# Patient Record
Sex: MALE | Race: WHITE | NOT HISPANIC OR LATINO | Employment: OTHER | URBAN - METROPOLITAN AREA
[De-identification: names, ages, dates, MRNs, and addresses within clinical notes are randomized per-mention and may not be internally consistent; named-entity substitution may affect disease eponyms.]

---

## 2017-01-02 ENCOUNTER — LAB VISIT (OUTPATIENT)
Dept: LAB | Facility: HOSPITAL | Age: 77
End: 2017-01-02
Attending: FAMILY MEDICINE
Payer: MEDICARE

## 2017-01-02 DIAGNOSIS — Z93.1 GASTROSTOMY STATUS: ICD-10-CM

## 2017-01-02 DIAGNOSIS — Z99.11: Primary | ICD-10-CM

## 2017-01-02 DIAGNOSIS — J96.00 ACUTE RESPIRATORY FAILURE: ICD-10-CM

## 2017-01-02 LAB
BASOPHILS # BLD AUTO: 0.2 K/UL
BASOPHILS NFR BLD: 2.4 %
DIFFERENTIAL METHOD: ABNORMAL
EOSINOPHIL # BLD AUTO: 0.6 K/UL
EOSINOPHIL NFR BLD: 6.2 %
ERYTHROCYTE [DISTWIDTH] IN BLOOD BY AUTOMATED COUNT: 16 %
HCT VFR BLD AUTO: 37.6 %
HGB BLD-MCNC: 12.4 G/DL
LYMPHOCYTES # BLD AUTO: 2.7 K/UL
LYMPHOCYTES NFR BLD: 30 %
MCH RBC QN AUTO: 28.3 PG
MCHC RBC AUTO-ENTMCNC: 32.9 %
MCV RBC AUTO: 86 FL
MONOCYTES # BLD AUTO: 0.8 K/UL
MONOCYTES NFR BLD: 9 %
NEUTROPHILS # BLD AUTO: 4.7 K/UL
NEUTROPHILS NFR BLD: 52.4 %
PLATELET # BLD AUTO: 186 K/UL
PMV BLD AUTO: 11.5 FL
RBC # BLD AUTO: 4.37 M/UL
WBC # BLD AUTO: 9 K/UL

## 2017-01-02 PROCEDURE — 36415 COLL VENOUS BLD VENIPUNCTURE: CPT

## 2017-01-02 PROCEDURE — 85025 COMPLETE CBC W/AUTO DIFF WBC: CPT

## 2017-02-13 ENCOUNTER — LAB VISIT (OUTPATIENT)
Dept: LAB | Facility: HOSPITAL | Age: 77
End: 2017-02-13
Attending: FAMILY MEDICINE
Payer: MEDICARE

## 2017-02-13 DIAGNOSIS — J39.8 INCREASED TRACHEAL SECRETIONS: Primary | ICD-10-CM

## 2017-02-13 PROCEDURE — 87070 CULTURE OTHR SPECIMN AEROBIC: CPT

## 2017-02-13 PROCEDURE — 87205 SMEAR GRAM STAIN: CPT

## 2017-02-13 PROCEDURE — 87077 CULTURE AEROBIC IDENTIFY: CPT

## 2017-02-13 PROCEDURE — 87186 SC STD MICRODIL/AGAR DIL: CPT | Mod: 59

## 2017-02-16 LAB
BACTERIA SPEC AEROBE CULT: NORMAL
BACTERIA SPEC AEROBE CULT: NORMAL
GRAM STN SPEC: NORMAL

## 2017-02-24 ENCOUNTER — LAB VISIT (OUTPATIENT)
Dept: LAB | Facility: HOSPITAL | Age: 77
End: 2017-02-24
Attending: FAMILY MEDICINE
Payer: MEDICARE

## 2017-02-24 DIAGNOSIS — R50.9 TEMPERATURE ELEVATED: Primary | ICD-10-CM

## 2017-02-24 PROCEDURE — 87077 CULTURE AEROBIC IDENTIFY: CPT | Mod: 59

## 2017-02-24 PROCEDURE — 87205 SMEAR GRAM STAIN: CPT

## 2017-02-24 PROCEDURE — 87070 CULTURE OTHR SPECIMN AEROBIC: CPT

## 2017-02-24 PROCEDURE — 87186 SC STD MICRODIL/AGAR DIL: CPT

## 2017-02-27 LAB
BACTERIA SPEC AEROBE CULT: NORMAL
GRAM STN SPEC: NORMAL

## 2017-03-20 ENCOUNTER — HOSPITAL ENCOUNTER (INPATIENT)
Facility: HOSPITAL | Age: 77
LOS: 3 days | DRG: 698 | End: 2017-03-24
Attending: EMERGENCY MEDICINE | Admitting: INTERNAL MEDICINE
Payer: MEDICARE

## 2017-03-20 DIAGNOSIS — A41.9 SEPSIS, DUE TO UNSPECIFIED ORGANISM: ICD-10-CM

## 2017-03-20 DIAGNOSIS — E03.9 ACQUIRED HYPOTHYROIDISM: ICD-10-CM

## 2017-03-20 DIAGNOSIS — Z93.0 TRACHEOSTOMY DEPENDENCE: ICD-10-CM

## 2017-03-20 DIAGNOSIS — A41.9 SEPTIC SHOCK: ICD-10-CM

## 2017-03-20 DIAGNOSIS — Z93.1 PEG (PERCUTANEOUS ENDOSCOPIC GASTROSTOMY) STATUS: ICD-10-CM

## 2017-03-20 DIAGNOSIS — N39.0 URINARY TRACT INFECTION WITHOUT HEMATURIA, SITE UNSPECIFIED: Primary | ICD-10-CM

## 2017-03-20 DIAGNOSIS — J44.9 CHRONIC OBSTRUCTIVE PULMONARY DISEASE, UNSPECIFIED COPD TYPE: ICD-10-CM

## 2017-03-20 DIAGNOSIS — J95.851 VENTILATOR ASSOCIATED PNEUMONIA: ICD-10-CM

## 2017-03-20 DIAGNOSIS — R53.2 FUNCTIONAL QUADRIPLEGIA: ICD-10-CM

## 2017-03-20 DIAGNOSIS — R65.21 SEPTIC SHOCK: ICD-10-CM

## 2017-03-20 LAB
BACTERIA #/AREA URNS HPF: ABNORMAL /HPF
BILIRUB UR QL STRIP: NEGATIVE
CLARITY UR: ABNORMAL
COLOR UR: YELLOW
GLUCOSE UR QL STRIP: NEGATIVE
HGB UR QL STRIP: ABNORMAL
HYALINE CASTS #/AREA URNS LPF: 10 /LPF
KETONES UR QL STRIP: NEGATIVE
LEUKOCYTE ESTERASE UR QL STRIP: ABNORMAL
MICROSCOPIC COMMENT: ABNORMAL
NITRITE UR QL STRIP: POSITIVE
PH UR STRIP: >8 [PH] (ref 5–8)
PROT UR QL STRIP: ABNORMAL
RBC #/AREA URNS HPF: >100 /HPF (ref 0–4)
SP GR UR STRIP: 1.01 (ref 1–1.03)
URN SPEC COLLECT METH UR: ABNORMAL
UROBILINOGEN UR STRIP-ACNC: 1 EU/DL
WBC #/AREA URNS HPF: >100 /HPF (ref 0–5)

## 2017-03-20 PROCEDURE — 93005 ELECTROCARDIOGRAM TRACING: CPT

## 2017-03-20 PROCEDURE — 80053 COMPREHEN METABOLIC PANEL: CPT

## 2017-03-20 PROCEDURE — 84443 ASSAY THYROID STIM HORMONE: CPT

## 2017-03-20 PROCEDURE — 25000003 PHARM REV CODE 250: Performed by: EMERGENCY MEDICINE

## 2017-03-20 PROCEDURE — 27000221 HC OXYGEN, UP TO 24 HOURS

## 2017-03-20 PROCEDURE — 93010 ELECTROCARDIOGRAM REPORT: CPT | Mod: ,,, | Performed by: INTERNAL MEDICINE

## 2017-03-20 PROCEDURE — 36415 COLL VENOUS BLD VENIPUNCTURE: CPT

## 2017-03-20 PROCEDURE — 99291 CRITICAL CARE FIRST HOUR: CPT

## 2017-03-20 PROCEDURE — 85025 COMPLETE CBC W/AUTO DIFF WBC: CPT

## 2017-03-20 PROCEDURE — 5A1945Z RESPIRATORY VENTILATION, 24-96 CONSECUTIVE HOURS: ICD-10-PCS | Performed by: INTERNAL MEDICINE

## 2017-03-20 PROCEDURE — 96365 THER/PROPH/DIAG IV INF INIT: CPT | Mod: 59

## 2017-03-20 PROCEDURE — 87086 URINE CULTURE/COLONY COUNT: CPT

## 2017-03-20 PROCEDURE — 87040 BLOOD CULTURE FOR BACTERIA: CPT | Mod: 59

## 2017-03-20 PROCEDURE — 81000 URINALYSIS NONAUTO W/SCOPE: CPT

## 2017-03-20 PROCEDURE — 83605 ASSAY OF LACTIC ACID: CPT

## 2017-03-20 PROCEDURE — 94002 VENT MGMT INPAT INIT DAY: CPT

## 2017-03-20 PROCEDURE — 51701 INSERT BLADDER CATHETER: CPT

## 2017-03-20 PROCEDURE — 84439 ASSAY OF FREE THYROXINE: CPT

## 2017-03-20 RX ORDER — CIPROFLOXACIN 2 MG/ML
400 INJECTION, SOLUTION INTRAVENOUS
Status: COMPLETED | OUTPATIENT
Start: 2017-03-21 | End: 2017-03-21

## 2017-03-20 RX ADMIN — SODIUM CHLORIDE 1000 ML: 0.9 INJECTION, SOLUTION INTRAVENOUS at 11:03

## 2017-03-20 NOTE — IP AVS SNAPSHOT
[address] [place] [address]  Phone: [phone]           Patient Discharge Instructions     Our goal is to set you up for success. This packet includes information on your condition, medications, and your home care. It will help you to care for yourself so you don't get sicker and need to go back to the hospital.     Please ask your nurse if you have any questions.        There are many details to remember when preparing to leave the hospital. Here is what you will need to do:    1. Take your medicine. If you are prescribed medications, review your Medication List in the following pages. You may have new medications to  at the pharmacy and others that you'll need to stop taking. Review the instructions for how and when to take your medications. Talk with your doctor or nurses if you are unsure of what to do.     2. Go to your follow-up appointments. Specific follow-up information is listed in the following pages. Your may be contacted by a transition nurse or clinical provider about future appointments. Be sure we have all of the phone numbers to reach you, if needed. Please contact your provider's office if you are unable to make an appointment.     3. Watch for warning signs. Your doctor or nurse will give you detailed warning signs to watch for and when to call for assistance. These instructions may also include educational information about your condition. If you experience any of warning signs to your health, call your doctor.               [facility]  Unless otherwise directed by your provider, please contact [facility], our nurse care line that is available for 24/7 assistance.     [phone] (toll-free)    Registered nurses in the [facility] provide clinical advisement, health education, appointment booking, and other advisory services.                    ** Verify the list of medication(s) below is accurate and up to date.  Carry this with you in case of emergency. If your medications have changed, please notify your healthcare provider.             Medication List      START taking these medications        Additional Info                      amoxicillin-clavulanate 875-125mg 875-125 mg per tablet   Commonly known as:  AUGMENTIN   Quantity:  14 tablet   Refills:  0   Dose:  1 tablet    Instructions:  1 tablet by Per G Tube route 2 (two) times daily.     Begin Date    AM    Noon    PM    Bedtime       levoFLOXacin 500 MG tablet   Commonly known as:  LEVAQUIN   Quantity:  7 tablet   Refills:  0   Dose:  500 mg    Instructions:  1 tablet (500 mg total) by Per G Tube route once daily.     Begin Date    AM    Noon    PM    Bedtime         CHANGE how you take these medications        Additional Info                      famotidine 20 MG tablet   Commonly known as:  PEPCID   Quantity:  60 tablet   Refills:  11   Dose:  20 mg   What changed:    - how to take this  - when to take this    Instructions:  1 tablet (20 mg total) by Per G Tube route 2 (two) times daily.     Begin Date    AM    Noon    PM    Bedtime         CONTINUE taking these medications        Additional Info                      aspirin 325 MG tablet   Refills:  0   Dose:  325 mg    Last time this was given:  325 mg on 3/24/2017  9:05 AM   Instructions:  325 mg by PEG Tube route once daily.     Begin Date    AM    Noon    PM    Bedtime       CERTA PLUS ORAL   Refills:  0    Instructions:  Take by mouth.     Begin Date    AM    Noon    PM    Bedtime       duloxetine 30 MG capsule   Commonly known as:  CYMBALTA   Refills:  0   Dose:  30 mg    Last time this was given:  30 mg on 3/24/2017  9:05 AM   Instructions:  Take 30 mg by mouth once daily.     Begin Date    AM    Noon    PM    Bedtime       DUONEB 0.5 mg-3 mg(2.5 mg base)/3 mL nebulizer solution   Refills:  0   Dose:  3 mL   Generic drug:  albuterol-ipratropium 2.5mg-0.5mg/3mL    Last time this was given:  3 mLs on 3/24/2017   1:23 PM   Instructions:  Take 3 mLs by nebulization every 4 (four) hours as needed.     Begin Date    AM    Noon    PM    Bedtime       EXELON 4.6 mg/24 hr Pt24   Refills:  0   Dose:  1 patch   Generic drug:  rivastigmine    Last time this was given:  1 patch on 3/24/2017  9:05 AM   Instructions:  Place 1 patch onto the skin once daily.     Begin Date    AM    Noon    PM    Bedtime       ferrous sulfate 300 mg (60 mg iron)/5 mL syrup   Refills:  0   Dose:  300 mg    Last time this was given:  300 mg on 3/24/2017  9:06 AM   Instructions:  300 mg by PEG Tube route once daily.     Begin Date    AM    Noon    PM    Bedtime       finasteride 5 mg tablet   Commonly known as:  PROSCAR   Refills:  0   Dose:  5 mg    Last time this was given:  5 mg on 3/24/2017  9:05 AM   Instructions:  Take 5 mg by mouth once daily.     Begin Date    AM    Noon    PM    Bedtime       FLOMAX 0.4 mg Cp24   Refills:  0   Dose:  0.4 mg   Generic drug:  tamsulosin    Last time this was given:  0.4 mg on 3/24/2017  9:05 AM   Instructions:  Take 0.4 mg by mouth once daily.     Begin Date    AM    Noon    PM    Bedtime       gabapentin 400 MG capsule   Commonly known as:  NEURONTIN   Refills:  0   Dose:  400 mg    Last time this was given:  400 mg on 3/24/2017  2:05 PM   Instructions:  Take 400 mg by mouth every 8 (eight) hours.     Begin Date    AM    Noon    PM    Bedtime       hydrocodone-acetaminophen 10-325mg  mg Tab   Commonly known as:  NORCO   Refills:  0    Last time this was given:  1 tablet on 3/24/2017  9:05 AM   Instructions:  Take by mouth.     Begin Date    AM    Noon    PM    Bedtime       ISOSOURCE HN Liqd   Refills:  0   Dose:  70 mL   Generic drug:  nutritional supplements    Instructions:  Take 70 mLs by mouth Daily.     Begin Date    AM    Noon    PM    Bedtime       levothyroxine 50 MCG tablet   Commonly known as:  SYNTHROID   Quantity:  30 tablet   Refills:  11   Dose:  50 mcg    Instructions:  1 tablet (50 mcg total)  by Per G Tube route before breakfast.     Begin Date    AM    Noon    PM    Bedtime       liothyronine 25 MCG Tab   Commonly known as:  CYTOMEL   Refills:  0   Dose:  25 mcg    Instructions:  Take 25 mcg by mouth once daily.     Begin Date    AM    Noon    PM    Bedtime       melatonin 3 mg Tab   Refills:  0   Dose:  3 mg    Instructions:  3 mg by PEG Tube route every evening.     Begin Date    AM    Noon    PM    Bedtime       oxybutynin 5 MG Tab   Commonly known as:  DITROPAN   Quantity:  90 tablet   Refills:  11   Dose:  5 mg    Instructions:  1 tablet (5 mg total) by Per G Tube route 3 (three) times daily.     Begin Date    AM    Noon    PM    Bedtime       potassium chloride 20 mEq Pack   Commonly known as:  KLOR-CON   Refills:  0   Dose:  20 mEq    Instructions:  Take 20 mEq by mouth 2 (two) times daily.     Begin Date    AM    Noon    PM    Bedtime       trazodone 50 MG tablet   Commonly known as:  DESYREL   Refills:  0   Dose:  50 mg    Last time this was given:  50 mg on 3/23/2017  9:30 PM   Instructions:  Take 50 mg by mouth every evening.     Begin Date    AM    Noon    PM    Bedtime       VITAMIN C 500 mg/5 mL Syrp syrup   Refills:  0   Dose:  500 mg   Generic drug:  ascorbic acid (vitamin C)    Instructions:  Take 500 mg by mouth once daily.     Begin Date    AM    Noon    PM    Bedtime         STOP taking these medications     ciprofloxacin HCl 500 MG tablet   Commonly known as:  CIPRO            Where to Get Your Medications      You can get these medications from any pharmacy     Bring a paper prescription for each of these medications     amoxicillin-clavulanate 875-125mg 875-125 mg per tablet    famotidine 20 MG tablet    levoFLOXacin 500 MG tablet                  Please bring to all follow up appointments:    1. A copy of your discharge instructions.  2. All medicines you are currently taking in their original bottles.  3. Identification and insurance card.    Please arrive 15 minutes ahead of  scheduled appointment time.    Please call 24 hours in advance if you must reschedule your appointment and/or time.        Follow-up Information     Follow up with Jeramie Lombardi MD.    Specialty:  Family Medicine    Contact information:    [place] [id number] [phone]          Follow up with [facility] Osmin Taylor.    Specialties:  SNF Agency, Nursing Home Agency    Why:  alf, Nursing Home    Contact information:    [address] [place] [id number] [phone]          Discharge Instructions     Future Orders    Call MD for:     Comments:    For worsening symptoms, chest pain, shortness of breath, increased abdominal pain, high grade fever, stroke or stroke like symptoms, immediately go to the nearest Emergency Room or call 911 as soon as possible.    Diet general     Comments:    PEG feeding: Peptamin Bariatric at 55cc/hre with free water 120 cc q 4.    Questions:    Total calories:      Fat restriction, if any:      Protein restriction, if any:      Na restriction, if any:      Fluid restriction:      Additional restrictions:      Other restrictions (specify):     Comments:    Fall precautions        Discharge Instructions       Admit to [facility]     Dx-   Patient Active Problem List   Diagnosis    Sepsis    Functional quadriplegia    Respiratory failure, acute-on-chronic    SIRS with acute organ dysfunction due to infectious process    Tracheostomy dependence    Chronic obstructive pulmonary disease    Hematuria    Hypotension    Urinary tract infection without hematuria    Septic shock    PEG (percutaneous endoscopic gastrostomy) status    Ventilator associated pneumonia    Acquired hypothyroidism     Diet- NPO  TF- Peptamen Bariatric @55cc/hr; Hold TF x4 hrs for residuals >250mls. Flush 120 mls Q4 hrs to meet fluid need  Aspiration Precautions   Keep HOB @30 degrees   Peg care per protocol    Vent Mode: A/C  Oxygen Concentration  (%):  [40] 40  Resp Rate Total:  [10 br/min-33 br/min] 18 br/min  Vt Set:  [700 mL] 700 mL  PEEP/CPAP:  [5 cmH20] 5 cmH20  Pressure Support:  [0 cmH20] 0 cmH20  Mean Airway Pressure:  [11 seR55-20 cmH20] 11 cmH20     Trach care per protocol     Levaquin and Augmentin x7days; stop date- 3/31/17; Dx- UTI        Primary Diagnosis     Your primary diagnosis was:  Septic Shock      Admission Information     Date & Time Provider Department CSN    3/20/2017 10:23 PM Melissa Mayfield MD [facility] [id number]      Care Providers     Provider Role Specialty Primary office phone    Melissa Mayfield MD Attending Provider Internal Medicine [phone]      Your Vitals Were     BP Pulse Temp Resp Height Weight    107/66 (BP Location: Right arm, Patient Position: Lying, BP Method: Automatic) 87 100 °F (37.8 °C) (Oral) 23 6' (1.829 m) 119 kg (262 lb 5.6 oz)    SpO2 BMI             95% 35.58 kg/m2         Recent Lab Values     No lab values to display.      Pending Labs     Order Current Status    Blood culture Preliminary result    Blood culture Preliminary result      Allergies as of 3/24/2017        Reactions    Codeine Other (See Comments)      Advance Directives     An advance directive is a document which, in the event you are no longer able to make decisions for yourself, tells your healthcare team what kind of treatment you do or do not want to receive, or who you would like to make those decisions for you.  If you do not currently have an advance directive, [facility] encourages you to create one.  For more information call:  [phone] ([phone]), [phone] ([phone]),  or log on to [email or web address].        Smoking Cessation     If you would like to quit smoking:   You may be eligible for free services if you are a [place] resident and started smoking cigarettes before September 1, 1988.  Call the Smoking Cessation Trust ([facility]) toll free at [phone] or [phone].   Call  1-800-QUIT-NOW if you do not meet the above criteria.            Language Assistance Services     ATTENTION: Language assistance services are available, free of charge. Please call [phone].      ATENCIÓN: Si clarisse villafana, tiene a gannon disposición servicios gratuitos de asistencia lingüística. Llame al [phone].     CHÚ Ý: N?u b?n nói Ti?ng Vi?t, có các d?ch v? h? tr? ngôn ng? mi?n phí dành cho b?n. G?i s? [phone].        [facility] Sign-Up     Activating your [facility] account is as easy as 1-2-3!     1) Visit [email or web address], select Sign Up Now, enter this activation code and your date of birth, then select Next.  [email or web address]  Expires: 5/8/2017 12:09 PM      2) Create a username and password to use when you visit [facility] in the future and select a security question in case you lose your password and select Next.    3) Enter your e-mail address and click Sign Up!    Additional Information  If you have questions, please e-mail [email or web address] or call [phone] to talk to our [facility] staff. Remember, [facility] is NOT to be used for urgent needs. For medical emergencies, dial 911.          [facility] complies with applicable Federal civil rights laws and does not discriminate on the basis of race, color, national origin, age, disability, or sex.

## 2017-03-21 PROBLEM — N39.0 URINARY TRACT INFECTION WITHOUT HEMATURIA: Status: ACTIVE | Noted: 2017-03-21

## 2017-03-21 PROBLEM — R65.21 SEPTIC SHOCK: Status: ACTIVE | Noted: 2017-03-21

## 2017-03-21 PROBLEM — J95.851 VENTILATOR ASSOCIATED PNEUMONIA: Status: ACTIVE | Noted: 2017-03-21

## 2017-03-21 PROBLEM — E03.9 ACQUIRED HYPOTHYROIDISM: Status: ACTIVE | Noted: 2017-03-21

## 2017-03-21 PROBLEM — Z93.1 PEG (PERCUTANEOUS ENDOSCOPIC GASTROSTOMY) STATUS: Status: ACTIVE | Noted: 2017-03-21

## 2017-03-21 PROBLEM — A41.9 SEPTIC SHOCK: Status: ACTIVE | Noted: 2017-03-21

## 2017-03-21 LAB
ALBUMIN SERPL BCP-MCNC: 2.8 G/DL
ALP SERPL-CCNC: 89 U/L
ALT SERPL W/O P-5'-P-CCNC: 22 U/L
ANION GAP SERPL CALC-SCNC: 7 MMOL/L
APTT BLDCRRT: 27.4 SEC
AST SERPL-CCNC: 21 U/L
BASOPHILS # BLD AUTO: 0 K/UL
BASOPHILS # BLD AUTO: 0.1 K/UL
BASOPHILS NFR BLD: 0.3 %
BASOPHILS NFR BLD: 0.5 %
BILIRUB SERPL-MCNC: 1.1 MG/DL
BUN SERPL-MCNC: 33 MG/DL
CALCIUM SERPL-MCNC: 9.6 MG/DL
CHLORIDE SERPL-SCNC: 97 MMOL/L
CO2 SERPL-SCNC: 33 MMOL/L
CREAT SERPL-MCNC: 1.1 MG/DL
DIFFERENTIAL METHOD: ABNORMAL
DIFFERENTIAL METHOD: ABNORMAL
EOSINOPHIL # BLD AUTO: 0.1 K/UL
EOSINOPHIL # BLD AUTO: 0.1 K/UL
EOSINOPHIL NFR BLD: 0.9 %
EOSINOPHIL NFR BLD: 0.9 %
ERYTHROCYTE [DISTWIDTH] IN BLOOD BY AUTOMATED COUNT: 13.5 %
ERYTHROCYTE [DISTWIDTH] IN BLOOD BY AUTOMATED COUNT: 13.6 %
EST. GFR  (AFRICAN AMERICAN): >60 ML/MIN/1.73 M^2
EST. GFR  (NON AFRICAN AMERICAN): >60 ML/MIN/1.73 M^2
GLUCOSE SERPL-MCNC: 95 MG/DL
HCT VFR BLD AUTO: 31.4 %
HCT VFR BLD AUTO: 34.7 %
HGB BLD-MCNC: 10.4 G/DL
HGB BLD-MCNC: 11.5 G/DL
INR PPP: 1.3
LACTATE SERPL-SCNC: 0.7 MMOL/L
LACTATE SERPL-SCNC: 1.1 MMOL/L
LYMPHOCYTES # BLD AUTO: 1.3 K/UL
LYMPHOCYTES # BLD AUTO: 1.3 K/UL
LYMPHOCYTES NFR BLD: 12.1 %
LYMPHOCYTES NFR BLD: 13.5 %
MCH RBC QN AUTO: 28.5 PG
MCH RBC QN AUTO: 28.6 PG
MCHC RBC AUTO-ENTMCNC: 33.1 %
MCHC RBC AUTO-ENTMCNC: 33.2 %
MCV RBC AUTO: 86 FL
MCV RBC AUTO: 86 FL
MONOCYTES # BLD AUTO: 1.2 K/UL
MONOCYTES # BLD AUTO: 1.4 K/UL
MONOCYTES NFR BLD: 11.3 %
MONOCYTES NFR BLD: 14.4 %
NEUTROPHILS # BLD AUTO: 7.1 K/UL
NEUTROPHILS # BLD AUTO: 7.9 K/UL
NEUTROPHILS NFR BLD: 70.9 %
NEUTROPHILS NFR BLD: 75.2 %
PLATELET # BLD AUTO: 114 K/UL
PLATELET # BLD AUTO: 141 K/UL
PMV BLD AUTO: 11.1 FL
PMV BLD AUTO: 11.1 FL
POTASSIUM SERPL-SCNC: 4.6 MMOL/L
PROT SERPL-MCNC: 7.9 G/DL
PROTHROMBIN TIME: 13.5 SEC
RBC # BLD AUTO: 3.64 M/UL
RBC # BLD AUTO: 4.03 M/UL
SODIUM SERPL-SCNC: 137 MMOL/L
T4 FREE SERPL-MCNC: 0.75 NG/DL
TSH SERPL DL<=0.005 MIU/L-ACNC: 0.02 UIU/ML
WBC # BLD AUTO: 10 K/UL
WBC # BLD AUTO: 10.5 K/UL

## 2017-03-21 PROCEDURE — 25000003 PHARM REV CODE 250: Performed by: INTERNAL MEDICINE

## 2017-03-21 PROCEDURE — 27000221 HC OXYGEN, UP TO 24 HOURS

## 2017-03-21 PROCEDURE — 87205 SMEAR GRAM STAIN: CPT

## 2017-03-21 PROCEDURE — 94003 VENT MGMT INPAT SUBQ DAY: CPT

## 2017-03-21 PROCEDURE — 94761 N-INVAS EAR/PLS OXIMETRY MLT: CPT

## 2017-03-21 PROCEDURE — 63600175 PHARM REV CODE 636 W HCPCS: Performed by: NURSE PRACTITIONER

## 2017-03-21 PROCEDURE — 25000242 PHARM REV CODE 250 ALT 637 W/ HCPCS: Performed by: NURSE PRACTITIONER

## 2017-03-21 PROCEDURE — 63600175 PHARM REV CODE 636 W HCPCS: Performed by: EMERGENCY MEDICINE

## 2017-03-21 PROCEDURE — C9113 INJ PANTOPRAZOLE SODIUM, VIA: HCPCS | Performed by: NURSE PRACTITIONER

## 2017-03-21 PROCEDURE — 87186 SC STD MICRODIL/AGAR DIL: CPT | Mod: 59

## 2017-03-21 PROCEDURE — 85610 PROTHROMBIN TIME: CPT

## 2017-03-21 PROCEDURE — 96368 THER/DIAG CONCURRENT INF: CPT

## 2017-03-21 PROCEDURE — 83605 ASSAY OF LACTIC ACID: CPT

## 2017-03-21 PROCEDURE — 87070 CULTURE OTHR SPECIMN AEROBIC: CPT

## 2017-03-21 PROCEDURE — 20000000 HC ICU ROOM

## 2017-03-21 PROCEDURE — 96365 THER/PROPH/DIAG IV INF INIT: CPT

## 2017-03-21 PROCEDURE — 36415 COLL VENOUS BLD VENIPUNCTURE: CPT

## 2017-03-21 PROCEDURE — 25000003 PHARM REV CODE 250: Performed by: NURSE PRACTITIONER

## 2017-03-21 PROCEDURE — 94770 HC EXHALED C02 TEST: CPT

## 2017-03-21 PROCEDURE — 25000003 PHARM REV CODE 250: Performed by: EMERGENCY MEDICINE

## 2017-03-21 PROCEDURE — 94640 AIRWAY INHALATION TREATMENT: CPT

## 2017-03-21 PROCEDURE — 97802 MEDICAL NUTRITION INDIV IN: CPT | Performed by: DIETITIAN, REGISTERED

## 2017-03-21 PROCEDURE — 85025 COMPLETE CBC W/AUTO DIFF WBC: CPT

## 2017-03-21 PROCEDURE — 87077 CULTURE AEROBIC IDENTIFY: CPT | Mod: 59

## 2017-03-21 PROCEDURE — 85730 THROMBOPLASTIN TIME PARTIAL: CPT

## 2017-03-21 PROCEDURE — 36556 INSERT NON-TUNNEL CV CATH: CPT

## 2017-03-21 PROCEDURE — 99223 1ST HOSP IP/OBS HIGH 75: CPT | Mod: ,,, | Performed by: INTERNAL MEDICINE

## 2017-03-21 RX ORDER — GABAPENTIN 400 MG/1
400 CAPSULE ORAL EVERY 8 HOURS
Status: ON HOLD | COMMUNITY
End: 2017-10-07

## 2017-03-21 RX ORDER — IBUPROFEN 200 MG
16 TABLET ORAL
Status: DISCONTINUED | OUTPATIENT
Start: 2017-03-21 | End: 2017-03-24 | Stop reason: HOSPADM

## 2017-03-21 RX ORDER — DULOXETIN HYDROCHLORIDE 30 MG/1
30 CAPSULE, DELAYED RELEASE ORAL DAILY
COMMUNITY

## 2017-03-21 RX ORDER — POTASSIUM CHLORIDE 1.5 G/1.58G
20 POWDER, FOR SOLUTION ORAL 2 TIMES DAILY
COMMUNITY
End: 2019-01-01

## 2017-03-21 RX ORDER — ACETAMINOPHEN 650 MG/20.3ML
650 LIQUID ORAL EVERY 4 HOURS PRN
Status: DISCONTINUED | OUTPATIENT
Start: 2017-03-21 | End: 2017-03-24 | Stop reason: HOSPADM

## 2017-03-21 RX ORDER — PANTOPRAZOLE SODIUM 40 MG/10ML
40 INJECTION, POWDER, LYOPHILIZED, FOR SOLUTION INTRAVENOUS DAILY
Status: DISCONTINUED | OUTPATIENT
Start: 2017-03-21 | End: 2017-03-24 | Stop reason: HOSPADM

## 2017-03-21 RX ORDER — SODIUM CHLORIDE 9 MG/ML
INJECTION, SOLUTION INTRAVENOUS CONTINUOUS
Status: DISCONTINUED | OUTPATIENT
Start: 2017-03-21 | End: 2017-03-24

## 2017-03-21 RX ORDER — IPRATROPIUM BROMIDE AND ALBUTEROL SULFATE 2.5; .5 MG/3ML; MG/3ML
3 SOLUTION RESPIRATORY (INHALATION) EVERY 6 HOURS
Status: DISCONTINUED | OUTPATIENT
Start: 2017-03-21 | End: 2017-03-24 | Stop reason: HOSPADM

## 2017-03-21 RX ORDER — FINASTERIDE 5 MG/1
5 TABLET, FILM COATED ORAL DAILY
COMMUNITY

## 2017-03-21 RX ORDER — ACETAMINOPHEN 10 MG/ML
1000 INJECTION, SOLUTION INTRAVENOUS ONCE
Status: COMPLETED | OUTPATIENT
Start: 2017-03-21 | End: 2017-03-21

## 2017-03-21 RX ORDER — FERROUS SULFATE 300 MG/5ML
300 LIQUID (ML) ORAL DAILY
Status: DISCONTINUED | OUTPATIENT
Start: 2017-03-21 | End: 2017-03-24 | Stop reason: HOSPADM

## 2017-03-21 RX ORDER — ASPIRIN 325 MG
325 TABLET ORAL DAILY
Status: DISCONTINUED | OUTPATIENT
Start: 2017-03-21 | End: 2017-03-24 | Stop reason: HOSPADM

## 2017-03-21 RX ORDER — ENOXAPARIN SODIUM 100 MG/ML
40 INJECTION SUBCUTANEOUS EVERY 24 HOURS
Status: DISCONTINUED | OUTPATIENT
Start: 2017-03-21 | End: 2017-03-24 | Stop reason: HOSPADM

## 2017-03-21 RX ORDER — ASCORBIC ACID 500 MG/5ML
500 SYRUP ORAL 2 TIMES DAILY
COMMUNITY

## 2017-03-21 RX ORDER — FAMOTIDINE 20 MG/1
20 TABLET, FILM COATED ORAL DAILY
Status: ON HOLD | COMMUNITY
End: 2017-03-24

## 2017-03-21 RX ORDER — TAMSULOSIN HYDROCHLORIDE 0.4 MG/1
0.4 CAPSULE ORAL DAILY
Status: DISCONTINUED | OUTPATIENT
Start: 2017-03-21 | End: 2017-03-24 | Stop reason: HOSPADM

## 2017-03-21 RX ORDER — FINASTERIDE 5 MG/1
5 TABLET, FILM COATED ORAL DAILY
Status: DISCONTINUED | OUTPATIENT
Start: 2017-03-21 | End: 2017-03-24 | Stop reason: HOSPADM

## 2017-03-21 RX ORDER — LIOTHYRONINE SODIUM 25 UG/1
75 TABLET ORAL DAILY
COMMUNITY
End: 2019-01-01

## 2017-03-21 RX ORDER — NOREPINEPHRINE BITARTRATE/D5W 8 MG/250ML
0.05 PLASTIC BAG, INJECTION (ML) INTRAVENOUS CONTINUOUS
Status: DISCONTINUED | OUTPATIENT
Start: 2017-03-21 | End: 2017-03-24

## 2017-03-21 RX ORDER — TAMSULOSIN HYDROCHLORIDE 0.4 MG/1
0.4 CAPSULE ORAL DAILY
COMMUNITY
End: 2017-03-27

## 2017-03-21 RX ORDER — IBUPROFEN 200 MG
24 TABLET ORAL
Status: DISCONTINUED | OUTPATIENT
Start: 2017-03-21 | End: 2017-03-24 | Stop reason: HOSPADM

## 2017-03-21 RX ORDER — HYDROCODONE BITARTRATE AND ACETAMINOPHEN 10; 325 MG/1; MG/1
1 TABLET ORAL EVERY 6 HOURS PRN
Status: DISCONTINUED | OUTPATIENT
Start: 2017-03-21 | End: 2017-03-24 | Stop reason: HOSPADM

## 2017-03-21 RX ORDER — GABAPENTIN 400 MG/1
400 CAPSULE ORAL EVERY 8 HOURS
Status: DISCONTINUED | OUTPATIENT
Start: 2017-03-21 | End: 2017-03-24 | Stop reason: HOSPADM

## 2017-03-21 RX ORDER — HYDROCODONE BITARTRATE AND ACETAMINOPHEN 10; 325 MG/1; MG/1
1 TABLET ORAL EVERY 6 HOURS PRN
Status: ON HOLD | COMMUNITY
End: 2017-10-16

## 2017-03-21 RX ORDER — TRAZODONE HYDROCHLORIDE 50 MG/1
50 TABLET ORAL NIGHTLY
Status: DISCONTINUED | OUTPATIENT
Start: 2017-03-21 | End: 2017-03-24 | Stop reason: HOSPADM

## 2017-03-21 RX ORDER — TRAZODONE HYDROCHLORIDE 50 MG/1
50 TABLET ORAL NIGHTLY
Status: ON HOLD | COMMUNITY
End: 2017-10-07

## 2017-03-21 RX ORDER — LEVOTHYROXINE SODIUM 50 UG/1
50 TABLET ORAL
Status: DISCONTINUED | OUTPATIENT
Start: 2017-03-21 | End: 2017-03-21

## 2017-03-21 RX ORDER — CIPROFLOXACIN 500 MG/1
500 TABLET ORAL 2 TIMES DAILY
Status: ON HOLD | COMMUNITY
End: 2017-03-24 | Stop reason: HOSPADM

## 2017-03-21 RX ORDER — GLUCAGON 1 MG
1 KIT INJECTION
Status: DISCONTINUED | OUTPATIENT
Start: 2017-03-21 | End: 2017-03-24 | Stop reason: HOSPADM

## 2017-03-21 RX ORDER — HYDROCODONE BITARTRATE AND ACETAMINOPHEN 7.5; 325 MG/15ML; MG/15ML
5 SOLUTION ORAL EVERY 6 HOURS PRN
Status: DISCONTINUED | OUTPATIENT
Start: 2017-03-21 | End: 2017-03-21

## 2017-03-21 RX ORDER — RIVASTIGMINE 4.6 MG/24H
1 PATCH, EXTENDED RELEASE TRANSDERMAL DAILY
Status: DISCONTINUED | OUTPATIENT
Start: 2017-03-21 | End: 2017-03-24 | Stop reason: HOSPADM

## 2017-03-21 RX ORDER — DULOXETIN HYDROCHLORIDE 30 MG/1
30 CAPSULE, DELAYED RELEASE ORAL DAILY
Status: DISCONTINUED | OUTPATIENT
Start: 2017-03-21 | End: 2017-03-24 | Stop reason: HOSPADM

## 2017-03-21 RX ORDER — LIOTHYRONINE SODIUM 25 UG/1
25 TABLET ORAL DAILY
Status: DISCONTINUED | OUTPATIENT
Start: 2017-03-21 | End: 2017-03-21

## 2017-03-21 RX ADMIN — HYDROCODONE BITARTRATE AND ACETAMINOPHEN 5 ML: 2.5; 108 SOLUTION ORAL at 01:03

## 2017-03-21 RX ADMIN — TRAZODONE HYDROCHLORIDE 50 MG: 50 TABLET ORAL at 08:03

## 2017-03-21 RX ADMIN — IPRATROPIUM BROMIDE AND ALBUTEROL SULFATE 3 ML: .5; 3 SOLUTION RESPIRATORY (INHALATION) at 07:03

## 2017-03-21 RX ADMIN — GABAPENTIN 400 MG: 400 CAPSULE ORAL at 01:03

## 2017-03-21 RX ADMIN — CIPROFLOXACIN 400 MG: 2 INJECTION, SOLUTION INTRAVENOUS at 12:03

## 2017-03-21 RX ADMIN — ENOXAPARIN SODIUM 40 MG: 100 INJECTION SUBCUTANEOUS at 04:03

## 2017-03-21 RX ADMIN — PIPERACILLIN SODIUM AND TAZOBACTAM SODIUM 4.5 G: 4; .5 INJECTION, POWDER, FOR SOLUTION INTRAVENOUS at 03:03

## 2017-03-21 RX ADMIN — PANTOPRAZOLE SODIUM 40 MG: 40 INJECTION, POWDER, FOR SOLUTION INTRAVENOUS at 10:03

## 2017-03-21 RX ADMIN — SODIUM CHLORIDE 1570 ML: 0.9 INJECTION, SOLUTION INTRAVENOUS at 05:03

## 2017-03-21 RX ADMIN — PIPERACILLIN AND TAZOBACTAM 3.38 G: 3; .375 INJECTION, POWDER, LYOPHILIZED, FOR SOLUTION INTRAVENOUS; PARENTERAL at 12:03

## 2017-03-21 RX ADMIN — RIVASTIGMINE TRANSDERMAL SYSTEM 1 PATCH: 4.6 PATCH, EXTENDED RELEASE TRANSDERMAL at 10:03

## 2017-03-21 RX ADMIN — SODIUM CHLORIDE 1000 ML: 0.9 INJECTION, SOLUTION INTRAVENOUS at 12:03

## 2017-03-21 RX ADMIN — ACETAMINOPHEN 1000 MG: 10 INJECTION, SOLUTION INTRAVENOUS at 05:03

## 2017-03-21 RX ADMIN — MINERAL SUPPLEMENT IRON 300 MG / 5 ML STRENGTH LIQUID 100 PER BOX UNFLAVORED 300 MG: at 10:03

## 2017-03-21 RX ADMIN — DULOXETINE HYDROCHLORIDE 30 MG: 30 CAPSULE, DELAYED RELEASE ORAL at 10:03

## 2017-03-21 RX ADMIN — TAMSULOSIN HYDROCHLORIDE 0.4 MG: 0.4 CAPSULE ORAL at 10:03

## 2017-03-21 RX ADMIN — ACETAMINOPHEN 650 MG: 160 SOLUTION ORAL at 08:03

## 2017-03-21 RX ADMIN — Medication 0.05 MCG/KG/MIN: at 02:03

## 2017-03-21 RX ADMIN — GABAPENTIN 400 MG: 400 CAPSULE ORAL at 09:03

## 2017-03-21 RX ADMIN — GABAPENTIN 400 MG: 400 CAPSULE ORAL at 05:03

## 2017-03-21 RX ADMIN — HYDROCODONE BITARTRATE AND ACETAMINOPHEN 1 TABLET: 10; 325 TABLET ORAL at 09:03

## 2017-03-21 RX ADMIN — ACETAMINOPHEN 650 MG: 160 SOLUTION ORAL at 03:03

## 2017-03-21 RX ADMIN — FINASTERIDE 5 MG: 5 TABLET, FILM COATED ORAL at 10:03

## 2017-03-21 RX ADMIN — TRAZODONE HYDROCHLORIDE 50 MG: 50 TABLET ORAL at 05:03

## 2017-03-21 RX ADMIN — SODIUM CHLORIDE: 0.9 INJECTION, SOLUTION INTRAVENOUS at 05:03

## 2017-03-21 RX ADMIN — VANCOMYCIN HYDROCHLORIDE 1000 MG: 1 INJECTION, POWDER, LYOPHILIZED, FOR SOLUTION INTRAVENOUS at 12:03

## 2017-03-21 RX ADMIN — IPRATROPIUM BROMIDE AND ALBUTEROL SULFATE 3 ML: .5; 3 SOLUTION RESPIRATORY (INHALATION) at 01:03

## 2017-03-21 RX ADMIN — ASPIRIN 325 MG ORAL TABLET 325 MG: 325 PILL ORAL at 10:03

## 2017-03-21 RX ADMIN — Medication 500 MG: at 12:03

## 2017-03-21 RX ADMIN — PIPERACILLIN SODIUM AND TAZOBACTAM SODIUM 4.5 G: 4; .5 INJECTION, POWDER, FOR SOLUTION INTRAVENOUS at 08:03

## 2017-03-21 RX ADMIN — HYDROCODONE BITARTRATE AND ACETAMINOPHEN 1 TABLET: 10; 325 TABLET ORAL at 03:03

## 2017-03-21 NOTE — CONSULTS
Masood Messina [id number] is a 76 y.o. male who has been consulted for vancomycin dosing.    The patient has the following labs:     Date Creatinine (mg/dl)    BUN WBC Count   3/21/2017 Estimated Creatinine Clearance: 74.9 mL/min (based on Cr of 1.1). Lab Results   Component Value Date    BUN 33 (H) 03/20/2017     Lab Results   Component Value Date    WBC 10.50 03/20/2017        Current weight is 119 kg (262 lb 5.6 oz)      The patient received  1000 mg on 3/21 at 0003.    The patient will be started on vancomycin at a dose of 1500 mg every 24 hours. The vancomycin trough has been ordered for 3/22 at 2330.  Target trough goal is 15 to 20.    Patient will be followed by pharmacy for changes in renal function, toxicity, and efficacy.   Thank you for allowing us to participate in this patient's care.     Juli [place]

## 2017-03-21 NOTE — PLAN OF CARE
03/21/17 0743   Patient Assessment/Suction   Level of Consciousness (AVPU) alert   Respiratory Effort Normal;Unlabored   Expansion/Accessory Muscles/Retractions no use of accessory muscles   All Lung Fields Breath Sounds diminished;clear   Rhythm/Pattern, Respiratory mechanical device   Cough Frequency infrequent   Cough Type assisted   Suction Method in-line suction catheter (closed)   PRE-TX-O2-ETCO2   O2 Device (Oxygen Therapy) ventilator   $ Is the patient on Oxygen? Yes   Oxygen Concentration (%) 40   SpO2 100 %   Pulse Oximetry Type Continuous   $ Pulse Oximetry - Multiple Charge Pulse Oximetry - Multiple   ETCO2 (mmHg) 42 mmHg   $ ETCO2 Charge Exhaled CO2 Monitoring   Pulse 75   Resp 10   Aerosol Therapy   $ Aerosol Therapy Charges Aerosol Treatment   Respiratory Treatment Status given   SVN/Inhaler Treatment Route in-line   Position During Treatment HOB at 30 degrees   Patient Tolerance good   Post-Treatment   Post-treatment Heart Rate (beats/min) 78       Surgical Airway Portex Cuffed   No Placement Date or Time found.   Inserted by: Present Prior to Hospital Arrival  Brand: Portex  Airway Device Size: 8.0  Style: Cuffed   Cuff Pressure 28 cm H2O   Status Secured   Site Assessment Clean;Dry   Ties Assessment Clean;Dry;Intact;Secure   Vent Select   Conventional Vent Y   $ Ventilator Charges Ventilator Subsequent   Preset Conventional Ventilator Settings   Vent Type    Ventilation Type VC   Vent Mode A/C   Set Rate 10 bmp   Vt Set 700 mL   PEEP/CPAP 5 cmH20   Pressure Support 0 cmH20   Waveform RAMP   Peak Flow 60 L/min   Set Inspiratory Pressure 0 cmH20   Insp Time 0 Sec(s)   Plateau Set/Insp. Hold (sec) 0   Insp Rise Time  0 %   Trigger Sensitivity Flow/I-Trigger 3 L/min   P High 0 cm H2O   P Low 0 cm H2O   T High 0 sec   T Low 0 sec   Patient Ventilator Parameters   Resp Rate Total 11 br/min   Peak Airway Pressure 32 cmH2O   Mean Airway Pressure 11 cmH20   Exhaled Vt 0 mL   Total Ve 6.83 mL   Spont  Ve 0 L   I:E Ratio Measured 6.20:1   Conventional Ventilator Alarms   Ve High Alarm 26 L/min   Resp Rate High Alarm 40 br/min   Press High Alarm 40 cmH2O   Apnea Rate 10   Apnea Volume (mL) 690 mL   Apnea Oxygen Concentration  100   Apnea Flow Rate (L/min) 83   T Apnea 20 sec(s)   Ready to Wean/Extubation Screen   FIO2<60 (chart decimal) 0.4   MV<16L (chart vol.) 6.83   PEEP <10 (chart #) 5   Airway Safety   Ambu bag with the patient? Yes, Adult Ambu   Is mask with the patient? Yes, Adult Mask       Aerosol treatments q6 hours. Patient tolerated well. All ventilator alarms on and functioning properly.

## 2017-03-21 NOTE — PLAN OF CARE
Problem: Fall Risk (Adult)  Goal: Identify Related Risk Factors and Signs and Symptoms  Related risk factors and signs and symptoms are identified upon initiation of Human Response Clinical Practice Guideline (CPG)   Outcome: Ongoing (interventions implemented as appropriate)  No falls this shift. Safety maintained.  Goal: Absence of Falls  Patient will demonstrate the desired outcomes by discharge/transition of care.   Outcome: Ongoing (interventions implemented as appropriate)  No falls this shift.    Problem: Patient Care Overview  Goal: Plan of Care Review  Outcome: Ongoing (interventions implemented as appropriate)  VS monitored throughout shift. Vasopressor titrated per order. Medications administered as ordered. Airway suctioned and protected, patient on mechanical vent. Temperature monitored and treated per order.    Problem: Pressure Ulcer Risk (Brandon Scale) (Adult,Obstetrics,Pediatric)  Goal: Identify Related Risk Factors and Signs and Symptoms  Related risk factors and signs and symptoms are identified upon initiation of Human Response Clinical Practice Guideline (CPG)   Outcome: Ongoing (interventions implemented as appropriate)  No evidence of breakdown.  Goal: Skin Integrity  Patient will demonstrate the desired outcomes by discharge/transition of care.   Outcome: Ongoing (interventions implemented as appropriate)  Intact,.    Problem: Nutrition, Enteral (Adult)  Goal: Signs and Symptoms of Listed Potential Problems Will be Absent, Minimized or Managed (Nutrition, Enteral)  Signs and symptoms of listed potential problems will be absent, minimized or managed by discharge/transition of care (reference Nutrition, Enteral (Adult) CPG).   Outcome: Ongoing (interventions implemented as appropriate)  Enteral feedings restarted.

## 2017-03-21 NOTE — CONSULTS
[facility]  Adult Nutrition  Consult Note    SUMMARY     Recommendations  Recommendation/Intervention: 1.) Recommend Peptamen VHP (formerly bariatric) @ 20 mls/hr advancing by 10 mls Q4 hrs to goal rate 55 mls/hr continuous providing 1320 kcals/day, 121 g protein/day, 100 g CHO/day, and 1109 mls water/day. Hold TF x4 hrs for residuals >250mls. Flush 120 mls Q4 hrs to meet fluid needs or per MD.  Goals: 1.) enteral nutrition will initiate within 72 hrs.   Nutrition Goal Status: new  Communication of RD Recs: other (comment) (sticky note to MD)    1. Urinary tract infection without hematuria, site unspecified    2. Sepsis, due to unspecified organism    3. Septic shock      Past Medical History:   Diagnosis Date    AAA (abdominal aortic aneurysm)     Anemia     CHF (congestive heart failure)     COPD (chronic obstructive pulmonary disease)     Coronary artery disease     Dementia     Depression     GERD (gastroesophageal reflux disease)     Hyperlipidemia     Hypertension     Hypothyroid     Renal disorder     Respiratory failure, chronic     Ventilator dependence        Reason for Assessment  Reason for Assessment: nurse/nurse practitioner consult  Interdisciplinary Rounds: attended  General Information Comments: Admits from [place] in septic shock. Vent depending with trach/peg. No propofol infusing during RD visit.    Nutrition Prescription Ordered  Current Diet Order: NPO      Evaluation of Received Nutrients/Fluid Intake  IV Fluid (mL): 3000     Nutrition Risk Screen  Nutrition Risk Screen: tube feeding or parenteral nutrition      Labs/Tests/Procedures/Meds  Diagnostic Test/Procedure Review: reviewed, pertinent  Pertinent Labs Reviewed: reviewed, pertinent  BMP  Lab Results   Component Value Date     03/20/2017    K 4.6 03/20/2017    CL 97 03/20/2017    CO2 33 (H) 03/20/2017    BUN 33 (H) 03/20/2017    CREATININE 1.1 03/20/2017    CALCIUM 9.6 03/20/2017    ANIONGAP 7 (L)  03/20/2017    ESTGFRAFRICA >60 03/20/2017    EGFRNONAA >60 03/20/2017     Lab Results   Component Value Date    ALBUMIN 2.8 (L) 03/20/2017     Lab Results   Component Value Date    CALCIUM 9.6 03/20/2017    PHOS 2.8 05/11/2014     No results for input(s): POCTGLUCOSE in the last 24 hours.    Pertinent Medications Reviewed: reviewed  Scheduled Meds:   albuterol-ipratropium 2.5mg-0.5mg/3mL  3 mL Nebulization Q6H    aspirin  325 mg Oral Daily    duloxetine  30 mg Oral Daily    enoxaparin  40 mg Subcutaneous Daily    ferrous sulfate  300 mg Oral Daily    finasteride  5 mg Oral Daily    gabapentin  400 mg Oral Q8H    pantoprazole  40 mg Intravenous Daily    piperacillin-tazobactam 4.5 g in dextrose 5 % 100 mL IVPB (ready to mix system)  4.5 g Intravenous Q8H    rivastigmine  1 patch Transdermal Daily    tamsulosin  0.4 mg Oral Daily    trazodone  50 mg Oral QHS    [START ON 3/22/2017] vancomycin (VANCOCIN) IVPB  1,500 mg Intravenous Q24H    vancomycin 500 mg in dextrose 5 % 100 mL IVPB (ready to mix system)  500 mg Intravenous Once     Continuous Infusions:   sodium chloride 0.9% 125 mL/hr at 03/21/17 0605    norepinephrine bitartrate-D5W Stopped (03/21/17 1216)         Physical Findings  Overall Physical Appearance: obese, on ventilator support  Tubes: gastrostomy tube  Oral/Mouth Cavity: WDL  Skin:  (Brandon score 12)    Anthropometrics  Height (inches): 72.01 in  Weight Method: Bed Scale  Weight (kg): 119 kg  Ideal Body Weight (IBW), Male: 178.06 lb  % Ideal Body Weight, Male (lb): 147.34 lb  BMI (kg/m2): 35.57  BMI Grade: 35 - 39.9 - obesity - grade II  Usual Body Weight (UBW), kg:  (mike)    Estimated/Assessed Needs  Weight Used For Calorie Calculations: 119 kg (262 lb 5.6 oz)   Height (cm): 182.9 cm  Energy Need Method: [facility] (modified) (1875)- x70%= 1312 kcals/day per aspen guidelines of permissive underfeeds.   RMR (Medon-St. Jeor Equation): 1958.1  Weight Used For Protein Calculations: 80.9  kg (178 lb 5.6 oz) (ideal body weight)  1.2 gm Protein (gm): 97.28 and 2.0 gm Protein (gm): 162.14  Fluid Need Method: RDA Method (or per MD)   Grams of CHO per day: 234 g max    Monitor and Evaluation  Food and Nutrient Intake: energy intake, enteral nutrition intake  Food and Nutrient Adminstration: diet order  Anthropometric Measurements: weight, weight change, body mass index  Biochemical Data, Medical Tests and Procedures: electrolyte and renal panel, glucose/endocrine profile, lipid profile  Nutrition-Focused Physical Findings: skin    Nutrition Risk  Level of Risk: high (x2 weekly)    Nutrition Follow-Up  RD Follow-up?: Yes    Assessment and Plan  Nutrition Diagnosis:   Problem: inadequate energy intake  Etiology: decreased ability to consume sufficient energy  Signs/Symptoms: NPO, no enteral orders  Status: new    % EEN: 0-25%  % Meal: npo    Discharge planning: unable to determine at this time.

## 2017-03-21 NOTE — ASSESSMENT & PLAN NOTE
Reviewed sputum culture from [place] 2/27/17.  + pseudomonas with sensitivity to zosyn.  Continue Vanc/Zosyn.  Culture sputum.  Droplet/contact precautions.

## 2017-03-21 NOTE — H&P
[facility] Medicine  History & Physical    Patient Name: Masood Messina  MRN: [id number]  Admission Date: 3/20/2017  Attending Physician: Melissa Mayfield MD   Primary Care Provider: Jeramie Lombardi MD         Patient information was obtained from patient, nursing home and ER records.     Subjective:     Principal Problem:Septic shock    Chief Complaint:   Chief Complaint   Patient presents with    Fever    Hypotension        HPI: Masood Messina is a 76 y.o. Male with PMHx significant for functional quadraplegia, CAD, hx psedumonas with ventilator dependency.  He lives at [facility].  He was sent to ED for evaluation of fever.  He was recently found to have pseudomonas in his sputum (2/27)  and placed on cipro.  He is non-contributory to history, but does shake head yes and no. He was found to febrile and hypotensive in ED.  His urine revealed urinary tract infection.  He was bolused NS in ED but remained hypotensive.  He underwent central line insertion and Levophed was started.  He is stable on levophed infusion at this time.  He remains febrile. Other pertinent medical history as below:     Past Medical History:   Diagnosis Date    AAA (abdominal aortic aneurysm)     Anemia     CHF (congestive heart failure)     COPD (chronic obstructive pulmonary disease)     Coronary artery disease     Dementia     Depression     GERD (gastroesophageal reflux disease)     Hyperlipidemia     Hypertension     Hypothyroid     Renal disorder     Respiratory failure, chronic     Ventilator dependence        Past Surgical History:   Procedure Laterality Date    ABDOMINAL SURGERY      CARDIAC SURGERY      GASTROSTOMY TUBE PLACEMENT      SPINE SURGERY      TRACHEOSTOMY TUBE PLACEMENT         Review of patient's allergies indicates:   Allergen Reactions    Codeine Other (See Comments)       No current facility-administered medications on file prior to encounter.      Current  Outpatient Prescriptions on File Prior to Encounter   Medication Sig    albuterol-ipratropium 2.5mg-0.5mg/3mL (DUONEB) 0.5 mg-3 mg(2.5 mg base)/3 mL nebulizer solution Take 3 mLs by nebulization every 4 (four) hours as needed.    aspirin 325 MG tablet 325 mg by PEG Tube route once daily.     ferrous sulfate 300 mg (60 mg iron)/5 mL syrup 300 mg by PEG Tube route once daily.    levothyroxine (SYNTHROID) 50 MCG tablet 1 tablet (50 mcg total) by Per G Tube route before breakfast.    melatonin 3 mg Tab 3 mg by PEG Tube route every evening.     rivastigmine (EXELON) 4.6 mg/24 hour PT24 Place 1 patch onto the skin once daily.    oxybutynin (DITROPAN) 5 MG Tab 1 tablet (5 mg total) by Per G Tube route 3 (three) times daily.    [DISCONTINUED] demeclocycline (DECLOMYCIN) 300 MG Tab 600 mg by PEG Tube route every 12 (twelve) hours.    [DISCONTINUED] enoxaparin (LOVENOX) 40 mg/0.4 mL Syrg Inject 40 mg into the skin once daily.    [DISCONTINUED] LACTOSE-FREE FOOD/FIBER (ISOSOURCE 1.5 NITO ORAL) 65 mL/hr by PEG Tube route continuous.    [DISCONTINUED] lansoprazole (PREVACID) 30 MG capsule 30 mg by PEG Tube route once daily.     [DISCONTINUED] loperamide (IMODIUM) 2 mg capsule 2 mg by PEG Tube route 4 (four) times daily as needed.     Family History     None        Social History Main Topics    Smoking status: Former Smoker    Smokeless tobacco: Not on file    Alcohol use No    Drug use: No    Sexual activity: No     Review of Systems   Unable to perform ROS: Patient nonverbal (with trach on MV)     Objective:     Vital Signs (Most Recent):  Temp: (!) 101.4 °F (38.6 °C) (03/21/17 0349)  Pulse: 97 (03/21/17 0410)  Resp: 19 (03/21/17 0410)  BP: 131/88 (03/21/17 0349)  SpO2: 100 % (03/21/17 0410) Vital Signs (24h Range):  Temp:  [99.2 °F (37.3 °C)-101.4 °F (38.6 °C)] 101.4 °F (38.6 °C)  Pulse:  [] 97  Resp:  [11-19] 19  SpO2:  [97 %-100 %] 100 %  BP: ()/(50-88) 131/88     Weight: 119 kg (262 lb 5.6  oz)  Body mass index is 35.58 kg/(m^2).    Physical Exam   Constitutional: He is oriented to person, place, and time. No distress.   Ill appearing with trach on ventilator   HENT:   Head: Normocephalic and atraumatic.   Eyes: Conjunctivae and EOM are normal. Pupils are equal, round, and reactive to light.   Glasses on   Neck: Normal range of motion. Neck supple.   Trach in place without drainage or erythema   Cardiovascular: Regular rhythm, normal heart sounds and intact distal pulses.    tachycardiac   Pulmonary/Chest: Breath sounds normal. He has no wheezes. He has no rales.   Mechanically assisted ventilation.   Abdominal: Bowel sounds are normal. There is no tenderness.   Obese. PEG noted without erythema.  Scant drainage.   Genitourinary:   Genitourinary Comments: Rahman in place   Musculoskeletal: He exhibits edema (trace BLE edema. non-pitting).   Contracted with noted LEFT hemiplegia.  + bilateral foot drop   Neurological: He is alert and oriented to person, place, and time.   Skin: Skin is warm and dry.   Psychiatric:   Flat affect. Withdrawn.        Significant Labs:   CBC:   Recent Labs  Lab 03/20/17  2348   WBC 10.50   HGB 11.5*   HCT 34.7*   *     CMP:   Recent Labs  Lab 03/20/17  2348      K 4.6   CL 97   CO2 33*   GLU 95   BUN 33*   CREATININE 1.1   CALCIUM 9.6   PROT 7.9   ALBUMIN 2.8*   BILITOT 1.1*   ALKPHOS 89   AST 21   ALT 22   ANIONGAP 7*   EGFRNONAA >60     Lactic Acid:   Recent Labs  Lab 03/20/17  2347 03/21/17  0248   LACTATE 1.1 0.7     Urine Studies:   Recent Labs  Lab 03/20/17  2315   COLORU Yellow   APPEARANCEUA Cloudy*   PHUR >8.0*   SPECGRAV 1.010   PROTEINUA 1+*   GLUCUA Negative   KETONESU Negative   BILIRUBINUA Negative   OCCULTUA 3+*   NITRITE Positive*   UROBILINOGEN 1.0   LEUKOCYTESUR 3+*   RBCUA >100*   WBCUA >100*   BACTERIA Many*   HYALINECASTS 10*       Significant Imaging:   CXR: haziness bilaterally.  LLL increased opacity. ? Infiltrate.     Assessment/Plan:      * Septic shock  Urosepsis with hypotension not responsive to IV bolus, requiring vasopressor.  Will give additional bolus for total 30 ml/kg NS as per sepsis protocol.  Blood cultures drawn- will follow.  IV Vanc with pharmacy to dose + zosyn.  Follow urine and sputum cultures. Monitor closely in ICU and titrate vasopressors as clinically appropriate.      Urinary tract infection without hematuria  With urosepsis.  Abx therapy as above.  Maintain tay catheter (was not chronic cath at [place] per shake of patient's head).  Follow urine culture.      Ventilator associated pneumonia  Reviewed sputum culture from [facility] 2/27/17.  + pseudomonas with sensitivity to zosyn.  Continue Vanc/Zosyn.  Culture sputum.  Droplet/contact precautions.    Functional quadriplegia  Turn q 2 hrs. Utilize heel boots      Tracheostomy dependence  Continue mechanical ventilation with previous settings.  Trach care per RT      COPD (chronic obstructive pulmonary disease)  Chronic without evidence of exacerbation.  Continue ventilatory support.  DuoNebs q 6.    PEG (percutaneous endoscopic gastrostomy) status  Consult dietary, continue TFs as clinically appropriate.    Hypothyroidism  Presenting with low TSH.  Hold thyroid hormone replacement.       I reviewed patient records from [facility].    VTE Risk Mitigation         Ordered     enoxaparin injection 40 mg  Daily     Route:  Subcutaneous        03/21/17 8293   Peptic ulcer prophylaxis: Protonix.     Evangelina Sims NP  Department of Hospital Medicine   [facility]

## 2017-03-21 NOTE — ED NOTES
Pt settled in bed after transport here from [place].  Pt seems alert and awake.  Answers some simple yes/no questions.  Continuing to monitor.

## 2017-03-21 NOTE — ED NOTES
Pt appears more alert still.  Vitals remain stable at this time.  Spoke with Dr. Osuna about 79/52 bp so another 1000cc bolus ordered.  Continuing to monitor.

## 2017-03-21 NOTE — ED NOTES
Pt resting well.  Awake and alert more.  Answering more specific questions.  Continuing to monitor.

## 2017-03-21 NOTE — ASSESSMENT & PLAN NOTE
With urosepsis.  Abx therapy as above.  Maintain tay catheter (was not chronic cath at [place] per shake of patient's head).  Follow urine culture.

## 2017-03-21 NOTE — PLAN OF CARE
Problem: Nutrition, Enteral (Adult)  Goal: Signs and Symptoms of Listed Potential Problems Will be Absent, Minimized or Managed (Nutrition, Enteral)  Signs and symptoms of listed potential problems will be absent, minimized or managed by discharge/transition of care (reference Nutrition, Enteral (Adult) CPG).  Recommendations  Recommendation/Intervention: 1.) Recommend Peptamen VHP (formerly bariatric) @ 20 mls/hr advancing by 10 mls Q4 hrs to goal rate 55 mls/hr continuous providing 1320 kcals/day, 121 g protein/day, 100 g CHO/day, and 1109 mls water/day. Hold TF x4 hrs for residuals >250mls. Flush 120 mls Q4 hrs to meet fluid needs or per MD.  Goals: 1.) enteral nutrition will initiate within 72 hrs.   Nutrition Goal Status: new  Communication of [facility] Recs: other (comment) (sticky note to MD)

## 2017-03-21 NOTE — PLAN OF CARE
Problem: Patient Care Overview  Goal: Individualization & Mutuality  Outcome: Ongoing (interventions implemented as appropriate)  0345 pt admitted to room 508 from er,debbie vent,no obvious distress noted,on levophed to central line rt groin without problem noted,rotation bed,Don RN assumed care of pt upon admission to icu

## 2017-03-21 NOTE — ASSESSMENT & PLAN NOTE
Urosepsis with hypotension not responsive to IV bolus, requiring vasopressor.  Will give additional bolus for total 30 ml/kg NS as per sepsis protocol.  Blood cultures drawn- will follow.  IV Vanc with pharmacy to dose + zosyn.  Follow urine and sputum cultures.

## 2017-03-21 NOTE — SUBJECTIVE & OBJECTIVE
Past Medical History:   Diagnosis Date    AAA (abdominal aortic aneurysm)     Anemia     CHF (congestive heart failure)     COPD (chronic obstructive pulmonary disease)     Coronary artery disease     Dementia     Depression     GERD (gastroesophageal reflux disease)     Hyperlipidemia     Hypertension     Hypothyroid     Renal disorder     Respiratory failure, chronic     Ventilator dependence        Past Surgical History:   Procedure Laterality Date    ABDOMINAL SURGERY      CARDIAC SURGERY      GASTROSTOMY TUBE PLACEMENT      SPINE SURGERY      TRACHEOSTOMY TUBE PLACEMENT         Review of patient's allergies indicates:   Allergen Reactions    Codeine Other (See Comments)       No current facility-administered medications on file prior to encounter.      Current Outpatient Prescriptions on File Prior to Encounter   Medication Sig    albuterol-ipratropium 2.5mg-0.5mg/3mL (DUONEB) 0.5 mg-3 mg(2.5 mg base)/3 mL nebulizer solution Take 3 mLs by nebulization every 4 (four) hours as needed.    aspirin 325 MG tablet 325 mg by PEG Tube route once daily.     ferrous sulfate 300 mg (60 mg iron)/5 mL syrup 300 mg by PEG Tube route once daily.    levothyroxine (SYNTHROID) 50 MCG tablet 1 tablet (50 mcg total) by Per G Tube route before breakfast.    melatonin 3 mg Tab 3 mg by PEG Tube route every evening.     rivastigmine (EXELON) 4.6 mg/24 hour PT24 Place 1 patch onto the skin once daily.    oxybutynin (DITROPAN) 5 MG Tab 1 tablet (5 mg total) by Per G Tube route 3 (three) times daily.    [DISCONTINUED] demeclocycline (DECLOMYCIN) 300 MG Tab 600 mg by PEG Tube route every 12 (twelve) hours.    [DISCONTINUED] enoxaparin (LOVENOX) 40 mg/0.4 mL Syrg Inject 40 mg into the skin once daily.    [DISCONTINUED] LACTOSE-FREE FOOD/FIBER (ISOSOURCE 1.5 NITO ORAL) 65 mL/hr by PEG Tube route continuous.    [DISCONTINUED] lansoprazole (PREVACID) 30 MG capsule 30 mg by PEG Tube route once daily.      [DISCONTINUED] loperamide (IMODIUM) 2 mg capsule 2 mg by PEG Tube route 4 (four) times daily as needed.     Family History     None        Social History Main Topics    Smoking status: Former Smoker    Smokeless tobacco: Not on file    Alcohol use No    Drug use: No    Sexual activity: No     Review of Systems   Unable to perform ROS: Patient nonverbal (with trach on MV)     Objective:     Vital Signs (Most Recent):  Temp: (!) 101.4 °F (38.6 °C) (03/21/17 0349)  Pulse: 97 (03/21/17 0410)  Resp: 19 (03/21/17 0410)  BP: 131/88 (03/21/17 0349)  SpO2: 100 % (03/21/17 0410) Vital Signs (24h Range):  Temp:  [99.2 °F (37.3 °C)-101.4 °F (38.6 °C)] 101.4 °F (38.6 °C)  Pulse:  [] 97  Resp:  [11-19] 19  SpO2:  [97 %-100 %] 100 %  BP: ()/(50-88) 131/88     Weight: 119 kg (262 lb 5.6 oz)  Body mass index is 35.58 kg/(m^2).    Physical Exam   Constitutional: He is oriented to person, place, and time. No distress.   Ill appearing with trach on ventilator   HENT:   Head: Normocephalic and atraumatic.   Eyes: Conjunctivae and EOM are normal. Pupils are equal, round, and reactive to light.   Glasses on   Neck: Normal range of motion. Neck supple.   Trach in place without drainage or erythema   Cardiovascular: Regular rhythm, normal heart sounds and intact distal pulses.    tachycardiac   Pulmonary/Chest: Breath sounds normal. He has no wheezes. He has no rales.   Mechanically assisted ventilation.   Abdominal: Bowel sounds are normal. There is no tenderness.   Obese. PEG noted without erythema.  Scant drainage.   Genitourinary:   Genitourinary Comments: Rahman in place   Musculoskeletal: He exhibits edema (trace BLE edema. non-pitting).   Contracted with noted LEFT hemiplegia.  + bilateral foot drop   Neurological: He is alert and oriented to person, place, and time.   Skin: Skin is warm and dry.   Psychiatric:   Flat affect. Withdrawn.        Significant Labs:   CBC:   Recent Labs  Lab 03/20/17  2348   WBC 10.50    HGB 11.5*   HCT 34.7*   *     CMP:   Recent Labs  Lab 03/20/17  2348      K 4.6   CL 97   CO2 33*   GLU 95   BUN 33*   CREATININE 1.1   CALCIUM 9.6   PROT 7.9   ALBUMIN 2.8*   BILITOT 1.1*   ALKPHOS 89   AST 21   ALT 22   ANIONGAP 7*   EGFRNONAA >60     Lactic Acid:   Recent Labs  Lab 03/20/17  2347 03/21/17  0248   LACTATE 1.1 0.7     Urine Studies:   Recent Labs  Lab 03/20/17  2315   COLORU Yellow   APPEARANCEUA Cloudy*   PHUR >8.0*   SPECGRAV 1.010   PROTEINUA 1+*   GLUCUA Negative   KETONESU Negative   BILIRUBINUA Negative   OCCULTUA 3+*   NITRITE Positive*   UROBILINOGEN 1.0   LEUKOCYTESUR 3+*   RBCUA >100*   WBCUA >100*   BACTERIA Many*   HYALINECASTS 10*       Significant Imaging:   CXR: haziness bilaterally.  LLL increased opacity. ? Infiltrate.

## 2017-03-21 NOTE — ED NOTES
Pt resting in bed awake and alert.  No acute distress noted.  Levophed started at 0.02 for hypotension maintenance.  bp at this time 100/59.  Continuing to monitor.

## 2017-03-21 NOTE — PLAN OF CARE
"called the number on the face sheet, Isaak Messina [phone] and he informed me that he is not responsible for his uncle.I explained that he is listed as an emergency contact and he stated ,"yes my dad did that but I'm not responsible for him." I tried to explain that I was just verifying that the pts is care home care at [facility] and he replied,"well they brought him there so you should know that." I told him that there is a second phone number on the facesheet with no name and asked if there is someone else that I should be speaking to. He replied no. The pt is care home care at [facility] and they meet all of his medical needs, transportation and medication management. Agatha Perkins [facility]      03/21/17 1151   Discharge Assessment   Assessment Type Discharge Planning Assessment   Confirmed/corrected address and phone number on facesheet? Yes   Assessment information obtained from? Other  (Isaak Messina nephladi [phone])   Communicated expected length of stay with patient/caregiver no   If Healthcare Directive is received, is it scanned into Epic? no (comment)   Prior to hospitilization cognitive status: Unable to Assess   Prior to hospitalization functional status: Assistive Equipment   Current cognitive status: Unable to Assess   Current Functional Status: Assistive Equipment   Arrived From care home care facility  ([facility] )   Lives With facility resident   Able to Return to Prior Arrangements yes   Is patient able to care for self after discharge? No   Readmission Within The Last 30 Days no previous admission in last 30 days   Patient currently being followed by outpatient case management? No   Patient currently receives home health services? No   Patient currently receives private duty nursing? No   Patient currently receives any other outside agency services? No   Do you have any problems affording any of your prescribed medications? Yes   Is the patient taking medications as prescribed? yes "   Do you have any financial concerns preventing you from receiving the healthcare you need? No   Does the patient have transportation to healthcare appointments? No   Transportation Available van, wheelchair accessible   On Dialysis? No   Does the patient receive services at the [facility]? No   Are there any open cases? No   Discharge Plan A Return to nursing home   Patient/Family In Agreement With Plan yes

## 2017-03-21 NOTE — ED PROVIDER NOTES
Encounter Date: 3/20/2017    SCRIBE #1 NOTE: I, sherri Parmar, am scribing for, and in the presence of, Dr Osuna.       History     Chief Complaint   Patient presents with    Fever    Hypotension     Review of patient's allergies indicates:   Allergen Reactions    Codeine Other (See Comments)     HPI Comments: 03/20/2017  10:54 PM     Chief Complaint: Hypotension      Masood Messina is a 76 y.o. male presenting to the [facility] from [place] for episode of hypotension which occurred prior to arrival tonight. Pt denies other complaints at this time. HPI limited. Pmhx of AAA ; Anemia; CHF; COPD; CAD; Dementia; Depression; GERD ; Hyperlipidemia; Hypertension; Hypothyroid; Renal disorder; Respiratory failure, chronic; and Ventilator dependence.  Pt has a past surgical history that includes Abdominal surgery; Cardiac surgery; Tracheostomy tube placement; Gastrostomy tube placement; and Spine surgery.      The history is provided by the patient.     Past Medical History:   Diagnosis Date    AAA (abdominal aortic aneurysm)     Anemia     CHF (congestive heart failure)     COPD (chronic obstructive pulmonary disease)     Coronary artery disease     Dementia     Depression     GERD (gastroesophageal reflux disease)     Hyperlipidemia     Hypertension     Hypothyroid     Renal disorder     Respiratory failure, chronic     Ventilator dependence      Past Surgical History:   Procedure Laterality Date    ABDOMINAL SURGERY      CARDIAC SURGERY      GASTROSTOMY TUBE PLACEMENT      SPINE SURGERY      TRACHEOSTOMY TUBE PLACEMENT       History reviewed. No pertinent family history.  Social History   Substance Use Topics    Smoking status: Former Smoker    Smokeless tobacco: None    Alcohol use No     Review of Systems   Constitutional: Negative for fever.   HENT: Negative.  Negative for sore throat.    Eyes: Negative for visual disturbance.   Respiratory: Negative for cough.    Cardiovascular:  Negative for chest pain.   Gastrointestinal: Negative for abdominal pain, diarrhea, nausea and vomiting.   Genitourinary: Negative for difficulty urinating.   Musculoskeletal: Negative for arthralgias.   Skin: Negative for rash.   Neurological: Negative for weakness.   Hematological:        Hypotension.       Physical Exam   Initial Vitals   BP Pulse Resp Temp SpO2   03/20/17 2227 03/20/17 2227 03/20/17 2227 03/20/17 2227 03/20/17 2227   71/51 97 17 101 °F (38.3 °C) 97 %     Physical Exam    Nursing note and vitals reviewed.  Constitutional: He appears well-developed and well-nourished.   HENT:   Head: Normocephalic and atraumatic.   Eyes: Conjunctivae are normal.   Neck: Neck supple.   Cardiovascular: Normal rate, regular rhythm, normal heart sounds and intact distal pulses. Exam reveals no gallop and no friction rub.    No murmur heard.  Pulmonary/Chest: He has no wheezes. He has no rhonchi. He has no rales.   Trach in place with good synchrony with ventilator.    Abdominal: Soft. He exhibits no distension. There is no tenderness.   PEG tube in epigastrium.    Musculoskeletal:    Legs symmetric and non tender. Padded heel boots in place.      Neurological: He is alert.   Left upper extremity paresis.    Skin: No rash noted. No erythema.   Psychiatric: He has a normal mood and affect.         ED Course   Critical Care  Date/Time: 3/21/2017 4:01 AM  Performed by: TYRON SELLERS  Authorized by: MITCHEL PRABHAKAR   Direct patient critical care time: 21 minutes  Additional history critical care time: 3 minutes  Ordering / reviewing critical care time: 5 minutes  Documentation critical care time: 4 minutes  Consulting other physicians critical care time: 2 minutes  Total critical care time (exclusive of procedural time) : 35 minutes        Labs Reviewed   CBC W/ AUTO DIFFERENTIAL - Abnormal; Notable for the following:        Result Value    RBC 4.03 (*)     Hemoglobin 11.5 (*)     Hematocrit 34.7 (*)     Platelets  141 (*)     Gran # 7.9 (*)     Mono # 1.2 (*)     Gran% 75.2 (*)     Lymph% 12.1 (*)     All other components within normal limits   COMPREHENSIVE METABOLIC PANEL - Abnormal; Notable for the following:     CO2 33 (*)     BUN, Bld 33 (*)     Albumin 2.8 (*)     Total Bilirubin 1.1 (*)     Anion Gap 7 (*)     All other components within normal limits   URINALYSIS - Abnormal; Notable for the following:     Appearance, UA Cloudy (*)     pH, UA >8.0 (*)     Protein, UA 1+ (*)     Occult Blood UA 3+ (*)     Nitrite, UA Positive (*)     Leukocytes, UA 3+ (*)     All other components within normal limits   TSH - Abnormal; Notable for the following:     TSH 0.019 (*)     All other components within normal limits   URINALYSIS MICROSCOPIC - Abnormal; Notable for the following:     RBC, UA >100 (*)     WBC, UA >100 (*)     Bacteria, UA Many (*)     Hyaline Casts, UA 10 (*)     All other components within normal limits   CULTURE, URINE    Narrative:     Cath if necessary   CULTURE, BLOOD   CULTURE, BLOOD   LACTIC ACID, PLASMA   T4, FREE   LACTIC ACID, PLASMA    Narrative:     Lab redraw.        CENTRAL LINE PLACEMENT:  Verbal consent with explanation of risks, benefits, and alternatives was obtained prior to the procedure.  A triple lumen catheter was placed with universal precautions under sterile conditions using ultrasound guidance.  1% lidocaine was used for analgesia.  Seldinger technique was employed.  The line was placed in the right femoral area. 1 attempt(s) were required.  There were no recognized complications and the patient tolerated the procedure well.            Masood Messina is a 76 y.o. male presenting with fever and hypotension suggestive of urosepsis based on positive urinalysis.  The patient had hypotension despite 2 L normal saline solution IV challenge.  Lactic acid is initially normal with repeat pending at the time of admission to be followed by admitting team.  Based on hypotension, broad-spectrum  minimize were initiated with IV Vanco, Zosyn, and Cipro.  Central line was inserted and ultimately in the femoral area due to trach collar in place and poor neck mobility.  Please affect is initiated with adequate control of hypotension thereafter.  No other obvious etiology for sepsis.  I have spoken with hospitalist service who will assume care.              ED Course   Comment By Time   CXR:  Poor inspiration, NAD compared to prior. (my read) Shai Osuna MD 03/20 9575   EKG:  NSR, rate of 86, normal intervals and axis.  There are no acute ST or T wave changes suggestive of acute ischemia or infarction. Shai Osuna MD 03/20 6937     Clinical Impression:   The primary encounter diagnosis was Urinary tract infection without hematuria, site unspecified. Diagnoses of Sepsis, due to unspecified organism and Septic shock were also pertinent to this visit.          Shai Osuna MD  03/21/17 4386

## 2017-03-22 LAB
ANION GAP SERPL CALC-SCNC: 7 MMOL/L
BACTERIA UR CULT: NORMAL
BACTERIA UR CULT: NORMAL
BASOPHILS # BLD AUTO: 0 K/UL
BASOPHILS NFR BLD: 0.4 %
BUN SERPL-MCNC: 25 MG/DL
CALCIUM SERPL-MCNC: 9.1 MG/DL
CHLORIDE SERPL-SCNC: 98 MMOL/L
CO2 SERPL-SCNC: 30 MMOL/L
CREAT SERPL-MCNC: 1.1 MG/DL
DIFFERENTIAL METHOD: ABNORMAL
EOSINOPHIL # BLD AUTO: 0.1 K/UL
EOSINOPHIL NFR BLD: 1 %
ERYTHROCYTE [DISTWIDTH] IN BLOOD BY AUTOMATED COUNT: 13.7 %
EST. GFR  (AFRICAN AMERICAN): >60 ML/MIN/1.73 M^2
EST. GFR  (NON AFRICAN AMERICAN): >60 ML/MIN/1.73 M^2
GLUCOSE SERPL-MCNC: 98 MG/DL
HCT VFR BLD AUTO: 31.3 %
HGB BLD-MCNC: 10.4 G/DL
LYMPHOCYTES # BLD AUTO: 1.3 K/UL
LYMPHOCYTES NFR BLD: 13.9 %
MCH RBC QN AUTO: 28.7 PG
MCHC RBC AUTO-ENTMCNC: 33.2 %
MCV RBC AUTO: 86 FL
MONOCYTES # BLD AUTO: 1.6 K/UL
MONOCYTES NFR BLD: 16.6 %
NEUTROPHILS # BLD AUTO: 6.4 K/UL
NEUTROPHILS NFR BLD: 68.1 %
PLATELET # BLD AUTO: 113 K/UL
PMV BLD AUTO: 10.8 FL
POTASSIUM SERPL-SCNC: 3.8 MMOL/L
RBC # BLD AUTO: 3.63 M/UL
SODIUM SERPL-SCNC: 135 MMOL/L
WBC # BLD AUTO: 9.4 K/UL

## 2017-03-22 PROCEDURE — 25000242 PHARM REV CODE 250 ALT 637 W/ HCPCS: Performed by: NURSE PRACTITIONER

## 2017-03-22 PROCEDURE — 36415 COLL VENOUS BLD VENIPUNCTURE: CPT

## 2017-03-22 PROCEDURE — 80048 BASIC METABOLIC PNL TOTAL CA: CPT

## 2017-03-22 PROCEDURE — 20000000 HC ICU ROOM

## 2017-03-22 PROCEDURE — 94640 AIRWAY INHALATION TREATMENT: CPT

## 2017-03-22 PROCEDURE — 94770 HC EXHALED C02 TEST: CPT

## 2017-03-22 PROCEDURE — 27200966 HC CLOSED SUCTION SYSTEM

## 2017-03-22 PROCEDURE — 63600175 PHARM REV CODE 636 W HCPCS: Performed by: NURSE PRACTITIONER

## 2017-03-22 PROCEDURE — 99233 SBSQ HOSP IP/OBS HIGH 50: CPT | Mod: ,,, | Performed by: INTERNAL MEDICINE

## 2017-03-22 PROCEDURE — 94003 VENT MGMT INPAT SUBQ DAY: CPT

## 2017-03-22 PROCEDURE — C9113 INJ PANTOPRAZOLE SODIUM, VIA: HCPCS | Performed by: NURSE PRACTITIONER

## 2017-03-22 PROCEDURE — 25000003 PHARM REV CODE 250: Performed by: NURSE PRACTITIONER

## 2017-03-22 PROCEDURE — 63600175 PHARM REV CODE 636 W HCPCS: Performed by: INTERNAL MEDICINE

## 2017-03-22 PROCEDURE — 25000003 PHARM REV CODE 250: Performed by: INTERNAL MEDICINE

## 2017-03-22 PROCEDURE — 85025 COMPLETE CBC W/AUTO DIFF WBC: CPT

## 2017-03-22 PROCEDURE — 94761 N-INVAS EAR/PLS OXIMETRY MLT: CPT

## 2017-03-22 PROCEDURE — 27000221 HC OXYGEN, UP TO 24 HOURS

## 2017-03-22 RX ADMIN — SODIUM CHLORIDE: 0.9 INJECTION, SOLUTION INTRAVENOUS at 11:03

## 2017-03-22 RX ADMIN — IPRATROPIUM BROMIDE AND ALBUTEROL SULFATE 3 ML: .5; 3 SOLUTION RESPIRATORY (INHALATION) at 07:03

## 2017-03-22 RX ADMIN — PIPERACILLIN SODIUM AND TAZOBACTAM SODIUM 4.5 G: 4; .5 INJECTION, POWDER, FOR SOLUTION INTRAVENOUS at 04:03

## 2017-03-22 RX ADMIN — VANCOMYCIN HYDROCHLORIDE 1500 MG: 1 INJECTION, POWDER, LYOPHILIZED, FOR SOLUTION INTRAVENOUS at 03:03

## 2017-03-22 RX ADMIN — FINASTERIDE 5 MG: 5 TABLET, FILM COATED ORAL at 09:03

## 2017-03-22 RX ADMIN — DULOXETINE HYDROCHLORIDE 30 MG: 30 CAPSULE, DELAYED RELEASE ORAL at 09:03

## 2017-03-22 RX ADMIN — HYDROCODONE BITARTRATE AND ACETAMINOPHEN 1 TABLET: 10; 325 TABLET ORAL at 05:03

## 2017-03-22 RX ADMIN — HYDROCODONE BITARTRATE AND ACETAMINOPHEN 1 TABLET: 10; 325 TABLET ORAL at 11:03

## 2017-03-22 RX ADMIN — TRAZODONE HYDROCHLORIDE 50 MG: 50 TABLET ORAL at 09:03

## 2017-03-22 RX ADMIN — PANTOPRAZOLE SODIUM 40 MG: 40 INJECTION, POWDER, FOR SOLUTION INTRAVENOUS at 09:03

## 2017-03-22 RX ADMIN — ENOXAPARIN SODIUM 40 MG: 100 INJECTION SUBCUTANEOUS at 04:03

## 2017-03-22 RX ADMIN — PIPERACILLIN SODIUM AND TAZOBACTAM SODIUM 4.5 G: 4; .5 INJECTION, POWDER, FOR SOLUTION INTRAVENOUS at 11:03

## 2017-03-22 RX ADMIN — TAMSULOSIN HYDROCHLORIDE 0.4 MG: 0.4 CAPSULE ORAL at 09:03

## 2017-03-22 RX ADMIN — IPRATROPIUM BROMIDE AND ALBUTEROL SULFATE 3 ML: .5; 3 SOLUTION RESPIRATORY (INHALATION) at 12:03

## 2017-03-22 RX ADMIN — IPRATROPIUM BROMIDE AND ALBUTEROL SULFATE 3 ML: .5; 3 SOLUTION RESPIRATORY (INHALATION) at 01:03

## 2017-03-22 RX ADMIN — PIPERACILLIN SODIUM AND TAZOBACTAM SODIUM 4.5 G: 4; .5 INJECTION, POWDER, FOR SOLUTION INTRAVENOUS at 09:03

## 2017-03-22 RX ADMIN — GABAPENTIN 400 MG: 400 CAPSULE ORAL at 05:03

## 2017-03-22 RX ADMIN — GABAPENTIN 400 MG: 400 CAPSULE ORAL at 02:03

## 2017-03-22 RX ADMIN — ASPIRIN 325 MG ORAL TABLET 325 MG: 325 PILL ORAL at 09:03

## 2017-03-22 RX ADMIN — RIVASTIGMINE TRANSDERMAL SYSTEM 1 PATCH: 4.6 PATCH, EXTENDED RELEASE TRANSDERMAL at 09:03

## 2017-03-22 RX ADMIN — MINERAL SUPPLEMENT IRON 300 MG / 5 ML STRENGTH LIQUID 100 PER BOX UNFLAVORED 300 MG: at 09:03

## 2017-03-22 RX ADMIN — GABAPENTIN 400 MG: 400 CAPSULE ORAL at 09:03

## 2017-03-22 RX ADMIN — PIPERACILLIN SODIUM AND TAZOBACTAM SODIUM 4.5 G: 4; .5 INJECTION, POWDER, FOR SOLUTION INTRAVENOUS at 12:03

## 2017-03-22 RX ADMIN — SODIUM CHLORIDE: 0.9 INJECTION, SOLUTION INTRAVENOUS at 12:03

## 2017-03-22 NOTE — PROGRESS NOTES
Progress Note  Hospital Medicine  Patient Name:Masood Messina  MRN:  [id number]  Patient Class: IP- Inpatient  Admit Date: 3/20/2017  Length of Stay: 1 days  Expected Discharge Date:   Attending Physician: Melissa Mayfield MD  Primary Care Provider:  Jeramie Lombardi MD    SUBJECTIVE:     Principal Problem: Septic shock  Initial history of present illness: Masood Messina is a 76 y.o. Male with PMHx significant for functional quadraplegia, CAD, hx psedumonas with ventilator dependency. He lives at [facility]. He was sent to ED for evaluation of fever. He was recently found to have pseudomonas in his sputum (2/27) and placed on cipro. He is non-contributory to history, but does shake head yes and no. He was found to febrile and hypotensive in ED. His urine revealed urinary tract infection. He was bolused NS in ED but remained hypotensive. He underwent central line insertion and Levophed was started. He is stable on levophed infusion at this time. He remains febrile.    PMH/PSH/SH/FH/Meds: reviewed.    Symptoms/Review of Systems:  Tmax 102.2F. No acute distress, patient without complaints. On IV pressor agent.  Diet:  PEG feeding  Activity level: Keep HOB at 30 degrees, rotate patient q 2 hrs  Pain:  NAD       OBJECTIVE:   Vital Signs (Most Recent):      Temp: 99.9 °F (37.7 °C) (03/22/17 0130)  Pulse: 92 (03/22/17 0130)  Resp: 19 (03/22/17 0130)  BP: (!) 141/67 (03/22/17 0130)  SpO2: 97 % (03/22/17 0130)       Vital Signs Range (Last 24H):  Temp:  [98.2 °F (36.8 °C)-102.2 °F (39 °C)]   Pulse:  []   Resp:  []   BP: ()/(41-86)   SpO2:  [96 %-100 %]     Weight: 119 kg (262 lb 5.6 oz)  Body mass index is 35.58 kg/(m^2).    Intake/Output Summary (Last 24 hours) at 03/22/17 0569  Last data filed at 03/21/17 1800   Gross per 24 hour   Intake          3552.09 ml   Output             1700 ml   Net          1852.09 ml     Physical Examination:  Constitutional: He is oriented to person, place, and time. No  distress.   Ill appearing with trach on ventilator   HENT:   Head: Normocephalic and atraumatic.   Eyes: Conjunctivae and EOM are normal. Pupils are equal, round, and reactive to light.   Neck: Normal range of motion. Neck supple.   Trach in place without drainage or erythema   Cardiovascular: Regular rhythm, normal heart sounds and intact distal pulses.   Pulmonary/Chest: Breath sounds normal. He has no wheezes. He has no rales.   Mechanically assisted ventilation.   Abdominal: Bowel sounds are normal. There is no tenderness.   Obese. PEG noted without erythema.   Genitourinary:   Genitourinary Comments: Rahman in place   Musculoskeletal: He exhibits edema (trace BLE edema. non-pitting).   Contracted with noted LEFT hemiplegia.  + bilateral foot drop   Neurological: He is alert and oriented to person, place, and time.   Skin: Skin is warm and dry.   Psychiatric:   Flat affect. Withdrawn.     CBC:    Recent Labs  Lab 03/20/17 2348 03/21/17  0657 03/22/17  0320   WBC 10.50 10.00 9.40   RBC 4.03* 3.64* 3.63*   HGB 11.5* 10.4* 10.4*   HCT 34.7* 31.4* 31.3*   * 114* 113*   MCV 86 86 86   MCH 28.6 28.5 28.7   MCHC 33.2 33.1 33.2   BMP    Recent Labs  Lab 03/20/17 2348 03/22/17  0320   GLU 95 98    135*   K 4.6 3.8   CL 97 98   CO2 33* 30*   BUN 33* 25*   CREATININE 1.1 1.1   CALCIUM 9.6 9.1      Diagnostic Results:  Microbiology Results (last 7 days)     Procedure Component Value Units Date/Time    Culture, Respiratory with Gram Stain [[id number]] Collected:  03/21/17 1122    Order Status:  Sent Specimen:  Respiratory from Tracheal Aspirate Updated:  03/21/17 2123    Blood culture [[id number]] Collected:  03/20/17 2353    Order Status:  Completed Specimen:  Blood from Peripheral, Right  Arm Updated:  03/21/17 1715     Blood Culture, Routine No Growth to date    Blood culture [[id number]] Collected:  03/20/17 2348    Order Status:  Completed Specimen:  Blood from Peripheral, Left  Hand Updated:  03/21/17 1715      Blood Culture, Routine No Growth to date    Urine culture [[id number]] Collected:  03/20/17 1935    Order Status:  Sent Specimen:  Urine from Urine, Catheterized Updated:  03/21/17 1035    Narrative:       Cath if necessary    Urine culture [[id number]] Collected:  03/21/17 0515    Order Status:  Canceled Specimen:  Urine from Urine, Catheterized Updated:  03/21/17 0516    Narrative:       Culture Urine was cancelled on 03/21/2017 at 06:18 by [facility]; Duplicate   order, test included in another profile. RN Don      CXR: Mild patchy infiltrate or atelectasis left lung base.  Assessment/Plan:   * Septic shock  Follow microbiology results.  IV Vanc with pharmacy to dose + zosyn.   Monitor closely in ICU and titrate vasopressors as clinically appropriate.     Urinary tract infection without hematuria  Follow urine culture.     Ventilator associated pneumonia  Reviewed sputum culture from [place] [facility] 2/27/17. + pseudomonas with sensitivity to zosyn. Continue Vanc/Zosyn. Culture sputum.      Functional quadriplegia  Turn q 2 hrs. Utilize heel boots     Tracheostomy dependence  Continue mechanical ventilation with previous settings. Trach care per RT     COPD (chronic obstructive pulmonary disease)  Chronic without evidence of exacerbation. Continue ventilatory support. DuoNebs q 6.     PEG (percutaneous endoscopic gastrostomy) status  Consult dietary, continue TFs as clinically appropriate.     Hypothyroidism  Presenting with low TSH. Hold thyroid hormone replacement.      VTE Risk Mitigation         Ordered     enoxaparin injection 40 mg  Daily     Route:  Subcutaneous        03/21/17 0348        Melissa Mayfield MD  Department of Hospital Medicine   [facility]

## 2017-03-22 NOTE — PLAN OF CARE
03/21/17 1912   Patient Assessment/Suction   All Lung Fields Breath Sounds diminished;clear   Rhythm/Pattern, Respiratory mechanical device   Suction Method in-line suction catheter (closed)   Sputum Amount scant;small   Sputum Color yellow   Sputum Consistency thick   PRE-TX-O2-ETCO2   O2 Device (Oxygen Therapy) ventilator   Oxygen Concentration (%) 40   SpO2 97 %   ETCO2 (mmHg) 40 mmHg   Pulse 103   Resp 13   Aerosol Therapy   $ Aerosol Therapy Charges Aerosol Treatment   Respiratory Treatment Status given   SVN/Inhaler Treatment Route in-line   Patient Tolerance good   Post-Treatment   Post-treatment Heart Rate (beats/min) 101   Post-treatment Resp Rate (breaths/min) 22   All Fields Breath Sounds aeration increased       Surgical Airway Portex Cuffed   No Placement Date or Time found.   Inserted by: Present Prior to Hospital Arrival  Brand: Portex  Airway Device Size: 8.0  Style: Cuffed   Cuff Pressure 29 cm H2O   Status Secured   Site Assessment Clean;Dry   Vent Select   Conventional Vent Y   Preset Conventional Ventilator Settings   Vent Type    Ventilation Type VC   Vent Mode A/C   Humidity HME   Set Rate 10 bmp   Vt Set 700 mL   PEEP/CPAP 5 cmH20   Pressure Support 0 cmH20   Waveform RAMP   Peak Flow 60 L/min   Set Inspiratory Pressure 0 cmH20   Insp Time 0 Sec(s)   Plateau Set/Insp. Hold (sec) 0   Insp Rise Time  0 %   Trigger Sensitivity Flow/I-Trigger 3 L/min   P High 0 cm H2O   P Low 0 cm H2O   T High 0 sec   T Low 0 sec   Patient Ventilator Parameters   Resp Rate Total 13 br/min   Peak Airway Pressure 24 cmH2O   Mean Airway Pressure 11 cmH20   Exhaled Vt 581 mL   Total Ve 7.72 mL   Spont Ve 0 L   I:E Ratio Measured 1:2.60   Conventional Ventilator Alarms   Ve High Alarm 26 L/min   Resp Rate High Alarm 40 br/min   Press High Alarm 40 cmH2O   Apnea Rate 10   Apnea Volume (mL) 690 mL   Apnea Oxygen Concentration  100   Apnea Flow Rate (L/min) 83   T Apnea 20 sec(s)   Ready to Wean/Extubation Screen    FIO2<60 (chart decimal) 0.4   MV<16L (chart vol.) 7.72   PEEP <10 (chart #) 5   Ready to Wean Parameters   F/VT Ratio<105 (RSBI) (!) 22.38

## 2017-03-22 NOTE — PHYSICIAN QUERY
PT Name: Masood Messina  MR #: [id number]    Physician Query Form - Cause and Effect Relationship Clarification      CDS/: Radha Taylor RN, CDS               Contact information:[phone]    This form is a permanent document in the medical record.     Query Date: March 22, 2017    By submitting this query, we are merely seeking further clarification of documentation. Please utilize your independent clinical judgment when addressing the question(s) below.    The Medical record contains the following:  Supporting Clinical Findings   Location in record                                                                       Urinary tract infection without hematuria   With urosepsis.  Abx therapy as above.   Maintain tay catheter                                                                                                           H/P 3/21     * Septic shock   Urosepsis with hypotension not responsive to IV bolus, requiring vasopressor                                                                                                                                                                                         H/P 3/21         Provider, please clarify if there is any correlation between _UTI/Sepsis___ and ___Foley catheter__.           Are the conditions:     [ x ] Due to or associated with each other     [  ] Unrelated to each other     [  ] Other (Please Specify): _________________________     [  ] Clinically Undetermined

## 2017-03-22 NOTE — PROGRESS NOTES
Ice packs applied and room temperature adjusted for 103 temp oral.  Pt is extremely warm to touch.  Pt doesn't exhibit distress at this time.  Levophed held at this time for 146/75 BP.  Continuing to monitor.

## 2017-03-22 NOTE — PHYSICIAN QUERY
PT Name: Masood Messina  MR #: [id number]    Physician Query Form - Nutrition Clarification     CDS/: Radha Taylor RN, CDS             Contact information: [phone]    This form is a permanent document in the medical record.     Query Date: March 22, 2017    By submitting this query, we are merely seeking further clarification of documentation.. Please utilize your independent clinical judgment when addressing the question(s) below.    The Medical record contains the following:   Indicators  Supporting Clinical Findings Location in Medical Record   X % of Estimated Energy Intake over a time frame from p.o., TF, or TPN Energy Need Method: [facility] (modified) (1875)- x70%= 1312 kcals/day per [place] guidelines of permissive underfeeds.      :gastrostomy tube  - tube feeding or parenteral nutritiHe was sent to ED for  evaluation of feveron  RD Consult 3/21            RD Consult 3/21   X Weight Status over a time frame Overall Physical Appearance: obese, on ventilator support      RD Consult  3/21    Subcutaneous Fat and/or Muscle Loss trace  BLE edema. non-pitting H/P 3/21   X Fluid Accumulation or Edema      Reduced  Strength     X Wt / BMI / Usual Body Weight Ideal Body Weight (IBW), Male: 178.06 lb     Wt. 262 lbs  BMI 35.7 RD Consult 3/21      Flowsheet 3/20    Delayed Wound Healing / Failure to Thrive     X Acute or Chronic Illness Sepsis, VAP, Severe sepsis with septic shock,  peg, trach dependentPMHx significant for functional quadraplegia, CAD, hx psedumonas with ventilator dependency.  He lives at [facility] H/P 3/21    Medication      Treatment     X Other inadequate energy intake   Etiology: decreased ability to consume sufficient energy   Signs/Symptoms: NPO, no enteral orders      Recommend Peptamen VHP (formerly bariatric) @ 20 mls/hr advancing by 10 mls Q4 hrs to goal rate 55 mls/hr continuous providing 1320 kcals/day, 121 g protein/day, 100 g CHO/day, and 1109 mls water/day RD Consult  3/21            RD Consult 3/21     AND / ASPEN Clinical Characteristics (October 2011)  A minimum of two characteristics is recommended for diagnosing either moderate or severe malnutrition   Mild Malnutrition Moderate Malnutrition Severe Malnutrition   Energy Intake from p.o., TF or TPN. < 75% intake of estimated energy needs for less than 7 days < 75% intake of estimated energy needs for greater than 7 days < 50% intake of estimated energy needs for > 5 days   Weight Loss 1-2% in 1 month  5% in 3 months  7.5% in 6 months  10% in 1 year 1-2 % in 1 week  5% in 1 month  7.5% in 3 months  10% in 6 months  20% in 1 year > 2% in 1 week  > 5% in 1 month  > 7.5% in 3 months  > 10% in 6 months  > 20% in 1 year   Physical Findings     None *Mild subcutaneous fat and/or muscle loss  *Mild fluid accumulation  *Stage II decubitus  *Surgical wound or non-healing wound *Mod/severe subcutaneous fat and/or muscle loss  *Mod/severe fluid accumulation  *Stage III or IV decubitus  *Non-healing surgical wound     Provider, please specify diagnosis or diagnoses associated with above clinical findings.    [x ] Mild Protein-Calorie Malnutrition  [ ] Moderate Protein-Calorie Malnutrition  [ ] Severe Protein-Calorie Malnutrition  [ ] Cachexia  [ ] Anorexia  [ ] Abnormal Weight Loss  [ ] Underweight  [ ] Other Nutritional Diagnosis (please specify): ____________________________________  [ ] Other: ________________________________  [ ] Clinically Undetermined    Please document in your progress notes daily for the duration of treatment until resolved and include in your discharge summary.

## 2017-03-23 LAB
ANION GAP SERPL CALC-SCNC: 6 MMOL/L
BASOPHILS # BLD AUTO: 0 K/UL
BASOPHILS NFR BLD: 0.3 %
BUN SERPL-MCNC: 22 MG/DL
CALCIUM SERPL-MCNC: 9.1 MG/DL
CHLORIDE SERPL-SCNC: 99 MMOL/L
CO2 SERPL-SCNC: 28 MMOL/L
CREAT SERPL-MCNC: 1 MG/DL
DIFFERENTIAL METHOD: ABNORMAL
EOSINOPHIL # BLD AUTO: 0.4 K/UL
EOSINOPHIL NFR BLD: 4.2 %
ERYTHROCYTE [DISTWIDTH] IN BLOOD BY AUTOMATED COUNT: 13.5 %
EST. GFR  (AFRICAN AMERICAN): >60 ML/MIN/1.73 M^2
EST. GFR  (NON AFRICAN AMERICAN): >60 ML/MIN/1.73 M^2
GLUCOSE SERPL-MCNC: 95 MG/DL
HCT VFR BLD AUTO: 29.8 %
HGB BLD-MCNC: 10.1 G/DL
LYMPHOCYTES # BLD AUTO: 1.2 K/UL
LYMPHOCYTES NFR BLD: 14.6 %
MCH RBC QN AUTO: 29.2 PG
MCHC RBC AUTO-ENTMCNC: 33.9 %
MCV RBC AUTO: 86 FL
MONOCYTES # BLD AUTO: 1.3 K/UL
MONOCYTES NFR BLD: 14.8 %
NEUTROPHILS # BLD AUTO: 5.6 K/UL
NEUTROPHILS NFR BLD: 66.1 %
PLATELET # BLD AUTO: 109 K/UL
PMV BLD AUTO: 11.2 FL
POTASSIUM SERPL-SCNC: 3.4 MMOL/L
RBC # BLD AUTO: 3.47 M/UL
SODIUM SERPL-SCNC: 133 MMOL/L
VANCOMYCIN TROUGH SERPL-MCNC: 10.5 UG/ML
WBC # BLD AUTO: 8.5 K/UL

## 2017-03-23 PROCEDURE — 25000242 PHARM REV CODE 250 ALT 637 W/ HCPCS: Performed by: NURSE PRACTITIONER

## 2017-03-23 PROCEDURE — 63600175 PHARM REV CODE 636 W HCPCS: Performed by: INTERNAL MEDICINE

## 2017-03-23 PROCEDURE — 25000003 PHARM REV CODE 250: Performed by: INTERNAL MEDICINE

## 2017-03-23 PROCEDURE — 94761 N-INVAS EAR/PLS OXIMETRY MLT: CPT

## 2017-03-23 PROCEDURE — 94770 HC EXHALED C02 TEST: CPT

## 2017-03-23 PROCEDURE — 94640 AIRWAY INHALATION TREATMENT: CPT

## 2017-03-23 PROCEDURE — 20000000 HC ICU ROOM

## 2017-03-23 PROCEDURE — 27200966 HC CLOSED SUCTION SYSTEM

## 2017-03-23 PROCEDURE — 94003 VENT MGMT INPAT SUBQ DAY: CPT

## 2017-03-23 PROCEDURE — 63600175 PHARM REV CODE 636 W HCPCS: Performed by: NURSE PRACTITIONER

## 2017-03-23 PROCEDURE — 99232 SBSQ HOSP IP/OBS MODERATE 35: CPT | Mod: ,,, | Performed by: INTERNAL MEDICINE

## 2017-03-23 PROCEDURE — 27000221 HC OXYGEN, UP TO 24 HOURS

## 2017-03-23 PROCEDURE — 36415 COLL VENOUS BLD VENIPUNCTURE: CPT

## 2017-03-23 PROCEDURE — 80202 ASSAY OF VANCOMYCIN: CPT

## 2017-03-23 PROCEDURE — 85025 COMPLETE CBC W/AUTO DIFF WBC: CPT

## 2017-03-23 PROCEDURE — C9113 INJ PANTOPRAZOLE SODIUM, VIA: HCPCS | Performed by: NURSE PRACTITIONER

## 2017-03-23 PROCEDURE — 25000003 PHARM REV CODE 250: Performed by: NURSE PRACTITIONER

## 2017-03-23 PROCEDURE — 80048 BASIC METABOLIC PNL TOTAL CA: CPT

## 2017-03-23 RX ORDER — POTASSIUM CHLORIDE 20 MEQ/1
40 TABLET, EXTENDED RELEASE ORAL ONCE
Status: COMPLETED | OUTPATIENT
Start: 2017-03-23 | End: 2017-03-23

## 2017-03-23 RX ADMIN — MINERAL SUPPLEMENT IRON 300 MG / 5 ML STRENGTH LIQUID 100 PER BOX UNFLAVORED 300 MG: at 08:03

## 2017-03-23 RX ADMIN — ASPIRIN 325 MG ORAL TABLET 325 MG: 325 PILL ORAL at 08:03

## 2017-03-23 RX ADMIN — RIVASTIGMINE TRANSDERMAL SYSTEM 1 PATCH: 4.6 PATCH, EXTENDED RELEASE TRANSDERMAL at 08:03

## 2017-03-23 RX ADMIN — PIPERACILLIN SODIUM AND TAZOBACTAM SODIUM 4.5 G: 4; .5 INJECTION, POWDER, FOR SOLUTION INTRAVENOUS at 09:03

## 2017-03-23 RX ADMIN — IPRATROPIUM BROMIDE AND ALBUTEROL SULFATE 3 ML: .5; 3 SOLUTION RESPIRATORY (INHALATION) at 07:03

## 2017-03-23 RX ADMIN — DULOXETINE HYDROCHLORIDE 30 MG: 30 CAPSULE, DELAYED RELEASE ORAL at 08:03

## 2017-03-23 RX ADMIN — PANTOPRAZOLE SODIUM 40 MG: 40 INJECTION, POWDER, FOR SOLUTION INTRAVENOUS at 08:03

## 2017-03-23 RX ADMIN — HYDROCODONE BITARTRATE AND ACETAMINOPHEN 1 TABLET: 10; 325 TABLET ORAL at 07:03

## 2017-03-23 RX ADMIN — ENOXAPARIN SODIUM 40 MG: 100 INJECTION SUBCUTANEOUS at 06:03

## 2017-03-23 RX ADMIN — POTASSIUM CHLORIDE 40 MEQ: 20 TABLET, EXTENDED RELEASE ORAL at 11:03

## 2017-03-23 RX ADMIN — IPRATROPIUM BROMIDE AND ALBUTEROL SULFATE 3 ML: .5; 3 SOLUTION RESPIRATORY (INHALATION) at 01:03

## 2017-03-23 RX ADMIN — FINASTERIDE 5 MG: 5 TABLET, FILM COATED ORAL at 08:03

## 2017-03-23 RX ADMIN — GABAPENTIN 400 MG: 400 CAPSULE ORAL at 02:03

## 2017-03-23 RX ADMIN — GABAPENTIN 400 MG: 400 CAPSULE ORAL at 06:03

## 2017-03-23 RX ADMIN — GABAPENTIN 400 MG: 400 CAPSULE ORAL at 09:03

## 2017-03-23 RX ADMIN — VANCOMYCIN HYDROCHLORIDE 1500 MG: 1 INJECTION, POWDER, LYOPHILIZED, FOR SOLUTION INTRAVENOUS at 03:03

## 2017-03-23 RX ADMIN — HYDROCODONE BITARTRATE AND ACETAMINOPHEN 1 TABLET: 10; 325 TABLET ORAL at 06:03

## 2017-03-23 RX ADMIN — TAMSULOSIN HYDROCHLORIDE 0.4 MG: 0.4 CAPSULE ORAL at 08:03

## 2017-03-23 RX ADMIN — PIPERACILLIN SODIUM AND TAZOBACTAM SODIUM 4.5 G: 4; .5 INJECTION, POWDER, FOR SOLUTION INTRAVENOUS at 05:03

## 2017-03-23 RX ADMIN — HYDROCODONE BITARTRATE AND ACETAMINOPHEN 1 TABLET: 10; 325 TABLET ORAL at 11:03

## 2017-03-23 RX ADMIN — TRAZODONE HYDROCHLORIDE 50 MG: 50 TABLET ORAL at 09:03

## 2017-03-23 NOTE — PLAN OF CARE
Problem: Patient Care Overview  Goal: Plan of Care Review  Outcome: Ongoing (interventions implemented as appropriate)  Vent dependant.  Trach in place.  Minimal secretions.   PEG intact.  Rahman patent, good urine output.  Temp max 100. Orally.  Safety maintained.  Isolation precautions maintained.  Cultures pending.

## 2017-03-23 NOTE — PROGRESS NOTES
Progress Note  Hospital Medicine  Patient Name:Masood Messina  MRN:  [id number]  Patient Class: IP- Inpatient  Admit Date: 3/20/2017  Length of Stay: 2 days  Expected Discharge Date:   Attending Physician: Melissa Mayfield MD  Primary Care Provider:  Jeramie Lombardi MD    SUBJECTIVE:     Principal Problem: Septic shock  Initial history of present illness: Masood Messina is a 76 y.o. Male with PMHx significant for functional quadraplegia, CAD, hx psedumonas with ventilator dependency. He lives at [place]. He was sent to ED for evaluation of fever. He was recently found to have pseudomonas in his sputum (2/27) and placed on cipro. He is non-contributory to history, but does shake head yes and no. He was found to febrile and hypotensive in ED. His urine revealed urinary tract infection. He was bolused NS in ED but remained hypotensive. He underwent central line insertion and Levophed was started. He is stable on levophed infusion at this time. He remains febrile.    PMH/PSH/SH/FH/Meds: reviewed.    Symptoms/Review of Systems:  Continued fever of 103F. Weaned off levophed this AM. No acute distress, patient without complaints. On IV pressor agent.  Diet:  PEG feeding  Activity level: Keep HOB at 30 degrees, rotate patient q 2 hrs  Pain:  NAD       OBJECTIVE:   Vital Signs (Most Recent):      Temp: 99.8 °F (37.7 °C) (03/23/17 0600)  Pulse: 69 (03/23/17 0737)  Resp: 16 (03/23/17 0737)  BP: 134/63 (03/23/17 0737)  SpO2: 97 % (03/23/17 0737)       Vital Signs Range (Last 24H):  Temp:  [97.8 °F (36.6 °C)-99.8 °F (37.7 °C)]   Pulse:  [62-80]   Resp:  [10-23]   BP: ()/(46-80)   SpO2:  [96 %-100 %]     Weight: 119 kg (262 lb 5.6 oz)  Body mass index is 35.58 kg/(m^2).    Intake/Output Summary (Last 24 hours) at 03/23/17 1006  Last data filed at 03/23/17 0600   Gross per 24 hour   Intake             4750 ml   Output             2595 ml   Net             2155 ml     Physical Examination:  Constitutional: He is  oriented to person, place, and time. No distress.   Ill appearing with trach on ventilator   HENT:   Head: Normocephalic and atraumatic.   Eyes: Conjunctivae and EOM are normal. Pupils are equal, round, and reactive to light.   Neck: Normal range of motion. Neck supple.   Trach in place without drainage or erythema   Cardiovascular: Regular rhythm, normal heart sounds and intact distal pulses.   Pulmonary/Chest: Breath sounds normal. He has no wheezes. He has no rales.   Mechanically assisted ventilation.   Abdominal: Bowel sounds are normal. There is no tenderness.   Obese. PEG noted without erythema.   Genitourinary:   Genitourinary Comments: Rahman in place   Musculoskeletal: He exhibits edema (trace BLE edema. non-pitting).   Contracted with noted LEFT hemiplegia.  + bilateral foot drop   Neurological: He is alert and oriented to person, place, and time.   Skin: Skin is warm and dry.   Psychiatric:   Flat affect. Withdrawn.     CBC:    Recent Labs  Lab 03/21/17  0657 03/22/17  0320 03/23/17  0450   WBC 10.00 9.40 8.50   RBC 3.64* 3.63* 3.47*   HGB 10.4* 10.4* 10.1*   HCT 31.4* 31.3* 29.8*   * 113* 109*   MCV 86 86 86   MCH 28.5 28.7 29.2   MCHC 33.1 33.2 33.9   BMP    Recent Labs  Lab 03/20/17  2348 03/22/17  0320 03/23/17  0450   GLU 95 98 95    135* 133*   K 4.6 3.8 3.4*   CL 97 98 99   CO2 33* 30* 28   BUN 33* 25* 22   CREATININE 1.1 1.1 1.0   CALCIUM 9.6 9.1 9.1      Diagnostic Results:  Microbiology Results (last 7 days)     Procedure Component Value Units Date/Time    Blood culture [[id number]] Collected:  03/20/17 2353    Order Status:  Completed Specimen:  Blood from Peripheral, Right  Arm Updated:  03/22/17 1212     Blood Culture, Routine No Growth to date     Blood Culture, Routine No Growth to date    Blood culture [[id number]] Collected:  03/20/17 2348    Order Status:  Completed Specimen:  Blood from Peripheral, Left  Hand Updated:  03/22/17 1212     Blood Culture, Routine No Growth to  date     Blood Culture, Routine No Growth to date    Urine culture [[id number]] Collected:  03/20/17 2315    Order Status:  Completed Specimen:  Urine from Urine, Catheterized Updated:  03/22/17 1143     Urine Culture, Routine Multiple organisms isolated. None in predominance.  Repeat if     Urine Culture, Routine clinically necessary.    Narrative:       Cath if necessary    Culture, Respiratory with Gram Stain [[id number]] Collected:  03/21/17 1122    Order Status:  Completed Specimen:  Respiratory from Tracheal Aspirate Updated:  03/22/17 0646     Gram Stain (Respiratory) <10 epithelial cells per low power field.     Gram Stain (Respiratory) Moderate WBC's     Gram Stain (Respiratory) Moderate Gram positive rods     Gram Stain (Respiratory) Moderate Gram negative rods    Urine culture [[id number]] Collected:  03/21/17 0515    Order Status:  Canceled Specimen:  Urine from Urine, Catheterized Updated:  03/21/17 0516    Narrative:       Culture Urine was cancelled on 03/21/2017 at 06:18 by [facility]; Duplicate   order, test included in another profile. RN Don      CXR: Mild patchy infiltrate or atelectasis left lung base.  Assessment/Plan:   * Septic shock  Follow microbiology results.  IV Vanc with pharmacy to dose + zosyn.   Monitor closely in ICU and titrate vasopressors as clinically appropriate. Weaned off pressors this AM. Closely monitor BP.     Urinary tract infection without hematuria  Follow urine culture.     Ventilator associated pneumonia  Reviewed sputum culture from [place] 2/27/17. + pseudomonas with sensitivity to zosyn. Continue Vanc/Zosyn. Culture sputum.      Functional quadriplegia  Turn q 2 hrs. Utilize heel boots     Tracheostomy dependence  Continue mechanical ventilation with previous settings. Trach care per RT     COPD (chronic obstructive pulmonary disease)  Chronic without evidence of exacerbation. Continue ventilatory support. DuoNebs q 6.     PEG (percutaneous endoscopic gastrostomy)  status  Consult dietary, continue TFs as clinically appropriate.     Hypothyroidism  Presenting with low TSH. Hold thyroid hormone replacement.      VTE Risk Mitigation         Ordered     enoxaparin injection 40 mg  Daily     Route:  Subcutaneous        03/21/17 7167        Melissa Mayfield MD  Department of Hospital Medicine   [facility]

## 2017-03-23 NOTE — PLAN OF CARE
03/22/17 1950   Patient Assessment/Suction   Level of Consciousness (AVPU) alert   Respiratory Effort Normal   Expansion/Accessory Muscles/Retractions no use of accessory muscles   All Lung Fields Breath Sounds diminished   Rhythm/Pattern, Respiratory mechanical device   Cough Frequency infrequent   Cough Type assisted   Suction Method in-line suction catheter (closed)   $ Suction Charges Close Suction System Equipment   Sputum Amount small   Sputum Color yellow   Sputum Consistency thick   PRE-TX-O2-ETCO2   O2 Device (Oxygen Therapy) ventilator   $ Is the patient on Oxygen? Yes   Oxygen Concentration (%) 40   SpO2 100 %   Pulse Oximetry Type Continuous   $ Pulse Oximetry - Multiple Charge Pulse Oximetry - Multiple   ETCO2 (mmHg) 35 mmHg   Pulse 62   Resp 13   Aerosol Therapy   $ Aerosol Therapy Charges Aerosol Treatment   Respiratory Treatment Status given   SVN/Inhaler Treatment Route in-line   Position During Treatment HOB at 30 degrees   Patient Tolerance good   Post-Treatment   Post-treatment Heart Rate (beats/min) 63   Post-treatment Resp Rate (breaths/min) 11   All Fields Breath Sounds aeration increased   Vent Select   Conventional Vent Y   Preset Conventional Ventilator Settings   Vent Type    Ready to Wean/Extubation Screen   FIO2<60 (chart decimal) 0.4

## 2017-03-23 NOTE — CONSULTS
Masood Messina [id number] is a 76 y.o. male who has been consulted for vancomycin dosing.    The patient has the following labs:     Date Creatinine (mg/dl)    BUN WBC Count   3/23/2017 Estimated Creatinine Clearance: 82.4 mL/min (based on Cr of 1). Lab Results   Component Value Date    BUN 22 03/23/2017     Lab Results   Component Value Date    WBC 8.50 03/23/2017        Current weight is 119 kg (262 lb 5.6 oz)    Vancomycin trough from 03/23 at 1434 was 10.5 mg/dL. The patient will be changed to a vancomycin dose of 1750 mg every 24 hours. A vancomycin trough has been ordered prior to 3rd dose due 03/25 at 1500.      Patient will be followed by pharmacy for changes in renal function, toxicity, and efficacy.  Thank you for allowing us to participate in this patient's care.     Lenore Vargas

## 2017-03-24 VITALS
RESPIRATION RATE: 23 BRPM | HEIGHT: 72 IN | OXYGEN SATURATION: 95 % | WEIGHT: 262.38 LBS | SYSTOLIC BLOOD PRESSURE: 107 MMHG | HEART RATE: 87 BPM | TEMPERATURE: 100 F | DIASTOLIC BLOOD PRESSURE: 66 MMHG | BODY MASS INDEX: 35.54 KG/M2

## 2017-03-24 LAB
ANION GAP SERPL CALC-SCNC: 10 MMOL/L
BACTERIA SPEC AEROBE CULT: NORMAL
BASOPHILS # BLD AUTO: 0 K/UL
BASOPHILS NFR BLD: 0.1 %
BUN SERPL-MCNC: 20 MG/DL
CALCIUM SERPL-MCNC: 9.1 MG/DL
CHLORIDE SERPL-SCNC: 99 MMOL/L
CO2 SERPL-SCNC: 25 MMOL/L
CREAT SERPL-MCNC: 1 MG/DL
DIFFERENTIAL METHOD: ABNORMAL
EOSINOPHIL # BLD AUTO: 0.2 K/UL
EOSINOPHIL NFR BLD: 1.3 %
ERYTHROCYTE [DISTWIDTH] IN BLOOD BY AUTOMATED COUNT: 13.8 %
EST. GFR  (AFRICAN AMERICAN): >60 ML/MIN/1.73 M^2
EST. GFR  (NON AFRICAN AMERICAN): >60 ML/MIN/1.73 M^2
GLUCOSE SERPL-MCNC: 109 MG/DL
GRAM STN SPEC: NORMAL
HCT VFR BLD AUTO: 28.5 %
HGB BLD-MCNC: 9.6 G/DL
LYMPHOCYTES # BLD AUTO: 1 K/UL
LYMPHOCYTES NFR BLD: 8 %
MCH RBC QN AUTO: 28.6 PG
MCHC RBC AUTO-ENTMCNC: 33.7 %
MCV RBC AUTO: 85 FL
MONOCYTES # BLD AUTO: 1.3 K/UL
MONOCYTES NFR BLD: 9.7 %
NEUTROPHILS # BLD AUTO: 10.5 K/UL
NEUTROPHILS NFR BLD: 80.9 %
PLATELET # BLD AUTO: 130 K/UL
PMV BLD AUTO: 10.3 FL
POTASSIUM SERPL-SCNC: 3.5 MMOL/L
RBC # BLD AUTO: 3.35 M/UL
SODIUM SERPL-SCNC: 134 MMOL/L
WBC # BLD AUTO: 12.9 K/UL

## 2017-03-24 PROCEDURE — 25000003 PHARM REV CODE 250: Performed by: INTERNAL MEDICINE

## 2017-03-24 PROCEDURE — 27000221 HC OXYGEN, UP TO 24 HOURS

## 2017-03-24 PROCEDURE — 27200966 HC CLOSED SUCTION SYSTEM

## 2017-03-24 PROCEDURE — C9113 INJ PANTOPRAZOLE SODIUM, VIA: HCPCS | Performed by: NURSE PRACTITIONER

## 2017-03-24 PROCEDURE — 99239 HOSP IP/OBS DSCHRG MGMT >30: CPT | Mod: ,,, | Performed by: INTERNAL MEDICINE

## 2017-03-24 PROCEDURE — 25000003 PHARM REV CODE 250: Performed by: NURSE PRACTITIONER

## 2017-03-24 PROCEDURE — 94640 AIRWAY INHALATION TREATMENT: CPT

## 2017-03-24 PROCEDURE — 80048 BASIC METABOLIC PNL TOTAL CA: CPT

## 2017-03-24 PROCEDURE — 94003 VENT MGMT INPAT SUBQ DAY: CPT

## 2017-03-24 PROCEDURE — 25000242 PHARM REV CODE 250 ALT 637 W/ HCPCS: Performed by: NURSE PRACTITIONER

## 2017-03-24 PROCEDURE — 85025 COMPLETE CBC W/AUTO DIFF WBC: CPT

## 2017-03-24 PROCEDURE — 97803 MED NUTRITION INDIV SUBSEQ: CPT | Performed by: DIETITIAN, REGISTERED

## 2017-03-24 PROCEDURE — 63600175 PHARM REV CODE 636 W HCPCS: Performed by: NURSE PRACTITIONER

## 2017-03-24 PROCEDURE — 94770 HC EXHALED C02 TEST: CPT

## 2017-03-24 PROCEDURE — 94761 N-INVAS EAR/PLS OXIMETRY MLT: CPT

## 2017-03-24 PROCEDURE — 36415 COLL VENOUS BLD VENIPUNCTURE: CPT

## 2017-03-24 RX ORDER — AMOXICILLIN AND CLAVULANATE POTASSIUM 875; 125 MG/1; MG/1
1 TABLET, FILM COATED ORAL 2 TIMES DAILY
Qty: 14 TABLET | Refills: 0 | Status: SHIPPED | OUTPATIENT
Start: 2017-03-24 | End: 2017-03-31

## 2017-03-24 RX ORDER — FAMOTIDINE 20 MG/1
20 TABLET, FILM COATED ORAL 2 TIMES DAILY
Qty: 60 TABLET | Refills: 11 | Status: SHIPPED | OUTPATIENT
Start: 2017-03-24 | End: 2019-01-01

## 2017-03-24 RX ORDER — LEVOFLOXACIN 500 MG/1
500 TABLET, FILM COATED ORAL DAILY
Qty: 7 TABLET | Refills: 0 | Status: SHIPPED | OUTPATIENT
Start: 2017-03-24 | End: 2017-03-31

## 2017-03-24 RX ORDER — RAMELTEON 8 MG/1
8 TABLET ORAL NIGHTLY PRN
Status: DISCONTINUED | OUTPATIENT
Start: 2017-03-24 | End: 2017-03-24 | Stop reason: HOSPADM

## 2017-03-24 RX ADMIN — MINERAL SUPPLEMENT IRON 300 MG / 5 ML STRENGTH LIQUID 100 PER BOX UNFLAVORED 300 MG: at 09:03

## 2017-03-24 RX ADMIN — IPRATROPIUM BROMIDE AND ALBUTEROL SULFATE 3 ML: .5; 3 SOLUTION RESPIRATORY (INHALATION) at 08:03

## 2017-03-24 RX ADMIN — TAMSULOSIN HYDROCHLORIDE 0.4 MG: 0.4 CAPSULE ORAL at 09:03

## 2017-03-24 RX ADMIN — IPRATROPIUM BROMIDE AND ALBUTEROL SULFATE 3 ML: .5; 3 SOLUTION RESPIRATORY (INHALATION) at 01:03

## 2017-03-24 RX ADMIN — PIPERACILLIN SODIUM AND TAZOBACTAM SODIUM 4.5 G: 4; .5 INJECTION, POWDER, FOR SOLUTION INTRAVENOUS at 12:03

## 2017-03-24 RX ADMIN — SODIUM CHLORIDE 1 ML: 0.9 INJECTION, SOLUTION INTRAVENOUS at 12:03

## 2017-03-24 RX ADMIN — HYDROCODONE BITARTRATE AND ACETAMINOPHEN 1 TABLET: 10; 325 TABLET ORAL at 09:03

## 2017-03-24 RX ADMIN — ASPIRIN 325 MG ORAL TABLET 325 MG: 325 PILL ORAL at 09:03

## 2017-03-24 RX ADMIN — RIVASTIGMINE TRANSDERMAL SYSTEM 1 PATCH: 4.6 PATCH, EXTENDED RELEASE TRANSDERMAL at 09:03

## 2017-03-24 RX ADMIN — PANTOPRAZOLE SODIUM 40 MG: 40 INJECTION, POWDER, FOR SOLUTION INTRAVENOUS at 09:03

## 2017-03-24 RX ADMIN — ACETAMINOPHEN 650 MG: 160 SOLUTION ORAL at 12:03

## 2017-03-24 RX ADMIN — GABAPENTIN 400 MG: 400 CAPSULE ORAL at 02:03

## 2017-03-24 RX ADMIN — GABAPENTIN 400 MG: 400 CAPSULE ORAL at 05:03

## 2017-03-24 RX ADMIN — FINASTERIDE 5 MG: 5 TABLET, FILM COATED ORAL at 09:03

## 2017-03-24 RX ADMIN — DULOXETINE HYDROCHLORIDE 30 MG: 30 CAPSULE, DELAYED RELEASE ORAL at 09:03

## 2017-03-24 RX ADMIN — HYDROCODONE BITARTRATE AND ACETAMINOPHEN 1 TABLET: 10; 325 TABLET ORAL at 12:03

## 2017-03-24 RX ADMIN — PIPERACILLIN SODIUM AND TAZOBACTAM SODIUM 4.5 G: 4; .5 INJECTION, POWDER, FOR SOLUTION INTRAVENOUS at 08:03

## 2017-03-24 NOTE — PROGRESS NOTES
I faxed DC orders and signed AVS to [place] at [phone].  Confirmation received. Agatha Perkins LMSW

## 2017-03-24 NOTE — PROGRESS NOTES
[facility]  Adult Nutrition  Progress Note    SUMMARY     Recommendations  Recommendation/Intervention:   1.) Continue with Peptamen VHP (formerly bariatric) @ 40 mls/hr advancing by 10 mls Q4 hrs to goal rate 55 mls/hr continuous providing 1320 kcals/day, 121 g protein/day, 100 g CHO/day, and 1109 mls water/day. Hold TF x4 hrs for residuals >250mls. Flush 120 mls Q4 hrs to meet fluid needs or per MD.    Goals: 1.) enteral nutrition will initiate within 72 hrs. 2.) patient will tolerate tube feeds at goal rate  Nutrition Goal Status: 1.) goal met 2.) new  Communication of RD Recs: other (comment) (sticky note to MD)    1. Urinary tract infection without hematuria, site unspecified    2. Sepsis, due to unspecified organism    3. Septic shock    4. Acquired hypothyroidism    5. Chronic obstructive pulmonary disease, unspecified COPD type    6. PEG (percutaneous endoscopic gastrostomy) status      Past Medical History:   Diagnosis Date    AAA (abdominal aortic aneurysm)     Anemia     CHF (congestive heart failure)     COPD (chronic obstructive pulmonary disease)     Coronary artery disease     Dementia     Depression     GERD (gastroesophageal reflux disease)     Hyperlipidemia     Hypertension     Hypothyroid     Renal disorder     Respiratory failure, chronic     Ventilator dependence        Reason for Assessment  Reason for Assessment: RD follow-up  Interdisciplinary Rounds: attended  General Information Comments: Admits from [place] in septic shock. Vent depending with trach/peg.   Peptamen infusing @ 40 mls/hr. Advancing toward goal rate. Per RN, 100 mls residuals noted initially, but have been low since.     Nutrition Prescription Ordered  Current Diet Order: NPO     Current Nutrition Support Formula Ordered: Peptamen  Current Nutrition Support Rate Ordered: 55 (ml)  Current Nutrition Support Frequency Ordered: continuous        Evaluation of Received Nutrients/Fluid  Intake  Enteral Calories (kcal): 1320  Enteral Protein (gm): 121  Enteral (Free Water) Fluid (mL): 1109  Peg tube: 1755 mls per I/Os  Tolerance: tolerating     Nutrition Risk Screen  Nutrition Risk Screen: tube feeding or parenteral nutrition    Nutrition/Diet History  Factors Affecting Nutritional Intake: NPO, on mechanical ventilation    Labs/Tests/Procedures/Meds  Diagnostic Test/Procedure Review: reviewed, pertinent  Pertinent Labs Reviewed: reviewed, pertinent  BMP  Lab Results   Component Value Date     (L) 03/24/2017    K 3.5 03/24/2017    CL 99 03/24/2017    CO2 25 03/24/2017    BUN 20 03/24/2017    CREATININE 1.0 03/24/2017    CALCIUM 9.1 03/24/2017    ANIONGAP 10 03/24/2017    ESTGFRAFRICA >60 03/24/2017    EGFRNONAA >60 03/24/2017     Lab Results   Component Value Date    ALBUMIN 2.8 (L) 03/20/2017     Lab Results   Component Value Date    CALCIUM 9.1 03/24/2017    PHOS 2.8 05/11/2014     No results for input(s): POCTGLUCOSE in the last 24 hours.    Pertinent Medications Reviewed: reviewed  Scheduled Meds:   albuterol-ipratropium 2.5mg-0.5mg/3mL  3 mL Nebulization Q6H    aspirin  325 mg Oral Daily    duloxetine  30 mg Oral Daily    enoxaparin  40 mg Subcutaneous Daily    ferrous sulfate  300 mg Oral Daily    finasteride  5 mg Oral Daily    gabapentin  400 mg Oral Q8H    pantoprazole  40 mg Intravenous Daily    piperacillin-tazobactam 4.5 g in dextrose 5 % 100 mL IVPB (ready to mix system)  4.5 g Intravenous Q8H    rivastigmine  1 patch Transdermal Daily    tamsulosin  0.4 mg Oral Daily    trazodone  50 mg Oral QHS    vancomycin (VANCOCIN) IVPB  1,750 mg Intravenous Q24H     Continuous Infusions:   sodium chloride 0.9% 1 mL (03/24/17 0032)         Physical Findings  Overall Physical Appearance: obese, on ventilator support  Tubes: gastrostomy tube  Oral/Mouth Cavity: WDL  Skin:  (Brandon score 12)    Anthropometrics  Height (inches): 72.01 in  Weight Method: Bed Scale  Weight (kg): 119  kg  Ideal Body Weight (IBW), Male: 178.06 lb  % Ideal Body Weight, Male (lb): 147.34 lb  BMI (kg/m2): 35.57  BMI Grade: 35 - 39.9 - obesity - grade II  Usual Body Weight (UBW), kg:  (mike)    Estimated/Assessed Needs  Weight Used For Calorie Calculations: 119 kg (262 lb 5.6 oz)   Height (cm): 182.9 cm  Energy Need Method: [place] (modified) (2240) x60-70% per aspen permissive underfeeds= 5988-5996 kcals/day  RMR ([place]-St. Jeor Equation): 1958.1  Weight Used For Protein Calculations: 80.9 kg (178 lb 5.6 oz) (ideal body weight)  1.2 gm Protein (gm): 97.28 and 2.0 gm Protein (gm): 162.14  Fluid Need Method: RDA Method (or per MD)   Grams of CHO per day: 234 g max       Monitor and Evaluation  Food and Nutrient Intake: energy intake, enteral nutrition intake  Food and Nutrient Adminstration: diet order  Anthropometric Measurements: weight, weight change, body mass index  Biochemical Data, Medical Tests and Procedures: electrolyte and renal panel, glucose/endocrine profile, lipid profile  Nutrition-Focused Physical Findings: skin    Nutrition Risk    Level of Risk:  (x2 weekly)    Nutrition Follow-Up    RD Follow-up?: Yes    Assessment and Plan    Nutrition Diagnosis:   Problem: inadequate energy intake  Etiology: decreased ability to consume sufficient energy  Signs/Symptoms: NPO, no enteral orders  Status: new     % EEN: 71% via tube feeds at 40 mls/hr  % Meal: npo     Discharge planning: Peg feeds above. .

## 2017-03-24 NOTE — PROGRESS NOTES
I sent Jennifer a 3 day packet and current meds list via [facility] to anticipate for discharge.  Agatha Perkins LMSW

## 2017-03-24 NOTE — PROGRESS NOTES
Per Chikis at [place] [phone] since the pt is a vent pt and on peg tube and has SNF days available they need to know if he is returning Skilled. Per LANI De Jesus the pt does not have any new skilled needs and is returning group home care. I updated Chikis and she stated that they need a SNF denial from [facility] in order for the pt to return. I updated LANI De Jesus. Agatha Perkins LMSW

## 2017-03-24 NOTE — PLAN OF CARE
Problem: Nutrition, Enteral (Adult)  Goal: Signs and Symptoms of Listed Potential Problems Will be Absent, Minimized or Managed (Nutrition, Enteral)  Signs and symptoms of listed potential problems will be absent, minimized or managed by discharge/transition of care (reference Nutrition, Enteral (Adult) CPG).   Recommendations  Recommendation/Intervention:   1.) Continue with Peptamen VHP (formerly bariatric) @ 40 mls/hr advancing by 10 mls Q4 hrs to goal rate 55 mls/hr continuous providing 1320 kcals/day, 121 g protein/day, 100 g CHO/day, and 1109 mls water/day. Hold TF x4 hrs for residuals >250mls. Flush 120 mls Q4 hrs to meet fluid needs or per MD.     Goals: 1.) enteral nutrition will initiate within 72 hrs. 2.) patient will tolerate tube feeds at goal rate  Nutrition Goal Status: 1.) goal met 2.) new  Communication of RD Recs: other (comment) (sticky note to MD)

## 2017-03-24 NOTE — DISCHARGE INSTRUCTIONS
Admit to [facility]     Dx-   Patient Active Problem List   Diagnosis    Sepsis    Functional quadriplegia    Respiratory failure, acute-on-chronic    SIRS with acute organ dysfunction due to infectious process    Tracheostomy dependence    Chronic obstructive pulmonary disease    Hematuria    Hypotension    Urinary tract infection without hematuria    Septic shock    PEG (percutaneous endoscopic gastrostomy) status    Ventilator associated pneumonia    Acquired hypothyroidism     Diet- NPO  TF- Peptamen Bariatric @55cc/hr; Hold TF x4 hrs for residuals >250mls. Flush 120 mls Q4 hrs to meet fluid need  Aspiration Precautions   Keep HOB @30 degrees   Peg care per protocol    Vent Mode: A/C  Oxygen Concentration (%):  [40] 40  Resp Rate Total:  [10 br/min-33 br/min] 18 br/min  Vt Set:  [700 mL] 700 mL  PEEP/CPAP:  [5 cmH20] 5 cmH20  Pressure Support:  [0 cmH20] 0 cmH20  Mean Airway Pressure:  [11 pqV34-05 cmH20] 11 cmH20     Trach care per protocol     Levaquin and Augmentin x7days; stop date- 3/31/17; Dx- UTI

## 2017-03-24 NOTE — PLAN OF CARE
03/23/17 1951   Patient Assessment/Suction   Level of Consciousness (AVPU) responds to voice   All Lung Fields Breath Sounds diminished   Rhythm/Pattern, Respiratory ventilator assisted   $ Suction Charges Close Suction System Equipment   Sputum Amount small   Sputum Color yellow   Sputum Consistency thick   $ Swab or suction? Suction   PRE-TX-O2-ETCO2   O2 Device (Oxygen Therapy) ventilator   Oxygen Concentration (%) 40   SpO2 99 %   Pulse Oximetry Type Continuous   $ Pulse Oximetry - Multiple Charge Pulse Oximetry - Multiple   ETCO2 (mmHg) 33 mmHg   $ ETCO2 Charge Exhaled CO2 Monitoring   Pulse 75   Resp 18   Aerosol Therapy   $ Aerosol Therapy Charges Aerosol Treatment   Respiratory Treatment Status given   SVN/Inhaler Treatment Route in-line   Patient Tolerance good   Post-Treatment   Post-treatment Heart Rate (beats/min) 78   Post-treatment Resp Rate (breaths/min) 20   All Fields Breath Sounds aeration increased       Surgical Airway Portex Cuffed   No Placement Date or Time found.   Inserted by: Present Prior to Hospital Arrival  Brand: Portex  Airway Device Size: 8.0  Style: Cuffed   Status Secured   Preset Conventional Ventilator Settings   Vent Type    Ventilation Type VC   Vent Mode A/C   Humidity HME   Set Rate 10 bmp   Vt Set 700 mL   PEEP/CPAP 5 cmH20   Pressure Support 0 cmH20   Waveform RAMP   Peak Flow 60 L/min   Set Inspiratory Pressure 0 cmH20   Insp Time 0 Sec(s)   Plateau Set/Insp. Hold (sec) 0   Insp Rise Time  0 %   Trigger Sensitivity Flow/I-Trigger 3 L/min   P High 0 cm H2O   P Low 0 cm H2O   T High 0 sec   T Low 0 sec   Patient Ventilator Parameters   Resp Rate Total 19 br/min   Peak Airway Pressure 27 cmH2O   Mean Airway Pressure 14 cmH20   Exhaled Vt 764 mL   Total Ve 13.4 mL   Spont Ve 0 L   I:E Ratio Measured 1:1.80   Conventional Ventilator Alarms   Ve High Alarm 26 L/min   Resp Rate High Alarm 40 br/min   Press High Alarm 40 cmH2O   Apnea Rate 10   Apnea Volume (mL) 690 mL    Apnea Oxygen Concentration  100   Apnea Flow Rate (L/min) 83   T Apnea 20 sec(s)   Ready to Wean/Extubation Screen   FIO2<60 (chart decimal) 0.4   MV<16L (chart vol.) 13.4   PEEP <10 (chart #) 5   Ready to Wean Parameters   F/VT Ratio<105 (RSBI) (!) 23.56

## 2017-03-24 NOTE — PLAN OF CARE
Problem: Ventilation, Mechanical Invasive (Adult)  Goal: Signs and Symptoms of Listed Potential Problems Will be Absent, Minimized or Managed (Ventilation, Mechanical Invasive)  Signs and symptoms of listed potential problems will be absent, minimized or managed by discharge/transition of care (reference Ventilation, Mechanical Invasive (Adult) CPG).   Outcome: Ongoing (interventions implemented as appropriate)  Patient is a chronic ventilator patient from [place].  No weaning or attempts to wean made.  Sputum is thick, yellowish-creamy colored.  Remains on antibiotics, sputum has multiple organisms growing.    Problem: Airway, Artificial (Adult)  Goal: Signs and Symptoms of Listed Potential Problems Will be Absent, Minimized or Managed (Airway, Artificial)  Signs and symptoms of listed potential problems will be absent, minimized or managed by discharge/transition of care (reference Airway, Artificial (Adult) CPG).   Outcome: Ongoing (interventions implemented as appropriate)  Chronic ventilator patient from [place] with trach and peg.    Problem: Fall Risk (Adult)  Goal: Absence of Falls  Patient will demonstrate the desired outcomes by discharge/transition of care.   Outcome: Ongoing (interventions implemented as appropriate)  No falls or injuries this shift.    Problem: Patient Care Overview  Goal: Plan of Care Review  Outcome: Ongoing (interventions implemented as appropriate)  Patient stable overnight.  Temp max was 100.3; Sputum culture positive for multiple organisms.  On contact isolation.  UOP adequate, BP good.  On no vasopressors.  Still receiving fluids at 125 ml/hr.    Problem: Infection, Risk/Actual (Adult)  Goal: Infection Prevention/Resolution  Patient will demonstrate the desired outcomes by discharge/transition of care.   Outcome: Ongoing (interventions implemented as appropriate)  Continues to receive antibiotics.  Low grade fever this shift.    Problem: Pressure Ulcer Risk (Brandon Scale)  (Adult,Obstetrics,Pediatric)  Goal: Skin Integrity  Patient will demonstrate the desired outcomes by discharge/transition of care.   Outcome: Ongoing (interventions implemented as appropriate)  Skin integrity maintained overnight.    Problem: Nutrition, Enteral (Adult)  Goal: Signs and Symptoms of Listed Potential Problems Will be Absent, Minimized or Managed (Nutrition, Enteral)  Signs and symptoms of listed potential problems will be absent, minimized or managed by discharge/transition of care (reference Nutrition, Enteral (Adult) CPG).   Outcome: Ongoing (interventions implemented as appropriate)  Tube feeds increased to 40 ml/hr this shit, with water flushes.  Residual initially 100 but has been low since then.    Problem: Sepsis/Septic Shock (Adult)  Goal: Signs and Symptoms of Listed Potential Problems Will be Absent, Minimized or Managed (Sepsis/Septic Shock)  Signs and symptoms of listed potential problems will be absent, minimized or managed by discharge/transition of care (reference Sepsis/Septic Shock (Adult) CPG).   Outcome: Ongoing (interventions implemented as appropriate)  As above.

## 2017-03-24 NOTE — PROGRESS NOTES
I received a call from Chikis at [place] asking for the phone number to call report to ICU. Info provided. Agatha Perkins LMSW

## 2017-03-24 NOTE — PLAN OF CARE
Problem: Patient Care Overview  Goal: Plan of Care Review  Ventilated via trach  Aerosol Q 6 rachna well

## 2017-03-24 NOTE — DISCHARGE SUMMARY
Discharge Summary  Hospital Medicine    Admit Date: 3/20/2017    Date and Time: 3/24/311386:09 PM    Discharge Attending Physician: Melissa Mayfield MD    Primary Care Physician: Jeramie Lombardi MD    Diagnoses:  Active Hospital Problems    Diagnosis  POA    *Septic shock [A41.9, R65.21]  Yes    Urinary tract infection without hematuria [N39.0]  Yes    PEG (percutaneous endoscopic gastrostomy) status [Z93.1]  Not Applicable    Ventilator associated pneumonia [J95.851]  Yes    Acquired hypothyroidism [E03.9]  Yes    Tracheostomy dependence [Z93.0]  Not Applicable    Functional quadriplegia [R53.2]  Yes    Chronic obstructive pulmonary disease [J44.9]  Yes      Resolved Hospital Problems    Diagnosis Date Resolved POA   No resolved problems to display.     Discharged Condition: Good    Hospital Course:   Masood Messina is a 76 y.o. Male with PMHx significant for functional quadraplegia, CAD, hx psedumonas with ventilator dependency. He lives at [facility]. He was sent to ED for evaluation of fever. He was recently found to have pseudomonas in his sputum (2/27) and placed on cipro. He was non-contributory to history, but did shake head yes and no. He was found to febrile and hypotensive in ED. His urine revealed urinary tract infection. He was bolused NS in ED but remained hypotensive. He underwent central line insertion and Levophed was started. He is stable on levophed infusion at this time. He remains febrile. Patient was admitted to Hospitalist medicine service. Patient was managed in ICU. With IVF hydration and use of IV antibiotics, IV pressors was weaned off. Symptoms improved. Microbiology results for urine were negative. No evidence of pneumonia or respiratory symptoms noted during this hospitalization. Patient was discharged to nursing home in stable condition with following discharge plan of care. Total time with the patient was 30 minutes and greater than 50% was spent in counseling and  coordination of care. The assessment and plan have been discussed at length. Physicians' notes reviewed. Labs and procedure reviewed.     Consults: None    Significant Diagnostic Studies:     Microbiology Results (last 7 days)     Procedure Component Value Units Date/Time    Culture, Respiratory with Gram Stain [[id number]]  (Susceptibility) Collected:  03/21/17 1122    Order Status:  Completed Specimen:  Respiratory from Tracheal Aspirate Updated:  03/24/17 1052     Respiratory Culture --     SERRATIA MARCESCENS  Many       Respiratory Culture --     PSEUDOMONAS AERUGINOSA   Many       Respiratory Culture --     PRESUMPTIVE PROTEUS SPECIES  Moderate  Identification and susceptibility pending  Normal respiratory gabriela also present       Gram Stain (Respiratory) <10 epithelial cells per low power field.     Gram Stain (Respiratory) Moderate WBC's     Gram Stain (Respiratory) Moderate Gram positive rods     Gram Stain (Respiratory) Moderate Gram negative rods    Blood culture [[id number]] Collected:  03/20/17 2353    Order Status:  Completed Specimen:  Blood from Peripheral, Right  Arm Updated:  03/23/17 1212     Blood Culture, Routine No Growth to date     Blood Culture, Routine No Growth to date     Blood Culture, Routine No Growth to date    Blood culture [[id number]] Collected:  03/20/17 2348    Order Status:  Completed Specimen:  Blood from Peripheral, Left  Hand Updated:  03/23/17 1212     Blood Culture, Routine No Growth to date     Blood Culture, Routine No Growth to date     Blood Culture, Routine No Growth to date    Urine culture [[id number]] Collected:  03/20/17 2315    Order Status:  Completed Specimen:  Urine from Urine, Catheterized Updated:  03/22/17 1143     Urine Culture, Routine Multiple organisms isolated. None in predominance.  Repeat if     Urine Culture, Routine clinically necessary.    Narrative:       Cath if necessary    Urine culture [[id number]] Collected:  03/21/17 0515    Order Status:   Canceled Specimen:  Urine from Urine, Catheterized Updated:  03/21/17 0516    Narrative:       Culture Urine was cancelled on 03/21/2017 at 06:18 by [facility]; Duplicate   order, test included in another profile. RN Don        Special Treatments/Procedures: None  Disposition: [facility]    Medications:  Reconciled Home Medications: Current Discharge Medication List      START taking these medications    Details   amoxicillin-clavulanate 875-125mg (AUGMENTIN) 875-125 mg per tablet 1 tablet by Per G Tube route 2 (two) times daily.  Qty: 14 tablet, Refills: 0      !! famotidine (PEPCID) 20 MG tablet 1 tablet (20 mg total) by Per G Tube route 2 (two) times daily.  Qty: 60 tablet, Refills: 11      levoFLOXacin (LEVAQUIN) 500 MG tablet 1 tablet (500 mg total) by Per G Tube route once daily.  Qty: 7 tablet, Refills: 0       !! - Potential duplicate medications found. Please discuss with provider.      CONTINUE these medications which have NOT CHANGED    Details   albuterol-ipratropium 2.5mg-0.5mg/3mL (DUONEB) 0.5 mg-3 mg(2.5 mg base)/3 mL nebulizer solution Take 3 mLs by nebulization every 4 (four) hours as needed.      ascorbic acid, vitamin C, (VITAMIN C) 500 mg/5 mL Syrp syrup Take 500 mg by mouth once daily.      aspirin 325 MG tablet 325 mg by PEG Tube route once daily.       duloxetine (CYMBALTA) 30 MG capsule Take 30 mg by mouth once daily.      !! famotidine (PEPCID) 20 MG tablet Take 20 mg by mouth once daily.      ferrous sulfate 300 mg (60 mg iron)/5 mL syrup 300 mg by PEG Tube route once daily.      finasteride (PROSCAR) 5 mg tablet Take 5 mg by mouth once daily.      FOLIC ACID/MV,FE,MIN/LUTEIN (CERTA PLUS ORAL) Take by mouth.      gabapentin (NEURONTIN) 400 MG capsule Take 400 mg by mouth every 8 (eight) hours.      hydrocodone-acetaminophen 10-325mg (NORCO)  mg Tab Take by mouth.      levothyroxine (SYNTHROID) 50 MCG tablet 1 tablet (50 mcg total) by Per G Tube route before breakfast.  Qty: 30 tablet,  Refills: 11    Associated Diagnoses: Hematuria; Chronic respiratory failure; Tracheostomy dependence      liothyronine (CYTOMEL) 25 MCG Tab Take 25 mcg by mouth once daily.      melatonin 3 mg Tab 3 mg by PEG Tube route every evening.       nutritional supplements (ISOSOURCE HN) Liqd Take 70 mLs by mouth Daily.      potassium chloride (KLOR-CON) 20 mEq Pack Take 20 mEq by mouth 2 (two) times daily.      rivastigmine (EXELON) 4.6 mg/24 hour PT24 Place 1 patch onto the skin once daily.      tamsulosin (FLOMAX) 0.4 mg Cp24 Take 0.4 mg by mouth once daily.      trazodone (DESYREL) 50 MG tablet Take 50 mg by mouth every evening.      oxybutynin (DITROPAN) 5 MG Tab 1 tablet (5 mg total) by Per G Tube route 3 (three) times daily.  Qty: 90 tablet, Refills: 11       !! - Potential duplicate medications found. Please discuss with provider.      STOP taking these medications       ciprofloxacin HCl (CIPRO) 500 MG tablet Comments:   Reason for Stopping:               Discharge Procedure Orders  Diet general   Order Comments: PEG feeding: Peptamin Bariatric at 55cc/hre with free water 120 cc q 4.     Other restrictions (specify):   Order Comments: Fall precautions     Call MD for:   Order Comments: For worsening symptoms, chest pain, shortness of breath, increased abdominal pain, high grade fever, stroke or stroke like symptoms, immediately go to the nearest Emergency Room or call 911 as soon as possible.       Follow-up Information     Follow up with Jeramie Lombardi MD.    Specialty:  Family Medicine    Contact information:    [place] [id number] [phone]

## 2017-03-25 NOTE — PLAN OF CARE
03/25/17 1759   Final Note   Assessment Type Final Discharge Note   Discharge Disposition [facility]   Discharge planning education complete? Yes

## 2017-03-26 LAB
BACTERIA BLD CULT: NORMAL
BACTERIA BLD CULT: NORMAL

## 2017-03-27 ENCOUNTER — HOSPITAL ENCOUNTER (EMERGENCY)
Facility: HOSPITAL | Age: 77
End: 2017-03-28
Attending: EMERGENCY MEDICINE
Payer: MEDICARE

## 2017-03-27 ENCOUNTER — PATIENT OUTREACH (OUTPATIENT)
Dept: ADMINISTRATIVE | Facility: CLINIC | Age: 77
End: 2017-03-27
Payer: MEDICARE

## 2017-03-27 VITALS
SYSTOLIC BLOOD PRESSURE: 119 MMHG | BODY MASS INDEX: 35.49 KG/M2 | OXYGEN SATURATION: 97 % | HEIGHT: 72 IN | DIASTOLIC BLOOD PRESSURE: 83 MMHG | RESPIRATION RATE: 16 BRPM | HEART RATE: 56 BPM | WEIGHT: 262 LBS | TEMPERATURE: 99 F

## 2017-03-27 DIAGNOSIS — M79.606 LEG PAIN: ICD-10-CM

## 2017-03-27 PROCEDURE — 94002 VENT MGMT INPAT INIT DAY: CPT

## 2017-03-27 PROCEDURE — 99285 EMERGENCY DEPT VISIT HI MDM: CPT

## 2017-03-27 RX ORDER — TERAZOSIN 1 MG/1
1 CAPSULE ORAL DAILY
COMMUNITY

## 2017-03-27 RX ORDER — LEVOTHYROXINE SODIUM 200 UG/1
200 TABLET ORAL DAILY
Status: ON HOLD | COMMUNITY
End: 2017-10-16 | Stop reason: HOSPADM

## 2017-03-27 RX ORDER — RIVASTIGMINE 9.5 MG/24H
1 PATCH, EXTENDED RELEASE TRANSDERMAL DAILY
COMMUNITY

## 2017-03-27 RX ORDER — BACLOFEN 10 MG/1
25 TABLET ORAL EVERY 6 HOURS
COMMUNITY

## 2017-03-27 NOTE — ED AVS SNAPSHOT
[facility] [address]  [place] [id number]               Masood Messina   3/27/2017  6:55 PM   ED    Description:  Male : 1940   Department:  [facility]           Your Care was Coordinated By:     Provider Role From To    David Villa III, MD Attending Provider [phone] --      Reason for Visit     rule out dvt           Diagnoses this Visit        Comments    Leg pain           ED Disposition     ED Disposition Condition Comment    Discharge             To Do List           Follow-up Information     Follow up with Jeramie Lombardi MD In 1 week.    Specialty:  Family Medicine    Contact information:    [place] [id number]  [phone]        [facility]     [facility] -  Assistance  Registered nurses in the [facility] provide clinical advisement, health education, appointment booking, and other advisory services.  Call for this free service at [phone].             Medications           Message regarding Medications     Verify the changes and/or additions to your medication regime listed below are the same as discussed with your clinician today.  If any of these changes or additions are incorrect, please notify your healthcare provider.        STOP taking these medications     rivastigmine (EXELON) 4.6 mg/24 hour PT24 Place 1 patch onto the skin once daily.    FOLIC ACID/MV,FE,MIN/LUTEIN (CERTA PLUS ORAL) Take by mouth.    tamsulosin (FLOMAX) 0.4 mg Cp24 Take 0.4 mg by mouth once daily.           Verify that the below list of medications is an accurate representation of the medications you are currently taking.  If none reported, the list may be blank. If incorrect, please contact your healthcare provider. Carry this list with you in case of emergency.           Current Medications     albuterol-ipratropium 2.5mg-0.5mg/3mL (DUONEB) 0.5 mg-3 mg(2.5 mg base)/3 mL  nebulizer solution Take 3 mLs by nebulization every 4 (four) hours as needed.    amoxicillin-clavulanate 875-125mg (AUGMENTIN) 875-125 mg per tablet 1 tablet by Per G Tube route 2 (two) times daily.    ascorbic acid, vitamin C, (VITAMIN C) 500 mg/5 mL Syrp syrup 500 mg by PEG Tube route 2 (two) times daily.     aspirin 325 MG tablet 325 mg by PEG Tube route once daily.     baclofen (LIORESAL) 20 MG tablet 20 mg by PEG Tube route every 6 (six) hours as needed (muscle spasms).     duloxetine (CYMBALTA) 30 MG capsule 30 mg by PEG Tube route once daily.     famotidine (PEPCID) 20 MG tablet 1 tablet (20 mg total) by Per G Tube route 2 (two) times daily.    ferrous sulfate 300 mg (60 mg iron)/5 mL syrup 300 mg by PEG Tube route once daily.    finasteride (PROSCAR) 5 mg tablet 5 mg by PEG Tube route once daily.     gabapentin (NEURONTIN) 400 MG capsule 400 mg by PEG Tube route every 8 (eight) hours.     hydrocodone-acetaminophen 10-325mg (NORCO)  mg Tab 1 tablet by PEG Tube route every 6 (six) hours as needed for Pain.     levoFLOXacin (LEVAQUIN) 500 MG tablet 1 tablet (500 mg total) by Per G Tube route once daily.    levothyroxine (SYNTHROID) 200 MCG tablet 200 mcg by PEG Tube route once daily.    liothyronine (CYTOMEL) 25 MCG Tab 75 mcg by PEG Tube route once daily.     melatonin 3 mg Tab 3 mg by PEG Tube route every evening.     nutritional supplements (ISOSOURCE HN) Liqd Take 70 mLs by mouth Daily.    oxybutynin (DITROPAN) 5 MG Tab 1 tablet (5 mg total) by Per G Tube route 3 (three) times daily.    potassium chloride (KLOR-CON) 20 mEq Pack 20 mEq by PEG Tube route 2 (two) times daily.     rivastigmine (EXELON) 9.5 mg/24 hr PT24 Place 1 patch onto the skin once daily.    terazosin (HYTRIN) 1 MG capsule 1 mg by PEG Tube route once daily.    trazodone (DESYREL) 50 MG tablet 50 mg by PEG Tube route every evening.            Clinical Reference Information           Your Vitals Were     BP Pulse Temp Resp Height Weight     140/63 56 98.5 °F (36.9 °C) 16 6' (1.829 m) 118.8 kg (262 lb)    SpO2 BMI             97% 35.53 kg/m2         Allergies as of 3/27/2017        Reactions    Codeine Other (See Comments)      Immunizations Administered on Date of Encounter - 3/27/2017     None      ED Micro, Lab, POCT     None      ED Imaging Orders     Start Ordered       Status Ordering Provider    03/27/17 1932 03/27/17 1931   Lower Extremity Veins Bilateral  1 time imaging      In process       Discharge References/Attachments     PAIN, ACUTE, UNCERTAIN CAUSE (ENGLISH)      [facility] Sign-Up     Activating your [facility] account is as easy as 1-2-3!     1) Visit [email or web address], select Sign Up Now, enter this activation code and your date of birth, then select Next.  [email or web address]  Expires: 5/8/2017 12:09 PM      2) Create a username and password to use when you visit [facility] in the future and select a security question in case you lose your password and select Next.    3) Enter your e-mail address and click Sign Up!    Additional Information  If you have questions, please e-mail [email or web address] or call [phone] to talk to our [facility] staff. Remember, [facility] is NOT to be used for urgent needs. For medical emergencies, dial 911.         Smoking Cessation     If you would like to quit smoking:   You may be eligible for free services if you are a [place] resident and started smoking cigarettes before September 1, 1988.  Call the [employer]oking [employer]essation [employer] ([employer]) toll free at (974) 579-5514 or (173) 824-5317.   Call 1-216-QUIT-NOW if you do not meet the above criteria.             [facility] complies with applicable Federal civil rights laws and does not discriminate on the basis of race, color, national origin, age, disability, or sex.        Language Assistance Services     ATTENTION: Language assistance services are available, free of charge. Please call [phone].      ATENCIÓN: Jenise villafana  tiene a gannon disposición servicios gratuitos de asistencia lingüística. Llame al [phone].     BLANKA Ý: N?u b?n nói Ti?ng Vi?t, có các d?ch v? h? tr? ngôn ng? mi?n phí alvaroh cho b?n. G?i s? [phone].

## 2017-03-28 NOTE — ED NOTES
Patient identifiers for Masood Messina checked and correct.  LOC:  Patient is awake, alert, and aware of environment with an appropriate affect. Patient is oriented x 3 and speaking appropriately.  APPEARANCE:  Patient resting comfortably and in no acute distress. Patient is clean and well groomed, patient's clothing is properly fastened.  SKIN:  The skin is warm and dry. Patient has normal skin turgor and moist mucus membranes. Skin is intact; no bruising or breakdown noted.  MUSCULOSKELETAL:  Contractures noted to L hand and josephine foot drop. Pulses intact. C/o josephine constant lower leg pain.  RESPIRATORY:  Airway is open and patent. Respirations are ventilator assisted with normal effort and rate.  CARDIAC:  Patient has a normal rate and rhythm. No peripheral edema noted. Capillary refill < 3 seconds.  ABDOMEN:  Rounded.  Soft and non-tender upon palpation.  NEUROLOGICAL:  PERRL. Facial expression is symmetrical.   Allergies reported:   Review of patient's allergies indicates:   Allergen Reactions    Codeine Other (See Comments)     OTHER NOTES:  Pt c/o constant josephine lower leg pain since this AM.

## 2017-03-28 NOTE — ED PROVIDER NOTES
Encounter Date: 3/27/2017    SCRIBE #1 NOTE: I, Jeanne Parmar, am scribing for, and in the presence of, Dr Villa.       History     Chief Complaint   Patient presents with    rule out dvt     bilat leg pain. Reports worse on right.  sent for eval. Sent from [place]. Pt at baseline.      Review of patient's allergies indicates:   Allergen Reactions    Codeine Other (See Comments)     HPI Comments: 03/27/2017  7:20 PM     Chief Complaint: rule out DVT      Masood Messina is a 76 y.o. male presenting to the E.D. Via EMS from [place] to rule out DVT. He was sent by Dr. Lombardi for evaluation of bilateral leg pain, which is worse on right. HPI limited. He has a past medical history of AAA; Anemia; CHF; COPD; Coronary artery disease; Dementia; Depression; GERD; Hyperlipidemia; Hypertension; Hypothyroid; Renal disorder; Respiratory failure, chronic; and Ventilator dependence.  Pt has a past surgical history that includes Abdominal surgery; Cardiac surgery; Tracheostomy tube placement; Gastrostomy tube placement; and Spine surgery.      The history is provided by the patient.     Past Medical History:   Diagnosis Date    AAA (abdominal aortic aneurysm)     Anemia     CHF (congestive heart failure)     COPD (chronic obstructive pulmonary disease)     Coronary artery disease     Dementia     Depression     GERD (gastroesophageal reflux disease)     Hyperlipidemia     Hypertension     Hypothyroid     Renal disorder     Respiratory failure, chronic     Ventilator dependence      Past Surgical History:   Procedure Laterality Date    ABDOMINAL SURGERY      CARDIAC SURGERY      GASTROSTOMY TUBE PLACEMENT      SPINE SURGERY      TRACHEOSTOMY TUBE PLACEMENT       History reviewed. No pertinent family history.  Social History   Substance Use Topics    Smoking status: Former Smoker    Smokeless tobacco: None    Alcohol use No     Review of Systems   Musculoskeletal: Positive for arthralgias.        Physical Exam   Initial Vitals   BP Pulse Resp Temp SpO2   03/27/17 1856 03/27/17 1856 03/27/17 1856 03/27/17 1856 03/27/17 1856   99/45 76 16 98.5 °F (36.9 °C) 96 %     Physical Exam    Nursing note and vitals reviewed.  Constitutional: He appears well-developed and well-nourished.   HENT:   Head: Normocephalic and atraumatic.   Eyes: Conjunctivae are normal.   Neck: Neck supple.   Cardiovascular: Normal rate, regular rhythm, normal heart sounds and intact distal pulses. Exam reveals no gallop and no friction rub.    No murmur heard.  Pulmonary/Chest: He has no wheezes. He has no rhonchi. He has no rales.   Abdominal: Soft. He exhibits no distension. There is no tenderness.   Musculoskeletal: Normal range of motion.    No swelling or erythema noted.    Neurological: He is alert.   Skin: No rash noted. No erythema.   Psychiatric: He has a normal mood and affect.         ED Course   Procedures  Labs Reviewed - No data to display          Medical Decision Making:   ED Management:  Masood Messina is a 76 y.o. male who presents with  bilateral pain distal to the knees greatest in the right leg.  Nursing home personnel expresses concerns over DVT; therefore, ultrasound was performed and demonstrates no evidence of same.  There is no overlying erythema or calor to suggest infectious process.  He has good distal pulses with no evidence of limb ischemia.            Scribe Attestation:   Scribe #1: I performed the above scribed service and the documentation accurately describes the services I performed. I attest to the accuracy of the note.    Attending Attestation:           Physician Attestation for Scribe:  Physician Attestation Statement for Scribe #1: I, Dr Villa, reviewed documentation, as scribed by Jeanne Parmar in my presence, and it is both accurate and complete.                 ED Course     Clinical Impression:   The encounter diagnosis was Leg pain.          David Villa III, MD  03/27/17  4146

## 2017-04-05 ENCOUNTER — LAB VISIT (OUTPATIENT)
Dept: LAB | Facility: HOSPITAL | Age: 77
End: 2017-04-05
Attending: FAMILY MEDICINE
Payer: MEDICARE

## 2017-04-05 DIAGNOSIS — D72.829 LEUKOCYTOSIS: Primary | ICD-10-CM

## 2017-04-05 LAB
BACTERIA #/AREA URNS HPF: ABNORMAL /HPF
BILIRUB UR QL STRIP: NEGATIVE
CLARITY UR: ABNORMAL
COLOR UR: YELLOW
GLUCOSE UR QL STRIP: NEGATIVE
HGB UR QL STRIP: ABNORMAL
KETONES UR QL STRIP: NEGATIVE
LEUKOCYTE ESTERASE UR QL STRIP: ABNORMAL
MICROSCOPIC COMMENT: ABNORMAL
NITRITE UR QL STRIP: POSITIVE
PH UR STRIP: 7 [PH] (ref 5–8)
PROT UR QL STRIP: NEGATIVE
RBC #/AREA URNS HPF: 2 /HPF (ref 0–4)
SP GR UR STRIP: 1.01 (ref 1–1.03)
SQUAMOUS #/AREA URNS HPF: 7 /HPF
URN SPEC COLLECT METH UR: ABNORMAL
UROBILINOGEN UR STRIP-ACNC: NEGATIVE EU/DL
WBC #/AREA URNS HPF: 32 /HPF (ref 0–5)

## 2017-04-05 PROCEDURE — 81000 URINALYSIS NONAUTO W/SCOPE: CPT

## 2017-04-05 PROCEDURE — 87077 CULTURE AEROBIC IDENTIFY: CPT

## 2017-04-05 PROCEDURE — 87088 URINE BACTERIA CULTURE: CPT

## 2017-04-05 PROCEDURE — 87086 URINE CULTURE/COLONY COUNT: CPT

## 2017-04-05 PROCEDURE — 87186 SC STD MICRODIL/AGAR DIL: CPT

## 2017-04-09 LAB — BACTERIA UR CULT: NORMAL

## 2017-04-12 ENCOUNTER — LAB VISIT (OUTPATIENT)
Dept: LAB | Facility: HOSPITAL | Age: 77
End: 2017-04-12
Attending: FAMILY MEDICINE
Payer: MEDICARE

## 2017-04-12 DIAGNOSIS — Z99.11 ENCOUNTER FOR WEANING FROM RESPIRATOR: Primary | ICD-10-CM

## 2017-04-12 LAB — VANCOMYCIN TROUGH SERPL-MCNC: 16.4 UG/ML

## 2017-04-12 PROCEDURE — 36415 COLL VENOUS BLD VENIPUNCTURE: CPT

## 2017-04-12 PROCEDURE — 80202 ASSAY OF VANCOMYCIN: CPT

## 2017-06-14 ENCOUNTER — APPOINTMENT (OUTPATIENT)
Dept: LAB | Facility: HOSPITAL | Age: 77
End: 2017-06-14
Attending: FAMILY MEDICINE
Payer: MEDICARE

## 2017-07-09 ENCOUNTER — APPOINTMENT (OUTPATIENT)
Dept: LAB | Facility: HOSPITAL | Age: 77
End: 2017-07-09
Attending: FAMILY MEDICINE
Payer: MEDICARE

## 2017-07-09 DIAGNOSIS — R50.9 ELEVATED TEMPERATURE: Primary | ICD-10-CM

## 2017-07-09 LAB
BACTERIA #/AREA URNS HPF: ABNORMAL /HPF
BASOPHILS # BLD AUTO: 0 K/UL
BASOPHILS NFR BLD: 0.3 %
BILIRUB UR QL STRIP: NEGATIVE
CLARITY UR: ABNORMAL
COLOR UR: YELLOW
DIFFERENTIAL METHOD: ABNORMAL
EOSINOPHIL # BLD AUTO: 0 K/UL
EOSINOPHIL NFR BLD: 0.5 %
ERYTHROCYTE [DISTWIDTH] IN BLOOD BY AUTOMATED COUNT: 14.1 %
GLUCOSE UR QL STRIP: NEGATIVE
HCT VFR BLD AUTO: 39.5 %
HGB BLD-MCNC: 13.1 G/DL
HGB UR QL STRIP: ABNORMAL
HYALINE CASTS #/AREA URNS LPF: 0 /LPF
KETONES UR QL STRIP: NEGATIVE
LEUKOCYTE ESTERASE UR QL STRIP: ABNORMAL
LYMPHOCYTES # BLD AUTO: 1.3 K/UL
LYMPHOCYTES NFR BLD: 17.7 %
MCH RBC QN AUTO: 27.5 PG
MCHC RBC AUTO-ENTMCNC: 33 %
MCV RBC AUTO: 83 FL
MICROSCOPIC COMMENT: ABNORMAL
MONOCYTES # BLD AUTO: 0.6 K/UL
MONOCYTES NFR BLD: 7.7 %
NEUTROPHILS # BLD AUTO: 5.5 K/UL
NEUTROPHILS NFR BLD: 73.8 %
NITRITE UR QL STRIP: POSITIVE
PH UR STRIP: 7 [PH] (ref 5–8)
PLATELET # BLD AUTO: 161 K/UL
PMV BLD AUTO: 11.6 FL
PROT UR QL STRIP: ABNORMAL
RBC # BLD AUTO: 4.74 M/UL
RBC #/AREA URNS HPF: 15 /HPF (ref 0–4)
SP GR UR STRIP: 1.01 (ref 1–1.03)
URN SPEC COLLECT METH UR: ABNORMAL
UROBILINOGEN UR STRIP-ACNC: 1 EU/DL
WBC # BLD AUTO: 7.4 K/UL
WBC #/AREA URNS HPF: >100 /HPF (ref 0–5)

## 2017-07-09 PROCEDURE — 81000 URINALYSIS NONAUTO W/SCOPE: CPT

## 2017-07-09 PROCEDURE — 87186 SC STD MICRODIL/AGAR DIL: CPT

## 2017-07-09 PROCEDURE — 85025 COMPLETE CBC W/AUTO DIFF WBC: CPT

## 2017-07-09 PROCEDURE — 36415 COLL VENOUS BLD VENIPUNCTURE: CPT

## 2017-07-09 PROCEDURE — 87088 URINE BACTERIA CULTURE: CPT

## 2017-07-09 PROCEDURE — 87077 CULTURE AEROBIC IDENTIFY: CPT

## 2017-07-09 PROCEDURE — 87086 URINE CULTURE/COLONY COUNT: CPT

## 2017-07-12 LAB — BACTERIA UR CULT: NORMAL

## 2017-07-24 ENCOUNTER — LAB VISIT (OUTPATIENT)
Dept: LAB | Facility: HOSPITAL | Age: 77
End: 2017-07-24
Attending: FAMILY MEDICINE
Payer: MEDICARE

## 2017-07-24 DIAGNOSIS — R07.9 CHEST PAIN: Primary | ICD-10-CM

## 2017-07-24 LAB
CK MB SERPL-MCNC: 1.5 NG/ML
CK MB SERPL-RTO: 4.5 %
CK SERPL-CCNC: 33 U/L
CK SERPL-CCNC: 33 U/L
TROPONIN I SERPL DL<=0.01 NG/ML-MCNC: 0.02 NG/ML

## 2017-07-24 PROCEDURE — 82553 CREATINE MB FRACTION: CPT

## 2017-07-24 PROCEDURE — 36415 COLL VENOUS BLD VENIPUNCTURE: CPT

## 2017-07-24 PROCEDURE — 84484 ASSAY OF TROPONIN QUANT: CPT

## 2017-08-08 ENCOUNTER — LAB VISIT (OUTPATIENT)
Dept: LAB | Facility: HOSPITAL | Age: 77
End: 2017-08-08
Attending: FAMILY MEDICINE
Payer: MEDICARE

## 2017-08-08 DIAGNOSIS — R41.82 ALTERED MENTAL STATUS: Primary | ICD-10-CM

## 2017-08-08 LAB
BACTERIA #/AREA URNS HPF: ABNORMAL /HPF
BILIRUB UR QL STRIP: NEGATIVE
CLARITY UR: CLEAR
COLOR UR: YELLOW
GLUCOSE UR QL STRIP: NEGATIVE
HGB UR QL STRIP: ABNORMAL
KETONES UR QL STRIP: NEGATIVE
LEUKOCYTE ESTERASE UR QL STRIP: ABNORMAL
MICROSCOPIC COMMENT: ABNORMAL
NITRITE UR QL STRIP: POSITIVE
PH UR STRIP: 8 [PH] (ref 5–8)
PROT UR QL STRIP: ABNORMAL
RBC #/AREA URNS HPF: 5 /HPF (ref 0–4)
SP GR UR STRIP: 1.01 (ref 1–1.03)
URN SPEC COLLECT METH UR: ABNORMAL
UROBILINOGEN UR STRIP-ACNC: NEGATIVE EU/DL
WBC #/AREA URNS HPF: 20 /HPF (ref 0–5)
WBC CLUMPS URNS QL MICRO: ABNORMAL

## 2017-08-08 PROCEDURE — 87077 CULTURE AEROBIC IDENTIFY: CPT

## 2017-08-08 PROCEDURE — 87186 SC STD MICRODIL/AGAR DIL: CPT

## 2017-08-08 PROCEDURE — 87088 URINE BACTERIA CULTURE: CPT

## 2017-08-08 PROCEDURE — 87086 URINE CULTURE/COLONY COUNT: CPT

## 2017-08-08 PROCEDURE — 81000 URINALYSIS NONAUTO W/SCOPE: CPT

## 2017-08-11 LAB — BACTERIA UR CULT: NORMAL

## 2017-10-06 ENCOUNTER — HOSPITAL ENCOUNTER (INPATIENT)
Facility: HOSPITAL | Age: 77
LOS: 9 days | DRG: 698 | End: 2017-10-16
Attending: EMERGENCY MEDICINE | Admitting: INTERNAL MEDICINE
Payer: MEDICARE

## 2017-10-06 DIAGNOSIS — R78.81 BACTEREMIA DUE TO COAGULASE-NEGATIVE STAPHYLOCOCCUS: ICD-10-CM

## 2017-10-06 DIAGNOSIS — B95.7 BACTEREMIA DUE TO COAGULASE-NEGATIVE STAPHYLOCOCCUS: ICD-10-CM

## 2017-10-06 DIAGNOSIS — I95.9 HYPOTENSION, UNSPECIFIED HYPOTENSION TYPE: ICD-10-CM

## 2017-10-06 DIAGNOSIS — Z93.1 PEG (PERCUTANEOUS ENDOSCOPIC GASTROSTOMY) STATUS: ICD-10-CM

## 2017-10-06 DIAGNOSIS — L89.152 SACRAL DECUBITUS ULCER, STAGE II: ICD-10-CM

## 2017-10-06 DIAGNOSIS — A41.9 SEVERE SEPSIS: ICD-10-CM

## 2017-10-06 DIAGNOSIS — N39.0 URINARY TRACT INFECTION WITHOUT HEMATURIA, SITE UNSPECIFIED: ICD-10-CM

## 2017-10-06 DIAGNOSIS — J96.11 CHRONIC RESPIRATORY FAILURE WITH HYPOXIA AND HYPERCAPNIA: ICD-10-CM

## 2017-10-06 DIAGNOSIS — R65.20 SEVERE SEPSIS: ICD-10-CM

## 2017-10-06 DIAGNOSIS — Z93.0 TRACHEOSTOMY DEPENDENT: ICD-10-CM

## 2017-10-06 DIAGNOSIS — R53.2 FUNCTIONAL QUADRIPLEGIA: ICD-10-CM

## 2017-10-06 DIAGNOSIS — I69.359 HEMIPARESIS AFFECTING DOMINANT SIDE AS LATE EFFECT OF STROKE: ICD-10-CM

## 2017-10-06 DIAGNOSIS — J96.12 CHRONIC RESPIRATORY FAILURE WITH HYPOXIA AND HYPERCAPNIA: ICD-10-CM

## 2017-10-06 DIAGNOSIS — A41.9 SEPSIS, DUE TO UNSPECIFIED ORGANISM: Primary | ICD-10-CM

## 2017-10-06 DIAGNOSIS — R00.1 BRADYCARDIA: ICD-10-CM

## 2017-10-06 DIAGNOSIS — J44.9 CHRONIC OBSTRUCTIVE PULMONARY DISEASE, UNSPECIFIED COPD TYPE: ICD-10-CM

## 2017-10-06 LAB
ALBUMIN SERPL BCP-MCNC: 2.4 G/DL
ALP SERPL-CCNC: 93 U/L
ALT SERPL W/O P-5'-P-CCNC: 16 U/L
ANION GAP SERPL CALC-SCNC: 8 MMOL/L
APTT BLDCRRT: 29.6 SEC
AST SERPL-CCNC: 16 U/L
BACTERIA #/AREA URNS HPF: ABNORMAL /HPF
BASOPHILS # BLD AUTO: 0 K/UL
BASOPHILS NFR BLD: 0.2 %
BILIRUB SERPL-MCNC: 0.8 MG/DL
BILIRUB UR QL STRIP: NEGATIVE
BUN SERPL-MCNC: 53 MG/DL
CALCIUM SERPL-MCNC: 9.3 MG/DL
CHLORIDE SERPL-SCNC: 101 MMOL/L
CLARITY UR: ABNORMAL
CO2 SERPL-SCNC: 27 MMOL/L
COLOR UR: ABNORMAL
CREAT SERPL-MCNC: 1.3 MG/DL
DIFFERENTIAL METHOD: ABNORMAL
EOSINOPHIL # BLD AUTO: 0 K/UL
EOSINOPHIL NFR BLD: 0.1 %
ERYTHROCYTE [DISTWIDTH] IN BLOOD BY AUTOMATED COUNT: 16.5 %
EST. GFR  (AFRICAN AMERICAN): >60 ML/MIN/1.73 M^2
EST. GFR  (NON AFRICAN AMERICAN): 52 ML/MIN/1.73 M^2
GLUCOSE SERPL-MCNC: 122 MG/DL
GLUCOSE UR QL STRIP: NEGATIVE
HCT VFR BLD AUTO: 38.2 %
HGB BLD-MCNC: 12.7 G/DL
HGB UR QL STRIP: ABNORMAL
HYALINE CASTS #/AREA URNS LPF: 0 /LPF
INR PPP: 1.4
KETONES UR QL STRIP: NEGATIVE
LEUKOCYTE ESTERASE UR QL STRIP: ABNORMAL
LYMPHOCYTES # BLD AUTO: 1.8 K/UL
LYMPHOCYTES NFR BLD: 11.7 %
MAGNESIUM SERPL-MCNC: 1.7 MG/DL
MCH RBC QN AUTO: 27 PG
MCHC RBC AUTO-ENTMCNC: 33.2 G/DL
MCV RBC AUTO: 82 FL
MICROSCOPIC COMMENT: ABNORMAL
MONOCYTES # BLD AUTO: 2.2 K/UL
MONOCYTES NFR BLD: 14 %
NEUTROPHILS # BLD AUTO: 11.7 K/UL
NEUTROPHILS NFR BLD: 74 %
NITRITE UR QL STRIP: POSITIVE
PH UR STRIP: >=9 [PH] (ref 5–8)
PHOSPHATE SERPL-MCNC: 1.8 MG/DL
PLATELET # BLD AUTO: 174 K/UL
PLATELET BLD QL SMEAR: ABNORMAL
PMV BLD AUTO: 11.1 FL
POTASSIUM SERPL-SCNC: 4.4 MMOL/L
PROT SERPL-MCNC: 7.7 G/DL
PROT UR QL STRIP: ABNORMAL
PROTHROMBIN TIME: 14.9 SEC
RBC # BLD AUTO: 4.69 M/UL
RBC #/AREA URNS HPF: >100 /HPF (ref 0–4)
SODIUM SERPL-SCNC: 136 MMOL/L
SP GR UR STRIP: <=1.005 (ref 1–1.03)
SQUAMOUS #/AREA URNS HPF: 2 /HPF
TROPONIN I SERPL DL<=0.01 NG/ML-MCNC: 0.08 NG/ML
URN SPEC COLLECT METH UR: ABNORMAL
UROBILINOGEN UR STRIP-ACNC: NEGATIVE EU/DL
WBC # BLD AUTO: 15.8 K/UL
WBC #/AREA URNS HPF: 73 /HPF (ref 0–5)

## 2017-10-06 PROCEDURE — 83690 ASSAY OF LIPASE: CPT

## 2017-10-06 PROCEDURE — 20000000 HC ICU ROOM

## 2017-10-06 PROCEDURE — 80053 COMPREHEN METABOLIC PANEL: CPT

## 2017-10-06 PROCEDURE — 84100 ASSAY OF PHOSPHORUS: CPT

## 2017-10-06 PROCEDURE — 87040 BLOOD CULTURE FOR BACTERIA: CPT | Mod: 59

## 2017-10-06 PROCEDURE — 36415 COLL VENOUS BLD VENIPUNCTURE: CPT

## 2017-10-06 PROCEDURE — 94002 VENT MGMT INPAT INIT DAY: CPT

## 2017-10-06 PROCEDURE — 5A1955Z RESPIRATORY VENTILATION, GREATER THAN 96 CONSECUTIVE HOURS: ICD-10-PCS | Performed by: INTERNAL MEDICINE

## 2017-10-06 PROCEDURE — 87086 URINE CULTURE/COLONY COUNT: CPT

## 2017-10-06 PROCEDURE — 96365 THER/PROPH/DIAG IV INF INIT: CPT

## 2017-10-06 PROCEDURE — 84439 ASSAY OF FREE THYROXINE: CPT

## 2017-10-06 PROCEDURE — 87088 URINE BACTERIA CULTURE: CPT

## 2017-10-06 PROCEDURE — 83605 ASSAY OF LACTIC ACID: CPT

## 2017-10-06 PROCEDURE — 81000 URINALYSIS NONAUTO W/SCOPE: CPT | Mod: 91

## 2017-10-06 PROCEDURE — 85610 PROTHROMBIN TIME: CPT

## 2017-10-06 PROCEDURE — 83880 ASSAY OF NATRIURETIC PEPTIDE: CPT

## 2017-10-06 PROCEDURE — 83735 ASSAY OF MAGNESIUM: CPT

## 2017-10-06 PROCEDURE — 99291 CRITICAL CARE FIRST HOUR: CPT | Mod: 25

## 2017-10-06 PROCEDURE — 63600175 PHARM REV CODE 636 W HCPCS: Performed by: EMERGENCY MEDICINE

## 2017-10-06 PROCEDURE — 96367 TX/PROPH/DG ADDL SEQ IV INF: CPT

## 2017-10-06 PROCEDURE — 25000003 PHARM REV CODE 250: Performed by: EMERGENCY MEDICINE

## 2017-10-06 PROCEDURE — 84145 PROCALCITONIN (PCT): CPT

## 2017-10-06 PROCEDURE — 84443 ASSAY THYROID STIM HORMONE: CPT

## 2017-10-06 PROCEDURE — 87077 CULTURE AEROBIC IDENTIFY: CPT | Mod: 59

## 2017-10-06 PROCEDURE — 27000221 HC OXYGEN, UP TO 24 HOURS

## 2017-10-06 PROCEDURE — 87186 SC STD MICRODIL/AGAR DIL: CPT | Mod: 59

## 2017-10-06 PROCEDURE — 96366 THER/PROPH/DIAG IV INF ADDON: CPT

## 2017-10-06 PROCEDURE — 93005 ELECTROCARDIOGRAM TRACING: CPT

## 2017-10-06 PROCEDURE — 85025 COMPLETE CBC W/AUTO DIFF WBC: CPT

## 2017-10-06 PROCEDURE — 85730 THROMBOPLASTIN TIME PARTIAL: CPT

## 2017-10-06 PROCEDURE — 84484 ASSAY OF TROPONIN QUANT: CPT

## 2017-10-06 PROCEDURE — 82533 TOTAL CORTISOL: CPT

## 2017-10-06 PROCEDURE — 96361 HYDRATE IV INFUSION ADD-ON: CPT

## 2017-10-06 RX ADMIN — VANCOMYCIN HYDROCHLORIDE 2000 MG: 1 INJECTION, POWDER, LYOPHILIZED, FOR SOLUTION INTRAVENOUS at 11:10

## 2017-10-06 RX ADMIN — SODIUM CHLORIDE 3537 ML: 0.9 INJECTION, SOLUTION INTRAVENOUS at 09:10

## 2017-10-06 RX ADMIN — PIPERACILLIN AND TAZOBACTAM 4.5 G: 4; .5 INJECTION, POWDER, LYOPHILIZED, FOR SOLUTION INTRAVENOUS; PARENTERAL at 11:10

## 2017-10-07 LAB
BNP SERPL-MCNC: 187 PG/ML
CORTIS SERPL-MCNC: 11.2 UG/DL
LACTATE SERPL-SCNC: 0.8 MMOL/L
LACTATE SERPL-SCNC: 1.1 MMOL/L
LACTATE SERPL-SCNC: 1.5 MMOL/L
LIPASE SERPL-CCNC: 11 U/L
PHOSPHATE SERPL-MCNC: 2.9 MG/DL
PROCALCITONIN SERPL IA-MCNC: 0.31 NG/ML
T4 FREE SERPL-MCNC: 0.77 NG/DL
TROPONIN I SERPL DL<=0.01 NG/ML-MCNC: 0.05 NG/ML
TSH SERPL DL<=0.005 MIU/L-ACNC: <0.01 UIU/ML

## 2017-10-07 PROCEDURE — 87205 SMEAR GRAM STAIN: CPT

## 2017-10-07 PROCEDURE — 94770 HC EXHALED C02 TEST: CPT

## 2017-10-07 PROCEDURE — 87077 CULTURE AEROBIC IDENTIFY: CPT | Mod: 59

## 2017-10-07 PROCEDURE — 27000221 HC OXYGEN, UP TO 24 HOURS

## 2017-10-07 PROCEDURE — 36415 COLL VENOUS BLD VENIPUNCTURE: CPT

## 2017-10-07 PROCEDURE — 83605 ASSAY OF LACTIC ACID: CPT

## 2017-10-07 PROCEDURE — 99900035 HC TECH TIME PER 15 MIN (STAT)

## 2017-10-07 PROCEDURE — 99223 1ST HOSP IP/OBS HIGH 75: CPT | Mod: ,,, | Performed by: INTERNAL MEDICINE

## 2017-10-07 PROCEDURE — 20000000 HC ICU ROOM

## 2017-10-07 PROCEDURE — 25000003 PHARM REV CODE 250: Performed by: INTERNAL MEDICINE

## 2017-10-07 PROCEDURE — 84484 ASSAY OF TROPONIN QUANT: CPT

## 2017-10-07 PROCEDURE — 63600175 PHARM REV CODE 636 W HCPCS: Performed by: EMERGENCY MEDICINE

## 2017-10-07 PROCEDURE — 25000003 PHARM REV CODE 250: Performed by: EMERGENCY MEDICINE

## 2017-10-07 PROCEDURE — 84100 ASSAY OF PHOSPHORUS: CPT

## 2017-10-07 PROCEDURE — 94003 VENT MGMT INPAT SUBQ DAY: CPT

## 2017-10-07 PROCEDURE — S0028 INJECTION, FAMOTIDINE, 20 MG: HCPCS | Performed by: EMERGENCY MEDICINE

## 2017-10-07 PROCEDURE — 87070 CULTURE OTHR SPECIMN AEROBIC: CPT

## 2017-10-07 PROCEDURE — 94761 N-INVAS EAR/PLS OXIMETRY MLT: CPT

## 2017-10-07 PROCEDURE — 87186 SC STD MICRODIL/AGAR DIL: CPT | Mod: 59

## 2017-10-07 RX ORDER — LIOTHYRONINE SODIUM 25 UG/1
75 TABLET ORAL
Status: DISCONTINUED | OUTPATIENT
Start: 2017-10-07 | End: 2017-10-16 | Stop reason: HOSPADM

## 2017-10-07 RX ORDER — DULOXETIN HYDROCHLORIDE 30 MG/1
30 CAPSULE, DELAYED RELEASE ORAL DAILY
Status: DISCONTINUED | OUTPATIENT
Start: 2017-10-07 | End: 2017-10-16 | Stop reason: HOSPADM

## 2017-10-07 RX ORDER — BACLOFEN 10 MG/1
20 TABLET ORAL EVERY 6 HOURS PRN
Status: DISCONTINUED | OUTPATIENT
Start: 2017-10-07 | End: 2017-10-16 | Stop reason: HOSPADM

## 2017-10-07 RX ORDER — MAGNESIUM SULFATE HEPTAHYDRATE 40 MG/ML
4 INJECTION, SOLUTION INTRAVENOUS
Status: DISCONTINUED | OUTPATIENT
Start: 2017-10-07 | End: 2017-10-16 | Stop reason: HOSPADM

## 2017-10-07 RX ORDER — ACETAMINOPHEN 650 MG/20.3ML
650 LIQUID ORAL EVERY 4 HOURS PRN
Status: DISCONTINUED | OUTPATIENT
Start: 2017-10-07 | End: 2017-10-16 | Stop reason: HOSPADM

## 2017-10-07 RX ORDER — ENOXAPARIN SODIUM 100 MG/ML
40 INJECTION SUBCUTANEOUS DAILY
COMMUNITY

## 2017-10-07 RX ORDER — POTASSIUM CHLORIDE 29.8 MG/ML
40 INJECTION INTRAVENOUS
Status: DISCONTINUED | OUTPATIENT
Start: 2017-10-07 | End: 2017-10-16 | Stop reason: HOSPADM

## 2017-10-07 RX ORDER — FAMOTIDINE 10 MG/ML
20 INJECTION INTRAVENOUS 2 TIMES DAILY
Status: DISCONTINUED | OUTPATIENT
Start: 2017-10-07 | End: 2017-10-07

## 2017-10-07 RX ORDER — SODIUM CHLORIDE 9 MG/ML
INJECTION, SOLUTION INTRAVENOUS CONTINUOUS
Status: DISCONTINUED | OUTPATIENT
Start: 2017-10-07 | End: 2017-10-12

## 2017-10-07 RX ORDER — BACITRACIN ZINC 500 UNIT/G
OINTMENT (GRAM) TOPICAL 2 TIMES DAILY
Status: DISCONTINUED | OUTPATIENT
Start: 2017-10-07 | End: 2017-10-16 | Stop reason: HOSPADM

## 2017-10-07 RX ORDER — BETHANECHOL CHLORIDE 10 MG/1
15 TABLET ORAL 3 TIMES DAILY
Status: ON HOLD | COMMUNITY
End: 2019-01-01 | Stop reason: HOSPADM

## 2017-10-07 RX ORDER — FAMOTIDINE 10 MG/ML
20 INJECTION INTRAVENOUS EVERY 12 HOURS
Status: DISCONTINUED | OUTPATIENT
Start: 2017-10-07 | End: 2017-10-16 | Stop reason: HOSPADM

## 2017-10-07 RX ORDER — FINASTERIDE 5 MG/1
5 TABLET, FILM COATED ORAL DAILY
Status: DISCONTINUED | OUTPATIENT
Start: 2017-10-07 | End: 2017-10-16 | Stop reason: HOSPADM

## 2017-10-07 RX ORDER — FERROUS SULFATE 220 (44)/5
5 ELIXIR ORAL DAILY
COMMUNITY
End: 2019-01-01

## 2017-10-07 RX ORDER — POTASSIUM CHLORIDE 29.8 MG/ML
60 INJECTION INTRAVENOUS
Status: DISCONTINUED | OUTPATIENT
Start: 2017-10-07 | End: 2017-10-16 | Stop reason: HOSPADM

## 2017-10-07 RX ORDER — RIVASTIGMINE 9.5 MG/24H
1 PATCH, EXTENDED RELEASE TRANSDERMAL DAILY
Status: DISCONTINUED | OUTPATIENT
Start: 2017-10-07 | End: 2017-10-16 | Stop reason: HOSPADM

## 2017-10-07 RX ORDER — ASPIRIN 325 MG
325 TABLET ORAL DAILY
Status: DISCONTINUED | OUTPATIENT
Start: 2017-10-07 | End: 2017-10-16 | Stop reason: HOSPADM

## 2017-10-07 RX ORDER — POTASSIUM CHLORIDE 14.9 MG/ML
40 INJECTION INTRAVENOUS
Status: DISCONTINUED | OUTPATIENT
Start: 2017-10-07 | End: 2017-10-16 | Stop reason: HOSPADM

## 2017-10-07 RX ORDER — MAGNESIUM SULFATE HEPTAHYDRATE 40 MG/ML
2 INJECTION, SOLUTION INTRAVENOUS
Status: DISCONTINUED | OUTPATIENT
Start: 2017-10-07 | End: 2017-10-16 | Stop reason: HOSPADM

## 2017-10-07 RX ORDER — TERAZOSIN 1 MG/1
1 CAPSULE ORAL DAILY
Status: DISCONTINUED | OUTPATIENT
Start: 2017-10-07 | End: 2017-10-16 | Stop reason: HOSPADM

## 2017-10-07 RX ADMIN — BACITRACIN ZINC: 500 OINTMENT TOPICAL at 11:10

## 2017-10-07 RX ADMIN — PIPERACILLIN AND TAZOBACTAM 4.5 G: 4; .5 INJECTION, POWDER, LYOPHILIZED, FOR SOLUTION INTRAVENOUS; PARENTERAL at 03:10

## 2017-10-07 RX ADMIN — LIOTHYRONINE SODIUM 75 MCG: 25 TABLET ORAL at 11:10

## 2017-10-07 RX ADMIN — SODIUM CHLORIDE 500 ML: 900 INJECTION, SOLUTION INTRAVENOUS at 03:10

## 2017-10-07 RX ADMIN — FAMOTIDINE 20 MG: 10 INJECTION, SOLUTION INTRAVENOUS at 08:10

## 2017-10-07 RX ADMIN — FAMOTIDINE 20 MG: 10 INJECTION, SOLUTION INTRAVENOUS at 09:10

## 2017-10-07 RX ADMIN — DULOXETINE 30 MG: 30 CAPSULE, DELAYED RELEASE ORAL at 11:10

## 2017-10-07 RX ADMIN — SODIUM CHLORIDE: 0.9 INJECTION, SOLUTION INTRAVENOUS at 10:10

## 2017-10-07 RX ADMIN — PIPERACILLIN AND TAZOBACTAM 4.5 G: 4; .5 INJECTION, POWDER, LYOPHILIZED, FOR SOLUTION INTRAVENOUS; PARENTERAL at 10:10

## 2017-10-07 RX ADMIN — VANCOMYCIN HYDROCHLORIDE 1750 MG: 1 INJECTION, POWDER, LYOPHILIZED, FOR SOLUTION INTRAVENOUS at 10:10

## 2017-10-07 RX ADMIN — PIPERACILLIN AND TAZOBACTAM 4.5 G: 4; .5 INJECTION, POWDER, LYOPHILIZED, FOR SOLUTION INTRAVENOUS; PARENTERAL at 07:10

## 2017-10-07 RX ADMIN — BETHANECHOL CHLORIDE 15 MG: 10 TABLET ORAL at 09:10

## 2017-10-07 RX ADMIN — ASPIRIN 325 MG ORAL TABLET 325 MG: 325 PILL ORAL at 11:10

## 2017-10-07 RX ADMIN — BACITRACIN ZINC: 500 OINTMENT TOPICAL at 09:10

## 2017-10-07 RX ADMIN — SODIUM CHLORIDE: 0.9 INJECTION, SOLUTION INTRAVENOUS at 02:10

## 2017-10-07 RX ADMIN — FINASTERIDE 5 MG: 5 TABLET, FILM COATED ORAL at 11:10

## 2017-10-07 RX ADMIN — LEVOTHYROXINE SODIUM 200 MCG: 150 TABLET ORAL at 11:10

## 2017-10-07 RX ADMIN — SODIUM PHOSPHATE, MONOBASIC, MONOHYDRATE 20.01 MMOL: 276; 142 INJECTION, SOLUTION INTRAVENOUS at 11:10

## 2017-10-07 RX ADMIN — RIVASTIGMINE TRANSDERMAL SYSTEM 1 PATCH: 9.5 PATCH, EXTENDED RELEASE TRANSDERMAL at 11:10

## 2017-10-07 RX ADMIN — SODIUM CHLORIDE: 0.9 INJECTION, SOLUTION INTRAVENOUS at 08:10

## 2017-10-07 RX ADMIN — BETHANECHOL CHLORIDE 15 MG: 10 TABLET ORAL at 02:10

## 2017-10-07 NOTE — H&P
[facility] Hospital Medicine  History & Physical    Patient Name: Masood Messina  MRN: [id number]  Admission Date: 10/6/2017  Attending Physician: Melissa Mayfield MD   Primary Care Provider: Jeramie Lombardi MD         Patient information was obtained from patient and ER records.     Subjective:     Principal Problem:<principal problem not specified>    Chief Complaint:   Chief Complaint   Patient presents with    Fever     sent from [place]         HPI: 77 y.o. Male with PMHx significant for functional quadraplegia, CAD, hx psedumonas with ventilator dependency currently living at [place] and here because of a noted fever at the nursing with associated tachycardia and relative hypotension.  In the ED he had a temperature up to 100.9 and was mildly hypotensive but responsive to fluids.  Labs significant for 40 WBC in urine and no other apparent source, so suspicion was high at that time for urosepsis.  Vanc and Zosyn were initiated and he was sent to the ICU.  He has remained HD stable with BP's on the low side of normal at this time.  Otherwise, no other acute issues.     Past Medical History:   Diagnosis Date    AAA (abdominal aortic aneurysm)     Anemia     CHF (congestive heart failure)     COPD (chronic obstructive pulmonary disease)     Coronary artery disease     Dementia     Dementia     Depression     GERD (gastroesophageal reflux disease)     Hyperlipidemia     Hypertension     Hypothyroid     Renal disorder     Respiratory failure, chronic     Ventilator dependence        Past Surgical History:   Procedure Laterality Date    ABDOMINAL SURGERY      CARDIAC SURGERY  1999    GASTROSTOMY TUBE PLACEMENT      SPINE SURGERY      TRACHEOSTOMY TUBE PLACEMENT         Review of patient's allergies indicates:   Allergen Reactions    Codeine Other (See Comments)       No current facility-administered medications on file prior to encounter.      Current Outpatient  Prescriptions on File Prior to Encounter   Medication Sig    ascorbic acid, vitamin C, (VITAMIN C) 500 mg/5 mL Syrp syrup 500 mg by PEG Tube route 2 (two) times daily.     aspirin 325 MG tablet 325 mg by PEG Tube route once daily.     baclofen (LIORESAL) 20 MG tablet 20 mg by PEG Tube route every 6 (six) hours as needed (muscle spasms).     duloxetine (CYMBALTA) 30 MG capsule 30 mg by PEG Tube route once daily.     famotidine (PEPCID) 20 MG tablet 1 tablet (20 mg total) by Per G Tube route 2 (two) times daily.    finasteride (PROSCAR) 5 mg tablet 5 mg by PEG Tube route once daily.     levothyroxine (SYNTHROID) 200 MCG tablet 200 mcg by PEG Tube route once daily.    liothyronine (CYTOMEL) 25 MCG Tab 75 mcg by PEG Tube route once daily.     melatonin 3 mg Tab 9 mg by PEG Tube route every evening.     potassium chloride (KLOR-CON) 20 mEq Pack 20 mEq by PEG Tube route 2 (two) times daily.     rivastigmine (EXELON) 9.5 mg/24 hr PT24 Place 1 patch onto the skin once daily.    terazosin (HYTRIN) 1 MG capsule 1 mg by PEG Tube route once daily.    albuterol-ipratropium 2.5mg-0.5mg/3mL (DUONEB) 0.5 mg-3 mg(2.5 mg base)/3 mL nebulizer solution Take 3 mLs by nebulization every 4 (four) hours as needed.    hydrocodone-acetaminophen 10-325mg (NORCO)  mg Tab 1 tablet by PEG Tube route every 6 (six) hours as needed for Pain.     [DISCONTINUED] ferrous sulfate 300 mg (60 mg iron)/5 mL syrup 300 mg by PEG Tube route once daily.    [DISCONTINUED] gabapentin (NEURONTIN) 400 MG capsule 400 mg by PEG Tube route every 8 (eight) hours.     [DISCONTINUED] nutritional supplements (ISOSOURCE HN) Liqd Take 70 mLs by mouth Daily.    [DISCONTINUED] oxybutynin (DITROPAN) 5 MG Tab 1 tablet (5 mg total) by Per G Tube route 3 (three) times daily.    [DISCONTINUED] trazodone (DESYREL) 50 MG tablet 50 mg by PEG Tube route every evening.      Family History     None        Social History Main Topics    Smoking status: Former  Smoker    Smokeless tobacco: Never Used    Alcohol use No    Drug use: No    Sexual activity: No     Review of Systems   Unable to perform ROS: Acuity of condition     Objective:     Vital Signs (Most Recent):  Temp: 100.2 °F (37.9 °C) (10/07/17 1015)  Pulse: 72 (10/07/17 1030)  Resp: 14 (10/07/17 1030)  BP: (!) 104/59 (10/07/17 1030)  SpO2: 98 % (10/07/17 1030) Vital Signs (24h Range):  Temp:  [98.7 °F (37.1 °C)-102.1 °F (38.9 °C)] 100.2 °F (37.9 °C)  Pulse:  [] 72  Resp:  [14-23] 14  SpO2:  [95 %-100 %] 98 %  BP: ()/(41-71) 104/59     Weight: 110.1 kg (242 lb 11.6 oz)  Body mass index is 32.92 kg/m².    Physical Exam   Constitutional: He appears well-developed and well-nourished. No distress.   HENT:   Head: Normocephalic and atraumatic.   Eyes: Pupils are equal, round, and reactive to light.   Neck: Neck supple. No thyromegaly present.   Cardiovascular: Normal rate and regular rhythm.  Exam reveals no gallop and no friction rub.    No murmur heard.  Pulmonary/Chest: Effort normal and breath sounds normal. No respiratory distress. He has no wheezes.   Abdominal: Soft. Bowel sounds are normal. He exhibits no distension. There is no tenderness. There is no guarding.   Peg in place with mild erythema at site   Musculoskeletal: Normal range of motion. He exhibits no edema.   Neurological: He is alert.   Skin: Skin is warm and dry. No erythema.   Psychiatric: He has a normal mood and affect.        Significant Labs:   CBC:   Recent Labs  Lab 10/06/17  2302   WBC 15.80*   HGB 12.7*   HCT 38.2*          Significant Imaging: I have reviewed all pertinent imaging results/findings within the past 24 hours.    Assessment/Plan:     Urinary tract infection without hematuria    - continue abx  - tay exchanged          Hypotension    - stable at this time  - could give additional fluid bolus if needed  - PICC line available for pressors if warranted          Chronic obstructive pulmonary disease    - PRN  duonebs          Functional quadriplegia    - routine supportive care          Sepsis    - suspect secondary to urosepsis  - cultures drawn  - on vanc/Zosyn  - history of pseudomonal infection  - fluid boluses given  - PICC line in place for pressors if needed  - follow up cultures and continue current care            VTE Risk Mitigation         Ordered     Medium Risk of VTE  Once      10/07/17 0149     Place EYAD hose  Until discontinued      10/07/17 0149     Place sequential compression device  Until discontinued      10/07/17 0149        Critical care time spent on the evaluation and treatment of severe organ dysfunction, review of pertinent labs and imaging studies, discussions with consulting providers and discussions with patient/family: 25 minutes.     Cali Madrid MD  Department of Hospital Medicine   [facility]

## 2017-10-07 NOTE — PLAN OF CARE
Problem: Patient Care Overview  Goal: Plan of Care Review  Outcome: Ongoing (interventions implemented as appropriate)   vent in use with alarms on and functioning, ambu at [facility], SX PRN with trach care Q shift

## 2017-10-07 NOTE — CONSULTS
Masood Messina [id number] is a 77 y.o. male who has been consulted for vancomycin dosing.    The patient has the following labs:     Date Creatinine (mg/dl)    BUN WBC Count   10/7/2017 Estimated Creatinine Clearance: 60 mL/min (based on SCr of 1.3 mg/dL). Lab Results   Component Value Date    BUN 53 (H) 10/06/2017     Lab Results   Component Value Date    WBC 15.80 (H) 10/06/2017        Current weight is 110.1 kg (242 lb 11.6 oz)    Pt being treated with vancomycin for sepsis.    The patient received 2000 mg on 10/6/17 at 2338 and 10/7/17 at 1055.    The vancomycin level has been ordered for 10/8/17 at 1000 and dosed accordingly. Target goal is 15-20.     Patient will be followed by pharmacy for changes in renal function, toxicity, and efficacy.      Thank you for allowing us to participate in this patient's care.     Kimani Ortiz, FayeD

## 2017-10-07 NOTE — ED PROVIDER NOTES
Encounter Date: 10/6/2017    SCRIBE #1 NOTE: I, Irma Knight, am scribing for, and in the presence of, Dr. Bonilla.       History     Chief Complaint   Patient presents with    Fever     sent from [place]        10/06/2017 9:45 PM     Chief complaint: Fever      Masood Messina is a 77 y.o. male who presents to the ED via EMS from [facility] for fever management and associated AMS.  The patient's symptoms are severe and acute.  PMHx includes UTI, respiratory failure, COPD, CHF, dementia, HTN, kidney disease. Pertinent surgical history includes cardiac surgery, tracheostomy tube placement, and gastrostomy tube placement.        The history is limited by the condition of the patient.     Review of patient's allergies indicates:   Allergen Reactions    Codeine Other (See Comments)     Past Medical History:   Diagnosis Date    AAA (abdominal aortic aneurysm)     Anemia     CHF (congestive heart failure)     COPD (chronic obstructive pulmonary disease)     Coronary artery disease     Dementia     Depression     GERD (gastroesophageal reflux disease)     Hyperlipidemia     Hypertension     Hypothyroid     Renal disorder     Respiratory failure, chronic     Ventilator dependence      Past Surgical History:   Procedure Laterality Date    ABDOMINAL SURGERY      CARDIAC SURGERY      GASTROSTOMY TUBE PLACEMENT      SPINE SURGERY      TRACHEOSTOMY TUBE PLACEMENT       No family history on file.  Social History   Substance Use Topics    Smoking status: Former Smoker    Smokeless tobacco: Not on file    Alcohol use No     Review of Systems   Unable to perform ROS: Mental status change       Physical Exam     Initial Vitals   BP Pulse Resp Temp SpO2   10/06/17 2137 10/06/17 2137 10/06/17 2137 10/06/17 2137 10/06/17 2129   (!) 83/53 108 14 (!) 102.1 °F (38.9 °C) 96 %      MAP       10/06/17 2137       63         Physical Exam    Nursing note and vitals reviewed.  Constitutional: He appears  well-developed and well-nourished. He is not diaphoretic. No distress.   HENT:   Head: Normocephalic and atraumatic.   Eyes: EOM are normal. Pupils are equal, round, and reactive to light.   Neck: Normal range of motion. Neck supple.   Trach in place.   Cardiovascular: Regular rhythm, normal heart sounds and intact distal pulses. Tachycardia present.  Exam reveals no gallop and no friction rub.    No murmur heard.  PICC line in place.   Pulmonary/Chest: Breath sounds normal. No respiratory distress. He has no wheezes. He has no rhonchi. He has no rales.   Abdominal: Soft. Bowel sounds are normal. There is no tenderness. There is no rebound and no guarding.   PEC tube upper mid-abdomen.    Genitourinary:   Genitourinary Comments: Rahman catheter in place.   Musculoskeletal: Normal range of motion.   Minimal hand movement. No movement of LE. Quadriplegic.   Neurological:   Arouses easily to verbal stimuli. Not following commands.    Skin: Skin is warm and dry.   Psychiatric: He has a normal mood and affect. His behavior is normal. Judgment and thought content normal.         ED Course   Critical Care  Performed by: REUBEN DENNIS.  Authorized by: REUBEN DENNIS   Direct patient critical care time: 13 minutes  Additional history critical care time: 10 minutes  Ordering / reviewing critical care time: 11 minutes  Documentation critical care time: 12 minutes  Consulting other physicians critical care time: 7 minutes  Total critical care time (exclusive of procedural time) : 53 minutes  Critical care was necessary to treat or prevent imminent or life-threatening deterioration of the following conditions: sepsis.  Critical care was time spent personally by me on the following activities: obtaining history from patient or surrogate, development of treatment plan with patient or surrogate, ordering and performing treatments and interventions, re-evaluation of patient's condition, evaluation of patient's response to  treatment, ordering and review of laboratory studies, examination of patient and ordering and review of radiographic studies.        Labs Reviewed   B-TYPE NATRIURETIC PEPTIDE - Abnormal; Notable for the following:        Result Value     (*)     All other components within normal limits   CBC W/ AUTO DIFFERENTIAL - Abnormal; Notable for the following:     WBC 15.80 (*)     Hemoglobin 12.7 (*)     Hematocrit 38.2 (*)     RDW 16.5 (*)     Gran # 11.7 (*)     Mono # 2.2 (*)     Gran% 74.0 (*)     Lymph% 11.7 (*)     All other components within normal limits   COMPREHENSIVE METABOLIC PANEL - Abnormal; Notable for the following:     Glucose 122 (*)     BUN, Bld 53 (*)     Albumin 2.4 (*)     eGFR if non  52 (*)     All other components within normal limits   PHOSPHORUS - Abnormal; Notable for the following:     Phosphorus 1.8 (*)     All other components within normal limits   PROTIME-INR - Abnormal; Notable for the following:     Prothrombin Time 14.9 (*)     INR 1.4 (*)     All other components within normal limits   TROPONIN I - Abnormal; Notable for the following:     Troponin I 0.083 (*)     All other components within normal limits   URINALYSIS - Abnormal; Notable for the following:     Color, UA Brown (*)     Appearance, UA Cloudy (*)     Specific Gravity, UA <=1.005 (*)     Protein, UA 2+ (*)     Occult Blood UA 3+ (*)     Nitrite, UA Positive (*)     Leukocytes, UA 3+ (*)     All other components within normal limits   URINALYSIS MICROSCOPIC - Abnormal; Notable for the following:     RBC, UA >100 (*)     WBC, UA 73 (*)     Bacteria, UA Many (*)     All other components within normal limits   CULTURE, BLOOD   CULTURE, BLOOD   CULTURE, URINE   APTT   LACTIC ACID, PLASMA   LIPASE   MAGNESIUM   CORTISOL, RANDOM   PROCALCITONIN   TSH     Imaging Results          X-Ray Chest AP Portable (In process)                         Medical Decision Making:   History:   Old Medical Records: I decided to  obtain old medical records.  Initial Assessment:   77-year-old male presented with a chief complaint of fever.  Differential Diagnosis:   Ddx includes but is not limited to sepsis, PNA, UTI, bacteremia.   Clinical Tests:   Lab Tests: Ordered and Reviewed  Radiological Study: Reviewed and Ordered  Medical Tests: Ordered and Reviewed  ED Management:  The patient was emergently evaluated in the department, his evaluation was significant for an elderly male with a trach in place.  The patient's EKG showed no acute abnormalities per my independent interpretation.  The patient's labs were significant for an infected urine and an elevated white blood cell count.  The patient's x-ray does show an early right-sided infiltrate per my independent interpretation.  The patient was noted to be hypotensive, tachycardic, and febrile on arrival to our.  The patient is likely septic.  The patient was aggressively treated with IV fluid boluses and broad-spectrum IV antibiotics, with improvement in his vital signs.  The etiology of his sepsis is likely from a urinary source.  I will admit the patient to the hospitalist service for further care.  I have discussed case with the hospitalist on-call, Dr. Bañuelos.  She has accepted the patient for admission.            Scribe Attestation:   Scribe #1: I performed the above scribed service and the documentation accurately describes the services I performed. I attest to the accuracy of the note.    Attending Attestation:           Physician Attestation for Scribe:  Physician Attestation Statement for Scribe #1: I, Dr. Bonilla, reviewed documentation, as scribed by Irma Knight in my presence, and it is both accurate and complete.                 ED Course      Clinical Impression:     1. Sepsis, due to unspecified organism          Disposition:   Disposition: Admitted                        Roosevelt Bonilla MD  10/07/17 0352

## 2017-10-07 NOTE — SIGNIFICANT EVENT
Pt has Portex 8 trach in place, placed on  a/c,14, 700, 28%, +5. Pt parul well per debbie previous settings. ambu is at the HOB all vent alarms are set and working.

## 2017-10-07 NOTE — ASSESSMENT & PLAN NOTE
- stable at this time  - could give additional fluid bolus if needed  - PICC line available for pressors if warranted

## 2017-10-07 NOTE — CONSULTS
DATE OF CONSULTATION:  10/07/2017.    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  He is a chronic mechanical ventilated patient.  He is subsequently   placed on broad-spectrum antibiotics.  He has underlying dementia and is   ventilator dependent and cannot communicate adequately.  He opens his eyes, but   cannot communicate effectively otherwise.    PAST MEDICAL HISTORY:  Pertinent for abdominal aortic aneurysm, anemia,   congestive heart failure, COPD, CAD, dementia, depression, chronic respiratory   failure, mechanical ventilator dependent, gastroesophageal reflux,   hyperlipidemia, hypertension, hypothyroidism, PEG placement, spinal surgery,   tracheostomy tube.    ALLERGIES:  TO CODEINE.    MEDICATIONS:  Includes aspirin, duloxetine, famotidine, finasteride,   levothyroxine, Zosyn, terazosin and vancomycin.    REVIEW OF SYSTEMS:  Unobtainable from the patient.    FAMILY HISTORY:  Unchanged from his prior chart.    PHYSICAL EXAMINATION:  VITAL SIGNS:  Demonstrated he had a temperature of 102.1, currently is 100.2,   pulse is 71, respiratory rate 18, is on mechanical ventilation, blood pressure   was 91/53, saturation 97.  HEENT:  His sclerae are nonicteric.  There is no epistaxis.  NECK:  No JVD.  He has a trach in place.  CHEST:  Reveals coarse breath sounds bilaterally.  No wheezing.  HEART:  Without a gallop.  ABDOMEN:  Soft, obese, no rebound.  EXTREMITIES:  With no cyanosis.  He has mild edema.  NEUROLOGIC:  He follows and tracks with his eyes, open eyes to presence, but it   is difficult to discern how much he understands and communication is limited.    DIAGNOSTIC DATA:  Chest x-ray fails to show any new acute infiltrates.    LABORATORY DATA:  His WBC is 15.8, hematocrit 38.2, CO2 is 27, creatinine is   1.3, albumin is 2.4.  BNP is 187.  Urine shows pseudomonas aeruginosa.    IMPRESSION:  1.  Urosepsis.  2.  Chronic respiratory failure.  3.  Dementia.  4.   Anemia.  5.  History of congestive heart failure.  6.  Coronary artery disease.  7.  Gastroesophageal reflux.  8.  Hypertension.  9.  Hypothyroidism, on replacement therapy.    PLAN:  Continue his present mechanical ventilation settings.  I will follow his   clinical course, stable from a pulmonary standpoint.  Hopefully he can be   discharged back to his chronic vent facility soon.      HES/HN  dd: 10/07/2017 11:23:40 (CDT)  td: 10/07/2017 17:48:13 (CDT)  Doc ID   #[id number]  Job ID #[id number]    CC: Melissa Lombardi M.D.

## 2017-10-07 NOTE — PLAN OF CARE
Discharge planner unable to assess patient discharge needs at this time due to medical history; dementia.  Patient is long-term care at [facility].  Attempted to contact relative at [phone], no answer and no voicemail available. . case management to continue to follow. Roxanna, SSC

## 2017-10-07 NOTE — PLAN OF CARE
Problem: Patient Care Overview  Goal: Plan of Care Review  Outcome: Ongoing (interventions implemented as appropriate)  Large amount trach secretions. Vital signs stable after NS bolus. Urine output adequate. Plan antibiotics, monitor urine output, vital signs

## 2017-10-07 NOTE — ASSESSMENT & PLAN NOTE
- suspect secondary to urosepsis  - cultures drawn  - on vanc/Zosyn  - history of pseudomonal infection  - fluid boluses given  - PICC line in place for pressors if needed  - follow up cultures and continue current care

## 2017-10-07 NOTE — SUBJECTIVE & OBJECTIVE
Past Medical History:   Diagnosis Date    AAA (abdominal aortic aneurysm)     Anemia     CHF (congestive heart failure)     COPD (chronic obstructive pulmonary disease)     Coronary artery disease     Dementia     Dementia     Depression     GERD (gastroesophageal reflux disease)     Hyperlipidemia     Hypertension     Hypothyroid     Renal disorder     Respiratory failure, chronic     Ventilator dependence        Past Surgical History:   Procedure Laterality Date    ABDOMINAL SURGERY      CARDIAC SURGERY  1999    GASTROSTOMY TUBE PLACEMENT      SPINE SURGERY      TRACHEOSTOMY TUBE PLACEMENT         Review of patient's allergies indicates:   Allergen Reactions    Codeine Other (See Comments)       No current facility-administered medications on file prior to encounter.      Current Outpatient Prescriptions on File Prior to Encounter   Medication Sig    ascorbic acid, vitamin C, (VITAMIN C) 500 mg/5 mL Syrp syrup 500 mg by PEG Tube route 2 (two) times daily.     aspirin 325 MG tablet 325 mg by PEG Tube route once daily.     baclofen (LIORESAL) 20 MG tablet 20 mg by PEG Tube route every 6 (six) hours as needed (muscle spasms).     duloxetine (CYMBALTA) 30 MG capsule 30 mg by PEG Tube route once daily.     famotidine (PEPCID) 20 MG tablet 1 tablet (20 mg total) by Per G Tube route 2 (two) times daily.    finasteride (PROSCAR) 5 mg tablet 5 mg by PEG Tube route once daily.     levothyroxine (SYNTHROID) 200 MCG tablet 200 mcg by PEG Tube route once daily.    liothyronine (CYTOMEL) 25 MCG Tab 75 mcg by PEG Tube route once daily.     melatonin 3 mg Tab 9 mg by PEG Tube route every evening.     potassium chloride (KLOR-CON) 20 mEq Pack 20 mEq by PEG Tube route 2 (two) times daily.     rivastigmine (EXELON) 9.5 mg/24 hr PT24 Place 1 patch onto the skin once daily.    terazosin (HYTRIN) 1 MG capsule 1 mg by PEG Tube route once daily.    albuterol-ipratropium 2.5mg-0.5mg/3mL (DUONEB) 0.5 mg-3  mg(2.5 mg base)/3 mL nebulizer solution Take 3 mLs by nebulization every 4 (four) hours as needed.    hydrocodone-acetaminophen 10-325mg (NORCO)  mg Tab 1 tablet by PEG Tube route every 6 (six) hours as needed for Pain.     [DISCONTINUED] ferrous sulfate 300 mg (60 mg iron)/5 mL syrup 300 mg by PEG Tube route once daily.    [DISCONTINUED] gabapentin (NEURONTIN) 400 MG capsule 400 mg by PEG Tube route every 8 (eight) hours.     [DISCONTINUED] nutritional supplements (ISOSOURCE HN) Liqd Take 70 mLs by mouth Daily.    [DISCONTINUED] oxybutynin (DITROPAN) 5 MG Tab 1 tablet (5 mg total) by Per G Tube route 3 (three) times daily.    [DISCONTINUED] trazodone (DESYREL) 50 MG tablet 50 mg by PEG Tube route every evening.      Family History     None        Social History Main Topics    Smoking status: Former Smoker    Smokeless tobacco: Never Used    Alcohol use No    Drug use: No    Sexual activity: No     Review of Systems   Unable to perform ROS: Acuity of condition     Objective:     Vital Signs (Most Recent):  Temp: 100.2 °F (37.9 °C) (10/07/17 1015)  Pulse: 72 (10/07/17 1030)  Resp: 14 (10/07/17 1030)  BP: (!) 104/59 (10/07/17 1030)  SpO2: 98 % (10/07/17 1030) Vital Signs (24h Range):  Temp:  [98.7 °F (37.1 °C)-102.1 °F (38.9 °C)] 100.2 °F (37.9 °C)  Pulse:  [] 72  Resp:  [14-23] 14  SpO2:  [95 %-100 %] 98 %  BP: ()/(41-71) 104/59     Weight: 110.1 kg (242 lb 11.6 oz)  Body mass index is 32.92 kg/m².    Physical Exam   Constitutional: He appears well-developed and well-nourished. No distress.   HENT:   Head: Normocephalic and atraumatic.   Eyes: Pupils are equal, round, and reactive to light.   Neck: Neck supple. No thyromegaly present.   Cardiovascular: Normal rate and regular rhythm.  Exam reveals no gallop and no friction rub.    No murmur heard.  Pulmonary/Chest: Effort normal and breath sounds normal. No respiratory distress. He has no wheezes.   Abdominal: Soft. Bowel sounds are  normal. He exhibits no distension. There is no tenderness. There is no guarding.   Peg in place with mild erythema at site   Musculoskeletal: Normal range of motion. He exhibits no edema.   Neurological: He is alert.   Skin: Skin is warm and dry. No erythema.   Psychiatric: He has a normal mood and affect.        Significant Labs:   CBC:   Recent Labs  Lab 10/06/17  2302   WBC 15.80*   HGB 12.7*   HCT 38.2*          Significant Imaging: I have reviewed all pertinent imaging results/findings within the past 24 hours.

## 2017-10-07 NOTE — PLAN OF CARE
Problem: Patient Care Overview  Goal: Plan of Care Review  Outcome: Ongoing (interventions implemented as appropriate)  Antibiotics given per orders. Sputum culture collected. Started back on tube feedings and is tolerating well. Replaced phos. Monitored urine output around 500 thus far. Low grade temps today with drop to 98.1 this afternoon. Had one BM appeared normal tube fed BM. Mouth care done every 4 hours, patient not cooperative with this. Kept bed on constant rotation. Will continue with currently plan of care. Trach care per RT.

## 2017-10-08 LAB
ALBUMIN SERPL BCP-MCNC: 2.1 G/DL
ALP SERPL-CCNC: 84 U/L
ALT SERPL W/O P-5'-P-CCNC: 19 U/L
ANION GAP SERPL CALC-SCNC: 10 MMOL/L
AST SERPL-CCNC: 25 U/L
BACTERIA UR CULT: NORMAL
BASOPHILS # BLD AUTO: 0 K/UL
BASOPHILS NFR BLD: 0.1 %
BILIRUB SERPL-MCNC: 0.7 MG/DL
BUN SERPL-MCNC: 41 MG/DL
CALCIUM SERPL-MCNC: 8.8 MG/DL
CHLORIDE SERPL-SCNC: 107 MMOL/L
CO2 SERPL-SCNC: 22 MMOL/L
CREAT SERPL-MCNC: 1 MG/DL
DIFFERENTIAL METHOD: ABNORMAL
EOSINOPHIL # BLD AUTO: 0.1 K/UL
EOSINOPHIL NFR BLD: 1.1 %
ERYTHROCYTE [DISTWIDTH] IN BLOOD BY AUTOMATED COUNT: 16.4 %
EST. GFR  (AFRICAN AMERICAN): >60 ML/MIN/1.73 M^2
EST. GFR  (NON AFRICAN AMERICAN): >60 ML/MIN/1.73 M^2
GLUCOSE SERPL-MCNC: 120 MG/DL
HCT VFR BLD AUTO: 35.6 %
HGB BLD-MCNC: 11.9 G/DL
LYMPHOCYTES # BLD AUTO: 1.3 K/UL
LYMPHOCYTES NFR BLD: 11.1 %
MCH RBC QN AUTO: 27.1 PG
MCHC RBC AUTO-ENTMCNC: 33.3 G/DL
MCV RBC AUTO: 81 FL
MONOCYTES # BLD AUTO: 1.4 K/UL
MONOCYTES NFR BLD: 11.9 %
NEUTROPHILS # BLD AUTO: 8.8 K/UL
NEUTROPHILS NFR BLD: 75.8 %
PLATELET # BLD AUTO: 178 K/UL
PLATELET BLD QL SMEAR: ABNORMAL
PMV BLD AUTO: 11.4 FL
POTASSIUM SERPL-SCNC: 2.9 MMOL/L
POTASSIUM SERPL-SCNC: 3.3 MMOL/L
PROT SERPL-MCNC: 7.2 G/DL
RBC # BLD AUTO: 4.38 M/UL
SODIUM SERPL-SCNC: 139 MMOL/L
VANCOMYCIN TROUGH SERPL-MCNC: 16.9 UG/ML
WBC # BLD AUTO: 11.6 K/UL

## 2017-10-08 PROCEDURE — 25000003 PHARM REV CODE 250: Performed by: EMERGENCY MEDICINE

## 2017-10-08 PROCEDURE — 94770 HC EXHALED C02 TEST: CPT

## 2017-10-08 PROCEDURE — 80202 ASSAY OF VANCOMYCIN: CPT

## 2017-10-08 PROCEDURE — 63600175 PHARM REV CODE 636 W HCPCS: Performed by: EMERGENCY MEDICINE

## 2017-10-08 PROCEDURE — 25000003 PHARM REV CODE 250: Performed by: INTERNAL MEDICINE

## 2017-10-08 PROCEDURE — 80053 COMPREHEN METABOLIC PANEL: CPT

## 2017-10-08 PROCEDURE — 84132 ASSAY OF SERUM POTASSIUM: CPT

## 2017-10-08 PROCEDURE — 94003 VENT MGMT INPAT SUBQ DAY: CPT

## 2017-10-08 PROCEDURE — 63600175 PHARM REV CODE 636 W HCPCS: Performed by: INTERNAL MEDICINE

## 2017-10-08 PROCEDURE — 27000221 HC OXYGEN, UP TO 24 HOURS

## 2017-10-08 PROCEDURE — 36415 COLL VENOUS BLD VENIPUNCTURE: CPT

## 2017-10-08 PROCEDURE — 85025 COMPLETE CBC W/AUTO DIFF WBC: CPT

## 2017-10-08 PROCEDURE — 20000000 HC ICU ROOM

## 2017-10-08 PROCEDURE — S0028 INJECTION, FAMOTIDINE, 20 MG: HCPCS | Performed by: EMERGENCY MEDICINE

## 2017-10-08 PROCEDURE — 99900026 HC AIRWAY MAINTENANCE (STAT)

## 2017-10-08 PROCEDURE — 94761 N-INVAS EAR/PLS OXIMETRY MLT: CPT

## 2017-10-08 PROCEDURE — 99900035 HC TECH TIME PER 15 MIN (STAT)

## 2017-10-08 RX ORDER — SODIUM CHLORIDE 0.9 % (FLUSH) 0.9 %
10 SYRINGE (ML) INJECTION EVERY 6 HOURS
Status: DISCONTINUED | OUTPATIENT
Start: 2017-10-08 | End: 2017-10-08

## 2017-10-08 RX ORDER — SODIUM CHLORIDE 0.9 % (FLUSH) 0.9 %
10 SYRINGE (ML) INJECTION
Status: DISCONTINUED | OUTPATIENT
Start: 2017-10-08 | End: 2017-10-08

## 2017-10-08 RX ADMIN — DULOXETINE 30 MG: 30 CAPSULE, DELAYED RELEASE ORAL at 08:10

## 2017-10-08 RX ADMIN — FINASTERIDE 5 MG: 5 TABLET, FILM COATED ORAL at 08:10

## 2017-10-08 RX ADMIN — VANCOMYCIN HYDROCHLORIDE 1750 MG: 1 INJECTION, POWDER, LYOPHILIZED, FOR SOLUTION INTRAVENOUS at 12:10

## 2017-10-08 RX ADMIN — BACITRACIN ZINC: 500 OINTMENT TOPICAL at 09:10

## 2017-10-08 RX ADMIN — PIPERACILLIN AND TAZOBACTAM 4.5 G: 4; .5 INJECTION, POWDER, LYOPHILIZED, FOR SOLUTION INTRAVENOUS; PARENTERAL at 06:10

## 2017-10-08 RX ADMIN — LEVOTHYROXINE SODIUM 200 MCG: 150 TABLET ORAL at 05:10

## 2017-10-08 RX ADMIN — BETHANECHOL CHLORIDE 15 MG: 10 TABLET ORAL at 10:10

## 2017-10-08 RX ADMIN — ASPIRIN 325 MG ORAL TABLET 325 MG: 325 PILL ORAL at 08:10

## 2017-10-08 RX ADMIN — POTASSIUM CHLORIDE 60 MEQ: 400 INJECTION, SOLUTION INTRAVENOUS at 05:10

## 2017-10-08 RX ADMIN — LIOTHYRONINE SODIUM 75 MCG: 25 TABLET ORAL at 05:10

## 2017-10-08 RX ADMIN — FAMOTIDINE 20 MG: 10 INJECTION, SOLUTION INTRAVENOUS at 09:10

## 2017-10-08 RX ADMIN — FAMOTIDINE 20 MG: 10 INJECTION, SOLUTION INTRAVENOUS at 08:10

## 2017-10-08 RX ADMIN — PIPERACILLIN AND TAZOBACTAM 4.5 G: 4; .5 INJECTION, POWDER, LYOPHILIZED, FOR SOLUTION INTRAVENOUS; PARENTERAL at 02:10

## 2017-10-08 RX ADMIN — PIPERACILLIN AND TAZOBACTAM 4.5 G: 4; .5 INJECTION, POWDER, LYOPHILIZED, FOR SOLUTION INTRAVENOUS; PARENTERAL at 11:10

## 2017-10-08 RX ADMIN — ACETAMINOPHEN 650 MG: 650 SOLUTION ORAL at 03:10

## 2017-10-08 RX ADMIN — BACITRACIN ZINC: 500 OINTMENT TOPICAL at 08:10

## 2017-10-08 RX ADMIN — RIVASTIGMINE TRANSDERMAL SYSTEM 1 PATCH: 9.5 PATCH, EXTENDED RELEASE TRANSDERMAL at 08:10

## 2017-10-08 RX ADMIN — POTASSIUM CHLORIDE 40 MEQ: 400 INJECTION, SOLUTION INTRAVENOUS at 04:10

## 2017-10-08 RX ADMIN — BETHANECHOL CHLORIDE 15 MG: 10 TABLET ORAL at 02:10

## 2017-10-08 RX ADMIN — SODIUM CHLORIDE: 0.9 INJECTION, SOLUTION INTRAVENOUS at 04:10

## 2017-10-08 RX ADMIN — BETHANECHOL CHLORIDE 15 MG: 10 TABLET ORAL at 05:10

## 2017-10-08 NOTE — PROGRESS NOTES
Progress Note  Pul-ICU    Admit Date: 10/6/2017   LOS: 1 day     SUBJECTIVE:     Follow-up For:    1.  Urosepsis.  2.  Chronic respiratory failure.- chronic vent  3.  Dementia.  4.  Anemia.  5.  History of congestive heart failure.  6.  Coronary artery disease.  7.  Gastroesophageal reflux.  8.  Hypertension.  9.  Hypothyroidism, on replacement therapy.       Continuous Infusions:   sodium chloride 0.9% 125 mL/hr at 10/08/17 0446     Scheduled Meds:   aspirin  325 mg Oral Daily    bacitracin   Topical (Top) BID    bethanechol  15 mg Oral TID    duloxetine  30 mg Oral Daily    famotidine (PF)  20 mg Intravenous Q12H    finasteride  5 mg Oral Daily    levothyroxine  0.2 mg Oral Before breakfast    liothyronine  75 mcg Oral Before breakfast    piperacillin-tazobactam 4.5 g in dextrose 5 % 100 mL IVPB (ready to mix system)  4.5 g Intravenous Q8H    potassium phosphate IVPB  20 mmol Intravenous Once    rivastigmine  1 patch Transdermal Daily    terazosin  1 mg Oral Daily     PRN Meds:acetaminophen, baclofen, calcium gluconate IVPB, calcium gluconate IVPB, calcium gluconate IVPB, influenza, magnesium sulfate IVPB, magnesium sulfate IVPB, potassium chloride **AND** potassium chloride **AND** potassium chloride, sodium phosphate IVPB, sodium phosphate IVPB, sodium phosphate IVPB    Review of patient's allergies indicates:   Allergen Reactions    Codeine Other (See Comments)       OBJECTIVE:     Vital Signs (Most Recent)  Temp: 99.6 °F (37.6 °C) (10/08/17 0745)  Pulse: 85 (10/08/17 0845)  Resp: 17 (10/08/17 0845)  BP: (!) 92/51 (10/08/17 0830)  SpO2: 98 % (10/08/17 0845)    Vital Signs Range (Last 24H):  Temp:  [98.1 °F (36.7 °C)-101.8 °F (38.8 °C)]   Pulse:  []   Resp:  [0-22]   BP: ()/(32-69)   SpO2:  [95 %-100 %]     I & O (Last 24H):  Intake/Output Summary (Last 24 hours) at 10/08/17 0932  Last data filed at 10/08/17 0771   Gross per 24 hour   Intake             2760 ml   Output             2100  ml   Net              660 ml     Ventilator Data (Last 24H):     Vent Mode: A/C  Oxygen Concentration (%):  [28] 28  Resp Rate Total:  [14 br/min-26 br/min] 14 br/min  Vt Set:  [700 mL] 700 mL  PEEP/CPAP:  [5 cmH20] 5 cmH20  Pressure Support:  [0 cmH20] 0 cmH20  Mean Airway Pressure:  [12 spV78-85 cmH20] 20 cmH20    Hemodynamic Parameters (Last 24H):       Constitutional: temp down this am  ENT: negative for epistaxis  Respiratory: negative for hemoptysis  Cardiovascular: neg for Vtach  Gastrointestinal: negative for bright red blood per rectum and vomiting  Genitourinary: negative for hematuria  Hematologic/Lymphatic: negative for bleeding  Allergy/Immunology: no hives  Neurological: negative for seizures    Physical Exam:  General: appears acutely ill, no distress, morbidly obese, on vent  Head: normocephalic, atraumatic  Eyes:  negative findings: conjunctivae and sclerae normal  Nose: no discharge, no epistaxis  Neck: supple, symmetrical, trachea midline, no JVD and tracheotomy  Lungs:  course bs on vent  Chest Wall: no tenderness  Heart: no gallop  Abdomen: normal findings: soft, non-tender and abnormal findings:  obese  Extremities: edema 1-2  Skin: warm and dry  Neurologic: Mental status: sleeping    Lines/Drains:       PICC Single Lumen (Active)   Site Assessment Clean;Dry;Intact;No redness;No swelling 10/8/2017  7:35 AM   Line Status Infusing 10/8/2017  7:35 AM   Dressing Type Securing device;Transparent 10/8/2017  7:35 AM   Dressing Status Clean;Dry;Intact;Biopatch in place 10/8/2017  7:35 AM   Dressing Change Due 10/14/17 10/8/2017  7:35 AM   Daily Line Review Performed 10/8/2017  7:35 AM   Number of days:             Peripheral IV - Single Lumen 10/07/17 0200 Left Hand (Active)   Site Assessment Clean;Dry;Intact;No redness;No swelling 10/8/2017  7:35 AM   Line Status Blood return noted;Flushed;Saline locked 10/8/2017  7:35 AM   Dressing Status Clean;Dry;Intact 10/8/2017  7:35 AM   Dressing Change Due  "10/11/17 10/8/2017  7:35 AM   Site Change Due 10/11/17 10/7/2017  7:25 AM   Reason Not Rotated Not due 10/8/2017  7:35 AM   Number of days: 1            Gastrostomy/Enterostomy  Gastrostomy tube w/ balloon;Gastrostomy-jejunostomy midline feeding (Active)   Securement taped to abdomen 10/8/2017  7:35 AM   Interventions Prior to Feeding patency checked;residual checked 10/8/2017  7:35 AM   Feeding Type continuous;by pump 10/8/2017  7:35 AM   Clamp Status/Tolerance unclamped 10/8/2017  7:35 AM   Feeding Action feeding continued 10/8/2017  7:35 AM   Dressing dry and intact 10/8/2017  7:35 AM   Insertion Site warmth;redness;yellow drainage 10/8/2017  7:35 AM   Site Care device rotatated;site cleansed w/ sterile normal saline 10/8/2017  7:35 AM   Flush/Irrigation flushed w/;water;no resistance met 10/8/2017  7:35 AM   Current Rate (mL/hr) 70 mL/hr 10/8/2017  7:35 AM   Goal Rate (mL/hr) 70 mL/hr 10/8/2017  7:35 AM   Intake (mL) 500 mL 10/8/2017  5:29 AM   Water Bolus (mL) 140 mL 10/8/2017  7:35 AM   Residual Amount (ml) 0 ml 10/8/2017  7:35 AM   Number of days:             Urethral Catheter 10/07/17 0136 Latex 16 Fr. (Active)   Site Assessment Clean;Intact 10/8/2017  7:35 AM   Collection Container Urimeter 10/8/2017  7:35 AM   Securement Method secured to top of thigh w/ adhesive device 10/8/2017  7:35 AM   Catheter Care Performed yes 10/8/2017  7:35 AM   Reason for Continuing Urinary Catheterization Critically ill in ICU requiring intensive monitoring 10/8/2017  7:35 AM   CAUTI Prevention Bundle StatLock in place w 1" slack;Intact seal between catheter & drainage tubing;Drainage bag off the floor;Green sheeting clip in use;No dependent loops or kinks;Drainage bag not overfilled (<2/3 full);Drainage bag below bladder 10/8/2017  7:35 AM   Output (mL) 400 mL 10/8/2017  5:29 AM   Number of days: 1       Laboratory (Last 24H):  CBC:    Recent Labs  Lab 10/08/17  0331   WBC 11.60   HGB 11.9*   HCT 35.6*    "     CMP:    Recent Labs  Lab 10/08/17  0331   CALCIUM 8.8   ALBUMIN 2.1*   PROT 7.2      K 2.9*   CO2 22*      BUN 41*   CREATININE 1.0   ALKPHOS 84   ALT 19   AST 25   BILITOT 0.7     BMP:   Recent Labs  Lab 10/06/17  2302 10/08/17  0331   * 120*    139   K 4.4 2.9*    107   CO2 27 22*   BUN 53* 41*   CREATININE 1.3 1.0   CALCIUM 9.3 8.8   MG 1.7  --      Coagulation:   Recent Labs  Lab 10/06/17  2302   INR 1.4*   APTT 29.6     Cardiac Markers: No results for input(s): CKMB, TROPONINT, MYOGLOBIN in the last 168 hours.  ABGs: No results for input(s): PH, PCO2, HCO3, POCSATURATED, BE in the last 168 hours.  Prealbumin: No results for input(s): PREALBUMIN in the last 168 hours.  Labs within the past 24 hours have been reviewed.    Chest X-Ray:     Diagnostic Results:      ASSESSMENT/PLAN:     Preventive Measures:     Patient Active Problem List    Diagnosis Date Noted    Urinary tract infection without hematuria 03/21/2017    Septic shock 03/21/2017    PEG (percutaneous endoscopic gastrostomy) status 03/21/2017    Ventilator associated pneumonia 03/21/2017    Acquired hypothyroidism 03/21/2017    Hypotension 02/16/2014    Hematuria 01/23/2014    Functional quadriplegia 12/05/2013    Respiratory failure, acute-on-chronic 12/05/2013    SIRS with acute organ dysfunction due to infectious process 12/05/2013    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

## 2017-10-08 NOTE — NURSING
Spoke with Dr Madrid. Thought best to change MID line catheter due to blood culture growth even though possible skin contaminate. PICC ordered. Can be placed tomorrow if not able to be done today.

## 2017-10-08 NOTE — CONSULTS
[facility]  Adult Nutrition  Consult Note    SUMMARY     Recommendations  Recommendation/Intervention: 1.) Recommend Isosource 1.5 @ advancing to goal rate 55 mls/hr continuous + x1 packet beneprotein BID (mixed with 2 oz water) providing 2030 kcals/day, 96 g protein/day, 232 g CHO/day, and 1008 mls water/day. Hold TF x4 hrs for residuals >250mls. Flush 160 mls free water Q4 hrs or per MD.   Goals: 1.) patient will tolerate TF at goal rate   Nutrition Goal Status: new  Communication of RD Recs: reviewed with RN    1. Sepsis, due to unspecified organism      Past Medical History:   Diagnosis Date    AAA (abdominal aortic aneurysm)     Anemia     CHF (congestive heart failure)     COPD (chronic obstructive pulmonary disease)     Coronary artery disease     Dementia     Dementia     Depression     GERD (gastroesophageal reflux disease)     Hyperlipidemia     Hypertension     Hypothyroid     Renal disorder     Respiratory failure, chronic     Ventilator dependence        Reason for Assessment  Reason for Assessment: nurse/nurse practitioner consult  General Information Comments: Admtis from [place] with fever. Vent dependent patient s/p peg. Isosource infusing @ 30 mls/hr during Rd visit. No propofol.       Nutrition Prescription Ordered  Current Diet Order: NPO     Current Nutrition Support Formula Ordered: Isosource 1.5  Current Nutrition Support Rate Ordered: 70 (ml)  Current Nutrition Support Frequency Ordered: continuous        Evaluation of Received Nutrients/Fluid Intake  Enteral Calories (kcal): 2520  Enteral Protein (gm): 114  Enteral (Free Water) Fluid (mL): 1284  Energy Calories Required: exceed needs  Protein Required: meeting needs  % Intake of Estimated Energy Needs: Other: 126%  % Meal Intake: NPO     Nutrition Risk Screen  Nutrition Risk Screen: dysphagia or difficulty swallowing, tube feeding or parenteral nutrition    Nutrition/Diet History   Food Preferences:  RAMON  Factors Affecting Nutritional Intake: on mechanical ventilation, NPO    Labs/Tests/Procedures/Meds  Diagnostic Test/Procedure Review: reviewed, pertinent  Pertinent Labs Reviewed: reviewed, pertinent  BMP  Lab Results   Component Value Date     10/06/2017    K 4.4 10/06/2017     10/06/2017    CO2 27 10/06/2017    BUN 53 (H) 10/06/2017    CREATININE 1.3 10/06/2017    CALCIUM 9.3 10/06/2017    ANIONGAP 8 10/06/2017    ESTGFRAFRICA >60 10/06/2017    EGFRNONAA 52 (A) 10/06/2017     Lab Results   Component Value Date    ALBUMIN 2.4 (L) 10/06/2017     Lab Results   Component Value Date    CALCIUM 9.3 10/06/2017    PHOS 1.8 (L) 10/06/2017     No results for input(s): POCTGLUCOSE in the last 24 hours.    Pertinent Medications Reviewed: reviewed  Scheduled Meds:   aspirin  325 mg Oral Daily    bacitracin   Topical (Top) BID    bethanechol  15 mg Oral TID    duloxetine  30 mg Oral Daily    famotidine (PF)  20 mg Intravenous Q12H    finasteride  5 mg Oral Daily    levothyroxine  0.2 mg Oral Before breakfast    liothyronine  75 mcg Oral Before breakfast    piperacillin-tazobactam 4.5 g in dextrose 5 % 100 mL IVPB (ready to mix system)  4.5 g Intravenous Q8H    potassium phosphate IVPB  20 mmol Intravenous Once    rivastigmine  1 patch Transdermal Daily    terazosin  1 mg Oral Daily     Continuous Infusions:   sodium chloride 0.9% 125 mL/hr at 10/07/17 1032         Physical Findings  Overall Physical Appearance: on ventilator support  Tubes: gastrostomy tube  Oral/Mouth Cavity: WDL  Skin: pressure ulcer(s) (Brandon score 10)    Anthropometrics  Temp: 98.1 °F (36.7 °C)  Height: 6'  Weight Method: Bed Scale  Weight: 110.1 kg (242 lb 11.6 oz)  Ideal Body Weight (IBW), Male: 178 lb  % Ideal Body Weight, Male (lb): 136.37 lb  BMI (Calculated): 33  BMI Grade: 30 - 34.9- obesity - grade I  Usual Body Weight (UBW), kg:  (ramon)      Estimated/Assessed Needs  Weight Used For Calorie Calculations: 110.1 kg (242  lb 11.6 oz)   Energy Calorie Requirements (kcal): 1990  Energy Need Method: [place] (modified)   RMR ([place]-St. Jeor Equation): 1859  Weight Used For Protein Calculations: 80.9 kg (178 lb 5.6 oz) (ideal body weight)  1.2 gm Protein (gm): 97.28 and 1.5 gm Protein (gm): 121.6  Fluid Need Method: RDA Method (or per MD)  RDA Method (mL): 1990      Assessment and Plan  Nutrition Problem  excessive energy intake    Related to (etiology):   Excessive kcals from enteral orders    Signs and Symptoms (as evidenced by):   EN providing 126% of energy needs    Interventions/Recommendations (treatment strategy):  Reduce goal rate to 55 mls/hr    Nutrition Diagnosis Status:   New        Monitor and Evaluation  Food and Nutrient Intake: energy intake, enteral nutrition intake  Food and Nutrient Adminstration: enteral and parenteral nutrition administration  Anthropometric Measurements: weight, weight change, body mass index  Biochemical Data, Medical Tests and Procedures: electrolyte and renal panel, glucose/endocrine profile, lipid profile  Nutrition-Focused Physical Findings: overall appearance    Nutrition Risk  Level of Risk:  (x2 weekly)    Nutrition Follow-Up  RD Follow-up?: Yes      Discharge Planning: discharge on TF above.

## 2017-10-08 NOTE — PROGRESS NOTES
10/07/17 1900   PRE-TX-O2-ETCO2   Oxygen Concentration (%) 28   SpO2 100 %   ETCO2 (mmHg) 31 mmHg   Pulse 77   Resp 15   BP (!) 100/53       Surgical Airway Portex Cuffed;Fenestrated   No Placement Date or Time found.   Present Prior to Hospital Arrival?: Yes  Inserted by: Present Prior to Hospital Arrival  Type: Tracheostomy  Brand: Portex  Airway Device Size: 8.0  Style: Cuffed;Fenestrated   Cuff Pressure 32 cm H2O   Status Secured   Site Assessment Air leak;Oozing secretions   Site Care Cleansed;Dried;Dressing applied   Ties Assessment Dry;Clean;Intact;Secure   Vent Select   Charged w/in last 24h YES   Preset Conventional Ventilator Settings   Vent Type    Ventilation Type VC   Vent Mode A/C   Humidity HME   Set Rate 14 bmp   Vt Set 700 mL   PEEP/CPAP 5 cmH20   Pressure Support 0 cmH20   Waveform RAMP   Peak Flow 65 L/min   Set Inspiratory Pressure 0 cmH20   Insp Time 0 Sec(s)   Plateau Set/Insp. Hold (sec) 0   Insp Rise Time  0 %   Trigger Sensitivity Flow/I-Trigger 1 L/min   P High 0 cm H2O   P Low 0 cm H2O   T High 0 sec   T Low 0 sec   Patient Ventilator Parameters   Resp Rate Total 15 br/min   Peak Airway Pressure 45 cmH2O   Mean Airway Pressure 14 cmH20   Plateau Pressure 0 cmH20   Exhaled Vt 837 mL   Total Ve 10.9 mL   Spont Ve 0 L   I:E Ratio Measured 1:2.50   Conventional Ventilator Alarms   Alarms On Y   Ve High Alarm 25 L/min   Resp Rate High Alarm 40 br/min   Press High Alarm 50 cmH2O   Apnea Rate 14   Apnea Volume (mL) 700 mL   Apnea Oxygen Concentration  100   Apnea Flow Rate (L/min) 65   T Apnea 20 sec(s)   Ready to Wean/Extubation Screen   FIO2<=50 (chart decimal) 0.28   MV<16L (chart vol.) 10.9   PEEP <=8 (chart #) 5   Ready to Wean Parameters   F/VT Ratio<105 (RSBI) (!) 17.92   Airway Safety   Ambu bag with the patient? Yes, Adult Ambu   Is mask with the patient? Yes, Adult Mask

## 2017-10-08 NOTE — PLAN OF CARE
10/08/17 0716   Patient Assessment/Suction   Level of Consciousness (AVPU) alert   Respiratory Effort Unlabored;Normal   Expansion/Accessory Muscles/Retractions no retractions;no use of accessory muscles   All Lung Fields Breath Sounds diminished;clear   Rhythm/Pattern, Respiratory ventilator assisted   Sputum Amount scant   Sputum Color white   Sputum Consistency thick   PRE-TX-O2-ETCO2   O2 Device (Oxygen Therapy) ventilator   $ Is the patient on Oxygen? Yes   Oxygen Concentration (%) 28   SpO2 97 %   Pulse Oximetry Type Continuous   $ Pulse Oximetry - Multiple Charge Pulse Oximetry - Multiple   ETCO2 (mmHg) 29 mmHg   Pulse 102   Resp (!) 21       Surgical Airway Portex Cuffed;Fenestrated   No Placement Date or Time found.   Present Prior to Hospital Arrival?: Yes  Inserted by: Present Prior to Hospital Arrival  Type: Tracheostomy  Brand: Portex  Airway Device Size: 8.0  Style: Cuffed;Fenestrated   Cuff Pressure 35 cm H2O   Status Secured   Site Assessment Clean;Dry   Ties Assessment Clean;Dry;Intact   Vent Select   Charged w/in last 24h YES   Preset Conventional Ventilator Settings   Vent Type    Ventilation Type VC   Vent Mode A/C   Humidity HME   Set Rate 14 bmp   Vt Set 700 mL   PEEP/CPAP 5 cmH20   Pressure Support 0 cmH20   Waveform RAMP   Peak Flow 65 L/min   Set Inspiratory Pressure 0 cmH20   Insp Time 0 Sec(s)   Plateau Set/Insp. Hold (sec) 0   Insp Rise Time  0 %   Trigger Sensitivity Flow/I-Trigger 1 L/min   P High 0 cm H2O   P Low 0 cm H2O   T High 0 sec   T Low 0 sec   Patient Ventilator Parameters   Resp Rate Total 26 br/min   Peak Airway Pressure 38 cmH2O   Mean Airway Pressure 20 cmH20   Plateau Pressure 0 cmH20   Exhaled Vt 801 mL   Total Ve 17.3 mL   Spont Ve 0 L   I:E Ratio Measured 1:1.40   Conventional Ventilator Alarms   Ve High Alarm 25 L/min   Resp Rate High Alarm 40 br/min   Press High Alarm 50 cmH2O   Apnea Rate 14   Apnea Volume (mL) 700 mL   Apnea Oxygen Concentration  100    Apnea Flow Rate (L/min) 65   T Apnea 20 sec(s)   Ready to Wean/Extubation Screen   FIO2<=50 (chart decimal) 0.28   MV<16L (chart vol.) (!) 17.3   PEEP <=8 (chart #) 5   Ready to Wean Parameters   F/VT Ratio<105 (RSBI) (!) 26.22

## 2017-10-08 NOTE — PROGRESS NOTES
[facility] Medicine  Progress Note    Patient Name: Masood Messina  MRN: [id number]  Patient Class: IP- Inpatient   Admission Date: 10/6/2017  Length of Stay: 1 days  Attending Physician: Melissa Mayfield MD  Primary Care Provider: Jeramie Lombardi MD        Subjective:     Principal Problem:<principal problem not specified>    HPI:  77 y.o. Male with PMHx significant for functional quadraplegia, CAD, hx psedumonas with ventilator dependency currently living at [facility] and here because of a noted fever at the nursing with associated tachycardia and relative hypotension.  In the ED he had a temperature up to 100.9 and was mildly hypotensive but responsive to fluids.  Labs significant for 40 WBC in urine and no other apparent source, so suspicion was high at that time for urosepsis.  Vanc and Zosyn were initiated and he was sent to the ICU.  He has remained HD stable with BP's on the low side of normal at this time.  Otherwise, no other acute issues.     Hospital Course:  No notes on file    Interval History: Patient seen and examined with no acute events overnight.  Urine culture positive for GNR and blood cultures positive for Staph from PICC line draw.      Review of Systems   Unable to perform ROS: Patient nonverbal     Objective:     Vital Signs (Most Recent):  Temp: 99.6 °F (37.6 °C) (10/08/17 0745)  Pulse: 78 (10/08/17 0945)  Resp: 14 (10/08/17 0945)  BP: (!) 92/54 (10/08/17 0930)  SpO2: 97 % (10/08/17 0945) Vital Signs (24h Range):  Temp:  [98.1 °F (36.7 °C)-101.8 °F (38.8 °C)] 99.6 °F (37.6 °C)  Pulse:  [] 78  Resp:  [0-22] 14  SpO2:  [95 %-100 %] 97 %  BP: ()/(32-69) 92/54     Weight: 110.1 kg (242 lb 11.6 oz)  Body mass index is 32.92 kg/m².    Intake/Output Summary (Last 24 hours) at 10/08/17 1117  Last data filed at 10/08/17 0735   Gross per 24 hour   Intake             2760 ml   Output             1800 ml   Net              960 ml      Physical Exam    Constitutional: He appears well-developed and well-nourished. No distress.   HENT:   Head: Normocephalic and atraumatic.   Eyes: Pupils are equal, round, and reactive to light.   Neck: Neck supple. No thyromegaly present.   Cardiovascular: Normal rate and regular rhythm.  Exam reveals no gallop and no friction rub.    No murmur heard.  Pulmonary/Chest: Effort normal and breath sounds normal. No respiratory distress. He has no wheezes.   Abdominal: Soft. Bowel sounds are normal. He exhibits no distension. There is no tenderness. There is no guarding.   Minor PEG site erythema   Musculoskeletal: Normal range of motion. He exhibits no edema.   Skin: Skin is warm and dry. No erythema.   Psychiatric: He has a normal mood and affect.       Significant Labs:   CBC:   Recent Labs  Lab 10/06/17  2302 10/08/17  0331   WBC 15.80* 11.60   HGB 12.7* 11.9*   HCT 38.2* 35.6*    178       Significant Imaging: I have reviewed all pertinent imaging results/findings within the past 24 hours.    Assessment/Plan:      Urinary tract infection without hematuria    - continue abx  - tay exchanged          Hypotension    - stable at this time  - could give additional fluid bolus if needed  - PICC line available for pressors if warranted          Chronic obstructive pulmonary disease    - PRN duonebs          Functional quadriplegia    - routine supportive care          Sepsis    - suspect secondary to urosepsis  - cultures drawn and urine and blood cx positive (GNR and Staph respectively)  - on vanc/Zosyn  - history of pseudomonal infection  - fluid boluses given  - follow up cultures and continue current care    - plan to pull PICC and culture TIP tomorrow with new PICC placed given positive culture            VTE Risk Mitigation         Ordered     Medium Risk of VTE  Once      10/07/17 0149     Place EYAD hose  Until discontinued      10/07/17 0149     Place sequential compression device  Until discontinued      10/07/17 0149           Critical care time spent on the evaluation and treatment of severe organ dysfunction, review of pertinent labs and imaging studies, discussions with consulting providers and discussions with patient/family: 25 minutes.    Cali Madrid MD  Department of Hospital Medicine   [facility]

## 2017-10-08 NOTE — PLAN OF CARE
Problem: Patient Care Overview  Goal: Plan of Care Review  Outcome: Ongoing (interventions implemented as appropriate)  Potassium low this AM - 60 mEq to infuse per SS orders. SR until around 0400 when became ST low 100s with temp 101.8 - moderate response to liquid APAP. Doesn't like oral care - bites on yaunker. One loose BM this shift. Rahman patent with good UO. Tolerating TF with no residuals - now at goal rate of 70 ml/hr.

## 2017-10-08 NOTE — SUBJECTIVE & OBJECTIVE
Interval History: Patient seen and examined with no acute events overnight.  Urine culture positive for GNR and blood cultures positive for Staph from PICC line draw.      Review of Systems   Unable to perform ROS: Patient nonverbal     Objective:     Vital Signs (Most Recent):  Temp: 99.6 °F (37.6 °C) (10/08/17 0745)  Pulse: 78 (10/08/17 0945)  Resp: 14 (10/08/17 0945)  BP: (!) 92/54 (10/08/17 0930)  SpO2: 97 % (10/08/17 0945) Vital Signs (24h Range):  Temp:  [98.1 °F (36.7 °C)-101.8 °F (38.8 °C)] 99.6 °F (37.6 °C)  Pulse:  [] 78  Resp:  [0-22] 14  SpO2:  [95 %-100 %] 97 %  BP: ()/(32-69) 92/54     Weight: 110.1 kg (242 lb 11.6 oz)  Body mass index is 32.92 kg/m².    Intake/Output Summary (Last 24 hours) at 10/08/17 1117  Last data filed at 10/08/17 0735   Gross per 24 hour   Intake             2760 ml   Output             1800 ml   Net              960 ml      Physical Exam   Constitutional: He appears well-developed and well-nourished. No distress.   HENT:   Head: Normocephalic and atraumatic.   Eyes: Pupils are equal, round, and reactive to light.   Neck: Neck supple. No thyromegaly present.   Cardiovascular: Normal rate and regular rhythm.  Exam reveals no gallop and no friction rub.    No murmur heard.  Pulmonary/Chest: Effort normal and breath sounds normal. No respiratory distress. He has no wheezes.   Abdominal: Soft. Bowel sounds are normal. He exhibits no distension. There is no tenderness. There is no guarding.   Minor PEG site erythema   Musculoskeletal: Normal range of motion. He exhibits no edema.   Skin: Skin is warm and dry. No erythema.   Psychiatric: He has a normal mood and affect.       Significant Labs:   CBC:   Recent Labs  Lab 10/06/17  2302 10/08/17  0331   WBC 15.80* 11.60   HGB 12.7* 11.9*   HCT 38.2* 35.6*    178       Significant Imaging: I have reviewed all pertinent imaging results/findings within the past 24 hours.

## 2017-10-08 NOTE — PLAN OF CARE
Problem: Patient Care Overview  Goal: Plan of Care Review  Outcome: Ongoing (interventions implemented as appropriate)  Patient more alert today. Will nod yes and no to questions. Afebrile. One set of BC drawn via Midline catheter beginning to grow gram pos cocci. Felt strongly to be skin contaminate as BC drawn from hand 5 minutes later has shown no growth Dr Madrid does want to change to a PICC and pull line and culture tip, can be done tomorrow. Patient tolerating tube feeds well, at goal. Potassium replaced and recheck and replacing for a second time, will recheck. Patient did allow mouth care once, he swabbed his mouth himself, but refuses teeth to be brushed. Urine beginning to be more clear, output adequate. He has had 3 loose stools, appears normal for tube feeds. Bed kept on constant rotation, patient kept clean and dry. Frequent suctioning as needed, culture pending. Trach care per RT. Medications as ordered.

## 2017-10-08 NOTE — ASSESSMENT & PLAN NOTE
- suspect secondary to urosepsis  - cultures drawn and urine and blood cx positive (GNR and Staph respectively)  - on vanc/Zosyn  - history of pseudomonal infection  - fluid boluses given  - follow up cultures and continue current care    - plan to pull PICC and culture TIP tomorrow with new PICC placed given positive culture

## 2017-10-08 NOTE — NURSING
Results of preliminary blood cultures called to Dr Madrid. No new orders at present. Patient on vancomycin and Zosyn.

## 2017-10-08 NOTE — PLAN OF CARE
Problem: Nutrition, Enteral (Adult)  Goal: Signs and Symptoms of Listed Potential Problems Will be Absent, Minimized or Managed (Nutrition, Enteral)  Signs and symptoms of listed potential problems will be absent, minimized or managed by discharge/transition of care (reference Nutrition, Enteral (Adult) CPG).  Outcome: Ongoing (interventions implemented as appropriate)  Recommendations  Recommendation/Intervention: 1.) Recommend Isosource 1.5 @ advancing to goal rate 55 mls/hr continuous + x1 packet beneprotein BID (mixed with 2 oz water) providing 2030 kcals/day, 96 g protein/day, 232 g CHO/day, and 1008 mls water/day. Hold TF x4 hrs for residuals >250mls. Flush 160 mls free water Q4 hrs or per MD.   Goals: 1.) patient will tolerate TF at goal rate   Nutrition Goal Status: new  Communication of MARQUIS Recs: reviewed with RN

## 2017-10-08 NOTE — CONSULTS
Masood Messina [id number] is a 77 y.o. male who has been consulted for vancomycin dosing.    The patient has the following labs:     Date Creatinine (mg/dl)    BUN WBC Count   10/8/2017 Estimated Creatinine Clearance: 78 mL/min (based on SCr of 1 mg/dL). Lab Results   Component Value Date    BUN 41 (H) 10/08/2017     Lab Results   Component Value Date    WBC 11.60 10/08/2017        Current weight is 110.1 kg (242 lb 11.6 oz)    Patient received 2000 mg on 10/6/17 at 2338 and 1750 mg on 10/7/17 at 1055 prior to consult.  Vancomycin trough from 10/8/17 at 1020  was 16.9 mg/dL.  Target trough range is 15-20 mg/dL. Patient will be placed on regimen of 1750 mg q24h. A vancomycin trough has been ordered prior to 3rd dose due 10/10/17 at 1200.       Patient will be followed by pharmacy for changes in renal function, toxicity, and efficacy.    Thank you for allowing us to participate in this patient's care.     Kimani Ortiz, PharmD

## 2017-10-09 LAB
ALBUMIN SERPL BCP-MCNC: 2.2 G/DL
ALP SERPL-CCNC: 74 U/L
ALT SERPL W/O P-5'-P-CCNC: 24 U/L
ANION GAP SERPL CALC-SCNC: 9 MMOL/L
AST SERPL-CCNC: 23 U/L
BILIRUB SERPL-MCNC: 0.6 MG/DL
BUN SERPL-MCNC: 36 MG/DL
C DIFF GDH STL QL: POSITIVE
C DIFF TOX A+B STL QL IA: NEGATIVE
CALCIUM SERPL-MCNC: 9.2 MG/DL
CHLORIDE SERPL-SCNC: 108 MMOL/L
CO2 SERPL-SCNC: 23 MMOL/L
CREAT SERPL-MCNC: 0.9 MG/DL
EST. GFR  (AFRICAN AMERICAN): >60 ML/MIN/1.73 M^2
EST. GFR  (NON AFRICAN AMERICAN): >60 ML/MIN/1.73 M^2
GLUCOSE SERPL-MCNC: 98 MG/DL
MAGNESIUM SERPL-MCNC: 1.8 MG/DL
PHOSPHATE SERPL-MCNC: 2.3 MG/DL
POTASSIUM SERPL-SCNC: 3.3 MMOL/L
PROT SERPL-MCNC: 7.2 G/DL
SODIUM SERPL-SCNC: 140 MMOL/L

## 2017-10-09 PROCEDURE — 87077 CULTURE AEROBIC IDENTIFY: CPT

## 2017-10-09 PROCEDURE — 99900026 HC AIRWAY MAINTENANCE (STAT)

## 2017-10-09 PROCEDURE — 02HV33Z INSERTION OF INFUSION DEVICE INTO SUPERIOR VENA CAVA, PERCUTANEOUS APPROACH: ICD-10-PCS | Performed by: RADIOLOGY

## 2017-10-09 PROCEDURE — 94761 N-INVAS EAR/PLS OXIMETRY MLT: CPT

## 2017-10-09 PROCEDURE — 84100 ASSAY OF PHOSPHORUS: CPT

## 2017-10-09 PROCEDURE — 87427 SHIGA-LIKE TOXIN AG IA: CPT | Mod: 59

## 2017-10-09 PROCEDURE — 87046 STOOL CULTR AEROBIC BACT EA: CPT | Mod: 59

## 2017-10-09 PROCEDURE — 99233 SBSQ HOSP IP/OBS HIGH 50: CPT | Mod: ,,, | Performed by: INTERNAL MEDICINE

## 2017-10-09 PROCEDURE — 63600175 PHARM REV CODE 636 W HCPCS: Performed by: INTERNAL MEDICINE

## 2017-10-09 PROCEDURE — 87449 NOS EACH ORGANISM AG IA: CPT

## 2017-10-09 PROCEDURE — 97802 MEDICAL NUTRITION INDIV IN: CPT

## 2017-10-09 PROCEDURE — 80053 COMPREHEN METABOLIC PANEL: CPT

## 2017-10-09 PROCEDURE — S0028 INJECTION, FAMOTIDINE, 20 MG: HCPCS | Performed by: EMERGENCY MEDICINE

## 2017-10-09 PROCEDURE — 87186 SC STD MICRODIL/AGAR DIL: CPT

## 2017-10-09 PROCEDURE — 27000221 HC OXYGEN, UP TO 24 HOURS

## 2017-10-09 PROCEDURE — 25000003 PHARM REV CODE 250: Performed by: EMERGENCY MEDICINE

## 2017-10-09 PROCEDURE — 83735 ASSAY OF MAGNESIUM: CPT

## 2017-10-09 PROCEDURE — 87070 CULTURE OTHR SPECIMN AEROBIC: CPT

## 2017-10-09 PROCEDURE — 25000003 PHARM REV CODE 250: Performed by: INTERNAL MEDICINE

## 2017-10-09 PROCEDURE — 76937 US GUIDE VASCULAR ACCESS: CPT

## 2017-10-09 PROCEDURE — 87040 BLOOD CULTURE FOR BACTERIA: CPT

## 2017-10-09 PROCEDURE — 36415 COLL VENOUS BLD VENIPUNCTURE: CPT

## 2017-10-09 PROCEDURE — 87045 FECES CULTURE AEROBIC BACT: CPT

## 2017-10-09 PROCEDURE — 36569 INSJ PICC 5 YR+ W/O IMAGING: CPT

## 2017-10-09 PROCEDURE — 94003 VENT MGMT INPAT SUBQ DAY: CPT

## 2017-10-09 PROCEDURE — 20000000 HC ICU ROOM

## 2017-10-09 PROCEDURE — 87493 C DIFF AMPLIFIED PROBE: CPT

## 2017-10-09 PROCEDURE — C1751 CATH, INF, PER/CENT/MIDLINE: HCPCS

## 2017-10-09 PROCEDURE — 63600175 PHARM REV CODE 636 W HCPCS: Performed by: EMERGENCY MEDICINE

## 2017-10-09 RX ORDER — ENOXAPARIN SODIUM 100 MG/ML
40 INJECTION SUBCUTANEOUS EVERY 24 HOURS
Status: DISCONTINUED | OUTPATIENT
Start: 2017-10-09 | End: 2017-10-16 | Stop reason: HOSPADM

## 2017-10-09 RX ORDER — LEVOTHYROXINE SODIUM 50 UG/1
100 TABLET ORAL
Status: DISCONTINUED | OUTPATIENT
Start: 2017-10-17 | End: 2017-10-16 | Stop reason: HOSPADM

## 2017-10-09 RX ORDER — DIPHENOXYLATE HYDROCHLORIDE AND ATROPINE SULFATE 2.5; .025 MG/1; MG/1
1 TABLET ORAL 4 TIMES DAILY PRN
Status: DISCONTINUED | OUTPATIENT
Start: 2017-10-09 | End: 2017-10-16 | Stop reason: HOSPADM

## 2017-10-09 RX ADMIN — DULOXETINE 30 MG: 30 CAPSULE, DELAYED RELEASE ORAL at 08:10

## 2017-10-09 RX ADMIN — TERAZOSIN HYDROCHLORIDE ANHYDROUS 1 MG: 1 CAPSULE ORAL at 08:10

## 2017-10-09 RX ADMIN — PIPERACILLIN AND TAZOBACTAM 4.5 G: 4; .5 INJECTION, POWDER, LYOPHILIZED, FOR SOLUTION INTRAVENOUS; PARENTERAL at 08:10

## 2017-10-09 RX ADMIN — FAMOTIDINE 20 MG: 10 INJECTION, SOLUTION INTRAVENOUS at 08:10

## 2017-10-09 RX ADMIN — SODIUM CHLORIDE 1 G: 9 INJECTION, SOLUTION INTRAVENOUS at 08:10

## 2017-10-09 RX ADMIN — SODIUM CHLORIDE: 0.9 INJECTION, SOLUTION INTRAVENOUS at 07:10

## 2017-10-09 RX ADMIN — LEVOTHYROXINE SODIUM 200 MCG: 150 TABLET ORAL at 05:10

## 2017-10-09 RX ADMIN — ENOXAPARIN SODIUM 40 MG: 100 INJECTION SUBCUTANEOUS at 04:10

## 2017-10-09 RX ADMIN — BETHANECHOL CHLORIDE 15 MG: 10 TABLET ORAL at 05:10

## 2017-10-09 RX ADMIN — LIOTHYRONINE SODIUM 75 MCG: 25 TABLET ORAL at 05:10

## 2017-10-09 RX ADMIN — PIPERACILLIN AND TAZOBACTAM 4.5 G: 4; .5 INJECTION, POWDER, LYOPHILIZED, FOR SOLUTION INTRAVENOUS; PARENTERAL at 03:10

## 2017-10-09 RX ADMIN — DIPHENOXYLATE HYDROCHLORIDE AND ATROPINE SULFATE 1 TABLET: 2.5; .025 TABLET ORAL at 08:10

## 2017-10-09 RX ADMIN — BACITRACIN ZINC: 500 OINTMENT TOPICAL at 08:10

## 2017-10-09 RX ADMIN — BETHANECHOL CHLORIDE 15 MG: 10 TABLET ORAL at 10:10

## 2017-10-09 RX ADMIN — BACLOFEN 20 MG: 10 TABLET ORAL at 08:10

## 2017-10-09 RX ADMIN — FINASTERIDE 5 MG: 5 TABLET, FILM COATED ORAL at 08:10

## 2017-10-09 RX ADMIN — POTASSIUM CHLORIDE 40 MEQ: 400 INJECTION, SOLUTION INTRAVENOUS at 06:10

## 2017-10-09 RX ADMIN — VANCOMYCIN HYDROCHLORIDE 1750 MG: 1 INJECTION, POWDER, LYOPHILIZED, FOR SOLUTION INTRAVENOUS at 01:10

## 2017-10-09 RX ADMIN — SODIUM CHLORIDE: 0.9 INJECTION, SOLUTION INTRAVENOUS at 02:10

## 2017-10-09 RX ADMIN — MAGNESIUM SULFATE HEPTAHYDRATE 2 G: 40 INJECTION, SOLUTION INTRAVENOUS at 08:10

## 2017-10-09 RX ADMIN — PROMETHAZINE HYDROCHLORIDE 12.5 MG: 25 INJECTION INTRAMUSCULAR; INTRAVENOUS at 08:10

## 2017-10-09 RX ADMIN — ASPIRIN 325 MG ORAL TABLET 325 MG: 325 PILL ORAL at 08:10

## 2017-10-09 RX ADMIN — RIVASTIGMINE TRANSDERMAL SYSTEM 1 PATCH: 9.5 PATCH, EXTENDED RELEASE TRANSDERMAL at 08:10

## 2017-10-09 NOTE — NURSING
Patient is unresponsive to verbal stimuli. Attempted to contact family for consent for PICC placement using 2 phone numbers on facesheet, no answer and no voicemail.  Liza Ferguson RN notified to seek physician approval/consent.

## 2017-10-09 NOTE — PLAN OF CARE
Problem: Patient Care Overview  Goal: Plan of Care Review  Outcome: Ongoing (interventions implemented as appropriate)  Alert. Vital signs stable. Urine output adequate. Oxygen saturation stable on vent via trach. Patient with freq diarrhea. Refuses flexiceal. Tube feeding off at 2300 per patient request. Plan antibiotics, control diarrhea, diet consult for tube feed change

## 2017-10-09 NOTE — PLAN OF CARE
Problem: Nutrition, Enteral (Adult)  Intervention: Monitor/Manage Nutrition Support  Recommendation/Intervention: 1.) Change TF Isosource 1.5 to Nutren 1.5 and reduce rate to goal rate 55 mls/hr continuous + x1 packet beneprotein BID (mixed with 2 oz water) providing 2005 kcals/day, 96 g protein/day, 232 g CHO/day, and 1008 mls water/day. Hold TF x4 hrs for residuals >250mls. Flush 160 mls free water Q4 hrs or per MD.   Goals: 1.) patient will tolerate TF at goal rate   Nutrition Goal Status: 1) not met  Communication of RD Recs: sticky note/second sign

## 2017-10-09 NOTE — PROGRESS NOTES
This note also relates to the following rows which could not be included:  BP - Cannot attach notes to unvalidated device data     10/09/17 0500   Patient Assessment/Suction   Level of Consciousness (AVPU) alert   Respiratory Effort Unlabored   Expansion/Accessory Muscles/Retractions no use of accessory muscles;no retractions;expansion symmetric   All Lung Fields Breath Sounds coarse   Rhythm/Pattern, Respiratory ventilator assisted   Cough Frequency with stimulation   Cough Type productive   Suction Method in-line suction catheter (closed)   Sputum Amount moderate   Sputum Color white   Sputum Consistency frothy   PRE-TX-O2-ETCO2   O2 Device (Oxygen Therapy) ventilator   Oxygen Concentration (%) 28   SpO2 97 %   Pulse Oximetry Type Continuous   $ Pulse Oximetry - Multiple Charge Pulse Oximetry - Multiple   ETCO2 (mmHg) 28 mmHg   Pulse 80   Resp 14       Surgical Airway Portex Cuffed;Fenestrated   No Placement Date or Time found.   Present Prior to Hospital Arrival?: Yes  Inserted by: Present Prior to Hospital Arrival  Type: Tracheostomy  Brand: Portex  Airway Device Size: 8.0  Style: Cuffed;Fenestrated   Cuff Pressure 35 cm H2O   Status Secured   Site Assessment Oozing secretions   Site Care Cleansed;Dried;Dressing applied   Inner Cannula Care Changed/new   Ties Assessment Secure;Intact;Clean   Vent Select   Charged w/in last 24h NO   Preset Conventional Ventilator Settings   Ventilation Type VC   Vent Mode A/C   Set Rate 14 bmp   Vt Set 700 mL   PEEP/CPAP 5 cmH20   Pressure Support 0 cmH20   Waveform RAMP   Peak Flow 65 L/min   Set Inspiratory Pressure 0 cmH20   Insp Time 0 Sec(s)   Plateau Set/Insp. Hold (sec) 0   Insp Rise Time  0 %   Trigger Sensitivity Flow/I-Trigger 1 L/min   P High 0 cm H2O   P Low 0 cm H2O   T High 0 sec   T Low 0 sec   Patient Ventilator Parameters   Resp Rate Total 14 br/min   Peak Airway Pressure 30 cmH2O   Mean Airway Pressure 12 cmH20   Plateau Pressure 0 cmH20   Exhaled Vt 706 mL    Total Ve 9.89 mL   Spont Ve 0 L   I:E Ratio Measured 1:2.60   Conventional Ventilator Alarms   Ve High Alarm 25 L/min   Resp Rate High Alarm 40 br/min   Press High Alarm 60 cmH2O   Apnea Rate 14   Apnea Volume (mL) 700 mL   Apnea Oxygen Concentration  100   Apnea Flow Rate (L/min) 65   T Apnea 20 sec(s)   Ready to Wean/Extubation Screen   FIO2<=50 (chart decimal) 0.28   MV<16L (chart vol.) 9.89   PEEP <=8 (chart #) 5   Ready to Wean Parameters   F/VT Ratio<105 (RSBI) (!) 19.83   Airway Safety   Ambu bag with the patient? Yes, Adult Ambu   Is mask with the patient? Yes, Adult Mask

## 2017-10-09 NOTE — PROGRESS NOTES
10/08/17 1900   Patient Assessment/Suction   Level of Consciousness (AVPU) alert   Respiratory Effort Unlabored   Expansion/Accessory Muscles/Retractions no retractions;no use of accessory muscles;expansion symmetric   All Lung Fields Breath Sounds coarse   Rhythm/Pattern, Respiratory unlabored;ventilator assisted   Cough Frequency with stimulation   Cough Type productive;good   Suction Method required;in-line suction catheter (closed);tracheostomy   $ Suction Charges Inline Suction Procedure Stat Charge   Sputum Amount small   Sputum Color white   Sputum Consistency frothy   PRE-TX-O2-ETCO2   O2 Device (Oxygen Therapy) ventilator   Oxygen Concentration (%) 28   SpO2 97 %   Pulse Oximetry Type Continuous   ETCO2 (mmHg) 30 mmHg   Pulse 81   Resp 16   BP (!) 109/57       Surgical Airway Portex Cuffed;Fenestrated   No Placement Date or Time found.   Present Prior to Hospital Arrival?: Yes  Inserted by: Present Prior to Hospital Arrival  Type: Tracheostomy  Brand: Portex  Airway Device Size: 8.0  Style: Cuffed;Fenestrated   Cuff Pressure 35 cm H2O   Status Secured   Site Assessment Midline;Oozing secretions;No bleeding;Clean   Site Care Cleansed;Dried   Ties Assessment Intact;Secure   Preset Conventional Ventilator Settings   Vent Type    Ventilation Type VC   Vent Mode A/C   Humidity HME   Set Rate 14 bmp   Vt Set 700 mL   PEEP/CPAP 5 cmH20   Pressure Support 0 cmH20   Waveform RAMP   Peak Flow 65 L/min   Set Inspiratory Pressure 0 cmH20   Insp Time 0 Sec(s)   Plateau Set/Insp. Hold (sec) 0   Insp Rise Time  0 %   Trigger Sensitivity Flow/I-Trigger 1 L/min   P High 0 cm H2O   P Low 0 cm H2O   T High 0 sec   T Low 0 sec   Patient Ventilator Parameters   Resp Rate Total 15 br/min   Peak Airway Pressure 36 cmH2O   Mean Airway Pressure 13 cmH20   Plateau Pressure 0 cmH20   Exhaled Vt 792 mL   Total Ve 10.9 mL   Spont Ve 0 L   I:E Ratio Measured 1:2.60   Conventional Ventilator Alarms   Alarms On Y   Ve High Alarm 25  L/min   Resp Rate High Alarm 40 br/min   Press High Alarm 50 cmH2O   Apnea Rate 14   Apnea Volume (mL) 700 mL   Apnea Oxygen Concentration  100   Apnea Flow Rate (L/min) 65   T Apnea 20 sec(s)   Ready to Wean/Extubation Screen   FIO2<=50 (chart decimal) 0.28   MV<16L (chart vol.) 10.9   PEEP <=8 (chart #) 5

## 2017-10-09 NOTE — PLAN OF CARE
Sacral area red but blanchable. Possible old decubitus: scar seen.Recommend vigorous pressure ulcer prevention as outlined in the care plan.  Skin is somewhat reddened around PEG tube insertion site. Recommend wash area and apply Critic Aid Clear skin protectant cream.

## 2017-10-09 NOTE — ASSESSMENT & PLAN NOTE
Related to (etiology):   Excessive energy intake    Signs and Symptoms (as evidenced by):   Current rate of provides 126% of EEN     Interventions/Recommendations (treatment strategy):  Recommend Nutren 1.5 @ 55 mls/hr to provide EEN    Nutrition Diagnosis Status:   Resolved.

## 2017-10-09 NOTE — CONSULTS
"Consult Note  Infectious Disease    Reason for Consult:  sepsis    HPI: Masood Messina is a 77 y.o. male with whom I am familiar, from prior consultations. He has been treated for numerous UTIs and pneumonias and was recently at [facility] and treated for UTI with pseudomonas(first week of sept) and was discharged to receive Zosyn at [facility] [address] [facility]. He was transferred to ED on 10/6 for fever and altered mental status. His UA was abnormal, CXR clear and he was placed on Vanc and zosyn. Blood cultures drawn from peripheral vein are negative but those drawn from "picc line" have coag neg staph. He had a midline on admission and blood is not generally drawn from this. That line was removed today and a new picc line was inserted. He has not required pressors and lactates were normal.     Review of patient's allergies indicates:   Allergen Reactions    Codeine Other (See Comments)     Past Medical History:   Diagnosis Date    AAA (abdominal aortic aneurysm)     Anemia     CHF (congestive heart failure)     COPD (chronic obstructive pulmonary disease)     Coronary artery disease     Dementia     Dementia     Depression     GERD (gastroesophageal reflux disease)     Hyperlipidemia     Hypertension     Hypothyroid     Renal disorder     Respiratory failure, chronic     Ventilator dependence    Numerous episodes of UTI and pneumonia, MDRO  Last admit at [facility] first week of sept 2017, treated for pseudomonas UTI    Past Surgical History:   Procedure Laterality Date    ABDOMINAL SURGERY      CARDIAC SURGERY  1999    GASTROSTOMY TUBE PLACEMENT      SPINE SURGERY      TRACHEOSTOMY TUBE PLACEMENT       Social History     Social History    Marital status:      Spouse name: N/A    Number of children: N/A    Years of education: N/A     Social History Main Topics    Smoking status: Former Smoker    Smokeless tobacco: Never Used    Alcohol use No    Drug use: No    Sexual activity: No     Other Topics " Concern    None     Social History Narrative    None     History reviewed. No pertinent family history.    Pertinent medications noted:     Review of Systems:   Answers questions,  But unreliable     EXAM & DIAGNOSTICS REVIEWED:   Vitals:     Temp:  [98 °F (36.7 °C)-99.9 °F (37.7 °C)]   Temp: 98 °F (36.7 °C) (10/09/17 1915)  Pulse: 68 (10/09/17 1915)  Resp: (!) 39 (10/09/17 1915)  BP: (!) 117/57 (10/09/17 1430)  SpO2: 100 % (10/09/17 1915)    Intake/Output Summary (Last 24 hours) at 10/09/17 2011  Last data filed at 10/09/17 1900   Gross per 24 hour   Intake          3145.83 ml   Output             1855 ml   Net          1290.83 ml       General:  In NAD. Looks nontoxic. Alert and attentive, cooperative, remembers me  Eyes:  Anicteric, PERRL, EOMI  ENT:  Mouth w/ pink MMM, no lesions/exudate,   Neck:  Tracheostomy midline, supple, no adenopathy appreciated  Lungs: clear  Heart:  RRR, no gallop/murmur noted  Abd:  soft, NT, ND, normal BS, no masses/organomegaly appreciated.  :  Rahman draining yellow urine, clear  Musc:  Joints without effusion, swelling,  erythema, synovitis,   Skin:  Generally warm, dry, normal for color. No rashes. No palmar or plantar    lesions. No subungual petechiae  Wound:   Neuro: AAOx3, speech clear, generalized weakness  Extrem: No edema, erythema, phlebitis, cellulitis,   VAD:  New picc RUE    Lines/Tubes/Drains:    General Labs reviewed:  No results for input(s): WBC, RBC, HGB, HCT, PLT, MCV, MCH, MCHC in the last 24 hours.    Recent Labs  Lab 10/09/17  0349   CALCIUM 9.2   PROT 7.2      K 3.3*   CO2 23      BUN 36*   CREATININE 0.9   ALKPHOS 74   ALT 24   AST 23   BILITOT 0.6       Micro:  Microbiology Results (last 7 days)     Procedure Component Value Units Date/Time    Clostridium difficile EIA [[id number]] Collected:  10/09/17 1817    Order Status:  Sent Specimen:  Stool from Stool Updated:  10/09/17 1840    Blood culture [[id number]] Collected:  10/09/17 1832    Order  Status:  Sent Specimen:  Blood Updated:  10/09/17 1832    E. coli 0157 antigen [[id number]] Collected:  10/09/17 1817    Order Status:  No result Specimen:  Stool from Stool Updated:  10/09/17 1817    Stool culture [[id number]] Collected:  10/09/17 1817    Order Status:  Sent Specimen:  Stool from Stool Updated:  10/09/17 1817    IV catheter culture [[id number]] Collected:  10/09/17 1816    Order Status:  Sent Specimen:  Catheter Tip from Catheter Tip, Subclavian Updated:  10/09/17 1817    Blood culture x two cultures. Draw prior to antibiotics. [[id number]] Collected:  10/06/17 2305    Order Status:  Completed Specimen:  Blood from Peripheral, Right  Hand Updated:  10/09/17 1212     Blood Culture, Routine No Growth to date     Blood Culture, Routine No Growth to date     Blood Culture, Routine No Growth to date    Narrative:       Aerobic and anaerobic    Blood culture x two cultures. Draw prior to antibiotics. [[id number]] Collected:  10/06/17 2300    Order Status:  Completed Specimen:  Blood from Line, PICC Left  Arm Updated:  10/09/17 1112     Blood Culture, Routine Gram stain peds bottle: Gram positive cocci in clusters resembling Staph     Blood Culture, Routine Results called to and read back by: Alexsander Chand RN     Blood Culture, Routine 10/08/2017  07:56     Blood Culture, Routine --     COAGULASE-NEGATIVE STAPHYLOCOCCUS SPECIES  Organism is a probable contaminant      Narrative:       Aerobic and anaerobic    Culture, Respiratory with Gram Stain [[id number]] Collected:  10/07/17 1900    Order Status:  Completed Specimen:  Respiratory from Tracheal Aspirate Updated:  10/09/17 1109     Respiratory Culture --     PRESUMPTIVE PSEUDOMONAS SPECIES  Moderate  Identification and susceptibility pending       Respiratory Culture --     GRAM NEGATIVE TOM  Moderate  Identification and susceptibility pending  Normal respiratory gabriela also present       Gram Stain (Respiratory) <10 epithelial cells per low power field.      Gram Stain (Respiratory) Few WBC's     Gram Stain (Respiratory) Moderate Gram negative rods     Gram Stain (Respiratory) Few Gram positive cocci    Urine culture [[id number]]  (Susceptibility) Collected:  10/06/17 2215    Order Status:  Completed Specimen:  Urine from Urine, Catheterized Updated:  10/08/17 2143     Urine Culture, Routine --     PROTEUS MIRABILIS ESBL  > 100,000 cfu/ml          Imaging Reviewed:   CXR, no pulmonary infiltrate    IMPRESSION & PLAN     Imp: UTI, Proteus ESBL          Positive blood cultures of unknown significance(drawn from midline)          Colonized sputum with pseudomonas   Dementia   Ventilator dependent, chronic tay    Rec;start invanz,stop zosyn, repeat blood cultures. If line tip culture negative, would probably stop vanc    thanks

## 2017-10-09 NOTE — PROCEDURES
Masood Messina is a 77 y.o. male patient.    Temp: 99.9 °F (37.7 °C) (10/09/17 0830)  Pulse: 60 (10/09/17 1127)  Resp: 15 (10/09/17 1127)  BP: (!) 81/49 (10/09/17 1100)  SpO2: 98 % (10/09/17 1127)  Weight: 115.2 kg (253 lb 15.5 oz) (10/09/17 0600)  Height: 6' (182.9 cm) (10/07/17 1847)    PICC  Date/Time: 10/9/2017 3:04 PM  Performed by: REENA WAITE  Consent Done: Yes  Time out: Immediately prior to procedure a time out was called to verify the correct patient, procedure, equipment, support staff and site/side marked as required  Indications: med administration and vascular access  Anesthesia: local infiltration  Local anesthetic: lidocaine 1% without epinephrine  Anesthetic Total (mL): 3  Preparation: skin prepped with ChloraPrep  Skin prep agent dried: skin prep agent completely dried prior to procedure  Sterile barriers: all five maximum sterile barriers used - cap, mask, sterile gown, sterile gloves, and large sterile sheet  Hand hygiene: hand hygiene performed prior to central venous catheter insertion  Location details: right basilic  Catheter type: double lumen  Catheter size: 5 Fr  Catheter Length: 40cm    Ultrasound guidance: yes  Vessel Caliber: medium and patent, compressibility normal  Needle advanced into vessel with real time Ultrasound guidance.  Guidewire confirmed in vessel.  Image recorded and saved.  Sterile sheath used.  Number of attempts: 1  Post-procedure: blood return through all ports, chlorhexidine patch and sterile dressing applied  Estimated blood loss (mL): 0    Assessment: placement verified by x-ray, no pneumothorax on x-ray, tip termination and successful placement  Complications: none        Lubna Munoz  10/9/2017

## 2017-10-09 NOTE — CONSULTS
[facility]  Adult Nutrition  Consult Note    SUMMARY     Recommendations  Recommendation/Intervention: 1.) Change TF Isosource 1.5 to Nutren 1.5 and reduce rate to goal rate 55 mls/hr continuous + x1 packet beneprotein BID (mixed with 2 oz water) providing 2005 kcals/day, 96 g protein/day, 232 g CHO/day, and 1008 mls water/day. Hold TF x4 hrs for residuals >250mls. Flush 160 mls free water Q4 hrs or per MD.   Goals: 1.) patient will tolerate TF at goal rate   Nutrition Goal Status: 1) not met  Communication of RD Recs: sticky note/second sign    1. Sepsis, due to unspecified organism      Past Medical History:   Diagnosis Date    AAA (abdominal aortic aneurysm)     Anemia     CHF (congestive heart failure)     COPD (chronic obstructive pulmonary disease)     Coronary artery disease     Dementia     Dementia     Depression     GERD (gastroesophageal reflux disease)     Hyperlipidemia     Hypertension     Hypothyroid     Renal disorder     Respiratory failure, chronic     Ventilator dependence        Reason for Assessment  Reason for Assessment: nurse/nurse practitioner consult  General Information Comments: Admtis from [place] with fever. Vent dependent patient s/p peg. Isosource infusing @ 30 mls/hr during Rd visit. No propofol.       Nutrition Prescription Ordered  Current Diet Order: NPO     Current Nutrition Support Formula Ordered: Isosource 1.5  Current Nutrition Support Rate Ordered: 70 (ml)  Current Nutrition Support Frequency Ordered: continuous        Evaluation of Received Nutrients/Fluid Intake  Enteral Calories (kcal): 2520  Enteral Protein (gm): 114  Enteral (Free Water) Fluid (mL): 1284  Energy Calories Required: exceed needs  Protein Required: meeting needs  % Intake of Estimated Energy Needs: Other: 126%  % Meal Intake: NPO     Nutrition Risk Screen  Nutrition Risk Screen: dysphagia or difficulty swallowing, tube feeding or parenteral nutrition    Nutrition/Diet  History   Food Preferences: RAMON  Factors Affecting Nutritional Intake: on mechanical ventilation, NPO    Labs/Tests/Procedures/Meds  Diagnostic Test/Procedure Review: reviewed, pertinent  Pertinent Labs Reviewed: reviewed, pertinent  BMP  Lab Results   Component Value Date     10/09/2017    K 3.3 (L) 10/09/2017     10/09/2017    CO2 23 10/09/2017    BUN 36 (H) 10/09/2017    CREATININE 0.9 10/09/2017    CALCIUM 9.2 10/09/2017    ANIONGAP 9 10/09/2017    ESTGFRAFRICA >60 10/09/2017    EGFRNONAA >60 10/09/2017     Lab Results   Component Value Date    ALBUMIN 2.2 (L) 10/09/2017     Lab Results   Component Value Date    CALCIUM 9.2 10/09/2017    PHOS 2.3 (L) 10/09/2017     No results for input(s): POCTGLUCOSE in the last 24 hours.    Pertinent Medications Reviewed: reviewed  Scheduled Meds:   aspirin  325 mg Oral Daily    bacitracin   Topical (Top) BID    bethanechol  15 mg Oral TID    duloxetine  30 mg Oral Daily    famotidine (PF)  20 mg Intravenous Q12H    finasteride  5 mg Oral Daily    levothyroxine  0.2 mg Oral Before breakfast    liothyronine  75 mcg Oral Before breakfast    piperacillin-tazobactam 4.5 g in dextrose 5 % 100 mL IVPB (ready to mix system)  4.5 g Intravenous Q8H    potassium phosphate IVPB  20 mmol Intravenous Once    rivastigmine  1 patch Transdermal Daily    terazosin  1 mg Oral Daily    vancomycin (VANCOCIN) IVPB  15 mg/kg Intravenous Q24H     Continuous Infusions:   sodium chloride 0.9% 125 mL/hr at 10/09/17 0208         Physical Findings  Overall Physical Appearance: on ventilator support  Tubes: gastrostomy tube  Oral/Mouth Cavity: WDL  Skin: pressure ulcer(s) (Brandon score 10)    Anthropometrics  Temp: 99.1 °F (37.3 °C)  Height: 6'  Weight Method: Bed Scale  Weight: 115.2 kg (253 lb 15.5 oz)  Ideal Body Weight (IBW), Male: 178 lb  % Ideal Body Weight, Male (lb): 136.37 lb  BMI (Calculated): 33  BMI Grade: 30 - 34.9- obesity - grade I  Usual Body Weight (UBW), kg:   (mike)      Estimated/Assessed Needs  Weight Used For Calorie Calculations: 110.1 kg (242 lb 11.6 oz)   Energy Calorie Requirements (kcal): 1990  Energy Need Method: [place] (modified)   RMR ([place]-St. Jeor Equation): 1859  Weight Used For Protein Calculations: 80.9 kg (178 lb 5.6 oz) (ideal body weight)  1.2 gm Protein (gm): 97.28 and 1.5 gm Protein (gm): 121.6  Fluid Need Method: RDA Method (or per MD)  RDA Method (mL): 1990      Assessment and Plan  Nutrition Problem  excessive energy intake    Related to (etiology):   Excessive kcals from enteral orders    Signs and Symptoms (as evidenced by):   EN providing 126% of energy needs    Interventions/Recommendations (treatment strategy):  Reduce goal rate to 55 mls/hr    Nutrition Diagnosis Status:   New        Monitor and Evaluation  Food and Nutrient Intake: energy intake, enteral nutrition intake  Food and Nutrient Adminstration: enteral and parenteral nutrition administration  Anthropometric Measurements: weight, weight change, body mass index  Biochemical Data, Medical Tests and Procedures: electrolyte and renal panel, glucose/endocrine profile, lipid profile  Nutrition-Focused Physical Findings: overall appearance    Nutrition Risk  Level of Risk:  (x2 weekly)    Nutrition Follow-Up  RD Follow-up?: Yes      Discharge Planning: discharge on TF above.

## 2017-10-09 NOTE — PHYSICIAN QUERY
PT Name: Masood Messina  MR #: [id number]    Physician Query Form - Cause and Effect Relationship Clarification      CDS/: Evangelina Espinoza RN              Contact information: [phone]    This form is a permanent document in the medical record.     Query Date: October 9, 2017    By submitting this query, we are merely seeking further clarification of documentation. Please utilize your independent clinical judgment when addressing the question(s) below.    The Medical record contains the following:  Supporting Clinical Findings   Location in record    Proteus mirabilis ESBL   > 100,000 cfu/ml                                                                                                                                                                                          10/6 Urine culture          Rahman catheter in place                                                                                                                                                                                        10/6 ED MD note         Provider, please clarify if there is any correlation between Rahman  And UTI .           Are the conditions:     [ x ] Due to or associated with each other     [  ] Unrelated to each other     [  ] Other (Please Specify): _________________________     [  ] Clinically Undetermined

## 2017-10-09 NOTE — PLAN OF CARE
10/09/17 0749   Patient Assessment/Suction   Level of Consciousness (AVPU) alert   Respiratory Effort Unlabored   Expansion/Accessory Muscles/Retractions no retractions;no use of accessory muscles   All Lung Fields Breath Sounds coarse   Rhythm/Pattern, Respiratory ventilator assisted   Cough Type productive   Suction Method in-line suction catheter (closed)   $ Suction Charges Inline Suction Procedure Stat Charge   Sputum Amount moderate   Sputum Color white   Sputum Consistency thick   PRE-TX-O2-ETCO2   O2 Device (Oxygen Therapy) ventilator   $ Is the patient on Oxygen? Yes   Oxygen Concentration (%) 28   SpO2 97 %   Pulse Oximetry Type Continuous   $ Pulse Oximetry - Multiple Charge Pulse Oximetry - Multiple   ETCO2 (mmHg) 27 mmHg   Pulse 75   Resp 16       Surgical Airway Portex Cuffed;Fenestrated   No Placement Date or Time found.   Present Prior to Hospital Arrival?: Yes  Inserted by: Present Prior to Hospital Arrival  Type: Tracheostomy  Brand: Portex  Airway Device Size: 8.0  Style: Cuffed;Fenestrated   Cuff Pressure 35 cm H2O   Status Secured   Site Assessment Oozing secretions   Site Care Cleansed;Dried   Ties Assessment Clean;Dry;Intact   Vent Select   Charged w/in last 24h NO   Preset Conventional Ventilator Settings   Vent Type    Ventilation Type VC   Vent Mode A/C   Humidity HME   Set Rate 14 bmp   Vt Set 700 mL   PEEP/CPAP 5 cmH20   Pressure Support 0 cmH20   Waveform RAMP   Peak Flow 65 L/min   Set Inspiratory Pressure 0 cmH20   Insp Time 0 Sec(s)   Plateau Set/Insp. Hold (sec) 0   Insp Rise Time  0 %   Trigger Sensitivity Flow/I-Trigger 1 L/min   P High 0 cm H2O   P Low 0 cm H2O   T High 0 sec   T Low 0 sec   Patient Ventilator Parameters   Resp Rate Total 16 br/min   Peak Airway Pressure 29 cmH2O   Mean Airway Pressure 14 cmH20   Plateau Pressure 0 cmH20   Exhaled Vt 589 mL   Total Ve 11.6 mL   Spont Ve 0 L   I:E Ratio Measured 1:1.40   Conventional Ventilator Alarms   Ve High Alarm 25  L/min   Resp Rate High Alarm 40 br/min   Press High Alarm 60 cmH2O   Apnea Rate 14   Apnea Volume (mL) 700 mL   Apnea Oxygen Concentration  100   Apnea Flow Rate (L/min) 65   T Apnea 20 sec(s)   Ready to Wean/Extubation Screen   FIO2<=50 (chart decimal) 0.28   MV<16L (chart vol.) 11.6   PEEP <=8 (chart #) 5   Ready to Wean Parameters   F/VT Ratio<105 (RSBI) (!) 27.16

## 2017-10-09 NOTE — PROGRESS NOTES
Progress Note  Hospital Medicine  Patient Name:Masood Messina  MRN:  [id number]  Patient Class: IP- Inpatient  Admit Date: 10/6/2017  Length of Stay: 2 days  Expected Discharge Date:   Attending Physician: Melissa Mayfield MD  Primary Care Provider:  Jeramie Lombardi MD    SUBJECTIVE:     Principal Problem: Septic shockInitial history of present illness: 77 y.o. Male with PMHx significant for functional quadraplegia, CAD, hx psedumonas with ventilator dependency currently living at [facility] and here because of a noted fever at the nursing with associated tachycardia and relative hypotension.  In the ED he had a temperature up to 100.9 and was mildly hypotensive but responsive to fluids.  Labs significant for 40 WBC in urine and no other apparent source, so suspicion was high at that time for urosepsis.  Vanc and Zosyn were initiated and he was sent to the ICU.  He has remained HD stable with BP's on the low side of normal at this time.  Otherwise, no other acute issues.     PMH/PSH/SH/FH/Meds: reviewed.    Symptoms/Review of Systems: In ICU. Patient is confused lethargic, unable to reach any family for PICC line placement.    Diet: PEG  Activity level: Functional Quadriplegia   Pain:  NAD       OBJECTIVE:   Vital Signs (Most Recent):      Temp: 99.1 °F (37.3 °C) (10/09/17 0400)  Pulse: 75 (10/09/17 0749)  Resp: 16 (10/09/17 0749)  BP: (!) 112/59 (10/09/17 0700)  SpO2: 97 % (10/09/17 0749)       Vital Signs Range (Last 24H):  Temp:  [97.9 °F (36.6 °C)-99.5 °F (37.5 °C)]   Pulse:  [69-86]   Resp:  [0-41]   BP: ()/(50-89)   SpO2:  [96 %-100 %]     Weight: 115.2 kg (253 lb 15.5 oz)  Body mass index is 34.44 kg/m².    Intake/Output Summary (Last 24 hours) at 10/09/17 0921  Last data filed at 10/09/17 0600   Gross per 24 hour   Intake          4494.58 ml   Output             1895 ml   Net          2599.58 ml     Physical Examination:  Constitutional: He appears well-developed and well-nourished. No distress.   HENT:    Head: Normocephalic and atraumatic.   Eyes: Pupils are equal, round, and reactive to light.   Neck: Neck supple. No thyromegaly present.   Cardiovascular: Normal rate and regular rhythm.  Exam reveals no gallop and no friction rub.    No murmur heard.  Pulmonary/Chest: Effort normal and breath sounds normal. No respiratory distress. He has no wheezes.   Abdominal: Soft. Bowel sounds are normal. He exhibits no distension. There is no tenderness. There is no guarding.   Minor PEG site erythema   Musculoskeletal: Normal range of motion. He exhibits no edema.   Skin: Skin is warm and dry. No erythema.   Psychiatric: He has a normal mood and affect.     CBC:    Recent Labs  Lab 10/06/17  2302 10/08/17  0331   WBC 15.80* 11.60   RBC 4.69 4.38*   HGB 12.7* 11.9*   HCT 38.2* 35.6*    178   MCV 82 81*   MCH 27.0 27.1   MCHC 33.2 33.3   BMP    Recent Labs  Lab 10/06/17  2302 10/08/17  0331 10/08/17  1530 10/09/17  0349   * 120*  --  98    139  --  140   K 4.4 2.9* 3.3* 3.3*    107  --  108   CO2 27 22*  --  23   BUN 53* 41*  --  36*   CREATININE 1.3 1.0  --  0.9   CALCIUM 9.3 8.8  --  9.2   MG 1.7  --   --  1.8      Diagnostic Results:  Microbiology Results (last 7 days)     Procedure Component Value Units Date/Time    Stool culture [[id number]]     Order Status:  No result Specimen:  Stool from Stool     IV catheter culture [[id number]]     Order Status:  No result Specimen:  Catheter Tip from Catheter Tip, PICC     Urine culture [[id number]]  (Susceptibility) Collected:  10/06/17 2215    Order Status:  Completed Specimen:  Urine from Urine, Catheterized Updated:  10/08/17 2143     Urine Culture, Routine --     PROTEUS MIRABILIS ESBL  > 100,000 cfu/ml      Blood culture x two cultures. Draw prior to antibiotics. [[id number]] Collected:  10/06/17 2305    Order Status:  Completed Specimen:  Blood from Peripheral, Right  Hand Updated:  10/08/17 1212     Blood Culture, Routine No Growth to date     Blood  Culture, Routine No Growth to date    Narrative:       Aerobic and anaerobic    Culture, Respiratory with Gram Stain [[id number]] Collected:  10/07/17 1900    Order Status:  Completed Specimen:  Respiratory from Tracheal Aspirate Updated:  10/08/17 1151     Gram Stain (Respiratory) <10 epithelial cells per low power field.     Gram Stain (Respiratory) Few WBC's     Gram Stain (Respiratory) Moderate Gram negative rods     Gram Stain (Respiratory) Few Gram positive cocci    Blood culture x two cultures. Draw prior to antibiotics. [[id number]] Collected:  10/06/17 2300    Order Status:  Completed Specimen:  Blood from Line, PICC Left  Arm Updated:  10/08/17 0757     Blood Culture, Routine Gram stain peds bottle: Gram positive cocci in clusters resembling Staph     Blood Culture, Routine Results called to and read back by: Alexsander Chand RN     Blood Culture, Routine 10/08/2017  07:56    Narrative:       Aerobic and anaerobic         CXR: Hypoventilatory view demonstrating mild increase in interstitial markings which are similar compared to the prior study. Differential considerations include chronic disease and mild CHF.    Assessment/Plan:     Urinary tract infection without hematuria due to Proteus sp (EBSL)     - continue abx  - tay exchanged  -Contact isolation due to ESBL sp.          Hypokalemia  Replete KCl. Follow BMP.          Chronic obstructive pulmonary disease     - PRN duonebs       Hypothyroidism  Hold Thyroid supplementation for 7 days and then reduce dose to 100 mcg daily.      Functional quadriplegia     - routine supportive care          Septic shock     - suspect secondary to urosepsis  - cultures drawn and urine and blood cx positive (Proteus sp - ESBL and Staph respectively)  - on vanc/Zosyn. Will consult ID specialist.   - history of pseudomonal infection  - fluid boluses given. May need IV pressor to keep MAP > 65 mmHg.  - follow up cultures and continue current care     - plan to pull PICC  and culture TIP tomorrow with new PICC placed given positive culture. I signed the consent for medical necessity.         VTE Risk Mitigation         Ordered     enoxaparin injection 40 mg  Daily     Route:  Subcutaneous        10/09/17 1409     Medium Risk of VTE  Once      10/07/17 0149     Place EYAD hose  Until discontinued      10/07/17 0149     Place sequential compression device  Until discontinued      10/07/17 0149        Melissa Mayfield MD  Department of Hospital Medicine   [facility]

## 2017-10-10 LAB
ANION GAP SERPL CALC-SCNC: 8 MMOL/L
BASOPHILS # BLD AUTO: 0 K/UL
BASOPHILS NFR BLD: 0.5 %
BUN SERPL-MCNC: 27 MG/DL
C DIFF TOX GENS STL QL NAA+PROBE: POSITIVE
CALCIUM SERPL-MCNC: 9.2 MG/DL
CHLORIDE SERPL-SCNC: 108 MMOL/L
CO2 SERPL-SCNC: 22 MMOL/L
CREAT SERPL-MCNC: 0.8 MG/DL
DIFFERENTIAL METHOD: ABNORMAL
EOSINOPHIL # BLD AUTO: 0.5 K/UL
EOSINOPHIL NFR BLD: 7.5 %
ERYTHROCYTE [DISTWIDTH] IN BLOOD BY AUTOMATED COUNT: 16 %
EST. GFR  (AFRICAN AMERICAN): >60 ML/MIN/1.73 M^2
EST. GFR  (NON AFRICAN AMERICAN): >60 ML/MIN/1.73 M^2
GLUCOSE SERPL-MCNC: 78 MG/DL
HCT VFR BLD AUTO: 32.2 %
HGB BLD-MCNC: 10.6 G/DL
LYMPHOCYTES # BLD AUTO: 2 K/UL
LYMPHOCYTES NFR BLD: 27.9 %
MCH RBC QN AUTO: 27 PG
MCHC RBC AUTO-ENTMCNC: 33.1 G/DL
MCV RBC AUTO: 82 FL
MONOCYTES # BLD AUTO: 0.7 K/UL
MONOCYTES NFR BLD: 9.4 %
NEUTROPHILS # BLD AUTO: 3.9 K/UL
NEUTROPHILS NFR BLD: 54.7 %
PLATELET # BLD AUTO: 177 K/UL
PMV BLD AUTO: 11.4 FL
POTASSIUM SERPL-SCNC: 3 MMOL/L
RBC # BLD AUTO: 3.94 M/UL
SODIUM SERPL-SCNC: 138 MMOL/L
VANCOMYCIN TROUGH SERPL-MCNC: 21 UG/ML
WBC # BLD AUTO: 7.1 K/UL

## 2017-10-10 PROCEDURE — 85025 COMPLETE CBC W/AUTO DIFF WBC: CPT

## 2017-10-10 PROCEDURE — 25000003 PHARM REV CODE 250: Performed by: INTERNAL MEDICINE

## 2017-10-10 PROCEDURE — 25000003 PHARM REV CODE 250: Performed by: EMERGENCY MEDICINE

## 2017-10-10 PROCEDURE — 94761 N-INVAS EAR/PLS OXIMETRY MLT: CPT

## 2017-10-10 PROCEDURE — 63600175 PHARM REV CODE 636 W HCPCS: Performed by: INTERNAL MEDICINE

## 2017-10-10 PROCEDURE — 80048 BASIC METABOLIC PNL TOTAL CA: CPT

## 2017-10-10 PROCEDURE — 20000000 HC ICU ROOM

## 2017-10-10 PROCEDURE — 99900026 HC AIRWAY MAINTENANCE (STAT)

## 2017-10-10 PROCEDURE — 80202 ASSAY OF VANCOMYCIN: CPT

## 2017-10-10 PROCEDURE — 94003 VENT MGMT INPAT SUBQ DAY: CPT

## 2017-10-10 PROCEDURE — 99900035 HC TECH TIME PER 15 MIN (STAT)

## 2017-10-10 PROCEDURE — 27000221 HC OXYGEN, UP TO 24 HOURS

## 2017-10-10 PROCEDURE — 94770 HC EXHALED C02 TEST: CPT

## 2017-10-10 PROCEDURE — 99233 SBSQ HOSP IP/OBS HIGH 50: CPT | Mod: ,,, | Performed by: INTERNAL MEDICINE

## 2017-10-10 PROCEDURE — 36415 COLL VENOUS BLD VENIPUNCTURE: CPT

## 2017-10-10 PROCEDURE — S0028 INJECTION, FAMOTIDINE, 20 MG: HCPCS | Performed by: EMERGENCY MEDICINE

## 2017-10-10 RX ORDER — CHOLESTYRAMINE 4 G/4.8G
1 POWDER, FOR SUSPENSION ORAL 2 TIMES DAILY
Status: DISCONTINUED | OUTPATIENT
Start: 2017-10-10 | End: 2017-10-16 | Stop reason: HOSPADM

## 2017-10-10 RX ORDER — HALOPERIDOL 5 MG/ML
5 INJECTION INTRAMUSCULAR EVERY 6 HOURS PRN
Status: DISCONTINUED | OUTPATIENT
Start: 2017-10-10 | End: 2017-10-16 | Stop reason: HOSPADM

## 2017-10-10 RX ADMIN — BETHANECHOL CHLORIDE 15 MG: 10 TABLET ORAL at 06:10

## 2017-10-10 RX ADMIN — BACLOFEN 20 MG: 10 TABLET ORAL at 10:10

## 2017-10-10 RX ADMIN — ASPIRIN 325 MG ORAL TABLET 325 MG: 325 PILL ORAL at 09:10

## 2017-10-10 RX ADMIN — CHOLESTYRAMINE 4 G: 4 POWDER, FOR SUSPENSION ORAL at 07:10

## 2017-10-10 RX ADMIN — HALOPERIDOL LACTATE 5 MG: 5 INJECTION, SOLUTION INTRAMUSCULAR at 03:10

## 2017-10-10 RX ADMIN — LIOTHYRONINE SODIUM 75 MCG: 25 TABLET ORAL at 06:10

## 2017-10-10 RX ADMIN — VANCOMYCIN HYDROCHLORIDE 1500 MG: 500 INJECTION, POWDER, LYOPHILIZED, FOR SOLUTION INTRAVENOUS at 05:10

## 2017-10-10 RX ADMIN — SODIUM CHLORIDE 1 G: 9 INJECTION, SOLUTION INTRAVENOUS at 07:10

## 2017-10-10 RX ADMIN — DIPHENOXYLATE HYDROCHLORIDE AND ATROPINE SULFATE 1 TABLET: 2.5; .025 TABLET ORAL at 10:10

## 2017-10-10 RX ADMIN — FAMOTIDINE 20 MG: 10 INJECTION, SOLUTION INTRAVENOUS at 09:10

## 2017-10-10 RX ADMIN — DULOXETINE 30 MG: 30 CAPSULE, DELAYED RELEASE ORAL at 09:10

## 2017-10-10 RX ADMIN — BETHANECHOL CHLORIDE 15 MG: 10 TABLET ORAL at 09:10

## 2017-10-10 RX ADMIN — ENOXAPARIN SODIUM 40 MG: 100 INJECTION SUBCUTANEOUS at 05:10

## 2017-10-10 RX ADMIN — BACITRACIN ZINC: 500 OINTMENT TOPICAL at 09:10

## 2017-10-10 RX ADMIN — HALOPERIDOL LACTATE 5 MG: 5 INJECTION, SOLUTION INTRAMUSCULAR at 10:10

## 2017-10-10 RX ADMIN — TERAZOSIN HYDROCHLORIDE ANHYDROUS 1 MG: 1 CAPSULE ORAL at 09:10

## 2017-10-10 RX ADMIN — Medication 250 MG: at 05:10

## 2017-10-10 RX ADMIN — BETHANECHOL CHLORIDE 15 MG: 10 TABLET ORAL at 01:10

## 2017-10-10 RX ADMIN — Medication 250 MG: at 09:10

## 2017-10-10 RX ADMIN — POTASSIUM CHLORIDE 40 MEQ: 400 INJECTION, SOLUTION INTRAVENOUS at 10:10

## 2017-10-10 RX ADMIN — FINASTERIDE 5 MG: 5 TABLET, FILM COATED ORAL at 09:10

## 2017-10-10 RX ADMIN — SODIUM CHLORIDE: 0.9 INJECTION, SOLUTION INTRAVENOUS at 03:10

## 2017-10-10 RX ADMIN — RIVASTIGMINE TRANSDERMAL SYSTEM 1 PATCH: 9.5 PATCH, EXTENDED RELEASE TRANSDERMAL at 09:10

## 2017-10-10 NOTE — PROGRESS NOTES
"Pt became very confused and agitated, pulling vent off himself repeatedly, stated " I'm leaving, I have somewhere to be right now", eICU notifed, orders received for restraints. Restraints placed without difficulty.  "

## 2017-10-10 NOTE — PROGRESS NOTES
Progress Note  Infectious Disease    Follow-up For:  Sepsis,     SUBJECTIVE:   ROS: advised of the positive Cdiff test. Vancomycin per Gtube was started. He has had 10 liquid stools today so far    Antibiotics     Start     Stop Route Frequency Ordered    10/10/17 1700  vancomycin (VANCOCIN) 1,500 mg in dextrose 5 % 250 mL IVPB      -- IV Every 24 hours (non-standard times) 10/10/17 1555    10/10/17 0900  vancomycin 250mg / 10ml oral suspension 250 mg      -- Oral Every 6 hours 10/10/17 0859    10/09/17 1830  ertapenem (INVANZ) 1 g in sodium chloride 0.9 % 100 mL IVPB (ready to mix system)      -- IV Every 24 hours (non-standard times) 10/09/17 1812    10/07/17 1200  bacitracin ointment      -- Top 2 times daily 10/07/17 1047          Pertinent Medications noted:    EXAM & DIAGNOSTICS REVIEWED:   Vitals:     Temp:  [98 °F (36.7 °C)-98.9 °F (37.2 °C)]   Temp: 98.5 °F (36.9 °C) (10/10/17 1300)  Pulse: 68 (10/10/17 1630)  Resp: 14 (10/10/17 1630)  BP: (!) 176/78 (10/10/17 1630)  SpO2: 100 % (10/10/17 1630)    Intake/Output Summary (Last 24 hours) at 10/10/17 1702  Last data filed at 10/10/17 0500   Gross per 24 hour   Intake             1810 ml   Output             1685 ml   Net              125 ml       General:  In NAD. Looks comfortable and non toxic  Eyes:  Anicteric, PERRL, EOMI  ENT:  Mouth w/ pink MMM, no lesions/exudate,     Neck:  Trachea midline, supple, no adenopathy appreciated  Lungs:  clear  Heart:  RRR, no gallop/murmur noted  Abd:  soft, NT, ND, normal BS, no masses/organomegaly appreciated. Copious liquid stool  :  Rahman draining yellow urine, clear  Musc:  Joints without effusion, erythema, synovitis,   Skin:  Generally warm, dry, normal for color. No rashes  Wound:   Neuro: AAOx3, speech clear, quadriparetic  Extrem: No edema, erythema, phlebitis, cellulitis, synovitis  VAD:  picc right arm    Lines/Tubes/Drains:    General Labs reviewed:    Recent Labs  Lab 10/10/17  0610   WBC 7.10   RBC 3.94*    HGB 10.6*   HCT 32.2*      MCV 82   MCH 27.0   MCHC 33.1       Recent Labs  Lab 10/10/17  0610   CALCIUM 9.2      K 3.0*   CO2 22*      BUN 27*   CREATININE 0.8       Micro: Cdiff positive    Imaging Reviewed:    ASSESSMENT & PLAN      Patient Active Problem List   Diagnosis    Sepsis    Functional quadriplegia    Respiratory failure, acute-on-chronic    SIRS with acute organ dysfunction due to infectious process    Tracheostomy dependence    Chronic obstructive pulmonary disease    Hematuria    Hypotension    Urinary tract infection without hematuria    Septic shock    PEG (percutaneous endoscopic gastrostomy) status    Ventilator associated pneumonia    Acquired hypothyroidism   colonized with MDRO pseudomonas  Cdifficile colitis  ESBL proteus UTI with sepsis    Recommendation: Continue ertapenem for UTI and oral vancomycin started for Cdiff. Adding questran

## 2017-10-10 NOTE — CONSULTS
Masood Messina [id number] is a 77 y.o. male who has been consulted for vancomycin dosing.    The patient has the following labs:     Date Creatinine (mg/dl)    BUN WBC Count   10/10/2017 Estimated Creatinine Clearance: 99.7 mL/min (based on SCr of 0.8 mg/dL). Lab Results   Component Value Date    BUN 27 (H) 10/10/2017     Lab Results   Component Value Date    WBC 7.10 10/10/2017        Current weight is 115.2 kg (253 lb 15.5 oz)      Vancomycin trough from 10/10 at 1400 was 21 mg/dL.  The patient will be changed to a vancomycin dose of 1500 mg every 24 hours. A vancomycin trough has been ordered prior to 3rd dose due 10/12 at 1700.      Patient will be followed by pharmacy for changes in renal function, toxicity, and efficacy.  Thank you for allowing us to participate in this patient's care.     Lenore Vargas

## 2017-10-10 NOTE — PLAN OF CARE
Problem: Patient Care Overview  Goal: Plan of Care Review  Outcome: Ongoing (interventions implemented as appropriate)  Pt remains trached on vent, afebrile, VSS, pt became more confused and removed vent numerous times, restraints placed, prn haldol given for extreme agitation, pt resting comfortably at this time

## 2017-10-10 NOTE — SIGNIFICANT EVENT
Called dr. Tyson's office and left a message about pt's positive stool for c-diff on PCR and antigen.

## 2017-10-10 NOTE — PROGRESS NOTES
10/09/17 2000   Patient Assessment/Suction   Level of Consciousness (AVPU) alert   Respiratory Effort Unlabored   Expansion/Accessory Muscles/Retractions no use of accessory muscles;no retractions;expansion symmetric   All Lung Fields Breath Sounds coarse   Rhythm/Pattern, Respiratory ventilator assisted   Cough Frequency infrequent   Cough Type good;productive   Suction Method required;in-line suction catheter (closed);tracheostomy   $ Suction Charges Inline Suction Procedure Stat Charge   Sputum Amount moderate   Sputum Color white   Sputum Consistency frothy   PRE-TX-O2-ETCO2   O2 Device (Oxygen Therapy) ventilator   Oxygen Concentration (%) 28   SpO2 100 %   Pulse Oximetry Type Continuous   $ Pulse Oximetry - Multiple Charge Pulse Oximetry - Multiple   ETCO2 (mmHg) 30 mmHg   Pulse 66   Resp (!) 36       Surgical Airway Portex Cuffed;Fenestrated   No Placement Date or Time found.   Present Prior to Hospital Arrival?: Yes  Inserted by: Present Prior to Hospital Arrival  Type: Tracheostomy  Brand: Portex  Airway Device Size: 8.0  Style: Cuffed;Fenestrated   Cuff Pressure 35 cm H2O   Status Secured   Site Assessment Oozing secretions;Midline   Site Care Cleansed;Dried;Dressing applied   Ties Assessment Intact;Secure   Vent Select   Charged w/in last 24h NO   Preset Conventional Ventilator Settings   Ventilation Type VC   Vent Mode A/C   Set Rate 14 bmp   Vt Set 700 mL   PEEP/CPAP 5 cmH20   Pressure Support 0 cmH20   Waveform RAMP   Peak Flow 65 L/min   Set Inspiratory Pressure 0 cmH20   Insp Time 0 Sec(s)   Plateau Set/Insp. Hold (sec) 0   Insp Rise Time  0 %   Trigger Sensitivity Flow/I-Trigger 1 L/min   P High 0 cm H2O   P Low 0 cm H2O   T High 0 sec   T Low 0 sec   Patient Ventilator Parameters   Resp Rate Total 14 br/min   Peak Airway Pressure 27 cmH2O   Mean Airway Pressure 11 cmH20   Plateau Pressure 0 cmH20   Exhaled Vt 665 mL   Total Ve 9.46 mL   Spont Ve 0 L   I:E Ratio Measured 1:2.60   Conventional  Ventilator Alarms   Ve High Alarm 25 L/min   Resp Rate High Alarm 40 br/min   Press High Alarm 60 cmH2O   Apnea Rate 14   Apnea Volume (mL) 700 mL   Apnea Oxygen Concentration  100   Apnea Flow Rate (L/min) 65   T Apnea 20 sec(s)   Ready to Wean/Extubation Screen   FIO2<=50 (chart decimal) 0.28   MV<16L (chart vol.) 9.46   PEEP <=8 (chart #) 5   Ready to Wean Parameters   F/VT Ratio<105 (RSBI) (!) 54.14   Airway Safety   Ambu bag with the patient? Yes, Adult Ambu   Is mask with the patient? Yes, Adult Mask

## 2017-10-10 NOTE — PLAN OF CARE
10/10/17 0746   Patient Assessment/Suction   Level of Consciousness (AVPU) responds to voice   Respiratory Effort Unlabored   Expansion/Accessory Muscles/Retractions no retractions;no use of accessory muscles   All Lung Fields Breath Sounds coarse   Rhythm/Pattern, Respiratory ventilator assisted   Suction Method in-line suction catheter (closed)   $ Suction Charges Inline Suction Procedure Stat Charge   Sputum Amount moderate   Sputum Color yellow   Sputum Consistency thick   PRE-TX-O2-ETCO2   O2 Device (Oxygen Therapy) ventilator   $ Is the patient on Oxygen? Yes   Oxygen Concentration (%) 28   SpO2 99 %   Pulse Oximetry Type Continuous   $ Pulse Oximetry - Multiple Charge Pulse Oximetry - Multiple   ETCO2 (mmHg) 25 mmHg   $ ETCO2 Charge Exhaled CO2 Monitoring   $ ETCO2 Usage Currently wearing   Pulse 60   Resp 14       Surgical Airway Portex Cuffed;Fenestrated   No Placement Date or Time found.   Present Prior to Hospital Arrival?: Yes  Inserted by: Present Prior to Hospital Arrival  Type: Tracheostomy  Brand: Portex  Airway Device Size: 8.0  Style: Cuffed;Fenestrated   Cuff Pressure 35 cm H2O   Status Secured   Site Assessment Clean;Dry   Ties Assessment Clean;Dry;Intact   Vent Select   $ Ventilator Subsequent 1   Charged w/in last 24h YES   IHI Ventilator Associated Pneumonia Bundle   Head of Bed Elevated  HOB 30   Oral Care Mouth suctioned   Preset Conventional Ventilator Settings   Vent Type    Ventilation Type VC   Vent Mode A/C   Humidity HME   Set Rate 14 bmp   Vt Set 700 mL   PEEP/CPAP 5 cmH20   Pressure Support 0 cmH20   Waveform RAMP   Peak Flow 65 L/min   Set Inspiratory Pressure 0 cmH20   Insp Time 0 Sec(s)   Plateau Set/Insp. Hold (sec) 0   Insp Rise Time  0 %   Trigger Sensitivity Flow/I-Trigger 1 L/min   P High 0 cm H2O   P Low 0 cm H2O   T High 0 sec   T Low 0 sec   Patient Ventilator Parameters   Resp Rate Total 14 br/min   Peak Airway Pressure 30 cmH2O   Mean Airway Pressure 12 cmH20    Plateau Pressure 0 cmH20   Exhaled Vt 702 mL   Total Ve 9.68 mL   Spont Ve 0 L   I:E Ratio Measured 1:2.60   Conventional Ventilator Alarms   Ve High Alarm 25 L/min   Resp Rate High Alarm 40 br/min   Press High Alarm 60 cmH2O   Apnea Rate 14   Apnea Volume (mL) 700 mL   Apnea Oxygen Concentration  100   Apnea Flow Rate (L/min) 65   T Apnea 20 sec(s)   Ready to Wean/Extubation Screen   FIO2<=50 (chart decimal) 0.28   MV<16L (chart vol.) 9.68   PEEP <=8 (chart #) 5   Ready to Wean Parameters   F/VT Ratio<105 (RSBI) (!) 19.94

## 2017-10-10 NOTE — PROGRESS NOTES
Progress Note  Hospital Medicine  Patient Name:Masood Messina  MRN:  [id number]  Patient Class: IP- Inpatient  Admit Date: 10/6/2017  Length of Stay: 3 days  Expected Discharge Date:   Attending Physician: Melissa Mayfield MD  Primary Care Provider:  Jeramie Lombardi MD    SUBJECTIVE:     Principal Problem: Septic shockInitial history of present illness: 77 y.o. Male with PMHx significant for functional quadraplegia, CAD, hx psedumonas with ventilator dependency currently living at [facility] and here because of a noted fever at the nursing with associated tachycardia and relative hypotension.  In the ED he had a temperature up to 100.9 and was mildly hypotensive but responsive to fluids.  Labs significant for 40 WBC in urine and no other apparent source, so suspicion was high at that time for urosepsis.  Vanc and Zosyn were initiated and he was sent to the ICU.  He has remained HD stable with BP's on the low side of normal at this time.  Otherwise, no other acute issues.     PMH/PSH/SH/FH/Meds: reviewed.    Symptoms/Review of Systems: In ICU. No confusion. Having watery diarrhea.  + C. Diff.   Diet: PEG  Activity level: Functional Quadriplegia   Pain:  NAD       OBJECTIVE:   Vital Signs (Most Recent):      Temp: 98.1 °F (36.7 °C) (10/10/17 0330)  Pulse: 60 (10/10/17 0746)  Resp: 14 (10/10/17 0746)  BP: (!) 147/70 (10/10/17 0630)  SpO2: 99 % (10/10/17 0746)       Vital Signs Range (Last 24H):  Temp:  [98 °F (36.7 °C)-98.2 °F (36.8 °C)]   Pulse:  [58-84]   Resp:  [14-73]   BP: ()/(44-80)   SpO2:  [97 %-100 %]     Weight: 115.2 kg (253 lb 15.5 oz)  Body mass index is 34.44 kg/m².    Intake/Output Summary (Last 24 hours) at 10/10/17 0856  Last data filed at 10/10/17 0500   Gross per 24 hour   Intake             2610 ml   Output             1685 ml   Net              925 ml     Physical Examination:  Constitutional: He appears well-developed and well-nourished. No distress.   HENT:   Head: Normocephalic and  atraumatic.   Eyes: Pupils are equal, round, and reactive to light.   Neck: Neck supple. No thyromegaly present.   Cardiovascular: Normal rate and regular rhythm.  Exam reveals no gallop and no friction rub.    No murmur heard.  Pulmonary/Chest: Effort normal and breath sounds normal. No respiratory distress. He has no wheezes.   Abdominal: Soft. Bowel sounds are normal. He exhibits no distension. There is no tenderness. There is no guarding.   Minor PEG site erythema   Musculoskeletal: Normal range of motion. He exhibits no edema.   Skin: Skin is warm and dry. No erythema.   Psychiatric: He has a normal mood and affect.     CBC:    Recent Labs  Lab 10/06/17  2302 10/08/17  0331 10/10/17  0610   WBC 15.80* 11.60 7.10   RBC 4.69 4.38* 3.94*   HGB 12.7* 11.9* 10.6*   HCT 38.2* 35.6* 32.2*    178 177   MCV 82 81* 82   MCH 27.0 27.1 27.0   MCHC 33.2 33.3 33.1   BMP    Recent Labs  Lab 10/06/17  2302 10/08/17  0331 10/08/17  1530 10/09/17  0349 10/10/17  0610   * 120*  --  98 78    139  --  140 138   K 4.4 2.9* 3.3* 3.3* 3.0*    107  --  108 108   CO2 27 22*  --  23 22*   BUN 53* 41*  --  36* 27*   CREATININE 1.3 1.0  --  0.9 0.8   CALCIUM 9.3 8.8  --  9.2 9.2   MG 1.7  --   --  1.8  --       Diagnostic Results:  Microbiology Results (last 7 days)     Procedure Component Value Units Date/Time    Blood culture [[id number]] Collected:  10/09/17 1832    Order Status:  Completed Specimen:  Blood Updated:  10/10/17 0715     Blood Culture, Routine No Growth to date    Narrative:       From picc    C Diff Toxin by PCR [[id number]]  (Abnormal) Collected:  10/09/17 1817    Order Status:  Completed Updated:  10/10/17 0159     C. diff PCR Positive (A)    Stool culture [[id number]] Collected:  10/09/17 1817    Order Status:  Sent Specimen:  Stool from Stool Updated:  10/10/17 0029    E. coli 0157 antigen [[id number]] Collected:  10/09/17 1817    Order Status:  No result Specimen:  Stool from Stool Updated:   10/10/17 0029    IV catheter culture [[id number]] Collected:  10/09/17 1816    Order Status:  Sent Specimen:  Catheter Tip from Catheter Tip, Subclavian Updated:  10/10/17 0029    Clostridium difficile EIA [[id number]]  (Abnormal) Collected:  10/09/17 1817    Order Status:  Completed Specimen:  Stool from Stool Updated:  10/09/17 2227     C. diff Antigen Positive (A)     C difficile Toxins A+B, EIA Negative     Comment: Testing not recommended for children <24 months old.       Blood culture x two cultures. Draw prior to antibiotics. [[id number]] Collected:  10/06/17 2305    Order Status:  Completed Specimen:  Blood from Peripheral, Right  Hand Updated:  10/09/17 1212     Blood Culture, Routine No Growth to date     Blood Culture, Routine No Growth to date     Blood Culture, Routine No Growth to date    Narrative:       Aerobic and anaerobic    Blood culture x two cultures. Draw prior to antibiotics. [[id number]] Collected:  10/06/17 2300    Order Status:  Completed Specimen:  Blood from Line, PICC Left  Arm Updated:  10/09/17 1112     Blood Culture, Routine Gram stain peds bottle: Gram positive cocci in clusters resembling Staph     Blood Culture, Routine Results called to and read back by: Alexsander Chand RN     Blood Culture, Routine 10/08/2017  07:56     Blood Culture, Routine --     COAGULASE-NEGATIVE STAPHYLOCOCCUS SPECIES  Organism is a probable contaminant      Narrative:       Aerobic and anaerobic    Culture, Respiratory with Gram Stain [[id number]] Collected:  10/07/17 1900    Order Status:  Completed Specimen:  Respiratory from Tracheal Aspirate Updated:  10/09/17 1109     Respiratory Culture --     PRESUMPTIVE PSEUDOMONAS SPECIES  Moderate  Identification and susceptibility pending       Respiratory Culture --     GRAM NEGATIVE TOM  Moderate  Identification and susceptibility pending  Normal respiratory gabriela also present       Gram Stain (Respiratory) <10 epithelial cells per low power field.     Gram  Stain (Respiratory) Few WBC's     Gram Stain (Respiratory) Moderate Gram negative rods     Gram Stain (Respiratory) Few Gram positive cocci    Urine culture [[id number]]  (Susceptibility) Collected:  10/06/17 2215    Order Status:  Completed Specimen:  Urine from Urine, Catheterized Updated:  10/08/17 2143     Urine Culture, Routine --     PROTEUS MIRABILIS ESBL  > 100,000 cfu/ml           CXR: Hypoventilatory view demonstrating mild increase in interstitial markings which are similar compared to the prior study. Differential considerations include chronic disease and mild CHF.    CXR: PICC in good position without pneumothorax. Tracheostomy tube in place. Prior sternotomy.    Assessment/Plan:     Urinary tract infection without hematuria due to Proteus sp (EBSL)     - Antibiotic changes noted as per Dr. Tyson. On IV Invanz, Vanco.  - tay exchanged  -Contact isolation due to ESBL sp and C. Diff.       Acute C. Difficile Colitis  Add PO Vancomycin.  Follow Contact isolation protocol.     Hypokalemia  Replete KCl. Follow BMP.          Chronic obstructive pulmonary disease     - PRN duonebs       Hypothyroidism  Hold Thyroid supplementation for 7 days and then reduce dose to 100 mcg daily.      Functional quadriplegia     - routine supportive care          Septic shock     - suspect secondary to urosepsis  - cultures drawn and urine and blood cx positive (Proteus sp - ESBL and Staph respectively)  - history of pseudomonal infection - colonization.  - follow up cultures and continue current care        VTE Risk Mitigation         Ordered     enoxaparin injection 40 mg  Daily     Route:  Subcutaneous        10/09/17 1409     Medium Risk of VTE  Once      10/07/17 0149     Place EYAD hose  Until discontinued      10/07/17 0149     Place sequential compression device  Until discontinued      10/07/17 0149        Melissa Mayfield MD  Department of Hospital Medicine   [facility]

## 2017-10-11 LAB
ANION GAP SERPL CALC-SCNC: 11 MMOL/L
BACTERIA SPEC AEROBE CULT: NORMAL
BASOPHILS # BLD AUTO: 0 K/UL
BASOPHILS NFR BLD: 0.5 %
BUN SERPL-MCNC: 18 MG/DL
CALCIUM SERPL-MCNC: 9.4 MG/DL
CHLORIDE SERPL-SCNC: 104 MMOL/L
CO2 SERPL-SCNC: 21 MMOL/L
CREAT SERPL-MCNC: 0.7 MG/DL
DIFFERENTIAL METHOD: ABNORMAL
E COLI SXT1 STL QL IA: NEGATIVE
E COLI SXT2 STL QL IA: NEGATIVE
EOSINOPHIL # BLD AUTO: 0.4 K/UL
EOSINOPHIL NFR BLD: 5.5 %
ERYTHROCYTE [DISTWIDTH] IN BLOOD BY AUTOMATED COUNT: 15.9 %
EST. GFR  (AFRICAN AMERICAN): >60 ML/MIN/1.73 M^2
EST. GFR  (NON AFRICAN AMERICAN): >60 ML/MIN/1.73 M^2
GLUCOSE SERPL-MCNC: 68 MG/DL
GRAM STN SPEC: NORMAL
HCT VFR BLD AUTO: 31.4 %
HGB BLD-MCNC: 10.8 G/DL
LYMPHOCYTES # BLD AUTO: 1.6 K/UL
LYMPHOCYTES NFR BLD: 25.7 %
MAGNESIUM SERPL-MCNC: 1.5 MG/DL
MCH RBC QN AUTO: 27.2 PG
MCHC RBC AUTO-ENTMCNC: 34.2 G/DL
MCV RBC AUTO: 79 FL
MONOCYTES # BLD AUTO: 0.6 K/UL
MONOCYTES NFR BLD: 8.7 %
NEUTROPHILS # BLD AUTO: 3.8 K/UL
NEUTROPHILS NFR BLD: 59.6 %
PLATELET # BLD AUTO: 186 K/UL
PMV BLD AUTO: 11.1 FL
POTASSIUM SERPL-SCNC: 2.9 MMOL/L
RBC # BLD AUTO: 3.96 M/UL
SODIUM SERPL-SCNC: 136 MMOL/L
WBC # BLD AUTO: 6.4 K/UL

## 2017-10-11 PROCEDURE — 97803 MED NUTRITION INDIV SUBSEQ: CPT

## 2017-10-11 PROCEDURE — 20000000 HC ICU ROOM

## 2017-10-11 PROCEDURE — 80048 BASIC METABOLIC PNL TOTAL CA: CPT

## 2017-10-11 PROCEDURE — 99900026 HC AIRWAY MAINTENANCE (STAT)

## 2017-10-11 PROCEDURE — 99233 SBSQ HOSP IP/OBS HIGH 50: CPT | Mod: ,,, | Performed by: INTERNAL MEDICINE

## 2017-10-11 PROCEDURE — 63600175 PHARM REV CODE 636 W HCPCS: Performed by: INTERNAL MEDICINE

## 2017-10-11 PROCEDURE — S0028 INJECTION, FAMOTIDINE, 20 MG: HCPCS | Performed by: EMERGENCY MEDICINE

## 2017-10-11 PROCEDURE — 87040 BLOOD CULTURE FOR BACTERIA: CPT

## 2017-10-11 PROCEDURE — 25000003 PHARM REV CODE 250: Performed by: INTERNAL MEDICINE

## 2017-10-11 PROCEDURE — 27000221 HC OXYGEN, UP TO 24 HOURS

## 2017-10-11 PROCEDURE — 25000003 PHARM REV CODE 250: Performed by: EMERGENCY MEDICINE

## 2017-10-11 PROCEDURE — 94003 VENT MGMT INPAT SUBQ DAY: CPT

## 2017-10-11 PROCEDURE — 85025 COMPLETE CBC W/AUTO DIFF WBC: CPT

## 2017-10-11 PROCEDURE — 83735 ASSAY OF MAGNESIUM: CPT

## 2017-10-11 PROCEDURE — 94761 N-INVAS EAR/PLS OXIMETRY MLT: CPT

## 2017-10-11 PROCEDURE — 94770 HC EXHALED C02 TEST: CPT

## 2017-10-11 PROCEDURE — 99900035 HC TECH TIME PER 15 MIN (STAT)

## 2017-10-11 PROCEDURE — 36415 COLL VENOUS BLD VENIPUNCTURE: CPT

## 2017-10-11 RX ORDER — HYDROCODONE BITARTRATE AND ACETAMINOPHEN 10; 325 MG/1; MG/1
1 TABLET ORAL EVERY 6 HOURS PRN
Status: DISCONTINUED | OUTPATIENT
Start: 2017-10-11 | End: 2017-10-16 | Stop reason: HOSPADM

## 2017-10-11 RX ORDER — L. ACIDOPHILUS/L.BULGARICUS 100MM CELL
1 GRANULES IN PACKET (EA) ORAL 2 TIMES DAILY
Status: DISCONTINUED | OUTPATIENT
Start: 2017-10-11 | End: 2017-10-16 | Stop reason: HOSPADM

## 2017-10-11 RX ADMIN — BETHANECHOL CHLORIDE 15 MG: 10 TABLET ORAL at 05:10

## 2017-10-11 RX ADMIN — BACITRACIN ZINC: 500 OINTMENT TOPICAL at 09:10

## 2017-10-11 RX ADMIN — BETHANECHOL CHLORIDE 15 MG: 10 TABLET ORAL at 02:10

## 2017-10-11 RX ADMIN — CHOLESTYRAMINE 4 G: 4 POWDER, FOR SUSPENSION ORAL at 09:10

## 2017-10-11 RX ADMIN — HYDROCODONE BITARTRATE AND ACETAMINOPHEN 1 TABLET: 10; 325 TABLET ORAL at 11:10

## 2017-10-11 RX ADMIN — DULOXETINE 30 MG: 30 CAPSULE, DELAYED RELEASE ORAL at 09:10

## 2017-10-11 RX ADMIN — Medication 250 MG: at 05:10

## 2017-10-11 RX ADMIN — SODIUM CHLORIDE: 0.9 INJECTION, SOLUTION INTRAVENOUS at 11:10

## 2017-10-11 RX ADMIN — FAMOTIDINE 20 MG: 10 INJECTION, SOLUTION INTRAVENOUS at 09:10

## 2017-10-11 RX ADMIN — ASPIRIN 325 MG ORAL TABLET 325 MG: 325 PILL ORAL at 09:10

## 2017-10-11 RX ADMIN — Medication 250 MG: at 12:10

## 2017-10-11 RX ADMIN — LIOTHYRONINE SODIUM 75 MCG: 25 TABLET ORAL at 05:10

## 2017-10-11 RX ADMIN — TERAZOSIN HYDROCHLORIDE ANHYDROUS 1 MG: 1 CAPSULE ORAL at 09:10

## 2017-10-11 RX ADMIN — ENOXAPARIN SODIUM 40 MG: 100 INJECTION SUBCUTANEOUS at 05:10

## 2017-10-11 RX ADMIN — BACLOFEN 20 MG: 10 TABLET ORAL at 05:10

## 2017-10-11 RX ADMIN — HYDROCODONE BITARTRATE AND ACETAMINOPHEN 1 TABLET: 10; 325 TABLET ORAL at 05:10

## 2017-10-11 RX ADMIN — RIVASTIGMINE TRANSDERMAL SYSTEM 1 PATCH: 9.5 PATCH, EXTENDED RELEASE TRANSDERMAL at 09:10

## 2017-10-11 RX ADMIN — SODIUM CHLORIDE: 0.9 INJECTION, SOLUTION INTRAVENOUS at 12:10

## 2017-10-11 RX ADMIN — MAGNESIUM SULFATE HEPTAHYDRATE 2 G: 40 INJECTION, SOLUTION INTRAVENOUS at 11:10

## 2017-10-11 RX ADMIN — SODIUM CHLORIDE 1 G: 9 INJECTION, SOLUTION INTRAVENOUS at 05:10

## 2017-10-11 RX ADMIN — POTASSIUM CHLORIDE 60 MEQ: 400 INJECTION, SOLUTION INTRAVENOUS at 04:10

## 2017-10-11 RX ADMIN — HALOPERIDOL LACTATE 5 MG: 5 INJECTION, SOLUTION INTRAMUSCULAR at 02:10

## 2017-10-11 RX ADMIN — FINASTERIDE 5 MG: 5 TABLET, FILM COATED ORAL at 09:10

## 2017-10-11 RX ADMIN — VANCOMYCIN HYDROCHLORIDE 1500 MG: 500 INJECTION, POWDER, LYOPHILIZED, FOR SOLUTION INTRAVENOUS at 05:10

## 2017-10-11 RX ADMIN — BETHANECHOL CHLORIDE 15 MG: 10 TABLET ORAL at 09:10

## 2017-10-11 RX ADMIN — LACTOBACILLUS ACIDOPHILUS / LACTOBACILLUS BULGARICUS 1 EACH: 100 MILLION CFU STRENGTH GRANULES at 09:10

## 2017-10-11 RX ADMIN — LACTOBACILLUS ACIDOPHILUS / LACTOBACILLUS BULGARICUS 1 EACH: 100 MILLION CFU STRENGTH GRANULES at 11:10

## 2017-10-11 RX ADMIN — Medication 250 MG: at 11:10

## 2017-10-11 NOTE — PROGRESS NOTES
Patient complaint of left foot pain, refused to have pillow placed under his left leg to keep foot elevated off of the bed. Explained to patient why pillow is important, patient still refused. Patient also refused to be turned. Explained why he needs turned, he still refused. Will attempt later.

## 2017-10-11 NOTE — PROGRESS NOTES
Spoke with lab at [facility], pt blood culture positive, for gram + cocci in clusters, resembling staph

## 2017-10-11 NOTE — PROGRESS NOTES
Progress Note  Hospital Medicine  Patient Name:Masood Messina  MRN:  [id number]  Patient Class: IP- Inpatient  Admit Date: 10/6/2017  Length of Stay: 4 days  Expected Discharge Date:   Attending Physician: Melissa Mayfield MD  Primary Care Provider:  Jeramie Lombardi MD    SUBJECTIVE:     Principal Problem: Septic shockInitial history of present illness: 77 y.o. Male with PMHx significant for functional quadraplegia, CAD, hx psedumonas with ventilator dependency currently living at [facility] and here because of a noted fever at the nursing with associated tachycardia and relative hypotension.  In the ED he had a temperature up to 100.9 and was mildly hypotensive but responsive to fluids.  Labs significant for 40 WBC in urine and no other apparent source, so suspicion was high at that time for urosepsis.  Vanc and Zosyn were initiated and he was sent to the ICU.  He has remained HD stable with BP's on the low side of normal at this time.  Otherwise, no other acute issues.     PMH/PSH/SH/FH/Meds: reviewed.    Symptoms/Review of Systems: In ICU. No confusion. Having watery diarrhea.  + C. Diff. Blood sugar low because feedings were being held.  Diet: PEG  Activity level: Functional Quadriplegia   Pain:  NAD       OBJECTIVE:   Vital Signs (Most Recent):      Temp: 97.9 °F (36.6 °C) (10/11/17 0315)  Pulse: 71 (10/11/17 0935)  Resp: 17 (10/11/17 0935)  BP: (!) 152/74 (10/11/17 0800)  SpO2: 99 % (10/11/17 0935)       Vital Signs Range (Last 24H):  Temp:  [97.9 °F (36.6 °C)-98.6 °F (37 °C)]   Pulse:  [59-75]   Resp:  [14-39]   BP: (111-176)/(59-97)   SpO2:  [98 %-100 %]     Weight: 115.2 kg (253 lb 15.5 oz)  Body mass index is 34.44 kg/m².    Intake/Output Summary (Last 24 hours) at 10/11/17 0949  Last data filed at 10/11/17 0521   Gross per 24 hour   Intake             2145 ml   Output             3300 ml   Net            -1155 ml     Physical Examination:  Constitutional: He appears well-developed and well-nourished. No  distress.   HENT:   Head: Normocephalic and atraumatic.   Eyes: Pupils are equal, round, and reactive to light.   Neck: Neck supple. No thyromegaly present.   Cardiovascular: Normal rate and regular rhythm.  Exam reveals no gallop and no friction rub.    No murmur heard.  Pulmonary/Chest: Effort normal and breath sounds normal. No respiratory distress. He has no wheezes.   Abdominal: Soft. Bowel sounds are normal. He exhibits no distension. There is no tenderness. There is no guarding.   Minor PEG site erythema   Musculoskeletal: Normal range of motion. He exhibits no edema.   Skin: Skin is warm and dry. No erythema.   Psychiatric: He has a normal mood and affect.     CBC:    Recent Labs  Lab 10/08/17  0331 10/10/17  0610 10/11/17  0239   WBC 11.60 7.10 6.40   RBC 4.38* 3.94* 3.96*   HGB 11.9* 10.6* 10.8*   HCT 35.6* 32.2* 31.4*    177 186   MCV 81* 82 79*   MCH 27.1 27.0 27.2   MCHC 33.3 33.1 34.2   BMP    Recent Labs  Lab 10/06/17  2302  10/09/17  0349 10/10/17  0610 10/11/17  0239   *  < > 98 78 68*     < > 140 138 136   K 4.4  < > 3.3* 3.0* 2.9*     < > 108 108 104   CO2 27  < > 23 22* 21*   BUN 53*  < > 36* 27* 18   CREATININE 1.3  < > 0.9 0.8 0.7   CALCIUM 9.3  < > 9.2 9.2 9.4   MG 1.7  --  1.8  --  1.5*   < > = values in this interval not displayed.   Diagnostic Results:  Microbiology Results (last 7 days)     Procedure Component Value Units Date/Time    IV catheter culture [[id number]] Collected:  10/09/17 1816    Order Status:  Completed Specimen:  Catheter Tip from Catheter Tip, Subclavian Updated:  10/11/17 0801     Aerobic Culture - Cath tip No growth    Blood culture [[id number]] Collected:  10/09/17 1832    Order Status:  Completed Specimen:  Blood Updated:  10/11/17 0012     Blood Culture, Routine Gram stain peds bottle: Gram positive cocci in clusters resembling Staph      Blood Culture, Routine Results called to and read back by: Yolanda Díaz RN  10/11/2017  00:12    Narrative:        From picc    Blood culture x two cultures. Draw prior to antibiotics. [[id number]] Collected:  10/06/17 2305    Order Status:  Completed Specimen:  Blood from Peripheral, Right  Hand Updated:  10/10/17 1212     Blood Culture, Routine No Growth to date     Blood Culture, Routine No Growth to date     Blood Culture, Routine No Growth to date     Blood Culture, Routine No Growth to date    Narrative:       Aerobic and anaerobic    Blood culture x two cultures. Draw prior to antibiotics. [[id number]] Collected:  10/06/17 2300    Order Status:  Completed Specimen:  Blood from Line, PICC Left  Arm Updated:  10/10/17 1119     Blood Culture, Routine Gram stain peds bottle: Gram positive cocci in clusters resembling Staph     Blood Culture, Routine Results called to and read back by: Alexsander Chand RN     Blood Culture, Routine 10/08/2017  07:56     Blood Culture, Routine --     COAGULASE-NEGATIVE STAPHYLOCOCCUS SPECIES  Organism is a probable contaminant      Narrative:       Aerobic and anaerobic    Culture, Respiratory with Gram Stain [[id number]]  (Susceptibility) Collected:  10/07/17 1900    Order Status:  Completed Specimen:  Respiratory from Tracheal Aspirate Updated:  10/10/17 0923     Respiratory Culture --     PSEUDOMONAS AERUGINOSA   Moderate  Identification and susceptibility pending       Respiratory Culture --     GRAM NEGATIVE TOM  Moderate  Identification and susceptibility pending  Normal respiratory gabriela also present       Gram Stain (Respiratory) <10 epithelial cells per low power field.     Gram Stain (Respiratory) Few WBC's     Gram Stain (Respiratory) Moderate Gram negative rods     Gram Stain (Respiratory) Few Gram positive cocci    C Diff Toxin by PCR [[id number]]  (Abnormal) Collected:  10/09/17 1817    Order Status:  Completed Updated:  10/10/17 0159     C. diff PCR Positive (A)    Stool culture [[id number]] Collected:  10/09/17 1817    Order Status:  Sent Specimen:  Stool from Stool Updated:   10/10/17 0029    E. coli 0157 antigen [[id number]] Collected:  10/09/17 1817    Order Status:  No result Specimen:  Stool from Stool Updated:  10/10/17 0029    Clostridium difficile EIA [[id number]]  (Abnormal) Collected:  10/09/17 1817    Order Status:  Completed Specimen:  Stool from Stool Updated:  10/09/17 2227     C. diff Antigen Positive (A)     C difficile Toxins A+B, EIA Negative     Comment: Testing not recommended for children <24 months old.       Urine culture [[id number]]  (Susceptibility) Collected:  10/06/17 2215    Order Status:  Completed Specimen:  Urine from Urine, Catheterized Updated:  10/08/17 2143     Urine Culture, Routine --     PROTEUS MIRABILIS ESBL  > 100,000 cfu/ml           CXR: Hypoventilatory view demonstrating mild increase in interstitial markings which are similar compared to the prior study. Differential considerations include chronic disease and mild CHF.    CXR: PICC in good position without pneumothorax. Tracheostomy tube in place. Prior sternotomy.    Assessment/Plan:     Urinary tract infection without hematuria due to Proteus sp (EBSL)     - Antibiotic changes noted as per Dr. Tyson. On IV Invanz, Vanco.  - tay exchanged  -Contact isolation due to ESBL sp and C. Diff.       Acute C. Difficile Colitis  Add PO Vancomycin.  Follow Contact isolation protocol.  Continue questran and probiotic.      Hypokalemia  Replete KCl. Follow BMP. Check mag.          Chronic obstructive pulmonary disease     - PRN duonebs       Hypothyroidism  Hold Thyroid supplementation for 7 days and then reduce dose to 100 mcg daily.      Functional quadriplegia     - routine supportive care          Septic shock     - suspect secondary to urosepsis  - cultures drawn and urine and blood cx positive (Proteus sp - ESBL and Staph respectively)  - history of pseudomonal infection - colonization.  - follow up cultures and continue current care  - Patient with hypoglycemia. Tube feedings resumed. Monitor  blood sugar.        VTE Risk Mitigation         Ordered     enoxaparin injection 40 mg  Daily     Route:  Subcutaneous        10/09/17 1409     Medium Risk of VTE  Once      10/07/17 0149     Place EYAD hose  Until discontinued      10/07/17 0149     Place sequential compression device  Until discontinued      10/07/17 0149        Melissa Mayfield MD  Department of Hospital Medicine   [facility]

## 2017-10-11 NOTE — PLAN OF CARE
Problem: Patient Care Overview  Goal: Plan of Care Review  Outcome: Ongoing (interventions implemented as appropriate)  Pt remains trached and on vent, afebrile, VSS,  Rahman intact with good UOP, pt becomes  anxious/confused at night, prn meds given, resting comfortably at this time

## 2017-10-11 NOTE — PLAN OF CARE
Problem: Patient Care Overview  Goal: Plan of Care Review  Outcome: Ongoing (interventions implemented as appropriate)  Care plan reviewed and revised.  IV antibiotics continue for treatment of infections. Afebrile. Special contact precautions remain in place for C-Diff.  Patient remains on ventilator with trach.   Patient refused to be repositioned and use of pillows to elevate feet off bed, became agitated. Rotation therapy initiated, patient tolerating.   Patient on tube feedings with Nutren 1.5 at 30 ml/hour with goal rate of 50 ml/hour. No residuals.   Patient medicated for c/o left foot pain, with Norco 10/325, as ordered. Appears effective.

## 2017-10-11 NOTE — CONSULTS
[facility]  Adult Nutrition  Consult Note    SUMMARY     Recommendations  Recommendation/Intervention: 1.) Continue Nutren 1.5 and increase  to goal rate 55 mls/hr continuous + x1 packet beneprotein BID (mixed with 2 oz water) providing 2005 kcals/day, 96 g protein/day, 232 g CHO/day, and 1008 mls water/day. Hold TF x4 hrs for residuals >250mls. Flush 160 mls free water Q4 hrs or per MD.   Goals: 1.) patient will tolerate TF at goal rate   Nutrition Goal Status: 1) progressing  Communication of RD Recs: reviewed with RN    1. Sepsis, due to unspecified organism      Past Medical History:   Diagnosis Date    AAA (abdominal aortic aneurysm)     Anemia     CHF (congestive heart failure)     COPD (chronic obstructive pulmonary disease)     Coronary artery disease     Dementia     Dementia     Depression     GERD (gastroesophageal reflux disease)     Hyperlipidemia     Hypertension     Hypothyroid     Renal disorder     Respiratory failure, chronic     Ventilator dependence        Reason for Assessment  Reason for Assessment:MD consult re hypoglycemia   General Information Comments: Admtis from [place] with fever. Vent dependent patient s/p peg. Isosource infusing @ 30 mls/hr during Rd visit. No propofol.   10/11/17: Spoke with nursing.  Pt is now on Nutren 1.5 @ 20 mls/hr. Discussed progression to goal rate. Noted TF was held temporarily.     Nutrition Prescription Ordered  Current Diet Order: NPO     Current Nutrition Support Formula Ordered: Isosource 1.5  Current Nutrition Support Rate Ordered: 20 (ml)  Current Nutrition Support Frequency Ordered: continuous        Evaluation of Received Nutrients/Fluid Intake  Enteral Calories (kcal): 720  Enteral Protein (gm): 33  34% EPN  Enteral (Free Water) Fluid (mL): 367  Energy Calories Required: not meeting needs  Protein Required: not meeting needs  % Intake of Estimated Energy Needs: 36% of EEN  % Meal Intake: NPO     Nutrition Risk  Screen  Nutrition Risk Screen: dysphagia or difficulty swallowing, tube feeding or parenteral nutrition    Nutrition/Diet History   Food Preferences: RAMON  Factors Affecting Nutritional Intake: on mechanical ventilation, NPO    Labs/Tests/Procedures/Meds  Diagnostic Test/Procedure Review: reviewed, pertinent  Pertinent Labs Reviewed: reviewed, pertinent  BMP  Lab Results   Component Value Date     10/11/2017    K 2.9 (L) 10/11/2017     10/11/2017    CO2 21 (L) 10/11/2017    BUN 18 10/11/2017    CREATININE 0.7 10/11/2017    CALCIUM 9.4 10/11/2017    ANIONGAP 11 10/11/2017    ESTGFRAFRICA >60 10/11/2017    EGFRNONAA >60 10/11/2017       Lab Results   Component Value Date    ALBUMIN 2.2 (L) 10/09/2017     Lab Results   Component Value Date    CALCIUM 9.4 10/11/2017    PHOS 2.3 (L) 10/09/2017     No results for input(s): POCTGLUCOSE in the last 24 hours.  Glucose 68    Pertinent Medications Reviewed: reviewed  Scheduled Meds:   aspirin  325 mg Oral Daily    bacitracin   Topical (Top) BID    bethanechol  15 mg Oral TID    cholestyramine-aspartame  1 packet Per G Tube BID    duloxetine  30 mg Oral Daily    enoxaparin  40 mg Subcutaneous Daily    ertapenem (INVANZ) IVPB  1 g Intravenous Q24H    famotidine (PF)  20 mg Intravenous Q12H    finasteride  5 mg Oral Daily    [START ON 10/17/2017] levothyroxine  100 mcg Oral Before breakfast    liothyronine  75 mcg Oral Before breakfast    potassium phosphate IVPB  20 mmol Intravenous Once    rivastigmine  1 patch Transdermal Daily    terazosin  1 mg Oral Daily    vancomycin (VANCOCIN) IVPB  1,500 mg Intravenous Q24H    vancomycin  250 mg Oral Q6H     Continuous Infusions:   sodium chloride 0.9% 125 mL/hr at 10/11/17 0014         Physical Findings  Overall Physical Appearance: on ventilator support  Tubes: gastrostomy tube  Oral/Mouth Cavity: WDL  Skin: pressure ulcer(s) (Brandon score 10)    Anthropometrics  Temp: 97.9 °F (36.6 °C)  Height: 6'  Weight  Method: Bed Scale  Weight: 115.2 kg (253 lb 15.5 oz)  Ideal Body Weight (IBW), Male: 178 lb  % Ideal Body Weight, Male (lb): 136.37 lb  BMI (Calculated): 33  BMI Grade: 30 - 34.9- obesity - grade I  Usual Body Weight (UBW), kg:  (mike)      Estimated/Assessed Needs  Weight Used For Calorie Calculations: 110.1 kg (242 lb 11.6 oz)   Energy Calorie Requirements (kcal): 1990  Energy Need Method: [place] (modified)   RMR ([place]-St. Jeor Equation): 1859  Weight Used For Protein Calculations: 80.9 kg (178 lb 5.6 oz) (ideal body weight)  1.2 gm Protein (gm): 97.28 and 1.5 gm Protein (gm): 121.6  Fluid Need Method: RDA Method (or per MD)  RDA Method (mL): 1990      Assessment and Plan  Nutrition Problem  excessive energy intake    Related to (etiology):   Excessive kcals from enteral orders    Signs and Symptoms (as evidenced by):   EN providing 126% of energy needs    Interventions/Recommendations (treatment strategy):  Reduce goal rate to 55 mls/hr    Nutrition Diagnosis Status:   progressing        Monitor and Evaluation  Food and Nutrient Intake: energy intake, enteral nutrition intake  Food and Nutrient Adminstration: enteral and parenteral nutrition administration  Anthropometric Measurements: weight, weight change, body mass index  Biochemical Data, Medical Tests and Procedures: electrolyte and renal panel, glucose/endocrine profile, lipid profile  Nutrition-Focused Physical Findings: overall appearance    Nutrition Risk  Level of Risk:  (x2 weekly)    Nutrition Follow-Up  RD Follow-up?: Yes      Discharge Planning: discharge on TF above.

## 2017-10-11 NOTE — PROGRESS NOTES
10/10/17 2004   PRE-TX-O2-ETCO2   Oxygen Concentration (%) 28   SpO2 100 %   ETCO2 (mmHg) 27 mmHg   Pulse 64   Resp 16   Vent Select   Conventional Vent Y   Charged w/in last 24h YES   Preset Conventional Ventilator Settings   Vent Type    Ventilation Type VC   Vent Mode A/C   Humidity HME   Set Rate 14 bmp   Vt Set 700 mL   PEEP/CPAP 5 cmH20   Pressure Support 0 cmH20   Waveform RAMP   Peak Flow 65 L/min   Set Inspiratory Pressure 0 cmH20   Insp Time 0 Sec(s)   Plateau Set/Insp. Hold (sec) 0   Insp Rise Time  0 %   Trigger Sensitivity Flow/I-Trigger 1 L/min   P High 0 cm H2O   P Low 0 cm H2O   T High 0 sec   T Low 0 sec   Patient Ventilator Parameters   Resp Rate Total 15 br/min   Peak Airway Pressure 27 cmH2O   Mean Airway Pressure 12 cmH20   Plateau Pressure 0 cmH20   Exhaled Vt 676 mL   Total Ve 10.8 mL   Spont Ve 0 L   I:E Ratio Measured 1:1.90   Conventional Ventilator Alarms   Alarms On Y   Ve High Alarm 25 L/min   Resp Rate High Alarm 40 br/min   Press High Alarm 60 cmH2O   Apnea Rate 14   Apnea Volume (mL) 700 mL   Apnea Oxygen Concentration  100   Apnea Flow Rate (L/min) 65   T Apnea 20 sec(s)   Ready to Wean/Extubation Screen   FIO2<=50 (chart decimal) 0.28   MV<16L (chart vol.) 10.8   PEEP <=8 (chart #) 5   Ready to Wean Parameters   F/VT Ratio<105 (RSBI) (!) 23.67

## 2017-10-11 NOTE — PLAN OF CARE
Problem: Nutrition, Enteral (Adult)  Intervention: Monitor/Manage Nutrition Support  Recommendation/Intervention: 1.) Continue Nutren 1.5 and increase  to goal rate 55 mls/hr continuous + x1 packet beneprotein BID (mixed with 2 oz water) providing 2005 kcals/day, 96 g protein/day, 232 g CHO/day, and 1008 mls water/day. Hold TF x4 hrs for residuals >250mls. Flush 160 mls free water Q4 hrs or per MD.   Goals: 1.) patient will tolerate TF at goal rate   Nutrition Goal Status: 1) progressing  Communication of RD Recs: reviewed with RN

## 2017-10-12 LAB
ALBUMIN SERPL BCP-MCNC: 2.2 G/DL
ALP SERPL-CCNC: 79 U/L
ALT SERPL W/O P-5'-P-CCNC: 23 U/L
ANION GAP SERPL CALC-SCNC: 7 MMOL/L
AORTIC VALVE REGURGITATION: NORMAL
AST SERPL-CCNC: 20 U/L
BACTERIA BLD CULT: NORMAL
BACTERIA CATH TIP CULT: NO GROWTH
BASOPHILS # BLD AUTO: 0 K/UL
BASOPHILS NFR BLD: 0.4 %
BILIRUB SERPL-MCNC: 0.7 MG/DL
BUN SERPL-MCNC: 15 MG/DL
CA-I BLDV-SCNC: 1.25 MMOL/L
CALCIUM SERPL-MCNC: 8.8 MG/DL
CHLORIDE SERPL-SCNC: 102 MMOL/L
CO2 SERPL-SCNC: 25 MMOL/L
CREAT SERPL-MCNC: 0.7 MG/DL
DIASTOLIC DYSFUNCTION: NO
DIFFERENTIAL METHOD: ABNORMAL
EOSINOPHIL # BLD AUTO: 0.4 K/UL
EOSINOPHIL NFR BLD: 6.6 %
ERYTHROCYTE [DISTWIDTH] IN BLOOD BY AUTOMATED COUNT: 15.4 %
EST. GFR  (AFRICAN AMERICAN): >60 ML/MIN/1.73 M^2
EST. GFR  (NON AFRICAN AMERICAN): >60 ML/MIN/1.73 M^2
GLUCOSE SERPL-MCNC: 100 MG/DL
HCT VFR BLD AUTO: 31.7 %
HGB BLD-MCNC: 10.8 G/DL
LYMPHOCYTES # BLD AUTO: 1.8 K/UL
LYMPHOCYTES NFR BLD: 29.2 %
MAGNESIUM SERPL-MCNC: 1.6 MG/DL
MAGNESIUM SERPL-MCNC: 1.9 MG/DL
MCH RBC QN AUTO: 27.2 PG
MCHC RBC AUTO-ENTMCNC: 33.9 G/DL
MCV RBC AUTO: 80 FL
MITRAL VALVE REGURGITATION: NORMAL
MONOCYTES # BLD AUTO: 0.7 K/UL
MONOCYTES NFR BLD: 10.7 %
NEUTROPHILS # BLD AUTO: 3.3 K/UL
NEUTROPHILS NFR BLD: 53.1 %
PHOSPHATE SERPL-MCNC: 1.9 MG/DL
PLATELET # BLD AUTO: 192 K/UL
PLATELET BLD QL SMEAR: ABNORMAL
PMV BLD AUTO: 10.8 FL
POTASSIUM SERPL-SCNC: 3.2 MMOL/L
POTASSIUM SERPL-SCNC: 3.5 MMOL/L
POTASSIUM SERPL-SCNC: 3.8 MMOL/L
PROT SERPL-MCNC: 7 G/DL
RBC # BLD AUTO: 3.95 M/UL
RETIRED EF AND QEF - SEE NOTES: 60 (ref 55–65)
SODIUM SERPL-SCNC: 134 MMOL/L
VANCOMYCIN TROUGH SERPL-MCNC: 19 UG/ML
WBC # BLD AUTO: 6.3 K/UL

## 2017-10-12 PROCEDURE — 83735 ASSAY OF MAGNESIUM: CPT | Mod: 91

## 2017-10-12 PROCEDURE — 25000003 PHARM REV CODE 250: Performed by: INTERNAL MEDICINE

## 2017-10-12 PROCEDURE — 80053 COMPREHEN METABOLIC PANEL: CPT

## 2017-10-12 PROCEDURE — 82330 ASSAY OF CALCIUM: CPT

## 2017-10-12 PROCEDURE — 63600175 PHARM REV CODE 636 W HCPCS: Performed by: INTERNAL MEDICINE

## 2017-10-12 PROCEDURE — 94003 VENT MGMT INPAT SUBQ DAY: CPT

## 2017-10-12 PROCEDURE — 85025 COMPLETE CBC W/AUTO DIFF WBC: CPT

## 2017-10-12 PROCEDURE — 20000000 HC ICU ROOM

## 2017-10-12 PROCEDURE — 36415 COLL VENOUS BLD VENIPUNCTURE: CPT

## 2017-10-12 PROCEDURE — 80202 ASSAY OF VANCOMYCIN: CPT

## 2017-10-12 PROCEDURE — 93010 ELECTROCARDIOGRAM REPORT: CPT | Mod: ,,, | Performed by: INTERNAL MEDICINE

## 2017-10-12 PROCEDURE — 25000003 PHARM REV CODE 250: Performed by: EMERGENCY MEDICINE

## 2017-10-12 PROCEDURE — 84132 ASSAY OF SERUM POTASSIUM: CPT

## 2017-10-12 PROCEDURE — C8929 TTE W OR WO FOL WCON,DOPPLER: HCPCS

## 2017-10-12 PROCEDURE — 27000221 HC OXYGEN, UP TO 24 HOURS

## 2017-10-12 PROCEDURE — 99232 SBSQ HOSP IP/OBS MODERATE 35: CPT | Mod: ,,, | Performed by: INTERNAL MEDICINE

## 2017-10-12 PROCEDURE — 84100 ASSAY OF PHOSPHORUS: CPT

## 2017-10-12 PROCEDURE — S0028 INJECTION, FAMOTIDINE, 20 MG: HCPCS | Performed by: EMERGENCY MEDICINE

## 2017-10-12 PROCEDURE — 93005 ELECTROCARDIOGRAM TRACING: CPT

## 2017-10-12 RX ORDER — GENTAMICIN SULFATE 3 MG/ML
2 SOLUTION/ DROPS OPHTHALMIC EVERY 4 HOURS
Status: DISCONTINUED | OUTPATIENT
Start: 2017-10-12 | End: 2017-10-16 | Stop reason: HOSPADM

## 2017-10-12 RX ADMIN — BETHANECHOL CHLORIDE 15 MG: 10 TABLET ORAL at 09:10

## 2017-10-12 RX ADMIN — LACTOBACILLUS ACIDOPHILUS / LACTOBACILLUS BULGARICUS 1 EACH: 100 MILLION CFU STRENGTH GRANULES at 09:10

## 2017-10-12 RX ADMIN — ASPIRIN 325 MG ORAL TABLET 325 MG: 325 PILL ORAL at 09:10

## 2017-10-12 RX ADMIN — FAMOTIDINE 20 MG: 10 INJECTION, SOLUTION INTRAVENOUS at 09:10

## 2017-10-12 RX ADMIN — BACITRACIN ZINC: 500 OINTMENT TOPICAL at 09:10

## 2017-10-12 RX ADMIN — CHOLESTYRAMINE 4 G: 4 POWDER, FOR SUSPENSION ORAL at 09:10

## 2017-10-12 RX ADMIN — BETHANECHOL CHLORIDE 15 MG: 10 TABLET ORAL at 05:10

## 2017-10-12 RX ADMIN — HYDROCODONE BITARTRATE AND ACETAMINOPHEN 1 TABLET: 10; 325 TABLET ORAL at 09:10

## 2017-10-12 RX ADMIN — Medication 250 MG: at 11:10

## 2017-10-12 RX ADMIN — Medication 250 MG: at 06:10

## 2017-10-12 RX ADMIN — LIOTHYRONINE SODIUM 75 MCG: 25 TABLET ORAL at 05:10

## 2017-10-12 RX ADMIN — Medication 250 MG: at 12:10

## 2017-10-12 RX ADMIN — DULOXETINE 30 MG: 30 CAPSULE, DELAYED RELEASE ORAL at 09:10

## 2017-10-12 RX ADMIN — BETHANECHOL CHLORIDE 15 MG: 10 TABLET ORAL at 02:10

## 2017-10-12 RX ADMIN — CHOLESTYRAMINE 4 G: 4 POWDER, FOR SUSPENSION ORAL at 04:10

## 2017-10-12 RX ADMIN — SODIUM CHLORIDE 1 G: 9 INJECTION, SOLUTION INTRAVENOUS at 06:10

## 2017-10-12 RX ADMIN — SODIUM PHOSPHATE, MONOBASIC, MONOHYDRATE 20.01 MMOL: 276; 142 INJECTION, SOLUTION INTRAVENOUS at 02:10

## 2017-10-12 RX ADMIN — DIPHENOXYLATE HYDROCHLORIDE AND ATROPINE SULFATE 1 TABLET: 2.5; .025 TABLET ORAL at 09:10

## 2017-10-12 RX ADMIN — FINASTERIDE 5 MG: 5 TABLET, FILM COATED ORAL at 09:10

## 2017-10-12 RX ADMIN — POTASSIUM CHLORIDE 40 MEQ: 400 INJECTION, SOLUTION INTRAVENOUS at 05:10

## 2017-10-12 RX ADMIN — RIVASTIGMINE TRANSDERMAL SYSTEM 1 PATCH: 9.5 PATCH, EXTENDED RELEASE TRANSDERMAL at 09:10

## 2017-10-12 RX ADMIN — BACLOFEN 20 MG: 10 TABLET ORAL at 09:10

## 2017-10-12 RX ADMIN — MAGNESIUM SULFATE HEPTAHYDRATE 2 G: 40 INJECTION, SOLUTION INTRAVENOUS at 12:10

## 2017-10-12 RX ADMIN — TERAZOSIN HYDROCHLORIDE ANHYDROUS 1 MG: 1 CAPSULE ORAL at 09:10

## 2017-10-12 RX ADMIN — POTASSIUM CHLORIDE 40 MEQ: 400 INJECTION, SOLUTION INTRAVENOUS at 09:10

## 2017-10-12 RX ADMIN — GENTAMICIN SULFATE 2 DROP: 3 SOLUTION/ DROPS OPHTHALMIC at 09:10

## 2017-10-12 RX ADMIN — POTASSIUM CHLORIDE 40 MEQ: 200 INJECTION, SOLUTION INTRAVENOUS at 12:10

## 2017-10-12 RX ADMIN — ENOXAPARIN SODIUM 40 MG: 100 INJECTION SUBCUTANEOUS at 05:10

## 2017-10-12 RX ADMIN — VANCOMYCIN HYDROCHLORIDE 1500 MG: 500 INJECTION, POWDER, LYOPHILIZED, FOR SOLUTION INTRAVENOUS at 04:10

## 2017-10-12 RX ADMIN — Medication 250 MG: at 05:10

## 2017-10-12 RX ADMIN — HUMAN ALBUMIN MICROSPHERES AND PERFLUTREN 0.11 MG: 10; .22 INJECTION, SOLUTION INTRAVENOUS at 02:10

## 2017-10-12 NOTE — CONSULTS
Masood Messina [id number] is a 77 y.o. male who has been consulted for vancomycin dosing.    The patient has the following labs:     Date Creatinine (mg/dl)    BUN WBC Count   10/12/2017 Estimated Creatinine Clearance: 111.2 mL/min (based on SCr of 0.7 mg/dL). Lab Results   Component Value Date    BUN 15 10/12/2017     Lab Results   Component Value Date    WBC 6.30 10/12/2017        Current weight is 109.6 kg (241 lb 10 oz)    Pt is receiving vancomycin 1500 mg every 24 hours.  Vancomycin trough from 10/12 at 1638 was 19.0 mg/dL.  Target trough range is 15-20 mg/dL.   Trough was drawn on time and anticipate it is /therapeutic. Pharmacy will continue current regimen.  A vancomycin trough has been ordered prior to next dose due 10/13 at 1630.      Patient will be followed by pharmacy for changes in renal function, toxicity, and efficacy.  Thank you for allowing us to participate in this patient's care.     Chencho Aragon, PharmD

## 2017-10-12 NOTE — PLAN OF CARE
Problem: Patient Care Overview  Goal: Plan of Care Review  Outcome: Ongoing (interventions implemented as appropriate)  Patient does not express himself easily, verbalized nightly goals and reassured of care    Comments: Rested well, no needs noted. Incontinent care X 1, resp therapy monitors ventilator

## 2017-10-12 NOTE — PROGRESS NOTES
Progress Note  Infectious Disease    Follow-up For:  Sepsis,     SUBJECTIVE:   ROS: not advised of new positive blood culture for GPC  Drawn 10/9. This was drawn from the new picc line.    Antibiotics     Start     Stop Route Frequency Ordered    10/10/17 1700  vancomycin (VANCOCIN) 1,500 mg in dextrose 5 % 250 mL IVPB      -- IV Every 24 hours (non-standard times) 10/10/17 1555    10/10/17 0900  vancomycin 250mg / 10ml oral suspension 250 mg      -- Oral Every 6 hours 10/10/17 0859    10/09/17 1830  ertapenem (INVANZ) 1 g in sodium chloride 0.9 % 100 mL IVPB (ready to mix system)      -- IV Every 24 hours (non-standard times) 10/09/17 1812    10/07/17 1200  bacitracin ointment      -- Top 2 times daily 10/07/17 1047          Pertinent Medications noted:    EXAM & DIAGNOSTICS REVIEWED:   Vitals:     Temp:  [97.3 °F (36.3 °C)-98.6 °F (37 °C)]   Temp: 97.9 °F (36.6 °C) (10/11/17 1700)  Pulse: 64 (10/11/17 2000)  Resp: 14 (10/11/17 2000)  BP: (!) 151/75 (10/11/17 2000)  SpO2: 99 % (10/11/17 2000)    Intake/Output Summary (Last 24 hours) at 10/11/17 2123  Last data filed at 10/11/17 1800   Gross per 24 hour   Intake          4471.25 ml   Output             2200 ml   Net          2271.25 ml       General:  In NAD. Looks comfortable and non toxic  Eyes:  Anicteric, PERRL, EOMI  ENT:  Mouth w/ pink MMM, no lesions/exudate,     Neck:  Trachea midline, supple, no adenopathy appreciated  Lungs:  clear  Heart:  RRR, no gallop/murmur noted  Abd:  soft, NT, ND, normal BS, no masses/organomegaly appreciated. Copious liquid stool  :  Rahman draining yellow urine, clear  Musc:  Joints without effusion, erythema, synovitis,   Skin:  Generally warm, dry, normal for color. No rashes  Wound:   Neuro: AAOx3, speech clear, quadriparetic  Extrem: No edema, erythema, phlebitis, cellulitis, synovitis  VAD:  picc right arm    Lines/Tubes/Drains:    General Labs reviewed:    Recent Labs  Lab 10/11/17  0239   WBC 6.40   RBC 3.96*   HGB 10.8*    HCT 31.4*      MCV 79*   MCH 27.2   MCHC 34.2       Recent Labs  Lab 10/11/17  0239   CALCIUM 9.4      K 2.9*   CO2 21*      BUN 18   CREATININE 0.7       Micro: Cdiff positive  Coag neg staph from 10/6 blood cultures , GPC from 10/9 blood culture    Imaging Reviewed:    ASSESSMENT & PLAN      Patient Active Problem List   Diagnosis    Sepsis    Functional quadriplegia    Respiratory failure, acute-on-chronic    SIRS with acute organ dysfunction due to infectious process    Tracheostomy dependence    Chronic obstructive pulmonary disease    Hematuria    Hypotension    Urinary tract infection without hematuria    Septic shock    PEG (percutaneous endoscopic gastrostomy) status    Ventilator associated pneumonia    Acquired hypothyroidism   colonized with MDRO pseudomonas  Cdifficile colitis, diarrhea improving  Coag neg Staph in 1 blood culture  10/6 and another positive culture 10/9(ID pending)  ESBL proteus UTI with sepsis    Recommendation: Continue ertapenem for UTI and oral vancomycin started for Cdiff. continue questran  Echocardiogram  Repeat blood cultures again

## 2017-10-12 NOTE — PLAN OF CARE
Problem: Patient Care Overview  Goal: Plan of Care Review  Care plan reviewed.  Safety maintained.  Remains on ventilator with trach, frequent suctioning performed by RT and nursing as needed. Large amount of green secretions noted. Patient has good cough.  Patient continues to receive IV antibiotics. Gentamycin drops to be started for right eye infection.  Patient continues to be on tube feeding, Nutren 1.5 at 55 ml per hour per peg tube. No residuals.  Remains on Special contact isolation for C-Diff. One episode of diarrhea today.  Rotation therapy in place on specialty bed, patient is tolerating.   Communication board attempted to aid with better communication, not effective.

## 2017-10-12 NOTE — PROGRESS NOTES
Progress Note  Hospital Medicine  Patient Name:Masood Messina  MRN:  [id number]  Patient Class: IP- Inpatient  Admit Date: 10/6/2017  Length of Stay: 5 days  Expected Discharge Date:   Attending Physician: Melissa Mayfield MD  Primary Care Provider:  Jeramie Lombardi MD    SUBJECTIVE:     Principal Problem: Septic shockInitial history of present illness: 77 y.o. Male with PMHx significant for functional quadraplegia, CAD, hx psedumonas with ventilator dependency currently living at [facility] and here because of a noted fever at the nursing with associated tachycardia and relative hypotension.  In the ED he had a temperature up to 100.9 and was mildly hypotensive but responsive to fluids.  Labs significant for 40 WBC in urine and no other apparent source, so suspicion was high at that time for urosepsis.  Vanc and Zosyn were initiated and he was sent to the ICU.  He has remained HD stable with BP's on the low side of normal at this time.  Otherwise, no other acute issues.     PMH/PSH/SH/FH/Meds: reviewed.    Symptoms/Review of Systems: In ICU. No confusion. Diarrhea better.  Diet: PEG  Activity level: Functional Quadriplegia   Pain:  NAD       OBJECTIVE:   Vital Signs (Most Recent):      Temp: 97 °F (36.1 °C) (10/12/17 1200)  Pulse: 65 (10/12/17 1200)  Resp: 14 (10/12/17 1200)  BP: (!) 143/73 (10/12/17 1200)  SpO2: 98 % (10/12/17 1200)       Vital Signs Range (Last 24H):  Temp:  [97 °F (36.1 °C)-98.6 °F (37 °C)]   Pulse:  [54-79]   Resp:  [13-22]   BP: (111-181)/(58-87)   SpO2:  [98 %-100 %]     Weight: 109.6 kg (241 lb 10 oz)  Body mass index is 32.77 kg/m².    Intake/Output Summary (Last 24 hours) at 10/12/17 1410  Last data filed at 10/12/17 1231   Gross per 24 hour   Intake          5793.75 ml   Output             3300 ml   Net          2493.75 ml     Physical Examination:  Constitutional: He appears well-developed and well-nourished. No distress.   HENT:   Head: Normocephalic and atraumatic.   Eyes: Pupils are  equal, round, and reactive to light.   Neck: Neck supple. No thyromegaly present.   Cardiovascular: Normal rate and regular rhythm.  Exam reveals no gallop and no friction rub.    No murmur heard.  Pulmonary/Chest: Effort normal and breath sounds normal. No respiratory distress. He has no wheezes.   Abdominal: Soft. Bowel sounds are normal. He exhibits no distension. There is no tenderness. There is no guarding.   Minor PEG site erythema   Musculoskeletal: Normal range of motion. He exhibits no edema.   Skin: Skin is warm and dry. No erythema.   Psychiatric: He has a normal mood and affect.     CBC:    Recent Labs  Lab 10/10/17  0610 10/11/17  0239 10/12/17  0319   WBC 7.10 6.40 6.30   RBC 3.94* 3.96* 3.95*   HGB 10.6* 10.8* 10.8*   HCT 32.2* 31.4* 31.7*    186 192   MCV 82 79* 80*   MCH 27.0 27.2 27.2   MCHC 33.1 34.2 33.9   BMP    Recent Labs  Lab 10/09/17  0349 10/10/17  0610 10/11/17  0239 10/12/17  0319 10/12/17  1110   GLU 98 78 68* 100  --     138 136 134*  --    K 3.3* 3.0* 2.9* 3.2* 3.5    108 104 102  --    CO2 23 22* 21* 25  --    BUN 36* 27* 18 15  --    CREATININE 0.9 0.8 0.7 0.7  --    CALCIUM 9.2 9.2 9.4 8.8  --    MG 1.8  --  1.5*  --  1.6      Diagnostic Results:  Microbiology Results (last 7 days)     Procedure Component Value Units Date/Time    Blood culture x two cultures. Draw prior to antibiotics. [[id number]] Collected:  10/06/17 2305    Order Status:  Completed Specimen:  Blood from Peripheral, Right  Hand Updated:  10/12/17 1212     Blood Culture, Routine No growth after 5 days.    Narrative:       Aerobic and anaerobic    Blood culture [[id number]] Collected:  10/09/17 1832    Order Status:  Completed Specimen:  Blood Updated:  10/12/17 1140     Blood Culture, Routine Gram stain peds bottle: Gram positive cocci in clusters resembling Staph      Blood Culture, Routine Results called to and read back by: Yolanda Díaz RN  10/11/2017  00:12     Blood Culture, Routine --      COAGULASE-NEGATIVE STAPHYLOCOCCUS SPECIES  Organism is a probable contaminant      Narrative:       From picc    IV catheter culture [[id number]] Collected:  10/09/17 1816    Order Status:  Completed Specimen:  Catheter Tip from Catheter Tip, Subclavian Updated:  10/12/17 1104     Aerobic Culture - Cath tip No growth    Blood culture [[id number]] Collected:  10/11/17 2008    Order Status:  Completed Specimen:  Blood Updated:  10/12/17 0745     Blood Culture, Routine No Growth to date    Narrative:       From picc    Stool culture [[id number]] Collected:  10/09/17 1817    Order Status:  Completed Specimen:  Stool from Stool Updated:  10/11/17 1357     Stool Culture Culture in progress    Culture, Respiratory with Gram Stain [[id number]]  (Susceptibility) Collected:  10/07/17 1900    Order Status:  Completed Specimen:  Respiratory from Tracheal Aspirate Updated:  10/11/17 1232     Respiratory Culture --     PSEUDOMONAS AERUGINOSA   Moderate  Normal respiratory gabriela also present       Gram Stain (Respiratory) <10 epithelial cells per low power field.     Gram Stain (Respiratory) Few WBC's     Gram Stain (Respiratory) Moderate Gram negative rods     Gram Stain (Respiratory) Few Gram positive cocci    E. coli 0157 antigen [[id number]] Collected:  10/09/17 1817    Order Status:  Completed Specimen:  Stool from Stool Updated:  10/11/17 1014     Shiga Toxin 1 E.coli Negative     Shiga Toxin 2 E.coli Negative    Blood culture x two cultures. Draw prior to antibiotics. [[id number]] Collected:  10/06/17 2300    Order Status:  Completed Specimen:  Blood from Line, PICC Left  Arm Updated:  10/10/17 1119     Blood Culture, Routine Gram stain peds bottle: Gram positive cocci in clusters resembling Staph     Blood Culture, Routine Results called to and read back by: Alexsander Chand RN     Blood Culture, Routine 10/08/2017  07:56     Blood Culture, Routine --     COAGULASE-NEGATIVE STAPHYLOCOCCUS SPECIES  Organism is a probable  contaminant      Narrative:       Aerobic and anaerobic    C Diff Toxin by PCR [[id number]]  (Abnormal) Collected:  10/09/17 1817    Order Status:  Completed Updated:  10/10/17 0159     C. diff PCR Positive (A)    Clostridium difficile EIA [[id number]]  (Abnormal) Collected:  10/09/17 1817    Order Status:  Completed Specimen:  Stool from Stool Updated:  10/09/17 2227     C. diff Antigen Positive (A)     C difficile Toxins A+B, EIA Negative     Comment: Testing not recommended for children <24 months old.       Urine culture [[id number]]  (Susceptibility) Collected:  10/06/17 2215    Order Status:  Completed Specimen:  Urine from Urine, Catheterized Updated:  10/08/17 2143     Urine Culture, Routine --     PROTEUS MIRABILIS ESBL  > 100,000 cfu/ml           CXR: Hypoventilatory view demonstrating mild increase in interstitial markings which are similar compared to the prior study. Differential considerations include chronic disease and mild CHF.    CXR: PICC in good position without pneumothorax. Tracheostomy tube in place. Prior sternotomy.    Assessment/Plan:     Urinary tract infection without hematuria due to Proteus sp (EBSL)     - Antibiotic changes noted as per Dr. Tyson. On IV Invanz, Vanco.  - tay exchanged  -Contact isolation due to ESBL sp and C. Diff.  ECHO pending.       Acute C. Difficile Colitis  On PO Vancomycin.  Follow Contact isolation protocol.  Continue questran and probiotic.      Hypokalemia  Replete KCl. Follow BMP. Check mag.          Chronic obstructive pulmonary disease     - PRN duonebs       Hypothyroidism  Hold Thyroid supplementation for 7 days and then reduce dose to 100 mcg daily.      Functional quadriplegia     - routine supportive care          Septic shock     - suspect secondary to urosepsis  - cultures drawn and urine and blood cx positive (Proteus sp - ESBL and Staph respectively)  - history of pseudomonal infection - colonization.  - follow up cultures and continue current  care  - Patient with hypoglycemia. Tube feedings resumed. Monitor blood sugar.        VTE Risk Mitigation         Ordered     enoxaparin injection 40 mg  Daily     Route:  Subcutaneous        10/09/17 1409     Medium Risk of VTE  Once      10/07/17 0149     Place EYAD hose  Until discontinued      10/07/17 0149     Place sequential compression device  Until discontinued      10/07/17 0149        Melissa Mayfield MD  Department of Hospital Medicine   [facility]

## 2017-10-12 NOTE — PROGRESS NOTES
Patient c/o right eye pain, noted that patient eye had become red with drainage. Area gently cleansed with a cool wash cloth.  Dr. Mayfield notified and an order was received.

## 2017-10-12 NOTE — PROGRESS NOTES
10/11/17 2000   PRE-TX-O2-ETCO2   Oxygen Concentration (%) 28   SpO2 99 %   ETCO2 (mmHg) 29 mmHg   Pulse 64   Resp 14   BP (!) 151/75       Surgical Airway Portex Cuffed;Fenestrated   No Placement Date or Time found.   Present Prior to Hospital Arrival?: Yes  Inserted by: Present Prior to Hospital Arrival  Type: Tracheostomy  Brand: Portex  Airway Device Size: 8.0  Style: Cuffed;Fenestrated   Cuff Pressure 32 cm H2O   Status Secured   Site Assessment Dry;Clean;No bleeding;No drainage   Site Care Dressing applied;Dried   Ties Assessment Clean;Intact;Secure   Vent Select   Charged w/in last 24h YES   IHI Ventilator Associated Pneumonia Bundle   Daily Awakening Trials Performed Not applicable   Daily Assessment of Readiness to Extubate No (Comment)   Additional VAP Prevention Documentation Clean equipment maintained   Preset Conventional Ventilator Settings   Vent Type    Ventilation Type VC   Vent Mode A/C   Humidity HME   Set Rate 14 bmp   Vt Set 700 mL   PEEP/CPAP 5 cmH20   Pressure Support 0 cmH20   Waveform RAMP   Peak Flow 65 L/min   Set Inspiratory Pressure 0 cmH20   Insp Time 0 Sec(s)   Plateau Set/Insp. Hold (sec) 0   Insp Rise Time  0 %   I-Trigger Type  Flow   Trigger Sensitivity Flow/I-Trigger 1 L/min   P High 0 cm H2O   P Low 0 cm H2O   T High 0 sec   T Low 0 sec   Patient Ventilator Parameters   Resp Rate Total 17 br/min   Peak Airway Pressure 60 cmH2O   Mean Airway Pressure 15 cmH20   Plateau Pressure 0 cmH20   Exhaled Vt 743 mL   Total Ve 12.5 mL   Spont Ve 0 L   I:E Ratio Measured 1:2.60   Conventional Ventilator Alarms   Alarms On Y   Ve High Alarm 25 L/min   Resp Rate High Alarm 40 br/min   Press High Alarm 60 cmH2O   Apnea Rate 14   Apnea Volume (mL) 700 mL   Apnea Oxygen Concentration  100   Apnea Flow Rate (L/min) 65   T Apnea 20 sec(s)   Ready to Wean/Extubation Screen   FIO2<=50 (chart decimal) 0.28   MV<16L (chart vol.) 12.5   PEEP <=8 (chart #) 5   Ready to Wean Parameters   F/VT  Ratio<105 (RSBI) (!) 18.84   Airway Safety   Ambu bag with the patient? Yes, Adult Ambu   Is mask with the patient? Yes, Adult Mask

## 2017-10-13 ENCOUNTER — OUTSIDE PLACE OF SERVICE (OUTPATIENT)
Dept: PULMONOLOGY | Facility: CLINIC | Age: 77
End: 2017-10-13
Payer: MEDICARE

## 2017-10-13 LAB
ALBUMIN SERPL BCP-MCNC: 2.2 G/DL
ALP SERPL-CCNC: 78 U/L
ALT SERPL W/O P-5'-P-CCNC: 21 U/L
ANION GAP SERPL CALC-SCNC: 5 MMOL/L
AST SERPL-CCNC: 23 U/L
BACTERIA STL CULT: NORMAL
BASOPHILS # BLD AUTO: 0.1 K/UL
BASOPHILS NFR BLD: 1.1 %
BILIRUB SERPL-MCNC: 0.5 MG/DL
BUN SERPL-MCNC: 12 MG/DL
CALCIUM SERPL-MCNC: 9 MG/DL
CHLORIDE SERPL-SCNC: 102 MMOL/L
CO2 SERPL-SCNC: 26 MMOL/L
CREAT SERPL-MCNC: 0.7 MG/DL
DIFFERENTIAL METHOD: ABNORMAL
EOSINOPHIL # BLD AUTO: 0.6 K/UL
EOSINOPHIL NFR BLD: 8.4 %
ERYTHROCYTE [DISTWIDTH] IN BLOOD BY AUTOMATED COUNT: 15.7 %
EST. GFR  (AFRICAN AMERICAN): >60 ML/MIN/1.73 M^2
EST. GFR  (NON AFRICAN AMERICAN): >60 ML/MIN/1.73 M^2
GLUCOSE SERPL-MCNC: 93 MG/DL
HCT VFR BLD AUTO: 32 %
HGB BLD-MCNC: 10.8 G/DL
LYMPHOCYTES # BLD AUTO: 2.2 K/UL
LYMPHOCYTES NFR BLD: 30.6 %
MAGNESIUM SERPL-MCNC: 1.8 MG/DL
MCH RBC QN AUTO: 27.1 PG
MCHC RBC AUTO-ENTMCNC: 33.7 G/DL
MCV RBC AUTO: 80 FL
MONOCYTES # BLD AUTO: 0.6 K/UL
MONOCYTES NFR BLD: 9.1 %
NEUTROPHILS # BLD AUTO: 3.6 K/UL
NEUTROPHILS NFR BLD: 50.8 %
PHOSPHATE SERPL-MCNC: 2.6 MG/DL
PLATELET # BLD AUTO: 229 K/UL
PMV BLD AUTO: 10.3 FL
POTASSIUM SERPL-SCNC: 4 MMOL/L
PROT SERPL-MCNC: 6.9 G/DL
RBC # BLD AUTO: 3.99 M/UL
SODIUM SERPL-SCNC: 133 MMOL/L
VANCOMYCIN TROUGH SERPL-MCNC: 20.1 UG/ML
WBC # BLD AUTO: 7.1 K/UL

## 2017-10-13 PROCEDURE — 99900026 HC AIRWAY MAINTENANCE (STAT)

## 2017-10-13 PROCEDURE — 99900022

## 2017-10-13 PROCEDURE — S0028 INJECTION, FAMOTIDINE, 20 MG: HCPCS | Performed by: EMERGENCY MEDICINE

## 2017-10-13 PROCEDURE — 84100 ASSAY OF PHOSPHORUS: CPT

## 2017-10-13 PROCEDURE — 99900035 HC TECH TIME PER 15 MIN (STAT)

## 2017-10-13 PROCEDURE — 63600175 PHARM REV CODE 636 W HCPCS: Performed by: INTERNAL MEDICINE

## 2017-10-13 PROCEDURE — 80202 ASSAY OF VANCOMYCIN: CPT

## 2017-10-13 PROCEDURE — 94003 VENT MGMT INPAT SUBQ DAY: CPT

## 2017-10-13 PROCEDURE — 25000003 PHARM REV CODE 250: Performed by: EMERGENCY MEDICINE

## 2017-10-13 PROCEDURE — 27000221 HC OXYGEN, UP TO 24 HOURS

## 2017-10-13 PROCEDURE — 36415 COLL VENOUS BLD VENIPUNCTURE: CPT

## 2017-10-13 PROCEDURE — 85025 COMPLETE CBC W/AUTO DIFF WBC: CPT

## 2017-10-13 PROCEDURE — 99233 SBSQ HOSP IP/OBS HIGH 50: CPT | Mod: ,,, | Performed by: INTERNAL MEDICINE

## 2017-10-13 PROCEDURE — 94761 N-INVAS EAR/PLS OXIMETRY MLT: CPT

## 2017-10-13 PROCEDURE — 94770 HC EXHALED C02 TEST: CPT

## 2017-10-13 PROCEDURE — 80053 COMPREHEN METABOLIC PANEL: CPT

## 2017-10-13 PROCEDURE — 25000003 PHARM REV CODE 250: Performed by: INTERNAL MEDICINE

## 2017-10-13 PROCEDURE — 20000000 HC ICU ROOM

## 2017-10-13 PROCEDURE — 99232 SBSQ HOSP IP/OBS MODERATE 35: CPT | Mod: ,,, | Performed by: INTERNAL MEDICINE

## 2017-10-13 PROCEDURE — 83735 ASSAY OF MAGNESIUM: CPT

## 2017-10-13 RX ADMIN — VANCOMYCIN HYDROCHLORIDE 1500 MG: 500 INJECTION, POWDER, LYOPHILIZED, FOR SOLUTION INTRAVENOUS at 06:10

## 2017-10-13 RX ADMIN — TERAZOSIN HYDROCHLORIDE ANHYDROUS 1 MG: 1 CAPSULE ORAL at 08:10

## 2017-10-13 RX ADMIN — POTASSIUM CHLORIDE 40 MEQ: 400 INJECTION, SOLUTION INTRAVENOUS at 07:10

## 2017-10-13 RX ADMIN — ASPIRIN 325 MG ORAL TABLET 325 MG: 325 PILL ORAL at 08:10

## 2017-10-13 RX ADMIN — FAMOTIDINE 20 MG: 10 INJECTION, SOLUTION INTRAVENOUS at 09:10

## 2017-10-13 RX ADMIN — HYDROCODONE BITARTRATE AND ACETAMINOPHEN 1 TABLET: 10; 325 TABLET ORAL at 03:10

## 2017-10-13 RX ADMIN — CHOLESTYRAMINE 4 G: 4 POWDER, FOR SUSPENSION ORAL at 04:10

## 2017-10-13 RX ADMIN — MAGNESIUM SULFATE HEPTAHYDRATE 2 G: 40 INJECTION, SOLUTION INTRAVENOUS at 08:10

## 2017-10-13 RX ADMIN — GENTAMICIN SULFATE 2 DROP: 3 SOLUTION/ DROPS OPHTHALMIC at 09:10

## 2017-10-13 RX ADMIN — Medication 250 MG: at 06:10

## 2017-10-13 RX ADMIN — BACLOFEN 20 MG: 10 TABLET ORAL at 03:10

## 2017-10-13 RX ADMIN — SODIUM PHOSPHATE, MONOBASIC, MONOHYDRATE 15 MMOL: 276; 142 INJECTION, SOLUTION INTRAVENOUS at 09:10

## 2017-10-13 RX ADMIN — DULOXETINE 30 MG: 30 CAPSULE, DELAYED RELEASE ORAL at 09:10

## 2017-10-13 RX ADMIN — BETHANECHOL CHLORIDE 15 MG: 10 TABLET ORAL at 05:10

## 2017-10-13 RX ADMIN — GENTAMICIN SULFATE 2 DROP: 3 SOLUTION/ DROPS OPHTHALMIC at 05:10

## 2017-10-13 RX ADMIN — FINASTERIDE 5 MG: 5 TABLET, FILM COATED ORAL at 08:10

## 2017-10-13 RX ADMIN — SODIUM CHLORIDE 1 G: 9 INJECTION, SOLUTION INTRAVENOUS at 06:10

## 2017-10-13 RX ADMIN — LACTOBACILLUS ACIDOPHILUS / LACTOBACILLUS BULGARICUS 1 EACH: 100 MILLION CFU STRENGTH GRANULES at 08:10

## 2017-10-13 RX ADMIN — LIOTHYRONINE SODIUM 75 MCG: 25 TABLET ORAL at 06:10

## 2017-10-13 RX ADMIN — GENTAMICIN SULFATE 2 DROP: 3 SOLUTION/ DROPS OPHTHALMIC at 03:10

## 2017-10-13 RX ADMIN — RIVASTIGMINE TRANSDERMAL SYSTEM 1 PATCH: 9.5 PATCH, EXTENDED RELEASE TRANSDERMAL at 08:10

## 2017-10-13 RX ADMIN — Medication 250 MG: at 12:10

## 2017-10-13 RX ADMIN — BACITRACIN ZINC: 500 OINTMENT TOPICAL at 09:10

## 2017-10-13 RX ADMIN — LACTOBACILLUS ACIDOPHILUS / LACTOBACILLUS BULGARICUS 1 EACH: 100 MILLION CFU STRENGTH GRANULES at 09:10

## 2017-10-13 RX ADMIN — HALOPERIDOL LACTATE 5 MG: 5 INJECTION, SOLUTION INTRAMUSCULAR at 03:10

## 2017-10-13 RX ADMIN — BETHANECHOL CHLORIDE 15 MG: 10 TABLET ORAL at 11:10

## 2017-10-13 RX ADMIN — Medication 250 MG: at 05:10

## 2017-10-13 RX ADMIN — ENOXAPARIN SODIUM 40 MG: 100 INJECTION SUBCUTANEOUS at 05:10

## 2017-10-13 RX ADMIN — DIPHENOXYLATE HYDROCHLORIDE AND ATROPINE SULFATE 1 TABLET: 2.5; .025 TABLET ORAL at 09:10

## 2017-10-13 RX ADMIN — CHOLESTYRAMINE 4 G: 4 POWDER, FOR SUSPENSION ORAL at 03:10

## 2017-10-13 RX ADMIN — Medication 250 MG: at 11:10

## 2017-10-13 RX ADMIN — BETHANECHOL CHLORIDE 15 MG: 10 TABLET ORAL at 02:10

## 2017-10-13 RX ADMIN — GENTAMICIN SULFATE 2 DROP: 3 SOLUTION/ DROPS OPHTHALMIC at 02:10

## 2017-10-13 NOTE — PROGRESS NOTES
Progress Note  Hospital Medicine  Patient Name:Masood Messina  MRN:  [id number]  Patient Class: IP- Inpatient  Admit Date: 10/6/2017  Length of Stay: 6 days  Expected Discharge Date:   Attending Physician: Melissa Mayfield MD  Primary Care Provider:  Jeramie Lombardi MD    SUBJECTIVE:     Principal Problem: Septic shockInitial history of present illness: 77 y.o. Male with PMHx significant for functional quadraplegia, CAD, hx psedumonas with ventilator dependency currently living at [place] and here because of a noted fever at the nursing with associated tachycardia and relative hypotension.  In the ED he had a temperature up to 100.9 and was mildly hypotensive but responsive to fluids.  Labs significant for 40 WBC in urine and no other apparent source, so suspicion was high at that time for urosepsis.  Vanc and Zosyn were initiated and he was sent to the ICU.  He has remained HD stable with BP's on the low side of normal at this time.  Otherwise, no other acute issues.     PMH/PSH/SH/FH/Meds: reviewed.    Symptoms/Review of Systems: In ICU. No confusion. Diarrhea better.  Diet: PEG  Activity level: Functional Quadriplegia   Pain:  NAD       OBJECTIVE:   Vital Signs (Most Recent):      Temp: 97.2 °F (36.2 °C) (10/12/17 1915)  Pulse: 66 (10/13/17 0030)  Resp: 14 (10/13/17 0030)  BP: (!) 158/115 (10/12/17 2300)  SpO2: 97 % (10/13/17 0030)       Vital Signs Range (Last 24H):  Temp:  [96.2 °F (35.7 °C)-98.6 °F (37 °C)]   Pulse:  [54-68]   Resp:  [14-22]   BP: (135-181)/()   SpO2:  [97 %-100 %]     Weight: 109.6 kg (241 lb 10 oz)  Body mass index is 32.77 kg/m².    Intake/Output Summary (Last 24 hours) at 10/13/17 0151  Last data filed at 10/12/17 1700   Gross per 24 hour   Intake           4812.5 ml   Output             2950 ml   Net           1862.5 ml     Physical Examination:  Constitutional: He appears well-developed and well-nourished. No distress.   HENT:   Head: Normocephalic and atraumatic.   Eyes: Pupils  are equal, round, and reactive to light.   Neck: Neck supple. No thyromegaly present.   Cardiovascular: Normal rate and regular rhythm.  Exam reveals no gallop and no friction rub.    No murmur heard.  Pulmonary/Chest: Effort normal and breath sounds normal. No respiratory distress. He has no wheezes.   Abdominal: Soft. Bowel sounds are normal. He exhibits no distension. There is no tenderness. There is no guarding.   Minor PEG site erythema   Musculoskeletal: Normal range of motion. He exhibits no edema.   Skin: Skin is warm and dry. No erythema.   Psychiatric: He has a normal mood and affect.     CBC:    Recent Labs  Lab 10/10/17  0610 10/11/17  0239 10/12/17  0319   WBC 7.10 6.40 6.30   RBC 3.94* 3.96* 3.95*   HGB 10.6* 10.8* 10.8*   HCT 32.2* 31.4* 31.7*    186 192   MCV 82 79* 80*   MCH 27.0 27.2 27.2   MCHC 33.1 34.2 33.9   BMP    Recent Labs  Lab 10/10/17  0610 10/11/17  0239 10/12/17  0319 10/12/17  1110 10/12/17  1940   GLU 78 68* 100  --   --     136 134*  --   --    K 3.0* 2.9* 3.2* 3.5 3.8    104 102  --   --    CO2 22* 21* 25  --   --    BUN 27* 18 15  --   --    CREATININE 0.8 0.7 0.7  --   --    CALCIUM 9.2 9.4 8.8  --   --    MG  --  1.5*  --  1.6 1.9      Diagnostic Results:  Microbiology Results (last 7 days)     Procedure Component Value Units Date/Time    Stool culture [[id number]] Collected:  10/09/17 1817    Order Status:  Completed Specimen:  Stool from Stool Updated:  10/12/17 1451     Stool Culture Culture in progress    Blood culture x two cultures. Draw prior to antibiotics. [[id number]] Collected:  10/06/17 2305    Order Status:  Completed Specimen:  Blood from Peripheral, Right  Hand Updated:  10/12/17 1212     Blood Culture, Routine No growth after 5 days.    Narrative:       Aerobic and anaerobic    Blood culture [[id number]] Collected:  10/09/17 1832    Order Status:  Completed Specimen:  Blood Updated:  10/12/17 1140     Blood Culture, Routine Gram stain peds bottle:  Gram positive cocci in clusters resembling Staph      Blood Culture, Routine Results called to and read back by: Yolanda Díaz RN  10/11/2017  00:12     Blood Culture, Routine --     COAGULASE-NEGATIVE STAPHYLOCOCCUS SPECIES  Organism is a probable contaminant      Narrative:       From picc    IV catheter culture [[id number]] Collected:  10/09/17 1816    Order Status:  Completed Specimen:  Catheter Tip from Catheter Tip, Subclavian Updated:  10/12/17 1104     Aerobic Culture - Cath tip No growth    Blood culture [[id number]] Collected:  10/11/17 2008    Order Status:  Completed Specimen:  Blood Updated:  10/12/17 0745     Blood Culture, Routine No Growth to date    Narrative:       From picc    Culture, Respiratory with Gram Stain [[id number]]  (Susceptibility) Collected:  10/07/17 1900    Order Status:  Completed Specimen:  Respiratory from Tracheal Aspirate Updated:  10/11/17 1232     Respiratory Culture --     PSEUDOMONAS AERUGINOSA   Moderate  Normal respiratory gabriela also present       Gram Stain (Respiratory) <10 epithelial cells per low power field.     Gram Stain (Respiratory) Few WBC's     Gram Stain (Respiratory) Moderate Gram negative rods     Gram Stain (Respiratory) Few Gram positive cocci    E. coli 0157 antigen [[id number]] Collected:  10/09/17 1817    Order Status:  Completed Specimen:  Stool from Stool Updated:  10/11/17 1014     Shiga Toxin 1 E.coli Negative     Shiga Toxin 2 E.coli Negative    Blood culture x two cultures. Draw prior to antibiotics. [[id number]] Collected:  10/06/17 2300    Order Status:  Completed Specimen:  Blood from Line, PICC Left  Arm Updated:  10/10/17 1119     Blood Culture, Routine Gram stain peds bottle: Gram positive cocci in clusters resembling Staph     Blood Culture, Routine Results called to and read back by: Alexsander Chand RN     Blood Culture, Routine 10/08/2017  07:56     Blood Culture, Routine --     COAGULASE-NEGATIVE STAPHYLOCOCCUS SPECIES  Organism is a  probable contaminant      Narrative:       Aerobic and anaerobic    C Diff Toxin by PCR [[id number]]  (Abnormal) Collected:  10/09/17 1817    Order Status:  Completed Updated:  10/10/17 0159     C. diff PCR Positive (A)    Clostridium difficile EIA [[id number]]  (Abnormal) Collected:  10/09/17 1817    Order Status:  Completed Specimen:  Stool from Stool Updated:  10/09/17 2227     C. diff Antigen Positive (A)     C difficile Toxins A+B, EIA Negative     Comment: Testing not recommended for children <24 months old.       Urine culture [[id number]]  (Susceptibility) Collected:  10/06/17 2215    Order Status:  Completed Specimen:  Urine from Urine, Catheterized Updated:  10/08/17 2143     Urine Culture, Routine --     PROTEUS MIRABILIS ESBL  > 100,000 cfu/ml           CXR: Hypoventilatory view demonstrating mild increase in interstitial markings which are similar compared to the prior study. Differential considerations include chronic disease and mild CHF.    CXR: PICC in good position without pneumothorax. Tracheostomy tube in place. Prior sternotomy.    2D ECHO:  ]  1 - Normal left ventricular systolic function (EF 60-65%).     2 - No wall motion abnormalities.     3 - Normal left ventricular diastolic function.     4 - Difficult windows, Optison contrast used for wall motion analysis.   Assessment/Plan:     Urinary tract infection without hematuria due to Proteus sp (EBSL)     - Antibiotic changes noted as per Dr. Tyson. On IV Invanz, Vanco.  - tay exchanged  -Contact isolation due to ESBL sp and C. Diff.  ECHO reviewed.       Acute C. Difficile Colitis  On PO Vancomycin.  Follow Contact isolation protocol.  Continue questran and probiotic.      Hypokalemia  Follow CMP.          Chronic obstructive pulmonary disease     - PRN duonebs       Hypothyroidism  Hold Thyroid supplementation for 7 days and then reduce dose to 100 mcg daily.      Functional quadriplegia     - routine supportive care          Septic shock      - suspect secondary to urosepsis  - cultures drawn and urine and blood cx positive (Proteus sp - ESBL and Staph respectively)  - history of pseudomonal infection - colonization.  - follow up cultures and continue current care  - Patient with hypoglycemia. Tube feedings resumed. Monitor blood sugar.        VTE Risk Mitigation         Ordered     enoxaparin injection 40 mg  Daily     Route:  Subcutaneous        10/09/17 1409     Medium Risk of VTE  Once      10/07/17 0149     Place EYAD hose  Until discontinued      10/07/17 0149     Place sequential compression device  Until discontinued      10/07/17 0149        Melissa Mayfield MD  Department of Hospital Medicine   [facility]

## 2017-10-13 NOTE — PLAN OF CARE
10/12/17 2034   Patient Assessment/Suction   Level of Consciousness (AVPU) alert   Respiratory Effort Normal;Unlabored   Expansion/Accessory Muscles/Retractions no retractions;no use of accessory muscles   All Lung Fields Breath Sounds clear;equal bilaterally   Cough Type none;other (see comments)  (Pt declined suctioning.)   PRE-TX-O2-ETCO2   O2 Device (Oxygen Therapy) ventilator   Oxygen Concentration (%) 28   SpO2 100 %   Pulse Oximetry Type Continuous   ETCO2 (mmHg) 28 mmHg   Pulse 64   Resp 14   Vent Select   Conventional Vent Y   Charged w/in last 24h YES   Preset Conventional Ventilator Settings   Vent Type    Ventilation Type VC   Vent Mode A/C   Humidity HME   Set Rate 14 bmp   Vt Set 700 mL   PEEP/CPAP 5 cmH20   Pressure Support 0 cmH20   Waveform RAMP   Peak Flow 65 L/min   Set Inspiratory Pressure 0 cmH20   Insp Time 0 Sec(s)   Plateau Set/Insp. Hold (sec) 0   Insp Rise Time  0 %   Trigger Sensitivity Flow/I-Trigger 1 L/min   P High 0 cm H2O   P Low 0 cm H2O   T High 0 sec   T Low 0 sec   Patient Ventilator Parameters   Resp Rate Total 14 br/min   Peak Airway Pressure 34 cmH2O   Mean Airway Pressure 13 cmH20   Plateau Pressure 0 cmH20   Exhaled Vt 711 mL   Total Ve 9.98 mL   Spont Ve 0 L   I:E Ratio Measured 1:2.60   Conventional Ventilator Alarms   Ve High Alarm 25 L/min   Resp Rate High Alarm 40 br/min   Press High Alarm 60 cmH2O   Apnea Rate 14   Apnea Volume (mL) 700 mL   Apnea Oxygen Concentration  100   Apnea Flow Rate (L/min) 65   T Apnea 20 sec(s)   Ready to Wean/Extubation Screen   FIO2<=50 (chart decimal) 0.28   MV<16L (chart vol.) 9.98   PEEP <=8 (chart #) 5   Ready to Wean Parameters   F/VT Ratio<105 (RSBI) (!) 19.69

## 2017-10-13 NOTE — PROGRESS NOTES
Data reviewed. Patient sleeping and not disturbed  Blood cultures from 10/6 and 10/9 have coag neg staph. Sensitivies requested but not yet reported.   Echocardiogram difficult but no obvious vegetations  Diarrhea has slowed.  Day 4/7 for ESBL Ecoli in urine    Small chance he could return to NH tomorrow  On invanz through 12/15  Oral vanc(per tube) QID x 10d, then TID x 7d, then bid x 7 d, then daily x7d then stop  Duration of vanc dependent on cultures/sensitivities(feel that both are contaminants but if they are identical, would give IV vanc until 10/19

## 2017-10-13 NOTE — PLAN OF CARE
Problem: Patient Care Overview  Goal: Individualization & Mutuality  Outcome: Ongoing (interventions implemented as appropriate)  Pt continues to voice frustration concerning not being able to verbalize his wishes due to the air in the cuff of his trach.  Reassurance provided.  No acute distress noted.

## 2017-10-13 NOTE — PLAN OF CARE
Problem: Nutrition, Enteral (Adult)  Goal: Signs and Symptoms of Listed Potential Problems Will be Absent, Minimized or Managed (Nutrition, Enteral)  Signs and symptoms of listed potential problems will be absent, minimized or managed by discharge/transition of care (reference Nutrition, Enteral (Adult) CPG).   Outcome: Ongoing (interventions implemented as appropriate)  Recommendations  Recommendation/Intervention:   1.) Continue with Nutren 1.5 @ goal rate 55 mls/hr continuous providing 1980 kcals/day, 90 g protein/day, 232 g CHO/day, and 1008 mls water/day. Hold TF x4 hrs for residuals >250mls. Flush 1 packet beneprotein (mixed in 2 oz water daily). Flush 160 mls free water Q4 hrs or per MD.   Goals: 1.) patient will tolerate TF at goal rate 2.) patient will meet >80% of EEN  Nutrition Goal Status: 1.) goal met/ongoing 2.) new  Communication of MARQUIS Recs: reviewed with RN

## 2017-10-13 NOTE — PLAN OF CARE
Problem: Patient Care Overview  Goal: Plan of Care Review  Outcome: Ongoing (interventions implemented as appropriate)  Pt on vent as ordered with trach care per shift

## 2017-10-13 NOTE — PROGRESS NOTES
Progress Note  Pulmonary/Critical Care      Admit Date: 10/6/2017    SUBJECTIVE:     HPI/Interval history (See H&P for complete P,F,SHx) :     Stable overnight, no new respiratory issues reported.  Pt is chronically on ventilator and has trach.    Review of Systems: List if applicable    Pain scale: 0/10    Review of Systems   Unable to perform ROS: Other (trach, ventilator)       OBJECTIVE:     Vital Signs Range (Last 24H):  Temp:  [96.2 °F (35.7 °C)-97.2 °F (36.2 °C)]   Pulse:  [55-68]   Resp:  [14-21]   BP: (119-178)/()   SpO2:  [97 %-100 %]     I & O (Last 24H):    Intake/Output Summary (Last 24 hours) at 10/13/17 1421  Last data filed at 10/13/17 1400   Gross per 24 hour   Intake             1770 ml   Output             4150 ml   Net            -2380 ml       Estimated body mass index is 32.77 kg/m² as calculated from the following:    Height as of this encounter: 6' (1.829 m).    Weight as of this encounter: 109.6 kg (241 lb 10 oz).    Vent Settings- Vent Mode: A/C  Oxygen Concentration (%):  [28] 28  Resp Rate Total:  [14 br/min-25 br/min] 14 br/min  Vt Set:  [700 mL] 700 mL  PEEP/CPAP:  [5 cmH20] 5 cmH20  Pressure Support:  [0 cmH20] 0 cmH20  Mean Airway Pressure:  [11 khO57-87 cmH20] 13 cmH20    ABG  No results for input(s): PH, PO2, PCO2, HCO3, BE in the last 168 hours.    Physical Exam:  Physical Exam   Constitutional: He is well-developed, well-nourished, and in no distress. No distress.   HENT:   Head: Normocephalic and atraumatic.   Eyes: Conjunctivae are normal. Pupils are equal, round, and reactive to light.   Neck: Normal range of motion. Neck supple. No JVD present.   trach   Cardiovascular: Normal rate, regular rhythm and normal heart sounds.  Exam reveals no gallop and no friction rub.    No murmur heard.  Pulmonary/Chest: Effort normal. No stridor. No respiratory distress. He has no wheezes. He has no rales. He exhibits no tenderness.   ventilator   Abdominal: Soft. Bowel sounds are  normal. He exhibits no distension. There is no tenderness. There is no rebound.   Musculoskeletal: He exhibits no edema or tenderness.   Lymphadenopathy:     He has no cervical adenopathy.   Neurological: He is alert.   Responds, difficult to communicate with   Skin: Skin is warm and dry. He is not diaphoretic.   Vitals reviewed.      Laboratory/Diagnostic Data:    Recent Results (from the past 336 hour(s))   CBC auto differential    Collection Time: 10/13/17  4:10 AM   Result Value Ref Range    WBC 7.10 3.90 - 12.70 K/uL    Hemoglobin 10.8 (L) 14.0 - 18.0 g/dL    Hematocrit 32.0 (L) 40.0 - 54.0 %    Platelets 229 150 - 350 K/uL   CBC auto differential    Collection Time: 10/12/17  3:19 AM   Result Value Ref Range    WBC 6.30 3.90 - 12.70 K/uL    Hemoglobin 10.8 (L) 14.0 - 18.0 g/dL    Hematocrit 31.7 (L) 40.0 - 54.0 %    Platelets 192 150 - 350 K/uL   CBC auto differential    Collection Time: 10/11/17  2:39 AM   Result Value Ref Range    WBC 6.40 3.90 - 12.70 K/uL    Hemoglobin 10.8 (L) 14.0 - 18.0 g/dL    Hematocrit 31.4 (L) 40.0 - 54.0 %    Platelets 186 150 - 350 K/uL       Recent Results (from the past 336 hour(s))   Basic metabolic panel    Collection Time: 10/11/17  2:39 AM   Result Value Ref Range    Sodium 136 136 - 145 mmol/L    Potassium 2.9 (L) 3.5 - 5.1 mmol/L    Chloride 104 95 - 110 mmol/L    CO2 21 (L) 23 - 29 mmol/L    BUN, Bld 18 8 - 23 mg/dL    Creatinine 0.7 0.5 - 1.4 mg/dL    Calcium 9.4 8.7 - 10.5 mg/dL    Anion Gap 11 8 - 16 mmol/L   Basic metabolic panel    Collection Time: 10/10/17  6:10 AM   Result Value Ref Range    Sodium 138 136 - 145 mmol/L    Potassium 3.0 (L) 3.5 - 5.1 mmol/L    Chloride 108 95 - 110 mmol/L    CO2 22 (L) 23 - 29 mmol/L    BUN, Bld 27 (H) 8 - 23 mg/dL    Creatinine 0.8 0.5 - 1.4 mg/dL    Calcium 9.2 8.7 - 10.5 mg/dL    Anion Gap 8 8 - 16 mmol/L       Lab Results   Component Value Date    ALT 21 10/13/2017    AST 23 10/13/2017    ALKPHOS 78 10/13/2017    BILITOT 0.5  10/13/2017       Invalid input(s): PT,  INR,  APTT    Microbiology    Microbiology Results (last 7 days)     Procedure Component Value Units Date/Time    Blood culture [[id number]] Collected:  10/09/17 1832    Order Status:  Completed Specimen:  Blood Updated:  10/13/17 1349     Blood Culture, Routine Gram stain peds bottle: Gram positive cocci in clusters resembling Staph      Blood Culture, Routine Results called to and read back by: Yolanda Díaz RN  10/11/2017  00:12     Blood Culture, Routine --     STAPHYLOCOCCUS EPIDERMIDIS  Susceptibility pending      Narrative:       From picc    Stool culture [[id number]] Collected:  10/09/17 1817    Order Status:  Completed Specimen:  Stool from Stool Updated:  10/13/17 1118     Stool Culture No Salmonella,Shigella,Vibrio,Campylobacter,Yersinia isolated.    Blood culture [[id number]] Collected:  10/11/17 2008    Order Status:  Completed Specimen:  Blood Updated:  10/13/17 0613     Blood Culture, Routine No Growth to date     Blood Culture, Routine No Growth to date    Narrative:       From picc    Blood culture x two cultures. Draw prior to antibiotics. [[id number]] Collected:  10/06/17 2305    Order Status:  Completed Specimen:  Blood from Peripheral, Right  Hand Updated:  10/12/17 1212     Blood Culture, Routine No growth after 5 days.    Narrative:       Aerobic and anaerobic    IV catheter culture [[id number]] Collected:  10/09/17 1816    Order Status:  Completed Specimen:  Catheter Tip from Catheter Tip, Subclavian Updated:  10/12/17 1104     Aerobic Culture - Cath tip No growth    Culture, Respiratory with Gram Stain [[id number]]  (Susceptibility) Collected:  10/07/17 1900    Order Status:  Completed Specimen:  Respiratory from Tracheal Aspirate Updated:  10/11/17 1232     Respiratory Culture --     PSEUDOMONAS AERUGINOSA   Moderate  Normal respiratory gabriela also present       Gram Stain (Respiratory) <10 epithelial cells per low power field.     Gram Stain (Respiratory)  Few WBC's     Gram Stain (Respiratory) Moderate Gram negative rods     Gram Stain (Respiratory) Few Gram positive cocci    E. coli 0157 antigen [[id number]] Collected:  10/09/17 1817    Order Status:  Completed Specimen:  Stool from Stool Updated:  10/11/17 1014     Shiga Toxin 1 E.coli Negative     Shiga Toxin 2 E.coli Negative    Blood culture x two cultures. Draw prior to antibiotics. [[id number]] Collected:  10/06/17 2300    Order Status:  Completed Specimen:  Blood from Line, PICC Left  Arm Updated:  10/10/17 1119     Blood Culture, Routine Gram stain peds bottle: Gram positive cocci in clusters resembling Staph     Blood Culture, Routine Results called to and read back by: Alexsander Chand RN     Blood Culture, Routine 10/08/2017  07:56     Blood Culture, Routine --     COAGULASE-NEGATIVE STAPHYLOCOCCUS SPECIES  Organism is a probable contaminant      Narrative:       Aerobic and anaerobic    C Diff Toxin by PCR [[id number]]  (Abnormal) Collected:  10/09/17 1817    Order Status:  Completed Updated:  10/10/17 0159     C. diff PCR Positive (A)    Clostridium difficile EIA [[id number]]  (Abnormal) Collected:  10/09/17 1817    Order Status:  Completed Specimen:  Stool from Stool Updated:  10/09/17 2227     C. diff Antigen Positive (A)     C difficile Toxins A+B, EIA Negative     Comment: Testing not recommended for children <24 months old.       Urine culture [[id number]]  (Susceptibility) Collected:  10/06/17 2215    Order Status:  Completed Specimen:  Urine from Urine, Catheterized Updated:  10/08/17 2143     Urine Culture, Routine --     PROTEUS MIRABILIS ESBL  > 100,000 cfu/ml            Radiology    Imaging Results          X-Ray Chest 1 View for PICC_Central line (Final result)  Result time 10/09/17 15:43:04    Final result by Alexsander Adiran Jr., MD (10/09/17 15:43:04)                 Impression:     PICC in good position without pneumothorax. Tracheostomy tube in place. Prior sternotomy.      Electronically  signed by: Alexsander Adrian MD  Date:     10/09/17  Time:    15:43              Narrative:    A PICC has been placed on the right and ends in the distal superior vena cava. Tracheostomy tube remains in position. The patient has had a prior sternotomy. Cardiac size is within normal limits. There is no pulmonary mass or infiltrate is seen.                             X-Ray Chest AP Portable (Final result)  Result time 10/07/17 04:27:22    Final result by Lissa Mcmahan MD (10/07/17 04:27:22)                 Impression:      1.  Hypoventilatory view demonstrating mild increase in interstitial markings which are similar compared to the prior study. Differential considerations include chronic disease and mild CHF.        Electronically signed by: Lissa Mcmahan MD  Date:     10/07/17  Time:    04:27              Narrative:    Comparison: 3/24/17    Technique: Single AP portable chest radiograph.    Findings:Tracheostomy tube tip projects over the mid trachea. ACDF changes are noted. Cardiac size is normal. There is aortic tortuosity. Changes of sternotomy and CABG are noted. Slightly elevated right hemidiaphragm noted. Mildly increased interstitial markings are seen in each lung. No pleural abnormality or pneumothorax is seen. Degenerative changes are seen in the shoulders.                                Medications:     aspirin  325 mg Oral Daily    bacitracin   Topical (Top) BID    bethanechol  15 mg Oral TID    cholestyramine-aspartame  1 packet Per G Tube BID    duloxetine  30 mg Oral Daily    enoxaparin  40 mg Subcutaneous Daily    ertapenem (INVANZ) IVPB  1 g Intravenous Q24H    famotidine (PF)  20 mg Intravenous Q12H    finasteride  5 mg Oral Daily    gentamicin  2 drop Right Eye Q4H    lactobacillus acidophilus & bulgar  1 packet Per G Tube BID    [START ON 10/17/2017] levothyroxine  100 mcg Oral Before breakfast    liothyronine  75 mcg Oral Before breakfast    potassium phosphate IVPB  20  mmol Intravenous Once    rivastigmine  1 patch Transdermal Daily    terazosin  1 mg Oral Daily    vancomycin (VANCOCIN) IVPB  1,500 mg Intravenous Q24H    vancomycin  250 mg Oral Q6H            acetaminophen, baclofen, calcium gluconate IVPB, calcium gluconate IVPB, calcium gluconate IVPB, diphenoxylate-atropine 2.5-0.025 mg, haloperidol lactate, hydrocodone-acetaminophen 10-325mg, influenza, magnesium sulfate IVPB, magnesium sulfate IVPB, potassium chloride **AND** potassium chloride **AND** potassium chloride, sodium phosphate IVPB, sodium phosphate IVPB, sodium phosphate IVPB    ASSESSMENT/PLAN:     Active Problems:    Active Hospital Problems    Diagnosis  POA    Urinary tract infection without hematuria [N39.0]  Yes    PEG (percutaneous endoscopic gastrostomy) status [Z93.1]  Not Applicable    Hypotension [I95.9]  Yes    Functional quadriplegia [R53.2]  Yes    Chronic obstructive pulmonary disease [J44.9]  Yes    Sepsis [A41.9]  Yes      Resolved Hospital Problems    Diagnosis Date Resolved POA   No resolved problems to display.     1.  Urosepsis. - E Coli ESBL       - teat per ID  2.  Chronic respiratory failure.- chronic vent       - stable on ventilator  3.  Dementia.       - aware  4.  Anemia.       - no acute blood loss  5.  History of congestive heart failure.       - seems compensated at this time  6.  Coronary artery disease.       - aware  7.  Gastroesophageal reflux.       - aware  8.  Hypertension.       - aware  9.  Hypothyroidism, on replacement therapy       - aware  10.  SC + Pseudomonas         - likely colonized         - follow for now  11.  + BC - coag neg Staph         - per ID    Respiratory status remains stable.    I will continue to follow with the pt.  Please call me at [phone] if you have any questions.      Shai Bai MD

## 2017-10-13 NOTE — PROGRESS NOTES
[facility]  Adult Nutrition  Progress Note    SUMMARY     Recommendations  Recommendation/Intervention:   1.) Continue with Nutren 1.5 @ goal rate 55 mls/hr continuous providing 1980 kcals/day, 90 g protein/day, 232 g CHO/day, and 1008 mls water/day. Hold TF x4 hrs for residuals >250mls. Flush 1 packet beneprotein (mixed in 2 oz water daily). Flush 160 mls free water Q4 hrs or per MD.   Goals: 1.) patient will tolerate TF at goal rate 2.) patient will meet >80% of EEN  Nutrition Goal Status: 1.) goal met/ongoing 2.) new  Communication of RD Recs: reviewed with RN    1. Sepsis, due to unspecified organism    2. Bacteremia due to coagulase-negative Staphylococcus    3. Bradycardia    4. Chronic obstructive pulmonary disease, unspecified COPD type    5. Functional quadriplegia    6. Hypotension, unspecified hypotension type    7. PEG (percutaneous endoscopic gastrostomy) status    8. Urinary tract infection without hematuria, site unspecified      Past Medical History:   Diagnosis Date    AAA (abdominal aortic aneurysm)     Anemia     CHF (congestive heart failure)     COPD (chronic obstructive pulmonary disease)     Coronary artery disease     Dementia     Dementia     Depression     GERD (gastroesophageal reflux disease)     Hyperlipidemia     Hypertension     Hypothyroid     Renal disorder     Respiratory failure, chronic     Ventilator dependence          Reason for Assessment  Reason for Assessment: nurse/nurse practitioner consult  Interdisciplinary Rounds: attended  General Information Comments: Admtis from [place] with fever. Vent dependent patient s/p peg.   Nutren infusing @ 55 mls/hr. Tolerating well per RN. c-diff noted. RN reports low K/phos.       Nutrition Prescription Ordered  Current Diet Order: NPO  Current Nutrition Support Formula Ordered:  (Nutren 1.5)  Current Nutrition Support Rate Ordered: 55 (ml)  Current Nutrition Support Frequency Ordered: continuous         Evaluation of Received Nutrients/Fluid Intake  Enteral Calories (kcal): 1980  Enteral Protein (gm): 90  Enteral (Free Water) Fluid (mL): 1008  Free Water Flush Fluid (mL): 320 (per i/os)  IV Fluid (mL): 362 (per i/os)  % Intake of Estimated Energy Needs: 75 - 100 %  % Meal Intake: NPO     Nutrition Risk Screen  Nutrition Risk Screen: dysphagia or difficulty swallowing, tube feeding or parenteral nutrition    Nutrition/Diet History  Food Preferences: [facility]  Factors Affecting Nutritional Intake: on mechanical ventilation, NPO    Labs/Tests/Procedures/Meds  Diagnostic Test/Procedure Review: reviewed, pertinent  Pertinent Labs Reviewed: reviewed, pertinent  BMP  Lab Results   Component Value Date     (L) 10/13/2017    K 4.0 10/13/2017     10/13/2017    CO2 26 10/13/2017    BUN 12 10/13/2017    CREATININE 0.7 10/13/2017    CALCIUM 9.0 10/13/2017    ANIONGAP 5 (L) 10/13/2017    ESTGFRAFRICA >60 10/13/2017    EGFRNONAA >60 10/13/2017     Lab Results   Component Value Date    ALBUMIN 2.2 (L) 10/13/2017     Lab Results   Component Value Date    CALCIUM 9.0 10/13/2017    PHOS 2.6 (L) 10/13/2017     No results for input(s): POCTGLUCOSE in the last 24 hours.    Pertinent Medications Reviewed: reviewed  Scheduled Meds:   aspirin  325 mg Oral Daily    bacitracin   Topical (Top) BID    bethanechol  15 mg Oral TID    cholestyramine-aspartame  1 packet Per G Tube BID    duloxetine  30 mg Oral Daily    enoxaparin  40 mg Subcutaneous Daily    ertapenem (INVANZ) IVPB  1 g Intravenous Q24H    famotidine (PF)  20 mg Intravenous Q12H    finasteride  5 mg Oral Daily    gentamicin  2 drop Right Eye Q4H    lactobacillus acidophilus & bulgar  1 packet Per G Tube BID    [START ON 10/17/2017] levothyroxine  100 mcg Oral Before breakfast    liothyronine  75 mcg Oral Before breakfast    potassium phosphate IVPB  20 mmol Intravenous Once    rivastigmine  1 patch Transdermal Daily    terazosin  1 mg Oral Daily     vancomycin (VANCOCIN) IVPB  1,500 mg Intravenous Q24H    vancomycin  250 mg Oral Q6H       Physical Findings  Overall Physical Appearance: on ventilator support  Tubes: gastrostomy tube  Oral/Mouth Cavity: WDL  Skin: pressure ulcer(s) (stage I. apolonia score 12)    Anthropometrics  Temp: 96.8 °F (36 °C)  Height: 6'  Weight Method: Bed Scale  Weight: 109.6 kg (241 lb 10 oz)  Ideal Body Weight (IBW), Male: 178 lb  % Ideal Body Weight, Male (lb): 135.75 lb  BMI (Calculated): 32.8  BMI Grade: 30 - 34.9- obesity - grade I  Usual Body Weight (UBW), kg:  (mike)    Estimated/Assessed Needs  Weight Used For Calorie Calculations: 109.6 kg (241 lb 10 oz)   Energy Calorie Requirements (kcal): 1894  Energy Need Method: [facility]  RMR ([place]. Jeor Equation): 1854  Weight Used For Protein Calculations: 80.9 kg (178 lb 5.6 oz) (ideal body weight)  1.2 gm Protein (gm): 97.28 and 1.5 gm Protein (gm): 121.6  Fluid Need Method: RDA Method (or per MD)  RDA Method (mL): 1990      Assessment and Plan    PEG (percutaneous endoscopic gastrostomy) status    Related to (etiology):   Excessive energy intake    Signs and Symptoms (as evidenced by):   Current rate of provides 126% of EEN     Interventions/Recommendations (treatment strategy):  Recommend Nutren 1.5 @ 55 mls/hr to provide EEN    Nutrition Diagnosis Status:   Resolved.               Monitor and Evaluation  Food and Nutrient Intake: energy intake, enteral nutrition intake  Food and Nutrient Adminstration: enteral and parenteral nutrition administration  Anthropometric Measurements: weight, weight change, body mass index  Biochemical Data, Medical Tests and Procedures: electrolyte and renal panel, glucose/endocrine profile, lipid profile  Nutrition-Focused Physical Findings: overall appearance    Nutrition Risk  Level of Risk:  (x2 weekly)    Nutrition Follow-Up  RD Follow-up?: Yes     Discharge Planning: discharge back to [place] on peg feeds above.

## 2017-10-14 PROBLEM — L89.152 SACRAL DECUBITUS ULCER, STAGE II: Status: ACTIVE | Noted: 2017-10-14

## 2017-10-14 PROBLEM — T83.511A URINARY TRACT INFECTION ASSOCIATED WITH INDWELLING URETHRAL CATHETER: Status: ACTIVE | Noted: 2017-03-21

## 2017-10-14 PROBLEM — I69.359 HEMIPARESIS AFFECTING DOMINANT SIDE AS LATE EFFECT OF STROKE: Status: ACTIVE | Noted: 2017-10-14

## 2017-10-14 LAB
ALBUMIN SERPL BCP-MCNC: 2.3 G/DL
ALP SERPL-CCNC: 84 U/L
ALT SERPL W/O P-5'-P-CCNC: 20 U/L
ANION GAP SERPL CALC-SCNC: 6 MMOL/L
AST SERPL-CCNC: 21 U/L
BASOPHILS # BLD AUTO: 0 K/UL
BASOPHILS NFR BLD: 0.2 %
BILIRUB SERPL-MCNC: 0.4 MG/DL
BUN SERPL-MCNC: 13 MG/DL
CALCIUM SERPL-MCNC: 9.3 MG/DL
CHLORIDE SERPL-SCNC: 101 MMOL/L
CO2 SERPL-SCNC: 27 MMOL/L
CREAT SERPL-MCNC: 0.7 MG/DL
DIFFERENTIAL METHOD: ABNORMAL
EOSINOPHIL # BLD AUTO: 0.7 K/UL
EOSINOPHIL NFR BLD: 8 %
ERYTHROCYTE [DISTWIDTH] IN BLOOD BY AUTOMATED COUNT: 16 %
EST. GFR  (AFRICAN AMERICAN): >60 ML/MIN/1.73 M^2
EST. GFR  (NON AFRICAN AMERICAN): >60 ML/MIN/1.73 M^2
GIANT PLATELETS BLD QL SMEAR: PRESENT
GLUCOSE SERPL-MCNC: 93 MG/DL
HCT VFR BLD AUTO: 33.6 %
HGB BLD-MCNC: 11.3 G/DL
LYMPHOCYTES # BLD AUTO: 2.7 K/UL
LYMPHOCYTES NFR BLD: 29.4 %
MCH RBC QN AUTO: 26.9 PG
MCHC RBC AUTO-ENTMCNC: 33.6 G/DL
MCV RBC AUTO: 80 FL
MONOCYTES # BLD AUTO: 0.6 K/UL
MONOCYTES NFR BLD: 6.4 %
NEUTROPHILS # BLD AUTO: 5.1 K/UL
NEUTROPHILS NFR BLD: 56 %
PLATELET # BLD AUTO: 242 K/UL
PLATELET BLD QL SMEAR: ABNORMAL
PMV BLD AUTO: 11 FL
POTASSIUM SERPL-SCNC: 3.7 MMOL/L
PROT SERPL-MCNC: 7.3 G/DL
RBC # BLD AUTO: 4.19 M/UL
SODIUM SERPL-SCNC: 134 MMOL/L
WBC # BLD AUTO: 9.2 K/UL

## 2017-10-14 PROCEDURE — 36415 COLL VENOUS BLD VENIPUNCTURE: CPT

## 2017-10-14 PROCEDURE — 94003 VENT MGMT INPAT SUBQ DAY: CPT

## 2017-10-14 PROCEDURE — 63600175 PHARM REV CODE 636 W HCPCS: Performed by: INTERNAL MEDICINE

## 2017-10-14 PROCEDURE — 85025 COMPLETE CBC W/AUTO DIFF WBC: CPT

## 2017-10-14 PROCEDURE — S0028 INJECTION, FAMOTIDINE, 20 MG: HCPCS | Performed by: EMERGENCY MEDICINE

## 2017-10-14 PROCEDURE — 99900026 HC AIRWAY MAINTENANCE (STAT)

## 2017-10-14 PROCEDURE — 25000003 PHARM REV CODE 250: Performed by: INTERNAL MEDICINE

## 2017-10-14 PROCEDURE — 20000000 HC ICU ROOM

## 2017-10-14 PROCEDURE — 25000003 PHARM REV CODE 250: Performed by: EMERGENCY MEDICINE

## 2017-10-14 PROCEDURE — 99900035 HC TECH TIME PER 15 MIN (STAT)

## 2017-10-14 PROCEDURE — 80053 COMPREHEN METABOLIC PANEL: CPT

## 2017-10-14 PROCEDURE — 94770 HC EXHALED C02 TEST: CPT

## 2017-10-14 PROCEDURE — 27000221 HC OXYGEN, UP TO 24 HOURS

## 2017-10-14 PROCEDURE — 94761 N-INVAS EAR/PLS OXIMETRY MLT: CPT

## 2017-10-14 RX ADMIN — Medication 250 MG: at 11:10

## 2017-10-14 RX ADMIN — FAMOTIDINE 20 MG: 10 INJECTION, SOLUTION INTRAVENOUS at 08:10

## 2017-10-14 RX ADMIN — TERAZOSIN HYDROCHLORIDE ANHYDROUS 1 MG: 1 CAPSULE ORAL at 08:10

## 2017-10-14 RX ADMIN — BETHANECHOL CHLORIDE 15 MG: 10 TABLET ORAL at 05:10

## 2017-10-14 RX ADMIN — SODIUM CHLORIDE 1 G: 9 INJECTION, SOLUTION INTRAVENOUS at 05:10

## 2017-10-14 RX ADMIN — BACITRACIN ZINC: 500 OINTMENT TOPICAL at 08:10

## 2017-10-14 RX ADMIN — LIOTHYRONINE SODIUM 75 MCG: 25 TABLET ORAL at 05:10

## 2017-10-14 RX ADMIN — LACTOBACILLUS ACIDOPHILUS / LACTOBACILLUS BULGARICUS 1 EACH: 100 MILLION CFU STRENGTH GRANULES at 08:10

## 2017-10-14 RX ADMIN — GENTAMICIN SULFATE 2 DROP: 3 SOLUTION/ DROPS OPHTHALMIC at 02:10

## 2017-10-14 RX ADMIN — RIVASTIGMINE TRANSDERMAL SYSTEM 1 PATCH: 9.5 PATCH, EXTENDED RELEASE TRANSDERMAL at 08:10

## 2017-10-14 RX ADMIN — VANCOMYCIN HYDROCHLORIDE 1250 MG: 1 INJECTION, POWDER, LYOPHILIZED, FOR SOLUTION INTRAVENOUS at 05:10

## 2017-10-14 RX ADMIN — HYDROCODONE BITARTRATE AND ACETAMINOPHEN 1 TABLET: 10; 325 TABLET ORAL at 08:10

## 2017-10-14 RX ADMIN — HALOPERIDOL LACTATE 5 MG: 5 INJECTION, SOLUTION INTRAMUSCULAR at 08:10

## 2017-10-14 RX ADMIN — Medication 250 MG: at 05:10

## 2017-10-14 RX ADMIN — ASPIRIN 325 MG ORAL TABLET 325 MG: 325 PILL ORAL at 08:10

## 2017-10-14 RX ADMIN — ENOXAPARIN SODIUM 40 MG: 100 INJECTION SUBCUTANEOUS at 05:10

## 2017-10-14 RX ADMIN — BETHANECHOL CHLORIDE 15 MG: 10 TABLET ORAL at 09:10

## 2017-10-14 RX ADMIN — CHOLESTYRAMINE 4 G: 4 POWDER, FOR SUSPENSION ORAL at 03:10

## 2017-10-14 RX ADMIN — HYDROCODONE BITARTRATE AND ACETAMINOPHEN 1 TABLET: 10; 325 TABLET ORAL at 12:10

## 2017-10-14 RX ADMIN — GENTAMICIN SULFATE 2 DROP: 3 SOLUTION/ DROPS OPHTHALMIC at 09:10

## 2017-10-14 RX ADMIN — FINASTERIDE 5 MG: 5 TABLET, FILM COATED ORAL at 08:10

## 2017-10-14 RX ADMIN — DIPHENOXYLATE HYDROCHLORIDE AND ATROPINE SULFATE 1 TABLET: 2.5; .025 TABLET ORAL at 12:10

## 2017-10-14 RX ADMIN — GENTAMICIN SULFATE 2 DROP: 3 SOLUTION/ DROPS OPHTHALMIC at 03:10

## 2017-10-14 RX ADMIN — DULOXETINE 30 MG: 30 CAPSULE, DELAYED RELEASE ORAL at 08:10

## 2017-10-14 RX ADMIN — BETHANECHOL CHLORIDE 15 MG: 10 TABLET ORAL at 03:10

## 2017-10-14 RX ADMIN — GENTAMICIN SULFATE 2 DROP: 3 SOLUTION/ DROPS OPHTHALMIC at 05:10

## 2017-10-14 NOTE — PROGRESS NOTES
Progress Note  [facility]    Admit Date: 10/6/2017    SUBJECTIVE:     Follow-up For:  Severe sepsis      Interval history (See H&P for complete P,F,SHx) : Patient seen and examined.  No acute events overnight.  Patient remains stable on the ventilator at baseline respiratory failure and vent status.    Review of Systems: Unable to determine- patient nonverbal      OBJECTIVE:     Vital Signs Range (Last 24H):  Temp:  [97.4 °F (36.3 °C)-98.7 °F (37.1 °C)]   Pulse:  [53-79]   Resp:  [14-33]   BP: ()/(50-93)   SpO2:  [95 %-100 %]     I & O (Last 24H):    Intake/Output Summary (Last 24 hours) at 10/14/17 2313  Last data filed at 10/14/17 2200   Gross per 24 hour   Intake             1240 ml   Output             2711 ml   Net            -1471 ml       Estimated body mass index is 33.07 kg/m² as calculated from the following:    Height as of this encounter: 6' (1.829 m).    Weight as of this encounter: 110.6 kg (243 lb 13.3 oz).    Physical Exam:  General exam- Tracheostomy patient with chronic ventilator who is alert and oriented but nonverbal secondary to tracheostomy and is in no acute distress.    HEENT pupils equal round reactive to light extraocular movements intact oropharynx is somewhat dry with poor dentition noted.    Neck exam-tracheostomy present with no evidence of secretions.    Cardiovascular exam- Regular rate and rhythm, no murmurs, gallops or rubs    Respiratory exam-Coarse bilateral sounds on the ventilator. Normal effort    Abdominal exam-  NTND, normoactive BS. Percutaneous gastrostomy in place    Extremities exam- pulses are 2+ no cyanosis clubbing or edema noted. Noted PICC line present in right brachial.    Skin exam-noted stage II sacral decubiti which is present on admission.     exam-Rahman catheter present.    Neurologic exam-patient with baseline hemiplegia and hemiparesis noted on the left side with noted spasticity and clonus as well.    Laboratory/Diagnostic  Data:  Reviewed and noted in plan where applicable- Please see chart for full lab data.    Medications:  Medication list was reviewed and changes noted under Assessment/Plan.    ASSESSMENT/PLAN:     Active Problems:    Active Hospital Problems    Diagnosis  POA    *Severe sepsis related to UTI [A41.9, R65.20]- source unknown with many possible potential routes for sepsis.  Patient's currently on therapy with ertapenem and vancomycin.  Previous cultures done from tracheostomy show positive Pseudomonas which is pan resistant with the exception of gentamicin.  Patient does have a positive staph epi from his blood which is likely a contaminant.  We will continue broad-spectrum antibiotics for now for problems probable duration of 7-10 days.  Yes    Hemiparesis affecting dominant side as late effect of stroke [I69.359]-  PT/OT consult.   Not Applicable    Sacral decubitus ulcer, stage II [L89.152]-  Wound care consulted. Turn q2  Yes    Urinary tract infection associated with indwelling urethral catheter [T83.511A, N39.0]-  Continue ABX. Continue indwelling catheter d/t decubitus ulcer. High risk for resistent infections.  Yes    PEG (percutaneous endoscopic gastrostomy) status [Z93.1]-  Routine care.  Not Applicable    Functional quadriplegia [R53.2]-  Turn q2, PT/OT.  Yes    Chronic respiratory failure with hypoxia and hypercapnia [J96.11, J96.12]- Patient's vent settings, CXR were reviewed.  Vent Mode: A/C  Oxygen Concentration (%):  [28] 28  Resp Rate Total:  [14 br/min-30 br/min] 18 br/min  Vt Set:  [700 mL] 700 mL  PEEP/CPAP:  [5 cmH20] 5 cmH20  Pressure Support:  [0 cmH20] 0 cmH20  Mean Airway Pressure:  [11 swC07-82 cmH20] 11 cmH20    Will adjust vent management as follows- Continue chronic settings.    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   Not Applicable      Resolved Hospital Problems    Diagnosis Date Resolved POA    Hypotension [I95.9] 10/14/2017 Yes     Disposition- [facility]    VTE Risk Mitigation         Ordered     enoxaparin injection 40 mg  Daily     Route:  Subcutaneous        10/09/17 1409     Medium Risk of VTE  Once      10/07/17 0149     Place EYAD hose  Until discontinued      10/07/17 0149     Place sequential compression device  Until discontinued      10/07/17 0149        Critical care time spent on the evaluation and treatment of severe organ dysfunction, review of pertinent labs and imaging studies, discussions with consulting providers and discussions with patient/family 42 minutes

## 2017-10-14 NOTE — PLAN OF CARE
Problem: Patient Care Overview  Goal: Plan of Care Review  Outcome: Ongoing (interventions implemented as appropriate)  Suctioning white/tan or white/yellow secretions from trach. BM x 2 this shift. Good Urine output with tay. Afebrile. Appeared to be sleeping at times, easily aroused. Does not want to be moved. Explained that he has to be cleaned, can not stay in stool. Rotation mode used on bed to decrease further risk of breakdown.

## 2017-10-14 NOTE — PROGRESS NOTES
"Progress Note  Infectious Disease    Follow-up For:  Sepsis,     SUBJECTIVE:   ROS: "get me out of here". Wants to go to "[place]".    Denies further diarrhea. Only 1 stool per day recorded x 2 days.  Still waiting on info from micro lab, called and spoke with someone there.     Antibiotics     Start     Stop Route Frequency Ordered    10/14/17 1830  vancomycin (VANCOCIN) 1,250 mg in dextrose 5 % 250 mL IVPB      -- IV Every 24 hours (non-standard times) 10/13/17 1908    10/12/17 2200  gentamicin 0.3 % ophthalmic solution 2 drop      10/17 2159 RIGHT EYE Every 4 hours 10/12/17 1748    10/10/17 0900  vancomycin 250mg / 10ml oral suspension 250 mg      -- Oral Every 6 hours 10/10/17 0859    10/09/17 1830  ertapenem (INVANZ) 1 g in sodium chloride 0.9 % 100 mL IVPB (ready to mix system)      -- IV Every 24 hours (non-standard times) 10/09/17 1812    10/07/17 1200  bacitracin ointment      -- Top 2 times daily 10/07/17 1047          Pertinent Medications noted:    EXAM & DIAGNOSTICS REVIEWED:   Vitals:     Temp:  [97.4 °F (36.3 °C)-98.2 °F (36.8 °C)]   Temp: 97.7 °F (36.5 °C) (10/14/17 1145)  Pulse: (!) 57 (10/14/17 1200)  Resp: 19 (10/14/17 1200)  BP: (!) 101/55 (10/14/17 1200)  SpO2: 97 % (10/14/17 1200)    Intake/Output Summary (Last 24 hours) at 10/14/17 1322  Last data filed at 10/14/17 1147   Gross per 24 hour   Intake              100 ml   Output             4995 ml   Net            -4895 ml       General:  In NAD. Looks comfortable and non toxic  Eyes:  Anicteric, PERRL, EOMI  ENT:  Mouth w/ pink MMM, no lesions/exudate,     Neck:  Trachea midline, supple, no adenopathy appreciated  Lungs:  clear  Heart:  RRR, no gallop/murmur noted  Abd:  soft, NT, ND, normal BS, no masses/organomegaly appreciated.   :  Rahman draining yellow urine, clear  Musc:  Joints without effusion, erythema, synovitis,   Skin:  Generally warm, dry, normal for color. No rashes  Wound:   Neuro: AAOx3, speech clear, quadriparetic  Extrem: No " edema, erythema, phlebitis, cellulitis, synovitis  VAD:  picc right arm    Lines/Tubes/Drains:    General Labs reviewed:    Recent Labs  Lab 10/14/17  0340   WBC 9.20   RBC 4.19*   HGB 11.3*   HCT 33.6*      MCV 80*   MCH 26.9*   MCHC 33.6       Recent Labs  Lab 10/14/17  0340   CALCIUM 9.3   PROT 7.3   *   K 3.7   CO2 27      BUN 13   CREATININE 0.7   ALKPHOS 84   ALT 20   AST 21   BILITOT 0.4       Micro: Cdiff positive  Coag neg staph from 10/6 blood cultures ,and staph epi from 10/9 blood culture, sensitivities pending to see if they are identical    Imaging Reviewed:    ASSESSMENT & PLAN      Patient Active Problem List   Diagnosis    Sepsis    Functional quadriplegia    Respiratory failure, acute-on-chronic    SIRS with acute organ dysfunction due to infectious process    Tracheostomy dependence    Chronic obstructive pulmonary disease    Hematuria    Hypotension    Urinary tract infection without hematuria    Septic shock    PEG (percutaneous endoscopic gastrostomy) status    Ventilator associated pneumonia    Acquired hypothyroidism   colonized with MDRO pseudomonas  Cdifficile colitis, diarrhea improving  Coag neg Staph in 1 blood culture  10/6 and another positive culture 10/9(ID pending)  ESBL proteus UTI with sepsis    Recommendation: On invanz through 10/15  Oral vanc(per tube) QID x 10d, then TID x 7d, then bid x 7 d, then daily x7d then stop  Duration of vanc dependent on cultures/sensitivities(feel that both are contaminants but if they are identical, would give IV vanc until 10/19 (still pending 10/14)  Ok to send back to [place] monday

## 2017-10-14 NOTE — PLAN OF CARE
10/13/17 2011   Patient Assessment/Suction   Level of Consciousness (AVPU) responds to voice   Respiratory Effort Normal;Unlabored   Expansion/Accessory Muscles/Retractions expansion symmetric;no retractions;no use of accessory muscles   PRE-TX-O2-ETCO2   O2 Device (Oxygen Therapy) ventilator   Oxygen Concentration (%) 28   SpO2 98 %   Pulse Oximetry Type Continuous   ETCO2 (mmHg) 30 mmHg   Pulse 60   Resp 14   Vent Select   Conventional Vent Y   Charged w/in last 24h YES   Preset Conventional Ventilator Settings   Vent Type    Ventilation Type VC   Vent Mode A/C   Humidity HME   Set Rate 14 bmp   Vt Set 700 mL   PEEP/CPAP 5 cmH20   Pressure Support 0 cmH20   Waveform RAMP   Peak Flow 60 L/min   Set Inspiratory Pressure 0 cmH20   Insp Time 0 Sec(s)   Plateau Set/Insp. Hold (sec) 0   Insp Rise Time  0 %   Trigger Sensitivity Flow/I-Trigger 3 L/min   P High 0 cm H2O   P Low 0 cm H2O   T High 0 sec   T Low 0 sec   Patient Ventilator Parameters   Resp Rate Total 14 br/min   Peak Airway Pressure 33 cmH2O   Mean Airway Pressure 13 cmH20   Plateau Pressure 0 cmH20   Exhaled Vt 699 mL   Total Ve 9.93 mL   Spont Ve 0 L   I:E Ratio Measured 1:2.30   Conventional Ventilator Alarms   Ve High Alarm 25 L/min   Resp Rate High Alarm 40 br/min   Press High Alarm 50 cmH2O   Apnea Rate 14   Apnea Volume (mL) 700 mL   Apnea Oxygen Concentration  100   Apnea Flow Rate (L/min) 65   T Apnea 20 sec(s)   Ready to Wean/Extubation Screen   FIO2<=50 (chart decimal) 0.28   MV<16L (chart vol.) 9.93   PEEP <=8 (chart #) 5   Ready to Wean Parameters   F/VT Ratio<105 (RSBI) (!) 20.03

## 2017-10-14 NOTE — CONSULTS
Masood Messina [id number] is a 77 y.o. male who has been consulted for vancomycin dosing.    The patient has the following labs:     Date Creatinine (mg/dl)    BUN WBC Count   10/13/2017 Estimated Creatinine Clearance: 111.2 mL/min (based on SCr of 0.7 mg/dL). Lab Results   Component Value Date    BUN 12 10/13/2017     Lab Results   Component Value Date    WBC 7.10 10/13/2017        Current weight is 109.6 kg (241 lb 10 oz)    Pt is receiving vancomycin 1500 mg every 24 hours.  Vancomycin trough from 10/13 at 1815 was 20.1  mg/dL.  Target trough range is 15-20 mg/dL.   Trough was drawn about 2 hours late and anticipate it is supra/therapeutic.  Pharmacy will decrease current doses to a vancomycin dose of 1250 mg every 24 hours. A vancomycin trough has been ordered prior to 3rd dose due 10/15 at 1800.      Patient will be followed by pharmacy for changes in renal function, toxicity, and efficacy.  Thank you for allowing us to participate in this patient's care.     Faye DuenasD

## 2017-10-14 NOTE — PLAN OF CARE
Problem: Patient Care Overview  Goal: Plan of Care Review  Outcome: Ongoing (interventions implemented as appropriate)  Stable vital signs throughout shift. Afebrile. 2 small-medium BM's this shift. Pt alert and oriented to person, place , situation. Feisty at times, tried to bite nurse when applying chap stick. Awaiting sensitivities on blood cultures.  Dr. Tyson states may return to [place] on Monday. Safety maintained.

## 2017-10-15 LAB
ALBUMIN SERPL BCP-MCNC: 2.4 G/DL
ALP SERPL-CCNC: 88 U/L
ALT SERPL W/O P-5'-P-CCNC: 18 U/L
ANION GAP SERPL CALC-SCNC: 8 MMOL/L
AST SERPL-CCNC: 16 U/L
BACTERIA BLD CULT: NORMAL
BASOPHILS # BLD AUTO: 0.1 K/UL
BASOPHILS NFR BLD: 0.6 %
BILIRUB SERPL-MCNC: 0.6 MG/DL
BUN SERPL-MCNC: 14 MG/DL
CALCIUM SERPL-MCNC: 9.5 MG/DL
CHLORIDE SERPL-SCNC: 101 MMOL/L
CO2 SERPL-SCNC: 27 MMOL/L
CREAT SERPL-MCNC: 0.8 MG/DL
DIFFERENTIAL METHOD: ABNORMAL
EOSINOPHIL # BLD AUTO: 0.7 K/UL
EOSINOPHIL NFR BLD: 7.4 %
ERYTHROCYTE [DISTWIDTH] IN BLOOD BY AUTOMATED COUNT: 15.8 %
EST. GFR  (AFRICAN AMERICAN): >60 ML/MIN/1.73 M^2
EST. GFR  (NON AFRICAN AMERICAN): >60 ML/MIN/1.73 M^2
GIANT PLATELETS BLD QL SMEAR: PRESENT
GLUCOSE SERPL-MCNC: 89 MG/DL
HCT VFR BLD AUTO: 35.3 %
HGB BLD-MCNC: 11.8 G/DL
LYMPHOCYTES # BLD AUTO: 3.2 K/UL
LYMPHOCYTES NFR BLD: 34 %
MCH RBC QN AUTO: 26.8 PG
MCHC RBC AUTO-ENTMCNC: 33.3 G/DL
MCV RBC AUTO: 80 FL
MONOCYTES # BLD AUTO: 0.8 K/UL
MONOCYTES NFR BLD: 9 %
NEUTROPHILS # BLD AUTO: 4.5 K/UL
NEUTROPHILS NFR BLD: 49 %
PLATELET # BLD AUTO: 260 K/UL
PLATELET BLD QL SMEAR: ABNORMAL
PMV BLD AUTO: 11.3 FL
POTASSIUM SERPL-SCNC: 3.5 MMOL/L
PROT SERPL-MCNC: 7.5 G/DL
RBC # BLD AUTO: 4.4 M/UL
SODIUM SERPL-SCNC: 136 MMOL/L
VANCOMYCIN TROUGH SERPL-MCNC: 21.3 UG/ML
WBC # BLD AUTO: 9.3 K/UL

## 2017-10-15 PROCEDURE — 25000003 PHARM REV CODE 250: Performed by: EMERGENCY MEDICINE

## 2017-10-15 PROCEDURE — 25000003 PHARM REV CODE 250: Performed by: INTERNAL MEDICINE

## 2017-10-15 PROCEDURE — 94770 HC EXHALED C02 TEST: CPT

## 2017-10-15 PROCEDURE — 63600175 PHARM REV CODE 636 W HCPCS: Performed by: INTERNAL MEDICINE

## 2017-10-15 PROCEDURE — 94761 N-INVAS EAR/PLS OXIMETRY MLT: CPT

## 2017-10-15 PROCEDURE — 85025 COMPLETE CBC W/AUTO DIFF WBC: CPT

## 2017-10-15 PROCEDURE — S0028 INJECTION, FAMOTIDINE, 20 MG: HCPCS | Performed by: EMERGENCY MEDICINE

## 2017-10-15 PROCEDURE — 36415 COLL VENOUS BLD VENIPUNCTURE: CPT

## 2017-10-15 PROCEDURE — 80202 ASSAY OF VANCOMYCIN: CPT

## 2017-10-15 PROCEDURE — 27000221 HC OXYGEN, UP TO 24 HOURS

## 2017-10-15 PROCEDURE — 20000000 HC ICU ROOM

## 2017-10-15 PROCEDURE — 25000242 PHARM REV CODE 250 ALT 637 W/ HCPCS: Performed by: HOSPITALIST

## 2017-10-15 PROCEDURE — 94640 AIRWAY INHALATION TREATMENT: CPT

## 2017-10-15 PROCEDURE — 99900035 HC TECH TIME PER 15 MIN (STAT)

## 2017-10-15 PROCEDURE — 94003 VENT MGMT INPAT SUBQ DAY: CPT

## 2017-10-15 PROCEDURE — 80053 COMPREHEN METABOLIC PANEL: CPT

## 2017-10-15 RX ORDER — ALBUTEROL SULFATE 2.5 MG/.5ML
2.5 SOLUTION RESPIRATORY (INHALATION) EVERY 4 HOURS PRN
Status: DISCONTINUED | OUTPATIENT
Start: 2017-10-15 | End: 2017-10-16 | Stop reason: HOSPADM

## 2017-10-15 RX ADMIN — GENTAMICIN SULFATE 2 DROP: 3 SOLUTION/ DROPS OPHTHALMIC at 10:10

## 2017-10-15 RX ADMIN — CHOLESTYRAMINE 4 G: 4 POWDER, FOR SUSPENSION ORAL at 03:10

## 2017-10-15 RX ADMIN — Medication 250 MG: at 05:10

## 2017-10-15 RX ADMIN — ASPIRIN 325 MG ORAL TABLET 325 MG: 325 PILL ORAL at 08:10

## 2017-10-15 RX ADMIN — VANCOMYCIN HYDROCHLORIDE 1250 MG: 1 INJECTION, POWDER, LYOPHILIZED, FOR SOLUTION INTRAVENOUS at 06:10

## 2017-10-15 RX ADMIN — BETHANECHOL CHLORIDE 15 MG: 10 TABLET ORAL at 10:10

## 2017-10-15 RX ADMIN — FINASTERIDE 5 MG: 5 TABLET, FILM COATED ORAL at 08:10

## 2017-10-15 RX ADMIN — Medication 250 MG: at 11:10

## 2017-10-15 RX ADMIN — DULOXETINE 30 MG: 30 CAPSULE, DELAYED RELEASE ORAL at 08:10

## 2017-10-15 RX ADMIN — FAMOTIDINE 20 MG: 10 INJECTION, SOLUTION INTRAVENOUS at 08:10

## 2017-10-15 RX ADMIN — HYDROCODONE BITARTRATE AND ACETAMINOPHEN 1 TABLET: 10; 325 TABLET ORAL at 03:10

## 2017-10-15 RX ADMIN — FAMOTIDINE 20 MG: 10 INJECTION, SOLUTION INTRAVENOUS at 10:10

## 2017-10-15 RX ADMIN — SODIUM CHLORIDE 1 G: 9 INJECTION, SOLUTION INTRAVENOUS at 05:10

## 2017-10-15 RX ADMIN — LIOTHYRONINE SODIUM 75 MCG: 25 TABLET ORAL at 05:10

## 2017-10-15 RX ADMIN — LACTOBACILLUS ACIDOPHILUS / LACTOBACILLUS BULGARICUS 1 EACH: 100 MILLION CFU STRENGTH GRANULES at 08:10

## 2017-10-15 RX ADMIN — HYDROCODONE BITARTRATE AND ACETAMINOPHEN 1 TABLET: 10; 325 TABLET ORAL at 10:10

## 2017-10-15 RX ADMIN — POTASSIUM CHLORIDE 40 MEQ: 400 INJECTION, SOLUTION INTRAVENOUS at 05:10

## 2017-10-15 RX ADMIN — BETHANECHOL CHLORIDE 15 MG: 10 TABLET ORAL at 03:10

## 2017-10-15 RX ADMIN — LACTOBACILLUS ACIDOPHILUS / LACTOBACILLUS BULGARICUS 1 EACH: 100 MILLION CFU STRENGTH GRANULES at 10:10

## 2017-10-15 RX ADMIN — BETHANECHOL CHLORIDE 15 MG: 10 TABLET ORAL at 05:10

## 2017-10-15 RX ADMIN — CHOLESTYRAMINE 4 G: 4 POWDER, FOR SUSPENSION ORAL at 04:10

## 2017-10-15 RX ADMIN — GENTAMICIN SULFATE 2 DROP: 3 SOLUTION/ DROPS OPHTHALMIC at 03:10

## 2017-10-15 RX ADMIN — TERAZOSIN HYDROCHLORIDE ANHYDROUS 1 MG: 1 CAPSULE ORAL at 08:10

## 2017-10-15 RX ADMIN — RIVASTIGMINE TRANSDERMAL SYSTEM 1 PATCH: 9.5 PATCH, EXTENDED RELEASE TRANSDERMAL at 08:10

## 2017-10-15 RX ADMIN — ENOXAPARIN SODIUM 40 MG: 100 INJECTION SUBCUTANEOUS at 05:10

## 2017-10-15 RX ADMIN — ALBUTEROL SULFATE 2.5 MG: 2.5 SOLUTION RESPIRATORY (INHALATION) at 04:10

## 2017-10-15 RX ADMIN — BACITRACIN ZINC: 500 OINTMENT TOPICAL at 08:10

## 2017-10-15 NOTE — PLAN OF CARE
10/14/17 2006   PRE-TX-O2-ETCO2   Oxygen Concentration (%) 28   SpO2 100 %   ETCO2 (mmHg) 28 mmHg   Pulse 76   Resp (!) 27   BP (!) 143/69   Vent Select   Conventional Vent Y   Charged w/in last 24h YES   Preset Conventional Ventilator Settings   Vent Type    Ventilation Type VC   Vent Mode A/C   Humidity HME   Set Rate 14 bmp   Vt Set 700 mL   PEEP/CPAP 5 cmH20   Pressure Support 0 cmH20   Waveform RAMP   Peak Flow 60 L/min   Set Inspiratory Pressure 0 cmH20   Insp Time 0 Sec(s)   Plateau Set/Insp. Hold (sec) 0   Insp Rise Time  0 %   Trigger Sensitivity Flow/I-Trigger 3 L/min   P High 0 cm H2O   P Low 0 cm H2O   T High 0 sec   T Low 0 sec   Patient Ventilator Parameters   Resp Rate Total 19 br/min   Peak Airway Pressure 17 cmH2O   Mean Airway Pressure 11 cmH20   Plateau Pressure 0 cmH20   Exhaled Vt 595 mL   Total Ve 11 mL   Spont Ve 0 L   I:E Ratio Measured 1:1.50   Conventional Ventilator Alarms   Ve High Alarm 25 L/min   Resp Rate High Alarm 40 br/min   Press High Alarm 50 cmH2O   Apnea Rate 14   Apnea Volume (mL) 700 mL   Apnea Oxygen Concentration  100   Apnea Flow Rate (L/min) 65   T Apnea 20 sec(s)   Ready to Wean/Extubation Screen   FIO2<=50 (chart decimal) 0.28   MV<16L (chart vol.) 11   PEEP <=8 (chart #) 5   Ready to Wean Parameters   F/VT Ratio<105 (RSBI) (!) 45.38

## 2017-10-15 NOTE — PROGRESS NOTES
Pt agitated on assessing. Stating I'm leaving this place...leave me alone. Tries to swat at this nurse with his right arm . Incontinent of loose stool with pt placing hand in stool and trying to grab at this nurse. Medicated as ordered for agitation. Incontinent care done with diaper placed so pt can't reach stool.

## 2017-10-15 NOTE — PROGRESS NOTES
"Pt voiced to nurse that he was "having trouble breathing" and stated he needed a resp tx. Albuterol was ordered Q4PRN. Upon entering room, pt resting quietly in no apparent distress. Albuterol tx administered. There appears to be no change in pts status. Pt sx following tx. No secretions obtained. Pt stated "you must have got it earlier"  "

## 2017-10-15 NOTE — PROGRESS NOTES
"Pt confused this AM. Pt asking "where's the men's room". Re-oriented patient that he no longer walks. Pt thinks he just came in last night.   "

## 2017-10-15 NOTE — PROGRESS NOTES
10/15/17 0650   Patient Assessment/Suction   Level of Consciousness (AVPU) alert   All Lung Fields Breath Sounds diminished   Cough Frequency with stimulation   Sputum Amount small   Sputum Color yellow, pale   Sputum Consistency thick   PRE-TX-O2-ETCO2   O2 Device (Oxygen Therapy) ventilator   $ Is the patient on Oxygen? Yes   Oxygen Concentration (%) 28   SpO2 98 %   Pulse Oximetry Type Continuous   ETCO2 (mmHg) 29 mmHg   $ ETCO2 Charge Exhaled CO2 Monitoring   $ ETCO2 Usage Currently wearing   Pulse 71   Resp (!) 22       Surgical Airway Portex Cuffed;Fenestrated   No Placement Date or Time found.   Present Prior to Hospital Arrival?: Yes  Inserted by: Present Prior to Hospital Arrival  Type: Tracheostomy  Brand: Portex  Airway Device Size: 8.0  Style: Cuffed;Fenestrated   Status Secured   Site Assessment Clean;Dry   Ties Assessment Clean   Vent Select   Conventional Vent Y   Ventilator Initiated No   $ Ventilator Subsequent 1   Charged w/in last 24h YES   IHI Ventilator Associated Pneumonia Bundle   Daily Awakening Trials Performed Not applicable   Head of Bed Elevated  HOB 30   Preset Conventional Ventilator Settings   Vent Type    Ventilation Type VC   Vent Mode A/C   Humidity HME   Set Rate 14 bmp   Vt Set 700 mL   PEEP/CPAP 5 cmH20   Pressure Support 0 cmH20   Waveform RAMP   Peak Flow 60 L/min   Set Inspiratory Pressure 0 cmH20   Insp Time 0 Sec(s)   Plateau Set/Insp. Hold (sec) 0   Insp Rise Time  0 %   Trigger Sensitivity Flow/I-Trigger 3 L/min   P High 0 cm H2O   P Low 0 cm H2O   T High 0 sec   T Low 0 sec   Patient Ventilator Parameters   Resp Rate Total 18 br/min   Peak Airway Pressure 27 cmH2O   Mean Airway Pressure 13 cmH20   Plateau Pressure 0 cmH20   Exhaled Vt 1373 mL   Total Ve 11.4 mL   Spont Ve 0 L   I:E Ratio Measured 1:2.30   Conventional Ventilator Alarms   Ve High Alarm 25 L/min   Resp Rate High Alarm 40 br/min   Press High Alarm 50 cmH2O   Apnea Rate 14   Apnea Volume (mL) 700 mL    Apnea Oxygen Concentration  100   Apnea Flow Rate (L/min) 65   T Apnea 20 sec(s)   Ready to Wean/Extubation Screen   FIO2<=50 (chart decimal) 0.28   MV<16L (chart vol.) 11.4   PEEP <=8 (chart #) 5   Ready to Wean Parameters   F/VT Ratio<105 (RSBI) (!) 16.02

## 2017-10-15 NOTE — PROGRESS NOTES
Data reviewed remotely  Awaiting info from micro on blood cultures (matching or not 10/6 and 10/9)  No new recs  Back to [place] tomorrow  Stop invanz today

## 2017-10-15 NOTE — PLAN OF CARE
Problem: Patient Care Overview  Goal: Plan of Care Review  Outcome: Ongoing (interventions implemented as appropriate)  Pt incontinent of 2 loose stools this shift. When giving meds per peg pt will try to slap nurses hands away. Curses at nurse. Refuses to have meds instilled to eye. Afebrile. Vss. For transfer to [place] Monday.

## 2017-10-16 VITALS
HEART RATE: 65 BPM | WEIGHT: 243.81 LBS | BODY MASS INDEX: 33.02 KG/M2 | SYSTOLIC BLOOD PRESSURE: 104 MMHG | OXYGEN SATURATION: 98 % | RESPIRATION RATE: 16 BRPM | DIASTOLIC BLOOD PRESSURE: 68 MMHG | TEMPERATURE: 98 F | HEIGHT: 72 IN

## 2017-10-16 PROBLEM — J44.9 CHRONIC OBSTRUCTIVE PULMONARY DISEASE: Status: ACTIVE | Noted: 2017-10-16

## 2017-10-16 LAB
ALBUMIN SERPL BCP-MCNC: 2.5 G/DL
ALP SERPL-CCNC: 90 U/L
ALT SERPL W/O P-5'-P-CCNC: 18 U/L
ANION GAP SERPL CALC-SCNC: 7 MMOL/L
AST SERPL-CCNC: 18 U/L
BASOPHILS # BLD AUTO: 0.1 K/UL
BASOPHILS NFR BLD: 1.1 %
BILIRUB SERPL-MCNC: 0.6 MG/DL
BUN SERPL-MCNC: 15 MG/DL
CALCIUM SERPL-MCNC: 9.5 MG/DL
CHLORIDE SERPL-SCNC: 101 MMOL/L
CO2 SERPL-SCNC: 26 MMOL/L
CREAT SERPL-MCNC: 0.8 MG/DL
DIFFERENTIAL METHOD: ABNORMAL
EOSINOPHIL # BLD AUTO: 0.5 K/UL
EOSINOPHIL NFR BLD: 5.8 %
ERYTHROCYTE [DISTWIDTH] IN BLOOD BY AUTOMATED COUNT: 15.7 %
EST. GFR  (AFRICAN AMERICAN): >60 ML/MIN/1.73 M^2
EST. GFR  (NON AFRICAN AMERICAN): >60 ML/MIN/1.73 M^2
GLUCOSE SERPL-MCNC: 88 MG/DL
HCT VFR BLD AUTO: 34.3 %
HGB BLD-MCNC: 11.6 G/DL
LYMPHOCYTES # BLD AUTO: 2.5 K/UL
LYMPHOCYTES NFR BLD: 28.4 %
MCH RBC QN AUTO: 26.9 PG
MCHC RBC AUTO-ENTMCNC: 33.7 G/DL
MCV RBC AUTO: 80 FL
MONOCYTES # BLD AUTO: 0.7 K/UL
MONOCYTES NFR BLD: 8.2 %
NEUTROPHILS # BLD AUTO: 5 K/UL
NEUTROPHILS NFR BLD: 56.5 %
PLATELET # BLD AUTO: 284 K/UL
PMV BLD AUTO: 10.2 FL
POTASSIUM SERPL-SCNC: 3.8 MMOL/L
PROT SERPL-MCNC: 7.5 G/DL
RBC # BLD AUTO: 4.31 M/UL
SODIUM SERPL-SCNC: 134 MMOL/L
WBC # BLD AUTO: 8.9 K/UL

## 2017-10-16 PROCEDURE — 63600175 PHARM REV CODE 636 W HCPCS: Performed by: INTERNAL MEDICINE

## 2017-10-16 PROCEDURE — 27000221 HC OXYGEN, UP TO 24 HOURS

## 2017-10-16 PROCEDURE — 36415 COLL VENOUS BLD VENIPUNCTURE: CPT

## 2017-10-16 PROCEDURE — 99900026 HC AIRWAY MAINTENANCE (STAT)

## 2017-10-16 PROCEDURE — 25000003 PHARM REV CODE 250: Performed by: INTERNAL MEDICINE

## 2017-10-16 PROCEDURE — 99900035 HC TECH TIME PER 15 MIN (STAT)

## 2017-10-16 PROCEDURE — 99239 HOSP IP/OBS DSCHRG MGMT >30: CPT | Mod: ,,, | Performed by: INTERNAL MEDICINE

## 2017-10-16 PROCEDURE — S0028 INJECTION, FAMOTIDINE, 20 MG: HCPCS | Performed by: EMERGENCY MEDICINE

## 2017-10-16 PROCEDURE — 94770 HC EXHALED C02 TEST: CPT

## 2017-10-16 PROCEDURE — 94003 VENT MGMT INPAT SUBQ DAY: CPT

## 2017-10-16 PROCEDURE — 94761 N-INVAS EAR/PLS OXIMETRY MLT: CPT

## 2017-10-16 PROCEDURE — 25000003 PHARM REV CODE 250: Performed by: EMERGENCY MEDICINE

## 2017-10-16 PROCEDURE — 85025 COMPLETE CBC W/AUTO DIFF WBC: CPT

## 2017-10-16 PROCEDURE — 27200966 HC CLOSED SUCTION SYSTEM

## 2017-10-16 PROCEDURE — 80053 COMPREHEN METABOLIC PANEL: CPT

## 2017-10-16 RX ORDER — SELENIUM SULFIDE 2.5 MG/100ML
5 LOTION TOPICAL
COMMUNITY
End: 2019-01-01

## 2017-10-16 RX ORDER — GENTAMICIN SULFATE 3 MG/ML
2 SOLUTION/ DROPS OPHTHALMIC EVERY 4 HOURS
Start: 2017-10-16 | End: 2017-10-17

## 2017-10-16 RX ORDER — LEVOTHYROXINE SODIUM 100 UG/1
100 TABLET ORAL
Qty: 30 TABLET | Refills: 11 | Status: SHIPPED | OUTPATIENT
Start: 2017-10-17 | End: 2019-01-01 | Stop reason: DRUGHIGH

## 2017-10-16 RX ORDER — HYDROCODONE BITARTRATE AND ACETAMINOPHEN 10; 325 MG/1; MG/1
1 TABLET ORAL EVERY 6 HOURS PRN
Qty: 5 TABLET | Refills: 0 | Status: ON HOLD | OUTPATIENT
Start: 2017-10-16 | End: 2019-01-01 | Stop reason: HOSPADM

## 2017-10-16 RX ORDER — BUTYROSPERMUM PARKII(SHEA BUTTER), SIMMONDSIA CHINENSIS (JOJOBA) SEED OIL, ALOE BARBADENSIS LEAF EXTRACT .01; 1; 3.5 G/100G; G/100G; G/100G
250 LIQUID TOPICAL 2 TIMES DAILY
COMMUNITY
End: 2019-01-01

## 2017-10-16 RX ORDER — CHOLESTYRAMINE 4 G/4.8G
1 POWDER, FOR SUSPENSION ORAL 2 TIMES DAILY
Qty: 180 PACKET | Refills: 3 | Status: ON HOLD | OUTPATIENT
Start: 2017-10-16 | End: 2019-01-01 | Stop reason: HOSPADM

## 2017-10-16 RX ORDER — ACETAMINOPHEN 650 MG/20.3ML
650 LIQUID ORAL EVERY 4 HOURS PRN
COMMUNITY
Start: 2017-10-16 | End: 2019-01-01

## 2017-10-16 RX ORDER — BACITRACIN ZINC 500 UNIT/G
OINTMENT (GRAM) TOPICAL 2 TIMES DAILY
Refills: 0 | COMMUNITY
Start: 2017-10-16 | End: 2019-01-01

## 2017-10-16 RX ORDER — HYDROCORTISONE 25 MG/G
CREAM TOPICAL 3 TIMES DAILY
Qty: 20 G | Refills: 0
Start: 2017-10-16 | End: 2019-01-01

## 2017-10-16 RX ADMIN — FINASTERIDE 5 MG: 5 TABLET, FILM COATED ORAL at 09:10

## 2017-10-16 RX ADMIN — HYDROCODONE BITARTRATE AND ACETAMINOPHEN 1 TABLET: 10; 325 TABLET ORAL at 12:10

## 2017-10-16 RX ADMIN — CHOLESTYRAMINE 4 G: 4 POWDER, FOR SUSPENSION ORAL at 03:10

## 2017-10-16 RX ADMIN — Medication 250 MG: at 12:10

## 2017-10-16 RX ADMIN — BETHANECHOL CHLORIDE 15 MG: 10 TABLET ORAL at 02:10

## 2017-10-16 RX ADMIN — ASPIRIN 325 MG ORAL TABLET 325 MG: 325 PILL ORAL at 09:10

## 2017-10-16 RX ADMIN — TERAZOSIN HYDROCHLORIDE ANHYDROUS 1 MG: 1 CAPSULE ORAL at 09:10

## 2017-10-16 RX ADMIN — HYDROCODONE BITARTRATE AND ACETAMINOPHEN 1 TABLET: 10; 325 TABLET ORAL at 05:10

## 2017-10-16 RX ADMIN — Medication 250 MG: at 06:10

## 2017-10-16 RX ADMIN — Medication 250 MG: at 05:10

## 2017-10-16 RX ADMIN — BACITRACIN ZINC: 500 OINTMENT TOPICAL at 09:10

## 2017-10-16 RX ADMIN — ENOXAPARIN SODIUM 40 MG: 100 INJECTION SUBCUTANEOUS at 05:10

## 2017-10-16 RX ADMIN — FAMOTIDINE 20 MG: 10 INJECTION, SOLUTION INTRAVENOUS at 09:10

## 2017-10-16 RX ADMIN — GENTAMICIN SULFATE 2 DROP: 3 SOLUTION/ DROPS OPHTHALMIC at 06:10

## 2017-10-16 RX ADMIN — GENTAMICIN SULFATE 2 DROP: 3 SOLUTION/ DROPS OPHTHALMIC at 02:10

## 2017-10-16 RX ADMIN — BETHANECHOL CHLORIDE 15 MG: 10 TABLET ORAL at 05:10

## 2017-10-16 RX ADMIN — DULOXETINE 30 MG: 30 CAPSULE, DELAYED RELEASE ORAL at 09:10

## 2017-10-16 RX ADMIN — LIOTHYRONINE SODIUM 75 MCG: 25 TABLET ORAL at 05:10

## 2017-10-16 RX ADMIN — LACTOBACILLUS ACIDOPHILUS / LACTOBACILLUS BULGARICUS 1 EACH: 100 MILLION CFU STRENGTH GRANULES at 09:10

## 2017-10-16 RX ADMIN — GENTAMICIN SULFATE 2 DROP: 3 SOLUTION/ DROPS OPHTHALMIC at 09:10

## 2017-10-16 RX ADMIN — HYDROCODONE BITARTRATE AND ACETAMINOPHEN 1 TABLET: 10; 325 TABLET ORAL at 11:10

## 2017-10-16 RX ADMIN — RIVASTIGMINE TRANSDERMAL SYSTEM 1 PATCH: 9.5 PATCH, EXTENDED RELEASE TRANSDERMAL at 09:10

## 2017-10-16 NOTE — CONSULTS
aMsood Messina [id number] is a 77 y.o. male who has been consulted for vancomycin dosing.    The patient has the following labs:     Date Creatinine (mg/dl)    WBC Count   10/15/2017 Estimated Creatinine Clearance: 97.7 mL/min (based on SCr of 0.8 mg/dL). Lab Results   Component Value Date    WBC 9.30 10/15/2017        Current weight is 110.6 kg (243 lb 13.3 oz)    Pt is receiving vancomycin 1250 mg every 24 hours.  Vancomycin trough from 10/15/2017  at 1825  was 21.3 mg/dL.  Target trough range is 15-20 mg/dL.   Trough was drawn on time and anticipate it is  Supratherapeutic. Pharmacy will continue current dose because level is less than 10% of goal.   The vancomycin trough has been ordered for 10/18/17 at 1800 .     Patient will be followed by pharmacy for changes in renal function, toxicity, and efficacy.    Thank you for allowing us to participate in this patient's care.     Jose Leiva RP

## 2017-10-16 NOTE — PROGRESS NOTES
Progress Note  [facility] [place]    Admit Date: 10/6/2017    SUBJECTIVE:     Follow-up For:  Severe sepsis      Interval history (See H&P for complete P,F,SHx) : Patient seen and examined.  No acute events overnight.  Plan of care discussed with the patient and bedside nurse.  Patient does remain intermittently confused, stating that he needs to run errands.      Review of Systems: Unable to determine- patient nonverbal      OBJECTIVE:     Vital Signs Range (Last 24H):  Temp:  [97.5 °F (36.4 °C)-98.7 °F (37.1 °C)]   Pulse:  [63-90]   Resp:  [14-39]   BP: (101-167)/(50-84)   SpO2:  [95 %-100 %]     I & O (Last 24H):    Intake/Output Summary (Last 24 hours) at 10/15/17 2124  Last data filed at 10/15/17 1900   Gross per 24 hour   Intake             2625 ml   Output             2901 ml   Net             -276 ml       Estimated body mass index is 33.07 kg/m² as calculated from the following:    Height as of this encounter: 6' (1.829 m).    Weight as of this encounter: 110.6 kg (243 lb 13.3 oz).    Physical Exam:  General exam- Tracheostomy patient with chronic ventilator who is alert and oriented but nonverbal secondary to tracheostomy and is in no acute distress.    HEENT pupils equal round reactive to light extraocular movements intact oropharynx is somewhat dry with poor dentition noted.    Neck exam-tracheostomy present with no evidence of secretions.    Cardiovascular exam- Regular rate and rhythm, no murmurs, gallops or rubs    Respiratory exam-Coarse bilateral sounds on the ventilator. Normal effort    Abdominal exam-  NTND, normoactive BS. Percutaneous gastrostomy in place    Extremities exam- pulses are 2+ no cyanosis clubbing or edema noted. Noted PICC line present in right brachial.    Skin exam-noted stage II sacral decubiti which is present on admission.     exam-Rahman catheter present.    Neurologic exam-patient with baseline hemiplegia and hemiparesis noted on the left side with noted  spasticity and clonus as well.    Laboratory/Diagnostic Data:  Reviewed and noted in plan where applicable- Please see chart for full lab data.    Medications:  Medication list was reviewed and changes noted under Assessment/Plan.    ASSESSMENT/PLAN:     Active Problems:    Active Hospital Problems    Diagnosis  POA    *Severe sepsis related to UTI [A41.9, R65.20]- source unknown with many possible potential routes for sepsis.  Patient's currently on therapy with ertapenem and vancomycin.  Previous cultures done from tracheostomy show positive Pseudomonas which is pan resistant with the exception of gentamicin.  Patient does have a positive staph epi from his blood which is likely a contaminant.  We will continue broad-spectrum antibiotics for now for probable duration of 7 days per infectious disease.  Yes    Hemiparesis affecting dominant side as late effect of stroke [I69.359]-  PT/OT consult.   Not Applicable    Sacral decubitus ulcer, stage II [L89.152]-  Wound care consulted. Turn q2  Yes    Urinary tract infection associated with indwelling urethral catheter [T83.511A, N39.0]-  Continue ABX. Continue indwelling catheter d/t decubitus ulcer. High risk for resistent infections.  Yes    PEG (percutaneous endoscopic gastrostomy) status [Z93.1]-  Routine care.  Not Applicable    Functional quadriplegia [R53.2]-  Turn q2, PT/OT.  Yes    Chronic respiratory failure with hypoxia and hypercapnia [J96.11, J96.12]- Patient's vent settings, CXR were reviewed.  Vent Mode: A/C  Oxygen Concentration (%):  [28] 28  Resp Rate Total:  [14 br/min-24 br/min] 19 br/min  Vt Set:  [700 mL] 700 mL  PEEP/CPAP:  [5 cmH20] 5 cmH20  Pressure Support:  [0 cmH20] 0 cmH20  Mean Airway Pressure:  [12 anZ26-53 cmH20] 16 cmH20    Will adjust vent management as follows- Continue chronic settings.    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   Not Applicable      Resolved Hospital Problems    Diagnosis Date Resolved POA     Hypotension [I95.9] 10/14/2017 Yes     Disposition- [facility]    VTE Risk Mitigation         Ordered     enoxaparin injection 40 mg  Daily     Route:  Subcutaneous        10/09/17 1409     Medium Risk of VTE  Once      10/07/17 0149     Place EYAD hose  Until discontinued      10/07/17 0149     Place sequential compression device  Until discontinued      10/07/17 0149        Critical care time spent on the evaluation and treatment of severe organ dysfunction, review of pertinent labs and imaging studies, discussions with consulting providers and discussions with patient/family 37 minutes

## 2017-10-16 NOTE — DISCHARGE INSTRUCTIONS
Admit to [facility]   Dx-   Patient Active Problem List    Diagnosis Date Noted    Hemiparesis affecting dominant side as late effect of stroke 10/14/2017    Sacral decubitus ulcer, stage II 10/14/2017    Urinary tract infection associated with indwelling urethral catheter 03/21/2017    Septic shock 03/21/2017    PEG (percutaneous endoscopic gastrostomy) status 03/21/2017    Ventilator associated pneumonia 03/21/2017    Acquired hypothyroidism 03/21/2017    Hematuria 01/23/2014    Functional quadriplegia 12/05/2013    Respiratory failure, acute-on-chronic 12/05/2013    SIRS with acute organ dysfunction due to infectious process 12/05/2013    Tracheostomy dependent 12/05/2013    Chronic respiratory failure with hypoxia and hypercapnia 12/05/2013    Severe sepsis related to UTI 12/04/2013     Diet- NPO  TF- Nutren 1.5 @55cc/hr HOLD TUBE FEED X4 HOURS FOR RESIDUALS OVER 250 ML  FLUSH WITH 160 ML FREE WATER EVERY 4 HOURS    Aspiration Precautions   Keep HOB @30 degrees   PEG tube care per protocol    Vent settings Vent Mode: A/C  Oxygen Concentration (%):  [28] 28  Resp Rate Total:  [14 br/min-26 br/min] 26 br/min  Vt Set:  [700 mL] 700 mL  PEEP/CPAP:  [5 cmH20] 5 cmH20  Pressure Support:  [0 cmH20] 0 cmH20  Mean Airway Pressure:  [12 yiH44-21 cmH20] 16 cmH20  Trach care per protocol    Activity as tolerated   Turn q2 while in bed     Oral Vancomycin 250mg (per tube) QID x 3d, then TID x 7d, then bid x 7 d, then daily x7d then stop; Dx- Cdifficile colitis; stop date- 11/9/17

## 2017-10-16 NOTE — PLAN OF CARE
10/15/17 2010   Patient Assessment/Suction   Level of Consciousness (AVPU) alert   Respiratory Effort Normal;Unlabored   Expansion/Accessory Muscles/Retractions expansion symmetric   All Lung Fields Breath Sounds coarse   Rhythm/Pattern, Respiratory ventilator assisted   Cough Frequency infrequent   PRE-TX-O2-ETCO2   O2 Device (Oxygen Therapy) ventilator   $ Is the patient on Oxygen? Yes   Oxygen Concentration (%) 28   SpO2 98 %   Pulse Oximetry Type Continuous   $ Pulse Oximetry - Multiple Charge Pulse Oximetry - Multiple   ETCO2 (mmHg) 31 mmHg   $ ETCO2 Charge Exhaled CO2 Monitoring   $ ETCO2 Usage Currently wearing   Pulse 74   Resp (!) 22   Aerosol Therapy   $ Aerosol Therapy Charges PRN treatment not required       Surgical Airway Portex Cuffed;Fenestrated   No Placement Date or Time found.   Present Prior to Hospital Arrival?: Yes  Inserted by: Present Prior to Hospital Arrival  Type: Tracheostomy  Brand: Portex  Airway Device Size: 8.0  Style: Cuffed;Fenestrated   Cuff Pressure 22 cm H2O   Status Secured   Site Assessment Clean;Dry   Ties Assessment Clean;Dry;Intact   Vent Select   Conventional Vent Y   Ventilator Initiated No   Charged w/in last 24h YES   Preset Conventional Ventilator Settings   Vent Type    Ventilation Type VC   Vent Mode A/C   Humidity HME   Set Rate 14 bmp   Vt Set 700 mL   PEEP/CPAP 5 cmH20   Pressure Support 0 cmH20   Waveform RAMP   Peak Flow 60 L/min   Set Inspiratory Pressure 0 cmH20   Insp Time 0 Sec(s)   Plateau Set/Insp. Hold (sec) 0   Insp Rise Time  0 %   Trigger Sensitivity Flow/I-Trigger 3 L/min   P High 0 cm H2O   P Low 0 cm H2O   T High 0 sec   T Low 0 sec   Patient Ventilator Parameters   Resp Rate Total 19 br/min   Peak Airway Pressure 36 cmH2O   Mean Airway Pressure 16 cmH20   Plateau Pressure 0 cmH20   Exhaled Vt 905 mL   Total Ve 13.4 mL   Spont Ve 0 L   I:E Ratio Measured 1:1.40   Conventional Ventilator Alarms   Ve High Alarm 25 L/min   Resp Rate High Alarm 40  br/min   Press High Alarm 50 cmH2O   Apnea Rate 14   Apnea Volume (mL) 700 mL   Apnea Oxygen Concentration  100   Apnea Flow Rate (L/min) 65   T Apnea 20 sec(s)   Ready to Wean/Extubation Screen   FIO2<=50 (chart decimal) 0.28   MV<16L (chart vol.) 13.4   PEEP <=8 (chart #) 5   Ready to Wean Parameters   F/VT Ratio<105 (RSBI) (!) 24.31

## 2017-10-16 NOTE — PROGRESS NOTES
I sent a 3 day placement packet and case management consult with IV recommendations to [facility] via [employer]. I also sent the signed and dated AVS and prescription via [facility]. Agahta Perkins LMSW      11:18 am- I spoke to Yg at [facility] [phone], she is aware of the discharge and does not have an isolation room available. She is working on a room assignment and will call me back. Agatha Perkins LMSW

## 2017-10-16 NOTE — PROGRESS NOTES
Blood cultures from 10/6 and 10/9 are different, therefore vanc IV can be stopped  Invanz was stopped yesterday  Can go back to NH on Oral vanc(per tube) QID x 10d, then TID x 7d, then bid x 7 d, then daily x7d then stop    Can remove picc if you wish prior to discharge  thanks

## 2017-10-16 NOTE — CONSULTS
Masood Messina [id number] is a 77 y.o. male who had been consulted for vancomycin dosing.    Vancomycin has been discontinued.  Pharmacy consult for vancomycin dosing in no longer required.      Thank you for allowing us to participate in this patient's care.     Lenore Vargas

## 2017-10-16 NOTE — PROGRESS NOTES
I spoke to Chikis at [facility] [phone] and updated her with the pts nurses name and phone number in ICU. She is going to call for report and the pt will transport via ambulance. Agatha Perkins LMSW

## 2017-10-16 NOTE — PROGRESS NOTES
I completed the ambulance transport form and added [facility] ambulance auth # [id number]. I placed the form in the pts blue folder. Agatha Perkins LMSW

## 2017-10-16 NOTE — PLAN OF CARE
Problem: Patient Care Overview  Goal: Plan of Care Review  Outcome: Ongoing (interventions implemented as appropriate)  Pt C/O left hand and left foot pain relieved with Norco. 2 small green mucous BM's this shift. Remains afebrile. No new skin breakdown. Safety maintained. Plan to discharge patient back to [place] tomorrow.

## 2017-10-16 NOTE — DISCHARGE SUMMARY
Discharge Summary  Hospital Medicine    Admit Date: 10/6/2017    Date and Time: 10/16/205621:45 AM    Discharge Attending Physician: Melissa Mayfield MD    Primary Care Physician: Jeramie Lombardi MD    Diagnoses:  Active Hospital Problems    Diagnosis  POA    *Severe sepsis related to UTI [A41.9, R65.20]  Acute C. 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 Male with PMHx significant for functional quadraplegia, CAD, hx psedumonas with ventilator dependency currently living at [facility] and here because of a noted fever at the nursing with associated tachycardia and relative hypotension.  In the ED he had a temperature up to 100.9 and was mildly hypotensive but responsive to fluids.  Labs significant for 40 WBC in urine and no other apparent source, so suspicion was high at that time for urosepsis.  Vanc and Zosyn were initiated and he was sent to the ICU.  He has remained HD stable with BP's on the low side of normal at this time.  Otherwise, no other acute issues. Patient was admitted to Hospitalist medicine service. Patient was evaluated by Dr. Tyson. Patient noted to have UTI and C. Difficile colitis. Patient's symptoms improved with IV antibiotic use. Patient was discharged to nursing home in stable condition with following discharge plan of care and antibiotic recommendations as per   Jah. Total time with the patient was 30 minutes and greater than 50% was spent in counseling and coordination of care. The assessment and plan have been discussed at length. Physicians' notes reviewed. Labs and procedure reviewed.     Consults: Dr. Tyson    Significant Diagnostic Studies:     Microbiology Results (last 7 days)     Procedure Component Value Units Date/Time    Blood culture [[id number]] Collected:  10/11/17 2008    Order Status:  Completed Specimen:  Blood Updated:  10/16/17 0612     Blood Culture, Routine No Growth to date     Blood Culture, Routine No Growth to date     Blood Culture, Routine No Growth to date     Blood Culture, Routine No Growth to date     Blood Culture, Routine No Growth to date    Narrative:       From picc    Blood culture x two cultures. Draw prior to antibiotics. [[id number]]  (Susceptibility) Collected:  10/06/17 2300    Order Status:  Completed Specimen:  Blood from Line, PICC Left  Arm Updated:  10/15/17 1126     Blood Culture, Routine Gram stain peds bottle: Gram positive cocci in clusters resembling Staph     Blood Culture, Routine Results called to and read back by: Alexsander Chand RN     Blood Culture, Routine 10/08/2017  07:56     Blood Culture, Routine STAPHYLOCOCCUS EPIDERMIDIS    Narrative:       Aerobic and anaerobic    Blood culture [[id number]]  (Susceptibility) Collected:  10/09/17 1832    Order Status:  Completed Specimen:  Blood Updated:  10/15/17 1125     Blood Culture, Routine Gram stain peds bottle: Gram positive cocci in clusters resembling Staph      Blood Culture, Routine Results called to and read back by: Yolanda Díaz RN  10/11/2017  00:12     Blood Culture, Routine STAPHYLOCOCCUS EPIDERMIDIS    Narrative:       From picc    Stool culture [[id number]] Collected:  10/09/17 1817    Order Status:  Completed Specimen:  Stool from Stool Updated:  10/13/17 1118     Stool Culture No Salmonella,Shigella,Vibrio,Campylobacter,Yersinia isolated.    Blood  culture x two cultures. Draw prior to antibiotics. [[id number]] Collected:  10/06/17 2305    Order Status:  Completed Specimen:  Blood from Peripheral, Right  Hand Updated:  10/12/17 1212     Blood Culture, Routine No growth after 5 days.    Narrative:       Aerobic and anaerobic    IV catheter culture [[id number]] Collected:  10/09/17 1816    Order Status:  Completed Specimen:  Catheter Tip from Catheter Tip, Subclavian Updated:  10/12/17 1104     Aerobic Culture - Cath tip No growth    Culture, Respiratory with Gram Stain [[id number]]  (Susceptibility) Collected:  10/07/17 1900    Order Status:  Completed Specimen:  Respiratory from Tracheal Aspirate Updated:  10/11/17 1232     Respiratory Culture --     PSEUDOMONAS AERUGINOSA   Moderate  Normal respiratory gabriela also present       Gram Stain (Respiratory) <10 epithelial cells per low power field.     Gram Stain (Respiratory) Few WBC's     Gram Stain (Respiratory) Moderate Gram negative rods     Gram Stain (Respiratory) Few Gram positive cocci    E. coli 0157 antigen [[id number]] Collected:  10/09/17 1817    Order Status:  Completed Specimen:  Stool from Stool Updated:  10/11/17 1014     Shiga Toxin 1 E.coli Negative     Shiga Toxin 2 E.coli Negative    C Diff Toxin by PCR [[id number]]  (Abnormal) Collected:  10/09/17 1817    Order Status:  Completed Updated:  10/10/17 0159     C. diff PCR Positive (A)    Clostridium difficile EIA [[id number]]  (Abnormal) Collected:  10/09/17 1817    Order Status:  Completed Specimen:  Stool from Stool Updated:  10/09/17 2227     C. diff Antigen Positive (A)     C difficile Toxins A+B, EIA Negative     Comment: Testing not recommended for children <24 months old.           Special Treatments/Procedures: None  Disposition: Nursing home    Medications:  Reconciled Home Medications:   Current Discharge Medication List      START taking these medications    Details   acetaminophen (TYLENOL) 650 mg/20.3 mL Soln 20.3 mLs (650 mg total) by  Per G Tube route every 4 (four) hours as needed.      bacitracin 500 unit/gram Oint Apply topically 2 (two) times daily. Apply to PEG site with daily dressing change  Refills: 0      cholestyramine-aspartame (QUESTRAN LIGHT) 4 gram PwPk 1 packet (4 g total) by Per G Tube route 2 (two) times daily. Discontinue if no bowel movement in a day  Qty: 180 packet, Refills: 3      gentamicin (GARAMYCIN) 0.3 % ophthalmic solution Place 2 drops into the right eye every 4 (four) hours.      hydrocortisone 2.5 % cream Apply topically 3 (three) times daily.  Qty: 20 g, Refills: 0      vancomycin 250mg / 10ml Susp Take 250 mg QID via PEG for 3 days then  Take 250 mg TID via PEG for 7 days then  Take 250 mg BID via PEG for 7 days then  Take 250 mg daily via PEG for 7 days then STOP.  Qty: 40 mL, Refills: 0         CONTINUE these medications which have CHANGED    Details   hydrocodone-acetaminophen 10-325mg (NORCO)  mg Tab 1 tablet by Per G Tube route every 6 (six) hours as needed for Pain.  Qty: 5 tablet, Refills: 0      levothyroxine (SYNTHROID) 100 MCG tablet Take 1 tablet (100 mcg total) by mouth before breakfast.  Qty: 30 tablet, Refills: 11         CONTINUE these medications which have NOT CHANGED    Details   ascorbic acid, vitamin C, (VITAMIN C) 500 mg/5 mL Syrp syrup 500 mg by PEG Tube route 2 (two) times daily.       aspirin 325 MG tablet 325 mg by PEG Tube route once daily.       baclofen (LIORESAL) 20 MG tablet 20 mg by PEG Tube route every 6 (six) hours as needed (muscle spasms).       bethanechol (URECHOLINE) 10 MG Tab Take 15 mg by mouth 3 (three) times daily.      D3/E/SE/SOY ISOFL/TOCOPH/LYCOP (PROSTATE 2.4 ORAL) Take 30 mLs by mouth 4 (four) times daily.      duloxetine (CYMBALTA) 30 MG capsule 30 mg by PEG Tube route once daily.       enoxaparin (LOVENOX) 40 mg/0.4 mL Syrg Inject 40 mg into the skin once daily.      famotidine (PEPCID) 20 MG tablet 1 tablet (20 mg total) by Per G Tube route 2 (two) times  daily.  Qty: 60 tablet, Refills: 11      ferrous sulfate (FEROSUL) 220 mg (44 mg iron)/5 mL Elix Take 5 mLs by mouth once daily.      finasteride (PROSCAR) 5 mg tablet 5 mg by PEG Tube route once daily.       liothyronine (CYTOMEL) 25 MCG Tab 75 mcg by PEG Tube route once daily.       melatonin 3 mg Tab 9 mg by PEG Tube route every evening.       multivit-mins-ferrous gluconat 9 mg iron/15 mL Liqd Take 15 mLs by mouth once daily.      potassium chloride (KLOR-CON) 20 mEq Pack 20 mEq by PEG Tube route 2 (two) times daily.       rivastigmine (EXELON) 9.5 mg/24 hr PT24 Place 1 patch onto the skin once daily.      Saccharomyces boulardii (FLORASTOR) 250 mg capsule 250 mg by Per G Tube route 2 (two) times daily.      selenium sulfide 2.5 % Lotn Apply 5 mLs topically twice a week.      terazosin (HYTRIN) 1 MG capsule 1 mg by PEG Tube route once daily.      albuterol-ipratropium 2.5mg-0.5mg/3mL (DUONEB) 0.5 mg-3 mg(2.5 mg base)/3 mL nebulizer solution Take 3 mLs by nebulization every 4 (four) hours as needed.             Discharge Procedure Orders  Diet general   Order Comments: PEG Feeding: Nutren 1.5 at 55 cc/hr.Water flushes 160 cc q 4 hrs     Other restrictions (specify):   Order Comments: Fall precautions   Scheduling Instructions: Rotate patient q 2 hrs     Call MD for:   Order Comments: For worsening symptoms, chest pain, shortness of breath, increased abdominal pain, high grade fever, stroke or stroke like symptoms, immediately go to the nearest Emergency Room or call 911 as soon as possible.       Follow-up Information     Jeramie Lombardi MD In 1 week.    Specialty:  Family Medicine  Contact information:  [place] [id number] [phone]

## 2017-10-16 NOTE — PLAN OF CARE
Problem: Patient Care Overview  Goal: Plan of Care Review  Outcome: Ongoing (interventions implemented as appropriate)  Afebrile. Vss. Tolerating tube feeds. Medicated for pain as ordered. More cooperative this shift. Less angry when performing tasks. Incontinent of stool. For transfer to [facility]

## 2017-10-16 NOTE — PROGRESS NOTES
I received a call from Chikis at [facility] asking if the pt is returning SNF due to the IV antibiotics and isolation needs. I informed her that Dr. Mayfield wasn't ordering SNF and the antibiotics is only for 3 days. She asked if we were going to send her a PHN denial. I explained that there is nothing to deny because Dr. Mayfield has not ordered SNF and the [facility] is [facility]. I updated TAMERA De Jesus CM. Agatha Perkins LMSW

## 2017-10-16 NOTE — PLAN OF CARE
Per Dr Tyson the pt does NOT need IV Vancomycin after today. I updated the AVS and notified Chikis at [facility]...TAMERA Guthrie CM

## 2017-10-17 LAB — BACTERIA BLD CULT: NORMAL

## 2017-10-17 NOTE — PLAN OF CARE
10/17/17 1433   Final Note   Assessment Type Final Discharge Note   Discharge Disposition [facility] care [facility]

## 2018-03-15 ENCOUNTER — APPOINTMENT (OUTPATIENT)
Dept: LAB | Facility: HOSPITAL | Age: 78
End: 2018-03-15
Attending: FAMILY MEDICINE
Payer: MEDICARE

## 2018-03-15 DIAGNOSIS — Z99.11: Primary | ICD-10-CM

## 2018-03-15 DIAGNOSIS — R13.12 OROPHARYNGEAL DYSPHAGIA: ICD-10-CM

## 2018-03-15 DIAGNOSIS — R41.82 CHANGE IN MENTAL STATUS: ICD-10-CM

## 2018-03-15 DIAGNOSIS — J96.00 ACUTE RESPIRATORY FAILURE: ICD-10-CM

## 2018-03-15 DIAGNOSIS — Z93.1 GASTROSTOMY STATUS: ICD-10-CM

## 2018-03-15 LAB
BACTERIA #/AREA URNS HPF: ABNORMAL /HPF
BILIRUB UR QL STRIP: NEGATIVE
CLARITY UR: ABNORMAL
COLOR UR: YELLOW
GLUCOSE UR QL STRIP: NEGATIVE
HGB UR QL STRIP: ABNORMAL
HYALINE CASTS #/AREA URNS LPF: 0 /LPF
KETONES UR QL STRIP: NEGATIVE
LEUKOCYTE ESTERASE UR QL STRIP: ABNORMAL
MICROSCOPIC COMMENT: ABNORMAL
NITRITE UR QL STRIP: POSITIVE
PH UR STRIP: 7 [PH] (ref 5–8)
PROT UR QL STRIP: ABNORMAL
RBC #/AREA URNS HPF: >100 /HPF (ref 0–4)
SP GR UR STRIP: 1.01 (ref 1–1.03)
URN SPEC COLLECT METH UR: ABNORMAL
UROBILINOGEN UR STRIP-ACNC: NEGATIVE EU/DL
WBC #/AREA URNS HPF: >100 /HPF (ref 0–5)

## 2018-03-15 PROCEDURE — 87088 URINE BACTERIA CULTURE: CPT

## 2018-03-15 PROCEDURE — 87077 CULTURE AEROBIC IDENTIFY: CPT

## 2018-03-15 PROCEDURE — 87086 URINE CULTURE/COLONY COUNT: CPT

## 2018-03-15 PROCEDURE — 81000 URINALYSIS NONAUTO W/SCOPE: CPT

## 2018-03-15 PROCEDURE — 87186 SC STD MICRODIL/AGAR DIL: CPT

## 2018-03-17 LAB — BACTERIA UR CULT: NORMAL

## 2018-06-26 ENCOUNTER — APPOINTMENT (OUTPATIENT)
Dept: LAB | Facility: HOSPITAL | Age: 78
End: 2018-06-26
Attending: FAMILY MEDICINE
Payer: MEDICARE

## 2018-06-26 DIAGNOSIS — R41.82 ALTERED MENTAL STATUS: Primary | ICD-10-CM

## 2018-06-26 LAB
BACTERIA #/AREA URNS HPF: ABNORMAL /HPF
BILIRUB UR QL STRIP: NEGATIVE
CLARITY UR: ABNORMAL
COLOR UR: YELLOW
GLUCOSE UR QL STRIP: NEGATIVE
HGB UR QL STRIP: ABNORMAL
KETONES UR QL STRIP: NEGATIVE
LEUKOCYTE ESTERASE UR QL STRIP: ABNORMAL
MICROSCOPIC COMMENT: ABNORMAL
NITRITE UR QL STRIP: NEGATIVE
PH UR STRIP: 6 [PH] (ref 5–8)
PROT UR QL STRIP: NEGATIVE
RBC #/AREA URNS HPF: 5 /HPF (ref 0–4)
SP GR UR STRIP: 1.01 (ref 1–1.03)
URN SPEC COLLECT METH UR: ABNORMAL
UROBILINOGEN UR STRIP-ACNC: NEGATIVE EU/DL
WBC #/AREA URNS HPF: 30 /HPF (ref 0–5)
WBC CLUMPS URNS QL MICRO: ABNORMAL

## 2018-06-26 PROCEDURE — 87077 CULTURE AEROBIC IDENTIFY: CPT

## 2018-06-26 PROCEDURE — 87186 SC STD MICRODIL/AGAR DIL: CPT

## 2018-06-26 PROCEDURE — 87088 URINE BACTERIA CULTURE: CPT

## 2018-06-26 PROCEDURE — 87086 URINE CULTURE/COLONY COUNT: CPT

## 2018-06-26 PROCEDURE — 81000 URINALYSIS NONAUTO W/SCOPE: CPT

## 2018-06-30 LAB — BACTERIA UR CULT: NORMAL

## 2018-07-26 ENCOUNTER — APPOINTMENT (OUTPATIENT)
Dept: LAB | Facility: HOSPITAL | Age: 78
End: 2018-07-26
Attending: FAMILY MEDICINE
Payer: MEDICARE

## 2018-07-26 DIAGNOSIS — R41.82 ALTERED MENTAL STATUS: ICD-10-CM

## 2018-07-26 DIAGNOSIS — D72.829 LEUKOCYTOSIS (LEUCOCYTOSIS): Primary | ICD-10-CM

## 2018-07-26 LAB
BACTERIA #/AREA URNS HPF: ABNORMAL /HPF
BILIRUB UR QL STRIP: NEGATIVE
CLARITY UR: ABNORMAL
COLOR UR: YELLOW
GLUCOSE UR QL STRIP: NEGATIVE
HGB UR QL STRIP: ABNORMAL
HYALINE CASTS #/AREA URNS LPF: 0 /LPF
KETONES UR QL STRIP: NEGATIVE
LEUKOCYTE ESTERASE UR QL STRIP: ABNORMAL
MICROSCOPIC COMMENT: ABNORMAL
NITRITE UR QL STRIP: POSITIVE
PH UR STRIP: 6 [PH] (ref 5–8)
PROT UR QL STRIP: ABNORMAL
RBC #/AREA URNS HPF: >100 /HPF (ref 0–4)
SP GR UR STRIP: 1.01 (ref 1–1.03)
SQUAMOUS #/AREA URNS HPF: 2 /HPF
URN SPEC COLLECT METH UR: ABNORMAL
UROBILINOGEN UR STRIP-ACNC: NEGATIVE EU/DL
WBC #/AREA URNS HPF: >100 /HPF (ref 0–5)

## 2018-07-26 PROCEDURE — 87077 CULTURE AEROBIC IDENTIFY: CPT

## 2018-07-26 PROCEDURE — 87088 URINE BACTERIA CULTURE: CPT

## 2018-07-26 PROCEDURE — 81000 URINALYSIS NONAUTO W/SCOPE: CPT

## 2018-07-26 PROCEDURE — 87086 URINE CULTURE/COLONY COUNT: CPT

## 2018-07-26 PROCEDURE — 87186 SC STD MICRODIL/AGAR DIL: CPT

## 2018-07-28 ENCOUNTER — LAB VISIT (OUTPATIENT)
Dept: LAB | Facility: HOSPITAL | Age: 78
End: 2018-07-28
Attending: FAMILY MEDICINE
Payer: MEDICARE

## 2018-07-28 DIAGNOSIS — N39.0 UTI (URINARY TRACT INFECTION): Primary | ICD-10-CM

## 2018-07-28 LAB
BASOPHILS # BLD AUTO: 0 K/UL
BASOPHILS NFR BLD: 0.4 %
DIFFERENTIAL METHOD: ABNORMAL
EOSINOPHIL # BLD AUTO: 0.6 K/UL
EOSINOPHIL NFR BLD: 7.5 %
ERYTHROCYTE [DISTWIDTH] IN BLOOD BY AUTOMATED COUNT: 15.3 %
HCT VFR BLD AUTO: 38.3 %
HGB BLD-MCNC: 12.2 G/DL
LYMPHOCYTES # BLD AUTO: 1.9 K/UL
LYMPHOCYTES NFR BLD: 22.9 %
MCH RBC QN AUTO: 27.3 PG
MCHC RBC AUTO-ENTMCNC: 31.9 G/DL
MCV RBC AUTO: 86 FL
MONOCYTES # BLD AUTO: 1.1 K/UL
MONOCYTES NFR BLD: 13.1 %
NEUTROPHILS # BLD AUTO: 4.6 K/UL
NEUTROPHILS NFR BLD: 56.1 %
PLATELET # BLD AUTO: 147 K/UL
PMV BLD AUTO: 13.5 FL
RBC # BLD AUTO: 4.47 M/UL
WBC # BLD AUTO: 8.2 K/UL

## 2018-07-28 PROCEDURE — 85025 COMPLETE CBC W/AUTO DIFF WBC: CPT

## 2018-07-28 PROCEDURE — 36415 COLL VENOUS BLD VENIPUNCTURE: CPT

## 2018-07-30 LAB — BACTERIA UR CULT: NORMAL

## 2018-11-05 ENCOUNTER — APPOINTMENT (OUTPATIENT)
Dept: LAB | Facility: HOSPITAL | Age: 78
End: 2018-11-05
Attending: NURSE PRACTITIONER
Payer: MEDICARE

## 2018-11-05 DIAGNOSIS — N39.0 UTI (URINARY TRACT INFECTION): Primary | ICD-10-CM

## 2018-11-05 LAB
BACTERIA #/AREA URNS HPF: ABNORMAL /HPF
BILIRUB UR QL STRIP: ABNORMAL
CLARITY UR: ABNORMAL
COLOR UR: ABNORMAL
GLUCOSE UR QL STRIP: ABNORMAL
HGB UR QL STRIP: ABNORMAL
HYALINE CASTS #/AREA URNS LPF: 0 /LPF
KETONES UR QL STRIP: ABNORMAL
LEUKOCYTE ESTERASE UR QL STRIP: ABNORMAL
MICROSCOPIC COMMENT: ABNORMAL
NITRITE UR QL STRIP: POSITIVE
PH UR STRIP: >8 [PH] (ref 5–8)
PROT UR QL STRIP: ABNORMAL
RBC #/AREA URNS HPF: >100 /HPF (ref 0–4)
SP GR UR STRIP: 1.01 (ref 1–1.03)
URN SPEC COLLECT METH UR: ABNORMAL
UROBILINOGEN UR STRIP-ACNC: 1 EU/DL
WBC #/AREA URNS HPF: >100 /HPF (ref 0–5)

## 2018-11-05 PROCEDURE — 87086 URINE CULTURE/COLONY COUNT: CPT

## 2018-11-05 PROCEDURE — 87077 CULTURE AEROBIC IDENTIFY: CPT

## 2018-11-05 PROCEDURE — 87088 URINE BACTERIA CULTURE: CPT

## 2018-11-05 PROCEDURE — 87186 SC STD MICRODIL/AGAR DIL: CPT | Mod: 59

## 2018-11-05 PROCEDURE — 81000 URINALYSIS NONAUTO W/SCOPE: CPT

## 2018-11-06 ENCOUNTER — APPOINTMENT (OUTPATIENT)
Dept: LAB | Facility: HOSPITAL | Age: 78
End: 2018-11-06
Attending: NURSE PRACTITIONER
Payer: MEDICARE

## 2018-11-06 DIAGNOSIS — J96.00 ACUTE RESPIRATORY FAILURE: Primary | ICD-10-CM

## 2018-11-06 PROCEDURE — 87205 SMEAR GRAM STAIN: CPT

## 2018-11-06 PROCEDURE — 87186 SC STD MICRODIL/AGAR DIL: CPT

## 2018-11-06 PROCEDURE — 87077 CULTURE AEROBIC IDENTIFY: CPT

## 2018-11-06 PROCEDURE — 87070 CULTURE OTHR SPECIMN AEROBIC: CPT

## 2018-11-07 LAB — BACTERIA UR CULT: NORMAL

## 2018-11-10 LAB
BACTERIA SPEC AEROBE CULT: NORMAL
BACTERIA SPEC AEROBE CULT: NORMAL
GRAM STN SPEC: NORMAL

## 2018-11-23 ENCOUNTER — APPOINTMENT (OUTPATIENT)
Dept: LAB | Facility: HOSPITAL | Age: 78
End: 2018-11-23
Attending: NURSE PRACTITIONER
Payer: MEDICARE

## 2018-11-23 DIAGNOSIS — N39.0 UTI (URINARY TRACT INFECTION): Primary | ICD-10-CM

## 2018-11-23 LAB
BACTERIA #/AREA URNS HPF: ABNORMAL /HPF
BILIRUB UR QL STRIP: NEGATIVE
CLARITY UR: ABNORMAL
COLOR UR: YELLOW
GLUCOSE UR QL STRIP: NEGATIVE
HGB UR QL STRIP: ABNORMAL
KETONES UR QL STRIP: NEGATIVE
LEUKOCYTE ESTERASE UR QL STRIP: ABNORMAL
MICROSCOPIC COMMENT: ABNORMAL
NITRITE UR QL STRIP: POSITIVE
PH UR STRIP: 7 [PH] (ref 5–8)
PROT UR QL STRIP: NEGATIVE
RBC #/AREA URNS HPF: 2 /HPF (ref 0–4)
SP GR UR STRIP: <=1.005 (ref 1–1.03)
URN SPEC COLLECT METH UR: 0
UROBILINOGEN UR STRIP-ACNC: NEGATIVE EU/DL
WBC #/AREA URNS HPF: 30 /HPF (ref 0–5)

## 2018-11-23 PROCEDURE — 81000 URINALYSIS NONAUTO W/SCOPE: CPT

## 2018-11-23 PROCEDURE — 87186 SC STD MICRODIL/AGAR DIL: CPT

## 2018-11-23 PROCEDURE — 87088 URINE BACTERIA CULTURE: CPT

## 2018-11-23 PROCEDURE — 87077 CULTURE AEROBIC IDENTIFY: CPT

## 2018-11-23 PROCEDURE — 87086 URINE CULTURE/COLONY COUNT: CPT

## 2018-11-26 LAB — BACTERIA UR CULT: NORMAL

## 2018-12-03 ENCOUNTER — APPOINTMENT (OUTPATIENT)
Dept: LAB | Facility: HOSPITAL | Age: 78
End: 2018-12-03
Attending: NURSE PRACTITIONER
Payer: MEDICARE

## 2018-12-03 DIAGNOSIS — N39.0 UTI (URINARY TRACT INFECTION): Primary | ICD-10-CM

## 2018-12-03 DIAGNOSIS — J96.00 ACUTE RESPIRATORY FAILURE: ICD-10-CM

## 2018-12-03 DIAGNOSIS — R13.12 OROPHARYNGEAL DYSPHAGIA: ICD-10-CM

## 2018-12-03 DIAGNOSIS — Z93.1 GASTROSTOMY STATUS: ICD-10-CM

## 2018-12-03 DIAGNOSIS — Z99.11 ENCOUNTER FOR WEANING FROM RESPIRATOR: ICD-10-CM

## 2018-12-03 LAB
BACTERIA #/AREA URNS HPF: ABNORMAL /HPF
BILIRUB UR QL STRIP: NEGATIVE
CLARITY UR: ABNORMAL
COLOR UR: YELLOW
GLUCOSE UR QL STRIP: NEGATIVE
HGB UR QL STRIP: ABNORMAL
KETONES UR QL STRIP: NEGATIVE
LEUKOCYTE ESTERASE UR QL STRIP: ABNORMAL
MICROSCOPIC COMMENT: ABNORMAL
NITRITE UR QL STRIP: POSITIVE
PH UR STRIP: 7 [PH] (ref 5–8)
PROT UR QL STRIP: NEGATIVE
RBC #/AREA URNS HPF: 5 /HPF (ref 0–4)
SP GR UR STRIP: <=1.005 (ref 1–1.03)
SQUAMOUS #/AREA URNS HPF: 2 /HPF
URN SPEC COLLECT METH UR: ABNORMAL
UROBILINOGEN UR STRIP-ACNC: NEGATIVE EU/DL
WBC #/AREA URNS HPF: >100 /HPF (ref 0–5)

## 2018-12-03 PROCEDURE — 87086 URINE CULTURE/COLONY COUNT: CPT

## 2018-12-03 PROCEDURE — 81000 URINALYSIS NONAUTO W/SCOPE: CPT

## 2018-12-04 LAB
BACTERIA UR CULT: NORMAL
BACTERIA UR CULT: NORMAL

## 2019-01-01 ENCOUNTER — OUTSIDE PLACE OF SERVICE (OUTPATIENT)
Dept: PULMONOLOGY | Facility: CLINIC | Age: 79
End: 2019-01-01
Payer: MEDICARE

## 2019-01-01 ENCOUNTER — APPOINTMENT (OUTPATIENT)
Dept: LAB | Facility: HOSPITAL | Age: 79
End: 2019-01-01
Attending: FAMILY MEDICINE
Payer: MEDICARE

## 2019-01-01 ENCOUNTER — APPOINTMENT (OUTPATIENT)
Dept: LAB | Facility: HOSPITAL | Age: 79
End: 2019-01-01
Attending: SPECIALIST
Payer: MEDICARE

## 2019-01-01 ENCOUNTER — ANESTHESIA EVENT (OUTPATIENT)
Dept: EMERGENCY MEDICINE | Facility: HOSPITAL | Age: 79
DRG: 870 | End: 2019-01-01
Payer: MEDICARE

## 2019-01-01 ENCOUNTER — HOSPITAL ENCOUNTER (INPATIENT)
Facility: HOSPITAL | Age: 79
LOS: 2 days | Discharge: NURSING, REHAB OR LONG-TERM CARE | DRG: 208 | End: 2019-09-14
Attending: EMERGENCY MEDICINE | Admitting: HOSPITALIST
Payer: MEDICARE

## 2019-01-01 ENCOUNTER — TELEPHONE (OUTPATIENT)
Dept: MEDSURG UNIT | Facility: HOSPITAL | Age: 79
End: 2019-01-01

## 2019-01-01 ENCOUNTER — HOSPITAL ENCOUNTER (INPATIENT)
Facility: HOSPITAL | Age: 79
LOS: 9 days | Discharge: OTHER INSTITUTION | DRG: 870 | End: 2019-11-14
Attending: EMERGENCY MEDICINE | Admitting: INTERNAL MEDICINE
Payer: MEDICARE

## 2019-01-01 ENCOUNTER — ANESTHESIA (OUTPATIENT)
Dept: INTENSIVE CARE | Facility: HOSPITAL | Age: 79
DRG: 870 | End: 2019-01-01
Payer: MEDICARE

## 2019-01-01 ENCOUNTER — LAB VISIT (OUTPATIENT)
Dept: LAB | Facility: HOSPITAL | Age: 79
End: 2019-01-01
Attending: SPECIALIST
Payer: MEDICARE

## 2019-01-01 ENCOUNTER — CLINICAL SUPPORT (OUTPATIENT)
Dept: CARDIOLOGY | Facility: HOSPITAL | Age: 79
DRG: 208 | End: 2019-01-01
Attending: INTERNAL MEDICINE
Payer: MEDICARE

## 2019-01-01 ENCOUNTER — HOSPITAL ENCOUNTER (INPATIENT)
Facility: HOSPITAL | Age: 79
LOS: 11 days | DRG: 870 | End: 2019-12-15
Attending: EMERGENCY MEDICINE | Admitting: INTERNAL MEDICINE
Payer: MEDICARE

## 2019-01-01 ENCOUNTER — OUTSIDE PLACE OF SERVICE (OUTPATIENT)
Dept: INFECTIOUS DISEASES | Facility: CLINIC | Age: 79
End: 2019-01-01
Payer: MEDICARE

## 2019-01-01 ENCOUNTER — ANESTHESIA EVENT (OUTPATIENT)
Dept: INTENSIVE CARE | Facility: HOSPITAL | Age: 79
DRG: 870 | End: 2019-01-01
Payer: MEDICARE

## 2019-01-01 ENCOUNTER — HOSPITAL ENCOUNTER (INPATIENT)
Facility: HOSPITAL | Age: 79
LOS: 12 days | DRG: 870 | End: 2019-10-14
Attending: EMERGENCY MEDICINE | Admitting: INTERNAL MEDICINE
Payer: MEDICARE

## 2019-01-01 ENCOUNTER — APPOINTMENT (OUTPATIENT)
Dept: LAB | Facility: HOSPITAL | Age: 79
End: 2019-01-01
Attending: NURSE PRACTITIONER
Payer: MEDICARE

## 2019-01-01 ENCOUNTER — ANESTHESIA (OUTPATIENT)
Dept: EMERGENCY MEDICINE | Facility: HOSPITAL | Age: 79
DRG: 870 | End: 2019-01-01
Payer: MEDICARE

## 2019-01-01 ENCOUNTER — HOSPITAL ENCOUNTER (INPATIENT)
Facility: HOSPITAL | Age: 79
LOS: 1 days | Discharge: NURSING, REHAB OR LONG-TERM CARE | DRG: 070 | End: 2019-11-22
Attending: EMERGENCY MEDICINE | Admitting: INTERNAL MEDICINE
Payer: MEDICARE

## 2019-01-01 VITALS
HEART RATE: 90 BPM | HEIGHT: 72 IN | BODY MASS INDEX: 41.95 KG/M2 | TEMPERATURE: 99 F | WEIGHT: 309.75 LBS | RESPIRATION RATE: 24 BRPM | DIASTOLIC BLOOD PRESSURE: 75 MMHG | OXYGEN SATURATION: 91 % | SYSTOLIC BLOOD PRESSURE: 155 MMHG

## 2019-01-01 VITALS
DIASTOLIC BLOOD PRESSURE: 71 MMHG | TEMPERATURE: 98 F | WEIGHT: 275.13 LBS | BODY MASS INDEX: 37.27 KG/M2 | SYSTOLIC BLOOD PRESSURE: 151 MMHG | HEART RATE: 71 BPM | HEIGHT: 72 IN | RESPIRATION RATE: 34 BRPM | OXYGEN SATURATION: 99 %

## 2019-01-01 VITALS — WEIGHT: 309 LBS | BODY MASS INDEX: 41.85 KG/M2 | HEIGHT: 72 IN

## 2019-01-01 VITALS
BODY MASS INDEX: 39.42 KG/M2 | HEIGHT: 72 IN | TEMPERATURE: 99 F | HEART RATE: 67 BPM | RESPIRATION RATE: 19 BRPM | OXYGEN SATURATION: 94 % | DIASTOLIC BLOOD PRESSURE: 70 MMHG | WEIGHT: 291 LBS | SYSTOLIC BLOOD PRESSURE: 149 MMHG

## 2019-01-01 VITALS
WEIGHT: 310.19 LBS | RESPIRATION RATE: 18 BRPM | SYSTOLIC BLOOD PRESSURE: 118 MMHG | HEIGHT: 74 IN | HEART RATE: 59 BPM | OXYGEN SATURATION: 100 % | TEMPERATURE: 99 F | BODY MASS INDEX: 39.81 KG/M2 | DIASTOLIC BLOOD PRESSURE: 57 MMHG

## 2019-01-01 VITALS
WEIGHT: 285.13 LBS | RESPIRATION RATE: 16 BRPM | OXYGEN SATURATION: 98 % | HEART RATE: 82 BPM | BODY MASS INDEX: 36.59 KG/M2 | TEMPERATURE: 97 F | HEIGHT: 74 IN | SYSTOLIC BLOOD PRESSURE: 147 MMHG | DIASTOLIC BLOOD PRESSURE: 77 MMHG

## 2019-01-01 DIAGNOSIS — Z99.11 ENCOUNTER FOR WEANING FROM RESPIRATOR: Primary | ICD-10-CM

## 2019-01-01 DIAGNOSIS — I50.33 ACUTE ON CHRONIC DIASTOLIC HF (HEART FAILURE): Primary | ICD-10-CM

## 2019-01-01 DIAGNOSIS — J15.69 PNEUMONIA DUE TO SERRATIA MARCESCENS: ICD-10-CM

## 2019-01-01 DIAGNOSIS — J96.12 CHRONIC RESPIRATORY FAILURE WITH HYPOXIA AND HYPERCAPNIA: ICD-10-CM

## 2019-01-01 DIAGNOSIS — L02.419 ARM ABSCESS: Primary | ICD-10-CM

## 2019-01-01 DIAGNOSIS — J18.9 PNEUMONIA: Primary | ICD-10-CM

## 2019-01-01 DIAGNOSIS — R41.82 ALTERED MENTAL STATUS: ICD-10-CM

## 2019-01-01 DIAGNOSIS — R65.21 SEPSIS DUE TO GRAM-NEGATIVE ORGANISM WITH SEPTIC SHOCK: ICD-10-CM

## 2019-01-01 DIAGNOSIS — G93.41 METABOLIC ENCEPHALOPATHY: Primary | ICD-10-CM

## 2019-01-01 DIAGNOSIS — K94.22 INFECTION OF PEG SITE: Primary | ICD-10-CM

## 2019-01-01 DIAGNOSIS — I50.9 CHF (CONGESTIVE HEART FAILURE): ICD-10-CM

## 2019-01-01 DIAGNOSIS — R41.82 ALTERED MENTAL STATUS: Primary | ICD-10-CM

## 2019-01-01 DIAGNOSIS — R05.9 COUGH: ICD-10-CM

## 2019-01-01 DIAGNOSIS — R05.8 COUGH PRODUCTIVE OF YELLOW SPUTUM: ICD-10-CM

## 2019-01-01 DIAGNOSIS — R89.9 ABNORMAL LABORATORY TEST: Primary | ICD-10-CM

## 2019-01-01 DIAGNOSIS — N39.0 URINARY TRACT INFECTION WITHOUT HEMATURIA, SITE UNSPECIFIED: ICD-10-CM

## 2019-01-01 DIAGNOSIS — R40.4 ALTERED LEVEL OF CONSCIOUSNESS: ICD-10-CM

## 2019-01-01 DIAGNOSIS — J96.00 ACUTE RESPIRATORY FAILURE: ICD-10-CM

## 2019-01-01 DIAGNOSIS — A41.9 SEPSIS WITH METABOLIC ENCEPHALOPATHY: ICD-10-CM

## 2019-01-01 DIAGNOSIS — R13.12 OROPHARYNGEAL DYSPHAGIA: ICD-10-CM

## 2019-01-01 DIAGNOSIS — R65.21 SEPTIC SHOCK: Primary | ICD-10-CM

## 2019-01-01 DIAGNOSIS — J95.851 VENTILATOR ASSOCIATED PNEUMONIA: ICD-10-CM

## 2019-01-01 DIAGNOSIS — J96.11 CHRONIC RESPIRATORY FAILURE WITH HYPOXIA: ICD-10-CM

## 2019-01-01 DIAGNOSIS — N17.9 AKI (ACUTE KIDNEY INJURY): ICD-10-CM

## 2019-01-01 DIAGNOSIS — R07.9 CHEST PAIN: ICD-10-CM

## 2019-01-01 DIAGNOSIS — J15.69: ICD-10-CM

## 2019-01-01 DIAGNOSIS — R09.89 CHEST CONGESTION: ICD-10-CM

## 2019-01-01 DIAGNOSIS — A41.9 SEPTIC SHOCK: Primary | ICD-10-CM

## 2019-01-01 DIAGNOSIS — R65.20 SEVERE SEPSIS: ICD-10-CM

## 2019-01-01 DIAGNOSIS — D72.829 LEUKOCYTOSIS: ICD-10-CM

## 2019-01-01 DIAGNOSIS — Z93.1 GASTROSTOMY STATUS: ICD-10-CM

## 2019-01-01 DIAGNOSIS — R53.2 FUNCTIONAL QUADRIPLEGIA: ICD-10-CM

## 2019-01-01 DIAGNOSIS — R78.81 BACTEREMIA DUE TO GRAM-NEGATIVE BACTERIA: Primary | ICD-10-CM

## 2019-01-01 DIAGNOSIS — Z93.0 TRACHEOSTOMY IN PLACE: ICD-10-CM

## 2019-01-01 DIAGNOSIS — A41.50 SEPSIS DUE TO GRAM-NEGATIVE ORGANISM WITH SEPTIC SHOCK: ICD-10-CM

## 2019-01-01 DIAGNOSIS — R60.9 EDEMA: ICD-10-CM

## 2019-01-01 DIAGNOSIS — A41.9 SEPTIC SHOCK: ICD-10-CM

## 2019-01-01 DIAGNOSIS — R78.81 GRAM-NEGATIVE BACTEREMIA: ICD-10-CM

## 2019-01-01 DIAGNOSIS — A41.9 SEVERE SEPSIS: ICD-10-CM

## 2019-01-01 DIAGNOSIS — J96.22 ACUTE ON CHRONIC RESPIRATORY FAILURE WITH HYPERCAPNIA: ICD-10-CM

## 2019-01-01 DIAGNOSIS — J96.11 CHRONIC RESPIRATORY FAILURE WITH HYPOXIA AND HYPERCAPNIA: ICD-10-CM

## 2019-01-01 DIAGNOSIS — R00.1 BRADYCARDIA: ICD-10-CM

## 2019-01-01 DIAGNOSIS — R65.21 SEPTIC SHOCK: ICD-10-CM

## 2019-01-01 DIAGNOSIS — R09.89 CHEST CONGESTION: Primary | ICD-10-CM

## 2019-01-01 DIAGNOSIS — R78.81 BACTEREMIA DUE TO PSEUDOMONAS: ICD-10-CM

## 2019-01-01 DIAGNOSIS — N28.9 RENAL INSUFFICIENCY: ICD-10-CM

## 2019-01-01 DIAGNOSIS — G93.41 SEPSIS WITH METABOLIC ENCEPHALOPATHY: ICD-10-CM

## 2019-01-01 DIAGNOSIS — R65.20 SEPSIS WITH METABOLIC ENCEPHALOPATHY: ICD-10-CM

## 2019-01-01 DIAGNOSIS — B96.5 BACTEREMIA DUE TO PSEUDOMONAS: ICD-10-CM

## 2019-01-01 DIAGNOSIS — G93.41 ENCEPHALOPATHY, METABOLIC: ICD-10-CM

## 2019-01-01 DIAGNOSIS — A41.9 SEPSIS: ICD-10-CM

## 2019-01-01 DIAGNOSIS — R06.02 SHORTNESS OF BREATH: ICD-10-CM

## 2019-01-01 DIAGNOSIS — R31.0 GROSS HEMATURIA: ICD-10-CM

## 2019-01-01 DIAGNOSIS — J18.9 PNEUMONIA DUE TO INFECTIOUS ORGANISM, UNSPECIFIED LATERALITY, UNSPECIFIED PART OF LUNG: ICD-10-CM

## 2019-01-01 DIAGNOSIS — E87.5 HYPERKALEMIA: Primary | ICD-10-CM

## 2019-01-01 DIAGNOSIS — E03.9 HYPOTHYROIDISM, UNSPECIFIED TYPE: ICD-10-CM

## 2019-01-01 LAB
25(OH)D3+25(OH)D2 SERPL-MCNC: 22 NG/ML (ref 30–96)
ABO + RH BLD: NORMAL
ALBUMIN SERPL BCP-MCNC: 2 G/DL (ref 3.5–5.2)
ALBUMIN SERPL BCP-MCNC: 2 G/DL (ref 3.5–5.2)
ALBUMIN SERPL BCP-MCNC: 2.1 G/DL (ref 3.5–5.2)
ALBUMIN SERPL BCP-MCNC: 2.2 G/DL (ref 3.5–5.2)
ALBUMIN SERPL BCP-MCNC: 2.3 G/DL (ref 3.5–5.2)
ALBUMIN SERPL BCP-MCNC: 2.4 G/DL (ref 3.5–5.2)
ALBUMIN SERPL BCP-MCNC: 2.5 G/DL (ref 3.5–5.2)
ALBUMIN SERPL BCP-MCNC: 2.5 G/DL (ref 3.5–5.2)
ALBUMIN SERPL BCP-MCNC: 2.6 G/DL (ref 3.5–5.2)
ALBUMIN SERPL BCP-MCNC: 2.6 G/DL (ref 3.5–5.2)
ALBUMIN SERPL BCP-MCNC: 2.7 G/DL (ref 3.5–5.2)
ALBUMIN SERPL BCP-MCNC: 2.8 G/DL (ref 3.5–5.2)
ALBUMIN SERPL BCP-MCNC: 2.8 G/DL (ref 3.5–5.2)
ALBUMIN SERPL BCP-MCNC: 2.9 G/DL (ref 3.5–5.2)
ALBUMIN SERPL BCP-MCNC: 2.9 G/DL (ref 3.5–5.2)
ALBUMIN SERPL BCP-MCNC: 3 G/DL (ref 3.5–5.2)
ALBUMIN SERPL BCP-MCNC: 3.1 G/DL (ref 3.5–5.2)
ALBUMIN SERPL BCP-MCNC: 3.2 G/DL (ref 3.5–5.2)
ALBUMIN SERPL BCP-MCNC: 3.3 G/DL (ref 3.5–5.2)
ALLENS TEST: ABNORMAL
ALP SERPL-CCNC: 101 U/L (ref 55–135)
ALP SERPL-CCNC: 105 U/L (ref 55–135)
ALP SERPL-CCNC: 106 U/L (ref 55–135)
ALP SERPL-CCNC: 118 U/L (ref 55–135)
ALP SERPL-CCNC: 118 U/L (ref 55–135)
ALP SERPL-CCNC: 135 U/L (ref 55–135)
ALP SERPL-CCNC: 64 U/L (ref 55–135)
ALP SERPL-CCNC: 67 U/L (ref 55–135)
ALP SERPL-CCNC: 68 U/L (ref 55–135)
ALP SERPL-CCNC: 70 U/L (ref 55–135)
ALP SERPL-CCNC: 72 U/L (ref 55–135)
ALP SERPL-CCNC: 72 U/L (ref 55–135)
ALP SERPL-CCNC: 74 U/L (ref 55–135)
ALP SERPL-CCNC: 75 U/L (ref 55–135)
ALP SERPL-CCNC: 75 U/L (ref 55–135)
ALP SERPL-CCNC: 77 U/L (ref 55–135)
ALP SERPL-CCNC: 77 U/L (ref 55–135)
ALP SERPL-CCNC: 78 U/L (ref 55–135)
ALP SERPL-CCNC: 79 U/L (ref 55–135)
ALP SERPL-CCNC: 79 U/L (ref 55–135)
ALP SERPL-CCNC: 82 U/L (ref 55–135)
ALP SERPL-CCNC: 82 U/L (ref 55–135)
ALP SERPL-CCNC: 83 U/L (ref 55–135)
ALP SERPL-CCNC: 83 U/L (ref 55–135)
ALP SERPL-CCNC: 84 U/L (ref 55–135)
ALP SERPL-CCNC: 84 U/L (ref 55–135)
ALP SERPL-CCNC: 85 U/L (ref 55–135)
ALT SERPL W/O P-5'-P-CCNC: 13 U/L (ref 10–44)
ALT SERPL W/O P-5'-P-CCNC: 14 U/L (ref 10–44)
ALT SERPL W/O P-5'-P-CCNC: 15 U/L (ref 10–44)
ALT SERPL W/O P-5'-P-CCNC: 16 U/L (ref 10–44)
ALT SERPL W/O P-5'-P-CCNC: 17 U/L (ref 10–44)
ALT SERPL W/O P-5'-P-CCNC: 18 U/L (ref 10–44)
ALT SERPL W/O P-5'-P-CCNC: 18 U/L (ref 10–44)
ALT SERPL W/O P-5'-P-CCNC: 19 U/L (ref 10–44)
ALT SERPL W/O P-5'-P-CCNC: 19 U/L (ref 10–44)
ALT SERPL W/O P-5'-P-CCNC: 21 U/L (ref 10–44)
ALT SERPL W/O P-5'-P-CCNC: 22 U/L (ref 10–44)
ALT SERPL W/O P-5'-P-CCNC: 24 U/L (ref 10–44)
ALT SERPL W/O P-5'-P-CCNC: 25 U/L (ref 10–44)
ALT SERPL W/O P-5'-P-CCNC: 28 U/L (ref 10–44)
ALT SERPL W/O P-5'-P-CCNC: 28 U/L (ref 10–44)
ALT SERPL W/O P-5'-P-CCNC: 29 U/L (ref 10–44)
ALT SERPL W/O P-5'-P-CCNC: 30 U/L (ref 10–44)
ALT SERPL W/O P-5'-P-CCNC: 38 U/L (ref 10–44)
AMMONIA PLAS-SCNC: 29 UMOL/L (ref 10–50)
AMMONIA PLAS-SCNC: 40 UMOL/L (ref 10–50)
AMPHET+METHAMPHET UR QL: NEGATIVE
ANION GAP SERPL CALC-SCNC: 10 MMOL/L (ref 8–16)
ANION GAP SERPL CALC-SCNC: 11 MMOL/L (ref 8–16)
ANION GAP SERPL CALC-SCNC: 12 MMOL/L (ref 8–16)
ANION GAP SERPL CALC-SCNC: 12 MMOL/L (ref 8–16)
ANION GAP SERPL CALC-SCNC: 2 MMOL/L (ref 8–16)
ANION GAP SERPL CALC-SCNC: 6 MMOL/L (ref 8–16)
ANION GAP SERPL CALC-SCNC: 7 MMOL/L (ref 8–16)
ANION GAP SERPL CALC-SCNC: 8 MMOL/L (ref 8–16)
ANION GAP SERPL CALC-SCNC: 9 MMOL/L (ref 8–16)
ANISOCYTOSIS BLD QL SMEAR: SLIGHT
AORTIC ROOT ANNULUS: 3.5 CM
AORTIC VALVE CUSP SEPERATION: 2.11 CM
APTT BLDCRRT: 32.6 SEC (ref 21–32)
AST SERPL-CCNC: 16 U/L (ref 10–40)
AST SERPL-CCNC: 17 U/L (ref 10–40)
AST SERPL-CCNC: 18 U/L (ref 10–40)
AST SERPL-CCNC: 19 U/L (ref 10–40)
AST SERPL-CCNC: 21 U/L (ref 10–40)
AST SERPL-CCNC: 22 U/L (ref 10–40)
AST SERPL-CCNC: 23 U/L (ref 10–40)
AST SERPL-CCNC: 24 U/L (ref 10–40)
AST SERPL-CCNC: 30 U/L (ref 10–40)
AST SERPL-CCNC: 30 U/L (ref 10–40)
AST SERPL-CCNC: 32 U/L (ref 10–40)
AST SERPL-CCNC: 33 U/L (ref 10–40)
AST SERPL-CCNC: 34 U/L (ref 10–40)
AST SERPL-CCNC: 35 U/L (ref 10–40)
AV INDEX (PROSTH): 0.74
AV MEAN GRADIENT: 2 MMHG
AV PEAK GRADIENT: 3 MMHG
AV VALVE AREA: 2.79 CM2
AV VELOCITY RATIO: 59.97
BACTERIA #/AREA URNS HPF: ABNORMAL /HPF
BACTERIA BLD CULT: ABNORMAL
BACTERIA BLD CULT: NORMAL
BACTERIA CATH TIP CULT: ABNORMAL
BACTERIA CATH TIP CULT: NO GROWTH
BACTERIA CATH TIP CULT: NO GROWTH
BACTERIA SPEC AEROBE CULT: ABNORMAL
BACTERIA SPEC AEROBE CULT: NORMAL
BACTERIA STL CULT: NORMAL
BACTERIA STL CULT: NORMAL
BACTERIA UR CULT: ABNORMAL
BACTERIA UR CULT: NO GROWTH
BACTERIA UR CULT: NORMAL
BARBITURATES UR QL SCN>200 NG/ML: NEGATIVE
BASOPHILS # BLD AUTO: 0.02 K/UL (ref 0–0.2)
BASOPHILS # BLD AUTO: 0.03 K/UL (ref 0–0.2)
BASOPHILS # BLD AUTO: 0.04 K/UL (ref 0–0.2)
BASOPHILS # BLD AUTO: 0.05 K/UL (ref 0–0.2)
BASOPHILS # BLD AUTO: 0.05 K/UL (ref 0–0.2)
BASOPHILS # BLD AUTO: 0.06 K/UL (ref 0–0.2)
BASOPHILS # BLD AUTO: 0.06 K/UL (ref 0–0.2)
BASOPHILS # BLD AUTO: 0.07 K/UL (ref 0–0.2)
BASOPHILS # BLD AUTO: 0.08 K/UL (ref 0–0.2)
BASOPHILS # BLD AUTO: 0.09 K/UL (ref 0–0.2)
BASOPHILS # BLD AUTO: 0.1 K/UL (ref 0–0.2)
BASOPHILS # BLD AUTO: ABNORMAL K/UL (ref 0–0.2)
BASOPHILS NFR BLD: 0 % (ref 0–1.9)
BASOPHILS NFR BLD: 0.2 % (ref 0–1.9)
BASOPHILS NFR BLD: 0.3 % (ref 0–1.9)
BASOPHILS NFR BLD: 0.3 % (ref 0–1.9)
BASOPHILS NFR BLD: 0.4 % (ref 0–1.9)
BASOPHILS NFR BLD: 0.5 % (ref 0–1.9)
BASOPHILS NFR BLD: 0.5 % (ref 0–1.9)
BASOPHILS NFR BLD: 0.6 % (ref 0–1.9)
BASOPHILS NFR BLD: 0.7 % (ref 0–1.9)
BASOPHILS NFR BLD: 0.8 % (ref 0–1.9)
BASOPHILS NFR BLD: 0.9 % (ref 0–1.9)
BASOPHILS NFR BLD: 1.1 % (ref 0–1.9)
BENZODIAZ UR QL SCN>200 NG/ML: NEGATIVE
BILIRUB SERPL-MCNC: 0.5 MG/DL (ref 0.1–1)
BILIRUB SERPL-MCNC: 0.6 MG/DL (ref 0.1–1)
BILIRUB SERPL-MCNC: 0.7 MG/DL (ref 0.1–1)
BILIRUB SERPL-MCNC: 0.8 MG/DL (ref 0.1–1)
BILIRUB SERPL-MCNC: 0.9 MG/DL (ref 0.1–1)
BILIRUB SERPL-MCNC: 0.9 MG/DL (ref 0.1–1)
BILIRUB SERPL-MCNC: 1 MG/DL (ref 0.1–1)
BILIRUB SERPL-MCNC: 1.1 MG/DL (ref 0.1–1)
BILIRUB SERPL-MCNC: 1.2 MG/DL (ref 0.1–1)
BILIRUB SERPL-MCNC: 1.3 MG/DL (ref 0.1–1)
BILIRUB SERPL-MCNC: 1.5 MG/DL (ref 0.1–1)
BILIRUB SERPL-MCNC: 1.6 MG/DL (ref 0.1–1)
BILIRUB SERPL-MCNC: 1.8 MG/DL (ref 0.1–1)
BILIRUB SERPL-MCNC: 1.8 MG/DL (ref 0.1–1)
BILIRUB UR QL STRIP: ABNORMAL
BILIRUB UR QL STRIP: NEGATIVE
BLD GP AB SCN CELLS X3 SERPL QL: NORMAL
BNP SERPL-MCNC: 150 PG/ML (ref 0–99)
BNP SERPL-MCNC: 182 PG/ML (ref 0–99)
BNP SERPL-MCNC: 185 PG/ML (ref 0–99)
BNP SERPL-MCNC: 290 PG/ML (ref 0–99)
BNP SERPL-MCNC: 80 PG/ML (ref 0–99)
BNP SERPL-MCNC: 93 PG/ML (ref 0–99)
BSA FOR ECHO PROCEDURE: 2.67 M2
BUN SERPL-MCNC: 20 MG/DL (ref 8–23)
BUN SERPL-MCNC: 20 MG/DL (ref 8–23)
BUN SERPL-MCNC: 21 MG/DL (ref 8–23)
BUN SERPL-MCNC: 21 MG/DL (ref 8–23)
BUN SERPL-MCNC: 22 MG/DL (ref 8–23)
BUN SERPL-MCNC: 22 MG/DL (ref 8–23)
BUN SERPL-MCNC: 26 MG/DL (ref 8–23)
BUN SERPL-MCNC: 28 MG/DL (ref 8–23)
BUN SERPL-MCNC: 30 MG/DL (ref 8–23)
BUN SERPL-MCNC: 31 MG/DL (ref 8–23)
BUN SERPL-MCNC: 31 MG/DL (ref 8–23)
BUN SERPL-MCNC: 34 MG/DL (ref 8–23)
BUN SERPL-MCNC: 35 MG/DL (ref 8–23)
BUN SERPL-MCNC: 36 MG/DL (ref 8–23)
BUN SERPL-MCNC: 36 MG/DL (ref 8–23)
BUN SERPL-MCNC: 37 MG/DL (ref 8–23)
BUN SERPL-MCNC: 37 MG/DL (ref 8–23)
BUN SERPL-MCNC: 38 MG/DL (ref 8–23)
BUN SERPL-MCNC: 39 MG/DL (ref 8–23)
BUN SERPL-MCNC: 40 MG/DL (ref 8–23)
BUN SERPL-MCNC: 40 MG/DL (ref 8–23)
BUN SERPL-MCNC: 41 MG/DL (ref 8–23)
BUN SERPL-MCNC: 42 MG/DL (ref 8–23)
BUN SERPL-MCNC: 43 MG/DL (ref 8–23)
BUN SERPL-MCNC: 46 MG/DL (ref 8–23)
BUN SERPL-MCNC: 47 MG/DL (ref 8–23)
BUN SERPL-MCNC: 47 MG/DL (ref 8–23)
BUN SERPL-MCNC: 48 MG/DL (ref 8–23)
BUN SERPL-MCNC: 48 MG/DL (ref 8–23)
BUN SERPL-MCNC: 49 MG/DL (ref 6–30)
BUN SERPL-MCNC: 49 MG/DL (ref 8–23)
BUN SERPL-MCNC: 51 MG/DL (ref 8–23)
BUN SERPL-MCNC: 52 MG/DL (ref 8–23)
BUN SERPL-MCNC: 53 MG/DL (ref 8–23)
BUN SERPL-MCNC: 56 MG/DL (ref 8–23)
BUN SERPL-MCNC: 57 MG/DL (ref 8–23)
BUN SERPL-MCNC: 58 MG/DL (ref 8–23)
BUN SERPL-MCNC: 58 MG/DL (ref 8–23)
BUN SERPL-MCNC: 61 MG/DL (ref 8–23)
BUN SERPL-MCNC: 62 MG/DL (ref 8–23)
BUN SERPL-MCNC: 63 MG/DL (ref 8–23)
BUN SERPL-MCNC: 66 MG/DL (ref 8–23)
BZE UR QL SCN: NEGATIVE
C DIFF GDH STL QL: NEGATIVE
C DIFF GDH STL QL: POSITIVE
C DIFF TOX A+B STL QL IA: NEGATIVE
C DIFF TOX A+B STL QL IA: NEGATIVE
C DIFF TOX GENS STL QL NAA+PROBE: POSITIVE
CALCIUM SERPL-MCNC: 10 MG/DL (ref 8.7–10.5)
CALCIUM SERPL-MCNC: 10.1 MG/DL (ref 8.7–10.5)
CALCIUM SERPL-MCNC: 10.2 MG/DL (ref 8.7–10.5)
CALCIUM SERPL-MCNC: 10.4 MG/DL (ref 8.7–10.5)
CALCIUM SERPL-MCNC: 7.5 MG/DL (ref 8.7–10.5)
CALCIUM SERPL-MCNC: 7.8 MG/DL (ref 8.7–10.5)
CALCIUM SERPL-MCNC: 7.8 MG/DL (ref 8.7–10.5)
CALCIUM SERPL-MCNC: 8.2 MG/DL (ref 8.7–10.5)
CALCIUM SERPL-MCNC: 8.3 MG/DL (ref 8.7–10.5)
CALCIUM SERPL-MCNC: 8.4 MG/DL (ref 8.7–10.5)
CALCIUM SERPL-MCNC: 8.5 MG/DL (ref 8.7–10.5)
CALCIUM SERPL-MCNC: 8.6 MG/DL (ref 8.7–10.5)
CALCIUM SERPL-MCNC: 8.7 MG/DL (ref 8.7–10.5)
CALCIUM SERPL-MCNC: 8.8 MG/DL (ref 8.7–10.5)
CALCIUM SERPL-MCNC: 8.8 MG/DL (ref 8.7–10.5)
CALCIUM SERPL-MCNC: 8.9 MG/DL (ref 8.7–10.5)
CALCIUM SERPL-MCNC: 9 MG/DL (ref 8.7–10.5)
CALCIUM SERPL-MCNC: 9 MG/DL (ref 8.7–10.5)
CALCIUM SERPL-MCNC: 9.1 MG/DL (ref 8.7–10.5)
CALCIUM SERPL-MCNC: 9.3 MG/DL (ref 8.7–10.5)
CALCIUM SERPL-MCNC: 9.4 MG/DL (ref 8.7–10.5)
CALCIUM SERPL-MCNC: 9.5 MG/DL (ref 8.7–10.5)
CALCIUM SERPL-MCNC: 9.6 MG/DL (ref 8.7–10.5)
CALCIUM SERPL-MCNC: 9.6 MG/DL (ref 8.7–10.5)
CALCIUM SERPL-MCNC: 9.7 MG/DL (ref 8.7–10.5)
CALCIUM SERPL-MCNC: 9.7 MG/DL (ref 8.7–10.5)
CALCIUM SERPL-MCNC: 9.8 MG/DL (ref 8.7–10.5)
CANNABINOIDS UR QL SCN: NEGATIVE
CHLORIDE SERPL-SCNC: 100 MMOL/L (ref 95–110)
CHLORIDE SERPL-SCNC: 101 MMOL/L (ref 95–110)
CHLORIDE SERPL-SCNC: 102 MMOL/L (ref 95–110)
CHLORIDE SERPL-SCNC: 102 MMOL/L (ref 95–110)
CHLORIDE SERPL-SCNC: 103 MMOL/L (ref 95–110)
CHLORIDE SERPL-SCNC: 103 MMOL/L (ref 95–110)
CHLORIDE SERPL-SCNC: 104 MMOL/L (ref 95–110)
CHLORIDE SERPL-SCNC: 105 MMOL/L (ref 95–110)
CHLORIDE SERPL-SCNC: 107 MMOL/L (ref 95–110)
CHLORIDE SERPL-SCNC: 107 MMOL/L (ref 95–110)
CHLORIDE SERPL-SCNC: 108 MMOL/L (ref 95–110)
CHLORIDE SERPL-SCNC: 82 MMOL/L (ref 95–110)
CHLORIDE SERPL-SCNC: 84 MMOL/L (ref 95–110)
CHLORIDE SERPL-SCNC: 85 MMOL/L (ref 95–110)
CHLORIDE SERPL-SCNC: 87 MMOL/L (ref 95–110)
CHLORIDE SERPL-SCNC: 87 MMOL/L (ref 95–110)
CHLORIDE SERPL-SCNC: 90 MMOL/L (ref 95–110)
CHLORIDE SERPL-SCNC: 92 MMOL/L (ref 95–110)
CHLORIDE SERPL-SCNC: 92 MMOL/L (ref 95–110)
CHLORIDE SERPL-SCNC: 94 MMOL/L (ref 95–110)
CHLORIDE SERPL-SCNC: 95 MMOL/L (ref 95–110)
CHLORIDE SERPL-SCNC: 96 MMOL/L (ref 95–110)
CHLORIDE SERPL-SCNC: 97 MMOL/L (ref 95–110)
CHLORIDE SERPL-SCNC: 98 MMOL/L (ref 95–110)
CHLORIDE SERPL-SCNC: 99 MMOL/L (ref 95–110)
CHLORIDE UR-SCNC: 44 MMOL/L (ref 25–200)
CHOLEST SERPL-MCNC: 107 MG/DL (ref 120–199)
CHOLEST/HDLC SERPL: 3.3 {RATIO} (ref 2–5)
CK MB SERPL-MCNC: 4.3 NG/ML (ref 0.1–6.5)
CK SERPL-CCNC: 55 U/L (ref 20–200)
CK SERPL-CCNC: 56 U/L (ref 20–200)
CLARITY UR: ABNORMAL
CLARITY UR: CLEAR
CLARITY UR: CLEAR
CO2 SERPL-SCNC: 21 MMOL/L (ref 23–29)
CO2 SERPL-SCNC: 23 MMOL/L (ref 23–29)
CO2 SERPL-SCNC: 24 MMOL/L (ref 23–29)
CO2 SERPL-SCNC: 25 MMOL/L (ref 23–29)
CO2 SERPL-SCNC: 26 MMOL/L (ref 23–29)
CO2 SERPL-SCNC: 27 MMOL/L (ref 23–29)
CO2 SERPL-SCNC: 28 MMOL/L (ref 23–29)
CO2 SERPL-SCNC: 29 MMOL/L (ref 23–29)
CO2 SERPL-SCNC: 30 MMOL/L (ref 23–29)
CO2 SERPL-SCNC: 30 MMOL/L (ref 23–29)
CO2 SERPL-SCNC: 31 MMOL/L (ref 23–29)
CO2 SERPL-SCNC: 32 MMOL/L (ref 23–29)
CO2 SERPL-SCNC: 34 MMOL/L (ref 23–29)
CO2 SERPL-SCNC: 42 MMOL/L (ref 23–29)
CO2 SERPL-SCNC: 44 MMOL/L (ref 23–29)
CO2 SERPL-SCNC: 46 MMOL/L (ref 23–29)
CO2 SERPL-SCNC: 52 MMOL/L (ref 23–29)
COLOR UR: ABNORMAL
COLOR UR: YELLOW
CREAT SERPL-MCNC: 1 MG/DL (ref 0.5–1.4)
CREAT SERPL-MCNC: 1 MG/DL (ref 0.5–1.4)
CREAT SERPL-MCNC: 1.1 MG/DL (ref 0.5–1.4)
CREAT SERPL-MCNC: 1.2 MG/DL (ref 0.5–1.4)
CREAT SERPL-MCNC: 1.4 MG/DL (ref 0.5–1.4)
CREAT SERPL-MCNC: 1.4 MG/DL (ref 0.5–1.4)
CREAT SERPL-MCNC: 1.5 MG/DL (ref 0.5–1.4)
CREAT SERPL-MCNC: 1.6 MG/DL (ref 0.5–1.4)
CREAT SERPL-MCNC: 1.9 MG/DL (ref 0.5–1.4)
CREAT SERPL-MCNC: 2.1 MG/DL (ref 0.5–1.4)
CREAT SERPL-MCNC: 2.2 MG/DL (ref 0.5–1.4)
CREAT SERPL-MCNC: 2.3 MG/DL (ref 0.5–1.4)
CREAT SERPL-MCNC: 2.3 MG/DL (ref 0.5–1.4)
CREAT SERPL-MCNC: 2.4 MG/DL (ref 0.5–1.4)
CREAT SERPL-MCNC: 2.5 MG/DL (ref 0.5–1.4)
CREAT SERPL-MCNC: 2.6 MG/DL (ref 0.5–1.4)
CREAT SERPL-MCNC: 2.7 MG/DL (ref 0.5–1.4)
CREAT SERPL-MCNC: 2.8 MG/DL (ref 0.5–1.4)
CREAT SERPL-MCNC: 2.9 MG/DL (ref 0.5–1.4)
CREAT SERPL-MCNC: 2.9 MG/DL (ref 0.5–1.4)
CREAT UR-MCNC: 34 MG/DL (ref 23–375)
CREAT UR-MCNC: 54 MG/DL (ref 23–375)
CRP SERPL-MCNC: 16.69 MG/DL (ref 0–0.75)
CRP SERPL-MCNC: 16.95 MG/DL (ref 0–0.75)
CV ECHO LV RWT: 0.3 CM
DELSYS: ABNORMAL
DIFFERENTIAL METHOD: ABNORMAL
DOP CALC AO PEAK VEL: 0.9 M/S
DOP CALC AO VTI: 20.36 CM
DOP CALC LVOT AREA: 3.8 CM2
DOP CALC LVOT DIAMETER: 2.19 CM
DOP CALC LVOT PEAK VEL: 53.97 M/S
DOP CALC LVOT STROKE VOLUME: 56.78 CM3
DOP CALCLVOT PEAK VEL VTI: 15.08 CM
E WAVE DECELERATION TIME: 169.27 MSEC
E/A RATIO: 2.15
E/E' RATIO: 20 M/S
ECHO LV POSTERIOR WALL: 0.84 CM (ref 0.6–1.1)
EOSINOPHIL # BLD AUTO: 0 K/UL (ref 0–0.5)
EOSINOPHIL # BLD AUTO: 0.1 K/UL (ref 0–0.5)
EOSINOPHIL # BLD AUTO: 0.2 K/UL (ref 0–0.5)
EOSINOPHIL # BLD AUTO: 0.3 K/UL (ref 0–0.5)
EOSINOPHIL # BLD AUTO: 0.4 K/UL (ref 0–0.5)
EOSINOPHIL # BLD AUTO: 0.5 K/UL (ref 0–0.5)
EOSINOPHIL # BLD AUTO: 0.6 K/UL (ref 0–0.5)
EOSINOPHIL # BLD AUTO: 0.7 K/UL (ref 0–0.5)
EOSINOPHIL # BLD AUTO: 0.7 K/UL (ref 0–0.5)
EOSINOPHIL # BLD AUTO: 0.8 K/UL (ref 0–0.5)
EOSINOPHIL # BLD AUTO: ABNORMAL K/UL (ref 0–0.5)
EOSINOPHIL NFR BLD: 0 % (ref 0–8)
EOSINOPHIL NFR BLD: 0 % (ref 0–8)
EOSINOPHIL NFR BLD: 0.1 % (ref 0–8)
EOSINOPHIL NFR BLD: 0.3 % (ref 0–8)
EOSINOPHIL NFR BLD: 0.5 % (ref 0–8)
EOSINOPHIL NFR BLD: 1 % (ref 0–8)
EOSINOPHIL NFR BLD: 10 % (ref 0–8)
EOSINOPHIL NFR BLD: 2 % (ref 0–8)
EOSINOPHIL NFR BLD: 3 % (ref 0–8)
EOSINOPHIL NFR BLD: 3.1 % (ref 0–8)
EOSINOPHIL NFR BLD: 4.2 % (ref 0–8)
EOSINOPHIL NFR BLD: 5.4 % (ref 0–8)
EOSINOPHIL NFR BLD: 5.5 % (ref 0–8)
EOSINOPHIL NFR BLD: 5.5 % (ref 0–8)
EOSINOPHIL NFR BLD: 5.7 % (ref 0–8)
EOSINOPHIL NFR BLD: 5.7 % (ref 0–8)
EOSINOPHIL NFR BLD: 5.8 % (ref 0–8)
EOSINOPHIL NFR BLD: 5.9 % (ref 0–8)
EOSINOPHIL NFR BLD: 6.1 % (ref 0–8)
EOSINOPHIL NFR BLD: 6.1 % (ref 0–8)
EOSINOPHIL NFR BLD: 6.7 % (ref 0–8)
EOSINOPHIL NFR BLD: 6.8 % (ref 0–8)
EOSINOPHIL NFR BLD: 7.1 % (ref 0–8)
EOSINOPHIL NFR BLD: 7.2 % (ref 0–8)
EOSINOPHIL NFR BLD: 7.8 % (ref 0–8)
EOSINOPHIL NFR BLD: 7.9 % (ref 0–8)
ERYTHROCYTE [DISTWIDTH] IN BLOOD BY AUTOMATED COUNT: 13.5 % (ref 11.5–14.5)
ERYTHROCYTE [DISTWIDTH] IN BLOOD BY AUTOMATED COUNT: 13.5 % (ref 11.5–14.5)
ERYTHROCYTE [DISTWIDTH] IN BLOOD BY AUTOMATED COUNT: 13.6 % (ref 11.5–14.5)
ERYTHROCYTE [DISTWIDTH] IN BLOOD BY AUTOMATED COUNT: 13.6 % (ref 11.5–14.5)
ERYTHROCYTE [DISTWIDTH] IN BLOOD BY AUTOMATED COUNT: 13.7 % (ref 11.5–14.5)
ERYTHROCYTE [DISTWIDTH] IN BLOOD BY AUTOMATED COUNT: 13.7 % (ref 11.5–14.5)
ERYTHROCYTE [DISTWIDTH] IN BLOOD BY AUTOMATED COUNT: 13.8 % (ref 11.5–14.5)
ERYTHROCYTE [DISTWIDTH] IN BLOOD BY AUTOMATED COUNT: 13.9 % (ref 11.5–14.5)
ERYTHROCYTE [DISTWIDTH] IN BLOOD BY AUTOMATED COUNT: 14 % (ref 11.5–14.5)
ERYTHROCYTE [DISTWIDTH] IN BLOOD BY AUTOMATED COUNT: 14.1 % (ref 11.5–14.5)
ERYTHROCYTE [DISTWIDTH] IN BLOOD BY AUTOMATED COUNT: 14.1 % (ref 11.5–14.5)
ERYTHROCYTE [DISTWIDTH] IN BLOOD BY AUTOMATED COUNT: 14.5 % (ref 11.5–14.5)
ERYTHROCYTE [DISTWIDTH] IN BLOOD BY AUTOMATED COUNT: 14.5 % (ref 11.5–14.5)
ERYTHROCYTE [DISTWIDTH] IN BLOOD BY AUTOMATED COUNT: 14.6 % (ref 11.5–14.5)
ERYTHROCYTE [DISTWIDTH] IN BLOOD BY AUTOMATED COUNT: 14.7 % (ref 11.5–14.5)
ERYTHROCYTE [DISTWIDTH] IN BLOOD BY AUTOMATED COUNT: 16.1 % (ref 11.5–14.5)
ERYTHROCYTE [DISTWIDTH] IN BLOOD BY AUTOMATED COUNT: 16.1 % (ref 11.5–14.5)
ERYTHROCYTE [DISTWIDTH] IN BLOOD BY AUTOMATED COUNT: 16.3 % (ref 11.5–14.5)
ERYTHROCYTE [DISTWIDTH] IN BLOOD BY AUTOMATED COUNT: 17.5 % (ref 11.5–14.5)
ERYTHROCYTE [DISTWIDTH] IN BLOOD BY AUTOMATED COUNT: 17.8 % (ref 11.5–14.5)
ERYTHROCYTE [DISTWIDTH] IN BLOOD BY AUTOMATED COUNT: 18.2 % (ref 11.5–14.5)
ERYTHROCYTE [DISTWIDTH] IN BLOOD BY AUTOMATED COUNT: 18.2 % (ref 11.5–14.5)
ERYTHROCYTE [DISTWIDTH] IN BLOOD BY AUTOMATED COUNT: 18.4 % (ref 11.5–14.5)
ERYTHROCYTE [DISTWIDTH] IN BLOOD BY AUTOMATED COUNT: 18.4 % (ref 11.5–14.5)
ERYTHROCYTE [DISTWIDTH] IN BLOOD BY AUTOMATED COUNT: 18.5 % (ref 11.5–14.5)
ERYTHROCYTE [SEDIMENTATION RATE] IN BLOOD BY WESTERGREN METHOD: 14 MM/H
ERYTHROCYTE [SEDIMENTATION RATE] IN BLOOD BY WESTERGREN METHOD: 16 MM/H
ERYTHROCYTE [SEDIMENTATION RATE] IN BLOOD BY WESTERGREN METHOD: 16 MM/H
ERYTHROCYTE [SEDIMENTATION RATE] IN BLOOD BY WESTERGREN METHOD: 18 MM/H
ERYTHROCYTE [SEDIMENTATION RATE] IN BLOOD BY WESTERGREN METHOD: 20 MM/H
ERYTHROCYTE [SEDIMENTATION RATE] IN BLOOD BY WESTERGREN METHOD: 24 MM/H
ERYTHROCYTE [SEDIMENTATION RATE] IN BLOOD BY WESTERGREN METHOD: 24 MM/H
ERYTHROCYTE [SEDIMENTATION RATE] IN BLOOD BY WESTERGREN METHOD: 26 MM/H
ERYTHROCYTE [SEDIMENTATION RATE] IN BLOOD BY WESTERGREN METHOD: 26 MM/H
EST. GFR  (AFRICAN AMERICAN): 22.6 ML/MIN/1.73 M^2
EST. GFR  (AFRICAN AMERICAN): 22.6 ML/MIN/1.73 M^2
EST. GFR  (AFRICAN AMERICAN): 23.6 ML/MIN/1.73 M^2
EST. GFR  (AFRICAN AMERICAN): 24.6 ML/MIN/1.73 M^2
EST. GFR  (AFRICAN AMERICAN): 25 ML/MIN/1.73 M^2
EST. GFR  (AFRICAN AMERICAN): 25.8 ML/MIN/1.73 M^2
EST. GFR  (AFRICAN AMERICAN): 26 ML/MIN/1.73 M^2
EST. GFR  (AFRICAN AMERICAN): 27 ML/MIN/1.73 M^2
EST. GFR  (AFRICAN AMERICAN): 28 ML/MIN/1.73 M^2
EST. GFR  (AFRICAN AMERICAN): 28 ML/MIN/1.73 M^2
EST. GFR  (AFRICAN AMERICAN): 28.4 ML/MIN/1.73 M^2
EST. GFR  (AFRICAN AMERICAN): 28.4 ML/MIN/1.73 M^2
EST. GFR  (AFRICAN AMERICAN): 29.9 ML/MIN/1.73 M^2
EST. GFR  (AFRICAN AMERICAN): 31.5 ML/MIN/1.73 M^2
EST. GFR  (AFRICAN AMERICAN): 33.4 ML/MIN/1.73 M^2
EST. GFR  (AFRICAN AMERICAN): 37.7 ML/MIN/1.73 M^2
EST. GFR  (AFRICAN AMERICAN): 37.7 ML/MIN/1.73 M^2
EST. GFR  (AFRICAN AMERICAN): 38 ML/MIN/1.73 M^2
EST. GFR  (AFRICAN AMERICAN): 38 ML/MIN/1.73 M^2
EST. GFR  (AFRICAN AMERICAN): 46 ML/MIN/1.73 M^2
EST. GFR  (AFRICAN AMERICAN): 46.3 ML/MIN/1.73 M^2
EST. GFR  (AFRICAN AMERICAN): 50 ML/MIN/1.73 M^2
EST. GFR  (AFRICAN AMERICAN): 54 ML/MIN/1.73 M^2
EST. GFR  (AFRICAN AMERICAN): 54 ML/MIN/1.73 M^2
EST. GFR  (AFRICAN AMERICAN): >60 ML/MIN/1.73 M^2
EST. GFR  (NON AFRICAN AMERICAN): 19.5 ML/MIN/1.73 M^2
EST. GFR  (NON AFRICAN AMERICAN): 19.5 ML/MIN/1.73 M^2
EST. GFR  (NON AFRICAN AMERICAN): 20.4 ML/MIN/1.73 M^2
EST. GFR  (NON AFRICAN AMERICAN): 21 ML/MIN/1.73 M^2
EST. GFR  (NON AFRICAN AMERICAN): 21.3 ML/MIN/1.73 M^2
EST. GFR  (NON AFRICAN AMERICAN): 22 ML/MIN/1.73 M^2
EST. GFR  (NON AFRICAN AMERICAN): 22.3 ML/MIN/1.73 M^2
EST. GFR  (NON AFRICAN AMERICAN): 23.4 ML/MIN/1.73 M^2
EST. GFR  (NON AFRICAN AMERICAN): 24.6 ML/MIN/1.73 M^2
EST. GFR  (NON AFRICAN AMERICAN): 24.6 ML/MIN/1.73 M^2
EST. GFR  (NON AFRICAN AMERICAN): 25 ML/MIN/1.73 M^2
EST. GFR  (NON AFRICAN AMERICAN): 25 ML/MIN/1.73 M^2
EST. GFR  (NON AFRICAN AMERICAN): 25.9 ML/MIN/1.73 M^2
EST. GFR  (NON AFRICAN AMERICAN): 27.3 ML/MIN/1.73 M^2
EST. GFR  (NON AFRICAN AMERICAN): 28.9 ML/MIN/1.73 M^2
EST. GFR  (NON AFRICAN AMERICAN): 32.6 ML/MIN/1.73 M^2
EST. GFR  (NON AFRICAN AMERICAN): 32.6 ML/MIN/1.73 M^2
EST. GFR  (NON AFRICAN AMERICAN): 33 ML/MIN/1.73 M^2
EST. GFR  (NON AFRICAN AMERICAN): 33 ML/MIN/1.73 M^2
EST. GFR  (NON AFRICAN AMERICAN): 40 ML/MIN/1.73 M^2
EST. GFR  (NON AFRICAN AMERICAN): 40.1 ML/MIN/1.73 M^2
EST. GFR  (NON AFRICAN AMERICAN): 43 ML/MIN/1.73 M^2
EST. GFR  (NON AFRICAN AMERICAN): 47 ML/MIN/1.73 M^2
EST. GFR  (NON AFRICAN AMERICAN): 47 ML/MIN/1.73 M^2
EST. GFR  (NON AFRICAN AMERICAN): 57 ML/MIN/1.73 M^2
EST. GFR  (NON AFRICAN AMERICAN): >60 ML/MIN/1.73 M^2
FIO2: 0.4
FIO2: 0.4
FIO2: 100
FIO2: 100
FIO2: 32
FIO2: 32
FIO2: 40
FIO2: 50
FOLATE SERPL-MCNC: 12.2 NG/ML (ref 4–24)
FOLATE SERPL-MCNC: 20.6 NG/ML (ref 4–24)
FOLATE SERPL-MCNC: >24.8 NG/ML (ref 4–24)
FRACTIONAL SHORTENING: 16 % (ref 28–44)
GLUCOSE SERPL-MCNC: 100 MG/DL (ref 70–110)
GLUCOSE SERPL-MCNC: 101 MG/DL (ref 70–110)
GLUCOSE SERPL-MCNC: 102 MG/DL (ref 70–110)
GLUCOSE SERPL-MCNC: 103 MG/DL (ref 70–110)
GLUCOSE SERPL-MCNC: 104 MG/DL (ref 70–110)
GLUCOSE SERPL-MCNC: 104 MG/DL (ref 70–110)
GLUCOSE SERPL-MCNC: 105 MG/DL (ref 70–110)
GLUCOSE SERPL-MCNC: 105 MG/DL (ref 70–110)
GLUCOSE SERPL-MCNC: 106 MG/DL (ref 70–110)
GLUCOSE SERPL-MCNC: 107 MG/DL (ref 70–110)
GLUCOSE SERPL-MCNC: 108 MG/DL (ref 70–110)
GLUCOSE SERPL-MCNC: 109 MG/DL (ref 70–110)
GLUCOSE SERPL-MCNC: 112 MG/DL (ref 70–110)
GLUCOSE SERPL-MCNC: 112 MG/DL (ref 70–110)
GLUCOSE SERPL-MCNC: 113 MG/DL (ref 70–110)
GLUCOSE SERPL-MCNC: 113 MG/DL (ref 70–110)
GLUCOSE SERPL-MCNC: 115 MG/DL (ref 70–110)
GLUCOSE SERPL-MCNC: 115 MG/DL (ref 70–110)
GLUCOSE SERPL-MCNC: 116 MG/DL (ref 70–110)
GLUCOSE SERPL-MCNC: 118 MG/DL (ref 70–110)
GLUCOSE SERPL-MCNC: 119 MG/DL (ref 70–110)
GLUCOSE SERPL-MCNC: 120 MG/DL (ref 70–110)
GLUCOSE SERPL-MCNC: 120 MG/DL (ref 70–110)
GLUCOSE SERPL-MCNC: 121 MG/DL (ref 70–110)
GLUCOSE SERPL-MCNC: 124 MG/DL (ref 70–110)
GLUCOSE SERPL-MCNC: 127 MG/DL (ref 70–110)
GLUCOSE SERPL-MCNC: 131 MG/DL (ref 70–110)
GLUCOSE SERPL-MCNC: 135 MG/DL (ref 70–110)
GLUCOSE SERPL-MCNC: 135 MG/DL (ref 70–110)
GLUCOSE SERPL-MCNC: 136 MG/DL (ref 70–110)
GLUCOSE SERPL-MCNC: 138 MG/DL (ref 70–110)
GLUCOSE SERPL-MCNC: 152 MG/DL (ref 70–110)
GLUCOSE SERPL-MCNC: 162 MG/DL (ref 70–110)
GLUCOSE SERPL-MCNC: 165 MG/DL (ref 70–110)
GLUCOSE SERPL-MCNC: 199 MG/DL (ref 70–110)
GLUCOSE SERPL-MCNC: 199 MG/DL (ref 70–110)
GLUCOSE SERPL-MCNC: 75 MG/DL (ref 70–110)
GLUCOSE SERPL-MCNC: 76 MG/DL (ref 70–110)
GLUCOSE SERPL-MCNC: 78 MG/DL (ref 70–110)
GLUCOSE SERPL-MCNC: 78 MG/DL (ref 70–110)
GLUCOSE SERPL-MCNC: 80 MG/DL (ref 70–110)
GLUCOSE SERPL-MCNC: 85 MG/DL (ref 70–110)
GLUCOSE SERPL-MCNC: 86 MG/DL (ref 70–110)
GLUCOSE SERPL-MCNC: 88 MG/DL (ref 70–110)
GLUCOSE SERPL-MCNC: 91 MG/DL (ref 70–110)
GLUCOSE SERPL-MCNC: 91 MG/DL (ref 70–110)
GLUCOSE SERPL-MCNC: 92 MG/DL (ref 70–110)
GLUCOSE SERPL-MCNC: 93 MG/DL (ref 70–110)
GLUCOSE SERPL-MCNC: 94 MG/DL (ref 70–110)
GLUCOSE SERPL-MCNC: 94 MG/DL (ref 70–110)
GLUCOSE SERPL-MCNC: 95 MG/DL (ref 70–110)
GLUCOSE SERPL-MCNC: 95 MG/DL (ref 70–110)
GLUCOSE SERPL-MCNC: 96 MG/DL (ref 70–110)
GLUCOSE SERPL-MCNC: 97 MG/DL (ref 70–110)
GLUCOSE SERPL-MCNC: 99 MG/DL (ref 70–110)
GLUCOSE SERPL-MCNC: 99 MG/DL (ref 70–110)
GLUCOSE UR QL STRIP: ABNORMAL
GLUCOSE UR QL STRIP: NEGATIVE
GRAM STN SPEC: ABNORMAL
GRAM STN SPEC: NORMAL
HCO3 UR-SCNC: 24.1 MMOL/L (ref 24–28)
HCO3 UR-SCNC: 25.3 MMOL/L (ref 24–28)
HCO3 UR-SCNC: 25.8 MMOL/L (ref 24–28)
HCO3 UR-SCNC: 25.9 MMOL/L (ref 24–28)
HCO3 UR-SCNC: 26.5 MMOL/L (ref 24–28)
HCO3 UR-SCNC: 26.9 MMOL/L (ref 24–28)
HCO3 UR-SCNC: 27.1 MMOL/L (ref 24–28)
HCO3 UR-SCNC: 28.4 MMOL/L (ref 24–28)
HCO3 UR-SCNC: 28.9 MMOL/L (ref 24–28)
HCO3 UR-SCNC: 34.5 MMOL/L (ref 24–28)
HCO3 UR-SCNC: 34.8 MMOL/L (ref 24–28)
HCO3 UR-SCNC: 34.9 MMOL/L (ref 24–28)
HCO3 UR-SCNC: 40.7 MMOL/L (ref 24–28)
HCO3 UR-SCNC: 48.5 MMOL/L (ref 24–28)
HCO3 UR-SCNC: 49.3 MMOL/L (ref 24–28)
HCO3 UR-SCNC: 50.1 MMOL/L (ref 24–28)
HCO3 UR-SCNC: 50.4 MMOL/L (ref 24–28)
HCO3 UR-SCNC: 50.7 MMOL/L (ref 24–28)
HCO3 UR-SCNC: 51.4 MMOL/L (ref 24–28)
HCT VFR BLD AUTO: 23.3 % (ref 40–54)
HCT VFR BLD AUTO: 23.3 % (ref 40–54)
HCT VFR BLD AUTO: 24.9 % (ref 40–54)
HCT VFR BLD AUTO: 25.9 % (ref 40–54)
HCT VFR BLD AUTO: 26.1 % (ref 40–54)
HCT VFR BLD AUTO: 26.3 % (ref 40–54)
HCT VFR BLD AUTO: 26.8 % (ref 40–54)
HCT VFR BLD AUTO: 26.9 % (ref 40–54)
HCT VFR BLD AUTO: 26.9 % (ref 40–54)
HCT VFR BLD AUTO: 27 % (ref 40–54)
HCT VFR BLD AUTO: 27 % (ref 40–54)
HCT VFR BLD AUTO: 27.4 % (ref 40–54)
HCT VFR BLD AUTO: 28 % (ref 40–54)
HCT VFR BLD AUTO: 28.1 % (ref 40–54)
HCT VFR BLD AUTO: 28.1 % (ref 40–54)
HCT VFR BLD AUTO: 28.3 % (ref 40–54)
HCT VFR BLD AUTO: 28.9 % (ref 40–54)
HCT VFR BLD AUTO: 29.1 % (ref 40–54)
HCT VFR BLD AUTO: 29.1 % (ref 40–54)
HCT VFR BLD AUTO: 29.2 % (ref 40–54)
HCT VFR BLD AUTO: 29.5 % (ref 40–54)
HCT VFR BLD AUTO: 29.7 % (ref 40–54)
HCT VFR BLD AUTO: 29.8 % (ref 40–54)
HCT VFR BLD AUTO: 30 % (ref 40–54)
HCT VFR BLD AUTO: 30.1 % (ref 40–54)
HCT VFR BLD AUTO: 30.4 % (ref 40–54)
HCT VFR BLD AUTO: 30.6 % (ref 40–54)
HCT VFR BLD AUTO: 30.7 % (ref 40–54)
HCT VFR BLD AUTO: 31.3 % (ref 40–54)
HCT VFR BLD AUTO: 31.7 % (ref 40–54)
HCT VFR BLD AUTO: 32.2 % (ref 40–54)
HCT VFR BLD AUTO: 33.4 % (ref 40–54)
HCT VFR BLD AUTO: 33.6 % (ref 40–54)
HCT VFR BLD AUTO: 33.7 % (ref 40–54)
HCT VFR BLD AUTO: 34.2 % (ref 40–54)
HCT VFR BLD AUTO: 35.4 % (ref 40–54)
HCT VFR BLD AUTO: 36.9 % (ref 40–54)
HCT VFR BLD CALC: 32 %PCV (ref 36–54)
HCT VFR BLD CALC: 33 %PCV (ref 36–54)
HCT VFR BLD CALC: 34 %PCV (ref 36–54)
HCT VFR BLD CALC: 35 %PCV (ref 36–54)
HDLC SERPL-MCNC: 32 MG/DL (ref 40–75)
HDLC SERPL: 29.9 % (ref 20–50)
HGB BLD-MCNC: 10 G/DL (ref 14–18)
HGB BLD-MCNC: 10.4 G/DL (ref 14–18)
HGB BLD-MCNC: 10.5 G/DL (ref 14–18)
HGB BLD-MCNC: 10.5 G/DL (ref 14–18)
HGB BLD-MCNC: 10.6 G/DL (ref 14–18)
HGB BLD-MCNC: 10.7 G/DL (ref 14–18)
HGB BLD-MCNC: 10.9 G/DL (ref 14–18)
HGB BLD-MCNC: 11.1 G/DL (ref 14–18)
HGB BLD-MCNC: 7.4 G/DL (ref 14–18)
HGB BLD-MCNC: 7.4 G/DL (ref 14–18)
HGB BLD-MCNC: 7.8 G/DL (ref 14–18)
HGB BLD-MCNC: 8 G/DL (ref 14–18)
HGB BLD-MCNC: 8.2 G/DL (ref 14–18)
HGB BLD-MCNC: 8.4 G/DL (ref 14–18)
HGB BLD-MCNC: 8.6 G/DL (ref 14–18)
HGB BLD-MCNC: 8.7 G/DL (ref 14–18)
HGB BLD-MCNC: 8.7 G/DL (ref 14–18)
HGB BLD-MCNC: 8.9 G/DL (ref 14–18)
HGB BLD-MCNC: 9.1 G/DL (ref 14–18)
HGB BLD-MCNC: 9.2 G/DL (ref 14–18)
HGB BLD-MCNC: 9.3 G/DL (ref 14–18)
HGB BLD-MCNC: 9.4 G/DL (ref 14–18)
HGB BLD-MCNC: 9.4 G/DL (ref 14–18)
HGB BLD-MCNC: 9.5 G/DL (ref 14–18)
HGB BLD-MCNC: 9.6 G/DL (ref 14–18)
HGB BLD-MCNC: 9.7 G/DL (ref 14–18)
HGB BLD-MCNC: 9.7 G/DL (ref 14–18)
HGB BLD-MCNC: 9.9 G/DL (ref 14–18)
HGB UR QL STRIP: ABNORMAL
HGB UR QL STRIP: NEGATIVE
HYALINE CASTS #/AREA URNS LPF: 0 /LPF
HYALINE CASTS #/AREA URNS LPF: 0 /LPF
HYALINE CASTS #/AREA URNS LPF: 13 /LPF
HYALINE CASTS #/AREA URNS LPF: 14 /LPF
HYALINE CASTS #/AREA URNS LPF: 4 /LPF
HYALINE CASTS #/AREA URNS LPF: 6589 /LPF
HYALINE CASTS #/AREA URNS LPF: 6933 /LPF
IMM GRANULOCYTES # BLD AUTO: 0.03 K/UL (ref 0–0.04)
IMM GRANULOCYTES # BLD AUTO: 0.05 K/UL (ref 0–0.04)
IMM GRANULOCYTES # BLD AUTO: 0.06 K/UL (ref 0–0.04)
IMM GRANULOCYTES # BLD AUTO: 0.07 K/UL (ref 0–0.04)
IMM GRANULOCYTES # BLD AUTO: 0.09 K/UL (ref 0–0.04)
IMM GRANULOCYTES # BLD AUTO: 0.09 K/UL (ref 0–0.04)
IMM GRANULOCYTES # BLD AUTO: 0.1 K/UL (ref 0–0.04)
IMM GRANULOCYTES # BLD AUTO: 0.1 K/UL (ref 0–0.04)
IMM GRANULOCYTES # BLD AUTO: 0.11 K/UL (ref 0–0.04)
IMM GRANULOCYTES # BLD AUTO: 0.13 K/UL (ref 0–0.04)
IMM GRANULOCYTES # BLD AUTO: 0.14 K/UL (ref 0–0.04)
IMM GRANULOCYTES # BLD AUTO: 0.15 K/UL (ref 0–0.04)
IMM GRANULOCYTES # BLD AUTO: 0.17 K/UL (ref 0–0.04)
IMM GRANULOCYTES # BLD AUTO: 0.17 K/UL (ref 0–0.04)
IMM GRANULOCYTES # BLD AUTO: 0.2 K/UL (ref 0–0.04)
IMM GRANULOCYTES # BLD AUTO: 0.22 K/UL (ref 0–0.04)
IMM GRANULOCYTES # BLD AUTO: 0.36 K/UL (ref 0–0.04)
IMM GRANULOCYTES # BLD AUTO: 0.36 K/UL (ref 0–0.04)
IMM GRANULOCYTES # BLD AUTO: 0.5 K/UL (ref 0–0.04)
IMM GRANULOCYTES # BLD AUTO: 0.6 K/UL (ref 0–0.04)
IMM GRANULOCYTES # BLD AUTO: ABNORMAL K/UL (ref 0–0.04)
IMM GRANULOCYTES NFR BLD AUTO: 0.3 % (ref 0–0.5)
IMM GRANULOCYTES NFR BLD AUTO: 0.5 % (ref 0–0.5)
IMM GRANULOCYTES NFR BLD AUTO: 0.6 % (ref 0–0.5)
IMM GRANULOCYTES NFR BLD AUTO: 0.7 % (ref 0–0.5)
IMM GRANULOCYTES NFR BLD AUTO: 1.8 % (ref 0–0.5)
IMM GRANULOCYTES NFR BLD AUTO: 2.1 % (ref 0–0.5)
IMM GRANULOCYTES NFR BLD AUTO: 2.1 % (ref 0–0.5)
IMM GRANULOCYTES NFR BLD AUTO: 3.9 % (ref 0–0.5)
IMM GRANULOCYTES NFR BLD AUTO: 4.2 % (ref 0–0.5)
IMM GRANULOCYTES NFR BLD AUTO: 4.4 % (ref 0–0.5)
IMM GRANULOCYTES NFR BLD AUTO: ABNORMAL % (ref 0–0.5)
INFLUENZA A, MOLECULAR: NEGATIVE
INFLUENZA B, MOLECULAR: NEGATIVE
INR PPP: 1.2
INR PPP: 1.2 (ref 0.8–1.2)
INR PPP: 1.3
INR PPP: 1.3
INR PPP: 1.6
INR PPP: 1.6
INR PPP: 1.7
INR PPP: 1.7
INR PPP: 2
INTERVENTRICULAR SEPTUM: 1.05 CM (ref 0.6–1.1)
IVRT: 93.93 MSEC
KETONES UR QL STRIP: ABNORMAL
KETONES UR QL STRIP: NEGATIVE
LABCORP MISC TEST CODE: 1453
LABCORP MISC TEST NAME: NORMAL
LABCORP MISCELLANEOUS TEST: NORMAL
LACOSAMIDE SERPL-MCNC: 13.4 UG/ML (ref 5–10)
LACTATE SERPL-SCNC: 1.1 MMOL/L (ref 0.5–1.9)
LACTATE SERPL-SCNC: 1.4 MMOL/L (ref 0.5–2.2)
LACTATE SERPL-SCNC: 1.4 MMOL/L (ref 0.5–2.2)
LACTATE SERPL-SCNC: 1.5 MMOL/L (ref 0.5–1.9)
LACTATE SERPL-SCNC: 1.6 MMOL/L (ref 0.5–1.9)
LACTATE SERPL-SCNC: 2.1 MMOL/L (ref 0.5–1.9)
LACTATE SERPL-SCNC: 2.2 MMOL/L (ref 0.5–1.9)
LACTATE SERPL-SCNC: 2.3 MMOL/L (ref 0.5–1.9)
LACTATE SERPL-SCNC: 2.4 MMOL/L (ref 0.5–1.9)
LACTATE SERPL-SCNC: 2.5 MMOL/L (ref 0.5–1.9)
LACTATE SERPL-SCNC: 2.5 MMOL/L (ref 0.5–2.2)
LACTATE SERPL-SCNC: 2.7 MMOL/L (ref 0.5–1.9)
LACTATE SERPL-SCNC: 2.9 MMOL/L (ref 0.5–1.9)
LACTATE SERPL-SCNC: 3.3 MMOL/L (ref 0.5–1.9)
LDH SERPL L TO P-CCNC: 1.44 MMOL/L (ref 0.5–2.2)
LDH SERPL L TO P-CCNC: 2.02 MMOL/L (ref 0.5–2.2)
LDH SERPL L TO P-CCNC: 2.44 MMOL/L (ref 0.5–2.2)
LDLC SERPL CALC-MCNC: 60.6 MG/DL (ref 63–159)
LEFT ATRIUM SIZE: 4.69 CM
LEFT INTERNAL DIMENSION IN SYSTOLE: 4.67 CM (ref 2.1–4)
LEFT VENTRICLE MASS INDEX: 78 G/M2
LEFT VENTRICULAR INTERNAL DIMENSION IN DIASTOLE: 5.54 CM (ref 3.5–6)
LEFT VENTRICULAR MASS: 200.4 G
LEUKOCYTE ESTERASE UR QL STRIP: ABNORMAL
LEVETIRACETAM SERPL-MCNC: 35.7 UG/ML (ref 10–40)
LV LATERAL E/E' RATIO: 20 M/S
LV SEPTAL E/E' RATIO: 20 M/S
LYMPHOCYTES # BLD AUTO: 0.6 K/UL (ref 1–4.8)
LYMPHOCYTES # BLD AUTO: 0.7 K/UL (ref 1–4.8)
LYMPHOCYTES # BLD AUTO: 0.8 K/UL (ref 1–4.8)
LYMPHOCYTES # BLD AUTO: 0.9 K/UL (ref 1–4.8)
LYMPHOCYTES # BLD AUTO: 1.1 K/UL (ref 1–4.8)
LYMPHOCYTES # BLD AUTO: 1.3 K/UL (ref 1–4.8)
LYMPHOCYTES # BLD AUTO: 1.5 K/UL (ref 1–4.8)
LYMPHOCYTES # BLD AUTO: 1.5 K/UL (ref 1–4.8)
LYMPHOCYTES # BLD AUTO: 1.7 K/UL (ref 1–4.8)
LYMPHOCYTES # BLD AUTO: 1.8 K/UL (ref 1–4.8)
LYMPHOCYTES # BLD AUTO: 1.9 K/UL (ref 1–4.8)
LYMPHOCYTES # BLD AUTO: 2.1 K/UL (ref 1–4.8)
LYMPHOCYTES # BLD AUTO: 2.1 K/UL (ref 1–4.8)
LYMPHOCYTES # BLD AUTO: 2.4 K/UL (ref 1–4.8)
LYMPHOCYTES # BLD AUTO: 2.4 K/UL (ref 1–4.8)
LYMPHOCYTES # BLD AUTO: 2.5 K/UL (ref 1–4.8)
LYMPHOCYTES # BLD AUTO: 2.6 K/UL (ref 1–4.8)
LYMPHOCYTES # BLD AUTO: 4.4 K/UL (ref 1–4.8)
LYMPHOCYTES # BLD AUTO: ABNORMAL K/UL (ref 1–4.8)
LYMPHOCYTES NFR BLD: 10 % (ref 18–48)
LYMPHOCYTES NFR BLD: 10.9 % (ref 18–48)
LYMPHOCYTES NFR BLD: 10.9 % (ref 18–48)
LYMPHOCYTES NFR BLD: 11.3 % (ref 18–48)
LYMPHOCYTES NFR BLD: 12 % (ref 18–48)
LYMPHOCYTES NFR BLD: 12.8 % (ref 18–48)
LYMPHOCYTES NFR BLD: 14.7 % (ref 18–48)
LYMPHOCYTES NFR BLD: 16.2 % (ref 18–48)
LYMPHOCYTES NFR BLD: 16.2 % (ref 18–48)
LYMPHOCYTES NFR BLD: 16.7 % (ref 18–48)
LYMPHOCYTES NFR BLD: 17.1 % (ref 18–48)
LYMPHOCYTES NFR BLD: 17.1 % (ref 18–48)
LYMPHOCYTES NFR BLD: 18.4 % (ref 18–48)
LYMPHOCYTES NFR BLD: 19.4 % (ref 18–48)
LYMPHOCYTES NFR BLD: 2 % (ref 18–48)
LYMPHOCYTES NFR BLD: 2.9 % (ref 18–48)
LYMPHOCYTES NFR BLD: 21.1 % (ref 18–48)
LYMPHOCYTES NFR BLD: 21.1 % (ref 18–48)
LYMPHOCYTES NFR BLD: 21.2 % (ref 18–48)
LYMPHOCYTES NFR BLD: 22.5 % (ref 18–48)
LYMPHOCYTES NFR BLD: 24 % (ref 18–48)
LYMPHOCYTES NFR BLD: 24.5 % (ref 18–48)
LYMPHOCYTES NFR BLD: 24.9 % (ref 18–48)
LYMPHOCYTES NFR BLD: 24.9 % (ref 18–48)
LYMPHOCYTES NFR BLD: 26.8 % (ref 18–48)
LYMPHOCYTES NFR BLD: 28.8 % (ref 18–48)
LYMPHOCYTES NFR BLD: 29.7 % (ref 18–48)
LYMPHOCYTES NFR BLD: 3 % (ref 18–48)
LYMPHOCYTES NFR BLD: 4.7 % (ref 18–48)
LYMPHOCYTES NFR BLD: 5.6 % (ref 18–48)
LYMPHOCYTES NFR BLD: 5.6 % (ref 18–48)
LYMPHOCYTES NFR BLD: 7 % (ref 18–48)
LYMPHOCYTES NFR BLD: 7 % (ref 18–48)
LYMPHOCYTES NFR BLD: 7.3 % (ref 18–48)
LYMPHOCYTES NFR BLD: 8.7 % (ref 18–48)
LYMPHOCYTES NFR BLD: 9 % (ref 18–48)
LYMPHOCYTES NFR BLD: 9 % (ref 18–48)
MAGNESIUM SERPL-MCNC: 1.6 MG/DL (ref 1.6–2.6)
MAGNESIUM SERPL-MCNC: 1.7 MG/DL (ref 1.6–2.6)
MAGNESIUM SERPL-MCNC: 1.8 MG/DL (ref 1.6–2.6)
MAGNESIUM SERPL-MCNC: 1.9 MG/DL (ref 1.6–2.6)
MAGNESIUM SERPL-MCNC: 2 MG/DL (ref 1.6–2.6)
MAGNESIUM SERPL-MCNC: 2.1 MG/DL (ref 1.6–2.6)
MAGNESIUM SERPL-MCNC: 2.2 MG/DL (ref 1.6–2.6)
MAGNESIUM SERPL-MCNC: 2.3 MG/DL (ref 1.6–2.6)
MAGNESIUM SERPL-MCNC: 2.4 MG/DL (ref 1.6–2.6)
MAGNESIUM SERPL-MCNC: 2.5 MG/DL (ref 1.6–2.6)
MCH RBC QN AUTO: 28.2 PG (ref 27–31)
MCH RBC QN AUTO: 28.5 PG (ref 27–31)
MCH RBC QN AUTO: 28.7 PG (ref 27–31)
MCH RBC QN AUTO: 28.7 PG (ref 27–31)
MCH RBC QN AUTO: 28.8 PG (ref 27–31)
MCH RBC QN AUTO: 28.9 PG (ref 27–31)
MCH RBC QN AUTO: 28.9 PG (ref 27–31)
MCH RBC QN AUTO: 29 PG (ref 27–31)
MCH RBC QN AUTO: 29 PG (ref 27–31)
MCH RBC QN AUTO: 29.1 PG (ref 27–31)
MCH RBC QN AUTO: 29.2 PG (ref 27–31)
MCH RBC QN AUTO: 29.3 PG (ref 27–31)
MCH RBC QN AUTO: 29.5 PG (ref 27–31)
MCH RBC QN AUTO: 29.5 PG (ref 27–31)
MCH RBC QN AUTO: 29.6 PG (ref 27–31)
MCH RBC QN AUTO: 29.7 PG (ref 27–31)
MCH RBC QN AUTO: 29.7 PG (ref 27–31)
MCH RBC QN AUTO: 29.8 PG (ref 27–31)
MCH RBC QN AUTO: 29.9 PG (ref 27–31)
MCH RBC QN AUTO: 30 PG (ref 27–31)
MCH RBC QN AUTO: 30.1 PG (ref 27–31)
MCH RBC QN AUTO: 30.3 PG (ref 27–31)
MCH RBC QN AUTO: 30.3 PG (ref 27–31)
MCH RBC QN AUTO: 30.4 PG (ref 27–31)
MCH RBC QN AUTO: 30.5 PG (ref 27–31)
MCH RBC QN AUTO: 30.5 PG (ref 27–31)
MCH RBC QN AUTO: 30.6 PG (ref 27–31)
MCH RBC QN AUTO: 30.7 PG (ref 27–31)
MCH RBC QN AUTO: 31.2 PG (ref 27–31)
MCH RBC QN AUTO: 31.2 PG (ref 27–31)
MCH RBC QN AUTO: 31.3 PG (ref 27–31)
MCHC RBC AUTO-ENTMCNC: 29.5 G/DL (ref 32–36)
MCHC RBC AUTO-ENTMCNC: 30.9 G/DL (ref 32–36)
MCHC RBC AUTO-ENTMCNC: 31 G/DL (ref 32–36)
MCHC RBC AUTO-ENTMCNC: 31.1 G/DL (ref 32–36)
MCHC RBC AUTO-ENTMCNC: 31.1 G/DL (ref 32–36)
MCHC RBC AUTO-ENTMCNC: 31.2 G/DL (ref 32–36)
MCHC RBC AUTO-ENTMCNC: 31.3 G/DL (ref 32–36)
MCHC RBC AUTO-ENTMCNC: 31.4 G/DL (ref 32–36)
MCHC RBC AUTO-ENTMCNC: 31.6 G/DL (ref 32–36)
MCHC RBC AUTO-ENTMCNC: 31.6 G/DL (ref 32–36)
MCHC RBC AUTO-ENTMCNC: 31.8 G/DL (ref 32–36)
MCHC RBC AUTO-ENTMCNC: 31.9 G/DL (ref 32–36)
MCHC RBC AUTO-ENTMCNC: 32 G/DL (ref 32–36)
MCHC RBC AUTO-ENTMCNC: 32.1 G/DL (ref 32–36)
MCHC RBC AUTO-ENTMCNC: 32.2 G/DL (ref 32–36)
MCHC RBC AUTO-ENTMCNC: 32.3 G/DL (ref 32–36)
MCHC RBC AUTO-ENTMCNC: 32.4 G/DL (ref 32–36)
MCHC RBC AUTO-ENTMCNC: 32.5 G/DL (ref 32–36)
MCHC RBC AUTO-ENTMCNC: 32.7 G/DL (ref 32–36)
MCHC RBC AUTO-ENTMCNC: 33.3 G/DL (ref 32–36)
MCV RBC AUTO: 100 FL (ref 82–98)
MCV RBC AUTO: 106 FL (ref 82–98)
MCV RBC AUTO: 89 FL (ref 82–98)
MCV RBC AUTO: 90 FL (ref 82–98)
MCV RBC AUTO: 91 FL (ref 82–98)
MCV RBC AUTO: 92 FL (ref 82–98)
MCV RBC AUTO: 93 FL (ref 82–98)
MCV RBC AUTO: 94 FL (ref 82–98)
MCV RBC AUTO: 95 FL (ref 82–98)
MCV RBC AUTO: 96 FL (ref 82–98)
MCV RBC AUTO: 96 FL (ref 82–98)
MCV RBC AUTO: 98 FL (ref 82–98)
MCV RBC AUTO: 99 FL (ref 82–98)
MICROSCOPIC COMMENT: ABNORMAL
MIN VOL: 10
MIN VOL: 10.3
MIN VOL: 10.4
MIN VOL: 11
MIN VOL: 11.3
MIN VOL: 11.5
MIN VOL: 12
MIN VOL: 12.5
MIN VOL: 8
MODE: ABNORMAL
MONOCYTES # BLD AUTO: 0.5 K/UL (ref 0.3–1)
MONOCYTES # BLD AUTO: 0.7 K/UL (ref 0.3–1)
MONOCYTES # BLD AUTO: 0.7 K/UL (ref 0.3–1)
MONOCYTES # BLD AUTO: 0.8 K/UL (ref 0.3–1)
MONOCYTES # BLD AUTO: 0.9 K/UL (ref 0.3–1)
MONOCYTES # BLD AUTO: 1 K/UL (ref 0.3–1)
MONOCYTES # BLD AUTO: 1.1 K/UL (ref 0.3–1)
MONOCYTES # BLD AUTO: 1.2 K/UL (ref 0.3–1)
MONOCYTES # BLD AUTO: 1.3 K/UL (ref 0.3–1)
MONOCYTES # BLD AUTO: 1.4 K/UL (ref 0.3–1)
MONOCYTES # BLD AUTO: 1.4 K/UL (ref 0.3–1)
MONOCYTES # BLD AUTO: 1.5 K/UL (ref 0.3–1)
MONOCYTES # BLD AUTO: 1.6 K/UL (ref 0.3–1)
MONOCYTES # BLD AUTO: 1.8 K/UL (ref 0.3–1)
MONOCYTES # BLD AUTO: 2.1 K/UL (ref 0.3–1)
MONOCYTES # BLD AUTO: ABNORMAL K/UL (ref 0.3–1)
MONOCYTES NFR BLD: 1 % (ref 4–15)
MONOCYTES NFR BLD: 10.3 % (ref 4–15)
MONOCYTES NFR BLD: 10.3 % (ref 4–15)
MONOCYTES NFR BLD: 10.4 % (ref 4–15)
MONOCYTES NFR BLD: 10.6 % (ref 4–15)
MONOCYTES NFR BLD: 10.6 % (ref 4–15)
MONOCYTES NFR BLD: 11.2 % (ref 4–15)
MONOCYTES NFR BLD: 11.2 % (ref 4–15)
MONOCYTES NFR BLD: 11.7 % (ref 4–15)
MONOCYTES NFR BLD: 11.9 % (ref 4–15)
MONOCYTES NFR BLD: 12.6 % (ref 4–15)
MONOCYTES NFR BLD: 13 % (ref 4–15)
MONOCYTES NFR BLD: 13.3 % (ref 4–15)
MONOCYTES NFR BLD: 13.4 % (ref 4–15)
MONOCYTES NFR BLD: 13.8 % (ref 4–15)
MONOCYTES NFR BLD: 14.5 % (ref 4–15)
MONOCYTES NFR BLD: 15 % (ref 4–15)
MONOCYTES NFR BLD: 15 % (ref 4–15)
MONOCYTES NFR BLD: 15.6 % (ref 4–15)
MONOCYTES NFR BLD: 15.6 % (ref 4–15)
MONOCYTES NFR BLD: 2 % (ref 4–15)
MONOCYTES NFR BLD: 3 % (ref 4–15)
MONOCYTES NFR BLD: 5.5 % (ref 4–15)
MONOCYTES NFR BLD: 6.4 % (ref 4–15)
MONOCYTES NFR BLD: 7.1 % (ref 4–15)
MONOCYTES NFR BLD: 7.5 % (ref 4–15)
MONOCYTES NFR BLD: 8 % (ref 4–15)
MONOCYTES NFR BLD: 8.1 % (ref 4–15)
MONOCYTES NFR BLD: 8.3 % (ref 4–15)
MONOCYTES NFR BLD: 8.5 % (ref 4–15)
MONOCYTES NFR BLD: 9.1 % (ref 4–15)
MONOCYTES NFR BLD: 9.2 % (ref 4–15)
MONOCYTES NFR BLD: 9.2 % (ref 4–15)
MONOCYTES NFR BLD: 9.5 % (ref 4–15)
MV PEAK A VEL: 0.65 M/S
MV PEAK E VEL: 1.4 M/S
NEUTROPHILS # BLD AUTO: 11.9 K/UL (ref 1.8–7.7)
NEUTROPHILS # BLD AUTO: 11.9 K/UL (ref 1.8–7.7)
NEUTROPHILS # BLD AUTO: 15.9 K/UL (ref 1.8–7.7)
NEUTROPHILS # BLD AUTO: 15.9 K/UL (ref 1.8–7.7)
NEUTROPHILS # BLD AUTO: 16.2 K/UL (ref 1.8–7.7)
NEUTROPHILS # BLD AUTO: 24.7 K/UL (ref 1.8–7.7)
NEUTROPHILS # BLD AUTO: 4.1 K/UL (ref 1.8–7.7)
NEUTROPHILS # BLD AUTO: 4.4 K/UL (ref 1.8–7.7)
NEUTROPHILS # BLD AUTO: 4.6 K/UL (ref 1.8–7.7)
NEUTROPHILS # BLD AUTO: 4.7 K/UL (ref 1.8–7.7)
NEUTROPHILS # BLD AUTO: 4.8 K/UL (ref 1.8–7.7)
NEUTROPHILS # BLD AUTO: 5.2 K/UL (ref 1.8–7.7)
NEUTROPHILS # BLD AUTO: 5.4 K/UL (ref 1.8–7.7)
NEUTROPHILS # BLD AUTO: 5.7 K/UL (ref 1.8–7.7)
NEUTROPHILS # BLD AUTO: 5.8 K/UL (ref 1.8–7.7)
NEUTROPHILS # BLD AUTO: 5.9 K/UL (ref 1.8–7.7)
NEUTROPHILS # BLD AUTO: 5.9 K/UL (ref 1.8–7.7)
NEUTROPHILS # BLD AUTO: 6.2 K/UL (ref 1.8–7.7)
NEUTROPHILS # BLD AUTO: 6.3 K/UL (ref 1.8–7.7)
NEUTROPHILS # BLD AUTO: 6.6 K/UL (ref 1.8–7.7)
NEUTROPHILS # BLD AUTO: 7 K/UL (ref 1.8–7.7)
NEUTROPHILS # BLD AUTO: 7.9 K/UL (ref 1.8–7.7)
NEUTROPHILS # BLD AUTO: 8.3 K/UL (ref 1.8–7.7)
NEUTROPHILS # BLD AUTO: 8.6 K/UL (ref 1.8–7.7)
NEUTROPHILS # BLD AUTO: ABNORMAL K/UL (ref 1.8–7.7)
NEUTROPHILS NFR BLD: 53.6 % (ref 38–73)
NEUTROPHILS NFR BLD: 54.5 % (ref 38–73)
NEUTROPHILS NFR BLD: 55.8 % (ref 38–73)
NEUTROPHILS NFR BLD: 56.2 % (ref 38–73)
NEUTROPHILS NFR BLD: 56.4 % (ref 38–73)
NEUTROPHILS NFR BLD: 56.5 % (ref 38–73)
NEUTROPHILS NFR BLD: 56.8 % (ref 38–73)
NEUTROPHILS NFR BLD: 58.2 % (ref 38–73)
NEUTROPHILS NFR BLD: 58.7 % (ref 38–73)
NEUTROPHILS NFR BLD: 59.2 % (ref 38–73)
NEUTROPHILS NFR BLD: 61.2 % (ref 38–73)
NEUTROPHILS NFR BLD: 61.7 % (ref 38–73)
NEUTROPHILS NFR BLD: 61.7 % (ref 38–73)
NEUTROPHILS NFR BLD: 61.9 % (ref 38–73)
NEUTROPHILS NFR BLD: 63 % (ref 38–73)
NEUTROPHILS NFR BLD: 63.5 % (ref 38–73)
NEUTROPHILS NFR BLD: 63.5 % (ref 38–73)
NEUTROPHILS NFR BLD: 63.9 % (ref 38–73)
NEUTROPHILS NFR BLD: 65.9 % (ref 38–73)
NEUTROPHILS NFR BLD: 66 % (ref 38–73)
NEUTROPHILS NFR BLD: 66 % (ref 38–73)
NEUTROPHILS NFR BLD: 66.7 % (ref 38–73)
NEUTROPHILS NFR BLD: 66.9 % (ref 38–73)
NEUTROPHILS NFR BLD: 67.9 % (ref 38–73)
NEUTROPHILS NFR BLD: 68 % (ref 38–73)
NEUTROPHILS NFR BLD: 74.9 % (ref 38–73)
NEUTROPHILS NFR BLD: 74.9 % (ref 38–73)
NEUTROPHILS NFR BLD: 76.5 % (ref 38–73)
NEUTROPHILS NFR BLD: 78 % (ref 38–73)
NEUTROPHILS NFR BLD: 79 % (ref 38–73)
NEUTROPHILS NFR BLD: 79 % (ref 38–73)
NEUTROPHILS NFR BLD: 83.1 % (ref 38–73)
NEUTROPHILS NFR BLD: 83.1 % (ref 38–73)
NEUTROPHILS NFR BLD: 84 % (ref 38–73)
NEUTROPHILS NFR BLD: 84.9 % (ref 38–73)
NEUTROPHILS NFR BLD: 85 % (ref 38–73)
NEUTROPHILS NFR BLD: 86.8 % (ref 38–73)
NEUTS BAND NFR BLD MANUAL: 15 %
NEUTS BAND NFR BLD MANUAL: 16 %
NEUTS BAND NFR BLD MANUAL: 23 %
NEUTS BAND NFR BLD MANUAL: 28 %
NEUTS BAND NFR BLD MANUAL: 5 %
NEUTS BAND NFR BLD MANUAL: 9 %
NITRITE UR QL STRIP: NEGATIVE
NITRITE UR QL STRIP: POSITIVE
NITRITE UR QL STRIP: POSITIVE
NONHDLC SERPL-MCNC: 75 MG/DL
NRBC BLD-RTO: 0 /100 WBC
OB PNL STL: NEGATIVE
OPIATES UR QL SCN: NEGATIVE
OSMOLALITY SERPL: 296 MOSM/KG (ref 280–300)
OSMOLALITY UR: 159 MOSM/KG (ref 50–1200)
PCO2 BLDA: 36.5 MMHG (ref 35–45)
PCO2 BLDA: 38.1 MMHG (ref 35–45)
PCO2 BLDA: 41.4 MMHG (ref 35–45)
PCO2 BLDA: 43.7 MMHG (ref 35–45)
PCO2 BLDA: 46.8 MMHG (ref 35–45)
PCO2 BLDA: 47.6 MMHG (ref 35–45)
PCO2 BLDA: 49 MMHG (ref 35–45)
PCO2 BLDA: 49.3 MMHG (ref 35–45)
PCO2 BLDA: 49.6 MMHG (ref 35–45)
PCO2 BLDA: 50.4 MMHG (ref 35–45)
PCO2 BLDA: 53.6 MMHG (ref 35–45)
PCO2 BLDA: 56.2 MMHG (ref 35–45)
PCO2 BLDA: 65.8 MMHG (ref 35–45)
PCO2 BLDA: 70.1 MMHG (ref 35–45)
PCO2 BLDA: 70.8 MMHG (ref 35–45)
PCO2 BLDA: 71.3 MMHG (ref 35–45)
PCO2 BLDA: 87.2 MMHG (ref 35–45)
PCO2 BLDA: 88.2 MMHG (ref 35–45)
PCO2 BLDA: 95.3 MMHG (ref 35–45)
PCP UR QL SCN>25 NG/ML: NEGATIVE
PEEP: 5
PEEP: 7
PH SMN: 7.32 [PH] (ref 7.35–7.45)
PH SMN: 7.32 [PH] (ref 7.35–7.45)
PH SMN: 7.33 [PH] (ref 7.35–7.45)
PH SMN: 7.33 [PH] (ref 7.35–7.45)
PH SMN: 7.34 [PH] (ref 7.35–7.45)
PH SMN: 7.34 [PH] (ref 7.35–7.45)
PH SMN: 7.36 [PH] (ref 7.35–7.45)
PH SMN: 7.37 [PH] (ref 7.35–7.45)
PH SMN: 7.38 [PH] (ref 7.35–7.45)
PH SMN: 7.43 [PH] (ref 7.35–7.45)
PH SMN: 7.45 [PH] (ref 7.35–7.45)
PH SMN: 7.46 [PH] (ref 7.35–7.45)
PH SMN: 7.46 [PH] (ref 7.35–7.45)
PH SMN: 7.48 [PH] (ref 7.35–7.45)
PH SMN: 7.57 [PH] (ref 7.35–7.45)
PH UR STRIP: 7 [PH] (ref 5–8)
PH UR STRIP: 8 [PH] (ref 5–8)
PH UR STRIP: 8 [PH] (ref 5–8)
PHOSPHATE SERPL-MCNC: 1.4 MG/DL (ref 2.7–4.5)
PHOSPHATE SERPL-MCNC: 2 MG/DL (ref 2.7–4.5)
PHOSPHATE SERPL-MCNC: 2.1 MG/DL (ref 2.7–4.5)
PHOSPHATE SERPL-MCNC: 2.1 MG/DL (ref 2.7–4.5)
PHOSPHATE SERPL-MCNC: 2.2 MG/DL (ref 2.7–4.5)
PHOSPHATE SERPL-MCNC: 2.3 MG/DL (ref 2.7–4.5)
PHOSPHATE SERPL-MCNC: 2.4 MG/DL (ref 2.7–4.5)
PHOSPHATE SERPL-MCNC: 2.5 MG/DL (ref 2.7–4.5)
PHOSPHATE SERPL-MCNC: 2.6 MG/DL (ref 2.7–4.5)
PHOSPHATE SERPL-MCNC: 2.7 MG/DL (ref 2.7–4.5)
PHOSPHATE SERPL-MCNC: 2.7 MG/DL (ref 2.7–4.5)
PHOSPHATE SERPL-MCNC: 2.8 MG/DL (ref 2.7–4.5)
PHOSPHATE SERPL-MCNC: 2.9 MG/DL (ref 2.7–4.5)
PHOSPHATE SERPL-MCNC: 2.9 MG/DL (ref 2.7–4.5)
PHOSPHATE SERPL-MCNC: 3 MG/DL (ref 2.7–4.5)
PHOSPHATE SERPL-MCNC: 3.1 MG/DL (ref 2.7–4.5)
PHOSPHATE SERPL-MCNC: 3.1 MG/DL (ref 2.7–4.5)
PHOSPHATE SERPL-MCNC: 3.2 MG/DL (ref 2.7–4.5)
PHOSPHATE SERPL-MCNC: 3.3 MG/DL (ref 2.7–4.5)
PHOSPHATE SERPL-MCNC: 3.3 MG/DL (ref 2.7–4.5)
PHOSPHATE SERPL-MCNC: 3.4 MG/DL (ref 2.7–4.5)
PHOSPHATE SERPL-MCNC: 3.5 MG/DL (ref 2.7–4.5)
PHOSPHATE SERPL-MCNC: 3.6 MG/DL (ref 2.7–4.5)
PHOSPHATE SERPL-MCNC: 3.7 MG/DL (ref 2.7–4.5)
PHOSPHATE SERPL-MCNC: 3.8 MG/DL (ref 2.7–4.5)
PIP: 1
PIP: 29
PIP: 29
PIP: 32
PIP: 36
PIP: 36
PIP: 40
PIP: 41
PIP: 48
PLATELET # BLD AUTO: 101 K/UL (ref 150–350)
PLATELET # BLD AUTO: 102 K/UL (ref 150–350)
PLATELET # BLD AUTO: 102 K/UL (ref 150–350)
PLATELET # BLD AUTO: 104 K/UL (ref 150–350)
PLATELET # BLD AUTO: 104 K/UL (ref 150–350)
PLATELET # BLD AUTO: 105 K/UL (ref 150–350)
PLATELET # BLD AUTO: 120 K/UL (ref 150–350)
PLATELET # BLD AUTO: 120 K/UL (ref 150–350)
PLATELET # BLD AUTO: 122 K/UL (ref 150–350)
PLATELET # BLD AUTO: 129 K/UL (ref 150–350)
PLATELET # BLD AUTO: 130 K/UL (ref 150–350)
PLATELET # BLD AUTO: 133 K/UL (ref 150–350)
PLATELET # BLD AUTO: 133 K/UL (ref 150–350)
PLATELET # BLD AUTO: 134 K/UL (ref 150–350)
PLATELET # BLD AUTO: 144 K/UL (ref 150–350)
PLATELET # BLD AUTO: 144 K/UL (ref 150–350)
PLATELET # BLD AUTO: 146 K/UL (ref 150–350)
PLATELET # BLD AUTO: 146 K/UL (ref 150–350)
PLATELET # BLD AUTO: 149 K/UL (ref 150–350)
PLATELET # BLD AUTO: 151 K/UL (ref 150–350)
PLATELET # BLD AUTO: 152 K/UL (ref 150–350)
PLATELET # BLD AUTO: 159 K/UL (ref 150–350)
PLATELET # BLD AUTO: 163 K/UL (ref 150–350)
PLATELET # BLD AUTO: 164 K/UL (ref 150–350)
PLATELET # BLD AUTO: 164 K/UL (ref 150–350)
PLATELET # BLD AUTO: 176 K/UL (ref 150–350)
PLATELET # BLD AUTO: 177 K/UL (ref 150–350)
PLATELET # BLD AUTO: 180 K/UL (ref 150–350)
PLATELET # BLD AUTO: 187 K/UL (ref 150–350)
PLATELET # BLD AUTO: 187 K/UL (ref 150–350)
PLATELET # BLD AUTO: 205 K/UL (ref 150–350)
PLATELET # BLD AUTO: 217 K/UL (ref 150–350)
PLATELET # BLD AUTO: 217 K/UL (ref 150–350)
PLATELET # BLD AUTO: 240 K/UL (ref 150–350)
PLATELET # BLD AUTO: 318 K/UL (ref 150–350)
PLATELET # BLD AUTO: 343 K/UL (ref 150–350)
PLATELET # BLD AUTO: 81 K/UL (ref 150–350)
PLATELET # BLD AUTO: 98 K/UL (ref 150–350)
PLATELET # BLD AUTO: 99 K/UL (ref 150–350)
PMV BLD AUTO: 11.1 FL (ref 9.2–12.9)
PMV BLD AUTO: 11.1 FL (ref 9.2–12.9)
PMV BLD AUTO: 11.2 FL (ref 9.2–12.9)
PMV BLD AUTO: 11.3 FL (ref 9.2–12.9)
PMV BLD AUTO: 11.4 FL (ref 9.2–12.9)
PMV BLD AUTO: 11.5 FL (ref 9.2–12.9)
PMV BLD AUTO: 11.6 FL (ref 9.2–12.9)
PMV BLD AUTO: 11.7 FL (ref 9.2–12.9)
PMV BLD AUTO: 11.8 FL (ref 9.2–12.9)
PMV BLD AUTO: 11.9 FL (ref 9.2–12.9)
PMV BLD AUTO: 12 FL (ref 9.2–12.9)
PMV BLD AUTO: 12.1 FL (ref 9.2–12.9)
PMV BLD AUTO: 12.2 FL (ref 9.2–12.9)
PMV BLD AUTO: 12.2 FL (ref 9.2–12.9)
PMV BLD AUTO: 12.3 FL (ref 9.2–12.9)
PMV BLD AUTO: 12.4 FL (ref 9.2–12.9)
PO2 BLDA: 102 MMHG (ref 80–100)
PO2 BLDA: 105 MMHG (ref 80–100)
PO2 BLDA: 106 MMHG (ref 80–100)
PO2 BLDA: 110 MMHG (ref 80–100)
PO2 BLDA: 116 MMHG (ref 80–100)
PO2 BLDA: 121 MMHG (ref 80–100)
PO2 BLDA: 125 MMHG (ref 80–100)
PO2 BLDA: 126 MMHG (ref 80–100)
PO2 BLDA: 336 MMHG (ref 80–100)
PO2 BLDA: 418 MMHG (ref 80–100)
PO2 BLDA: 65 MMHG (ref 80–100)
PO2 BLDA: 71 MMHG (ref 80–100)
PO2 BLDA: 74 MMHG (ref 80–100)
PO2 BLDA: 81 MMHG (ref 80–100)
PO2 BLDA: 82 MMHG (ref 80–100)
PO2 BLDA: 83 MMHG (ref 80–100)
PO2 BLDA: 85 MMHG (ref 80–100)
PO2 BLDA: 86 MMHG (ref 80–100)
PO2 BLDA: 91 MMHG (ref 80–100)
POC BE: -1 MMOL/L
POC BE: 0 MMOL/L
POC BE: 0 MMOL/L
POC BE: 1 MMOL/L
POC BE: 1 MMOL/L
POC BE: 11 MMOL/L
POC BE: 11 MMOL/L
POC BE: 15 MMOL/L
POC BE: 2 MMOL/L
POC BE: 24 MMOL/L
POC BE: 25 MMOL/L
POC BE: 27 MMOL/L
POC BE: 29 MMOL/L
POC BE: 3 MMOL/L
POC BE: 3 MMOL/L
POC BE: 4 MMOL/L
POC BE: 9 MMOL/L
POC IONIZED CALCIUM: 1.16 MMOL/L (ref 1.06–1.42)
POC IONIZED CALCIUM: 1.18 MMOL/L (ref 1.06–1.42)
POC IONIZED CALCIUM: 1.19 MMOL/L (ref 1.06–1.42)
POC IONIZED CALCIUM: 1.2 MMOL/L (ref 1.06–1.42)
POC IONIZED CALCIUM: 1.2 MMOL/L (ref 1.06–1.42)
POC IONIZED CALCIUM: 1.21 MMOL/L (ref 1.06–1.42)
POC IONIZED CALCIUM: 1.22 MMOL/L (ref 1.06–1.42)
POC IONIZED CALCIUM: 1.26 MMOL/L (ref 1.06–1.42)
POC SATURATED O2: 100 % (ref 95–100)
POC SATURATED O2: 100 % (ref 95–100)
POC SATURATED O2: 90 % (ref 95–100)
POC SATURATED O2: 92 % (ref 95–100)
POC SATURATED O2: 93 % (ref 95–100)
POC SATURATED O2: 94 % (ref 95–100)
POC SATURATED O2: 96 % (ref 95–100)
POC SATURATED O2: 97 % (ref 95–100)
POC SATURATED O2: 98 % (ref 95–100)
POC SATURATED O2: 99 % (ref 95–100)
POC TCO2 (MEASURED): 36 MMOL/L (ref 23–29)
POC TCO2: 25 MMOL/L (ref 23–27)
POC TCO2: 27 MMOL/L (ref 23–27)
POC TCO2: 28 MMOL/L (ref 23–27)
POC TCO2: 30 MMOL/L (ref 23–27)
POC TCO2: 30 MMOL/L (ref 23–27)
POC TCO2: 36 MMOL/L (ref 23–27)
POC TCO2: 36 MMOL/L (ref 23–27)
POC TCO2: 37 MMOL/L (ref 23–27)
POC TCO2: 43 MMOL/L (ref 23–27)
POC TCO2: >50 MMOL/L (ref 23–27)
POCT GLUCOSE: 137 MG/DL (ref 70–110)
POIKILOCYTOSIS BLD QL SMEAR: SLIGHT
POIKILOCYTOSIS BLD QL SMEAR: SLIGHT
POLYCHROMASIA BLD QL SMEAR: ABNORMAL
POTASSIUM BLD-SCNC: 3.9 MMOL/L (ref 3.5–5.1)
POTASSIUM BLD-SCNC: 4.3 MMOL/L (ref 3.5–5.1)
POTASSIUM BLD-SCNC: 4.6 MMOL/L (ref 3.5–5.1)
POTASSIUM BLD-SCNC: 4.7 MMOL/L (ref 3.5–5.1)
POTASSIUM BLD-SCNC: 4.7 MMOL/L (ref 3.5–5.1)
POTASSIUM BLD-SCNC: 5.2 MMOL/L (ref 3.5–5.1)
POTASSIUM BLD-SCNC: 5.6 MMOL/L (ref 3.5–5.1)
POTASSIUM BLD-SCNC: 5.6 MMOL/L (ref 3.5–5.1)
POTASSIUM SERPL-SCNC: 2.9 MMOL/L (ref 3.5–5.1)
POTASSIUM SERPL-SCNC: 3 MMOL/L (ref 3.5–5.1)
POTASSIUM SERPL-SCNC: 3 MMOL/L (ref 3.5–5.1)
POTASSIUM SERPL-SCNC: 3.2 MMOL/L (ref 3.5–5.1)
POTASSIUM SERPL-SCNC: 3.4 MMOL/L (ref 3.5–5.1)
POTASSIUM SERPL-SCNC: 3.5 MMOL/L (ref 3.5–5.1)
POTASSIUM SERPL-SCNC: 3.6 MMOL/L (ref 3.5–5.1)
POTASSIUM SERPL-SCNC: 3.7 MMOL/L (ref 3.5–5.1)
POTASSIUM SERPL-SCNC: 3.7 MMOL/L (ref 3.5–5.1)
POTASSIUM SERPL-SCNC: 3.8 MMOL/L (ref 3.5–5.1)
POTASSIUM SERPL-SCNC: 3.9 MMOL/L (ref 3.5–5.1)
POTASSIUM SERPL-SCNC: 4 MMOL/L (ref 3.5–5.1)
POTASSIUM SERPL-SCNC: 4.1 MMOL/L (ref 3.5–5.1)
POTASSIUM SERPL-SCNC: 4.2 MMOL/L (ref 3.5–5.1)
POTASSIUM SERPL-SCNC: 4.7 MMOL/L (ref 3.5–5.1)
POTASSIUM SERPL-SCNC: 4.8 MMOL/L (ref 3.5–5.1)
POTASSIUM SERPL-SCNC: 4.8 MMOL/L (ref 3.5–5.1)
POTASSIUM SERPL-SCNC: 4.9 MMOL/L (ref 3.5–5.1)
POTASSIUM SERPL-SCNC: 5 MMOL/L (ref 3.5–5.1)
POTASSIUM SERPL-SCNC: 5.1 MMOL/L (ref 3.5–5.1)
POTASSIUM SERPL-SCNC: 5.2 MMOL/L (ref 3.5–5.1)
POTASSIUM SERPL-SCNC: 5.3 MMOL/L (ref 3.5–5.1)
POTASSIUM SERPL-SCNC: 5.5 MMOL/L (ref 3.5–5.1)
POTASSIUM SERPL-SCNC: 5.6 MMOL/L (ref 3.5–5.1)
POTASSIUM SERPL-SCNC: 5.9 MMOL/L (ref 3.5–5.1)
POTASSIUM SERPL-SCNC: 5.9 MMOL/L (ref 3.5–5.1)
POTASSIUM SERPL-SCNC: 6.1 MMOL/L (ref 3.5–5.1)
POTASSIUM SERPL-SCNC: 6.3 MMOL/L (ref 3.5–5.1)
POTASSIUM UR-SCNC: 34 MMOL/L (ref 15–95)
PROCALCITONIN SERPL IA-MCNC: 0.11 NG/ML
PROCALCITONIN SERPL IA-MCNC: 0.13 NG/ML
PROCALCITONIN SERPL IA-MCNC: 0.34 NG/ML
PROCALCITONIN SERPL IA-MCNC: 0.41 NG/ML (ref 0–0.5)
PROCALCITONIN SERPL IA-MCNC: 0.42 NG/ML (ref 0–0.5)
PROCALCITONIN SERPL IA-MCNC: 3.68 NG/ML (ref 0–0.5)
PROCALCITONIN SERPL IA-MCNC: 6.36 NG/ML (ref 0–0.5)
PROCALCITONIN SERPL IA-MCNC: 6.99 NG/ML (ref 0–0.5)
PROCALCITONIN SERPL IA-MCNC: 9.6 NG/ML (ref 0–0.5)
PROT SERPL-MCNC: 6.1 G/DL (ref 6–8.4)
PROT SERPL-MCNC: 6.2 G/DL (ref 6–8.4)
PROT SERPL-MCNC: 6.3 G/DL (ref 6–8.4)
PROT SERPL-MCNC: 6.4 G/DL (ref 6–8.4)
PROT SERPL-MCNC: 6.5 G/DL (ref 6–8.4)
PROT SERPL-MCNC: 6.5 G/DL (ref 6–8.4)
PROT SERPL-MCNC: 6.6 G/DL (ref 6–8.4)
PROT SERPL-MCNC: 6.7 G/DL (ref 6–8.4)
PROT SERPL-MCNC: 6.9 G/DL (ref 6–8.4)
PROT SERPL-MCNC: 7.1 G/DL (ref 6–8.4)
PROT SERPL-MCNC: 7.3 G/DL (ref 6–8.4)
PROT SERPL-MCNC: 7.5 G/DL (ref 6–8.4)
PROT SERPL-MCNC: 7.6 G/DL (ref 6–8.4)
PROT SERPL-MCNC: 7.7 G/DL (ref 6–8.4)
PROT SERPL-MCNC: 7.7 G/DL (ref 6–8.4)
PROT SERPL-MCNC: 7.8 G/DL (ref 6–8.4)
PROT SERPL-MCNC: 7.9 G/DL (ref 6–8.4)
PROT SERPL-MCNC: 8 G/DL (ref 6–8.4)
PROT SERPL-MCNC: 8.1 G/DL (ref 6–8.4)
PROT SERPL-MCNC: 8.2 G/DL (ref 6–8.4)
PROT SERPL-MCNC: 8.7 G/DL (ref 6–8.4)
PROT UR QL STRIP: ABNORMAL
PROTHROMBIN TIME: 12.3 SEC (ref 9–12.5)
PROTHROMBIN TIME: 14.7 SEC (ref 10.6–14.8)
PROTHROMBIN TIME: 15.3 SEC (ref 10.6–14.8)
PROTHROMBIN TIME: 15.4 SEC (ref 10.6–14.8)
PROTHROMBIN TIME: 18.3 SEC (ref 10.6–14.8)
PROTHROMBIN TIME: 18.5 SEC (ref 10.6–14.8)
PROTHROMBIN TIME: 19.6 SEC (ref 10.6–14.8)
PROTHROMBIN TIME: 19.7 SEC (ref 10.6–14.8)
PROTHROMBIN TIME: 22 SEC (ref 10.6–14.8)
PULM VEIN S/D RATIO: 0.46
PV PEAK D VEL: 46.28 M/S
PV PEAK S VEL: 21.51 M/S
PV PEAK VELOCITY: 94.11 CM/S
RBC # BLD AUTO: 2.43 M/UL (ref 4.6–6.2)
RBC # BLD AUTO: 2.43 M/UL (ref 4.6–6.2)
RBC # BLD AUTO: 2.72 M/UL (ref 4.6–6.2)
RBC # BLD AUTO: 2.81 M/UL (ref 4.6–6.2)
RBC # BLD AUTO: 2.81 M/UL (ref 4.6–6.2)
RBC # BLD AUTO: 2.83 M/UL (ref 4.6–6.2)
RBC # BLD AUTO: 2.84 M/UL (ref 4.6–6.2)
RBC # BLD AUTO: 2.84 M/UL (ref 4.6–6.2)
RBC # BLD AUTO: 2.86 M/UL (ref 4.6–6.2)
RBC # BLD AUTO: 2.87 M/UL (ref 4.6–6.2)
RBC # BLD AUTO: 2.89 M/UL (ref 4.6–6.2)
RBC # BLD AUTO: 2.89 M/UL (ref 4.6–6.2)
RBC # BLD AUTO: 3.01 M/UL (ref 4.6–6.2)
RBC # BLD AUTO: 3.07 M/UL (ref 4.6–6.2)
RBC # BLD AUTO: 3.09 M/UL (ref 4.6–6.2)
RBC # BLD AUTO: 3.09 M/UL (ref 4.6–6.2)
RBC # BLD AUTO: 3.13 M/UL (ref 4.6–6.2)
RBC # BLD AUTO: 3.14 M/UL (ref 4.6–6.2)
RBC # BLD AUTO: 3.15 M/UL (ref 4.6–6.2)
RBC # BLD AUTO: 3.17 M/UL (ref 4.6–6.2)
RBC # BLD AUTO: 3.18 M/UL (ref 4.6–6.2)
RBC # BLD AUTO: 3.19 M/UL (ref 4.6–6.2)
RBC # BLD AUTO: 3.21 M/UL (ref 4.6–6.2)
RBC # BLD AUTO: 3.22 M/UL (ref 4.6–6.2)
RBC # BLD AUTO: 3.23 M/UL (ref 4.6–6.2)
RBC # BLD AUTO: 3.28 M/UL (ref 4.6–6.2)
RBC # BLD AUTO: 3.34 M/UL (ref 4.6–6.2)
RBC # BLD AUTO: 3.35 M/UL (ref 4.6–6.2)
RBC # BLD AUTO: 3.35 M/UL (ref 4.6–6.2)
RBC # BLD AUTO: 3.36 M/UL (ref 4.6–6.2)
RBC # BLD AUTO: 3.39 M/UL (ref 4.6–6.2)
RBC # BLD AUTO: 3.46 M/UL (ref 4.6–6.2)
RBC # BLD AUTO: 3.47 M/UL (ref 4.6–6.2)
RBC # BLD AUTO: 3.49 M/UL (ref 4.6–6.2)
RBC # BLD AUTO: 3.53 M/UL (ref 4.6–6.2)
RBC # BLD AUTO: 3.55 M/UL (ref 4.6–6.2)
RBC # BLD AUTO: 3.61 M/UL (ref 4.6–6.2)
RBC # BLD AUTO: 3.62 M/UL (ref 4.6–6.2)
RBC #/AREA URNS HPF: 2 /HPF (ref 0–4)
RBC #/AREA URNS HPF: 2 /HPF (ref 0–4)
RBC #/AREA URNS HPF: 4 /HPF (ref 0–4)
RBC #/AREA URNS HPF: 5 /HPF (ref 0–4)
RBC #/AREA URNS HPF: 61 /HPF (ref 0–4)
RBC #/AREA URNS HPF: >100 /HPF (ref 0–4)
RBC #/AREA URNS HPF: >100 /HPF (ref 0–4)
RPR SER QL: NORMAL
SAMPLE: ABNORMAL
SAMPLE: NORMAL
SAMPLE: NORMAL
SITE: ABNORMAL
SODIUM BLD-SCNC: 131 MMOL/L (ref 136–145)
SODIUM BLD-SCNC: 132 MMOL/L (ref 136–145)
SODIUM BLD-SCNC: 134 MMOL/L (ref 136–145)
SODIUM BLD-SCNC: 134 MMOL/L (ref 136–145)
SODIUM BLD-SCNC: 135 MMOL/L (ref 136–145)
SODIUM BLD-SCNC: 135 MMOL/L (ref 136–145)
SODIUM BLD-SCNC: 136 MMOL/L (ref 136–145)
SODIUM BLD-SCNC: 141 MMOL/L (ref 136–145)
SODIUM SERPL-SCNC: 127 MMOL/L (ref 136–145)
SODIUM SERPL-SCNC: 128 MMOL/L (ref 136–145)
SODIUM SERPL-SCNC: 129 MMOL/L (ref 136–145)
SODIUM SERPL-SCNC: 130 MMOL/L (ref 136–145)
SODIUM SERPL-SCNC: 131 MMOL/L (ref 136–145)
SODIUM SERPL-SCNC: 131 MMOL/L (ref 136–145)
SODIUM SERPL-SCNC: 132 MMOL/L (ref 136–145)
SODIUM SERPL-SCNC: 133 MMOL/L (ref 136–145)
SODIUM SERPL-SCNC: 134 MMOL/L (ref 136–145)
SODIUM SERPL-SCNC: 135 MMOL/L (ref 136–145)
SODIUM SERPL-SCNC: 136 MMOL/L (ref 136–145)
SODIUM SERPL-SCNC: 137 MMOL/L (ref 136–145)
SODIUM SERPL-SCNC: 138 MMOL/L (ref 136–145)
SODIUM SERPL-SCNC: 139 MMOL/L (ref 136–145)
SODIUM SERPL-SCNC: 140 MMOL/L (ref 136–145)
SODIUM SERPL-SCNC: 141 MMOL/L (ref 136–145)
SODIUM SERPL-SCNC: 141 MMOL/L (ref 136–145)
SODIUM UR-SCNC: 30 MMOL/L (ref 20–250)
SODIUM UR-SCNC: 60 MMOL/L (ref 20–250)
SP GR UR STRIP: 1 (ref 1–1.03)
SP GR UR STRIP: 1.01 (ref 1–1.03)
SP02: 100
SP02: 95
SP02: 96
SP02: 96
SP02: 97
SP02: 97
SP02: 99
SPECIMEN SOURCE: NORMAL
SQUAMOUS #/AREA URNS HPF: 0 /HPF
SQUAMOUS #/AREA URNS HPF: 1 /HPF
SQUAMOUS #/AREA URNS HPF: >100 /HPF
SQUAMOUS #/AREA URNS HPF: >100 /HPF
STOOL CULTURE: NORMAL
STOOL CULTURE: NORMAL
T4 FREE SERPL-MCNC: 0.28 NG/DL (ref 0.71–1.51)
T4 FREE SERPL-MCNC: 0.37 NG/DL (ref 0.71–1.51)
T4 FREE SERPL-MCNC: 0.57 NG/DL (ref 0.71–1.51)
T4 FREE SERPL-MCNC: 0.57 NG/DL (ref 0.71–1.51)
T4 FREE SERPL-MCNC: 0.76 NG/DL (ref 0.71–1.51)
TDI LATERAL: 0.07 M/S
TDI SEPTAL: 0.07 M/S
TDI: 0.07 M/S
TOXIC GRANULES BLD QL SMEAR: PRESENT
TOXICOLOGY INFORMATION: NORMAL
TRIGL SERPL-MCNC: 72 MG/DL (ref 30–150)
TROPONIN I SERPL DL<=0.01 NG/ML-MCNC: 0.03 NG/ML (ref 0.02–0.04)
TROPONIN I SERPL DL<=0.01 NG/ML-MCNC: 0.03 NG/ML (ref 0.02–0.04)
TROPONIN I SERPL DL<=0.01 NG/ML-MCNC: 0.03 NG/ML (ref 0–0.03)
TROPONIN I SERPL DL<=0.01 NG/ML-MCNC: 0.04 NG/ML (ref 0.02–0.04)
TROPONIN I SERPL DL<=0.01 NG/ML-MCNC: 0.08 NG/ML (ref 0.02–0.04)
TROPONIN I SERPL DL<=0.01 NG/ML-MCNC: 0.09 NG/ML (ref 0.02–0.04)
TROPONIN I SERPL DL<=0.01 NG/ML-MCNC: <0.03 NG/ML (ref 0.02–0.04)
TSH SERPL DL<=0.005 MIU/L-ACNC: 180.66 UIU/ML (ref 0.34–5.6)
TSH SERPL DL<=0.005 MIU/L-ACNC: 180.66 UIU/ML (ref 0.34–5.6)
TSH SERPL DL<=0.005 MIU/L-ACNC: 186.83 UIU/ML (ref 0.34–5.6)
TSH SERPL DL<=0.005 MIU/L-ACNC: 207.94 UIU/ML (ref 0.34–5.6)
TSH SERPL DL<=0.005 MIU/L-ACNC: 28.31 UIU/ML (ref 0.34–5.6)
TSH SERPL DL<=0.005 MIU/L-ACNC: 9.98 UIU/ML (ref 0.34–5.6)
URATE UR-MCNC: 38.6 MG/DL
URN SPEC COLLECT METH UR: ABNORMAL
UROBILINOGEN UR STRIP-ACNC: ABNORMAL EU/DL
UROBILINOGEN UR STRIP-ACNC: NEGATIVE EU/DL
UUN UR-MCNC: 931 MG/DL (ref 140–1050)
VANCOMYCIN SERPL-MCNC: 15.6 UG/ML
VANCOMYCIN SERPL-MCNC: 21.2 UG/ML
VANCOMYCIN SERPL-MCNC: 23.3 UG/ML
VANCOMYCIN SERPL-MCNC: 27.2 UG/ML
VANCOMYCIN SERPL-MCNC: 29.2 UG/ML
VANCOMYCIN SERPL-MCNC: 6.6 UG/ML
VANCOMYCIN TROUGH SERPL-MCNC: 13.7 UG/ML (ref 10–22)
VIT B1 BLD-SCNC: 163.9 NMOL/L (ref 66.5–200)
VIT B1 BLD-SCNC: 193.7 NMOL/L (ref 66.5–200)
VIT B1 BLD-SCNC: 209.4 NMOL/L (ref 66.5–200)
VIT B12 SERPL-MCNC: 1071 PG/ML (ref 210–950)
VIT B12 SERPL-MCNC: 1262 PG/ML (ref 210–950)
VIT B6 SERPL-MCNC: 4 UG/L (ref 5.3–46.7)
VT: 450
VT: 470
VT: 550
VT: 600
VT: 650
VT: 650
VT: 700
WBC # BLD AUTO: 11.28 K/UL (ref 3.9–12.7)
WBC # BLD AUTO: 11.48 K/UL (ref 3.9–12.7)
WBC # BLD AUTO: 11.76 K/UL (ref 3.9–12.7)
WBC # BLD AUTO: 14.75 K/UL (ref 3.9–12.7)
WBC # BLD AUTO: 15.93 K/UL (ref 3.9–12.7)
WBC # BLD AUTO: 15.93 K/UL (ref 3.9–12.7)
WBC # BLD AUTO: 16.69 K/UL (ref 3.9–12.7)
WBC # BLD AUTO: 19.07 K/UL (ref 3.9–12.7)
WBC # BLD AUTO: 19.18 K/UL (ref 3.9–12.7)
WBC # BLD AUTO: 19.18 K/UL (ref 3.9–12.7)
WBC # BLD AUTO: 28.18 K/UL (ref 3.9–12.7)
WBC # BLD AUTO: 28.44 K/UL (ref 3.9–12.7)
WBC # BLD AUTO: 31.53 K/UL (ref 3.9–12.7)
WBC # BLD AUTO: 44.92 K/UL (ref 3.9–12.7)
WBC # BLD AUTO: 7.32 K/UL (ref 3.9–12.7)
WBC # BLD AUTO: 7.72 K/UL (ref 3.9–12.7)
WBC # BLD AUTO: 7.86 K/UL (ref 3.9–12.7)
WBC # BLD AUTO: 7.93 K/UL (ref 3.9–12.7)
WBC # BLD AUTO: 8 K/UL (ref 3.9–12.7)
WBC # BLD AUTO: 8.22 K/UL (ref 3.9–12.7)
WBC # BLD AUTO: 8.23 K/UL (ref 3.9–12.7)
WBC # BLD AUTO: 8.24 K/UL (ref 3.9–12.7)
WBC # BLD AUTO: 8.24 K/UL (ref 3.9–12.7)
WBC # BLD AUTO: 8.26 K/UL (ref 3.9–12.7)
WBC # BLD AUTO: 8.26 K/UL (ref 3.9–12.7)
WBC # BLD AUTO: 8.58 K/UL (ref 3.9–12.7)
WBC # BLD AUTO: 8.62 K/UL (ref 3.9–12.7)
WBC # BLD AUTO: 8.64 K/UL (ref 3.9–12.7)
WBC # BLD AUTO: 8.79 K/UL (ref 3.9–12.7)
WBC # BLD AUTO: 8.79 K/UL (ref 3.9–12.7)
WBC # BLD AUTO: 9.01 K/UL (ref 3.9–12.7)
WBC # BLD AUTO: 9.12 K/UL (ref 3.9–12.7)
WBC # BLD AUTO: 9.22 K/UL (ref 3.9–12.7)
WBC # BLD AUTO: 9.23 K/UL (ref 3.9–12.7)
WBC # BLD AUTO: 9.34 K/UL (ref 3.9–12.7)
WBC # BLD AUTO: 9.42 K/UL (ref 3.9–12.7)
WBC # BLD AUTO: 9.56 K/UL (ref 3.9–12.7)
WBC # BLD AUTO: 9.64 K/UL (ref 3.9–12.7)
WBC # BLD AUTO: 9.66 K/UL (ref 3.9–12.7)
WBC # BLD AUTO: 9.67 K/UL (ref 3.9–12.7)
WBC # BLD AUTO: 9.7 K/UL (ref 3.9–12.7)
WBC # BLD AUTO: 9.96 K/UL (ref 3.9–12.7)
WBC #/AREA STL HPF: ABNORMAL /[HPF]
WBC #/AREA URNS HPF: 30 /HPF (ref 0–5)
WBC #/AREA URNS HPF: 40 /HPF (ref 0–5)
WBC #/AREA URNS HPF: >100 /HPF (ref 0–5)
YEAST URNS QL MICRO: ABNORMAL
YEAST URNS QL MICRO: ABNORMAL

## 2019-01-01 PROCEDURE — 99233 PR SUBSEQUENT HOSPITAL CARE,LEVL III: ICD-10-PCS | Mod: ,,, | Performed by: INTERNAL MEDICINE

## 2019-01-01 PROCEDURE — 94770 HC EXHALED C02 TEST: CPT

## 2019-01-01 PROCEDURE — 94761 N-INVAS EAR/PLS OXIMETRY MLT: CPT

## 2019-01-01 PROCEDURE — 63600175 PHARM REV CODE 636 W HCPCS: Performed by: INTERNAL MEDICINE

## 2019-01-01 PROCEDURE — 94640 AIRWAY INHALATION TREATMENT: CPT

## 2019-01-01 PROCEDURE — 87186 SC STD MICRODIL/AGAR DIL: CPT

## 2019-01-01 PROCEDURE — 36415 COLL VENOUS BLD VENIPUNCTURE: CPT

## 2019-01-01 PROCEDURE — 83735 ASSAY OF MAGNESIUM: CPT

## 2019-01-01 PROCEDURE — 37799 UNLISTED PX VASCULAR SURGERY: CPT

## 2019-01-01 PROCEDURE — 84443 ASSAY THYROID STIM HORMONE: CPT

## 2019-01-01 PROCEDURE — 27100080 HC AIRWAY ADAPTER-END TIDAL CO2

## 2019-01-01 PROCEDURE — 25000003 PHARM REV CODE 250: Performed by: INTERNAL MEDICINE

## 2019-01-01 PROCEDURE — 63600175 PHARM REV CODE 636 W HCPCS: Performed by: PSYCHIATRY & NEUROLOGY

## 2019-01-01 PROCEDURE — 20000000 HC ICU ROOM

## 2019-01-01 PROCEDURE — 84100 ASSAY OF PHOSPHORUS: CPT

## 2019-01-01 PROCEDURE — 83605 ASSAY OF LACTIC ACID: CPT | Mod: 91

## 2019-01-01 PROCEDURE — 36569 INSJ PICC 5 YR+ W/O IMAGING: CPT

## 2019-01-01 PROCEDURE — A4216 STERILE WATER/SALINE, 10 ML: HCPCS | Performed by: INTERNAL MEDICINE

## 2019-01-01 PROCEDURE — 85025 COMPLETE CBC W/AUTO DIFF WBC: CPT

## 2019-01-01 PROCEDURE — 99291 CRITICAL CARE FIRST HOUR: CPT | Mod: ,,, | Performed by: INTERNAL MEDICINE

## 2019-01-01 PROCEDURE — 12000002 HC ACUTE/MED SURGE SEMI-PRIVATE ROOM

## 2019-01-01 PROCEDURE — 99231 SBSQ HOSP IP/OBS SF/LOW 25: CPT | Mod: ,,, | Performed by: INTERNAL MEDICINE

## 2019-01-01 PROCEDURE — 94003 VENT MGMT INPAT SUBQ DAY: CPT

## 2019-01-01 PROCEDURE — 85007 BL SMEAR W/DIFF WBC COUNT: CPT

## 2019-01-01 PROCEDURE — 85347 COAGULATION TIME ACTIVATED: CPT

## 2019-01-01 PROCEDURE — 84295 ASSAY OF SERUM SODIUM: CPT

## 2019-01-01 PROCEDURE — 82550 ASSAY OF CK (CPK): CPT

## 2019-01-01 PROCEDURE — 87186 SC STD MICRODIL/AGAR DIL: CPT | Mod: 59

## 2019-01-01 PROCEDURE — 99900035 HC TECH TIME PER 15 MIN (STAT)

## 2019-01-01 PROCEDURE — 63600175 PHARM REV CODE 636 W HCPCS

## 2019-01-01 PROCEDURE — C9113 INJ PANTOPRAZOLE SODIUM, VIA: HCPCS | Performed by: INTERNAL MEDICINE

## 2019-01-01 PROCEDURE — 87070 CULTURE OTHR SPECIMN AEROBIC: CPT

## 2019-01-01 PROCEDURE — 86140 C-REACTIVE PROTEIN: CPT

## 2019-01-01 PROCEDURE — 84484 ASSAY OF TROPONIN QUANT: CPT

## 2019-01-01 PROCEDURE — 25000003 PHARM REV CODE 250

## 2019-01-01 PROCEDURE — 80053 COMPREHEN METABOLIC PANEL: CPT

## 2019-01-01 PROCEDURE — 87493 C DIFF AMPLIFIED PROBE: CPT

## 2019-01-01 PROCEDURE — 27000221 HC OXYGEN, UP TO 24 HOURS

## 2019-01-01 PROCEDURE — 83880 ASSAY OF NATRIURETIC PEPTIDE: CPT

## 2019-01-01 PROCEDURE — 87077 CULTURE AEROBIC IDENTIFY: CPT

## 2019-01-01 PROCEDURE — 99900026 HC AIRWAY MAINTENANCE (STAT)

## 2019-01-01 PROCEDURE — 97803 MED NUTRITION INDIV SUBSEQ: CPT

## 2019-01-01 PROCEDURE — 87086 URINE CULTURE/COLONY COUNT: CPT

## 2019-01-01 PROCEDURE — 99231 SBSQ HOSP IP/OBS SF/LOW 25: CPT | Mod: S$GLB,,, | Performed by: INTERNAL MEDICINE

## 2019-01-01 PROCEDURE — 87205 SMEAR GRAM STAIN: CPT

## 2019-01-01 PROCEDURE — 25000242 PHARM REV CODE 250 ALT 637 W/ HCPCS: Performed by: INTERNAL MEDICINE

## 2019-01-01 PROCEDURE — 82962 GLUCOSE BLOOD TEST: CPT

## 2019-01-01 PROCEDURE — 99223 PR INITIAL HOSPITAL CARE,LEVL III: ICD-10-PCS | Mod: ,,, | Performed by: INTERNAL MEDICINE

## 2019-01-01 PROCEDURE — 85610 PROTHROMBIN TIME: CPT

## 2019-01-01 PROCEDURE — 84439 ASSAY OF FREE THYROXINE: CPT

## 2019-01-01 PROCEDURE — 36600 WITHDRAWAL OF ARTERIAL BLOOD: CPT

## 2019-01-01 PROCEDURE — 94002 VENT MGMT INPAT INIT DAY: CPT

## 2019-01-01 PROCEDURE — 83735 ASSAY OF MAGNESIUM: CPT | Mod: 91

## 2019-01-01 PROCEDURE — 27200966 HC CLOSED SUCTION SYSTEM

## 2019-01-01 PROCEDURE — 99291 PR CRITICAL CARE, E/M 30-74 MINUTES: ICD-10-PCS | Mod: ,,, | Performed by: INTERNAL MEDICINE

## 2019-01-01 PROCEDURE — 99232 PR SUBSEQUENT HOSPITAL CARE,LEVL II: ICD-10-PCS | Mod: ,,, | Performed by: INTERNAL MEDICINE

## 2019-01-01 PROCEDURE — 93005 ELECTROCARDIOGRAM TRACING: CPT

## 2019-01-01 PROCEDURE — 99233 PR SUBSEQUENT HOSPITAL CARE,LEVL III: ICD-10-PCS | Mod: ,,, | Performed by: UROLOGY

## 2019-01-01 PROCEDURE — 80202 ASSAY OF VANCOMYCIN: CPT

## 2019-01-01 PROCEDURE — 87086 URINE CULTURE/COLONY COUNT: CPT | Mod: 59

## 2019-01-01 PROCEDURE — 63600175 PHARM REV CODE 636 W HCPCS: Performed by: HOSPITALIST

## 2019-01-01 PROCEDURE — 82803 BLOOD GASES ANY COMBINATION: CPT

## 2019-01-01 PROCEDURE — 87147 CULTURE TYPE IMMUNOLOGIC: CPT | Mod: 59

## 2019-01-01 PROCEDURE — 80069 RENAL FUNCTION PANEL: CPT

## 2019-01-01 PROCEDURE — 63600175 PHARM REV CODE 636 W HCPCS: Performed by: EMERGENCY MEDICINE

## 2019-01-01 PROCEDURE — 84100 ASSAY OF PHOSPHORUS: CPT | Mod: 91

## 2019-01-01 PROCEDURE — 99291 CRITICAL CARE FIRST HOUR: CPT

## 2019-01-01 PROCEDURE — 99232 SBSQ HOSP IP/OBS MODERATE 35: CPT | Mod: ,,, | Performed by: INTERNAL MEDICINE

## 2019-01-01 PROCEDURE — 87149 DNA/RNA DIRECT PROBE: CPT | Mod: 91

## 2019-01-01 PROCEDURE — 25500020 PHARM REV CODE 255: Performed by: SPECIALIST

## 2019-01-01 PROCEDURE — 25000003 PHARM REV CODE 250: Performed by: UROLOGY

## 2019-01-01 PROCEDURE — 99233 SBSQ HOSP IP/OBS HIGH 50: CPT | Mod: ,,, | Performed by: INTERNAL MEDICINE

## 2019-01-01 PROCEDURE — 87045 FECES CULTURE AEROBIC BACT: CPT

## 2019-01-01 PROCEDURE — 96365 THER/PROPH/DIAG IV INF INIT: CPT | Mod: 59

## 2019-01-01 PROCEDURE — C9254 INJECTION, LACOSAMIDE: HCPCS | Performed by: PSYCHIATRY & NEUROLOGY

## 2019-01-01 PROCEDURE — 84540 ASSAY OF URINE/UREA-N: CPT

## 2019-01-01 PROCEDURE — 87077 CULTURE AEROBIC IDENTIFY: CPT | Mod: 59

## 2019-01-01 PROCEDURE — 95805 MULTIPLE SLEEP LATENCY TEST: CPT

## 2019-01-01 PROCEDURE — 51700 IRRIGATION OF BLADDER: CPT | Mod: ,,, | Performed by: UROLOGY

## 2019-01-01 PROCEDURE — 82330 ASSAY OF CALCIUM: CPT

## 2019-01-01 PROCEDURE — 80053 COMPREHEN METABOLIC PANEL: CPT | Mod: 91

## 2019-01-01 PROCEDURE — 96374 THER/PROPH/DIAG INJ IV PUSH: CPT

## 2019-01-01 PROCEDURE — 87070 CULTURE OTHR SPECIMN AEROBIC: CPT | Mod: 59

## 2019-01-01 PROCEDURE — 84145 PROCALCITONIN (PCT): CPT

## 2019-01-01 PROCEDURE — 84133 ASSAY OF URINE POTASSIUM: CPT

## 2019-01-01 PROCEDURE — 25000242 PHARM REV CODE 250 ALT 637 W/ HCPCS: Performed by: EMERGENCY MEDICINE

## 2019-01-01 PROCEDURE — 85027 COMPLETE CBC AUTOMATED: CPT

## 2019-01-01 PROCEDURE — 81001 URINALYSIS AUTO W/SCOPE: CPT

## 2019-01-01 PROCEDURE — 80339 ANTIEPILEPTICS NOS 1-3: CPT

## 2019-01-01 PROCEDURE — 82570 ASSAY OF URINE CREATININE: CPT

## 2019-01-01 PROCEDURE — 96361 HYDRATE IV INFUSION ADD-ON: CPT

## 2019-01-01 PROCEDURE — 63600175 PHARM REV CODE 636 W HCPCS: Performed by: NURSE PRACTITIONER

## 2019-01-01 PROCEDURE — C1751 CATH, INF, PER/CENT/MIDLINE: HCPCS

## 2019-01-01 PROCEDURE — 99292 CRITICAL CARE ADDL 30 MIN: CPT

## 2019-01-01 PROCEDURE — 99233 SBSQ HOSP IP/OBS HIGH 50: CPT | Mod: S$GLB,,, | Performed by: INTERNAL MEDICINE

## 2019-01-01 PROCEDURE — 25000003 PHARM REV CODE 250: Performed by: HOSPITALIST

## 2019-01-01 PROCEDURE — 25000003 PHARM REV CODE 250: Performed by: EMERGENCY MEDICINE

## 2019-01-01 PROCEDURE — 85014 HEMATOCRIT: CPT

## 2019-01-01 PROCEDURE — 80061 LIPID PANEL: CPT

## 2019-01-01 PROCEDURE — 36565 INSERT TUNNELED CV CATH: CPT | Mod: 52

## 2019-01-01 PROCEDURE — 80048 BASIC METABOLIC PNL TOTAL CA: CPT

## 2019-01-01 PROCEDURE — 96375 TX/PRO/DX INJ NEW DRUG ADDON: CPT

## 2019-01-01 PROCEDURE — 83605 ASSAY OF LACTIC ACID: CPT

## 2019-01-01 PROCEDURE — S0030 INJECTION, METRONIDAZOLE: HCPCS | Performed by: INTERNAL MEDICINE

## 2019-01-01 PROCEDURE — 87502 INFLUENZA DNA AMP PROBE: CPT

## 2019-01-01 PROCEDURE — 84425 ASSAY OF VITAMIN B-1: CPT

## 2019-01-01 PROCEDURE — 87147 CULTURE TYPE IMMUNOLOGIC: CPT

## 2019-01-01 PROCEDURE — 87040 BLOOD CULTURE FOR BACTERIA: CPT

## 2019-01-01 PROCEDURE — 36556 INSERT NON-TUNNEL CV CATH: CPT

## 2019-01-01 PROCEDURE — 99222 1ST HOSP IP/OBS MODERATE 55: CPT | Mod: ,,, | Performed by: PODIATRIST

## 2019-01-01 PROCEDURE — 87040 BLOOD CULTURE FOR BACTERIA: CPT | Mod: 59

## 2019-01-01 PROCEDURE — 80177 DRUG SCRN QUAN LEVETIRACETAM: CPT

## 2019-01-01 PROCEDURE — 99233 SBSQ HOSP IP/OBS HIGH 50: CPT | Mod: 25,,, | Performed by: UROLOGY

## 2019-01-01 PROCEDURE — 83930 ASSAY OF BLOOD OSMOLALITY: CPT

## 2019-01-01 PROCEDURE — 82746 ASSAY OF FOLIC ACID SERUM: CPT

## 2019-01-01 PROCEDURE — 99233 PR SUBSEQUENT HOSPITAL CARE,LEVL III: ICD-10-PCS | Mod: S$GLB,,, | Performed by: INTERNAL MEDICINE

## 2019-01-01 PROCEDURE — 82607 VITAMIN B-12: CPT

## 2019-01-01 PROCEDURE — 36556 CENTRAL LINE: ICD-10-PCS | Mod: ,,, | Performed by: ANESTHESIOLOGY

## 2019-01-01 PROCEDURE — 99223 PR INITIAL HOSPITAL CARE,LEVL III: ICD-10-PCS | Mod: 25,,, | Performed by: UROLOGY

## 2019-01-01 PROCEDURE — 99231 PR SUBSEQUENT HOSPITAL CARE,LEVL I: ICD-10-PCS | Mod: S$GLB,,, | Performed by: INTERNAL MEDICINE

## 2019-01-01 PROCEDURE — 85730 THROMBOPLASTIN TIME PARTIAL: CPT

## 2019-01-01 PROCEDURE — 95819 EEG AWAKE AND ASLEEP: CPT

## 2019-01-01 PROCEDURE — 83935 ASSAY OF URINE OSMOLALITY: CPT

## 2019-01-01 PROCEDURE — 84484 ASSAY OF TROPONIN QUANT: CPT | Mod: 91

## 2019-01-01 PROCEDURE — 96365 THER/PROPH/DIAG IV INF INIT: CPT

## 2019-01-01 PROCEDURE — 99231 PR SUBSEQUENT HOSPITAL CARE,LEVL I: ICD-10-PCS | Mod: ,,, | Performed by: INTERNAL MEDICINE

## 2019-01-01 PROCEDURE — 82140 ASSAY OF AMMONIA: CPT

## 2019-01-01 PROCEDURE — 94644 CONT INHLJ TX 1ST HOUR: CPT

## 2019-01-01 PROCEDURE — 87449 NOS EACH ORGANISM AG IA: CPT

## 2019-01-01 PROCEDURE — 84207 ASSAY OF VITAMIN B-6: CPT

## 2019-01-01 PROCEDURE — 25000003 PHARM REV CODE 250: Performed by: FAMILY MEDICINE

## 2019-01-01 PROCEDURE — 83036 HEMOGLOBIN GLYCOSYLATED A1C: CPT

## 2019-01-01 PROCEDURE — 76937 US GUIDE VASCULAR ACCESS: CPT

## 2019-01-01 PROCEDURE — 84132 ASSAY OF SERUM POTASSIUM: CPT

## 2019-01-01 PROCEDURE — 84300 ASSAY OF URINE SODIUM: CPT

## 2019-01-01 PROCEDURE — 51702 INSERT TEMP BLADDER CATH: CPT

## 2019-01-01 PROCEDURE — 96368 THER/DIAG CONCURRENT INF: CPT

## 2019-01-01 PROCEDURE — 96376 TX/PRO/DX INJ SAME DRUG ADON: CPT

## 2019-01-01 PROCEDURE — 36556 INSERT NON-TUNNEL CV CATH: CPT | Mod: ,,, | Performed by: ANESTHESIOLOGY

## 2019-01-01 PROCEDURE — 36573 INSJ PICC RS&I 5 YR+: CPT

## 2019-01-01 PROCEDURE — 99223 1ST HOSP IP/OBS HIGH 75: CPT | Mod: ,,, | Performed by: INTERNAL MEDICINE

## 2019-01-01 PROCEDURE — 99222 PR INITIAL HOSPITAL CARE,LEVL II: ICD-10-PCS | Mod: ,,, | Performed by: PODIATRIST

## 2019-01-01 PROCEDURE — 97802 MEDICAL NUTRITION INDIV IN: CPT

## 2019-01-01 PROCEDURE — 99223 PR INITIAL HOSPITAL CARE,LEVL III: ICD-10-PCS | Mod: S$GLB,,, | Performed by: INTERNAL MEDICINE

## 2019-01-01 PROCEDURE — 99233 PR SUBSEQUENT HOSPITAL CARE,LEVL III: ICD-10-PCS | Mod: 25,,, | Performed by: UROLOGY

## 2019-01-01 PROCEDURE — 84145 PROCALCITONIN (PCT): CPT | Mod: 91

## 2019-01-01 PROCEDURE — 82272 OCCULT BLD FECES 1-3 TESTS: CPT

## 2019-01-01 PROCEDURE — 86592 SYPHILIS TEST NON-TREP QUAL: CPT

## 2019-01-01 PROCEDURE — 30000890 LABCORP MISCELLANEOUS TEST

## 2019-01-01 PROCEDURE — 84560 ASSAY OF URINE/URIC ACID: CPT

## 2019-01-01 PROCEDURE — 86850 RBC ANTIBODY SCREEN: CPT

## 2019-01-01 PROCEDURE — 87088 URINE BACTERIA CULTURE: CPT

## 2019-01-01 PROCEDURE — 81000 URINALYSIS NONAUTO W/SCOPE: CPT

## 2019-01-01 PROCEDURE — 51701 INSERT BLADDER CATHETER: CPT

## 2019-01-01 PROCEDURE — 25500020 PHARM REV CODE 255: Performed by: HOSPITALIST

## 2019-01-01 PROCEDURE — 89055 LEUKOCYTE ASSESSMENT FECAL: CPT

## 2019-01-01 PROCEDURE — 63600175 PHARM REV CODE 636 W HCPCS: Mod: JG | Performed by: INTERNAL MEDICINE

## 2019-01-01 PROCEDURE — S0030 INJECTION, METRONIDAZOLE: HCPCS | Performed by: EMERGENCY MEDICINE

## 2019-01-01 PROCEDURE — 96360 HYDRATION IV INFUSION INIT: CPT | Mod: 59

## 2019-01-01 PROCEDURE — 82306 VITAMIN D 25 HYDROXY: CPT

## 2019-01-01 PROCEDURE — 99291 CRITICAL CARE FIRST HOUR: CPT | Mod: 25

## 2019-01-01 PROCEDURE — 99223 1ST HOSP IP/OBS HIGH 75: CPT | Mod: 25,,, | Performed by: UROLOGY

## 2019-01-01 PROCEDURE — 99285 EMERGENCY DEPT VISIT HI MDM: CPT | Mod: 25

## 2019-01-01 PROCEDURE — 99233 SBSQ HOSP IP/OBS HIGH 50: CPT | Mod: ,,, | Performed by: UROLOGY

## 2019-01-01 PROCEDURE — 51700 PR IRRIGATION, BLADDER: ICD-10-PCS | Mod: ,,, | Performed by: UROLOGY

## 2019-01-01 PROCEDURE — 87046 STOOL CULTR AEROBIC BACT EA: CPT

## 2019-01-01 PROCEDURE — 99223 1ST HOSP IP/OBS HIGH 75: CPT | Mod: S$GLB,,, | Performed by: INTERNAL MEDICINE

## 2019-01-01 PROCEDURE — 82436 ASSAY OF URINE CHLORIDE: CPT

## 2019-01-01 PROCEDURE — 80307 DRUG TEST PRSMV CHEM ANLYZR: CPT

## 2019-01-01 PROCEDURE — 87046 STOOL CULTR AEROBIC BACT EA: CPT | Mod: 59

## 2019-01-01 PROCEDURE — 82553 CREATINE MB FRACTION: CPT

## 2019-01-01 RX ORDER — MUPIROCIN 20 MG/G
OINTMENT TOPICAL 2 TIMES DAILY
Status: COMPLETED | OUTPATIENT
Start: 2019-01-01 | End: 2019-01-01

## 2019-01-01 RX ORDER — LEVOTHYROXINE SODIUM ANHYDROUS 100 UG/5ML
50 INJECTION, POWDER, LYOPHILIZED, FOR SOLUTION INTRAVENOUS DAILY
Status: DISCONTINUED | OUTPATIENT
Start: 2019-01-01 | End: 2019-01-01

## 2019-01-01 RX ORDER — MORPHINE SULFATE 2 MG/ML
2 INJECTION, SOLUTION INTRAMUSCULAR; INTRAVENOUS
Status: DISCONTINUED | OUTPATIENT
Start: 2019-01-01 | End: 2019-01-01 | Stop reason: HOSPADM

## 2019-01-01 RX ORDER — FUROSEMIDE 40 MG/4ML
20 SOLUTION ORAL DAILY
Status: DISCONTINUED | OUTPATIENT
Start: 2019-01-01 | End: 2019-01-01

## 2019-01-01 RX ORDER — CEFEPIME 1 G/50ML
1 INJECTION, SOLUTION INTRAVENOUS EVERY 8 HOURS
Status: ON HOLD | COMMUNITY
Start: 2019-01-01 | End: 2019-01-01 | Stop reason: HOSPADM

## 2019-01-01 RX ORDER — TERAZOSIN 1 MG/1
1 CAPSULE ORAL NIGHTLY
Status: DISCONTINUED | OUTPATIENT
Start: 2019-01-01 | End: 2019-01-01

## 2019-01-01 RX ORDER — VANCOMYCIN HCL 50 MG/ML
250 SOLUTION, RECONSTITUTED, ORAL ORAL 4 TIMES DAILY
Status: DISCONTINUED | OUTPATIENT
Start: 2019-01-01 | End: 2019-01-01 | Stop reason: HOSPADM

## 2019-01-01 RX ORDER — IPRATROPIUM BROMIDE AND ALBUTEROL SULFATE 2.5; .5 MG/3ML; MG/3ML
3 SOLUTION RESPIRATORY (INHALATION) EVERY 6 HOURS
Status: DISCONTINUED | OUTPATIENT
Start: 2019-01-01 | End: 2019-01-01 | Stop reason: HOSPADM

## 2019-01-01 RX ORDER — SODIUM CHLORIDE 0.9 % (FLUSH) 0.9 %
10 SYRINGE (ML) INJECTION EVERY 6 HOURS
Status: DISCONTINUED | OUTPATIENT
Start: 2019-01-01 | End: 2019-01-01 | Stop reason: HOSPADM

## 2019-01-01 RX ORDER — SODIUM CHLORIDE 9 MG/ML
1000 INJECTION, SOLUTION INTRAVENOUS
Status: DISCONTINUED | OUTPATIENT
Start: 2019-01-01 | End: 2019-01-01

## 2019-01-01 RX ORDER — CEFEPIME HYDROCHLORIDE 1 G/50ML
2 INJECTION, SOLUTION INTRAVENOUS
Status: DISCONTINUED | OUTPATIENT
Start: 2019-01-01 | End: 2019-01-01 | Stop reason: RX

## 2019-01-01 RX ORDER — HYDROCODONE BITARTRATE AND ACETAMINOPHEN 10; 325 MG/1; MG/1
1 TABLET ORAL EVERY 6 HOURS PRN
Status: DISCONTINUED | OUTPATIENT
Start: 2019-01-01 | End: 2019-01-01

## 2019-01-01 RX ORDER — FUROSEMIDE 20 MG/1
20 TABLET ORAL DAILY
Qty: 30 TABLET | Refills: 11 | Status: SHIPPED | OUTPATIENT
Start: 2019-01-01 | End: 2020-09-13

## 2019-01-01 RX ORDER — CHOLESTYRAMINE 4 G/4.8G
1 POWDER, FOR SUSPENSION ORAL 2 TIMES DAILY
Status: DISCONTINUED | OUTPATIENT
Start: 2019-01-01 | End: 2019-01-01 | Stop reason: HOSPADM

## 2019-01-01 RX ORDER — SODIUM CHLORIDE 0.9 % (FLUSH) 0.9 %
10 SYRINGE (ML) INJECTION
Status: DISCONTINUED | OUTPATIENT
Start: 2019-01-01 | End: 2019-01-01 | Stop reason: HOSPADM

## 2019-01-01 RX ORDER — FUROSEMIDE 10 MG/ML
20 INJECTION INTRAMUSCULAR; INTRAVENOUS ONCE
Status: DISCONTINUED | OUTPATIENT
Start: 2019-01-01 | End: 2019-01-01

## 2019-01-01 RX ORDER — POTASSIUM CHLORIDE 7.45 MG/ML
80 INJECTION INTRAVENOUS
Status: DISCONTINUED | OUTPATIENT
Start: 2019-01-01 | End: 2019-01-01 | Stop reason: HOSPADM

## 2019-01-01 RX ORDER — LANOLIN ALCOHOL/MO/W.PET/CERES
800 CREAM (GRAM) TOPICAL
Status: DISCONTINUED | OUTPATIENT
Start: 2019-01-01 | End: 2019-01-01 | Stop reason: HOSPADM

## 2019-01-01 RX ORDER — MAGNESIUM SULFATE HEPTAHYDRATE 40 MG/ML
2 INJECTION, SOLUTION INTRAVENOUS
Status: DISCONTINUED | OUTPATIENT
Start: 2019-01-01 | End: 2019-01-01 | Stop reason: HOSPADM

## 2019-01-01 RX ORDER — MUPIROCIN 20 MG/G
OINTMENT TOPICAL 2 TIMES DAILY
Status: DISPENSED | OUTPATIENT
Start: 2019-01-01 | End: 2019-01-01

## 2019-01-01 RX ORDER — EPINEPHRINE 0.1 MG/ML
0.5 INJECTION INTRAVENOUS ONCE
Status: COMPLETED | OUTPATIENT
Start: 2019-01-01 | End: 2019-01-01

## 2019-01-01 RX ORDER — SODIUM CHLORIDE 9 MG/ML
INJECTION, SOLUTION INTRAVENOUS CONTINUOUS
Status: DISCONTINUED | OUTPATIENT
Start: 2019-01-01 | End: 2019-01-01 | Stop reason: HOSPADM

## 2019-01-01 RX ORDER — POTASSIUM CHLORIDE 14.9 MG/ML
40 INJECTION INTRAVENOUS ONCE
Status: COMPLETED | OUTPATIENT
Start: 2019-01-01 | End: 2019-01-01

## 2019-01-01 RX ORDER — LEVOTHYROXINE SODIUM ANHYDROUS 100 UG/5ML
100 INJECTION, POWDER, LYOPHILIZED, FOR SOLUTION INTRAVENOUS ONCE
Status: COMPLETED | OUTPATIENT
Start: 2019-01-01 | End: 2019-01-01

## 2019-01-01 RX ORDER — FINASTERIDE 5 MG/1
5 TABLET, FILM COATED ORAL DAILY
Status: DISCONTINUED | OUTPATIENT
Start: 2019-01-01 | End: 2019-01-01 | Stop reason: HOSPADM

## 2019-01-01 RX ORDER — PANTOPRAZOLE SODIUM 40 MG/10ML
40 INJECTION, POWDER, LYOPHILIZED, FOR SOLUTION INTRAVENOUS DAILY
Status: DISCONTINUED | OUTPATIENT
Start: 2019-01-01 | End: 2019-01-01 | Stop reason: HOSPADM

## 2019-01-01 RX ORDER — BALSAM PERU/CASTOR OIL
OINTMENT (GRAM) TOPICAL DAILY
Start: 2019-01-01

## 2019-01-01 RX ORDER — LEVALBUTEROL 1.25 MG/.5ML
3.75 SOLUTION, CONCENTRATE RESPIRATORY (INHALATION)
Status: COMPLETED | OUTPATIENT
Start: 2019-01-01 | End: 2019-01-01

## 2019-01-01 RX ORDER — IBUPROFEN 200 MG
24 TABLET ORAL
Status: DISCONTINUED | OUTPATIENT
Start: 2019-01-01 | End: 2019-01-01 | Stop reason: HOSPADM

## 2019-01-01 RX ORDER — FERROUS SULFATE 220 (44)/5
220 SOLUTION, ORAL ORAL NIGHTLY
COMMUNITY

## 2019-01-01 RX ORDER — ENOXAPARIN SODIUM 100 MG/ML
40 INJECTION SUBCUTANEOUS EVERY 24 HOURS
Status: DISCONTINUED | OUTPATIENT
Start: 2019-01-01 | End: 2019-01-01

## 2019-01-01 RX ORDER — FERROUS SULFATE 300 MG/5ML
300 LIQUID (ML) ORAL DAILY
Status: DISCONTINUED | OUTPATIENT
Start: 2019-01-01 | End: 2019-01-01 | Stop reason: HOSPADM

## 2019-01-01 RX ORDER — ASCORBIC ACID 500 MG
500 TABLET ORAL 2 TIMES DAILY
Status: DISCONTINUED | OUTPATIENT
Start: 2019-01-01 | End: 2019-01-01 | Stop reason: HOSPADM

## 2019-01-01 RX ORDER — BUSPIRONE HYDROCHLORIDE 5 MG/1
5 TABLET ORAL 2 TIMES DAILY
Status: DISCONTINUED | OUTPATIENT
Start: 2019-01-01 | End: 2019-01-01

## 2019-01-01 RX ORDER — FLUCONAZOLE 40 MG/ML
200 POWDER, FOR SUSPENSION ORAL DAILY
Refills: 0
Start: 2019-01-01 | End: 2019-01-01

## 2019-01-01 RX ORDER — LACOSAMIDE 10 MG/ML
200 SOLUTION ORAL DAILY
COMMUNITY

## 2019-01-01 RX ORDER — METRONIDAZOLE 500 MG/100ML
500 INJECTION, SOLUTION INTRAVENOUS
Status: COMPLETED | OUTPATIENT
Start: 2019-01-01 | End: 2019-01-01

## 2019-01-01 RX ORDER — IPRATROPIUM BROMIDE AND ALBUTEROL SULFATE 2.5; .5 MG/3ML; MG/3ML
3 SOLUTION RESPIRATORY (INHALATION) EVERY 6 HOURS PRN
Status: DISCONTINUED | OUTPATIENT
Start: 2019-01-01 | End: 2019-01-01

## 2019-01-01 RX ORDER — CEFEPIME HYDROCHLORIDE 1 G/50ML
2 INJECTION, SOLUTION INTRAVENOUS
Status: DISCONTINUED | OUTPATIENT
Start: 2019-01-01 | End: 2019-01-01

## 2019-01-01 RX ORDER — GABAPENTIN 300 MG/1
300 CAPSULE ORAL 2 TIMES DAILY
Start: 2019-01-01

## 2019-01-01 RX ORDER — POLYETHYLENE GLYCOL 3350 17 G/17G
17 POWDER, FOR SOLUTION ORAL NIGHTLY
COMMUNITY

## 2019-01-01 RX ORDER — IPRATROPIUM BROMIDE AND ALBUTEROL SULFATE 2.5; .5 MG/3ML; MG/3ML
3 SOLUTION RESPIRATORY (INHALATION) EVERY 6 HOURS PRN
Status: DISCONTINUED | OUTPATIENT
Start: 2019-01-01 | End: 2019-01-01 | Stop reason: HOSPADM

## 2019-01-01 RX ORDER — LEVOFLOXACIN 5 MG/ML
500 INJECTION, SOLUTION INTRAVENOUS
Status: DISCONTINUED | OUTPATIENT
Start: 2019-01-01 | End: 2019-01-01 | Stop reason: DRUGHIGH

## 2019-01-01 RX ORDER — CARVEDILOL 3.12 MG/1
3.12 TABLET ORAL 2 TIMES DAILY
Qty: 60 TABLET | Refills: 11 | Status: SHIPPED | OUTPATIENT
Start: 2019-01-01 | End: 2020-09-13

## 2019-01-01 RX ORDER — METHYLPREDNISOLONE SOD SUCC 125 MG
125 VIAL (EA) INJECTION
Status: COMPLETED | OUTPATIENT
Start: 2019-01-01 | End: 2019-01-01

## 2019-01-01 RX ORDER — GABAPENTIN 300 MG/1
300 CAPSULE ORAL 3 TIMES DAILY
Status: ON HOLD | COMMUNITY
End: 2019-01-01 | Stop reason: SDUPTHER

## 2019-01-01 RX ORDER — ACETAMINOPHEN 325 MG/1
650 TABLET ORAL EVERY 6 HOURS PRN
Status: DISCONTINUED | OUTPATIENT
Start: 2019-01-01 | End: 2019-01-01

## 2019-01-01 RX ORDER — POTASSIUM CHLORIDE 20 MEQ/15ML
40 SOLUTION ORAL ONCE
Status: COMPLETED | OUTPATIENT
Start: 2019-01-01 | End: 2019-01-01

## 2019-01-01 RX ORDER — IBUPROFEN 200 MG
16 TABLET ORAL
Status: DISCONTINUED | OUTPATIENT
Start: 2019-01-01 | End: 2019-01-01 | Stop reason: HOSPADM

## 2019-01-01 RX ORDER — FLUCONAZOLE 40 MG/ML
200 POWDER, FOR SUSPENSION ORAL DAILY
Status: DISCONTINUED | OUTPATIENT
Start: 2019-01-01 | End: 2019-01-01 | Stop reason: HOSPADM

## 2019-01-01 RX ORDER — POTASSIUM CHLORIDE 20 MEQ/15ML
20 SOLUTION ORAL DAILY
Status: DISCONTINUED | OUTPATIENT
Start: 2019-01-01 | End: 2019-01-01

## 2019-01-01 RX ORDER — CARVEDILOL 3.12 MG/1
3.12 TABLET ORAL DAILY
Status: DISCONTINUED | OUTPATIENT
Start: 2019-01-01 | End: 2019-01-01 | Stop reason: HOSPADM

## 2019-01-01 RX ORDER — IBUPROFEN 600 MG/1
600 TABLET ORAL EVERY 6 HOURS PRN
COMMUNITY

## 2019-01-01 RX ORDER — DULOXETIN HYDROCHLORIDE 30 MG/1
30 CAPSULE, DELAYED RELEASE ORAL DAILY
Status: DISCONTINUED | OUTPATIENT
Start: 2019-01-01 | End: 2019-01-01 | Stop reason: HOSPADM

## 2019-01-01 RX ORDER — METRONIDAZOLE 500 MG/100ML
500 INJECTION, SOLUTION INTRAVENOUS ONCE
Status: COMPLETED | OUTPATIENT
Start: 2019-01-01 | End: 2019-01-01

## 2019-01-01 RX ORDER — MULTIVIT-MIN/FERROUS GLUCONATE 12 MG/15ML
15 LIQUID (ML) ORAL NIGHTLY
COMMUNITY

## 2019-01-01 RX ORDER — RIVASTIGMINE 9.5 MG/24H
1 PATCH, EXTENDED RELEASE TRANSDERMAL DAILY
Status: DISCONTINUED | OUTPATIENT
Start: 2019-01-01 | End: 2019-01-01

## 2019-01-01 RX ORDER — SODIUM CHLORIDE 9 MG/ML
1000 INJECTION, SOLUTION INTRAVENOUS
Status: COMPLETED | OUTPATIENT
Start: 2019-01-01 | End: 2019-01-01

## 2019-01-01 RX ORDER — FAMOTIDINE 20 MG/1
20 TABLET, FILM COATED ORAL 2 TIMES DAILY
Status: DISCONTINUED | OUTPATIENT
Start: 2019-01-01 | End: 2019-01-01

## 2019-01-01 RX ORDER — NITROGLYCERIN 0.4 MG/1
0.4 TABLET SUBLINGUAL EVERY 5 MIN PRN
COMMUNITY

## 2019-01-01 RX ORDER — MUPIROCIN 20 MG/G
OINTMENT TOPICAL 2 TIMES DAILY
Status: DISCONTINUED | OUTPATIENT
Start: 2019-01-01 | End: 2019-01-01 | Stop reason: HOSPADM

## 2019-01-01 RX ORDER — IPRATROPIUM BROMIDE AND ALBUTEROL SULFATE 2.5; .5 MG/3ML; MG/3ML
3 SOLUTION RESPIRATORY (INHALATION)
Status: COMPLETED | OUTPATIENT
Start: 2019-01-01 | End: 2019-01-01

## 2019-01-01 RX ORDER — DEXMEDETOMIDINE HYDROCHLORIDE 4 UG/ML
0.2 INJECTION, SOLUTION INTRAVENOUS CONTINUOUS
Status: DISCONTINUED | OUTPATIENT
Start: 2019-01-01 | End: 2019-01-01

## 2019-01-01 RX ORDER — POLYETHYLENE GLYCOL 3350 17 G/17G
17 POWDER, FOR SOLUTION ORAL NIGHTLY
Status: DISCONTINUED | OUTPATIENT
Start: 2019-01-01 | End: 2019-01-01 | Stop reason: HOSPADM

## 2019-01-01 RX ORDER — FLUCONAZOLE 10 MG/ML
200 POWDER, FOR SUSPENSION ORAL DAILY
Status: DISCONTINUED | OUTPATIENT
Start: 2019-01-01 | End: 2019-01-01

## 2019-01-01 RX ORDER — PROPOFOL 10 MG/ML
50 INJECTION, EMULSION INTRAVENOUS CONTINUOUS
Status: DISCONTINUED | OUTPATIENT
Start: 2019-01-01 | End: 2019-01-01

## 2019-01-01 RX ORDER — MENTHOL AND ZINC OXIDE .44; 20.625 G/100G; G/100G
1 OINTMENT TOPICAL DAILY
COMMUNITY

## 2019-01-01 RX ORDER — TERAZOSIN 1 MG/1
1 CAPSULE ORAL DAILY
Status: DISCONTINUED | OUTPATIENT
Start: 2019-01-01 | End: 2019-01-01 | Stop reason: HOSPADM

## 2019-01-01 RX ORDER — HYDROCODONE BITARTRATE AND ACETAMINOPHEN 10; 325 MG/1; MG/1
1 TABLET ORAL EVERY 12 HOURS PRN
COMMUNITY

## 2019-01-01 RX ORDER — HYDROCORTISONE 25 MG/G
1 CREAM TOPICAL 2 TIMES DAILY PRN
COMMUNITY

## 2019-01-01 RX ORDER — ATROPINE SULFATE 0.1 MG/ML
0.3 INJECTION INTRAVENOUS
Status: COMPLETED | OUTPATIENT
Start: 2019-01-01 | End: 2019-01-01

## 2019-01-01 RX ORDER — LEVETIRACETAM 500 MG/1
500 TABLET ORAL 2 TIMES DAILY
Status: DISCONTINUED | OUTPATIENT
Start: 2019-01-01 | End: 2019-01-01 | Stop reason: HOSPADM

## 2019-01-01 RX ORDER — IPRATROPIUM BROMIDE 0.5 MG/2.5ML
1 SOLUTION RESPIRATORY (INHALATION)
Status: COMPLETED | OUTPATIENT
Start: 2019-01-01 | End: 2019-01-01

## 2019-01-01 RX ORDER — TRAZODONE HYDROCHLORIDE 100 MG/1
50 TABLET ORAL NIGHTLY
Start: 2019-01-01

## 2019-01-01 RX ORDER — PROPOFOL 10 MG/ML
INJECTION, EMULSION INTRAVENOUS
Status: COMPLETED
Start: 2019-01-01 | End: 2019-01-01

## 2019-01-01 RX ORDER — MAGNESIUM SULFATE HEPTAHYDRATE 40 MG/ML
4 INJECTION, SOLUTION INTRAVENOUS
Status: DISCONTINUED | OUTPATIENT
Start: 2019-01-01 | End: 2019-01-01 | Stop reason: HOSPADM

## 2019-01-01 RX ORDER — ACETAMINOPHEN 160 MG/5ML
650 ELIXIR ORAL EVERY 4 HOURS PRN
COMMUNITY

## 2019-01-01 RX ORDER — MORPHINE SULFATE 10 MG/ML
10 INJECTION INTRAMUSCULAR; INTRAVENOUS; SUBCUTANEOUS
Status: DISCONTINUED | OUTPATIENT
Start: 2019-01-01 | End: 2019-12-16 | Stop reason: HOSPADM

## 2019-01-01 RX ORDER — LORAZEPAM 2 MG/ML
1 INJECTION INTRAMUSCULAR
Status: COMPLETED | OUTPATIENT
Start: 2019-01-01 | End: 2019-01-01

## 2019-01-01 RX ORDER — ENOXAPARIN SODIUM 100 MG/ML
40 INJECTION SUBCUTANEOUS DAILY
Status: DISCONTINUED | OUTPATIENT
Start: 2019-01-01 | End: 2019-01-01 | Stop reason: HOSPADM

## 2019-01-01 RX ORDER — LEVOTHYROXINE SODIUM ANHYDROUS 100 UG/5ML
100 INJECTION, POWDER, LYOPHILIZED, FOR SOLUTION INTRAVENOUS DAILY
Status: DISCONTINUED | OUTPATIENT
Start: 2019-01-01 | End: 2019-01-01 | Stop reason: HOSPADM

## 2019-01-01 RX ORDER — POTASSIUM CHLORIDE 20 MEQ/15ML
60 SOLUTION ORAL
Status: DISCONTINUED | OUTPATIENT
Start: 2019-01-01 | End: 2019-01-01 | Stop reason: HOSPADM

## 2019-01-01 RX ORDER — SODIUM,POTASSIUM PHOSPHATES 280-250MG
2 POWDER IN PACKET (EA) ORAL
Status: DISCONTINUED | OUTPATIENT
Start: 2019-01-01 | End: 2019-01-01 | Stop reason: HOSPADM

## 2019-01-01 RX ORDER — BUSPIRONE HYDROCHLORIDE 5 MG/1
5 TABLET ORAL 2 TIMES DAILY
COMMUNITY

## 2019-01-01 RX ORDER — FINASTERIDE 5 MG/1
5 TABLET, FILM COATED ORAL DAILY
Status: DISCONTINUED | OUTPATIENT
Start: 2019-01-01 | End: 2019-01-01

## 2019-01-01 RX ORDER — DULOXETIN HYDROCHLORIDE 30 MG/1
30 CAPSULE, DELAYED RELEASE ORAL DAILY
Status: DISCONTINUED | OUTPATIENT
Start: 2019-01-01 | End: 2019-01-01

## 2019-01-01 RX ORDER — TRAMADOL HYDROCHLORIDE 50 MG/1
50 TABLET ORAL ONCE
Status: COMPLETED | OUTPATIENT
Start: 2019-01-01 | End: 2019-01-01

## 2019-01-01 RX ORDER — LEVOTHYROXINE SODIUM 100 UG/1
200 TABLET ORAL
Status: DISCONTINUED | OUTPATIENT
Start: 2019-01-01 | End: 2019-01-01 | Stop reason: HOSPADM

## 2019-01-01 RX ORDER — LIDOCAINE 50 MG/G
1 OINTMENT TOPICAL
COMMUNITY

## 2019-01-01 RX ORDER — ACETAMINOPHEN 160 MG/5ML
325 SOLUTION ORAL EVERY 6 HOURS PRN
Status: DISCONTINUED | OUTPATIENT
Start: 2019-01-01 | End: 2019-01-01

## 2019-01-01 RX ORDER — HYDROCORTISONE 25 MG/G
1 CREAM TOPICAL 2 TIMES DAILY PRN
Status: DISCONTINUED | OUTPATIENT
Start: 2019-01-01 | End: 2019-01-01 | Stop reason: HOSPADM

## 2019-01-01 RX ORDER — ACETAMINOPHEN 325 MG/1
650 TABLET ORAL EVERY 4 HOURS PRN
Status: DISCONTINUED | OUTPATIENT
Start: 2019-01-01 | End: 2019-01-01 | Stop reason: HOSPADM

## 2019-01-01 RX ORDER — SODIUM,POTASSIUM PHOSPHATES 280-250MG
2 POWDER IN PACKET (EA) ORAL ONCE
Status: COMPLETED | OUTPATIENT
Start: 2019-01-01 | End: 2019-01-01

## 2019-01-01 RX ORDER — FLUCONAZOLE 40 MG/ML
200 POWDER, FOR SUSPENSION ORAL DAILY
Status: DISCONTINUED | OUTPATIENT
Start: 2019-01-01 | End: 2019-01-01

## 2019-01-01 RX ORDER — FAMOTIDINE 20 MG/50ML
20 INJECTION, SOLUTION INTRAVENOUS 2 TIMES DAILY
Status: DISCONTINUED | OUTPATIENT
Start: 2019-01-01 | End: 2019-01-01

## 2019-01-01 RX ORDER — DEXTROSE 50 % IN WATER (D50W) INTRAVENOUS SYRINGE
25
Status: COMPLETED | OUTPATIENT
Start: 2019-01-01 | End: 2019-01-01

## 2019-01-01 RX ORDER — DOPAMINE HYDROCHLORIDE 160 MG/100ML
10 INJECTION, SOLUTION INTRAVENOUS CONTINUOUS
Status: DISCONTINUED | OUTPATIENT
Start: 2019-01-01 | End: 2019-01-01

## 2019-01-01 RX ORDER — VANCOMYCIN HCL IN 5 % DEXTROSE 1G/250ML
1000 PLASTIC BAG, INJECTION (ML) INTRAVENOUS
Status: COMPLETED | OUTPATIENT
Start: 2019-01-01 | End: 2019-01-01

## 2019-01-01 RX ORDER — CARVEDILOL 3.12 MG/1
3.12 TABLET ORAL 2 TIMES DAILY
Status: DISCONTINUED | OUTPATIENT
Start: 2019-01-01 | End: 2019-01-01

## 2019-01-01 RX ORDER — LEVETIRACETAM 5 MG/ML
500 INJECTION INTRAVASCULAR EVERY 12 HOURS
Status: DISCONTINUED | OUTPATIENT
Start: 2019-01-01 | End: 2019-01-01

## 2019-01-01 RX ORDER — EPINEPHRINE 0.1 MG/ML
1 INJECTION INTRAVENOUS ONCE
Status: DISCONTINUED | OUTPATIENT
Start: 2019-01-01 | End: 2019-01-01

## 2019-01-01 RX ORDER — FINASTERIDE 5 MG/1
5 TABLET, FILM COATED ORAL DAILY
Qty: 30 TABLET | Refills: 11
Start: 2019-01-01 | End: 2019-01-01 | Stop reason: SDUPTHER

## 2019-01-01 RX ORDER — GENTAMICIN SULFATE 3 MG/ML
1 SOLUTION/ DROPS OPHTHALMIC 4 TIMES DAILY
Status: DISCONTINUED | OUTPATIENT
Start: 2019-01-01 | End: 2019-01-01

## 2019-01-01 RX ORDER — POLYETHYLENE GLYCOL 3350 17 G/17G
17 POWDER, FOR SOLUTION ORAL 2 TIMES DAILY PRN
Status: DISCONTINUED | OUTPATIENT
Start: 2019-01-01 | End: 2019-01-01 | Stop reason: HOSPADM

## 2019-01-01 RX ORDER — BETHANECHOL CHLORIDE 5 MG/1
15 TABLET ORAL 3 TIMES DAILY
Status: DISCONTINUED | OUTPATIENT
Start: 2019-01-01 | End: 2019-01-01

## 2019-01-01 RX ORDER — TRAZODONE HYDROCHLORIDE 100 MG/1
100 TABLET ORAL NIGHTLY
Status: ON HOLD | COMMUNITY
End: 2019-01-01 | Stop reason: SDUPTHER

## 2019-01-01 RX ORDER — FERROUS SULFATE 15 MG/ML
30 DROPS ORAL 2 TIMES DAILY
Status: DISCONTINUED | OUTPATIENT
Start: 2019-01-01 | End: 2019-01-01 | Stop reason: HOSPADM

## 2019-01-01 RX ORDER — BUTYROSPERMUM PARKII(SHEA BUTTER), SIMMONDSIA CHINENSIS (JOJOBA) SEED OIL, ALOE BARBADENSIS LEAF EXTRACT .01; 1; 3.5 G/100G; G/100G; G/100G
250 LIQUID TOPICAL 2 TIMES DAILY
COMMUNITY

## 2019-01-01 RX ORDER — CEFEPIME HYDROCHLORIDE 1 G/50ML
1 INJECTION, SOLUTION INTRAVENOUS
Status: DISCONTINUED | OUTPATIENT
Start: 2019-01-01 | End: 2019-01-01

## 2019-01-01 RX ORDER — ONDANSETRON 2 MG/ML
4 INJECTION INTRAMUSCULAR; INTRAVENOUS EVERY 6 HOURS PRN
Status: DISCONTINUED | OUTPATIENT
Start: 2019-01-01 | End: 2019-01-01

## 2019-01-01 RX ORDER — GABAPENTIN 300 MG/1
300 CAPSULE ORAL 3 TIMES DAILY
Status: DISCONTINUED | OUTPATIENT
Start: 2019-01-01 | End: 2019-01-01

## 2019-01-01 RX ORDER — BACLOFEN 10 MG/1
20 TABLET ORAL 2 TIMES DAILY
Status: DISCONTINUED | OUTPATIENT
Start: 2019-01-01 | End: 2019-01-01

## 2019-01-01 RX ORDER — IBUPROFEN 600 MG/1
600 TABLET ORAL EVERY 6 HOURS PRN
Status: ON HOLD | COMMUNITY
End: 2019-01-01 | Stop reason: HOSPADM

## 2019-01-01 RX ORDER — LEVOFLOXACIN 5 MG/ML
750 INJECTION, SOLUTION INTRAVENOUS
Status: DISCONTINUED | OUTPATIENT
Start: 2019-01-01 | End: 2019-01-01

## 2019-01-01 RX ORDER — POTASSIUM CHLORIDE 7.45 MG/ML
40 INJECTION INTRAVENOUS
Status: DISCONTINUED | OUTPATIENT
Start: 2019-01-01 | End: 2019-01-01 | Stop reason: HOSPADM

## 2019-01-01 RX ORDER — LACTULOSE 10 G/15ML
20 SOLUTION ORAL; RECTAL 4 TIMES DAILY
Status: ON HOLD | COMMUNITY
End: 2019-01-01 | Stop reason: HOSPADM

## 2019-01-01 RX ORDER — LEVETIRACETAM 500 MG/1
500 TABLET ORAL 2 TIMES DAILY
Qty: 60 TABLET | Refills: 11
Start: 2019-01-01 | End: 2020-11-13

## 2019-01-01 RX ORDER — LACOSAMIDE 200 MG/1
200 TABLET ORAL EVERY 12 HOURS
Status: DISCONTINUED | OUTPATIENT
Start: 2019-01-01 | End: 2019-01-01

## 2019-01-01 RX ORDER — LACOSAMIDE 200 MG/1
200 TABLET ORAL EVERY 12 HOURS
Qty: 60 TABLET | Refills: 11
Start: 2019-01-01 | End: 2019-01-01 | Stop reason: DRUGHIGH

## 2019-01-01 RX ORDER — SELENIUM SULFIDE 2.5 MG/100ML
1 LOTION TOPICAL
COMMUNITY

## 2019-01-01 RX ORDER — CHLORHEXIDINE GLUCONATE ORAL RINSE 1.2 MG/ML
15 SOLUTION DENTAL 2 TIMES DAILY
Status: COMPLETED | OUTPATIENT
Start: 2019-01-01 | End: 2019-01-01

## 2019-01-01 RX ORDER — BALSAM PERU/CASTOR OIL
OINTMENT (GRAM) TOPICAL DAILY
Status: DISCONTINUED | OUTPATIENT
Start: 2019-01-01 | End: 2019-01-01 | Stop reason: HOSPADM

## 2019-01-01 RX ORDER — FLUCONAZOLE 40 MG/ML
200 POWDER, FOR SUSPENSION ORAL DAILY
COMMUNITY
Start: 2019-01-01 | End: 2019-01-01

## 2019-01-01 RX ORDER — ENOXAPARIN SODIUM 100 MG/ML
40 INJECTION SUBCUTANEOUS NIGHTLY
Status: DISCONTINUED | OUTPATIENT
Start: 2019-01-01 | End: 2019-01-01

## 2019-01-01 RX ORDER — VANCOMYCIN HCL 50 MG/ML
125 SOLUTION, RECONSTITUTED, ORAL ORAL EVERY 6 HOURS
Status: DISCONTINUED | OUTPATIENT
Start: 2019-01-01 | End: 2019-01-01

## 2019-01-01 RX ORDER — PANTOPRAZOLE SODIUM 40 MG/1
40 TABLET, DELAYED RELEASE ORAL DAILY
Status: DISCONTINUED | OUTPATIENT
Start: 2019-01-01 | End: 2019-01-01 | Stop reason: HOSPADM

## 2019-01-01 RX ORDER — POTASSIUM CHLORIDE 7.45 MG/ML
60 INJECTION INTRAVENOUS
Status: DISCONTINUED | OUTPATIENT
Start: 2019-01-01 | End: 2019-01-01 | Stop reason: HOSPADM

## 2019-01-01 RX ORDER — SODIUM CHLORIDE 9 MG/ML
INJECTION, SOLUTION INTRAVENOUS CONTINUOUS
Status: DISCONTINUED | OUTPATIENT
Start: 2019-01-01 | End: 2019-01-01

## 2019-01-01 RX ORDER — FUROSEMIDE 10 MG/ML
40 INJECTION INTRAMUSCULAR; INTRAVENOUS ONCE
Status: COMPLETED | OUTPATIENT
Start: 2019-01-01 | End: 2019-01-01

## 2019-01-01 RX ORDER — SODIUM CHLORIDE, SODIUM LACTATE, POTASSIUM CHLORIDE, CALCIUM CHLORIDE 600; 310; 30; 20 MG/100ML; MG/100ML; MG/100ML; MG/100ML
1000 INJECTION, SOLUTION INTRAVENOUS
Status: COMPLETED | OUTPATIENT
Start: 2019-01-01 | End: 2019-01-01

## 2019-01-01 RX ORDER — GLUCAGON 1 MG
1 KIT INJECTION
Status: DISCONTINUED | OUTPATIENT
Start: 2019-01-01 | End: 2019-01-01 | Stop reason: HOSPADM

## 2019-01-01 RX ORDER — FAMOTIDINE 20 MG/1
20 TABLET, FILM COATED ORAL 2 TIMES DAILY
Status: ON HOLD | COMMUNITY
End: 2019-01-01 | Stop reason: HOSPADM

## 2019-01-01 RX ORDER — LORAZEPAM 2 MG/ML
2 INJECTION INTRAMUSCULAR
Status: DISCONTINUED | OUTPATIENT
Start: 2019-01-01 | End: 2019-12-16 | Stop reason: HOSPADM

## 2019-01-01 RX ORDER — METRONIDAZOLE 500 MG/100ML
500 INJECTION, SOLUTION INTRAVENOUS
Status: DISCONTINUED | OUTPATIENT
Start: 2019-01-01 | End: 2019-01-01

## 2019-01-01 RX ORDER — SCOLOPAMINE TRANSDERMAL SYSTEM 1 MG/1
1 PATCH, EXTENDED RELEASE TRANSDERMAL
Status: DISCONTINUED | OUTPATIENT
Start: 2019-01-01 | End: 2019-01-01 | Stop reason: HOSPADM

## 2019-01-01 RX ORDER — CHLORHEXIDINE GLUCONATE ORAL RINSE 1.2 MG/ML
15 SOLUTION DENTAL 2 TIMES DAILY
Status: DISCONTINUED | OUTPATIENT
Start: 2019-01-01 | End: 2019-01-01 | Stop reason: HOSPADM

## 2019-01-01 RX ORDER — LEVETIRACETAM 100 MG/ML
500 SOLUTION ORAL 2 TIMES DAILY
Status: DISCONTINUED | OUTPATIENT
Start: 2019-01-01 | End: 2019-01-01

## 2019-01-01 RX ORDER — BUSPIRONE HYDROCHLORIDE 5 MG/1
5 TABLET ORAL 2 TIMES DAILY
Status: DISCONTINUED | OUTPATIENT
Start: 2019-01-01 | End: 2019-01-01 | Stop reason: HOSPADM

## 2019-01-01 RX ORDER — INSULIN ASPART 100 [IU]/ML
0-5 INJECTION, SOLUTION INTRAVENOUS; SUBCUTANEOUS
Status: DISCONTINUED | OUTPATIENT
Start: 2019-01-01 | End: 2019-01-01 | Stop reason: HOSPADM

## 2019-01-01 RX ORDER — MAGNESIUM SULFATE HEPTAHYDRATE 40 MG/ML
2 INJECTION, SOLUTION INTRAVENOUS ONCE
Status: COMPLETED | OUTPATIENT
Start: 2019-01-01 | End: 2019-01-01

## 2019-01-01 RX ORDER — FUROSEMIDE 10 MG/ML
10 INJECTION INTRAMUSCULAR; INTRAVENOUS 2 TIMES DAILY
Status: DISCONTINUED | OUTPATIENT
Start: 2019-01-01 | End: 2019-01-01

## 2019-01-01 RX ORDER — VANCOMYCIN HCL IN 5 % DEXTROSE 1G/250ML
20 PLASTIC BAG, INJECTION (ML) INTRAVENOUS ONCE
Status: COMPLETED | OUTPATIENT
Start: 2019-01-01 | End: 2019-01-01

## 2019-01-01 RX ORDER — ENOXAPARIN SODIUM 100 MG/ML
40 INJECTION SUBCUTANEOUS EVERY 12 HOURS
Status: DISCONTINUED | OUTPATIENT
Start: 2019-01-01 | End: 2019-01-01 | Stop reason: HOSPADM

## 2019-01-01 RX ORDER — LEVOTHYROXINE SODIUM 200 UG/1
220 TABLET ORAL DAILY
Qty: 30 TABLET | Refills: 11
Start: 2019-01-01 | End: 2020-11-21

## 2019-01-01 RX ORDER — CHLORHEXIDINE GLUCONATE 40 MG/ML
1 SOLUTION TOPICAL
COMMUNITY
End: 2019-01-01

## 2019-01-01 RX ORDER — ONDANSETRON 2 MG/ML
4 INJECTION INTRAMUSCULAR; INTRAVENOUS EVERY 8 HOURS PRN
Status: DISCONTINUED | OUTPATIENT
Start: 2019-01-01 | End: 2019-01-01

## 2019-01-01 RX ORDER — MENTHOL AND ZINC OXIDE .44; 20.625 G/100G; G/100G
1 OINTMENT TOPICAL DAILY
COMMUNITY
End: 2019-01-01

## 2019-01-01 RX ORDER — NITROGLYCERIN 0.4 MG/1
0.4 TABLET SUBLINGUAL EVERY 5 MIN PRN
Status: DISCONTINUED | OUTPATIENT
Start: 2019-01-01 | End: 2019-01-01 | Stop reason: HOSPADM

## 2019-01-01 RX ORDER — POTASSIUM CHLORIDE 20 MEQ/15ML
10 SOLUTION ORAL DAILY
COMMUNITY

## 2019-01-01 RX ORDER — DOPAMINE HYDROCHLORIDE 160 MG/100ML
5 INJECTION, SOLUTION INTRAVENOUS CONTINUOUS
Status: DISCONTINUED | OUTPATIENT
Start: 2019-01-01 | End: 2019-01-01

## 2019-01-01 RX ORDER — LEVOTHYROXINE SODIUM ANHYDROUS 100 UG/5ML
100 INJECTION, POWDER, LYOPHILIZED, FOR SOLUTION INTRAVENOUS DAILY
Status: DISCONTINUED | OUTPATIENT
Start: 2019-01-01 | End: 2019-01-01

## 2019-01-01 RX ORDER — ONDANSETRON 2 MG/ML
4 INJECTION INTRAMUSCULAR; INTRAVENOUS EVERY 6 HOURS PRN
Status: DISCONTINUED | OUTPATIENT
Start: 2019-01-01 | End: 2019-01-01 | Stop reason: HOSPADM

## 2019-01-01 RX ORDER — AMOXICILLIN AND CLAVULANATE POTASSIUM 875; 125 MG/1; MG/1
1 TABLET, FILM COATED ORAL 2 TIMES DAILY
Status: ON HOLD | COMMUNITY
Start: 2019-01-01 | End: 2019-01-01 | Stop reason: HOSPADM

## 2019-01-01 RX ORDER — HYDROCORTISONE 25 MG/G
1 CREAM TOPICAL 2 TIMES DAILY PRN
COMMUNITY
End: 2019-01-01 | Stop reason: CLARIF

## 2019-01-01 RX ORDER — ENOXAPARIN SODIUM 100 MG/ML
40 INJECTION SUBCUTANEOUS
Status: DISCONTINUED | OUTPATIENT
Start: 2019-01-01 | End: 2019-01-01 | Stop reason: HOSPADM

## 2019-01-01 RX ORDER — NALOXONE HCL 0.4 MG/ML
1 VIAL (ML) INJECTION
Status: COMPLETED | OUTPATIENT
Start: 2019-01-01 | End: 2019-01-01

## 2019-01-01 RX ORDER — NOREPINEPHRINE BITARTRATE 0.03 MG/ML
0.05 INJECTION, SOLUTION INTRAVENOUS CONTINUOUS
Status: DISCONTINUED | OUTPATIENT
Start: 2019-01-01 | End: 2019-01-01

## 2019-01-01 RX ORDER — FERROUS SULFATE 325(65) MG
325 TABLET ORAL DAILY
Status: DISCONTINUED | OUTPATIENT
Start: 2019-01-01 | End: 2019-01-01

## 2019-01-01 RX ORDER — EPINEPHRINE 0.1 MG/ML
1 INJECTION INTRAVENOUS ONCE
Status: COMPLETED | OUTPATIENT
Start: 2019-01-01 | End: 2019-01-01

## 2019-01-01 RX ORDER — POTASSIUM CHLORIDE 20 MEQ/15ML
40 SOLUTION ORAL DAILY
Status: DISCONTINUED | OUTPATIENT
Start: 2019-01-01 | End: 2019-01-01

## 2019-01-01 RX ORDER — CHOLESTYRAMINE 4 G/4.8G
4 POWDER, FOR SUSPENSION ORAL 2 TIMES DAILY
Status: DISCONTINUED | OUTPATIENT
Start: 2019-01-01 | End: 2019-01-01 | Stop reason: HOSPADM

## 2019-01-01 RX ORDER — ENALAPRILAT 1.25 MG/ML
1.25 INJECTION INTRAVENOUS EVERY 8 HOURS PRN
Status: DISCONTINUED | OUTPATIENT
Start: 2019-01-01 | End: 2019-01-01

## 2019-01-01 RX ORDER — VANCOMYCIN HCL 50 MG/ML
125 SOLUTION, RECONSTITUTED, ORAL ORAL EVERY 6 HOURS
Status: DISCONTINUED | OUTPATIENT
Start: 2019-01-01 | End: 2019-01-01 | Stop reason: HOSPADM

## 2019-01-01 RX ORDER — AMIODARONE HYDROCHLORIDE 150 MG/3ML
300 INJECTION, SOLUTION INTRAVENOUS
Status: DISCONTINUED | OUTPATIENT
Start: 2019-01-01 | End: 2019-01-01

## 2019-01-01 RX ORDER — FUROSEMIDE 10 MG/ML
20 INJECTION INTRAMUSCULAR; INTRAVENOUS 2 TIMES DAILY
Status: DISCONTINUED | OUTPATIENT
Start: 2019-01-01 | End: 2019-01-01

## 2019-01-01 RX ORDER — LACOSAMIDE 50 MG/1
200 TABLET ORAL EVERY 24 HOURS
Status: DISCONTINUED | OUTPATIENT
Start: 2019-01-01 | End: 2019-01-01

## 2019-01-01 RX ORDER — LEVOTHYROXINE SODIUM 200 UG/1
200 TABLET ORAL DAILY
Qty: 30 TABLET | Refills: 11 | Status: ON HOLD | OUTPATIENT
Start: 2019-01-01 | End: 2019-01-01 | Stop reason: SDUPTHER

## 2019-01-01 RX ORDER — NOREPINEPHRINE BITARTRATE 0.03 MG/ML
INJECTION, SOLUTION INTRAVENOUS
Status: DISPENSED
Start: 2019-01-01 | End: 2019-01-01

## 2019-01-01 RX ORDER — TOBRAMYCIN INHALATION SOLUTION 300 MG/5ML
300 INHALANT RESPIRATORY (INHALATION) EVERY 12 HOURS
Status: DISCONTINUED | OUTPATIENT
Start: 2019-01-01 | End: 2019-01-01 | Stop reason: HOSPADM

## 2019-01-01 RX ORDER — NOREPINEPHRINE BITARTRATE 0.03 MG/ML
0.02 INJECTION, SOLUTION INTRAVENOUS CONTINUOUS
Status: DISCONTINUED | OUTPATIENT
Start: 2019-01-01 | End: 2019-01-01

## 2019-01-01 RX ORDER — LORAZEPAM 2 MG/ML
1 INJECTION INTRAMUSCULAR EVERY 6 HOURS PRN
Status: DISCONTINUED | OUTPATIENT
Start: 2019-01-01 | End: 2019-01-01

## 2019-01-01 RX ORDER — FUROSEMIDE 10 MG/ML
20 INJECTION INTRAMUSCULAR; INTRAVENOUS ONCE
Status: COMPLETED | OUTPATIENT
Start: 2019-01-01 | End: 2019-01-01

## 2019-01-01 RX ORDER — LACOSAMIDE 50 MG/1
200 TABLET ORAL EVERY 12 HOURS
Status: DISCONTINUED | OUTPATIENT
Start: 2019-01-01 | End: 2019-01-01 | Stop reason: HOSPADM

## 2019-01-01 RX ORDER — LEVETIRACETAM 500 MG/1
500 TABLET ORAL 2 TIMES DAILY
Status: DISCONTINUED | OUTPATIENT
Start: 2019-01-01 | End: 2019-01-01

## 2019-01-01 RX ORDER — POLYETHYLENE GLYCOL 3350 17 G/17G
17 POWDER, FOR SOLUTION ORAL NIGHTLY
Status: DISCONTINUED | OUTPATIENT
Start: 2019-01-01 | End: 2019-01-01

## 2019-01-01 RX ORDER — FUROSEMIDE 10 MG/ML
40 INJECTION INTRAMUSCULAR; INTRAVENOUS 2 TIMES DAILY
Status: DISCONTINUED | OUTPATIENT
Start: 2019-01-01 | End: 2019-01-01

## 2019-01-01 RX ORDER — ATROPINE SULFATE 0.1 MG/ML
1 INJECTION INTRAVENOUS ONCE
Status: COMPLETED | OUTPATIENT
Start: 2019-01-01 | End: 2019-01-01

## 2019-01-01 RX ORDER — LEVOTHYROXINE SODIUM ANHYDROUS 100 UG/5ML
200 INJECTION, POWDER, LYOPHILIZED, FOR SOLUTION INTRAVENOUS ONCE
Status: COMPLETED | OUTPATIENT
Start: 2019-01-01 | End: 2019-01-01

## 2019-01-01 RX ADMIN — PIPERACILLIN AND TAZOBACTAM 4.5 G: 4; .5 INJECTION, POWDER, LYOPHILIZED, FOR SOLUTION INTRAVENOUS; PARENTERAL at 03:10

## 2019-01-01 RX ADMIN — ENOXAPARIN SODIUM 40 MG: 100 INJECTION SUBCUTANEOUS at 09:09

## 2019-01-01 RX ADMIN — Medication 125 MG: at 12:10

## 2019-01-01 RX ADMIN — Medication 125 MG: at 01:10

## 2019-01-01 RX ADMIN — LACTOBACILLUS TAB 1 TABLET: TAB at 09:12

## 2019-01-01 RX ADMIN — FUROSEMIDE 10 MG: 10 INJECTION, SOLUTION INTRAVENOUS at 06:10

## 2019-01-01 RX ADMIN — PROPOFOL 50 MCG/KG/MIN: 10 INJECTION, EMULSION INTRAVENOUS at 02:11

## 2019-01-01 RX ADMIN — IPRATROPIUM BROMIDE AND ALBUTEROL SULFATE 3 ML: .5; 3 SOLUTION RESPIRATORY (INHALATION) at 12:11

## 2019-01-01 RX ADMIN — Medication 10 ML: at 12:10

## 2019-01-01 RX ADMIN — MEROPENEM 1 G: 1 INJECTION, POWDER, FOR SOLUTION INTRAVENOUS at 01:12

## 2019-01-01 RX ADMIN — VANCOMYCIN HYDROCHLORIDE 125 MG: KIT at 06:11

## 2019-01-01 RX ADMIN — Medication 125 MG: at 05:10

## 2019-01-01 RX ADMIN — PIPERACILLIN AND TAZOBACTAM 4.5 G: 4; .5 INJECTION, POWDER, LYOPHILIZED, FOR SOLUTION INTRAVENOUS; PARENTERAL at 02:10

## 2019-01-01 RX ADMIN — PROPOFOL 50 MCG/KG/MIN: 10 INJECTION, EMULSION INTRAVENOUS at 09:11

## 2019-01-01 RX ADMIN — IPRATROPIUM BROMIDE AND ALBUTEROL SULFATE 3 ML: .5; 3 SOLUTION RESPIRATORY (INHALATION) at 01:11

## 2019-01-01 RX ADMIN — BUSPIRONE HYDROCHLORIDE 5 MG: 5 TABLET ORAL at 09:11

## 2019-01-01 RX ADMIN — CHLORHEXIDINE GLUCONATE 15 ML: 1.2 RINSE ORAL at 09:12

## 2019-01-01 RX ADMIN — ENOXAPARIN SODIUM 40 MG: 100 INJECTION SUBCUTANEOUS at 06:11

## 2019-01-01 RX ADMIN — GENTAMICIN SULFATE 1 DROP: 3 SOLUTION/ DROPS OPHTHALMIC at 10:12

## 2019-01-01 RX ADMIN — GENTAMICIN SULFATE 1 DROP: 3 SOLUTION/ DROPS OPHTHALMIC at 08:12

## 2019-01-01 RX ADMIN — LACTOBACILLUS TAB 1 TABLET: TAB at 06:12

## 2019-01-01 RX ADMIN — OXYCODONE HYDROCHLORIDE AND ACETAMINOPHEN 500 MG: 500 TABLET ORAL at 09:10

## 2019-01-01 RX ADMIN — PANTOPRAZOLE SODIUM 40 MG: 40 TABLET, DELAYED RELEASE ORAL at 06:11

## 2019-01-01 RX ADMIN — LACOSAMIDE 200 MG: 50 TABLET, FILM COATED ORAL at 08:12

## 2019-01-01 RX ADMIN — BUSPIRONE HYDROCHLORIDE 5 MG: 5 TABLET ORAL at 08:12

## 2019-01-01 RX ADMIN — LORAZEPAM 1 MG: 2 INJECTION INTRAMUSCULAR; INTRAVENOUS at 03:09

## 2019-01-01 RX ADMIN — PIPERACILLIN AND TAZOBACTAM 4.5 G: 4; .5 INJECTION, POWDER, LYOPHILIZED, FOR SOLUTION INTRAVENOUS; PARENTERAL at 02:11

## 2019-01-01 RX ADMIN — ENOXAPARIN SODIUM 40 MG: 100 INJECTION SUBCUTANEOUS at 09:12

## 2019-01-01 RX ADMIN — LEVETIRACETAM 500 MG: 100 SOLUTION ORAL at 10:12

## 2019-01-01 RX ADMIN — MEROPENEM 1 G: 1 INJECTION, POWDER, FOR SOLUTION INTRAVENOUS at 12:12

## 2019-01-01 RX ADMIN — PIPERACILLIN AND TAZOBACTAM 4.5 G: 4; .5 INJECTION, POWDER, LYOPHILIZED, FOR SOLUTION INTRAVENOUS; PARENTERAL at 10:11

## 2019-01-01 RX ADMIN — CEFEPIME HYDROCHLORIDE 2 G: 2 INJECTION, POWDER, FOR SOLUTION INTRAVENOUS at 10:10

## 2019-01-01 RX ADMIN — VANCOMYCIN HYDROCHLORIDE 250 MG: KIT at 09:11

## 2019-01-01 RX ADMIN — VANCOMYCIN HYDROCHLORIDE 125 MG: KIT at 06:12

## 2019-01-01 RX ADMIN — VANCOMYCIN HYDROCHLORIDE 250 MG: KIT at 08:11

## 2019-01-01 RX ADMIN — VANCOMYCIN HYDROCHLORIDE 125 MG: KIT at 08:12

## 2019-01-01 RX ADMIN — MORPHINE SULFATE 2 MG: 2 INJECTION, SOLUTION INTRAMUSCULAR; INTRAVENOUS at 01:10

## 2019-01-01 RX ADMIN — LACTOBACILLUS TAB 1 TABLET: TAB at 01:12

## 2019-01-01 RX ADMIN — AMPICILLIN SODIUM 2 G: 2 INJECTION, POWDER, FOR SOLUTION INTRAMUSCULAR; INTRAVENOUS at 09:11

## 2019-01-01 RX ADMIN — LEVETIRACETAM 500 MG: 500 TABLET, FILM COATED ORAL at 09:11

## 2019-01-01 RX ADMIN — FLUCONAZOLE 200 MG: 10 POWDER, FOR SUSPENSION ORAL at 11:12

## 2019-01-01 RX ADMIN — SODIUM CHLORIDE 100 MG: 9 INJECTION, SOLUTION INTRAVENOUS at 06:11

## 2019-01-01 RX ADMIN — CARVEDILOL 3.12 MG: 3.12 TABLET, FILM COATED ORAL at 08:12

## 2019-01-01 RX ADMIN — DEXMEDETOMIDINE HYDROCHLORIDE 0.2 MCG/KG/HR: 400 INJECTION INTRAVENOUS at 10:09

## 2019-01-01 RX ADMIN — FLUCONAZOLE 200 MG: 40 POWDER, FOR SUSPENSION ORAL at 09:11

## 2019-01-01 RX ADMIN — CHLORHEXIDINE GLUCONATE 15 ML: 1.2 RINSE ORAL at 09:11

## 2019-01-01 RX ADMIN — Medication 10 ML: at 05:10

## 2019-01-01 RX ADMIN — LEVETIRACETAM 500 MG: 100 SOLUTION ORAL at 09:12

## 2019-01-01 RX ADMIN — FINASTERIDE 5 MG: 5 TABLET, FILM COATED ORAL at 08:10

## 2019-01-01 RX ADMIN — CASTOR OIL AND BALSAM, PERU: 788; 87 OINTMENT TOPICAL at 01:11

## 2019-01-01 RX ADMIN — LEVOFLOXACIN 750 MG: 750 INJECTION, SOLUTION INTRAVENOUS at 08:10

## 2019-01-01 RX ADMIN — VANCOMYCIN HYDROCHLORIDE 250 MG: KIT at 05:11

## 2019-01-01 RX ADMIN — LACTOBACILLUS TAB 1 TABLET: TAB at 12:12

## 2019-01-01 RX ADMIN — PROPOFOL 50 MCG/KG/MIN: 10 INJECTION, EMULSION INTRAVENOUS at 10:11

## 2019-01-01 RX ADMIN — PANTOPRAZOLE SODIUM 40 MG: 40 TABLET, DELAYED RELEASE ORAL at 05:11

## 2019-01-01 RX ADMIN — FUROSEMIDE 20 MG: 40 SOLUTION ORAL at 09:12

## 2019-01-01 RX ADMIN — PIPERACILLIN AND TAZOBACTAM 4.5 G: 4; .5 INJECTION, POWDER, LYOPHILIZED, FOR SOLUTION INTRAVENOUS; PARENTERAL at 06:11

## 2019-01-01 RX ADMIN — OXYCODONE HYDROCHLORIDE AND ACETAMINOPHEN 500 MG: 500 TABLET ORAL at 08:10

## 2019-01-01 RX ADMIN — EPINEPHRINE 0.5 MG: 0.1 INJECTION INTRACARDIAC; INTRAVENOUS at 11:12

## 2019-01-01 RX ADMIN — PIPERACILLIN AND TAZOBACTAM 4.5 G: 4; .5 INJECTION, POWDER, LYOPHILIZED, FOR SOLUTION INTRAVENOUS; PARENTERAL at 03:11

## 2019-01-01 RX ADMIN — SODIUM CHLORIDE 2328 ML: 0.9 INJECTION, SOLUTION INTRAVENOUS at 05:09

## 2019-01-01 RX ADMIN — Medication 30 MG: at 09:09

## 2019-01-01 RX ADMIN — METRONIDAZOLE 500 MG: 500 INJECTION, SOLUTION INTRAVENOUS at 05:11

## 2019-01-01 RX ADMIN — PERFLUTREN 0.3 ML: 6.52 INJECTION, SUSPENSION INTRAVENOUS at 09:09

## 2019-01-01 RX ADMIN — LACOSAMIDE 200 MG: 50 TABLET, FILM COATED ORAL at 05:11

## 2019-01-01 RX ADMIN — TOBRAMYCIN 300 MG: 300 SOLUTION RESPIRATORY (INHALATION) at 06:10

## 2019-01-01 RX ADMIN — LEVETIRACETAM INJECTION 500 MG: 5 INJECTION INTRAVENOUS at 05:11

## 2019-01-01 RX ADMIN — VANCOMYCIN HYDROCHLORIDE 125 MG: KIT at 11:12

## 2019-01-01 RX ADMIN — PROPOFOL 50 MCG/KG/MIN: 10 INJECTION, EMULSION INTRAVENOUS at 03:11

## 2019-01-01 RX ADMIN — PIPERACILLIN AND TAZOBACTAM 4.5 G: 4; .5 INJECTION, POWDER, LYOPHILIZED, FOR SOLUTION INTRAVENOUS; PARENTERAL at 01:10

## 2019-01-01 RX ADMIN — Medication 30 MG: at 02:09

## 2019-01-01 RX ADMIN — FUROSEMIDE 20 MG: 10 INJECTION, SOLUTION INTRAMUSCULAR; INTRAVENOUS at 03:09

## 2019-01-01 RX ADMIN — LEVETIRACETAM 500 MG: 100 SOLUTION ORAL at 01:12

## 2019-01-01 RX ADMIN — Medication 10 ML: at 07:10

## 2019-01-01 RX ADMIN — VANCOMYCIN HYDROCHLORIDE 125 MG: KIT at 12:11

## 2019-01-01 RX ADMIN — LEVETIRACETAM INJECTION 500 MG: 5 INJECTION INTRAVENOUS at 08:11

## 2019-01-01 RX ADMIN — IPRATROPIUM BROMIDE AND ALBUTEROL SULFATE 3 ML: .5; 3 SOLUTION RESPIRATORY (INHALATION) at 07:11

## 2019-01-01 RX ADMIN — CHLORHEXIDINE GLUCONATE 15 ML: 1.2 RINSE ORAL at 08:11

## 2019-01-01 RX ADMIN — LEVOTHYROXINE SODIUM ANHYDROUS 100 MCG: 100 INJECTION, POWDER, LYOPHILIZED, FOR SOLUTION INTRAVENOUS at 08:12

## 2019-01-01 RX ADMIN — TERAZOSIN HYDROCHLORIDE 1 MG: 1 CAPSULE ORAL at 12:11

## 2019-01-01 RX ADMIN — MORPHINE SULFATE 2 MG: 2 INJECTION, SOLUTION INTRAMUSCULAR; INTRAVENOUS at 07:10

## 2019-01-01 RX ADMIN — CHLORHEXIDINE GLUCONATE 15 ML: 1.2 RINSE ORAL at 09:09

## 2019-01-01 RX ADMIN — Medication 10 ML: at 06:10

## 2019-01-01 RX ADMIN — PANTOPRAZOLE SODIUM 40 MG: 40 INJECTION, POWDER, LYOPHILIZED, FOR SOLUTION INTRAVENOUS at 08:10

## 2019-01-01 RX ADMIN — POLYETHYLENE GLYCOL (3350) 17 G: 17 POWDER, FOR SOLUTION ORAL at 08:10

## 2019-01-01 RX ADMIN — VANCOMYCIN HYDROCHLORIDE 125 MG: KIT at 05:12

## 2019-01-01 RX ADMIN — LACTOBACILLUS TAB 1 TABLET: TAB at 04:12

## 2019-01-01 RX ADMIN — VANCOMYCIN HYDROCHLORIDE 125 MG: KIT at 01:12

## 2019-01-01 RX ADMIN — ENOXAPARIN SODIUM 40 MG: 100 INJECTION SUBCUTANEOUS at 05:11

## 2019-01-01 RX ADMIN — POTASSIUM PHOSPHATE, MONOBASIC AND POTASSIUM PHOSPHATE, DIBASIC 30 MMOL: 224; 236 INJECTION, SOLUTION, CONCENTRATE INTRAVENOUS at 04:10

## 2019-01-01 RX ADMIN — PANTOPRAZOLE SODIUM 40 MG: 40 INJECTION, POWDER, LYOPHILIZED, FOR SOLUTION INTRAVENOUS at 09:10

## 2019-01-01 RX ADMIN — VANCOMYCIN HYDROCHLORIDE 125 MG: KIT at 05:11

## 2019-01-01 RX ADMIN — MEROPENEM 1 G: 1 INJECTION, POWDER, FOR SOLUTION INTRAVENOUS at 11:12

## 2019-01-01 RX ADMIN — MUPIROCIN: 20 OINTMENT TOPICAL at 10:12

## 2019-01-01 RX ADMIN — MUPIROCIN: 20 OINTMENT TOPICAL at 09:12

## 2019-01-01 RX ADMIN — BUSPIRONE HYDROCHLORIDE 5 MG: 5 TABLET ORAL at 09:09

## 2019-01-01 RX ADMIN — SODIUM CHLORIDE 100 MG: 9 INJECTION, SOLUTION INTRAVENOUS at 05:11

## 2019-01-01 RX ADMIN — CHLORHEXIDINE GLUCONATE 15 ML: 1.2 RINSE ORAL at 08:12

## 2019-01-01 RX ADMIN — SODIUM CHLORIDE: 0.9 INJECTION, SOLUTION INTRAVENOUS at 03:10

## 2019-01-01 RX ADMIN — LEVOTHYROXINE SODIUM ANHYDROUS 100 MCG: 100 INJECTION, POWDER, LYOPHILIZED, FOR SOLUTION INTRAVENOUS at 09:12

## 2019-01-01 RX ADMIN — LACOSAMIDE 200 MG: 50 TABLET, FILM COATED ORAL at 09:12

## 2019-01-01 RX ADMIN — OXYCODONE HYDROCHLORIDE AND ACETAMINOPHEN 500 MG: 500 TABLET ORAL at 10:10

## 2019-01-01 RX ADMIN — SODIUM POLYSTYRENE SULFONATE 30 G: 15 SUSPENSION ORAL; RECTAL at 07:09

## 2019-01-01 RX ADMIN — TOBRAMYCIN 300 MG: 300 SOLUTION RESPIRATORY (INHALATION) at 07:10

## 2019-01-01 RX ADMIN — DULOXETINE 30 MG: 30 CAPSULE, DELAYED RELEASE ORAL at 09:12

## 2019-01-01 RX ADMIN — ATROPINE SULFATE 1 MG: 0.1 INJECTION PARENTERAL at 10:12

## 2019-01-01 RX ADMIN — CARVEDILOL 3.12 MG: 3.12 TABLET, FILM COATED ORAL at 01:11

## 2019-01-01 RX ADMIN — GENTAMICIN SULFATE 1 DROP: 3 SOLUTION/ DROPS OPHTHALMIC at 09:12

## 2019-01-01 RX ADMIN — CHLORHEXIDINE GLUCONATE 15 ML: 1.2 RINSE ORAL at 10:09

## 2019-01-01 RX ADMIN — MORPHINE SULFATE 2 MG: 2 INJECTION, SOLUTION INTRAMUSCULAR; INTRAVENOUS at 09:10

## 2019-01-01 RX ADMIN — FINASTERIDE 5 MG: 5 TABLET, FILM COATED ORAL at 09:12

## 2019-01-01 RX ADMIN — PIPERACILLIN AND TAZOBACTAM 4.5 G: 4; .5 INJECTION, POWDER, LYOPHILIZED, FOR SOLUTION INTRAVENOUS; PARENTERAL at 09:10

## 2019-01-01 RX ADMIN — MUPIROCIN: 20 OINTMENT TOPICAL at 02:09

## 2019-01-01 RX ADMIN — LEVOTHYROXINE SODIUM 200 MCG: 100 TABLET ORAL at 05:10

## 2019-01-01 RX ADMIN — DEXTROSE 0.05 MCG/KG/MIN: 5 SOLUTION INTRAVENOUS at 12:12

## 2019-01-01 RX ADMIN — LACTOBACILLUS TAB 1 TABLET: TAB at 05:12

## 2019-01-01 RX ADMIN — VANCOMYCIN HYDROCHLORIDE 250 MG: KIT at 01:11

## 2019-01-01 RX ADMIN — LACTOBACILLUS TAB 1 TABLET: TAB at 08:12

## 2019-01-01 RX ADMIN — FERROUS SULFATE TAB 325 MG (65 MG ELEMENTAL FE) 325 MG: 325 (65 FE) TAB at 10:12

## 2019-01-01 RX ADMIN — FLUCONAZOLE 200 MG: 40 POWDER, FOR SUSPENSION ORAL at 12:11

## 2019-01-01 RX ADMIN — PIPERACILLIN AND TAZOBACTAM 4.5 G: 4; .5 INJECTION, POWDER, LYOPHILIZED, FOR SOLUTION INTRAVENOUS; PARENTERAL at 06:10

## 2019-01-01 RX ADMIN — LEVETIRACETAM 500 MG: 500 INJECTION, SOLUTION, CONCENTRATE INTRAVENOUS at 05:11

## 2019-01-01 RX ADMIN — GENTAMICIN SULFATE 1 DROP: 3 SOLUTION/ DROPS OPHTHALMIC at 01:12

## 2019-01-01 RX ADMIN — ENOXAPARIN SODIUM 40 MG: 100 INJECTION SUBCUTANEOUS at 08:12

## 2019-01-01 RX ADMIN — LORAZEPAM 2 MG: 2 INJECTION INTRAMUSCULAR; INTRAVENOUS at 01:12

## 2019-01-01 RX ADMIN — DEXMEDETOMIDINE HYDROCHLORIDE 0.3 MCG/KG/HR: 400 INJECTION INTRAVENOUS at 05:09

## 2019-01-01 RX ADMIN — PIPERACILLIN AND TAZOBACTAM 3.38 G: 3; .375 INJECTION, POWDER, LYOPHILIZED, FOR SOLUTION INTRAVENOUS; PARENTERAL at 04:11

## 2019-01-01 RX ADMIN — LACOSAMIDE 200 MG: 10 INJECTION INTRAVENOUS at 06:11

## 2019-01-01 RX ADMIN — MEROPENEM 2 G: 1 INJECTION, POWDER, FOR SOLUTION INTRAVENOUS at 11:11

## 2019-01-01 RX ADMIN — VANCOMYCIN HYDROCHLORIDE 250 MG: KIT at 12:11

## 2019-01-01 RX ADMIN — SODIUM CHLORIDE: 0.9 INJECTION, SOLUTION INTRAVENOUS at 05:10

## 2019-01-01 RX ADMIN — CHOLESTYRAMINE 4 G: 4 POWDER, FOR SUSPENSION ORAL at 09:10

## 2019-01-01 RX ADMIN — METRONIDAZOLE 500 MG: 500 INJECTION, SOLUTION INTRAVENOUS at 03:11

## 2019-01-01 RX ADMIN — Medication 0.1 MCG/KG/MIN: at 11:10

## 2019-01-01 RX ADMIN — CEFEPIME HYDROCHLORIDE 2 G: 2 INJECTION, POWDER, FOR SOLUTION INTRAVENOUS at 09:10

## 2019-01-01 RX ADMIN — GENTAMICIN SULFATE 1 DROP: 3 SOLUTION/ DROPS OPHTHALMIC at 12:12

## 2019-01-01 RX ADMIN — FUROSEMIDE 40 MG: 10 INJECTION, SOLUTION INTRAMUSCULAR; INTRAVENOUS at 10:09

## 2019-01-01 RX ADMIN — CHLORHEXIDINE GLUCONATE 15 ML: 1.2 RINSE ORAL at 10:11

## 2019-01-01 RX ADMIN — POTASSIUM CHLORIDE 20 MEQ: 20 SOLUTION ORAL at 08:12

## 2019-01-01 RX ADMIN — FLUCONAZOLE 200 MG: 10 POWDER, FOR SUSPENSION ORAL at 10:12

## 2019-01-01 RX ADMIN — POLYETHYLENE GLYCOL (3350) 17 G: 17 POWDER, FOR SOLUTION ORAL at 09:10

## 2019-01-01 RX ADMIN — PROPOFOL 50 MCG/KG/MIN: 10 INJECTION, EMULSION INTRAVENOUS at 04:11

## 2019-01-01 RX ADMIN — CARVEDILOL 3.12 MG: 3.12 TABLET, FILM COATED ORAL at 09:12

## 2019-01-01 RX ADMIN — CASTOR OIL AND BALSAM, PERU: 788; 87 OINTMENT TOPICAL at 09:11

## 2019-01-01 RX ADMIN — LORAZEPAM 1 MG: 2 INJECTION INTRAMUSCULAR; INTRAVENOUS at 08:09

## 2019-01-01 RX ADMIN — CHOLESTYRAMINE 4 G: 4 POWDER, FOR SUSPENSION ORAL at 08:10

## 2019-01-01 RX ADMIN — DULOXETINE 30 MG: 30 CAPSULE, DELAYED RELEASE ORAL at 08:12

## 2019-01-01 RX ADMIN — GENTAMICIN SULFATE 1 DROP: 3 SOLUTION/ DROPS OPHTHALMIC at 02:12

## 2019-01-01 RX ADMIN — SODIUM CHLORIDE: 0.9 INJECTION, SOLUTION INTRAVENOUS at 11:10

## 2019-01-01 RX ADMIN — IPRATROPIUM BROMIDE AND ALBUTEROL SULFATE 3 ML: .5; 3 SOLUTION RESPIRATORY (INHALATION) at 02:11

## 2019-01-01 RX ADMIN — LEVOTHYROXINE SODIUM ANHYDROUS 200 MCG: 100 INJECTION, POWDER, LYOPHILIZED, FOR SOLUTION INTRAVENOUS at 06:11

## 2019-01-01 RX ADMIN — SODIUM CHLORIDE 100 MG: 9 INJECTION, SOLUTION INTRAVENOUS at 08:11

## 2019-01-01 RX ADMIN — LEVOTHYROXINE SODIUM 200 MCG: 100 TABLET ORAL at 06:10

## 2019-01-01 RX ADMIN — SODIUM CHLORIDE 100 MG: 9 INJECTION, SOLUTION INTRAVENOUS at 07:11

## 2019-01-01 RX ADMIN — LACTOBACILLUS TAB 1 TABLET: TAB at 11:12

## 2019-01-01 RX ADMIN — FUROSEMIDE 10 MG: 10 INJECTION, SOLUTION INTRAVENOUS at 09:10

## 2019-01-01 RX ADMIN — POTASSIUM CHLORIDE 40 MEQ: 20 SOLUTION ORAL at 08:12

## 2019-01-01 RX ADMIN — TRAMADOL HYDROCHLORIDE 50 MG: 50 TABLET, FILM COATED ORAL at 08:10

## 2019-01-01 RX ADMIN — POTASSIUM CHLORIDE 40 MEQ: 20 SOLUTION ORAL at 10:12

## 2019-01-01 RX ADMIN — DEXTROSE 0.03 MCG/KG/MIN: 5 SOLUTION INTRAVENOUS at 05:11

## 2019-01-01 RX ADMIN — SODIUM PHOSPHATE, MONOBASIC, MONOHYDRATE 15 MMOL: 276; 142 INJECTION, SOLUTION INTRAVENOUS at 09:10

## 2019-01-01 RX ADMIN — FLUCONAZOLE 200 MG: 40 POWDER, FOR SUSPENSION ORAL at 11:12

## 2019-01-01 RX ADMIN — LEVETIRACETAM 1000 MG: 100 INJECTION, SOLUTION, CONCENTRATE INTRAVENOUS at 06:11

## 2019-01-01 RX ADMIN — Medication 10 ML: at 11:10

## 2019-01-01 RX ADMIN — BUSPIRONE HYDROCHLORIDE 5 MG: 5 TABLET ORAL at 10:12

## 2019-01-01 RX ADMIN — GABAPENTIN 300 MG: 300 CAPSULE ORAL at 09:09

## 2019-01-01 RX ADMIN — MUPIROCIN: 20 OINTMENT TOPICAL at 08:12

## 2019-01-01 RX ADMIN — CHOLESTYRAMINE 4 G: 4 POWDER, FOR SUSPENSION ORAL at 09:09

## 2019-01-01 RX ADMIN — ACETAZOLAMIDE 250 MG: 500 INJECTION, POWDER, LYOPHILIZED, FOR SOLUTION INTRAVENOUS at 09:09

## 2019-01-01 RX ADMIN — Medication 125 MG: at 06:10

## 2019-01-01 RX ADMIN — MORPHINE SULFATE 10 MG: 10 INJECTION INTRAVENOUS at 02:12

## 2019-01-01 RX ADMIN — PIPERACILLIN AND TAZOBACTAM 4.5 G: 4; .5 INJECTION, POWDER, LYOPHILIZED, FOR SOLUTION INTRAVENOUS; PARENTERAL at 05:11

## 2019-01-01 RX ADMIN — METHYLPREDNISOLONE SODIUM SUCCINATE 125 MG: 125 INJECTION, POWDER, FOR SOLUTION INTRAMUSCULAR; INTRAVENOUS at 01:12

## 2019-01-01 RX ADMIN — IPRATROPIUM BROMIDE AND ALBUTEROL SULFATE 3 ML: .5; 3 SOLUTION RESPIRATORY (INHALATION) at 08:11

## 2019-01-01 RX ADMIN — VANCOMYCIN HYDROCHLORIDE 500 MG: 500 INJECTION, POWDER, LYOPHILIZED, FOR SOLUTION INTRAVENOUS at 12:11

## 2019-01-01 RX ADMIN — METRONIDAZOLE 500 MG: 500 INJECTION, SOLUTION INTRAVENOUS at 11:11

## 2019-01-01 RX ADMIN — SODIUM CHLORIDE: 0.9 INJECTION, SOLUTION INTRAVENOUS at 07:11

## 2019-01-01 RX ADMIN — PIPERACILLIN AND TAZOBACTAM 4.5 G: 4; .5 INJECTION, POWDER, LYOPHILIZED, FOR SOLUTION INTRAVENOUS; PARENTERAL at 05:12

## 2019-01-01 RX ADMIN — LEVETIRACETAM 500 MG: 100 SOLUTION ORAL at 08:12

## 2019-01-01 RX ADMIN — LEVOTHYROXINE SODIUM ANHYDROUS 100 MCG: 100 INJECTION, POWDER, LYOPHILIZED, FOR SOLUTION INTRAVENOUS at 10:12

## 2019-01-01 RX ADMIN — FLUCONAZOLE 200 MG: 40 POWDER, FOR SUSPENSION ORAL at 08:11

## 2019-01-01 RX ADMIN — FERROUS SULFATE TAB 325 MG (65 MG ELEMENTAL FE) 325 MG: 325 (65 FE) TAB at 09:12

## 2019-01-01 RX ADMIN — FLUCONAZOLE 200 MG: 40 POWDER, FOR SUSPENSION ORAL at 10:12

## 2019-01-01 RX ADMIN — NALOXONE HYDROCHLORIDE 1 MG: 0.4 INJECTION, SOLUTION INTRAMUSCULAR; INTRAVENOUS; SUBCUTANEOUS at 02:10

## 2019-01-01 RX ADMIN — BUSPIRONE HYDROCHLORIDE 5 MG: 5 TABLET ORAL at 09:12

## 2019-01-01 RX ADMIN — MUPIROCIN: 20 OINTMENT TOPICAL at 09:11

## 2019-01-01 RX ADMIN — LACTOBACILLUS TAB 1 TABLET: TAB at 10:12

## 2019-01-01 RX ADMIN — FERROUS SULFATE TAB 325 MG (65 MG ELEMENTAL FE) 325 MG: 325 (65 FE) TAB at 08:12

## 2019-01-01 RX ADMIN — VANCOMYCIN HYDROCHLORIDE 125 MG: KIT at 12:12

## 2019-01-01 RX ADMIN — LEVALBUTEROL HYDROCHLORIDE 3.75 MG: 1.25 SOLUTION, CONCENTRATE RESPIRATORY (INHALATION) at 02:09

## 2019-01-01 RX ADMIN — MEROPENEM 2 G: 1 INJECTION, POWDER, FOR SOLUTION INTRAVENOUS at 12:11

## 2019-01-01 RX ADMIN — LACOSAMIDE 200 MG: 50 TABLET, FILM COATED ORAL at 09:11

## 2019-01-01 RX ADMIN — CHOLESTYRAMINE 4 G: 4 POWDER, FOR SUSPENSION ORAL at 10:09

## 2019-01-01 RX ADMIN — TOBRAMYCIN 300 MG: 300 SOLUTION RESPIRATORY (INHALATION) at 09:10

## 2019-01-01 RX ADMIN — FINASTERIDE 5 MG: 5 TABLET, FILM COATED ORAL at 09:10

## 2019-01-01 RX ADMIN — CEFEPIME HYDROCHLORIDE 2 G: 2 INJECTION, POWDER, FOR SOLUTION INTRAVENOUS at 05:10

## 2019-01-01 RX ADMIN — GENTAMICIN SULFATE 1 DROP: 3 SOLUTION/ DROPS OPHTHALMIC at 04:12

## 2019-01-01 RX ADMIN — SODIUM CHLORIDE: 0.9 INJECTION, SOLUTION INTRAVENOUS at 12:11

## 2019-01-01 RX ADMIN — SCOPALAMINE 1 PATCH: 1 PATCH, EXTENDED RELEASE TRANSDERMAL at 11:10

## 2019-01-01 RX ADMIN — CHOLESTYRAMINE 4 G: 4 POWDER, FOR SUSPENSION ORAL at 01:11

## 2019-01-01 RX ADMIN — NALOXONE HYDROCHLORIDE 1 MG: 0.4 INJECTION, SOLUTION INTRAMUSCULAR; INTRAVENOUS; SUBCUTANEOUS at 12:10

## 2019-01-01 RX ADMIN — PIPERACILLIN AND TAZOBACTAM 4.5 G: 4; .5 INJECTION, POWDER, LYOPHILIZED, FOR SOLUTION INTRAVENOUS; PARENTERAL at 09:12

## 2019-01-01 RX ADMIN — MORPHINE SULFATE 2 MG: 2 INJECTION, SOLUTION INTRAMUSCULAR; INTRAVENOUS at 08:10

## 2019-01-01 RX ADMIN — DEXTROSE 0.15 MCG/KG/MIN: 5 SOLUTION INTRAVENOUS at 12:11

## 2019-01-01 RX ADMIN — CEFEPIME 2 G: 2 INJECTION, POWDER, FOR SOLUTION INTRAVENOUS at 07:11

## 2019-01-01 RX ADMIN — EPINEPHRINE 1 MG: 0.1 INJECTION INTRACARDIAC; INTRAVENOUS at 10:12

## 2019-01-01 RX ADMIN — Medication 125 MG: at 11:10

## 2019-01-01 RX ADMIN — MORPHINE SULFATE 2 MG: 2 INJECTION, SOLUTION INTRAMUSCULAR; INTRAVENOUS at 05:10

## 2019-01-01 RX ADMIN — IPRATROPIUM BROMIDE AND ALBUTEROL SULFATE 3 ML: .5; 3 SOLUTION RESPIRATORY (INHALATION) at 01:12

## 2019-01-01 RX ADMIN — TOBRAMYCIN 300 MG: 300 SOLUTION RESPIRATORY (INHALATION) at 08:10

## 2019-01-01 RX ADMIN — POLYETHYLENE GLYCOL 3350 17 G: 17 POWDER, FOR SOLUTION ORAL at 09:09

## 2019-01-01 RX ADMIN — PIPERACILLIN SODIUM AND TAZOBACTAM SODIUM 3.38 G: 3; .375 INJECTION, POWDER, LYOPHILIZED, FOR SOLUTION INTRAVENOUS at 01:09

## 2019-01-01 RX ADMIN — MINERAL SUPPLEMENT IRON 300 MG / 5 ML STRENGTH LIQUID 100 PER BOX UNFLAVORED 300 MG: at 12:11

## 2019-01-01 RX ADMIN — DEXTROSE 0.2 MCG/KG/MIN: 5 SOLUTION INTRAVENOUS at 12:11

## 2019-01-01 RX ADMIN — MAGNESIUM SULFATE 2 G: 2 INJECTION INTRAVENOUS at 03:10

## 2019-01-01 RX ADMIN — FINASTERIDE 5 MG: 5 TABLET, FILM COATED ORAL at 10:11

## 2019-01-01 RX ADMIN — GENTAMICIN SULFATE 1 DROP: 3 SOLUTION/ DROPS OPHTHALMIC at 05:12

## 2019-01-01 RX ADMIN — FAMOTIDINE 20 MG: 20 TABLET ORAL at 09:09

## 2019-01-01 RX ADMIN — LEVETIRACETAM 500 MG: 500 INJECTION, SOLUTION, CONCENTRATE INTRAVENOUS at 09:11

## 2019-01-01 RX ADMIN — IPRATROPIUM BROMIDE 1 MG: 0.5 SOLUTION RESPIRATORY (INHALATION) at 02:09

## 2019-01-01 RX ADMIN — LACTOBACILLUS TAB 1 TABLET: TAB at 07:12

## 2019-01-01 RX ADMIN — VANCOMYCIN HYDROCHLORIDE 1500 MG: 1.5 INJECTION, POWDER, LYOPHILIZED, FOR SOLUTION INTRAVENOUS at 09:10

## 2019-01-01 RX ADMIN — DEXTROSE 25 G: 50 INJECTION, SOLUTION INTRAVENOUS at 04:11

## 2019-01-01 RX ADMIN — PROPOFOL 30 MCG/KG/MIN: 10 INJECTION, EMULSION INTRAVENOUS at 08:11

## 2019-01-01 RX ADMIN — POTASSIUM CHLORIDE 40 MEQ: 20 SOLUTION ORAL at 09:12

## 2019-01-01 RX ADMIN — TERAZOSIN HYDROCHLORIDE 1 MG: 1 CAPSULE ORAL at 09:11

## 2019-01-01 RX ADMIN — VANCOMYCIN HYDROCHLORIDE 250 MG: KIT at 07:11

## 2019-01-01 RX ADMIN — CEFEPIME 2 G: 2 INJECTION, POWDER, FOR SOLUTION INTRAVENOUS at 12:11

## 2019-01-01 RX ADMIN — MORPHINE SULFATE 2 MG: 2 INJECTION, SOLUTION INTRAMUSCULAR; INTRAVENOUS at 04:10

## 2019-01-01 RX ADMIN — SCOPALAMINE 1 PATCH: 1 PATCH, EXTENDED RELEASE TRANSDERMAL at 12:10

## 2019-01-01 RX ADMIN — PROPOFOL 20 MCG/KG/MIN: 10 INJECTION, EMULSION INTRAVENOUS at 09:11

## 2019-01-01 RX ADMIN — FLUCONAZOLE 200 MG: 10 POWDER, FOR SUSPENSION ORAL at 09:12

## 2019-01-01 RX ADMIN — FUROSEMIDE 20 MG: 40 SOLUTION ORAL at 10:12

## 2019-01-01 RX ADMIN — VANCOMYCIN HYDROCHLORIDE 250 MG: KIT at 10:11

## 2019-01-01 RX ADMIN — POTASSIUM PHOSPHATE, MONOBASIC AND POTASSIUM PHOSPHATE, DIBASIC 15 MMOL: 224; 236 INJECTION, SOLUTION, CONCENTRATE INTRAVENOUS at 09:10

## 2019-01-01 RX ADMIN — DEXTROSE 0.15 MCG/KG/MIN: 5 SOLUTION INTRAVENOUS at 04:11

## 2019-01-01 RX ADMIN — DEXTROSE 0.02 MCG/KG/MIN: 5 SOLUTION INTRAVENOUS at 04:11

## 2019-01-01 RX ADMIN — MUPIROCIN: 20 OINTMENT TOPICAL at 10:11

## 2019-01-01 RX ADMIN — POTASSIUM CHLORIDE 40 MEQ: 14.9 INJECTION, SOLUTION INTRAVENOUS at 08:10

## 2019-01-01 RX ADMIN — CARVEDILOL 3.12 MG: 3.12 TABLET, FILM COATED ORAL at 10:12

## 2019-01-01 RX ADMIN — FUROSEMIDE 10 MG: 10 INJECTION, SOLUTION INTRAVENOUS at 05:10

## 2019-01-01 RX ADMIN — MINERAL SUPPLEMENT IRON 300 MG / 5 ML STRENGTH LIQUID 100 PER BOX UNFLAVORED 300 MG: at 03:11

## 2019-01-01 RX ADMIN — MEROPENEM 1 G: 1 INJECTION, POWDER, FOR SOLUTION INTRAVENOUS at 02:12

## 2019-01-01 RX ADMIN — DEXTROSE 0.02 MCG/KG/MIN: 5 SOLUTION INTRAVENOUS at 06:11

## 2019-01-01 RX ADMIN — PROPOFOL 50 MCG/KG/MIN: 10 INJECTION, EMULSION INTRAVENOUS at 06:11

## 2019-01-01 RX ADMIN — PIPERACILLIN AND TAZOBACTAM 4.5 G: 4; .5 INJECTION, POWDER, LYOPHILIZED, FOR SOLUTION INTRAVENOUS; PARENTERAL at 10:10

## 2019-01-01 RX ADMIN — MORPHINE SULFATE 2 MG: 2 INJECTION, SOLUTION INTRAMUSCULAR; INTRAVENOUS at 02:10

## 2019-01-01 RX ADMIN — CHOLESTYRAMINE 4 G: 4 POWDER, FOR SUSPENSION ORAL at 09:11

## 2019-01-01 RX ADMIN — LEVETIRACETAM 500 MG: 500 TABLET, FILM COATED ORAL at 10:11

## 2019-01-01 RX ADMIN — VANCOMYCIN HYDROCHLORIDE 2000 MG: 1 INJECTION, POWDER, LYOPHILIZED, FOR SOLUTION INTRAVENOUS at 11:11

## 2019-01-01 RX ADMIN — LACOSAMIDE 200 MG: 50 TABLET, FILM COATED ORAL at 10:12

## 2019-01-01 RX ADMIN — FINASTERIDE 5 MG: 5 TABLET, FILM COATED ORAL at 02:10

## 2019-01-01 RX ADMIN — LEVOTHYROXINE SODIUM ANHYDROUS 100 MCG: 100 INJECTION, POWDER, LYOPHILIZED, FOR SOLUTION INTRAVENOUS at 06:12

## 2019-01-01 RX ADMIN — METRONIDAZOLE 500 MG: 500 INJECTION, SOLUTION INTRAVENOUS at 01:12

## 2019-01-01 RX ADMIN — CHLORHEXIDINE GLUCONATE 15 ML: 1.2 RINSE ORAL at 10:12

## 2019-01-01 RX ADMIN — CARVEDILOL 3.12 MG: 3.12 TABLET, FILM COATED ORAL at 04:12

## 2019-01-01 RX ADMIN — PIPERACILLIN AND TAZOBACTAM 4.5 G: 4; .5 INJECTION, POWDER, LYOPHILIZED, FOR SOLUTION INTRAVENOUS; PARENTERAL at 12:12

## 2019-01-01 RX ADMIN — IPRATROPIUM BROMIDE AND ALBUTEROL SULFATE 3 ML: .5; 2.5 SOLUTION RESPIRATORY (INHALATION) at 11:12

## 2019-01-01 RX ADMIN — DEXTROSE 0.03 MCG/KG/MIN: 5 SOLUTION INTRAVENOUS at 11:11

## 2019-01-01 RX ADMIN — VANCOMYCIN HYDROCHLORIDE 250 MG: KIT at 04:11

## 2019-01-01 RX ADMIN — POTASSIUM & SODIUM PHOSPHATES POWDER PACK 280-160-250 MG 2 PACKET: 280-160-250 PACK at 02:10

## 2019-01-01 RX ADMIN — MUPIROCIN: 20 OINTMENT TOPICAL at 08:11

## 2019-01-01 RX ADMIN — POLYETHYLENE GLYCOL (3350) 17 G: 17 POWDER, FOR SOLUTION ORAL at 10:10

## 2019-01-01 RX ADMIN — SODIUM CHLORIDE 4218 ML: 0.9 INJECTION, SOLUTION INTRAVENOUS at 11:12

## 2019-01-01 RX ADMIN — PROPOFOL 30 MCG/KG/MIN: 10 INJECTION, EMULSION INTRAVENOUS at 05:11

## 2019-01-01 RX ADMIN — CHOLESTYRAMINE 4 G: 4 POWDER, FOR SUSPENSION ORAL at 04:11

## 2019-01-01 RX ADMIN — VANCOMYCIN HYDROCHLORIDE 2000 MG: 1 INJECTION, POWDER, LYOPHILIZED, FOR SOLUTION INTRAVENOUS at 06:10

## 2019-01-01 RX ADMIN — POTASSIUM CHLORIDE 40 MEQ: 20 SOLUTION ORAL at 10:11

## 2019-01-01 RX ADMIN — MORPHINE SULFATE 5 MG: 10 INJECTION INTRAVENOUS at 06:12

## 2019-01-01 RX ADMIN — IPRATROPIUM BROMIDE AND ALBUTEROL SULFATE 3 ML: .5; 3 SOLUTION RESPIRATORY (INHALATION) at 07:12

## 2019-01-01 RX ADMIN — BACLOFEN 20 MG: 10 TABLET ORAL at 09:09

## 2019-01-01 RX ADMIN — FUROSEMIDE 20 MG: 40 SOLUTION ORAL at 08:12

## 2019-01-01 RX ADMIN — LEVETIRACETAM 500 MG: 500 INJECTION, SOLUTION, CONCENTRATE INTRAVENOUS at 10:11

## 2019-01-01 RX ADMIN — DEXMEDETOMIDINE HYDROCHLORIDE 0.2 MCG/KG/HR: 400 INJECTION INTRAVENOUS at 05:09

## 2019-01-01 RX ADMIN — MUPIROCIN: 20 OINTMENT TOPICAL at 09:09

## 2019-01-01 RX ADMIN — DULOXETINE 30 MG: 30 CAPSULE, DELAYED RELEASE ORAL at 10:12

## 2019-01-01 RX ADMIN — ENOXAPARIN SODIUM 40 MG: 100 INJECTION SUBCUTANEOUS at 10:11

## 2019-01-01 RX ADMIN — IPRATROPIUM BROMIDE AND ALBUTEROL SULFATE 3 ML: .5; 3 SOLUTION RESPIRATORY (INHALATION) at 04:11

## 2019-01-01 RX ADMIN — ENOXAPARIN SODIUM 40 MG: 100 INJECTION SUBCUTANEOUS at 10:09

## 2019-01-01 RX ADMIN — GENTAMICIN SULFATE 1 DROP: 3 SOLUTION/ DROPS OPHTHALMIC at 06:12

## 2019-01-01 RX ADMIN — DEXTROSE 0.02 MCG/KG/MIN: 5 SOLUTION INTRAVENOUS at 05:11

## 2019-01-01 RX ADMIN — Medication 0.05 MCG/KG/MIN: at 11:10

## 2019-01-01 RX ADMIN — VANCOMYCIN HYDROCHLORIDE 2000 MG: 1 INJECTION, POWDER, LYOPHILIZED, FOR SOLUTION INTRAVENOUS at 03:10

## 2019-01-01 RX ADMIN — Medication 0.05 MCG/KG/MIN: at 06:10

## 2019-01-01 RX ADMIN — PIPERACILLIN AND TAZOBACTAM 3.38 G: 3; .375 INJECTION, POWDER, LYOPHILIZED, FOR SOLUTION INTRAVENOUS; PARENTERAL at 05:09

## 2019-01-01 RX ADMIN — CEFEPIME 2 G: 2 INJECTION, POWDER, FOR SOLUTION INTRAVENOUS at 09:11

## 2019-01-01 RX ADMIN — CEFEPIME HYDROCHLORIDE 1 G: 1 INJECTION, SOLUTION INTRAVENOUS at 11:10

## 2019-01-01 RX ADMIN — VANCOMYCIN HYDROCHLORIDE 1000 MG: 1 INJECTION, POWDER, LYOPHILIZED, FOR SOLUTION INTRAVENOUS at 02:12

## 2019-01-01 RX ADMIN — METRONIDAZOLE 500 MG: 500 SOLUTION INTRAVENOUS at 12:11

## 2019-01-01 RX ADMIN — TOBRAMYCIN 300 MG: 300 SOLUTION RESPIRATORY (INHALATION) at 10:10

## 2019-01-01 RX ADMIN — COLISTIMETHATE SODIUM 300 MG: 150 INJECTION INTRAMUSCULAR; INTRAVENOUS at 02:11

## 2019-01-01 RX ADMIN — LEVOTHYROXINE SODIUM ANHYDROUS 100 MCG: 100 INJECTION, POWDER, LYOPHILIZED, FOR SOLUTION INTRAVENOUS at 03:09

## 2019-01-01 RX ADMIN — PIPERACILLIN SODIUM AND TAZOBACTAM SODIUM 4.5 G: 4; .5 INJECTION, POWDER, LYOPHILIZED, FOR SOLUTION INTRAVENOUS at 02:10

## 2019-01-01 RX ADMIN — SODIUM CHLORIDE 1000 ML: 0.9 INJECTION, SOLUTION INTRAVENOUS at 03:10

## 2019-01-01 RX ADMIN — MORPHINE SULFATE 2 MG: 2 INJECTION, SOLUTION INTRAMUSCULAR; INTRAVENOUS at 11:10

## 2019-01-01 RX ADMIN — MORPHINE SULFATE 2 MG: 2 INJECTION, SOLUTION INTRAMUSCULAR; INTRAVENOUS at 12:10

## 2019-01-01 RX ADMIN — LORAZEPAM 2 MG: 2 INJECTION INTRAMUSCULAR; INTRAVENOUS at 11:12

## 2019-01-01 RX ADMIN — DULOXETINE 30 MG: 30 CAPSULE, DELAYED RELEASE ORAL at 10:11

## 2019-01-01 RX ADMIN — POTASSIUM PHOSPHATE, MONOBASIC AND POTASSIUM PHOSPHATE, DIBASIC 20 MMOL: 224; 236 INJECTION, SOLUTION, CONCENTRATE INTRAVENOUS at 10:10

## 2019-01-01 RX ADMIN — METRONIDAZOLE 500 MG: 500 INJECTION, SOLUTION INTRAVENOUS at 09:11

## 2019-01-01 RX ADMIN — VANCOMYCIN HYDROCHLORIDE 250 MG: KIT at 02:11

## 2019-01-01 RX ADMIN — MORPHINE SULFATE 5 MG: 10 INJECTION INTRAVENOUS at 02:12

## 2019-01-01 RX ADMIN — BETHANECHOL CHLORIDE 15 MG: 5 TABLET ORAL at 09:09

## 2019-01-01 RX ADMIN — LACOSAMIDE 200 MG: 50 TABLET, FILM COATED ORAL at 06:12

## 2019-01-01 RX ADMIN — Medication 10 ML: at 01:10

## 2019-01-01 RX ADMIN — ENOXAPARIN SODIUM 40 MG: 100 INJECTION SUBCUTANEOUS at 04:11

## 2019-01-01 RX ADMIN — DULOXETINE 30 MG: 30 CAPSULE, DELAYED RELEASE ORAL at 09:11

## 2019-01-01 RX ADMIN — CARVEDILOL 3.12 MG: 3.12 TABLET, FILM COATED ORAL at 09:11

## 2019-01-01 RX ADMIN — SODIUM CHLORIDE 1000 ML: 0.9 INJECTION, SOLUTION INTRAVENOUS at 06:10

## 2019-01-01 RX ADMIN — SODIUM CHLORIDE, SODIUM LACTATE, POTASSIUM CHLORIDE, AND CALCIUM CHLORIDE 1000 ML: .6; .31; .03; .02 INJECTION, SOLUTION INTRAVENOUS at 02:11

## 2019-01-01 RX ADMIN — CHOLESTYRAMINE 4 G: 4 POWDER, FOR SUSPENSION ORAL at 01:10

## 2019-01-01 RX ADMIN — SODIUM CHLORIDE 1000 ML: 0.9 INJECTION, SOLUTION INTRAVENOUS at 12:10

## 2019-01-01 RX ADMIN — PROPOFOL 20 MCG/KG/MIN: 10 INJECTION, EMULSION INTRAVENOUS at 12:11

## 2019-01-01 RX ADMIN — PIPERACILLIN AND TAZOBACTAM 4.5 G: 4; .5 INJECTION, POWDER, LYOPHILIZED, FOR SOLUTION INTRAVENOUS; PARENTERAL at 09:11

## 2019-01-01 RX ADMIN — DEXTROSE 0.02 MCG/KG/MIN: 5 SOLUTION INTRAVENOUS at 09:11

## 2019-01-01 RX ADMIN — SODIUM CHLORIDE 1000 ML: 0.9 INJECTION, SOLUTION INTRAVENOUS at 04:11

## 2019-01-01 RX ADMIN — DEXTROSE 0.02 MCG/KG/MIN: 5 SOLUTION INTRAVENOUS at 03:11

## 2019-01-01 RX ADMIN — FINASTERIDE 5 MG: 5 TABLET, FILM COATED ORAL at 09:11

## 2019-01-01 RX ADMIN — FUROSEMIDE 10 MG: 10 INJECTION, SOLUTION INTRAVENOUS at 08:10

## 2019-01-01 RX ADMIN — DEXTROSE 0.02 MCG/KG/MIN: 5 SOLUTION INTRAVENOUS at 07:12

## 2019-01-01 RX ADMIN — PIPERACILLIN AND TAZOBACTAM 4.5 G: 4; .5 INJECTION, POWDER, LYOPHILIZED, FOR SOLUTION INTRAVENOUS; PARENTERAL at 01:12

## 2019-01-01 RX ADMIN — LEVOTHYROXINE SODIUM ANHYDROUS 100 MCG: 100 INJECTION, POWDER, LYOPHILIZED, FOR SOLUTION INTRAVENOUS at 09:11

## 2019-01-01 RX ADMIN — VANCOMYCIN HYDROCHLORIDE 125 MG: KIT at 07:12

## 2019-01-01 RX ADMIN — SODIUM CHLORIDE: 0.9 INJECTION, SOLUTION INTRAVENOUS at 08:11

## 2019-01-01 RX ADMIN — HUMAN INSULIN 10 UNITS: 100 INJECTION, SOLUTION SUBCUTANEOUS at 04:11

## 2019-01-01 RX ADMIN — METHYLPREDNISOLONE SODIUM SUCCINATE 125 MG: 125 INJECTION, POWDER, FOR SOLUTION INTRAMUSCULAR; INTRAVENOUS at 02:09

## 2019-01-01 RX ADMIN — SCOPALAMINE 1 PATCH: 1 PATCH, EXTENDED RELEASE TRANSDERMAL at 01:10

## 2019-01-01 RX ADMIN — LEVETIRACETAM 500 MG: 500 INJECTION, SOLUTION, CONCENTRATE INTRAVENOUS at 08:11

## 2019-01-01 RX ADMIN — DEXTROSE 0.15 MCG/KG/MIN: 5 SOLUTION INTRAVENOUS at 09:11

## 2019-01-01 RX ADMIN — TIGECYCLINE 100 MG: 50 INJECTION, POWDER, LYOPHILIZED, FOR SOLUTION INTRAVENOUS; PARENTERAL at 11:11

## 2019-01-01 RX ADMIN — VANCOMYCIN HYDROCHLORIDE 2000 MG: 1 INJECTION, POWDER, LYOPHILIZED, FOR SOLUTION INTRAVENOUS at 05:09

## 2019-01-01 RX ADMIN — FINASTERIDE 5 MG: 5 TABLET, FILM COATED ORAL at 08:12

## 2019-01-01 RX ADMIN — POTASSIUM PHOSPHATE, MONOBASIC AND POTASSIUM PHOSPHATE, DIBASIC 30 MMOL: 224; 236 INJECTION, SOLUTION, CONCENTRATE INTRAVENOUS at 10:10

## 2019-01-01 RX ADMIN — POTASSIUM CHLORIDE 40 MEQ: 14.9 INJECTION, SOLUTION INTRAVENOUS at 09:12

## 2019-01-01 RX ADMIN — VANCOMYCIN HYDROCHLORIDE 2000 MG: 1 INJECTION, POWDER, LYOPHILIZED, FOR SOLUTION INTRAVENOUS at 12:11

## 2019-01-01 RX ADMIN — SODIUM PHOSPHATE, MONOBASIC, MONOHYDRATE 15 MMOL: 276; 142 INJECTION, SOLUTION INTRAVENOUS at 10:10

## 2019-01-01 RX ADMIN — IOHEXOL 100 ML: 350 INJECTION, SOLUTION INTRAVENOUS at 06:09

## 2019-01-01 RX ADMIN — VANCOMYCIN HYDROCHLORIDE 125 MG: KIT at 04:12

## 2019-01-01 RX ADMIN — DEXTROSE 0.02 MCG/KG/MIN: 5 SOLUTION INTRAVENOUS at 12:11

## 2019-01-01 RX ADMIN — SODIUM CHLORIDE 1000 ML: 0.9 INJECTION, SOLUTION INTRAVENOUS at 02:10

## 2019-01-01 RX ADMIN — VANCOMYCIN HYDROCHLORIDE 2000 MG: 1 INJECTION, POWDER, LYOPHILIZED, FOR SOLUTION INTRAVENOUS at 07:11

## 2019-01-01 RX ADMIN — DEXTROSE 0.02 MCG/KG/MIN: 5 SOLUTION INTRAVENOUS at 10:11

## 2019-01-01 RX ADMIN — LEVOTHYROXINE SODIUM 175 MCG: 25 TABLET ORAL at 07:09

## 2019-01-01 RX ADMIN — VANCOMYCIN HYDROCHLORIDE 2000 MG: 1 INJECTION, POWDER, LYOPHILIZED, FOR SOLUTION INTRAVENOUS at 05:10

## 2019-01-01 RX ADMIN — ENOXAPARIN SODIUM 40 MG: 100 INJECTION SUBCUTANEOUS at 10:12

## 2019-01-01 RX ADMIN — BUSPIRONE HYDROCHLORIDE 5 MG: 5 TABLET ORAL at 10:11

## 2019-01-01 RX ADMIN — COLISTIMETHATE SODIUM 100 MG: 150 INJECTION INTRAMUSCULAR; INTRAVENOUS at 02:11

## 2019-09-12 PROBLEM — I50.1 CARDIOGENIC PULMONARY EDEMA: Status: ACTIVE | Noted: 2019-01-01

## 2019-09-12 PROBLEM — D64.9 ANEMIA: Status: ACTIVE | Noted: 2019-01-01

## 2019-09-12 PROBLEM — I50.9 CHF (CONGESTIVE HEART FAILURE): Status: ACTIVE | Noted: 2019-01-01

## 2019-09-12 PROBLEM — Z99.11 VENTILATOR DEPENDENCE: Status: ACTIVE | Noted: 2019-01-01

## 2019-09-12 PROBLEM — I25.10 CORONARY ARTERY DISEASE: Status: ACTIVE | Noted: 2019-01-01

## 2019-09-12 NOTE — ED NOTES
Bed: 01A Trauma  Expected date:   Expected time:   Means of arrival:   Comments:  Jennifer Decreased LOC

## 2019-09-12 NOTE — ED PROVIDER NOTES
Encounter Date: 9/12/2019       History     Chief Complaint   Patient presents with    Altered Mental Status     Patient with a history of stroke and is chronically vent dependent.  Reportedly patient became confused and hypoxic.  Trach balloon was not functioning.  Trach tube replaced.  Patient reportedly hypoxic at that time and very confused.  Patient does have recent history of Pseudomonas infection and admission to the hospital.  No reported fever.  All history is obtained from old records and nursing staff.  Patient unable to give reliable history.        Review of patient's allergies indicates:   Allergen Reactions    Codeine Other (See Comments)     Past Medical History:   Diagnosis Date    AAA (abdominal aortic aneurysm)     Anemia     CHF (congestive heart failure)     COPD (chronic obstructive pulmonary disease)     Coronary artery disease     Dementia     Dementia     Depression     GERD (gastroesophageal reflux disease)     Hyperlipidemia     Hypertension     Hypothyroid     Renal disorder     Respiratory failure, chronic     Ventilator dependence      Past Surgical History:   Procedure Laterality Date    ABDOMINAL SURGERY      CARDIAC SURGERY  1999    CYSTOSCOPY N/A 1/30/2014    [facility]    CYSTOSCOPY N/A 1/25/2014    [facility]    CYSTOSCOPY, WITH BLADDER WASHINGS IF INDICATED  1/30/2014    [facility] at [facility] OR    GASTROSTOMY TUBE PLACEMENT      IRRIGATION, BLADDER N/A 1/25/2014    [facility]    SPINE SURGERY      TRACHEOSTOMY TUBE PLACEMENT       No family history on file.  Social History     Tobacco Use    Smoking status: Former Smoker    Smokeless tobacco: Never Used   Substance Use Topics    Alcohol use: No    Drug use: No     Review of Systems   Unable to perform ROS: Intubated       Physical Exam     Initial Vitals   BP Pulse Resp  Temp SpO2   09/12/19 1415 09/12/19 1401 09/12/19 1401 09/12/19 1452 09/12/19 1401   (!) 110/50 (!) 57 16 97.7 °F (36.5 °C) 100 %      MAP       --                Physical Exam    Nursing note and vitals reviewed.  Constitutional: He is not diaphoretic. No distress.   HENT:   Head: Normocephalic and atraumatic.   Eyes: Conjunctivae and EOM are normal. Pupils are equal, round, and reactive to light.   Eyes open and patient is able to track in the room.   Neck: Normal range of motion.   Trach in place.  No surrounding erythema to trach site.  No stridor.   Cardiovascular: Regular rhythm.   Pulmonary/Chest: Breath sounds normal.   Patient on ventilator with diffuse rhonchi.  Right greater than left.  Poor air movement on right side.   Abdominal: Soft. There is no tenderness.   Musculoskeletal: Normal range of motion.   Trace bilateral lower extremity edema.  All extremities are neurovascular intact.   Neurological:   Patient moves is right arm and leg.  Patient holds his left arm and leg extended.  No seizure-like activity.  Patient does have left-sided hemiparesis.   Skin: No rash noted.   Stage II sacral decubiti I with no surrounding erythema   Psychiatric: He has a normal mood and affect.   Patient is nonverbal         ED Course   Critical Care  Date/Time: 9/12/2019 4:23 PM  Performed by: Sathish Garces MD  Authorized by: Sathish Garces MD   Direct patient critical care time: 20 minutes  Additional history critical care time: 5 minutes  Ordering / reviewing critical care time: 5 minutes  Documentation critical care time: 5 minutes  Total critical care time (exclusive of procedural time) : 35 minutes        Labs Reviewed   CBC W/ AUTO DIFFERENTIAL - Abnormal; Notable for the following components:       Result Value    RBC 3.49 (*)     Hemoglobin 10.9 (*)     Hematocrit 36.9 (*)     Mean Corpuscular Volume 106 (*)     Mean Corpuscular Hemoglobin 31.2 (*)     Mean Corpuscular Hemoglobin Conc 29.5 (*)      Platelets 81 (*)     Immature Granulocytes 0.6 (*)     Immature Grans (Abs) 0.05 (*)     Lymph # 0.6 (*)     Gran% 79.0 (*)     Lymph% 7.3 (*)     All other components within normal limits   COMPREHENSIVE METABOLIC PANEL - Abnormal; Notable for the following components:    Potassium 5.5 (*)     Chloride 82 (*)     CO2 52 (*)     Glucose 162 (*)     BUN, Bld 40 (*)     Albumin 3.3 (*)     Anion Gap 2 (*)     All other components within normal limits    Narrative:     CO2   critical result(s) called and verbal readback obtained from   Brigid Sarah RN/ED, 09/12/2019 15:12   CULTURE, BLOOD   CULTURE, BLOOD   CK-MB   CK   MAGNESIUM   TROPONIN I   LACTIC ACID, PLASMA   URINALYSIS, REFLEX TO URINE CULTURE   POCT GLUCOSE MONITORING CONTINUOUS        ECG Results          EKG 12-lead (In process)  Result time 09/12/19 14:19:06    In process by Interface, Lab In [facility] (09/12/19 14:19:06)                 Narrative:    Test Reason : R07.9,    Vent. Rate : 066 BPM     Atrial Rate : 066 BPM     P-R Int : 156 ms          QRS Dur : 112 ms      QT Int : 440 ms       P-R-T Axes : 051 067 088 degrees     QTc Int : 461 ms    Normal sinus rhythm with sinus arrhythmia  Normal ECG  When compared with ECG of 12-SEP-2019 14:05,  OH interval has decreased    Referred By:             Confirmed By:                             Imaging Results          X-Ray Chest AP Portable (Final result)  Result time 09/12/19 14:34:45    Final result by Sebastian Zuniga MD (09/12/19 14:34:45)                 Impression:      New central pulmonary vascular prominence and perihilar interstitial opacities suggesting interstitial edema either related to heart failure or hypervolemia.  This could be in part accentuated by technique, related to portable chest and low lung volumes.      Electronically signed by: Sebastian Zuniga MD  Date:    09/12/2019  Time:    14:34             Narrative:    EXAMINATION:  XR CHEST AP PORTABLE    CLINICAL HISTORY:  Chest  Pain;    FINDINGS:  Portable chest at 1415 compared with 10/09/2017 shows persistent tracheostomy.  Prior right PICC is been removed.  Cardiomediastinal silhouette remains unchanged with prominence of proximal descending thoracic aorta and postsurgical changes of CABG remaining unchanged.  Cardiac silhouette size is normal.    Central pulmonary vascular prominence is new.  Lung volumes are low, and perihilar interstitial opacities occur bilaterally.  No pleural effusion or pneumothorax.  Cervical spine surgical hardware again noted.  No acute osseous abnormality.  Right rotator cuff deficiency noted.                                 Medical Decision Making:   History:   Old Medical Records: I decided to obtain old medical records.  Clinical Tests:   Lab Tests: Reviewed  Radiological Study: Reviewed  Medical Tests: Reviewed  ED Management:  Patient presents with altered mental status with hypercapnia.  Patient does have evidence of sepsis with elevated lactate level urinary tract infection on catheterized specimen.  Will cover with broad-spectrum antibiotics.  Seemingly patient likely had hypoxia and hypercapnia secondary to dislodged trach or malfunction balloon.  PCO2 is improving.  Patient's baseline pCO2 is likely very elevated given pCO2 remains in 80s with a pH of 7.34.  Patient appears to be in somewhat of fluid overload.  Will add BNP.  Currently patient does not seem buying depleted with stable blood pressure.  Will not give fluid bolus given likely fluid overload state already.  Will wait for viewing PE and defer for IV fluid resuscitation to admitting physician.  Patient reexamined with better air movement.  Peak pressures on the ventilator have his improved is well.  Patient did become agitated and needed Ativan IV for sedation.                      Clinical Impression:       ICD-10-CM ICD-9-CM   1. Acute on chronic respiratory failure with hypercapnia J96.22 518.84   2. Chest pain R07.9 786.50   3.  Urinary tract infection without hematuria, site unspecified N39.0 599.0                                Sathish Garces MD  09/12/19 1624       Sathish Garces MD  09/12/19 1640

## 2019-09-12 NOTE — SUBJECTIVE & OBJECTIVE
Past Medical History:   Diagnosis Date    AAA (abdominal aortic aneurysm)     Anemia     CHF (congestive heart failure)     COPD (chronic obstructive pulmonary disease)     Coronary artery disease     Dementia     Dementia     Depression     GERD (gastroesophageal reflux disease)     Hyperlipidemia     Hypertension     Hypothyroid     Renal disorder     Respiratory failure, chronic     Ventilator dependence        Past Surgical History:   Procedure Laterality Date    ABDOMINAL SURGERY      CARDIAC SURGERY  1999    CYSTOSCOPY N/A 1/30/2014    [facility]    CYSTOSCOPY N/A 1/25/2014    [facility]    CYSTOSCOPY, WITH BLADDER WASHINGS IF INDICATED  1/30/2014    [facility]    GASTROSTOMY TUBE PLACEMENT      IRRIGATION, BLADDER N/A 1/25/2014    [facility]    SPINE SURGERY      TRACHEOSTOMY TUBE PLACEMENT         Review of patient's allergies indicates:   Allergen Reactions    Codeine Other (See Comments)       No current facility-administered medications on file prior to encounter.      Current Outpatient Medications on File Prior to Encounter   Medication Sig    ascorbic acid, vitamin C, (VITAMIN C) 500 mg/5 mL Syrp syrup 500 mg by PEG Tube route 2 (two) times daily.     baclofen (LIORESAL) 20 MG tablet 25 mg by PEG Tube route every 6 (six) hours.     bethanechol (URECHOLINE) 10 MG Tab Take 15 mg by mouth 3 (three) times daily.     busPIRone (BUSPAR) 5 MG Tab 5 mg by Per G Tube route 2 (two) times daily.    cholestyramine-aspartame (QUESTRAN LIGHT) 4 gram PwPk 1 packet (4 g total) by Per G Tube route 2 (two) times daily. Discontinue if no bowel movement in a day    duloxetine (CYMBALTA) 30 MG capsule 30 mg by PEG Tube route once daily.     enoxaparin (LOVENOX) 40 mg/0.4 mL Syrg Inject 40 mg into the skin once daily.    famotidine (PEPCID) 20 MG  tablet 20 mg by Per G Tube route 2 (two) times daily.    ferrous sulfate 220 mg (44 mg iron)/5 mL solution 220 mg by Per G Tube route once daily.    finasteride (PROSCAR) 5 mg tablet 5 mg by PEG Tube route once daily.     gabapentin (NEURONTIN) 300 MG capsule 300 mg by Per G Tube route 3 (three) times daily.    hydrocodone-acetaminophen 10-325mg (NORCO)  mg Tab 1 tablet by Per G Tube route every 6 (six) hours as needed for Pain.    lactulose (CHRONULAC) 10 gram/15 mL solution 20 g by Per G Tube route 4 (four) times daily.    lidocaine (XYLOCAINE) 5 % Oint ointment Apply 1 g topically as needed (with each trach change).    menthol-zinc oxide (RISAMINE) 0.44-20.6 % Oint Apply 1 application topically once daily. AFTER EACH DIAPER CHANGE    multivit-min-ferrous gluconate (CERTAVITE-ANTIOXID, IRON GLUC,) 9 mg iron/ 15 mL (15 mL) Liqd Take 15 mLs by mouth once daily.    polyethylene glycol (GLYCOLAX) 17 gram PwPk Take 17 g by mouth every evening.    rivastigmine (EXELON) 9.5 mg/24 hr PT24 Place 1 patch onto the skin once daily.    selenium sulfide 2.5 % Lotn Apply 1 application topically twice a week. ON Tuesday AND SATURDAY    terazosin (HYTRIN) 1 MG capsule 1 mg by PEG Tube route once daily.    traZODone (DESYREL) 100 MG tablet Take 100 mg by mouth every evening.    acetaminophen (TYLENOL) 160 mg/5 mL Elix 650 mg by Per G Tube route every 4 (four) hours as needed.    albuterol-ipratropium 2.5mg-0.5mg/3mL (DUONEB) 0.5 mg-3 mg(2.5 mg base)/3 mL nebulizer solution Take 3 mLs by nebulization every 6 (six) hours as needed for Wheezing or Shortness of Breath.     hydrocortisone 2.5 % cream Apply 1 application topically 2 (two) times daily as needed.    ibuprofen (ADVIL,MOTRIN) 600 MG tablet 600 mg by Per G Tube route every 6 (six) hours as needed for Pain.    Lactoperoxi/Gluc Oxid/Pot Thio (BIOTENE DRY MOUTH MM) Swish and spit 15 mLs 4 (four) times daily. AND/OR PRN    nitroGLYCERIN (NITROSTAT) 0.4 MG  SL tablet Place 0.4 mg under the tongue every 5 (five) minutes as needed for Chest pain.    [DISCONTINUED] acetaminophen (TYLENOL) 650 mg/20.3 mL Soln 20.3 mLs (650 mg total) by Per G Tube route every 4 (four) hours as needed.    [DISCONTINUED] aspirin 325 MG tablet 325 mg by PEG Tube route once daily.     [DISCONTINUED] bacitracin 500 unit/gram Oint Apply topically 2 (two) times daily. Apply to PEG site with daily dressing change    [DISCONTINUED] D3/E/SE/SOY ISOFL/TOCOPH/LYCOP (PROSTATE 2.4 ORAL) Take 30 mLs by mouth 4 (four) times daily.    [DISCONTINUED] famotidine (PEPCID) 20 MG tablet 1 tablet (20 mg total) by Per G Tube route 2 (two) times daily.    [DISCONTINUED] ferrous sulfate (FEROSUL) 220 mg (44 mg iron)/5 mL Elix Take 5 mLs by mouth once daily.    [DISCONTINUED] hydrocortisone 2.5 % cream Apply topically 3 (three) times daily.    [DISCONTINUED] levothyroxine (SYNTHROID) 100 MCG tablet Take 1 tablet (100 mcg total) by mouth before breakfast.    [DISCONTINUED] liothyronine (CYTOMEL) 25 MCG Tab 75 mcg by PEG Tube route once daily.     [DISCONTINUED] melatonin 3 mg Tab 9 mg by PEG Tube route every evening.     [DISCONTINUED] multivit-mins-ferrous gluconat 9 mg iron/15 mL Liqd Take 15 mLs by mouth once daily.    [DISCONTINUED] potassium chloride (KLOR-CON) 20 mEq Pack 20 mEq by PEG Tube route 2 (two) times daily.     [DISCONTINUED] Saccharomyces boulardii (FLORASTOR) 250 mg capsule 250 mg by Per G Tube route 2 (two) times daily.    [DISCONTINUED] selenium sulfide 2.5 % Lotn Apply 5 mLs topically twice a week.    [DISCONTINUED] vancomycin 250mg / 10ml Susp Take 250 mg QID via PEG for 3 days then  Take 250 mg TID via PEG for 7 days then  Take 250 mg BID via PEG for 7 days then  Take 250 mg daily via PEG for 7 days then STOP.     Family History     None        Tobacco Use    Smoking status: Former Smoker    Smokeless tobacco: Never Used   Substance and Sexual Activity    Alcohol use: No     Drug use: No    Sexual activity: Never     Review of Systems   Unable to perform ROS: Intubated     Objective:     Vital Signs (Most Recent):  Temp: 97.7 °F (36.5 °C) (09/12/19 1452)  Pulse: 65 (09/12/19 1552)  Resp: 16 (09/12/19 1433)  BP: (!) 110/50 (09/12/19 1415)  SpO2: (!) 80 % (09/12/19 1552) Vital Signs (24h Range):  Temp:  [97.7 °F (36.5 °C)] 97.7 °F (36.5 °C)  Pulse:  [57-70] 65  Resp:  [16-20] 16  SpO2:  [80 %-100 %] 80 %  BP: (110)/(50) 110/50     Weight: (!) 142.1 kg (313 lb 4.4 oz)  Body mass index is 42.49 kg/m².    Physical Exam   Constitutional: He appears well-developed and well-nourished.   Intubated, he just received Ativan   HENT:   Head: Atraumatic.   Mouth/Throat: Oropharynx is clear and moist.   Tracheostomy tube in place   Neck: Neck supple. No JVD present.   Tracheostomy   Cardiovascular: Normal rate, regular rhythm and normal heart sounds. Exam reveals no gallop.   No murmur heard.  Pulmonary/Chest: Breath sounds normal. No respiratory distress. He has no wheezes.   Tracheostomy, on ventilator, bilateral basal crackles   Abdominal: Bowel sounds are normal. He exhibits no distension. There is no rebound and no guarding.   Very rigid abdomen   Musculoskeletal: He exhibits no edema.   Spastic left upper extremity, quadriplegic   Lymphadenopathy:     He has no cervical adenopathy.   Neurological: No cranial nerve deficit.   According to ED physician upon arrival patient was moving his right upper and lower extremity however upon my exam he is quadriplegic.  Pupils are bilaterally equal and reactive, left upper extremity is spastic   Skin: Skin is warm and dry. Capillary refill takes less than 2 seconds. No rash noted.   Stage II decubitus sacral ulcer   Nursing note and vitals reviewed.          Significant Labs: All pertinent labs within the past 24 hours have been reviewed.    Significant Imaging: I have reviewed all pertinent imaging results/findings within the past 24 hours.

## 2019-09-12 NOTE — ASSESSMENT & PLAN NOTE
-there was a trach malfunction at [place]-tube was replaced  -further management as per Pulmonary

## 2019-09-12 NOTE — ASSESSMENT & PLAN NOTE
-acute hypoxic hypercapnic respiratory failure  -tracheostomy was changed, current his ABGs improved  -consult pulmonology for vent management

## 2019-09-12 NOTE — ED TRIAGE NOTES
"Vent dependent [facility] resident who was found by respiratory therapist there with decreased LOC an O2 sats in the "Low 70's"  "

## 2019-09-12 NOTE — ASSESSMENT & PLAN NOTE
-UA positive, history of Pseudomonas UTI  -urine culture  -patient already has mild volume overload and is not a candidate for aggressive IV hydration  -130 cc/hour IV fluid  -repeat lactic acid  -vancomycin and Zosyn

## 2019-09-13 PROBLEM — D69.6 THROMBOCYTOPENIA: Status: ACTIVE | Noted: 2019-01-01

## 2019-09-13 PROBLEM — I10 ESSENTIAL HYPERTENSION: Status: ACTIVE | Noted: 2019-01-01

## 2019-09-13 PROBLEM — I50.33 ACUTE ON CHRONIC DIASTOLIC HF (HEART FAILURE): Status: ACTIVE | Noted: 2019-01-01

## 2019-09-13 PROBLEM — I50.30 (HFPEF) HEART FAILURE WITH PRESERVED EJECTION FRACTION: Status: ACTIVE | Noted: 2019-01-01

## 2019-09-13 PROBLEM — N20.0 BILATERAL NEPHROLITHIASIS: Status: ACTIVE | Noted: 2019-01-01

## 2019-09-13 PROBLEM — I71.40 ENLARGING ABDOMINAL AORTIC ANEURYSM (AAA): Status: ACTIVE | Noted: 2019-01-01

## 2019-09-13 PROBLEM — I71.40 AAA (ABDOMINAL AORTIC ANEURYSM) WITHOUT RUPTURE: Status: ACTIVE | Noted: 2019-01-01

## 2019-09-13 PROBLEM — N30.00 ACUTE CYSTITIS WITHOUT HEMATURIA: Status: ACTIVE | Noted: 2019-01-01

## 2019-09-13 PROBLEM — N17.9 AKI (ACUTE KIDNEY INJURY): Status: ACTIVE | Noted: 2019-01-01

## 2019-09-13 PROBLEM — D53.9 MACROCYTIC ANEMIA: Status: ACTIVE | Noted: 2019-01-01

## 2019-09-13 PROBLEM — N13.4 HYDROURETER, LEFT: Status: ACTIVE | Noted: 2019-01-01

## 2019-09-13 PROBLEM — E87.5 HYPERKALEMIA: Status: ACTIVE | Noted: 2019-01-01

## 2019-09-13 PROBLEM — Z86.73 HISTORY OF CVA (CEREBROVASCULAR ACCIDENT) WITHOUT RESIDUAL DEFICITS: Status: ACTIVE | Noted: 2019-01-01

## 2019-09-13 PROBLEM — K80.20 CHOLELITHIASES: Status: ACTIVE | Noted: 2019-01-01

## 2019-09-13 NOTE — CONSULTS
[facility], [facility]  Adult Nutrition  Consult Note    SUMMARY     Recommendations    Recommendation/Intervention: 1. Recommend starting Jevity 1.5 with goal rate 60ml/hr and liquacel BID. Water flushes:30ml every 4 hours. RD will monitor  Goals: TF will be started and advanced to goal rate within 48hrs.   Nutrition Goal Status: new    Reason for Assessment    Reason For Assessment: consult(TF recs)  Diagnosis: pulmonary disease(chest pain)  Relevant Medical History: AAA, HLD, CAD s/p CABG, CHF, stroke, dementia, GERD    Nutrition Risk Screen    Nutrition Risk Screen: tube feeding or parenteral nutrition    Nutrition/Diet History    Typical Food/Fluid Intake: On TF   Spiritual, Cultural Beliefs, Zoroastrianism Practices, Values that Affect Care: no  Food Allergies: NKFA  Factors Affecting Nutritional Intake: on mechanical ventilation    Anthropometrics    Temp: 96.8 °F (36 °C)(axillary)  Height: 6' (182.9 cm)  Height (inches): 72 in  Weight Method: Bed Scale  Weight: (!) 140.5 kg (309 lb 11.9 oz)  Weight (lb): (!) 309.75 lb  Ideal Body Weight (IBW), Male: 178 lb  % Ideal Body Weight, Male (lb): 174.02 lb  BMI (Calculated): 42.1  BMI Grade: greater than 40 - morbid obesity       Lab/Procedures/Meds    Pertinent Labs Reviewed: reviewed  Pertinent Medications Reviewed: reviewed    Lab Results   Component Value Date    CREATININE 1.1 09/13/2019    BUN 43 (H) 09/13/2019     09/13/2019    K 4.8 09/13/2019    CL 84 (L) 09/13/2019    CO2 44 (HH) 09/13/2019     Scheduled Meds:   chlorhexidine  15 mL Mouth/Throat BID    cholestyramine-aspartame  1 packet Per G Tube BID    enoxparin  40 mg Subcutaneous Q12H    ferrous sulfate  30 mg Oral BID    furosemide  40 mg Intravenous BID    [START ON 9/14/2019] levothyroxine  175 mcg Per G Tube Before breakfast    mupirocin   Nasal BID    polyethylene glycol  17 g Oral QHS       Estimated/Assessed Needs    Weight Used For Calorie Calculations: (!) 141 kg (310 lb 13.6  oz)  Energy Calorie Requirements (kcal): 5589-6341 (15-20)  Energy Need Method: Kcal/kg  Protein Requirements: 123-162gm (1.5-2gm/kg IBW)  Weight Used For Protein Calculations: 81 kg (178 lb 9.2 oz)(IBW)     Estimated Fluid Requirement Method: RDA Method  RDA Method (mL): 2115       Nutrition Prescription Ordered    Current Diet Order: NPO    Evaluation of Received Nutrient/Fluid Intake    Energy Calories Required: not meeting needs  Protein Required: not meeting needs    Comments: Pt on TF at NH. he has st II sacral ulcer. Pt also has trach and PEG.   Tolerance: (NPO)  % Intake of Estimated Energy Needs: 0 - 25 %  % Meal Intake: NPO    Nutrition Risk    Level of Risk/Frequency of Follow-up: high        Monitor and Evaluation    Food and Nutrient Intake: enteral nutrition intake  Food and Nutrient Adminstration: enteral and parenteral nutrition administration  Anthropometric Measurements: weight change  Biochemical Data, Medical Tests and Procedures: gastrointestinal profile, glucose/endocrine profile, electrolyte and renal panel  Nutrition-Focused Physical Findings: overall appearance     Nutrition Follow-Up    RD Follow-up?: Yes     Bree Rosen  09/13/2019

## 2019-09-13 NOTE — PLAN OF CARE
Problem: Adult Inpatient Plan of Care  Goal: Plan of Care Review  Plan of care discussed with pt.

## 2019-09-13 NOTE — PROGRESS NOTES
"[facility] Medicine  Progress Note    Patient Name: Masood Messina  MRN: [id number]  Admission Date: 9/12/2019  Attending Physician: Gio Mcgovern MD   Primary Care Provider: Jeramie Lombardi MD  The patient was seen and examined approximately 8:20 a.m. on September 13, 2019    Subjective:     Principal Problem:Sepsis with metabolic encephalopathy    Chief Complaint:   Chief Complaint   Patient presents with    Altered Mental Status        HPI/Hospital course: 79-year-old nursing home resident([place]) with past medical history of chronic respiratory failure status post trach and PEG due to stroke, CAD status post CABG, hypertension, COPD,BPH presented from [facility] due to altered mental status, hypoxia.  There is no family members available and patient is not giving any meaningful history due to his clinical status.  According to ED physician Trach balloon was not functioning at nursing home and Trach tube was replaced.  Patient was also reportedly hypoxic at that time and was very confused.  Patient does have recent history of Pseudomonas infection and admission to the hospital.  No reported fever.  According to ED physician patient was moving his right upper and lower extremities upon arrival however when I interviewed patient received Ativan and is not moving his extremities, left upper extremity appears very spastic.  Patient also has stage II sacral decubitus ulcer.  The patient was admitted to the hospital for workup and treatment including sepsis protocol with gentle IV fluids and empiric broad-spectrum IV antibiotics.  Pulmonary consultation was obtained.  Overnight the patient received 1 dose of IV Lasix for diuresis by Pulmonary.  Reported baseline mental status is confused but interactive and verbal.  Additional history includes recent treatment with IV Lasix and Cipro x1 week (9/2-9/9) for presumed "pneumonia" at nursing facility.    Interval history:  The " patient cannot provide adequate history secondary to altered mental status/nonverbal status; history obtained by chart review and nursing report.  Overnight the patient has been afebrile.  Blood pressure mildly elevated.  The patient is sedated on Precedex.  Stable respiratory status on ventilator.    Physical exam:  Vital signs personally reviewed.  Afebrile.  Blood pressure 170s systolic.   General:  Nontoxic, morbidly obese, comfortable appearing, sedated  Head and eyes:  Anicteric sclerae, no conjunctival discharge, PERRLA  ENT:  Moist mucous membranes, tracheostomy in place  Pulmonary:  Diminished breath sounds diffusely, no wheezes or rhonchi  Cardiovascular:  2+ radial pulses, regular rate and rhythm, unable to appreciate jugular veins, 1+ peripheral edema  GI:  Abdomen soft, obese but nondistended, peg in place with small pink stoma without drainage or bleeding  Skin:  Dry and warm no jaundice  Neuro:  Sedated, nonverbal, no response to verbal stimuli  :  Rahman catheter in place with light yellow urine    Labs:  Hemoglobin 10, hematocrit 33, platelet 101, potassium 4.8, creatinine 1.1, possible 0.4, procalcitonin 0.41    Chest x-ray personally reviewed:  Bilateral patchy infiltrates consistent with edema, fluid in fissure on right, basilar opacity on right possible pleural effusion versus atelectasis    Chart review:  Echocardiogram Conclusions (10/2017)     1 - Normal left ventricular systolic function (EF 60-65%).     2 - No wall motion abnormalities.     3 - Normal left ventricular diastolic function.     4 - Difficult windows, Optison contrast used for wall motion analysis.     Assessment/Plan:     Assessment:  Acute on chronic hypoxemic respiratory failure secondary to trach malfunction and pulmonary edema  Acute pulmonary edema suspect due to acute on chronic diastolic CHF, possible iatrogenic  Unlikely severe sepsis  Lactic acidosis secondary to severe hypoxemia  Metabolic encephalopathy likely  secondary to severe hypoxemia  Trach malfunction status post tracheostomy replacement  Hyperkalemia  Hypophosphatemia  Chronic functional quadriplegia secondary to prior stroke  Chronic dysphagia status secondary to prior stroke s/p PEG  COPD  CAD  Dementia  Chronic mood disorder/depression  GERD  Hypertension and hypertensive heart disease  Hyperlipidemia  Hypothyroidism  Chronic anemia likely inflammatory  Thrombocytopenia   AAA (abdominal aortic aneurysm)    Sacral pressure ulcer stage II POA     Plan:  Continue ICU care.  This patient is critically ill.  Appears clinically volume overloaded and blood pressure elevated.  Chest x-ray with worsening pulmonary edema. Start IV Lasix 20 mg Q 12.  Monitor urine output and volume status.    IV Vasotec as needed for blood pressure control.  Consider nitrates.  Check echocardiogram.  Serial BNP and chest x-ray.    Discontinue empiric broad-spectrum IV antibiotics.  Negative procalcitonin.  Cultures unremarkable to date, will follow.  Urinalysis unremarkable.  Status post recent week long course of Cipro.  Doubt infectious source.  Continue ventilatory support.  Continue bronchodilators.  Tracheostomy care.  Appreciate respiratory therapy and pulmonary.  Resume tube feeds.  Nutrition consultation for tube feed recommendations as it appears the patient's tube feeding at nursing home is not on formulary at our facility.   Wound care  Continue home medicines for chronic issues as able.  Resume terazosin, levothyroxine, and Exelon patch.  Daily labs.  Check TSH, lipids, and BNP.   GI prophylaxis with Pepcid  VTE prophylaxis with Lovenox  I discussed case extensively with nursing.  Nursing home records personally reviewed and detailed above.   This patient has a high risk of life-threatening deterioration due to multiorgan dysfunction and poor chronic baseline status with severe exacerbation of chronic illness.  I spent 50 min of critical care time in management of  patient      VTE Risk Mitigation (From admission, onward)        Ordered     enoxaparin injection 40 mg  Every 12 hours      09/12/19 1723     IP VTE HIGH RISK PATIENT  Once      09/12/19 1723          Gio Mcgovern MD  Department of Hospital Medicine   [facility]

## 2019-09-13 NOTE — HPI
Mr. Messina is an 79 year old gentleman with a history of CAD, s/p CABG, HF with unclear EF, COPD, chronic respiratory failure and ventilator dependence, CVA and dementia, who was found to be encephalopathic this afternoon by personal at the [facility] where he is a resident.  He was transferred to the [facility] emergency department for further evaluation.  He is currently unable to provide any significant history of his present illness.

## 2019-09-13 NOTE — PROGRESS NOTES
Therapy with Vancomycin complete and / or consult / order discontinued by Dr. Mcgovern on 9/13/19 @ 09:00   Pharmacy will sign off, please re-consult as needed.  Thank you for allowing us to participate in this patient's care.  Antonia Chung 9/13/2019 9:29 AM  Dept of Pharmacy  Ext [phone]

## 2019-09-13 NOTE — PLAN OF CARE
Problem: Feeding Intolerance (Enteral Nutrition)  Goal: Feeding Tolerance  Outcome: Ongoing (interventions implemented as appropriate)  Start Jevity 1.5 at 20ml/hr and advance 20ml every 4 hours until goal rate 60ml is achieved. RD will also order liquacel BID to meet protein needs. Water flushes: 30ml every 4 hours. RD will continue to monitor.

## 2019-09-13 NOTE — PROGRESS NOTES
Reason for Call: WOUND CARE ORDERS    Symptoms: N/A    Onset (when it began):N/A    Have they tried anything?N/A    Other pertinent info:    CARETENDERS NEEDS A VERBAL ABOUT HIM HAVING A PRESSURE ULCER ON THE LEFT POSTERIOR UPPER THIGH. HE NEEDS HOME DRESSING TO BE APPLIED ONCE A WEEK AND AS NEEDED.          ABG NOT CROSSING OVER  410 / 14 / +8 / .50  7.46 / 71 / 83 / BE 27 / 51 / 96

## 2019-09-13 NOTE — PROGRESS NOTES
VANCOMYCIN PHARMACOKINETIC NOTE:  Vancomycin Day # 1    Objective/Assessment:    Diagnosis/Indication for Vancomycin: Sepsis    79 y.o., male; Actual Body Weight = (!) 142.1 kg (313 lb 4.4 oz).    The patient has the following labs:     9/12/2019 Estimated Creatinine Clearance: 78.3 mL/min (based on SCr of 1.1 mg/dL). Lab Results   Component Value Date    BUN 40 (H) 09/12/2019       Lab Results   Component Value Date    WBC 8.00 09/12/2019            Plan:  Adjust vancomycin dose and/or frequency based on the patient's actual weight and renal function:  Initiate Vancomycin 2000 mg IV every 18 hours.  Orders have been entered into patient's chart.    Vancomycin dose = 14 mg/kg actual body weight    Vancomycin trough level has been ordered for 9/14/19 @22:00 prior to 4th dose.    Pharmacy will manage vancomycin therapy, monitor serum vancomycin levels, monitor renal function and adjust regimen as necessary.        Thank you for allowing us to participate in this patient's care.     Mohinder Lopez 9/12/2019 7:32 PM  Department of Pharmacy  Ext [phone]

## 2019-09-13 NOTE — CONSULTS
[facility]  Pulmonary / Critical Care Medicine  Consult Note    Primary Attending:  Gio Mcgovern MD   Primary Service: Hospital Medicine     Reason for Consult  Chronic respiratory failure; ventilator dependence     History of Present Illness:  Mr. Masood Messina is an 79-year-old gentleman with past medical history of a stroke trauma dementia, functional quadriparesis, chronic hypoxemic and hypercapnic respiratory failure, morbid obesity, and chronic ventilator dependence.  He was transferred from a nearby LTAC to our emergency department yesterday evening for evaluation of altered mental status.  The exact details surrounding the events leading up to his presentation are not particularly clear, there is little documentation available from the [facility].  Reportedly his tracheostomy cannula was being changed yesterday, but some difficulty was encountered in recannulated in the stoma.  Shortly thereafter he was noted to be somnolent and difficult to arouse which is not his reported baseline.  He arrived in the emergency department he was noted to be agitated and found to have an acute on chronic hypercapnic and hypoxemic respiratory failure.  Hospital Medicine was consulted and admitted him to the intensive care unit under the presumed diagnosis of severe sepsis secondary to urinary tract infection.  Initially received fluid resuscitation and empiric broad-spectrum antibiotics.  Pulmonology was consulted for assistance in managing his ventilator and chronic hypoxemic respiratory failure.  I initially examined Mr.Dominick Messina last night soon after he arrived in the ICU, and when I arrived and found him in acute respiratory distress with pink frothy fluid leaking out of his tracheostomy cannula and around his stoma.  He was agitated at time but redirectable inappropriate.  IV fluids were discontinued and was given a dose of IV Lasix which resulted and a robust diuresis overnight.  This  morning when I examined the patient he was much more comfortable conversant and his respiratory distress resolved.  He was unable to provide any significant history of his present illness due to is indwelling tracheostomy and baseline cognitive impairment.     Past Medical History:    AAA (abdominal aortic aneurysm)     Anemia     CHF (congestive heart failure)     COPD (chronic obstructive pulmonary disease)     Coronary artery disease     Dementia     Dementia     Depression     GERD (gastroesophageal reflux disease)     Hyperlipidemia     Hypertension     Hypothyroid     Renal disorder     Respiratory failure, chronic     Ventilator dependence      Past Surgical History:    ABDOMINAL SURGERY      CARDIAC SURGERY  1999    CYSTOSCOPY N/A 1/30/2014    [facility]    CYSTOSCOPY N/A 1/25/2014    [facility]formed [facility]    CYSTOSCOPY, WITH BLADDER WASHINGS IF INDICATED  1/30/2014    [facility]    GASTROSTOMY TUBE PLACEMENT      IRRIGATION, BLADDER N/A 1/25/2014    [facility]    SPINE SURGERY      TRACHEOSTOMY TUBE PLACEMENT       Allergies:   Codeine Other (See Comments)     Medications:   Home Medications:     ascorbic acid, vitamin C, (VITAMIN C) 500 mg/5 mL Syrp syrup 500 mg by PEG Tube route 2 (two) times daily.     baclofen (LIORESAL) 20 MG tablet 25 mg by PEG Tube route every 6 (six) hours.     bethanechol (URECHOLINE) 10 MG Tab Take 15 mg by mouth 3 (three) times daily.     busPIRone (BUSPAR) 5 MG Tab 5 mg by Per G Tube route 2 (two) times daily.    cholestyramine-aspartame (QUESTRAN LIGHT) 4 gram PwPk 1 packet (4 g total) by Per G Tube route 2 (two) times daily. Discontinue if no bowel movement in a day    duloxetine (CYMBALTA) 30 MG capsule 30 mg by PEG Tube route once daily.     enoxaparin (LOVENOX) 40 mg/0.4 mL Syrg Inject 40 mg into the skin once  daily.    famotidine (PEPCID) 20 MG tablet 20 mg by Per G Tube route 2 (two) times daily.    ferrous sulfate 220 mg (44 mg iron)/5 mL solution 220 mg by Per G Tube route once daily.    finasteride (PROSCAR) 5 mg tablet 5 mg by PEG Tube route once daily.     gabapentin (NEURONTIN) 300 MG capsule 300 mg by Per G Tube route 3 (three) times daily.    hydrocodone-acetaminophen 10-325mg (NORCO)  mg Tab 1 tablet by Per G Tube route every 6 (six) hours as needed for Pain.    lactulose (CHRONULAC) 10 gram/15 mL solution 20 g by Per G Tube route 4 (four) times daily.    lidocaine (XYLOCAINE) 5 % Oint ointment Apply 1 g topically as needed (with each trach change).    menthol-zinc oxide (RISAMINE) 0.44-20.6 % Oint Apply 1 application topically once daily. AFTER EACH DIAPER CHANGE    multivit-min-ferrous gluconate (CERTAVITE-ANTIOXID, IRON GLUC,) 9 mg iron/ 15 mL (15 mL) Liqd Take 15 mLs by mouth once daily.    polyethylene glycol (GLYCOLAX) 17 gram PwPk Take 17 g by mouth every evening.    rivastigmine (EXELON) 9.5 mg/24 hr PT24 Place 1 patch onto the skin once daily.    selenium sulfide 2.5 % Lotn Apply 1 application topically twice a week. ON Tuesday AND SATURDAY    terazosin (HYTRIN) 1 MG capsule 1 mg by PEG Tube route once daily.    traZODone (DESYREL) 100 MG tablet Take 100 mg by mouth every evening.    acetaminophen (TYLENOL) 160 mg/5 mL Elix 650 mg by Per G Tube route every 4 (four) hours as needed.    albuterol-ipratropium 2.5mg-0.5mg/3mL (DUONEB) 0.5 mg-3 mg(2.5 mg base)/3 mL nebulizer solution Take 3 mLs by nebulization every 6 (six) hours as needed for Wheezing or Shortness of Breath.     hydrocortisone 2.5 % cream Apply 1 application topically 2 (two) times daily as needed.    ibuprofen (ADVIL,MOTRIN) 600 MG tablet 600 mg by Per G Tube route every 6 (six) hours as needed for Pain.    Lactoperoxi/Gluc Oxid/Pot Thio (BIOTENE DRY MOUTH MM) Swish and spit 15 mLs 4 (four) times daily. AND/OR PRN     nitroGLYCERIN (NITROSTAT) 0.4 MG SL tablet Place 0.4 mg under the tongue every 5 (five) minutes as needed for Chest pain.         Current Medications:  Scheduled:   chlorhexidine  15 mL Mouth/Throat BID    cholestyramine-aspartame  1 packet Per G Tube BID    enoxparin  40 mg Subcutaneous Q12H    ferrous sulfate  30 mg Oral BID    furosemide  40 mg Intravenous BID    [START ON 9/14/2019] levothyroxine  175 mcg Per G Tube Before breakfast    mupirocin   Nasal BID    polyethylene glycol  17 g Oral QHS     Continuous Infusions:    PRN:   albuterol-ipratropium, calcium gluconate IVPB, calcium gluconate IVPB, calcium gluconate IVPB, magnesium oxide, magnesium sulfate IVPB, magnesium sulfate IVPB, potassium chloride in water **AND** potassium chloride in water **AND** potassium chloride in water, potassium chloride 10%, potassium, sodium phosphates, sodium chloride 0.9%, sodium phosphate IVPB, sodium phosphate IVPB, sodium phosphate IVPB     Social History:  Tobacco: Former smoker   EtOH: Does not drink   Illicit Drugs: None     Family History:  Unable to obtain    Review of Systems:  Review of Systems   Unable to perform ROS: Mental status change        Vital Signs   Temp:  [96.8 °F (36 °C)-100 °F (37.8 °C)]   Pulse:  [53-77]   Resp:  [13-38]   BP: (139-178)/(61-89)   SpO2:  [80 %-99 %]    Physical Exam   Gen: no distress, morbidly obese, non-toxic; alert and interactive; no increased WOB   HEENT: Normocephalic, without obvious abnormality, atraumaticconjunctivae/corneas clear. PERRL.lips, mucosa, and tongue normal; teeth and gums normal and no throat erythema   Neck: supple, 8.0 cuffed tracheostomy canula secure and patent with audible air leaking around the canula; no surrounding erythema; pink frothy sputum has resolved.  Large neck circumference, unable to appreciate any JVD.   CVS: regular rate and rhythm, S1, S2 normal, no murmur, click, rub or gallop   Chest: unlabored breathing, bibasilar rales and no  wheezing no increased work of breathing and no intercostal retractions   Abdomen: soft, non-tender non-distended; bowel sounds normal and G-tube secure and patent   Ext: 2 + edema present leg   Skin: no rashes, no ecchymoses, no petechiae, no nodules, no purpura, no wounds   Neuro: EFRAIN, cranial nerves 2-12 intact, muscle tone and strength normal and symmetric, reflexes normal and symmetric and sensation grossly normal   Psych: Alert and interactive; calm   Lines: PIV  G-tube  Rahman catheter  8.0 cuffed tracheostomy      Labs I have reviewed and personally interpreted all labs and chest imaging within the past 24 hours as well as relevant prior images and studies..  Recent Labs   Lab 09/13/19 0422 09/13/19 0423 09/13/19  0538   WBC 8.24  --   --    RBC 3.36*  --   --    HGB 10.5*  --   --    HCT 33.6*  --  32*   *  --   --    *  --   --    MCH 31.3*  --   --    MCHC 31.3*  --   --    NA  --  136  --    K  --  4.8  --    CL  --  84*  --    CO2  --  44*  --    BUN  --  43*  --    CREATININE  --  1.1  --    MG  --  1.9  --    ALT  --  28  --    AST  --  33  --    ALKPHOS  --  74  --    BILITOT  --  1.2*  --    PROT  --  7.8  --    ALBUMIN  --  3.1*  --    PH  --   --  7.463*   PCO2  --   --  70.8*   PO2  --   --  83   HCO3  --   --  50.7*   POCSATURATED  --   --  96   BE  --   --  27   TROPONINI  --  0.036  --      Lactate:  1.6  Trop:  0.036  BNP:  182   Imaging CXR 09/13/2019 I personally reviewed the films and findings are:, CHF, pulmonary edema   CT Abdomen 9/12/2019 1. Tiny bilateral pleural effusions with scattered dependent airspace opacities most likely reflecting atelectasis.  2. Heterogeneous density in gallbladder either reflecting cholelithiasis or sludge.  3. Nonobstructing bilateral renal calculi as described.  4. Improvement of left hydronephrosis since 2014, although left hydroureter does persist through level of left UVJ.  This could reflect sequelae of chronic vesicoureteral reflux or  congenital megaureter.  5. Interval enlargement of infrarenal abdominal aortic aneurysm currently measuring 5.2 x 5.4 cm.  6. Unchanged enlarged prostate.  7. Unchanged metallic foreign body in superficial subcutaneous fat of right gluteal soft tissues.   CT Head 9/12/2019 1. No acute intracranial abnormality.  2. Chronic small vessel ischemic changes.      Other Studies: Echo 09/13/2019   Mild left ventricular enlargement.  Low normal left ventricular systolic function. The estimated ejection fraction is 50%  Grade III (severe) left ventricular diastolic dysfunction consistent with restrictive physiology.  Elevated left atrial pressure.  Mechanically ventilated; cannot use inferior caval vein diameter to estimate central venous pressure.  Moderate left atrial enlargement.  Mild mitral regurgitation.  Technically challenging study; contrast enhanced.      Micro Blood Cx 9/12 NGTD   Sputum Cx 9/12 Many GPC  Rare GNR      Assessment/Plan:  1. History of Cva (Cerebrovascular Accident) Without Residual Deficits   2. Functional Quadriplegia   3. Suspected sepsis With Metabolic Encephalopathy   4. Aaa (Abdominal Aortic Aneurysm) Without Rupture   5. Enlarging Abdominal Aortic Aneurysm (Aaa)   6. Essential Hypertension   7. Cardiogenic Pulmonary Edema   8. Coronary Artery Disease   9. Acute On Chronic Diastolic Hf (Heart Failure)   10. Ventilator Dependence   11. Chronic Obstructive Pulmonary Disease   12. Acute On Chronic Respiratory Failure With Hypoxia and Hypercapnia   13. Tracheostomy in Place   14. Chronic Respiratory Failure With Hypoxia and Hypercapnia   15. Hyperkalemia   16. Cholelithiases   17. Peg (Percutaneous Endoscopic Gastrostomy) Status   18. Acute Cystitis Without Hematuria   19. Andrea (Acute Kidney Injury)   20. Bilateral Nephrolithiasis   21. Hydroureter, Left   22. Thrombocytopenia   23. Macrocytic Anemia   24. Sacral Decubitus Ulcer, Stage II   · Acute encephalopathy likely secondary to hypercapnia from  "hypoventilation during tracheostomy exchange.  · Mentation apparently back to baseline this morning now that he is back at his chronic hypercapnic baseline.  · Volume overloaded and in the midst of acute on chronic diastolic heart failure.  · Needs more diuresis.  · No compelling evidence of systemic inflammatory response secondary nor occult infection, admitting diagnosis of "severe sepsis" is unlikely.  Agree with discontinuing empiric antibiotics altogether.  · Marked compensatory metabolic alkalosis secondary to his chronic hypercapnia.    · Slightly alkalemic this morning and a loop diuretic will only exacerbate this further.  · Recommend trial of acetazolamide.  · Placed on lung protective ventilation overnight and transitioned to pressure support ventilation this morning.  Tolerating PSV throughout the morning.   · Continue pressure support ventilation for now and wean PS as tolerated.  · Air leaking around the tracheostomy canula placed yesterday, but despite this ventilation remains adequate.    · Will observe for now considering difficulty re-establishing an airway that lead to his transfer.  · Hold sedating medications for now with his recent encephalopathy.  · Continue H2 blocker for stress ulcer prophylaxis.  · Appears to be back at baseline, and he should be stable to transfer back to the facility where he resides soon.  · Please call Dr. garcia with any questions or concerns over the weekend.    Critical Care Time: >35 minutes  Critical secondary to Patient has a condition that poses threat to life and bodily function: acute on chronic respiratory failure     Critical care was time spent personally by me on the following activities: development of treatment plan with patient or surrogate and bedside caregivers, discussions with consultants, evaluation of patient's response to treatment, examination of patient, ordering and performing treatments and interventions, ordering and review of laboratory " studies, ordering and review of radiographic studies, pulse oximetry, re-evaluation of patient's condition. This critical care time did not overlap with that of any other provider or involve time for any procedures.     Mauricio Kinsey MD  Pulmonary / Critical Care Medicine  [facility]  Cell [phone]

## 2019-09-14 NOTE — PLAN OF CARE
This note also relates to the following rows which could not be included:  Oxygen Concentration (%) - Cannot attach notes to unvalidated device data  SpO2 - Cannot attach notes to unvalidated device data  Pulse - Cannot attach notes to unvalidated device data  Resp - Cannot attach notes to unvalidated device data  Ventilation Type - Cannot attach notes to unvalidated device data  Vent Mode - Cannot attach notes to unvalidated device data  Vt Set - Cannot attach notes to unvalidated device data  PEEP/CPAP - Cannot attach notes to unvalidated device data  Pressure Support - Cannot attach notes to unvalidated device data  Waveform - Cannot attach notes to unvalidated device data  Peak Flow - Cannot attach notes to unvalidated device data  Plateau Set/Insp. Hold (sec) - Cannot attach notes to unvalidated device data  Insp Rise Time  - Cannot attach notes to unvalidated device data  Trigger Sensitivity Flow/I-Trigger - Cannot attach notes to unvalidated device data  Resp Rate Total - Cannot attach notes to unvalidated device data  Peak Airway Pressure - Cannot attach notes to unvalidated device data  Mean Airway Pressure - Cannot attach notes to unvalidated device data  Plateau Pressure - Cannot attach notes to unvalidated device data  Exhaled Vt - Cannot attach notes to unvalidated device data  Total Ve - Cannot attach notes to unvalidated device data  Spont Ve - Cannot attach notes to unvalidated device data  I:E Ratio Measured - Cannot attach notes to unvalidated device data  Resp Rate High Alarm - Cannot attach notes to unvalidated device data  Press High Alarm - Cannot attach notes to unvalidated device data  Apnea Rate - Cannot attach notes to unvalidated device data  Apnea Volume (mL) - Cannot attach notes to unvalidated device data  Apnea Oxygen Concentration  - Cannot attach notes to unvalidated device data  Apnea Flow Rate (L/min) - Cannot attach notes to unvalidated device data  T Apnea - Cannot attach notes  to unvalidated device data       09/14/19 0741   Patient Assessment/Suction   Level of Consciousness (AVPU) alert   Respiratory Effort Unlabored   All Lung Fields Breath Sounds coarse   Suction Method tracheal   $ Suction Charges Inline Suction Procedure Stat Charge   Secretions Amount small   Secretions Color yellow   Secretions Characteristics thick   PRE-TX-O2   O2 Device (Oxygen Therapy) ventilator   $ Is the patient on Low Flow Oxygen? Yes   Pulse Oximetry Type Continuous   $ Pulse Oximetry - Multiple Charge Pulse Oximetry - Multiple   Aerosol Therapy   $ Aerosol Therapy Charges PRN treatment not required   Vent Select   Conventional Vent Y   $ Ventilator Subsequent 1   Charged w/in last 24h YES   Preset Conventional Ventilator Settings   Vent ID [id number]   Vent Type

## 2019-09-14 NOTE — CARE UPDATE
09/13/19 2036   Patient Assessment/Suction   Level of Consciousness (AVPU) alert   Respiratory Effort Unlabored   Expansion/Accessory Muscles/Retractions no use of accessory muscles   All Lung Fields Breath Sounds coarse   Rhythm/Pattern, Respiratory unlabored   Cough Frequency frequent;with stimulation   Cough Type assisted   Suction Method tracheal   $ Suction Charges Inline Suction Procedure Stat Charge   Secretions Amount large   Secretions Color yellow;green   Secretions Characteristics thick   Sputum Collection sample obtained per suctioning   $ Swab or suction? Suction   Aspirate Toleration TIFFANIE (no adverse reactions)   Sent to the lab? Yes   PRE-TX-O2   O2 Device (Oxygen Therapy) ventilator   Oxygen Concentration (%) 40   SpO2 (!) 94 %   Pulse Oximetry Type Continuous   Pulse 66   Resp 18        Surgical Airway Portex Cuffed   No Placement Date or Time found.   Present Prior to Hospital Arrival?: Yes  Type: Tracheostomy  Brand: Portex  Airway Device Size: 7.0  Style: Cuffed   Cuff Inflation? Inflated   Status Secured   Site Assessment Clean;Dry   Ties Assessment Clean;Dry   Vent Select   Conventional Vent Y   $ Ventilator Subsequent 1   Charged w/in last 24h YES   Preset Conventional Ventilator Settings   Vent ID [id number]   Vent Type    Ventilation Type VC   Vent Mode Spont   Vt Set 410 mL   PEEP/CPAP 8 cmH20   Pressure Support 15 cmH20   Waveform RAMP   Peak Flow 76 L/min   Plateau Set/Insp. Hold (sec) 0   Insp Rise Time  50 %   Trigger Sensitivity Flow/I-Trigger 2.7 L/min   Patient Ventilator Parameters   Resp Rate Total 20 br/min   Peak Airway Pressure 24 cmH2O   Mean Airway Pressure 13 cmH20   Plateau Pressure 23 cmH20   Exhaled Vt 423 mL   Total Ve 7.47 mL   Spont Ve 7.47 L   I:E Ratio Measured 1:3.40   Conventional Ventilator Alarms   Alarms On Y   Resp Rate High Alarm 40 br/min   Press High Alarm 50 cmH2O   Apnea Rate 16   Apnea Volume (mL) 1 mL   Apnea Oxygen Concentration  100   Apnea Flow Rate  (L/min) 60   T Apnea 20 sec(s)   Ready to Wean/Extubation Screen   FIO2<=50 (chart decimal) 0.4   MV<16L (chart vol.) 7.47   PEEP <=8 (chart #) 8   Ready to Wean Parameters   F/VT Ratio<105 (RSBI) (!) 42.55

## 2019-09-14 NOTE — PROGRESS NOTES
Progress Note  Pulmonary/Critical Care      Admit Date: 9/12/2019      SUBJECTIVE:      Patient ID: Masood Messina is a 79 y.o. male.    HPI/Interval history (See H&P for complete P,F,SHx) :     The patient is tolerating CPAP on the ventilator.  There are no plans to extubate him as he has to go back to [place] and they will not take him if he is extubated.    ROS      OBJECTIVE:     Vital Signs Range (Last 24H):  Temp:  [96.8 °F (36 °C)-98.5 °F (36.9 °C)]   Pulse:  [53-99]   Resp:  [6-31]   BP: (143-178)/(69-88)   SpO2:  [84 %-100 %]     I & O (Last 24H):    Intake/Output Summary (Last 24 hours) at 9/14/2019 0829  Last data filed at 9/14/2019 0600  Gross per 24 hour   Intake 1250 ml   Output 1415 ml   Net -165 ml        Estimated body mass index is 42.01 kg/m² as calculated from the following:    Height as of this encounter: 6' (1.829 m).    Weight as of this encounter: 140.5 kg (309 lb 11.9 oz).    Physical Exam  GENERAL: Older patient in no distress.  HEENT: Pupils equal and reactive. Extraocular movements intact. Nose intact.      Pharynx moist.  NECK: Supple.  Tracheostomy in place attached to the ventilator.  HEART: Regular rate and rhythm. No murmur or gallop auscultated.  LUNGS: Clear to auscultation and percussion. Lung excursion symmetrical. No change in fremitus. No adventitial noises.  ABDOMEN: Bowel sounds present. Non-tender, no masses palpated.  : Normal anatomy.  Rahman with yellow urine.  EXTREMITIES: Normal muscle tone and joint movement, no cyanosis or clubbing.  There is bilateral footdrop.  The left arm is not moving and the hand is contracted.  LYMPHATICS: No adenopathy palpated, no edema.  SKIN: Dry, intact, no lesions.   NEURO:  Tracks, opens mouth, squeezes on the right side  PSYCH:  Unable to assess  Laboratory/Diagnostic Data:    Vent Settings- Vent Mode: Spont  Oxygen Concentration (%):  [40-50] 40  Resp Rate Total:  [14 br/min-38 br/min] 25 br/min  Vt Set:  [410 mL] 410  mL  PEEP/CPAP:  [8 cmH20] 8 cmH20  Pressure Support:  [15 cmH20] 15 cmH20  Mean Airway Pressure:  [12 oaN13-20 cmH20] 12 cmH20    7.32/88.2/71/50/92    Recent Labs   Lab 09/13/19  0423  09/14/19  0445   WBC  --   --  7.72   HGB  --   --  10.6*   HCT  --    < > 34.2*   PLT  --   --  102*     --  140   K 4.8  --  4.0   CL 84*  --  85*   CO2 44*  --  46*   BUN 43*  --  47*   CREATININE 1.1  --  1.0   AST 33  --  32   ALT 28  --  30   PROT 7.8  --  7.9   ALBUMIN 3.1*  --  3.2*   BILITOT 1.2*  --  1.0   PHOS 1.4*  --   --     < > = values in this interval not displayed.          Microbiology:    Microbiology Results (last 7 days)     Procedure Component Value Units Date/Time    Culture, Respiratory with Gram Stain [[id number]] Collected:  09/13/19 2021    Order Status:  Completed Specimen:  Respiratory from Tracheal Aspirate Updated:  09/14/19 0823     Respiratory Culture Insufficient incubation, culture in progress    Blood culture [[id number]] Collected:  09/12/19 1410    Order Status:  Completed Specimen:  Blood from Peripheral, Upper Arm, Right Updated:  09/13/19 1632     Blood Culture, Routine No Growth to date      No Growth to date    Blood culture [[id number]] Collected:  09/12/19 1440    Order Status:  Completed Specimen:  Blood from Peripheral, Upper Arm, Left Updated:  09/13/19 1632     Blood Culture, Routine No Growth to date      No Growth to date    Urine culture [[id number]]  (Abnormal) Collected:  09/12/19 1534    Order Status:  Completed Specimen:  Urine Updated:  09/13/19 1552     Urine Culture, Routine PRESUMPTIVE PSEUDOMONAS SPECIES  >100,000 cfu/ml  Identification and susceptibility pending      Narrative:       Preferred Collection Type->Urine, Catheterized  Specimen Source->Urine    Culture, Sputum (RT Induced) [[id number]] Collected:  09/12/19 1549    Order Status:  Completed Specimen:  Sputum, Induced Updated:  09/13/19 0958     Respiratory Culture Insufficient incubation, culture in progress      Gram Stain (Respiratory) <10 epithelial cells per low power field.     Gram Stain (Respiratory) Many WBC's     Gram Stain (Respiratory) Many Gram positive rods     Gram Stain (Respiratory) Rare Gram negative rods          Radiology:  Persistent right hemidiaphragm elevation.  Scattered bilateral reticulonodular opacities appear stable.  Slightly improved aeration of right lung base compared to prior.  No pneumothorax.    ASSESSMENT/PLAN:     Active Problems:    1. History of Cva (Cerebrovascular Accident) Without Residual Deficits   2. Functional Quadriplegia   3. Suspected sepsis With Metabolic Encephalopathy   4. Aaa (Abdominal Aortic Aneurysm) Without Rupture   5. Enlarging Abdominal Aortic Aneurysm (Aaa)   6. Essential Hypertension   7. Cardiogenic Pulmonary Edema   8. Coronary Artery Disease   9. Acute On Chronic Diastolic Hf (Heart Failure)   10. Ventilator Dependence   11. Chronic Obstructive Pulmonary Disease   12. Acute On Chronic Respiratory Failure With Hypoxia and Hypercapnia   13. Tracheostomy in Place   14. Chronic Respiratory Failure With Hypoxia and Hypercapnia   15. Hyperkalemia   16. Cholelithiases   17. Peg (Percutaneous Endoscopic Gastrostomy) Status   18. Acute Cystitis Without Hematuria, colonized with Pseudomonas   19. Andrea (Acute Kidney Injury), appears to be just prerenal azotemia   20. Bilateral Nephrolithiasis   21. Hydroureter, Left   22. Thrombocytopenia   23. Macrocytic Anemia   24. Sacral Decubitus Ulcer, Stage II  25. Hypothyroidism, on Synthroid         Patient appears ready to transfer back to [place]  He is comfortable on CPAP of 8, pressure support of 20  Would not treat UTI with no signs of sepsis  Continue enteral nutrition  Consider increasing Synthroid    Critical care time spent reviewing the chart, examining the patient, reviewing the labs, reviewing the radiological findings, discussing care with nursing, physicians, and respiratory and creating the note and  has  "been>35"    "

## 2019-09-15 NOTE — NURSING
Talked with Hilary with [facility] and received authorization # [id number] for ambulance transport to [facility].

## 2019-09-15 NOTE — NURSING
Contacted Bryon with [facility] at [phone] and passed on the Authorization number to transport patient back to [facility].

## 2019-09-15 NOTE — NURSING
[facility] transported patient to [place] in stable condition w/ tay still intact.

## 2019-09-15 NOTE — CARE UPDATE
09/14/19 2000   Patient Assessment/Suction   Level of Consciousness (AVPU) alert   Respiratory Effort Unlabored   Expansion/Accessory Muscles/Retractions no use of accessory muscles   All Lung Fields Breath Sounds coarse   Rhythm/Pattern, Respiratory unlabored   Cough Frequency with stimulation   Cough Type assisted   Suction Method tracheal   $ Suction Charges Inline Suction Procedure Stat Charge   Secretions Amount large   Secretions Color yellow   Secretions Characteristics thick   PRE-TX-O2   O2 Device (Oxygen Therapy) ventilator  (Simultaneous filing. User may not have seen previous data.)   $ Is the patient on Low Flow Oxygen? Yes   Oxygen Concentration (%) 45  (Simultaneous filing. User may not have seen previous data.)   SpO2 (!) 91 %  (Simultaneous filing. User may not have seen previous data.)   Pulse Oximetry Type Continuous  (Simultaneous filing. User may not have seen previous data.)   Oximetry Probe Site Intact   Pulse 90  (Simultaneous filing. User may not have seen previous data.)   Resp (!) 24  (Simultaneous filing. User may not have seen previous data.)   BP (!) 155/75  (Simultaneous filing. User may not have seen previous data.)        Surgical Airway Portex Cuffed   No Placement Date or Time found.   Present Prior to Hospital Arrival?: Yes  Type: Tracheostomy  Brand: Portex  Airway Device Size: 7.0  Style: Cuffed   Cuff Inflation? Inflated   Speaking Valve Usage Not wearing   Site Assessment Clean;Dry   Site Care Cleansed   Ties Assessment Clean   Vent Select   Conventional Vent Y   Charged w/in last 24h YES   Preset Conventional Ventilator Settings   Vent ID [id number]   Vent Type    Ventilation Type VC   Vent Mode Spont   Humidity HME   Vt Set 450 mL   PEEP/CPAP 8 cmH20   Pressure Support 20 cmH20   Waveform RAMP   Peak Flow 60 L/min   Plateau Set/Insp. Hold (sec) 0   Insp Rise Time  50 %   Trigger Sensitivity Flow/I-Trigger 2.7 L/min   Patient Ventilator Parameters   Resp Rate Total 23  br/min  (Simultaneous filing. User may not have seen previous data.)   Peak Airway Pressure 28 cmH2O   Mean Airway Pressure 14 cmH20   Plateau Pressure 23 cmH20   Exhaled Vt 446 mL   Total Ve 8.97 mL   Spont Ve 8.97 L   I:E Ratio Measured 1:2.80   Conventional Ventilator Alarms   Alarms On Y   Ve High Alarm 20 L/min   Ve Low Alarm 4 L/min   Vt High Alarm 1480 mL   Vt Low Alarm 200 mL   Resp Rate High Alarm 50 br/min   Press High Alarm 50 cmH2O   Apnea Rate 16   Apnea Volume (mL) 1 mL   Apnea Oxygen Concentration  100   Apnea Flow Rate (L/min) 60   T Apnea 20 sec(s)   Ready to Wean/Extubation Screen   FIO2<=50 (chart decimal) 0.45   MV<16L (chart vol.) 8.97   PEEP <=8 (chart #) 8   Ready to Wean Parameters   F/VT Ratio<105 (RSBI) (!) 53.81      09/14/19 2000   Patient Assessment/Suction   Level of Consciousness (AVPU) alert   Respiratory Effort Unlabored   Expansion/Accessory Muscles/Retractions no use of accessory muscles   All Lung Fields Breath Sounds coarse   Rhythm/Pattern, Respiratory unlabored   Cough Frequency with stimulation   Cough Type assisted   Suction Method tracheal   $ Suction Charges Inline Suction Procedure Stat Charge   Secretions Amount large   Secretions Color yellow   Secretions Characteristics thick   PRE-TX-O2   O2 Device (Oxygen Therapy) ventilator  (Simultaneous filing. User may not have seen previous data.)   $ Is the patient on Low Flow Oxygen? Yes   Oxygen Concentration (%) 45  (Simultaneous filing. User may not have seen previous data.)   SpO2 (!) 91 %  (Simultaneous filing. User may not have seen previous data.)   Pulse Oximetry Type Continuous  (Simultaneous filing. User may not have seen previous data.)   Oximetry Probe Site Intact   Pulse 90  (Simultaneous filing. User may not have seen previous data.)   Resp (!) 24  (Simultaneous filing. User may not have seen previous data.)   BP (!) 155/75  (Simultaneous filing. User may not have seen previous data.)        Surgical Airway Portex  Cuffed   No Placement Date or Time found.   Present Prior to Hospital Arrival?: Yes  Type: Tracheostomy  Brand: Portex  Airway Device Size: 7.0  Style: Cuffed   Cuff Inflation? Inflated   Speaking Valve Usage Not wearing   Site Assessment Clean;Dry   Site Care Cleansed   Ties Assessment Clean   Vent Select   Conventional Vent Y   Charged w/in last 24h YES   Preset Conventional Ventilator Settings   Vent ID [id number]   Vent Type    Ventilation Type VC   Vent Mode Spont   Humidity HME   Vt Set 450 mL   PEEP/CPAP 8 cmH20   Pressure Support 20 cmH20   Waveform RAMP   Peak Flow 60 L/min   Plateau Set/Insp. Hold (sec) 0   Insp Rise Time  50 %   Trigger Sensitivity Flow/I-Trigger 2.7 L/min   Patient Ventilator Parameters   Resp Rate Total 23 br/min  (Simultaneous filing. User may not have seen previous data.)   Peak Airway Pressure 28 cmH2O   Mean Airway Pressure 14 cmH20   Plateau Pressure 23 cmH20   Exhaled Vt 446 mL   Total Ve 8.97 mL   Spont Ve 8.97 L   I:E Ratio Measured 1:2.80   Conventional Ventilator Alarms   Alarms On Y   Ve High Alarm 20 L/min   Ve Low Alarm 4 L/min   Vt High Alarm 1480 mL   Vt Low Alarm 200 mL   Resp Rate High Alarm 50 br/min   Press High Alarm 50 cmH2O   Apnea Rate 16   Apnea Volume (mL) 1 mL   Apnea Oxygen Concentration  100   Apnea Flow Rate (L/min) 60   T Apnea 20 sec(s)   Ready to Wean/Extubation Screen   FIO2<=50 (chart decimal) 0.45   MV<16L (chart vol.) 8.97   PEEP <=8 (chart #) 8   Ready to Wean Parameters   F/VT Ratio<105 (RSBI) (!) 53.81

## 2019-09-17 NOTE — DISCHARGE SUMMARY
"[facility]  Discharge Summary    Patient Name: Masood Messina  MRN: [id number]  Admission Date: 9/12/2019  Discharge Date and Time: 9/14/2019  8:43 PM  Discharging Provider: Gio Mcgovern MD  Primary Care Provider: Jeramie Lombardi MD    HPI/Hospital course:   79-year-old nursing home resident([place]) with past medical history of chronic respiratory failure status post trach and PEG due to stroke, CAD status post CABG, hypertension, COPD,BPH presented from [facility] due to altered mental status, hypoxia.  There is no family members available and patient is not giving any meaningful history due to his clinical status.  According to ED physician Trach balloon was not functioning at nursing home and Trach tube was replaced.  Patient was also reportedly hypoxic at that time and was very confused.  Patient does have recent history of Pseudomonas infection and admission to the hospital.  No reported fever.  According to ED physician patient was moving his right upper and lower extremities upon arrival however when I interviewed patient received Ativan and is not moving his extremities, left upper extremity appears very spastic.  Patient also has stage II sacral decubitus ulcer. Additional history includes recent treatment with IV Lasix and Cipro x1 week (9/2-9/9) for presumed "pneumonia" at nursing facility.      The patient was admitted to the ICU for workup and treatment including sepsis protocol with gentle IV fluids and empiric broad-spectrum IV antibiotics.  Pulmonary consultation was obtained for ventilator management.  The patient was started on IV Lasix diuresis for pulmonary edema. With diuresis the patient's volume overload and pulmonary edema slowly improved.  Echocardiogram was obtained as below.  The patient's infectious workup was unremarkable with negative procalcitonin, negative cultures, and unremarkable urinalysis.  The patient recently had a " week-long course of antibiotics and at this time no evidence of infectious source.  Therefore antibiotics were discontinued.  While hospitalized the patient received wound care as well as tube feeds per normal regimen.  Workup revealed some optimal control of hypothyroidism and therefore Synthroid dose was increased at discharge. As the patient medically improved he was transitioned to oral Lasix.  Oral beta-blocker was initiated.  At this time the patient has much improved and is medically stable for discharge. I discussed the case with Pulmonary Dr. Newton.  I spent 45 min on discharge management.    Discharge diagnoses:  Acute on chronic hypoxemic respiratory failure secondary to trach malfunction and pulmonary edema  Acute pulmonary edema due to acute diastolic CHF  Lactic acidosis secondary to severe hypoxemia  Metabolic encephalopathy likely secondary to severe hypoxemia  Trach malfunction status post tracheostomy replacement  Hyperkalemia  Hypophosphatemia  Chronic functional quadriplegia secondary to prior stroke  Chronic dysphagia status secondary to prior stroke s/p PEG  COPD  CAD  Dementia  Chronic mood disorder/depression  GERD  Hypertension and hypertensive heart disease  Hyperlipidemia  Hypothyroidism  Chronic anemia likely inflammatory  Thrombocytopenia   AAA (abdominal aortic aneurysm)         Sacral pressure ulcer stage II POA         Discharge physical exam:  General:  Nontoxic, morbidly obese, comfortable appearing   ENT:  Moist mucous membranes, tracheostomy in place  Pulmonary:  Diminished breath sounds diffusely with decent air movement, no wheezes or rhonchi  Cardiovascular:  2+ radial pulses, regular rate and rhythm, 1+ peripheral edema  GI:  Abdomen soft, obese but nondistended, peg in place with small pink stoma without drainage or bleeding  Neuro:  Sedated, nonverbal, no response to verbal stimuli     Consults:   Consults (From admission, onward)        Status Ordering Provider      Inpatient consult to Pulmonology  Once     Provider:  Chrissie Duarte MD    Completed SANDEEP SANDERSON     Inpatient consult to Registered Dietitian/Nutritionist  Once     Provider:  (Not yet assigned)    Completed CHRISSIE DUARTE     Inpatient consult to Registered Dietitian/Nutritionist  Once     Provider:  (Not yet assigned)    Completed JANENE LOBO          Final Active Diagnoses:    Diagnosis Date Noted POA    PRINCIPAL PROBLEM:  Acute on chronic respiratory failure with hypoxia and hypercapnia [J96.21, J96.22] 12/05/2013 Yes    Acute cystitis without hematuria [N30.00] 09/13/2019 Yes    AAA (abdominal aortic aneurysm) without rupture [I71.4] 09/13/2019 Yes    Enlarging abdominal aortic aneurysm (AAA) [I71.4] 09/13/2019 Yes    Essential hypertension [I10] 09/13/2019 Yes    Thrombocytopenia [D69.6] 09/13/2019 Yes    Hyperkalemia [E87.5] 09/13/2019 Yes    GINETTE (acute kidney injury) [N17.9] 09/13/2019 Yes    Cholelithiases [K80.20] 09/13/2019 Yes    Bilateral nephrolithiasis [N20.0] 09/13/2019 Yes    Hydroureter, left [N13.4] 09/13/2019 Yes    History of CVA (cerebrovascular accident) without residual deficits [Z86.73] 09/13/2019 Not Applicable    Cardiogenic pulmonary edema [I50.1] 09/12/2019 Yes    Coronary artery disease [I25.10] 09/12/2019 Yes    Macrocytic anemia [D53.9] 09/12/2019 Yes    Ventilator dependence [Z99.11] 09/12/2019 Not Applicable    Acute on chronic diastolic HF (heart failure) [I50.33] 09/12/2019 Yes    Chronic obstructive pulmonary disease [J44.9] 10/16/2017 Yes    Sacral decubitus ulcer, stage II [L89.152] 10/14/2017 Yes    PEG (percutaneous endoscopic gastrostomy) status [Z93.1] 03/21/2017 Not Applicable    Functional quadriplegia [R53.2] 12/05/2013 Yes    Tracheostomy in place [Z93.0] 12/05/2013 Not Applicable    Chronic respiratory failure with hypoxia and hypercapnia [J96.11, J96.12] 12/05/2013 Yes      Problems Resolved During this Admission:       Discharged  Condition: good    Disposition: Long Term Care    Follow Up:  Follow-up Information     Jeramie Lombardi MD In 2 weeks.    Specialty:  Family Medicine  Contact information:  [facility] [address] [place] [id number] [phone]             [facility] In 1 week.               Patient Instructions:      Notify your health care provider if you experience any of the following:  temperature >100.4     Notify your health care provider if you experience any of the following:  difficulty breathing or increased cough     Tube Feedings/Formulas     Order Specific Question Answer Comments   Select Adult Formula: Isosource 1.5 arturo    Route: Gastrostomy    Formula Rate (mL/hr): 70      Activity as tolerated       Significant Diagnostic Studies:  Chest x-ray personally reviewed:  Bilateral patchy infiltrates consistent with edema, fluid in fissure on right, basilar opacity on right possible pleural effusion versus atelectasis     Echocardiogram:  · Mild left ventricular enlargement.  · Low normal left ventricular systolic function. The estimated ejection fraction is 50%  · Grade III (severe) left ventricular diastolic dysfunction consistent with restrictive physiology.  · Elevated left atrial pressure.  · Mechanically ventilated; cannot use inferior caval vein diameter to estimate central venous pressure.  · Moderate left atrial enlargement.  · Mild mitral regurgitation.  · Technically challenging study; contrast enhanced.     Pending Diagnostic Studies:     None         Medications:  Reconciled Home Medications:      Medication List      START taking these medications    carvedilol 3.125 MG tablet  Commonly known as:  COREG  Take 1 tablet (3.125 mg total) by mouth 2 (two) times daily.     furosemide 20 MG tablet  Commonly known as:  LASIX  Take 1 tablet (20 mg total) by mouth once daily.     levothyroxine 200 MCG tablet  Commonly known as:  SYNTHROID  Take 1 tablet (200 mcg total) by mouth once daily.        CONTINUE taking  these medications    acetaminophen 160 mg/5 mL Elix  Commonly known as:  TYLENOL  650 mg by Per G Tube route every 4 (four) hours as needed.     baclofen 20 MG tablet  Commonly known as:  LIORESAL  25 mg by PEG Tube route every 6 (six) hours.     bethanechol 10 MG Tab  Commonly known as:  URECHOLINE  Take 15 mg by mouth 3 (three) times daily.     BIOTENE DRY MOUTH MM  Swish and spit 15 mLs 4 (four) times daily. AND/OR PRN     busPIRone 5 MG Tab  Commonly known as:  BUSPAR  5 mg by Per G Tube route 2 (two) times daily.     CERTAVITE-ANTIOXID (IRON GLUC) 9 mg iron/ 15 mL (15 mL) Liqd  Generic drug:  multivit-min-ferrous gluconate  Take 15 mLs by mouth once daily.     cholestyramine-aspartame 4 gram Pwpk  Commonly known as:  QUESTRAN LIGHT  1 packet (4 g total) by Per G Tube route 2 (two) times daily. Discontinue if no bowel movement in a day     DULoxetine 30 MG capsule  Commonly known as:  CYMBALTA  30 mg by PEG Tube route once daily.     DUONEB 2.5 mg-0.5 mg/3 mL nebulizer solution  Generic drug:  albuterol-ipratropium  Take 3 mLs by nebulization every 6 (six) hours as needed for Wheezing or Shortness of Breath.     enoxaparin 40 mg/0.4 mL Syrg  Commonly known as:  LOVENOX  Inject 40 mg into the skin once daily.     famotidine 20 MG tablet  Commonly known as:  PEPCID  20 mg by Per G Tube route 2 (two) times daily.     ferrous sulfate 220 mg (44 mg iron)/5 mL solution  220 mg by Per G Tube route once daily.     finasteride 5 mg tablet  Commonly known as:  PROSCAR  5 mg by PEG Tube route once daily.     gabapentin 300 MG capsule  Commonly known as:  NEURONTIN  300 mg by Per G Tube route 3 (three) times daily.     HYDROcodone-acetaminophen  mg per tablet  Commonly known as:  NORCO  1 tablet by Per G Tube route every 6 (six) hours as needed for Pain.     hydrocortisone 2.5 % cream  Apply 1 application topically 2 (two) times daily as needed.     ibuprofen 600 MG tablet  Commonly known as:  ADVIL,MOTRIN  600 mg by  Per G Tube route every 6 (six) hours as needed for Pain.     lactulose 10 gram/15 mL solution  Commonly known as:  CHRONULAC  20 g by Per G Tube route 4 (four) times daily.     lidocaine 5 % Oint ointment  Commonly known as:  XYLOCAINE  Apply 1 g topically as needed (with each trach change).     nitroGLYCERIN 0.4 MG SL tablet  Commonly known as:  NITROSTAT  Place 0.4 mg under the tongue every 5 (five) minutes as needed for Chest pain.     polyethylene glycol 17 gram Pwpk  Commonly known as:  GLYCOLAX  Take 17 g by mouth every evening.     RISAMINE 0.44-20.6 % Oint  Generic drug:  menthol-zinc oxide  Apply 1 application topically once daily. AFTER EACH DIAPER CHANGE     rivastigmine 9.5 mg/24 hr Pt24  Commonly known as:  EXELON  Place 1 patch onto the skin once daily.     selenium sulfide 2.5 % Lotn  Apply 1 application topically twice a week. ON Tuesday AND SATURDAY     terazosin 1 MG capsule  Commonly known as:  HYTRIN  1 mg by PEG Tube route once daily.     traZODone 100 MG tablet  Commonly known as:  DESYREL  Take 100 mg by mouth every evening.     VITAMIN C 500 mg/5 mL Syrp syrup  Generic drug:  ascorbic acid (vitamin C)  500 mg by PEG Tube route 2 (two) times daily.            Indwelling Lines/Drains at time of discharge:   Lines/Drains/Airways     Drain                 Gastrostomy/Enterostomy  Gastrostomy tube w/ balloon;Gastrostomy-jejunostomy midline feeding -- days         Gastrostomy/Enterostomy 1335 Percutaneous endoscopic gastrostomy (PEG) LUQ feeding -- days         Urethral Catheter 10/07/17 0136 Latex 16 Fr. 710 days         Urethral Catheter 09/12/19 1531 Non-latex 16 Fr. 5 days          Airway                 Surgical Airway Portex Cuffed -- days         Surgical Airway Portex Cuffed;Fenestrated -- days          Pressure Ulcer                 Pressure Injury 02/21/14 1130 sacral spine Stage II 2034 days         Pressure Ulcer 10/07/17 0200 posterior sacral spine Stage  days              Time  spent on the discharge of patient: 45 minutes  Patient was seen and examined on the date of discharge and determined to be suitable for discharge.      Gio Mcgovern MD  Department of Hospital Medicine  [facility]

## 2019-09-17 NOTE — HOSPITAL COURSE
"HPI/Hospital course:   79-year-old nursing home resident([place]) with past medical history of chronic respiratory failure status post trach and PEG due to stroke, CAD status post CABG, hypertension, COPD,BPH presented from [facility] due to altered mental status, hypoxia.  There is no family members available and patient is not giving any meaningful history due to his clinical status.  According to ED physician Trach balloon was not functioning at nursing home and Trach tube was replaced.  Patient was also reportedly hypoxic at that time and was very confused.  Patient does have recent history of Pseudomonas infection and admission to the hospital.  No reported fever.  According to ED physician patient was moving his right upper and lower extremities upon arrival however when I interviewed patient received Ativan and is not moving his extremities, left upper extremity appears very spastic.  Patient also has stage II sacral decubitus ulcer. Additional history includes recent treatment with IV Lasix and Cipro x1 week (9/2-9/9) for presumed "pneumonia" at nursing facility.      The patient was admitted to the ICU for workup and treatment including sepsis protocol with gentle IV fluids and empiric broad-spectrum IV antibiotics.  Pulmonary consultation was obtained for ventilator management.  The patient was started on IV Lasix diuresis for pulmonary edema. With diuresis the patient's volume overload and pulmonary edema slowly improved.  Echocardiogram was obtained as below.  The patient's infectious workup was unremarkable with negative procalcitonin, negative cultures, and unremarkable urinalysis.  The patient recently had a week-long course of antibiotics and at this time no evidence of infectious source.  Therefore antibiotics were discontinued.  While hospitalized the patient received wound care as well as tube feeds per normal regimen.  Workup revealed some optimal control of hypothyroidism and therefore " Synthroid dose was increased at discharge. As the patient medically improved he was transitioned to oral Lasix.  Oral beta-blocker was initiated.  At this time the patient has much improved and is medically stable for discharge. I discussed the case with Pulmonary Dr. Newton.  I spent 45 min on discharge management.    Discharge diagnoses:  Acute on chronic hypoxemic respiratory failure secondary to trach malfunction and pulmonary edema  Acute pulmonary edema due to acute diastolic CHF  Lactic acidosis secondary to severe hypoxemia  Metabolic encephalopathy likely secondary to severe hypoxemia  Trach malfunction status post tracheostomy replacement  Hyperkalemia  Hypophosphatemia  Chronic functional quadriplegia secondary to prior stroke  Chronic dysphagia status secondary to prior stroke s/p PEG  COPD  CAD  Dementia  Chronic mood disorder/depression  GERD  Hypertension and hypertensive heart disease  Hyperlipidemia  Hypothyroidism  Chronic anemia likely inflammatory  Thrombocytopenia   AAA (abdominal aortic aneurysm)         Sacral pressure ulcer stage II POA         Discharge physical exam:  General:  Nontoxic, morbidly obese, comfortable appearing   ENT:  Moist mucous membranes, tracheostomy in place  Pulmonary:  Diminished breath sounds diffusely with decent air movement, no wheezes or rhonchi  Cardiovascular:  2+ radial pulses, regular rate and rhythm, 1+ peripheral edema  GI:  Abdomen soft, obese but nondistended, peg in place with small pink stoma without drainage or bleeding  Neuro:  Sedated, nonverbal, no response to verbal stimuli

## 2019-10-02 NOTE — NURSING
Pt arrived to ICU at 1745 on monitor with TAMERA Morales. Assumed care of pt. Pt placed on bedside monitor, connected to vent by RT. Pt responsive to painful stimuli, Bilateral breathes sounds clear, PERRLA noted with 2mm pupils.Rahman to gravity, gross hematuria noted. Small BM, liquid/mucous stool noted. Pt cleaned and linens changed. Redness to sacrum noted. Safety maintained.

## 2019-10-02 NOTE — ED PROVIDER NOTES
"Encounter Date: 10/2/2019    SCRIBE #1 NOTE: I, Geraldine Cramer, am scribing for, and in the presence of, Roosevelt Bonilla MD.       History     Chief Complaint   Patient presents with    Altered Mental Status     unresponsive    Hypotension       Time seen by provider: 12:17 PM on 10/02/2019    Masood Messina is a 79 y.o. male with HTN, CHF, COPD, dementia, renal disorder, and ventilator dependent who presents to the ED via EMS from [facility] with an onset of decreased mental status. Per EMS, the patient was noted to be unresponsive and hypotension - 70/40. He has a BS of 129. At baseline, he is "very responsive" and usually complains if his feet are touched.The patient began to have some blood in his urine 2 days ago that has progressively increased. Currently, he is being treated for PNA. The patient has a PSHx including a cardiac surgery (1999). No known drug allergy.    The history is provided by the EMS personnel.     Review of patient's allergies indicates:   Allergen Reactions    Codeine Other (See Comments)     Past Medical History:   Diagnosis Date    AAA (abdominal aortic aneurysm)     Anemia     BPH (benign prostatic hyperplasia)     CHF (congestive heart failure)     COPD (chronic obstructive pulmonary disease)     Coronary artery disease     Dementia     Dementia     Depression     GERD (gastroesophageal reflux disease)     Hyperlipidemia     Hypertension     Hypothyroid     Renal disorder     Respiratory failure, chronic     Ventilator dependence      Past Surgical History:   Procedure Laterality Date    ABDOMINAL SURGERY      CARDIAC SURGERY  1999    GASTROSTOMY TUBE PLACEMENT      SPINE SURGERY      TRACHEOSTOMY TUBE PLACEMENT       History reviewed. No pertinent family history.  Social History     Tobacco Use    Smoking status: Former Smoker    Smokeless tobacco: Never Used   Substance Use Topics    Alcohol use: No    Drug use: No "     Review of Systems   Unable to perform ROS: Mental status change   Genitourinary: Positive for hematuria.     Physical Exam     Initial Vitals   BP Pulse Resp Temp SpO2   10/02/19 1217 10/02/19 1217 10/02/19 1815 10/02/19 1217 10/02/19 1217   (!) 72/48 72 16 98.8 °F (37.1 °C) 100 %      MAP       --                Physical Exam    Nursing note and vitals reviewed.  Constitutional: He appears well-developed and well-nourished. He is not diaphoretic. No distress.   HENT:   Head: Normocephalic and atraumatic.   Eyes: Pupils are equal, round, and reactive to light.   Pupils are 3 mm, round and reactive.    Neck:   Trach in place.    Cardiovascular: Normal rate, regular rhythm, normal heart sounds and intact distal pulses. Exam reveals no gallop and no friction rub.    No murmur heard.  Pulmonary/Chest: Breath sounds normal. No respiratory distress. He has no wheezes. He has no rhonchi. He has no rales.   Clear and equal lung sounds.    Abdominal: Soft. There is no tenderness.   Soft, non-tender abdomen. PEG tube in place.    Genitourinary:   Genitourinary Comments: Rahman catheter with dark red blood noted in bag. Redness to the sacral region.    Neurological: He is unresponsive.   Skin: Skin is dry.   Cool, dry skin.        ED Course   Central Line  Date/Time: 10/2/2019 4:07 PM  Performed by: Roosevelt Bonilla MD  Consent Done: Emergent Situation  Site marked: the operative site was marked  Indications: med administration  Anesthesia: local infiltration    Anesthesia:  Local anesthesia used: yes  Local Anesthetic: lidocaine 1% without epinephrine  Anesthetic total: 2 mL  Preparation: skin prepped with ChloraPrep  Skin prep agent dried: skin prep agent completely dried prior to procedure  Sterile barriers: all five maximum sterile barriers used - cap, mask, sterile gown, sterile gloves, and large sterile sheet  Hand hygiene: hand hygiene performed prior to central venous catheter insertion  Location details: left  subclavian  Comments: Unsuccessful.     Central Line  Date/Time: 10/2/2019 4:07 PM  Performed by: Roosevelt Bonilla MD  Site marked: the operative site was marked  Indications: med administration  Anesthesia: local infiltration    Anesthesia:  Local anesthesia used: yes  Local Anesthetic: lidocaine 1% without epinephrine  Anesthetic total: 2 mL  Preparation: skin prepped with ChloraPrep  Skin prep agent dried: skin prep agent completely dried prior to procedure  Sterile barriers: all five maximum sterile barriers used - cap, mask, sterile gown, sterile gloves, and large sterile sheet  Hand hygiene: hand hygiene performed prior to central venous catheter insertion  Location details: right subclavian  Comments: Unsuccessful.     Central Line  Date/Time: 10/2/2019 4:07 PM  Performed by: Roosevelt Bonilla MD  Site marked: the operative site was marked  Indications: med administration  Anesthesia: local infiltration    Anesthesia:  Local anesthesia used: yes  Local Anesthetic: lidocaine 1% without epinephrine  Anesthetic total: 2 mL  Preparation: skin prepped with ChloraPrep  Skin prep agent dried: skin prep agent completely dried prior to procedure  Sterile barriers: all five maximum sterile barriers used - cap, mask, sterile gown, sterile gloves, and large sterile sheet  Hand hygiene: hand hygiene performed prior to central venous catheter insertion  Location details: right femoral  Site selection rationale: failed bilateral subclavian sites  Comments: Unsuccessful.     Critical Care  Performed by: Roosevelt Bonilla MD  Authorized by: Roosevelt Bonilla MD   Direct patient critical care time: 22 minutes  Additional history critical care time: 12 minutes  Ordering / reviewing critical care time: 18 minutes  Documentation critical care time: 15 minutes  Consulting other physicians critical care time: 11 minutes  Total critical care time (exclusive of procedural time) : 78 minutes  Critical care was time spent  personally by me on the following activities: development of treatment plan with patient or surrogate, examination of patient, ordering and performing treatments and interventions, re-evaluation of patient's condition, ordering and review of radiographic studies, evaluation of patient's response to treatment, obtaining history from patient or surrogate and ordering and review of laboratory studies.        Labs Reviewed   CBC W/ AUTO DIFFERENTIAL - Abnormal; Notable for the following components:       Result Value    WBC 19.07 (*)     RBC 3.62 (*)     Hemoglobin 11.1 (*)     Hematocrit 35.4 (*)     Mean Corpuscular Hemoglobin Conc 31.4 (*)     Platelets 149 (*)     Gran # (ANC) 16.2 (*)     Immature Grans (Abs) 0.13 (*)     Lymph # 0.9 (*)     Mono # 1.6 (*)     Gran% 84.9 (*)     Lymph% 4.7 (*)     All other components within normal limits   COMPREHENSIVE METABOLIC PANEL - Abnormal; Notable for the following components:    CO2 32 (*)     Glucose 124 (*)     BUN, Bld 47 (*)     Creatinine 2.7 (*)     Albumin 2.9 (*)     eGFR if  25 (*)     eGFR if non  21 (*)     All other components within normal limits   APTT - Abnormal; Notable for the following components:    aPTT 32.6 (*)     All other components within normal limits   TROPONIN I - Abnormal; Notable for the following components:    Troponin I 0.028 (*)     All other components within normal limits   B-TYPE NATRIURETIC PEPTIDE - Abnormal; Notable for the following components:     (*)     All other components within normal limits   URINALYSIS, REFLEX TO URINE CULTURE - Abnormal; Notable for the following components:    Protein, UA 3+ (*)     Glucose, UA 1+ (*)     Ketones, UA 1+ (*)     Bilirubin (UA) 2+ (*)     Occult Blood UA 3+ (*)     Nitrite, UA Positive (*)     Urobilinogen, UA 4.0-6.0 (*)     Leukocytes, UA 3+ (*)     All other components within normal limits    Narrative:     Preferred Collection Type->Urine,  Catheterized   PROCALCITONIN - Abnormal; Notable for the following components:    Procalcitonin 0.34 (*)     All other components within normal limits   URINALYSIS MICROSCOPIC - Abnormal; Notable for the following components:    RBC, UA >100 (*)     WBC, UA 30 (*)     Bacteria Moderate (*)     All other components within normal limits    Narrative:     Preferred Collection Type->Urine, Catheterized   POCT GLUCOSE - Abnormal; Notable for the following components:    POCT Glucose 137 (*)     All other components within normal limits   ISTAT PROCEDURE - Abnormal; Notable for the following components:    POC PH 7.340 (*)     POC PCO2 53.6 (*)     POC HCO3 28.9 (*)     POC TCO2 30 (*)     All other components within normal limits   INFLUENZA A & B BY MOLECULAR   CULTURE, URINE   MAGNESIUM   PROTIME-INR   LACTIC ACID, PLASMA   AMMONIA   TYPE & SCREEN   POCT GLUCOSE MONITORING CONTINUOUS     EKG Readings: (Independently Interpreted)   Initial Reading: No STEMI.   Sinus rhythm at a rate of 64 bpm with a first degree AV block and T-wave flattening noted throughout. Otherwise, no other acute abnormalities.      Imaging Results          X-Ray Chest AP Portable (Final result)  Result time 10/02/19 13:00:37    Final result by Anne Machuca MD (10/02/19 13:00:37)                 Impression:      New or increased right lung infiltrate, diffusely but more so right upper lung field.  Interval decrease of the left lung infiltrate with left lung today appearing clear.  Right lung infiltrates suggest pneumonia      Electronically signed by: Anne Machuca MD  Date:    10/02/2019  Time:    13:00             Narrative:    EXAMINATION:  XR CHEST AP PORTABLE    CLINICAL HISTORY:  AMS;    TECHNIQUE:  Single frontal view of the chest was performed.    COMPARISON:  9/14/2019    FINDINGS:  Tracheostomy cannula remains in place.  Cardiomediastinal silhouette is stable.  The postoperative changes with median sternotomy sutures and  mediastinal clips.  Other lines project over the chest.  There is increased diffuse right lung infiltrate.  Right hemidiaphragm is elevated.  Left lung lung is clear today.  No pleural effusion.                               CT Head Without Contrast (Final result)  Result time 10/02/19 12:55:56    Final result by Leonid Nguyen MD (10/02/19 12:55:56)                 Impression:      1.  No evidence of an acute intracranial abnormality.      Electronically signed by: Leonid Nguyen  Date:    10/02/2019  Time:    12:55             Narrative:    EXAMINATION:  CT HEAD WITHOUT CONTRAST    CLINICAL HISTORY:  Confusion/delirium, altered LOC, unexplained;    TECHNIQUE:  Low dose axial images were obtained through the head.  Coronal and sagittal reformations were also performed. Contrast was not administered.    COMPARISON:  09/12/2019    FINDINGS:  Study is slightly limited by patient motion artifact.  There is no acute intracranial hemorrhage.  Generalized cerebral volume loss with moderate compensatory dilatation of the ventricles, unchanged.  There are scattered areas of hypoattenuation involving the supratentorial white matter bilaterally, which are nonspecific but likely reflect a moderate degree of chronic microvascular ischemic change.  Bilateral chronic basal ganglial lacunar-type infarcts again noted.  No mass effect or midline shift is present.  The gray-white matter differentiation is normal.  The visualized portions of the orbits are normal.  Continued complete opacification of the left sphenoid and posterior ethmoid sinuses.  Trace bilateral mastoid opacification noted.  No fractures are identified.  Atherosclerotic calcification of bilateral carotid siphons noted.                                 Medical Decision Making:   History:   Old Medical Records: I decided to obtain old medical records.  Initial Assessment:   79-year-old male presented with altered mental status and hypotension.  Differential Diagnosis:    Initial differential diagnosis included but not limited to sepsis, medication reaction, and dehydration.  Independently Interpreted Test(s):   I have ordered and independently interpreted EKG Reading(s) - see prior notes  Clinical Tests:   Lab Tests: Reviewed and Ordered  Radiological Study: Ordered and Reviewed  Medical Tests: Reviewed and Ordered  ED Management:  The patient was emergently evaluated in the emergency department, his evaluation was significant for an elderly male who is altered from his baseline.  The patient's chest x-ray was significant for worsening pneumonia.  The patient's labs were significant for an elevated creatinine, an elevated white blood cell count, an infected urine, and a normal lactic acid level.  The patient states initial hypotensive episode was initially responsive to IV fluids given in the emergency department.  The patient was also treated with broad-spectrum IV antibiotics.  The patient did have a repeat occurrence in his hypotension in the emergency department, and required further IV fluids, which did eventually improve his blood pressure.  I did use sepsis IV fluid boluses based on an ideal body weight.  The patient is morbidly obese and has a history of heart failure.  I was unsuccessful and placing a central line in this patient, and discussed the case with anesthesiology who will place a central line the patient in the intensive care unit.  The patient will be admitted to the hospitalist septic shock.  I discussed the case with the hospitalist on call, Dr. Mayfield.  He has accepted the patient for admission.  Other:   I have discussed this case with another health care provider.               I, Dr. Roosevelt Bonilla, personally performed the services described in this documentation. All medical record entries made by the scribe were at my direction and in my presence.  I have reviewed the chart and agree that the record reflects my personal performance and is accurate and  complete. Roosevelt Bonilla MD.  10:47 PM 10/03/2019       Clinical Impression:       ICD-10-CM ICD-9-CM   1. Septic shock A41.9 038.9    R65.21 785.52     995.92   2. Altered mental status R41.82 780.97         Disposition:   Disposition: Admitted  Condition: Critical                        Roosevelt Bonilla MD  10/03/19 1111

## 2019-10-02 NOTE — NURSING
Pt BP began to drop. Epic down at this time, unable to view orders. Levophed started at 0.02mcg/kg/min and titrated to 0.1mcg/kg/min via titration protocol. BP stabilized and levo titrated back down. Will continue to monitor. Awaiting anesthesia to come attempt central line placement.

## 2019-10-02 NOTE — CARE UPDATE
Received patient from EMS and placed on [id number] at ordered settings. Ambued patient to and from CT with TIFFANIEN.

## 2019-10-02 NOTE — HPI
Patient is an 79-year-old morbidly obese male from [facility] with past medical history significant for multiple medical problems including chronic hypoxic hypercarbic respiratory failure (ventilator dependent status post tracheostomy), status post PEG tube placement, hypertension, hyperlipidemia, coronary artery disease, history of cerebrovascular accident, hypothyroidism and prior history of cerebrovascular accident who is functional quadriplegic is being admitted to Hospital Medicine from [facility] where patient presented with septic shock.  Upon arrival patient is unresponsive and blood pressure was noted to be around 72/48 mm of mercury.  Per ER physician reportedly patient had some hematuria for 2 days.  Patient was being treated for pneumonia recently. No further details of HPI.

## 2019-10-02 NOTE — ED NOTES
Central line access attempted by Dr. Bonilla to right and left subclavian and right femoral vein unsuccessfully.

## 2019-10-02 NOTE — NURSING
1L NS bolus began per verbal MD orders, still unable to access Epic at this time. Anesthesia at bedside preparing for central line placement.

## 2019-10-03 PROBLEM — J15.1 PNEUMONIA OF RIGHT UPPER LOBE DUE TO PSEUDOMONAS SPECIES: Status: ACTIVE | Noted: 2019-01-01

## 2019-10-03 PROBLEM — I50.33 ACUTE ON CHRONIC DIASTOLIC CONGESTIVE HEART FAILURE: Status: ACTIVE | Noted: 2019-01-01

## 2019-10-03 NOTE — NURSING
Pt bleeding around meatus nothing in catheter tubing tay irrigated with clots removed. MARIO ALBERTO Sims NP in room to see pt. Urology consult ordered.

## 2019-10-03 NOTE — PLAN OF CARE
Pt unable to participate in discharge planning due to being intubated and no family at bedside. CM completed discharge assessment by chart review. Pt is a current resident at [place] and will return once DC. Pt was recently discharged from [facility] on 9/17. CM following to assist in any DC needs.        10/03/19 1020   Discharge Assessment   Assessment Type Discharge Planning Assessment   Confirmed/corrected address and phone number on facesheet? Yes   Assessment information obtained from? Medical Record   Prior to hospitilization cognitive status: Not Oriented to Time;Not Oriented to Place   Prior to hospitalization functional status: Needs Assistance   Current cognitive status: Coma/Sedated/Intubated   Current Functional Status: Needs Assistance   Lives With facility resident   Able to Return to Prior Arrangements yes   Is patient able to care for self after discharge? No   Patient's perception of discharge disposition nursing home   Readmission Within the Last 30 Days   ([facility] DC on 9/17)   Patient currently being followed by outpatient case management? No   Patient currently receives any other outside agency services? No   Equipment Currently Used at Home   (facility resident)   Do you have any problems affording any of your prescribed medications? No   Is the patient taking medications as prescribed? yes   Does the patient receive services at the [facility]? No   Discharge Plan A Return to nursing home   Patient/Family in Agreement with Plan yes

## 2019-10-03 NOTE — ASSESSMENT & PLAN NOTE
Continue mechanical ventilation as before.  Supplemental O2 via nasal canula; titrate O2 saturation to >92%.   Pulmonary consultation.   Continue beta 2 agonist bronchodilator treatments.   Continue IV antibiotics  - vancomycin/Zosyn/Levaquin  Check sputum GS and Cx.   Continue routine medications as before.

## 2019-10-03 NOTE — ASSESSMENT & PLAN NOTE
Telemonitoring.  Trend serial cardiac enzymes.  Holding antihypertensive agent due to hypotension.

## 2019-10-03 NOTE — ASSESSMENT & PLAN NOTE
Masood Messina is a 79 y.o. male who presents to the Emergency Department with septic shock.  This patient does) have evidence of infective focus  My overall impression is healthcare associated pneumonia and UTI.  Lab Results   Component Value Date    WBC 19.07 (H) 10/02/2019    HGB 11.1 (L) 10/02/2019    HCT 35.4 (L) 10/02/2019    MCV 98 10/02/2019     (L) 10/02/2019    Trend lactic acid.  Organ dysfunction indicated by altered mental status and acute kidney injury  Fluid bolus is being given.  Vital signs post fluid administrations were-    Temp Readings from Last 1 Encounters:   10/02/19 98.8 °F (37.1 °C) (Rectal)     BP Readings from Last 1 Encounters:   10/02/19 136/64     Pulse Readings from Last 1 Encounters:   10/02/19 86    Check serial cardiac enzymes and use intravenous pressor agent as needed to maintain mean arterial pressure above 65 mm of mercury.

## 2019-10-03 NOTE — PLAN OF CARE
Pt more alert as shift progressed. Opens eyes spontaneously. Moves right arm spontaneously and squeezed nurses hand. No movement to left side nor lower extremities. Remains on Levophed for BP support. Maintenance IVF in progress. Rahman cath with hematuria. Rahman irrigation every 2 hours as ordered. Bath and linen change done. Safety measures in place. Bed on rotation. NPO maintained

## 2019-10-03 NOTE — PROGRESS NOTES
Pharmacist Renal Dose Adjustment Note    Masood Messina is a 79 y.o. male being treated with the medication Zosyn.    Patient Data:    Vital Signs (Most Recent):  Temp: 98.8 °F (37.1 °C) (10/02/19 1217)  Pulse: 87 (10/02/19 1921)  Resp: 16 (10/02/19 1921)  BP: (!) 148/67 (10/02/19 1921)  SpO2: 100 % (10/02/19 1921)   Vital Signs (72h Range):  Temp:  [98.8 °F (37.1 °C)]   Pulse:  [64-87]   Resp:  [16]   BP: ()/(31-79)   SpO2:  [98 %-100 %]      Recent Labs   Lab 10/02/19  1236   CREATININE 2.7*     Serum creatinine: 2.7 mg/dL (H) 10/02/19 1236  Estimated creatinine clearance: 29.7 mL/min (A)    Medication: Zosyn 3.375 gm every 8 hours will be changed to 4.5 gm every 12 hours.    Chencho Aragon, PharmD

## 2019-10-03 NOTE — PLAN OF CARE
10/02/19 1921   Patient Assessment/Suction   Level of Consciousness (AVPU) responds to pain   Expansion/Accessory Muscles/Retractions no use of accessory muscles   Rhythm/Pattern, Respiratory assisted mechanically   PRE-TX-O2   O2 Device (Oxygen Therapy) ventilator   $ Is the patient on Low Flow Oxygen? Yes   Oxygen Concentration (%) 40   SpO2 100 %   Pulse Oximetry Type Continuous   $ Pulse Oximetry - Multiple Charge Pulse Oximetry - Multiple   Pulse 87   Resp 16   BP (!) 148/67   Vent Select   Conventional Vent Y   Charged w/in last 24h YES   Preset Conventional Ventilator Settings   Vent Type    Ventilation Type VC   Vent Mode A/C   Humidity HME   Set Rate 16 bmp   Vt Set 700 mL   PEEP/CPAP 7 cmH20   Pressure Support 0 cmH20   Waveform RAMP   Peak Flow 60 L/min   Set Inspiratory Pressure 0 cmH20   Insp Time 0 Sec(s)   Plateau Set/Insp. Hold (sec) 0   Insp Rise Time  0 %   Trigger Sensitivity Flow/I-Trigger 3 L/min   P High 0 cm H2O   P Low 0 cm H2O   T High 0 sec   T Low 0 sec   Patient Ventilator Parameters   Resp Rate Total 16 br/min   Peak Airway Pressure 34 cmH2O   Mean Airway Pressure 16 cmH20   Plateau Pressure 0 cmH20   Exhaled Vt 717 mL   Total Ve 11.5 mL   Spont Ve 0 L   I:E Ratio Measured 1:1.90   Conventional Ventilator Alarms   Alarms On Y   Ve High Alarm 16 L/min   Resp Rate High Alarm 50 br/min   Press High Alarm 60 cmH2O   Apnea Rate 16   Apnea Volume (mL) 700 mL   Apnea Oxygen Concentration  100   Apnea Flow Rate (L/min) 60   T Apnea 20 sec(s)   Ready to Wean/Extubation Screen   FIO2<=50 (chart decimal) 0.4   MV<16L (chart vol.) 11.5   PEEP <=8 (chart #) 7   Ready to Wean Parameters   F/VT Ratio<105 (RSBI) (!) 22.32     Trach care QS

## 2019-10-03 NOTE — SUBJECTIVE & OBJECTIVE
Past Medical History:   Diagnosis Date    AAA (abdominal aortic aneurysm)     Anemia     BPH (benign prostatic hyperplasia)     CHF (congestive heart failure)     COPD (chronic obstructive pulmonary disease)     Coronary artery disease     Dementia     Dementia     Depression     GERD (gastroesophageal reflux disease)     Hyperlipidemia     Hypertension     Hypothyroid     Renal disorder     Respiratory failure, chronic     Ventilator dependence        Past Surgical History:   Procedure Laterality Date    ABDOMINAL SURGERY      CARDIAC SURGERY  1999    GASTROSTOMY TUBE PLACEMENT      SPINE SURGERY      TRACHEOSTOMY TUBE PLACEMENT         Review of patient's allergies indicates:   Allergen Reactions    Codeine Other (See Comments)       No current facility-administered medications on file prior to encounter.      Current Outpatient Medications on File Prior to Encounter   Medication Sig    acetaminophen (TYLENOL) 160 mg/5 mL Elix 650 mg by Per G Tube route every 4 (four) hours as needed.    albuterol-ipratropium 2.5mg-0.5mg/3mL (DUONEB) 0.5 mg-3 mg(2.5 mg base)/3 mL nebulizer solution Take 3 mLs by nebulization every 6 (six) hours as needed for Wheezing or Shortness of Breath.     amoxicillin-clavulanate 875-125mg (AUGMENTIN) 875-125 mg per tablet 1 tablet by Per G Tube route 2 (two) times daily.    ascorbic acid, vitamin C, (VITAMIN C) 500 mg/5 mL Syrp syrup 500 mg by PEG Tube route 2 (two) times daily.     baclofen (LIORESAL) 20 MG tablet 25 mg by PEG Tube route every 6 (six) hours.     bethanechol (URECHOLINE) 10 MG Tab Take 15 mg by mouth 3 (three) times daily.     busPIRone (BUSPAR) 5 MG Tab 5 mg by Per G Tube route 2 (two) times daily.    carvedilol (COREG) 3.125 MG tablet Take 1 tablet (3.125 mg total) by mouth 2 (two) times daily.    ceFEPIme (MAXIPIME) 1 gram/50 mL PgBk Inject 1 g into the vein every 8 (eight) hours.    cholestyramine-aspartame (QUESTRAN LIGHT) 4 gram PwPk 1  packet (4 g total) by Per G Tube route 2 (two) times daily. Discontinue if no bowel movement in a day    duloxetine (CYMBALTA) 30 MG capsule 30 mg by PEG Tube route once daily.     famotidine (PEPCID) 20 MG tablet 20 mg by Per G Tube route 2 (two) times daily.    ferrous sulfate 220 mg (44 mg iron)/5 mL solution 220 mg by Per G Tube route once daily.    finasteride (PROSCAR) 5 mg tablet 5 mg by PEG Tube route once daily.     furosemide (LASIX) 20 MG tablet Take 1 tablet (20 mg total) by mouth once daily. (Patient taking differently: 20 mg by Per G Tube route once daily. )    gabapentin (NEURONTIN) 300 MG capsule 300 mg by Per G Tube route 3 (three) times daily.    hydrocodone-acetaminophen 10-325mg (NORCO)  mg Tab 1 tablet by Per G Tube route every 6 (six) hours as needed for Pain.    hydrocortisone (PROCTOZONE-HC) 2.5 % rectal cream Place 1 application rectally 2 (two) times daily as needed for Hemorrhoids.    ibuprofen (ADVIL,MOTRIN) 600 MG tablet 600 mg by Per G Tube route every 6 (six) hours as needed for Pain.    Lactoperoxi/Gluc Oxid/Pot Thio (BIOTENE DRY MOUTH MM) Swish and spit 15 mLs 4 (four) times daily. AND/OR PRN    lactulose (CHRONULAC) 10 gram/15 mL solution 20 g by Per G Tube route 4 (four) times daily.    levothyroxine (SYNTHROID) 200 MCG tablet Take 1 tablet (200 mcg total) by mouth once daily.    lidocaine (XYLOCAINE) 5 % Oint ointment Apply 1 g topically as needed (with each trach change).    menthol-zinc oxide (RISAMINE) 0.44-20.6 % Oint Apply 1 application topically once daily. AFTER EACH DIAPER CHANGE    multivit-min-ferrous gluconate (CERTAVITE-ANTIOXID, IRON GLUC,) 9 mg iron/ 15 mL (15 mL) Liqd 15 mLs by Per G Tube route once daily.     nitroGLYCERIN (NITROSTAT) 0.4 MG SL tablet Place 0.4 mg under the tongue every 5 (five) minutes as needed for Chest pain.    polyethylene glycol (GLYCOLAX) 17 gram PwPk 17 g by Per G Tube route every evening.     potassium chloride  (KLOR-CON) 10 MEQ TbSR Take 10 mEq by mouth once daily. Per g tube    rivastigmine (EXELON) 9.5 mg/24 hr PT24 Place 1 patch onto the skin once daily.    selenium sulfide 2.5 % Lotn Apply 1 application topically twice a week. ON Tuesday AND SATURDAY    terazosin (HYTRIN) 1 MG capsule 1 mg by PEG Tube route once daily.    traZODone (DESYREL) 100 MG tablet 100 mg by Per G Tube route every evening.     enoxaparin (LOVENOX) 40 mg/0.4 mL Syrg Inject 40 mg into the skin once daily.    [DISCONTINUED] hydrocortisone 2.5 % cream Apply 1 application topically 2 (two) times daily as needed.     Family History     None        Tobacco Use    Smoking status: Former Smoker    Smokeless tobacco: Never Used   Substance and Sexual Activity    Alcohol use: No    Drug use: No    Sexual activity: Never     Review of Systems   Unable to perform ROS: Patient unresponsive     Objective:     Vital Signs (Most Recent):  Temp: 98.8 °F (37.1 °C) (10/02/19 1217)  Pulse: 86 (10/02/19 1837)  Resp: 16 (10/02/19 1837)  BP: 136/64 (10/02/19 1837)  SpO2: 100 % (10/02/19 1837) Vital Signs (24h Range):  Temp:  [98.8 °F (37.1 °C)] 98.8 °F (37.1 °C)  Pulse:  [64-86] 86  Resp:  [16] 16  SpO2:  [98 %-100 %] 100 %  BP: ()/(31-79) 136/64     Weight: 134.5 kg (296 lb 8.3 oz)  Body mass index is 43.79 kg/m².    Physical Exam   Constitutional: He appears well-developed.   Morbidly obese male who is unresponsive   HENT:   Head: Normocephalic and atraumatic.   Nose: Nose normal. No septal deviation.   Mouth/Throat: Oropharynx is clear and moist.   Eyes: Pupils are equal, round, and reactive to light. Conjunctivae are normal.   Neck: No JVD present. No tracheal tenderness present. No tracheal deviation present. No thyroid mass present.   Tracheostomy site appears fine   Cardiovascular: Normal rate, regular rhythm, S1 normal and S2 normal. Exam reveals no gallop and no friction rub.   No murmur heard.  Pulmonary/Chest: Effort normal and breath  sounds normal. He has no decreased breath sounds. He has no wheezes. He has no rhonchi. He has no rales.   Abdominal: Soft. Normal appearance and bowel sounds are normal. He exhibits no distension and no mass. There is no hepatosplenomegaly, splenomegaly or hepatomegaly. There is no tenderness.   Peg site appears fine   Musculoskeletal:   Trace pedal edema bilateral lower extremities   Neurological: He has normal strength. No cranial nerve deficit or sensory deficit.   Patient is unresponsive to painful stimulus and verbal command   Skin: No rash noted. No cyanosis.   Nursing note and vitals reviewed.        CRANIAL NERVES     CN III, IV, VI   Pupils are equal, round, and reactive to light.       Significant Labs:   Blood Culture: No results for input(s): LABBLOO in the last 48 hours.  CBC:   Recent Labs   Lab 10/02/19  1236   WBC 19.07*   HGB 11.1*   HCT 35.4*   *     CMP:   Recent Labs   Lab 10/02/19  1236      K 5.1   CL 97   CO2 32*   *   BUN 47*   CREATININE 2.7*   CALCIUM 10.4   PROT 8.1   ALBUMIN 2.9*   BILITOT 0.7   ALKPHOS 83   AST 24   ALT 19   ANIONGAP 8   EGFRNONAA 21*     TSH:   Recent Labs   Lab 09/13/19  0422   TSH 28.310*     Urine Culture: No results for input(s): LABURIN in the last 48 hours.  Urine Studies:   Recent Labs   Lab 10/02/19  1321   COLORU Yellow   APPEARANCEUA Clear   PHUR 7.0   SPECGRAV 1.015   PROTEINUA 3+*   GLUCUA 1+*   KETONESU 1+*   BILIRUBINUA 2+*   OCCULTUA 3+*   NITRITE Positive*   UROBILINOGEN 4.0-6.0*   LEUKOCYTESUR 3+*   RBCUA >100*   WBCUA 30*   BACTERIA Moderate*   HYALINECASTS 0       Significant Imaging:   CXR:   New or increased right lung infiltrate, diffusely but more so right upper lung field.  Interval decrease of the left lung infiltrate with left lung today appearing clear.  Right lung infiltrates suggest pneumonia.    CT head without contrast: No evidence of an acute intracranial abnormality.

## 2019-10-03 NOTE — H&P
[facility] Medicine  History & Physical    Patient Name: Masood Messina  MRN: [id number]  Admission Date: 10/2/2019  Attending Physician: Melissa Mayfield MD   Primary Care Provider: Jeramie Lombardi MD         Patient information was obtained from ER records.     Subjective:     Principal Problem:Septic shock    Chief Complaint:   Chief Complaint   Patient presents with    Altered Mental Status     unresponsive    Hypotension        HPI: Patient is an 79-year-old morbidly obese male from [place] with past medical history significant for multiple medical problems including chronic hypoxic hypercarbic respiratory failure (ventilator dependent status post tracheostomy), status post PEG tube placement, hypertension, hyperlipidemia, coronary artery disease, history of cerebrovascular accident, hypothyroidism and prior history of cerebrovascular accident who is functional quadriplegic is being admitted to Hospital Medicine from [facility] where patient presented with septic shock.  Upon arrival patient is unresponsive and blood pressure was noted to be around 72/48 mm of mercury.  Per ER physician reportedly patient had some hematuria for 2 days.  Patient was being treated for pneumonia recently. No further details of HPI.     Past Medical History:   Diagnosis Date    AAA (abdominal aortic aneurysm)     Anemia     BPH (benign prostatic hyperplasia)     CHF (congestive heart failure)     COPD (chronic obstructive pulmonary disease)     Coronary artery disease     Dementia     Dementia     Depression     GERD (gastroesophageal reflux disease)     Hyperlipidemia     Hypertension     Hypothyroid     Renal disorder     Respiratory failure, chronic     Ventilator dependence        Past Surgical History:   Procedure Laterality Date    ABDOMINAL SURGERY      CARDIAC SURGERY  1999    GASTROSTOMY TUBE PLACEMENT      SPINE SURGERY       TRACHEOSTOMY TUBE PLACEMENT         Review of patient's allergies indicates:   Allergen Reactions    Codeine Other (See Comments)       No current facility-administered medications on file prior to encounter.      Current Outpatient Medications on File Prior to Encounter   Medication Sig    acetaminophen (TYLENOL) 160 mg/5 mL Elix 650 mg by Per G Tube route every 4 (four) hours as needed.    albuterol-ipratropium 2.5mg-0.5mg/3mL (DUONEB) 0.5 mg-3 mg(2.5 mg base)/3 mL nebulizer solution Take 3 mLs by nebulization every 6 (six) hours as needed for Wheezing or Shortness of Breath.     amoxicillin-clavulanate 875-125mg (AUGMENTIN) 875-125 mg per tablet 1 tablet by Per G Tube route 2 (two) times daily.    ascorbic acid, vitamin C, (VITAMIN C) 500 mg/5 mL Syrp syrup 500 mg by PEG Tube route 2 (two) times daily.     baclofen (LIORESAL) 20 MG tablet 25 mg by PEG Tube route every 6 (six) hours.     bethanechol (URECHOLINE) 10 MG Tab Take 15 mg by mouth 3 (three) times daily.     busPIRone (BUSPAR) 5 MG Tab 5 mg by Per G Tube route 2 (two) times daily.    carvedilol (COREG) 3.125 MG tablet Take 1 tablet (3.125 mg total) by mouth 2 (two) times daily.    ceFEPIme (MAXIPIME) 1 gram/50 mL PgBk Inject 1 g into the vein every 8 (eight) hours.    cholestyramine-aspartame (QUESTRAN LIGHT) 4 gram PwPk 1 packet (4 g total) by Per G Tube route 2 (two) times daily. Discontinue if no bowel movement in a day    duloxetine (CYMBALTA) 30 MG capsule 30 mg by PEG Tube route once daily.     famotidine (PEPCID) 20 MG tablet 20 mg by Per G Tube route 2 (two) times daily.    ferrous sulfate 220 mg (44 mg iron)/5 mL solution 220 mg by Per G Tube route once daily.    finasteride (PROSCAR) 5 mg tablet 5 mg by PEG Tube route once daily.     furosemide (LASIX) 20 MG tablet Take 1 tablet (20 mg total) by mouth once daily. (Patient taking differently: 20 mg by Per G Tube route once daily. )    gabapentin (NEURONTIN) 300 MG capsule  300 mg by Per G Tube route 3 (three) times daily.    hydrocodone-acetaminophen 10-325mg (NORCO)  mg Tab 1 tablet by Per G Tube route every 6 (six) hours as needed for Pain.    hydrocortisone (PROCTOZONE-HC) 2.5 % rectal cream Place 1 application rectally 2 (two) times daily as needed for Hemorrhoids.    ibuprofen (ADVIL,MOTRIN) 600 MG tablet 600 mg by Per G Tube route every 6 (six) hours as needed for Pain.    Lactoperoxi/Gluc Oxid/Pot Thio (BIOTENE DRY MOUTH MM) Swish and spit 15 mLs 4 (four) times daily. AND/OR PRN    lactulose (CHRONULAC) 10 gram/15 mL solution 20 g by Per G Tube route 4 (four) times daily.    levothyroxine (SYNTHROID) 200 MCG tablet Take 1 tablet (200 mcg total) by mouth once daily.    lidocaine (XYLOCAINE) 5 % Oint ointment Apply 1 g topically as needed (with each trach change).    menthol-zinc oxide (RISAMINE) 0.44-20.6 % Oint Apply 1 application topically once daily. AFTER EACH DIAPER CHANGE    multivit-min-ferrous gluconate (CERTAVITE-ANTIOXID, IRON GLUC,) 9 mg iron/ 15 mL (15 mL) Liqd 15 mLs by Per G Tube route once daily.     nitroGLYCERIN (NITROSTAT) 0.4 MG SL tablet Place 0.4 mg under the tongue every 5 (five) minutes as needed for Chest pain.    polyethylene glycol (GLYCOLAX) 17 gram PwPk 17 g by Per G Tube route every evening.     potassium chloride (KLOR-CON) 10 MEQ TbSR Take 10 mEq by mouth once daily. Per g tube    rivastigmine (EXELON) 9.5 mg/24 hr PT24 Place 1 patch onto the skin once daily.    selenium sulfide 2.5 % Lotn Apply 1 application topically twice a week. ON Tuesday AND SATURDAY    terazosin (HYTRIN) 1 MG capsule 1 mg by PEG Tube route once daily.    traZODone (DESYREL) 100 MG tablet 100 mg by Per G Tube route every evening.     enoxaparin (LOVENOX) 40 mg/0.4 mL Syrg Inject 40 mg into the skin once daily.    [DISCONTINUED] hydrocortisone 2.5 % cream Apply 1 application topically 2 (two) times daily as needed.     Family History     None         Tobacco Use    Smoking status: Former Smoker    Smokeless tobacco: Never Used   Substance and Sexual Activity    Alcohol use: No    Drug use: No    Sexual activity: Never     Review of Systems   Unable to perform ROS: Patient unresponsive     Objective:     Vital Signs (Most Recent):  Temp: 98.8 °F (37.1 °C) (10/02/19 1217)  Pulse: 86 (10/02/19 1837)  Resp: 16 (10/02/19 1837)  BP: 136/64 (10/02/19 1837)  SpO2: 100 % (10/02/19 1837) Vital Signs (24h Range):  Temp:  [98.8 °F (37.1 °C)] 98.8 °F (37.1 °C)  Pulse:  [64-86] 86  Resp:  [16] 16  SpO2:  [98 %-100 %] 100 %  BP: ()/(31-79) 136/64     Weight: 134.5 kg (296 lb 8.3 oz)  Body mass index is 43.79 kg/m².    Physical Exam   Constitutional: He appears well-developed.   Morbidly obese male who is unresponsive   HENT:   Head: Normocephalic and atraumatic.   Nose: Nose normal. No septal deviation.   Mouth/Throat: Oropharynx is clear and moist.   Eyes: Pupils are equal, round, and reactive to light. Conjunctivae are normal.   Neck: No JVD present. No tracheal tenderness present. No tracheal deviation present. No thyroid mass present.   Tracheostomy site appears fine   Cardiovascular: Normal rate, regular rhythm, S1 normal and S2 normal. Exam reveals no gallop and no friction rub.   No murmur heard.  Pulmonary/Chest: Effort normal and breath sounds normal. He has no decreased breath sounds. He has no wheezes. He has no rhonchi. He has no rales.   Abdominal: Soft. Normal appearance and bowel sounds are normal. He exhibits no distension and no mass. There is no hepatosplenomegaly, splenomegaly or hepatomegaly. There is no tenderness.   Peg site appears fine   Musculoskeletal:   Trace pedal edema bilateral lower extremities   Neurological: He has normal strength. No cranial nerve deficit or sensory deficit.   Patient is unresponsive to painful stimulus and verbal command   Skin: No rash noted. No cyanosis.   Nursing note and vitals reviewed.        CRANIAL  NERVES     CN III, IV, VI   Pupils are equal, round, and reactive to light.       Significant Labs:   Blood Culture: No results for input(s): LABBLOO in the last 48 hours.  CBC:   Recent Labs   Lab 10/02/19  1236   WBC 19.07*   HGB 11.1*   HCT 35.4*   *     CMP:   Recent Labs   Lab 10/02/19  1236      K 5.1   CL 97   CO2 32*   *   BUN 47*   CREATININE 2.7*   CALCIUM 10.4   PROT 8.1   ALBUMIN 2.9*   BILITOT 0.7   ALKPHOS 83   AST 24   ALT 19   ANIONGAP 8   EGFRNONAA 21*     TSH:   Recent Labs   Lab 09/13/19  0422   TSH 28.310*     Urine Culture: No results for input(s): LABURIN in the last 48 hours.  Urine Studies:   Recent Labs   Lab 10/02/19  1321   COLORU Yellow   APPEARANCEUA Clear   PHUR 7.0   SPECGRAV 1.015   PROTEINUA 3+*   GLUCUA 1+*   KETONESU 1+*   BILIRUBINUA 2+*   OCCULTUA 3+*   NITRITE Positive*   UROBILINOGEN 4.0-6.0*   LEUKOCYTESUR 3+*   RBCUA >100*   WBCUA 30*   BACTERIA Moderate*   HYALINECASTS 0       Significant Imaging:   CXR:   New or increased right lung infiltrate, diffusely but more so right upper lung field.  Interval decrease of the left lung infiltrate with left lung today appearing clear.  Right lung infiltrates suggest pneumonia.    CT head without contrast: No evidence of an acute intracranial abnormality.        Assessment/Plan:     * Septic shock  Masood Messina is a 79 y.o. male who presents to the Emergency Department with septic shock.  This patient does) have evidence of infective focus  My overall impression is healthcare associated pneumonia and UTI.  Lab Results   Component Value Date    WBC 19.07 (H) 10/02/2019    HGB 11.1 (L) 10/02/2019    HCT 35.4 (L) 10/02/2019    MCV 98 10/02/2019     (L) 10/02/2019    Trend lactic acid.  Organ dysfunction indicated by altered mental status and acute kidney injury  Fluid bolus is being given.  Vital signs post fluid administrations were-    Temp Readings from Last 1 Encounters:   10/02/19 98.8 °F (37.1 °C)  (Rectal)     BP Readings from Last 1 Encounters:   10/02/19 136/64     Pulse Readings from Last 1 Encounters:   10/02/19 86    Check serial cardiac enzymes and use intravenous pressor agent as needed to maintain mean arterial pressure above 65 mm of mercury.        Coronary artery disease  Telemonitoring.  Trend serial cardiac enzymes.  Holding antihypertensive agent due to hypotension.      Acquired hypothyroidism  Chronic problem. Will continue chronic medications and monitor for any changes, adjusting as needed.          PEG (percutaneous endoscopic gastrostomy) status  PEG care.  Resume tube feeding in a.m.      Urinary tract infection without hematuria  As above      Hematuria  Monitor for any worsening hematuria.  If symptoms persist consult urologist.      Chronic respiratory failure with hypoxia and hypercapnia  Continue mechanical ventilation as before      Tracheostomy in place  Trach care      Acute on chronic respiratory failure with hypoxia and hypercapnia  Continue mechanical ventilation as before.  Supplemental O2 via nasal canula; titrate O2 saturation to >92%.   Pulmonary consultation.   Continue beta 2 agonist bronchodilator treatments.   Continue IV antibiotics  - vancomycin/Zosyn/Levaquin  Check sputum GS and Cx.   Continue routine medications as before.             Functional quadriplegia  Supportive care, skin care, rotate patient every 2 hr        VTE Risk Mitigation (From admission, onward)         Ordered     IP VTE HIGH RISK PATIENT  Once .  Avoiding anticoagulation due to hematuria.    10/02/19 1710     Place sequential compression device  Until discontinued      10/02/19 1710     Place EYAD hose  Until discontinued      10/02/19 1710              Critical care time spent on the evaluation and treatment of severe organ dysfunction, review of pertinent labs and imaging studies, discussions with consulting providers and discussions with patient/family: 60 minutes.     Melissa Mayfield,  MD  Department of Hospital Medicine   [facility]

## 2019-10-03 NOTE — PROGRESS NOTES
[facility]  Progress Note    Patient Name: Masood Messina  MRN: [id number]  Patient Class: IP- Inpatient   Admission Date: 10/2/2019  Length of Stay: 1 days  Attending Physician: Melissa Mayfield MD  Primary Care Provider: Jeramie Lombardi MD        Subjective:     Principal Problem:Septic shock    HPI:  Patient is an 79-year-old morbidly obese male from [facility] with past medical history significant for multiple medical problems including chronic hypoxic hypercarbic respiratory failure (ventilator dependent status post tracheostomy), status post PEG tube placement, hypertension, hyperlipidemia, coronary artery disease, history of cerebrovascular accident, hypothyroidism and prior history of cerebrovascular accident who is functional quadriplegic is being admitted to Hospital Medicine from [facility] where patient presented with septic shock.  Upon arrival patient is unresponsive and blood pressure was noted to be around 72/48 mm of mercury.  Per ER physician reportedly patient had some hematuria for 2 days.  Patient was being treated for pneumonia recently. No further details of HPI.     Overview/Hospital Course:  No notes on file    Interval History:  In intensive care unit, receiving management for septic shock related to healthcare associated pneumonia and urinary tract infection.  On intravenous Boni-Synephrine.  Patient is more alert and responsive today.  No focal complaints.  Dr. Robles from Urology assisting with hematuria.    Review of Systems   Unable to perform ROS: Patient unresponsive     Objective:     Vital Signs (Most Recent):  Temp: 98.5 °F (36.9 °C) (10/03/19 0313)  Pulse: 86 (10/03/19 0849)  Resp: 16 (10/03/19 0849)  BP: (!) 98/53 (10/03/19 0849)  SpO2: 100 % (10/03/19 0849) Vital Signs (24h Range):  Temp:  [97.7 °F (36.5 °C)-98.8 °F (37.1 °C)] 98.5 °F (36.9 °C)  Pulse:  [62-97] 86  Resp:  [6-21] 16  SpO2:  [88  %-100 %] 100 %  BP: ()/(31-79) 98/53     Weight: 132.2 kg (291 lb 7.2 oz)  Body mass index is 43.04 kg/m².    Intake/Output Summary (Last 24 hours) at 10/3/2019 0859  Last data filed at 10/3/2019 0724  Gross per 24 hour   Intake 3005.9 ml   Output 5005 ml   Net -1999.1 ml      Physical Exam   Constitutional: He appears well-developed.   Morbidly obese male who is unresponsive   HENT:   Head: Normocephalic and atraumatic.   Nose: Nose normal. No septal deviation.   Mouth/Throat: Oropharynx is clear and moist.   Eyes: Pupils are equal, round, and reactive to light. Conjunctivae are normal.   Neck: No JVD present. No tracheal tenderness present. No tracheal deviation present. No thyroid mass present.   Tracheostomy site appears fine   Cardiovascular: Normal rate, regular rhythm, S1 normal and S2 normal. Exam reveals no gallop and no friction rub.   No murmur heard.  Pulmonary/Chest: Effort normal and breath sounds normal. He has no decreased breath sounds. He has no wheezes. He has no rhonchi. He has no rales.   Abdominal: Soft. Normal appearance and bowel sounds are normal. He exhibits no distension and no mass. There is no hepatosplenomegaly, splenomegaly or hepatomegaly. There is no tenderness.   Peg site appears fine   Musculoskeletal:   Trace pedal edema bilateral lower extremities   Neurological: He has normal strength. No cranial nerve deficit or sensory deficit.   Patient is unresponsive to painful stimulus and verbal command   Skin: No rash noted. No cyanosis.   Nursing note and vitals reviewed.    Significant Labs:   Blood Culture:   Recent Labs   Lab 10/02/19  1305   LABBLOO No Growth to date  No Growth to date     CBC:   Recent Labs   Lab 10/02/19  1236 10/02/19  2340 10/03/19  0608   WBC 19.07* 28.18* 19.18*  19.18*   HGB 11.1* 10.0* 10.7*  10.7*   HCT 35.4* 30.6* 33.4*  33.4*   * 146* 129*  129*     CMP:   Recent Labs   Lab 10/02/19  1236 10/02/19  2340 10/03/19  0609    139 139   139   K 5.1 4.8 4.7  4.7   CL 97 103 105  105   CO2 32* 26 26  26   * 138* 112*  112*   BUN 47* 48* 46*  46*   CREATININE 2.7* 2.6* 2.4*  2.4*   CALCIUM 10.4 9.4 9.3  9.3   PROT 8.1  --  6.6   ALBUMIN 2.9*  --  2.3*   BILITOT 0.7  --  1.0   ALKPHOS 83  --  72   AST 24  --  23   ALT 19  --  16   ANIONGAP 8 10 8  8   EGFRNONAA 21* 22* 25*  25*     Coagulation:   Recent Labs   Lab 10/02/19  1236   INR 1.2   APTT 32.6*     Lactic Acid:   Recent Labs   Lab 10/02/19  1236 10/02/19  1643   LACTATE 1.4 2.5*     Troponin:   Recent Labs   Lab 10/02/19  1236   TROPONINI 0.028*     Urine Culture: No results for input(s): LABURIN in the last 48 hours.    Significant Imaging:   CXR:   New or increased right lung infiltrate, diffusely but more so right upper lung field.  Interval decrease of the left lung infiltrate with left lung today appearing clear.  Right lung infiltrates suggest pneumonia.     CT head without contrast: No evidence of an acute intracranial abnormality.      Assessment/Plan:      * Septic shock  Masood Messina is a 79 y.o. male who presents to the Emergency Department with septic shock.  This patient does) have evidence of infective focus  My overall impression is healthcare associated pneumonia and UTI.  Lab Results   Component Value Date    WBC 19.18 (H) 10/03/2019    WBC 19.18 (H) 10/03/2019    HGB 10.7 (L) 10/03/2019    HGB 10.7 (L) 10/03/2019    HCT 33.4 (L) 10/03/2019    HCT 33.4 (L) 10/03/2019    MCV 94 10/03/2019    MCV 94 10/03/2019     (L) 10/03/2019     (L) 10/03/2019     Organ dysfunction indicated by altered mental status and acute kidney injury  Vital signs post fluid administrations were-    Temp Readings from Last 1 Encounters:   10/03/19 98.5 °F (36.9 °C) (Axillary)     BP Readings from Last 1 Encounters:   10/03/19 (!) 98/53     Pulse Readings from Last 1 Encounters:   10/03/19 86    Use intravenous pressor agent to maintain mean arterial pressure above 65  mm of mercury as needed.  Repeat lactic acid.    Coronary artery disease  Telemonitoring.  Holding antihypertensive agent due to hypotension.    Acquired hypothyroidism  Chronic problem. Will continue chronic medications and monitor for any changes, adjusting as needed.    PEG (percutaneous endoscopic gastrostomy) status  PEG care.  Resume tube feeding.    Urinary tract infection without hematuria  As above    Hematuria  Monitor for any worsening hematuria.  Follow Dr. Robles recommendations.  Bladder irrigation and flushing on as needed basis.    Chronic respiratory failure with hypoxia and hypercapnia  Continue mechanical ventilation as before    Tracheostomy in place  Trach care    Acute on chronic respiratory failure with hypoxia and hypercapnia  Continue mechanical ventilation as before.  Supplemental O2 via nasal canula; titrate O2 saturation to >92%.   Follow Pulmonary recommendations.  Following contact isolation protocol for recent Pseudomonas pneumonia.  Continue beta 2 agonist bronchodilator treatments.   Continue IV antibiotics  - vancomycin/Zosyn/Levaquin  Check sputum GS and Cx.   Continue routine medications as before.     Functional quadriplegia  Supportive care, skin care, rotate patient every 2 hr    VTE Risk Mitigation (From admission, onward)         Ordered     IP VTE HIGH RISK PATIENT  Once      10/02/19 1710     Place sequential compression device  Until discontinued      10/02/19 1710     Place EYAD hose  Until discontinued      10/02/19 1710              Critical care time spent on the evaluation and treatment of severe organ dysfunction, review of pertinent labs and imaging studies, discussions with consulting providers and discussions with patient/family: 37 minutes.    Melissa Mayfiedl MD  Department of Hospital Medicine   [facility]

## 2019-10-03 NOTE — ASSESSMENT & PLAN NOTE
Continue mechanical ventilation as before.  Supplemental O2 via nasal canula; titrate O2 saturation to >92%.   Follow Pulmonary recommendations.  Following contact isolation protocol for recent Pseudomonas pneumonia.  Continue beta 2 agonist bronchodilator treatments.   Continue IV antibiotics  - vancomycin/Zosyn/Levaquin  Check sputum GS and Cx.   Continue routine medications as before.

## 2019-10-03 NOTE — PLAN OF CARE
Intervention: to be determined     Recommendation:   1) Initiate TF via PEG- Nutren 1.5 @ 20 ml/hr advancing to goal of 60 ml/hr + 180 ml flush q 4 hr once IVF d/c'd   ( 2160 kcal ( 96% EEN), 97 g protien( 93%EPN), and 1094 ml free water)      2) If unable to advance PO diet in < 5 days consider PPN   3) Weigh pt weekly      Goals: 1) Nutrition support initiated in < 5 days  Nutrition Goal Status: new  Communication of RD Recs: (POC, sticky note, reviewed with RN)

## 2019-10-03 NOTE — SUBJECTIVE & OBJECTIVE
Interval History:  In intensive care unit, receiving management for septic shock related to healthcare associated pneumonia and urinary tract infection.  Weaned off intravenous pressor agents.  Patient is more alert and responsive today.  No focal complaints.    Review of Systems   Unable to perform ROS: Patient unresponsive     Objective:     Vital Signs (Most Recent):  Temp: 98.5 °F (36.9 °C) (10/03/19 0313)  Pulse: 86 (10/03/19 0849)  Resp: 16 (10/03/19 0849)  BP: (!) 98/53 (10/03/19 0849)  SpO2: 100 % (10/03/19 0849) Vital Signs (24h Range):  Temp:  [97.7 °F (36.5 °C)-98.8 °F (37.1 °C)] 98.5 °F (36.9 °C)  Pulse:  [62-97] 86  Resp:  [6-21] 16  SpO2:  [88 %-100 %] 100 %  BP: ()/(31-79) 98/53     Weight: 132.2 kg (291 lb 7.2 oz)  Body mass index is 43.04 kg/m².    Intake/Output Summary (Last 24 hours) at 10/3/2019 0859  Last data filed at 10/3/2019 0724  Gross per 24 hour   Intake 3005.9 ml   Output 5005 ml   Net -1999.1 ml      Physical Exam   Constitutional: He appears well-developed.   Morbidly obese male who is unresponsive   HENT:   Head: Normocephalic and atraumatic.   Nose: Nose normal. No septal deviation.   Mouth/Throat: Oropharynx is clear and moist.   Eyes: Pupils are equal, round, and reactive to light. Conjunctivae are normal.   Neck: No JVD present. No tracheal tenderness present. No tracheal deviation present. No thyroid mass present.   Tracheostomy site appears fine   Cardiovascular: Normal rate, regular rhythm, S1 normal and S2 normal. Exam reveals no gallop and no friction rub.   No murmur heard.  Pulmonary/Chest: Effort normal and breath sounds normal. He has no decreased breath sounds. He has no wheezes. He has no rhonchi. He has no rales.   Abdominal: Soft. Normal appearance and bowel sounds are normal. He exhibits no distension and no mass. There is no hepatosplenomegaly, splenomegaly or hepatomegaly. There is no tenderness.   Peg site appears fine   Musculoskeletal:   Trace pedal edema  bilateral lower extremities   Neurological: He has normal strength. No cranial nerve deficit or sensory deficit.   Patient is unresponsive to painful stimulus and verbal command   Skin: No rash noted. No cyanosis.   Nursing note and vitals reviewed.    Significant Labs:   Blood Culture:   Recent Labs   Lab 10/02/19  1305   LABBLOO No Growth to date  No Growth to date     CBC:   Recent Labs   Lab 10/02/19  1236 10/02/19  2340 10/03/19  0608   WBC 19.07* 28.18* 19.18*  19.18*   HGB 11.1* 10.0* 10.7*  10.7*   HCT 35.4* 30.6* 33.4*  33.4*   * 146* 129*  129*     CMP:   Recent Labs   Lab 10/02/19  1236 10/02/19  2340 10/03/19  0609    139 139  139   K 5.1 4.8 4.7  4.7   CL 97 103 105  105   CO2 32* 26 26  26   * 138* 112*  112*   BUN 47* 48* 46*  46*   CREATININE 2.7* 2.6* 2.4*  2.4*   CALCIUM 10.4 9.4 9.3  9.3   PROT 8.1  --  6.6   ALBUMIN 2.9*  --  2.3*   BILITOT 0.7  --  1.0   ALKPHOS 83  --  72   AST 24  --  23   ALT 19  --  16   ANIONGAP 8 10 8  8   EGFRNONAA 21* 22* 25*  25*     Coagulation:   Recent Labs   Lab 10/02/19  1236   INR 1.2   APTT 32.6*     Lactic Acid:   Recent Labs   Lab 10/02/19  1236 10/02/19  1643   LACTATE 1.4 2.5*     Troponin:   Recent Labs   Lab 10/02/19  1236   TROPONINI 0.028*     Urine Culture: No results for input(s): LABURIN in the last 48 hours.    Significant Imaging:   CXR:   New or increased right lung infiltrate, diffusely but more so right upper lung field.  Interval decrease of the left lung infiltrate with left lung today appearing clear.  Right lung infiltrates suggest pneumonia.     CT head without contrast: No evidence of an acute intracranial abnormality.

## 2019-10-03 NOTE — ANESTHESIA PROCEDURE NOTES
Central Line    Diagnosis: SEPSIS  Doctor requesting consult: ED MD  Patient location during procedure: ICU  Procedure start time: 10/2/2019 6:00 PM  Timeout: 10/2/2019 6:00 PM  Procedure end time: 10/2/2019 6:45 PM    Staffing  Authorizing Provider: Noel Teague MD  Performing Provider: Noel Teague MD    Staffing  Anesthesiologist: Noel Teague MD  Performed: anesthesiologist   Anesthesiologist was present at the time of the procedure.  Preanesthetic Checklist  Completed: patient identified, site marked, surgical consent, pre-op evaluation, timeout performed, IV checked, risks and benefits discussed, monitors and equipment checked and anesthesia consent given  Indication   Indication: hemodynamic monitoring, vascular access, med administration, hemodialysis     Anesthesia   local infiltration    Central Line   Skin Prep: skin prepped with ChloraPrep, skin prep agent completely dried prior to procedure  maximum sterile barriers used during central venous catheter insertion  hand hygiene performed prior to central venous catheter insertion  Location: left, femoral.   Catheter type: triple lumen  Catheter Size: 7.5 Fr  Ultrasound: vascular probe with ultrasound  Vessel Caliber: medium, patent  Vascular Doppler:  not done  Needle advanced into vessel with real time Ultrasound guidance.  Guidewire confirmed in vessel.  Manometry: none  Insertion Attempts: 2   Securement:line sutured, chlorhexidine patch, sterile dressing applied and blood return through all ports    Post-Procedure   X-Ray: successful placement  Adverse Events:none    Guidewire Guidewire removed intact, verified with nurse.  Additional Notes  U/S does not have image capture capability. Tastemaker U/S

## 2019-10-03 NOTE — CONSULTS
"  10/03/2019      Admit Date: 10/2/2019  Masood Messina  New Patient Consult    Chief Complaint   Patient presents with    Altered Mental Status     unresponsive    Hypotension       History of Present Illness:     Pt with trach, morbid obese, left hemiparesis.  Not interacting well - eyes open but no  Head nods or response to questioning.     Pt was at [facility] 9/12-14- dc note indicates pulm edema - pt had had wk abx pta that admit.            From erp note on 10/2/19:"Masood Messina is a 79 y.o. male with HTN, CHF, COPD, dementia, renal disorder, and ventilator dependent who presents to the ED via EMS from [facility] with an onset of decreased mental status. Per EMS, the patient was noted to be unresponsive and hypotension - 70/40. He has a BS of 129. At baseline, he is "very responsive" and usually complains if his feet are touched.The patient began to have some blood in his urine 2 days ago that has progressively increased. Currently, he is being treated for PNA. The patient has a PSHx including a cardiac surgery (1999). No known drug allergy."   "    PFSH:  Past Medical History:   Diagnosis Date    AAA (abdominal aortic aneurysm)     Anemia     BPH (benign prostatic hyperplasia)     CHF (congestive heart failure)     COPD (chronic obstructive pulmonary disease)     Coronary artery disease     Dementia     Dementia     Depression     GERD (gastroesophageal reflux disease)     Hyperlipidemia     Hypertension     Hypothyroid     Renal disorder     Respiratory failure, chronic     Ventilator dependence      Past Surgical History:   Procedure Laterality Date    ABDOMINAL SURGERY      CARDIAC SURGERY  1999    GASTROSTOMY TUBE PLACEMENT      SPINE SURGERY      TRACHEOSTOMY TUBE PLACEMENT       Social History     Tobacco Use    Smoking status: Former Smoker    Smokeless tobacco: Never Used   Substance Use Topics    Alcohol use: No    Drug use: No "     History reviewed. No pertinent family history.  Review of patient's allergies indicates:   Allergen Reactions    Codeine Other (See Comments)       Performance Status:Performance Status:The patient's activity level is bedbound.    Review of Systems:  due to neurologic status/impairments a Review of Systems could not be obtained       Exam:Comprehensive exam done. BP (!) 110/51   Pulse 86   Temp 98.9 °F (37.2 °C) (Axillary)   Resp 14   Ht 6' (1.829 m)   Wt 132.2 kg (291 lb 7.2 oz)   SpO2 98%   BMI 39.53 kg/m²   Exam included Vitals as listed, and patient's appearance and affect and alertness and mood, oral exam for yeast and hygiene and pharynx lesions and Mallapatti (M) score, neck with inspection for jvd and masses and thyroid abnormalities and lymph nodes (supraclavicular and infraclavicular nodes also examined and noted if abn), chest exam included symmetry and effort and fremitus and percussion and auscultation, cardiac exam included rhythm and gallops and murmur and rubs and jvd and edema, abdominal exam for mass and hepatosplenomegaly and tenderness and hernias and bowel sounds, Musculoskeletal exam with muscle tone and posture and mobility/gait and  strenght, and skin for rashes and cyanosis and pallor and turgor, extremity for clubbing.  Findings were normal except as listed below:  Morbid obese, trach , left spastic paresis.  Anasarca with edema to chest wall, left femoral triple lumen.  No clubbing. chest is symmetric, no distress, normal percussion, normal fremitus and min rhonchi, no clubbing , distent cor tones          Radiographs reviewed: view by direct vision - cxr on 2nd oct with new/increased rul infiltrate.    Impression       New or increased right lung infiltrate, diffusely but more so right upper lung field.  Interval decrease of the left lung infiltrate with left lung today appearing clear.  Right lung infiltrates suggest pneumonia      Electronically signed by: Anne Machuca  MD  Date: 10/02/2019  Time: 13:00       Results for orders placed during the hospital encounter of 10/06/17   X-Ray Chest 1 View for PICC_Central line    Narrative A PICC has been placed on the right and ends in the distal superior vena cava. Tracheostomy tube remains in position. The patient has had a prior sternotomy. Cardiac size is within normal limits. There is no pulmonary mass or infiltrate is seen.    Impression  PICC in good position without pneumothorax. Tracheostomy tube in place. Prior sternotomy.      Electronically signed by: Alexsander Adrian MD  Date:     10/09/17  Time:    15:43    ]   10d ago   Respiratory Culture No S aureus isolated.    Respiratory Culture Abnormal    PSEUDOMONAS AERUGINOSA   Many     Gram Stain (Respiratory) <10 epithelial cells per low power field.    Gram Stain (Respiratory) Moderate WBC's    Gram Stain (Respiratory) Few Gram positive rods    Gram Stain (Respiratory) Rare Gram negative rods    Resulting Agency [facility]   Susceptibility      Pseudomonas aeruginosa     CULTURE, RESPIRATORY     Amikacin <=16 mcg/mL Sensitive     Cefepime 4 mcg/mL Sensitive     Ciprofloxacin >2 mcg/mL Resistant     Gentamicin >8 mcg/mL Resistant     Piperacillin/Tazo <=16 mcg/mL Sensitive     Tobramycin >8 mcg/mL Resistant            Linear View          Labs     Recent Labs   Lab 10/02/19  2340 10/03/19  0608   WBC 28.18* 19.18*  19.18*   HGB 10.0* 10.7*  10.7*   HCT 30.6* 33.4*  33.4*   * 129*  129*   BAND 5.0  --      Recent Labs   Lab 10/03/19  0609 10/03/19  1104     139  --    K 4.7  4.7  --      105  --    CO2 26  26  --    BUN 46*  46*  --    CREATININE 2.4*  2.4*  --    *  112*  --    CALCIUM 9.3  9.3  --    MG 2.1  --    PHOS 2.4*  --    AST 23  --    ALT 16  --    ALKPHOS 72  --    BILITOT 1.0  --    PROT 6.6  --    ALBUMIN 2.3*  --    LACTATE  --  1.4     No results for input(s): PH, PCO2, PO2, HCO3 in the last 24 hours.  Microbiology Results (last 7  days)     Procedure Component Value Units Date/Time    Blood culture #1 **CANNOT BE ORDERED STAT** [[id number]] Collected:  10/02/19 1305    Order Status:  Completed Specimen:  Blood Updated:  10/03/19 0545     Blood Culture, Routine No Growth to date    Blood culture #2 **CANNOT BE ORDERED STAT** [[id number]] Collected:  10/02/19 1305    Order Status:  Completed Specimen:  Blood Updated:  10/03/19 0545     Blood Culture, Routine No Growth to date    Influenza A & B by Molecular [[id number]] Collected:  10/02/19 1300    Order Status:  Completed Specimen:  Nasopharyngeal Swab Updated:  10/02/19 1349     Influenza A, Molecular Negative     Influenza B, Molecular Negative     Flu A & B Source Nasal swab    Urine culture [[id number]] Collected:  10/02/19 1321    Order Status:  No result Specimen:  Urine Updated:  10/02/19 1336          Impression:  Active Hospital Problems    Diagnosis  POA    *Septic shock [A41.9, R65.21]  Yes    Pneumonia of right upper lobe due to Pseudomonas species [J15.1]  Yes    Acute on chronic diastolic congestive heart failure [I50.33]  Yes    Thrombocytopenia [D69.6]  Yes    Coronary artery disease [I25.10]  Yes    Urinary tract infection without hematuria [N39.0]  Yes    PEG (percutaneous endoscopic gastrostomy) status [Z93.1]  Not Applicable    Acquired hypothyroidism [E03.9]  Yes    Hematuria [R31.9]  Yes    Functional quadriplegia [R53.2]  Yes    Acute on chronic respiratory failure with hypoxia and hypercapnia [J96.21, J96.22]  Yes    Chronic respiratory failure with hypoxia and hypercapnia [J96.11, J96.12]  Yes    Tracheostomy in place [Z93.0]  Not Applicable      Resolved Hospital Problems   No resolved problems to display.               Plan: pt dced wks ago- had pseudomonas - now septic shock - pseudomonas pneumonia likely playing a role .  Vol overloaded diastolic chf also.  Left hemiparesis/functional quad, uti , septic shock   levaquin likely not needed/effective?  Await  cultures.

## 2019-10-03 NOTE — PROGRESS NOTES
Irrigated bladder again with sterile water. Able to evacuate several clots. Dr. Georges at bedside. Urine irrigated to a clear light yellow.

## 2019-10-03 NOTE — NURSING
Spoke with Dr Red regarding consult and updated on pt condition, hematuria and clots. Stated to remove current tay and replace with #24 three way with irrigation orders given through secure chat.     2130  Unsuccessful placement of #24 3 way cath . Able to place without difficulty but no urine returned and unable to irrigate. Relayed this to Dr Red per phone. Stated to remove and replace with #24 Coude cath.     2200  # 24 Coude cath inserted with bloody urine returned. Updated Dr Red per phone. Stated to keep pt NPO and irrigate every 2 hours

## 2019-10-03 NOTE — PLAN OF CARE
Remains trached on [id number] at ordered settings. ambu and mask at [facility], alarms on and working. Will continue to monitor.       10/03/19 0849   PRE-TX-O2   O2 Device (Oxygen Therapy) ventilator   $ Is the patient on Low Flow Oxygen? Yes   Oxygen Concentration (%) 40   SpO2 100 %   Pulse Oximetry Type Continuous   $ Pulse Oximetry - Multiple Charge Pulse Oximetry - Multiple   Pulse 86   Resp 16   BP (!) 98/53   ETCO2   $ ETCO2 Charge Exhaled CO2 Monitoring   $ ETCO2 Usage Currently wearing   ETCO2 (mmHg) 29 mmHg   ETCO2 Device Type Bedside Monitor;Artificial Airway   Wound Care   Area of Concern Neck;Neck under tracheostomy   Skin Color/Characteristics pale   Skin Temperature warm        Surgical Airway Portex Cuffed;Fenestrated   No Placement Date or Time found.   Present Prior to Hospital Arrival?: Yes  Inserted by: Present Prior to Hospital Arrival  Type: Tracheostomy  Brand: Portex  Airway Device Size: 8.0  Style: Cuffed;Fenestrated   Cuff Pressure 30 cm H2O   Cuff Inflation? Inflated   Status Secured   Ties Assessment Secure   Respiratory Interventions   Airway/Ventilation Management airway patency maintained;pulmonary hygiene promoted   Airway/Ventilation Support pulmonary hygiene promoted   VAP Prevention Bundle HOB elevation maintained;oral care regularly provided   Vent Select   Conventional Vent Y   $ Ventilator Subsequent 1   Charged w/in last 24h YES   IHI Ventilator Associated Pneumonia Bundle   Oral Care Mouth suctioned   Additional VAP Prevention Documentation Clean equipment maintained   Preset Conventional Ventilator Settings   Vent Type    Ventilation Type VC   Vent Mode A/C   Humidity HME   Set Rate 16 bmp   Vt Set 700 mL   PEEP/CPAP 7 cmH20   Pressure Support 0 cmH20   Waveform RAMP   Peak Flow 60 L/min   Set Inspiratory Pressure 0 cmH20   Insp Time 0 Sec(s)   Plateau Set/Insp. Hold (sec) 0   Insp Rise Time  0 %   Trigger Sensitivity Flow/I-Trigger 3 L/min   P High 0 cm H2O   P Low 0 cm H2O   T  High 0 sec   T Low 0 sec   Patient Ventilator Parameters   Resp Rate Total 16 br/min   Peak Airway Pressure 35 cmH2O   Mean Airway Pressure 16 cmH20   Plateau Pressure 0 cmH20   Exhaled Vt 710 mL   Total Ve 11.4 mL   Spont Ve 0 L   I:E Ratio Measured 1:1.90   Conventional Ventilator Alarms   Alarms On Y   Ve High Alarm 16 L/min   Resp Rate High Alarm 50 br/min   Press High Alarm 60 cmH2O   Apnea Rate 16   Apnea Volume (mL) 700 mL   Apnea Oxygen Concentration  100   Apnea Flow Rate (L/min) 60   T Apnea 20 sec(s)   Ready to Wean/Extubation Screen   FIO2<=50 (chart decimal) 0.4   MV<16L (chart vol.) 11.4   PEEP <=8 (chart #) 7   Ready to Wean Parameters   F/VT Ratio<105 (RSBI) (!) 22.54

## 2019-10-03 NOTE — PROGRESS NOTES
Pharmacokinetic Assessment Follow Up: IV Vancomycin    Vancomycin serum concentration assessment(s):    The random level was drawn correctly and can be used to guide therapy at this time. The measurement is below the desired definitive target range of 15 to 20 mcg/mL.    Vancomycin Regimen Plan:    The random level drawn today at 1534 was 6.6 mcg/mL.  Patient will receive 2000 mg once today and will draw next level on 10/4 at 1700.  Re-dose when the random level is less than 20 mcg/mL.  Continue to target goal of 15-20 mcg/mL.     Drug levels (last 3 results):  Recent Labs   Lab Result Units 10/03/19  1534   Vancomycin, Random ug/mL 6.6       Pharmacy will continue to follow and monitor vancomycin.    Please contact pharmacy at extension [phone] for questions regarding this assessment.    Thank you for the consult,   Whitley Macias, PharmD       Patient brief summary:  Masood Messina is a 79 y.o. male initiated on antimicrobial therapy with IV Vancomycin for treatment of pneumonia.    The patient's current regimen is pulse dosing.    Drug Allergies:   Review of patient's allergies indicates:   Allergen Reactions    Codeine Other (See Comments)       Actual Body Weight:   132.2 kg    Renal Function:   Estimated Creatinine Clearance: 34.5 mL/min (A) (based on SCr of 2.4 mg/dL (H)).,     Dialysis Method (if applicable):  N/A    CBC (last 72 hours):  Recent Labs   Lab Result Units 10/02/19  1236 10/02/19  2340 10/03/19  0608   WBC K/uL 19.07* 28.18* 19.18*  19.18*   Hemoglobin g/dL 11.1* 10.0* 10.7*  10.7*   Hematocrit % 35.4* 30.6* 33.4*  33.4*   Platelets K/uL 149* 146* 129*  129*   Gran% % 84.9* 84.0* 83.1*  83.1*   Lymph% % 4.7* 9.0* 5.6*  5.6*   Mono% % 8.3 1.0* 9.2  9.2   Eosinophil% % 1.0 1.0 1.0  1.0   Basophil% % 0.4 0.0 0.4  0.4   Differential Method  Automated Manual Automated  Automated       Metabolic Panel (last 72 hours):  Recent Labs   Lab Result Units 10/02/19  1236 10/02/19  0931  10/02/19  2340 10/03/19  0609   Sodium mmol/L 137  --  139 139  139   Potassium mmol/L 5.1  --  4.8 4.7  4.7   Chloride mmol/L 97  --  103 105  105   CO2 mmol/L 32*  --  26 26  26   Glucose mg/dL 124*  --  138* 112*  112*   Glucose, UA   --  1+*  --   --    BUN, Bld mg/dL 47*  --  48* 46*  46*   Creatinine mg/dL 2.7*  --  2.6* 2.4*  2.4*   Albumin g/dL 2.9*  --   --  2.3*   Total Bilirubin mg/dL 0.7  --   --  1.0   Alkaline Phosphatase U/L 83  --   --  72   AST U/L 24  --   --  23   ALT U/L 19  --   --  16   Magnesium mg/dL 2.5  --  2.2 2.1   Phosphorus mg/dL  --   --   --  2.4*       Vancomycin Administrations:  vancomycin given in the last 96 hours                     vancomycin (VANCOCIN) 2,000 mg in dextrose 5 % 500 mL IVPB (mg) 2,000 mg New Bag 10/02/19 1511                      Microbiologic Results:  Microbiology Results (last 7 days)       Procedure Component Value Units Date/Time    Blood culture #1 **CANNOT BE ORDERED STAT** [[id number]] Collected:  10/02/19 1305    Order Status:  Completed Specimen:  Blood Updated:  10/03/19 0545     Blood Culture, Routine No Growth to date    Blood culture #2 **CANNOT BE ORDERED STAT** [[id number]] Collected:  10/02/19 1305    Order Status:  Completed Specimen:  Blood Updated:  10/03/19 0545     Blood Culture, Routine No Growth to date    Influenza A & B by Molecular [[id number]] Collected:  10/02/19 1300    Order Status:  Completed Specimen:  Nasopharyngeal Swab Updated:  10/02/19 1349     Influenza A, Molecular Negative     Influenza B, Molecular Negative     Flu A & B Source Nasal swab    Urine culture [[id number]] Collected:  10/02/19 1321    Order Status:  No result Specimen:  Urine Updated:  10/02/19 1336

## 2019-10-03 NOTE — PROGRESS NOTES
Dr. Georges at bedside irrigating bladder, able to evacuate multiple clots. Instructed to continue to irrigate every 2-3 hours

## 2019-10-03 NOTE — CONSULTS
Pharmacokinetic Initial Assessment: IV Vancomycin    Assessment/Plan:    Initiate intravenous vancomycin with loading dose of 2000 mg once with subsequent doses when random concentrations are less than 20 mcg/mL.  Desired empiric serum trough concentration is 15 to 20 mcg/mL.  Draw vancomycin random level on 10/03 at 1500.  Pharmacy will continue to follow and monitor vancomycin.      Please contact pharmacy at extension [phone] with any questions regarding this assessment.     Thank you for the consult,   Chencho Aragon, PharmD       Patient brief summary:  Masood Messina is a 79 y.o. male initiated on antimicrobial therapy with IV Vancomycin for treatment of suspected pneumonia.     Drug Allergies:   Review of patient's allergies indicates:   Allergen Reactions    Codeine Other (See Comments)       Actual Body Weight:   134.7 kg    Renal Function:   Estimated Creatinine Clearance: 29.7 mL/min (A) (based on SCr of 2.7 mg/dL (H)).,     Dialysis Method (if applicable):  N/A    CBC (last 72 hours):  Recent Labs   Lab Result Units 10/02/19  1236   WBC K/uL 19.07*   Hemoglobin g/dL 11.1*   Hematocrit % 35.4*   Platelets K/uL 149*   Gran% % 84.9*   Lymph% % 4.7*   Mono% % 8.3   Eosinophil% % 1.0   Basophil% % 0.4   Differential Method  Automated       Metabolic Panel (last 72 hours):  Recent Labs   Lab Result Units 10/02/19  1236 10/02/19  1321   Sodium mmol/L 137  --    Potassium mmol/L 5.1  --    Chloride mmol/L 97  --    CO2 mmol/L 32*  --    Glucose mg/dL 124*  --    Glucose, UA   --  1+*   BUN, Bld mg/dL 47*  --    Creatinine mg/dL 2.7*  --    Albumin g/dL 2.9*  --    Total Bilirubin mg/dL 0.7  --    Alkaline Phosphatase U/L 83  --    AST U/L 24  --    ALT U/L 19  --    Magnesium mg/dL 2.5  --        Drug levels (last 3 results):  No results for input(s): VANCOMYCINRA, VANCOMYCINPE, VANCOMYCINTR in the last 72 hours.    Microbiologic Results:  Microbiology Results (last 7 days)       Procedure Component Value Units  Date/Time    Influenza A & B by Molecular [[id number]] Collected:  10/02/19 1300    Order Status:  Completed Specimen:  Nasopharyngeal Swab Updated:  10/02/19 1349     Influenza A, Molecular Negative     Influenza B, Molecular Negative     Flu A & B Source Nasal swab    Urine culture [[id number]] Collected:  10/02/19 1321    Order Status:  No result Specimen:  Urine Updated:  10/02/19 1336    Blood culture #2 **CANNOT BE ORDERED STAT** [[id number]] Collected:  10/02/19 1305    Order Status:  Sent Specimen:  Blood Updated:  10/02/19 1305    Blood culture #1 **CANNOT BE ORDERED STAT** [[id number]] Collected:  10/02/19 1305    Order Status:  Sent Specimen:  Blood Updated:  10/02/19 1305

## 2019-10-03 NOTE — CONSULTS
[facility]  Adult Nutrition  Consult Note    SUMMARY    Intervention: to be determined    Recommendation:   1) initiate TF via PEG- Nutren 1.5 @ 20 ml/hr advancing to goal of 60 ml/hr + 180 ml flush q 4 hr once IVF d/c'd   ( 2160 kcal ( 96% EEN), 97 g protien( 93%EPN), and 1094 ml free water)     2) If unable to advance PO diett in < 5 days consider PPN   3) Weigh pt weekly     Goals: 1) Nutrition support initiated in < 5 days  Nutrition Goal Status: new  Communication of RD Recs: (POC, sticky note, reviewed with RN)    Reason for Assessment    Reason For Assessment: consult  Diagnosis: (septic shock)  Relevant Medical History: + trach + PEG, [place] resident, stroke, HTN, HLD, COPD, CHF, CAD, dementia, anemia, GERD, morbid obesity  Interdisciplinary Rounds: attended    General Information Comments: 80 y/o male from [place] + PEG and trach admitted with sepsis, Pneumonia and UTI. Not alert at time of visit. Per [place] notes, last on Isosource HN @ 70 ml/hr x 22 hr ( holding one hour befor eand after synthroid) + 60 ml flush q 22 hr. NPO x 1 day. On low dose pressor. NFPE done 10/3/19, no wasting seen appears obese. Significant wt gain 115-140 kg 10/2017-9/12/19, with 8 kg loss since then ? % r/t fluid shifts.    Nutrition Discharge Planning: To be determined- TF as above    Nutrition Risk Screen    Nutrition Risk Screen: tube feeding or parenteral nutrition    Nutrition/Diet History    Spiritual, Cultural Beliefs, [employer] Practices, Values that Affect Care: no  Food Allergies: NKFA  Factors Affecting Nutritional Intake: NPO, difficulty/impaired swallowing    Anthropometrics    Temp: 97.6 °F (36.4 °C)  Height Method: Stated(hospital 9/12/19)  Height: 6'  Height (inches): 72 in  Weight Method: Bed Scale  Weight: 132.2 kg (291 lb 7.2 oz)  Weight (lb): 291.45 lb  Ideal Body Weight (IBW), Male: 178 lb  % Ideal Body Weight, Male (lb): 163.74 lb  BMI (Calculated): 39.6  BMI Grade: 35 - 39.9 -  obesity - grade II  Usual Body Weight (UBW), k kg(10/2017)  Weight Change Amount: (140 kg 19)  % Usual Body Weight: 115.2  % Weight Change From Usual Weight: 14.96 %       Lab/Procedures/Meds    Pertinent Labs Reviewed: reviewed  BMP  Lab Results   Component Value Date     10/03/2019     10/03/2019    K 4.7 10/03/2019    K 4.7 10/03/2019     10/03/2019     10/03/2019    CO2 26 10/03/2019    CO2 26 10/03/2019    BUN 46 (H) 10/03/2019    BUN 46 (H) 10/03/2019    CREATININE 2.4 (H) 10/03/2019    CREATININE 2.4 (H) 10/03/2019    CALCIUM 9.3 10/03/2019    CALCIUM 9.3 10/03/2019    ANIONGAP 8 10/03/2019    ANIONGAP 8 10/03/2019    ESTGFRAFRICA 28 (A) 10/03/2019    ESTGFRAFRICA 28 (A) 10/03/2019    EGFRNONAA 25 (A) 10/03/2019    EGFRNONAA 25 (A) 10/03/2019     Lab Results   Component Value Date    CHOL 107 (L) 2019     Lab Results   Component Value Date    HDL 32 (L) 2019     Lab Results   Component Value Date    LDLCALC 60.6 (L) 2019     Lab Results   Component Value Date    TRIG 72 2019     Lab Results   Component Value Date    CHOLHDL 29.9 2019     No results found for: IRON, TIBC, FERRITIN, SATURATEDIRO    Pertinent Medications Reviewed: reviewed  Pertinent Medications Comments: polyethylene glycol, NS @ 125 ml/hr, Vitamin C, norepinephrine @ 8 ml/hr    Estimated/Assessed Needs    Weight Used For Calorie Calculations: 132.2 kg (291 lb 7.2 oz)  Energy Calorie Requirements (kcal): [employer] modified: 2240 kcal  Energy Need Method: [facility]  Protein Requirements: 0.8-1.0 g protein/kg ( 108-132 g protein)  Weight Used For Protein Calculations: 132.2 kg (291 lb 7.2 oz)  Fluid Requirements (mL): 2240 ml  Estimated Fluid Requirement Method: RDA Method  CHO Requirement: N/A      Nutrition Prescription Ordered    Current Diet Order: NPO  Nutrition Order Comments: x 1 day    Evaluation of Received Nutrient/Fluid Intake    Energy Calories Required: not meeting  needs  Protein Required: not meeting needs  Fluid Required: exceeds needs  Total Fluid Intake (mL/kg): 125 ml/hr NS  Tolerance: other (see comments)(NPO)  % Intake of Estimated Energy Needs: 0%  % Meal Intake: 0%    Nutrition Risk    Level of Risk/Frequency of Follow-up: moderate 2 x weekly    Assessment and Plan  Inadequate energy intake  R/t NPO  As evidenced by PO intakes < 50% EEN x 1 day  new    Monitor and Evaluation    Food and Nutrient Intake: energy intake  Food and Nutrient Adminstration: enteral and parenteral nutrition administration  Anthropometric Measurements: weight  Biochemical Data, Medical Tests and Procedures: electrolyte and renal panel, gastrointestinal profile  Nutrition-Focused Physical Findings: overall appearance     Malnutrition Assessment     Skin (Micronutrient): (Brandon = 11, no PI noted)  Teeth (Micronutrient): (WDL)   Micronutrient Evaluation: suspected deficiency  Micronutrient Evaluation Comments: check iron and replete if needed               Edema (Fluid Accumulation): 1-->trace   Subcutaneous Fat Loss (Final Summary): well nourished  Muscle Loss Evaluation (Final Summary): well nourished         Nutrition Follow-Up    RD Follow-up?: Yes

## 2019-10-03 NOTE — ASSESSMENT & PLAN NOTE
Masood Messina is a 79 y.o. male who presents to the Emergency Department with septic shock.  This patient does) have evidence of infective focus  My overall impression is healthcare associated pneumonia and UTI.  Lab Results   Component Value Date    WBC 19.18 (H) 10/03/2019    WBC 19.18 (H) 10/03/2019    HGB 10.7 (L) 10/03/2019    HGB 10.7 (L) 10/03/2019    HCT 33.4 (L) 10/03/2019    HCT 33.4 (L) 10/03/2019    MCV 94 10/03/2019    MCV 94 10/03/2019     (L) 10/03/2019     (L) 10/03/2019     Organ dysfunction indicated by altered mental status and acute kidney injury  Vital signs post fluid administrations were-    Temp Readings from Last 1 Encounters:   10/03/19 98.5 °F (36.9 °C) (Axillary)     BP Readings from Last 1 Encounters:   10/03/19 (!) 98/53     Pulse Readings from Last 1 Encounters:   10/03/19 86    Use intravenous pressor agent to maintain mean arterial pressure above 65 mm of mercury as needed.  Repeat lactic acid.

## 2019-10-03 NOTE — PLAN OF CARE
POC reviewed with pt, unable to comprehend at this time due to cognitive deficits. Pt only responsive to painful stimuli, hypotensive. Levophed started, titrated per MAR. Left femoral central line placed per anesthesia. Pt remains vent dependent. No further issue at this time, Safety maintained.

## 2019-10-03 NOTE — CONSULTS
[facility] Urology Consult:  Consult from: Dr Mayfield, hospital medicine  Consult for: gross hematuria    Masood Messina is a 79 y.o. male admitted for septic shock 2/2 pneumonia and UTI    Patient unable to provide history or communicate so history from chart review and discussions with care team    79-year-old morbidly obese male from [facility] with past medical history significant for multiple medical problems including chronic hypoxic hypercarbic respiratory failure (ventilator dependent status post tracheostomy), status post PEG tube placement, hypertension, hyperlipidemia, coronary artery disease, history of cerebrovascular accident, hypothyroidism and prior history of cerebrovascular accident who is functional quadriplegic is being admitted to Hospital Medicine from [facility] where patient presented with septic shock.  Upon arrival patient is unresponsive and blood pressure was noted to be around 72/48 mm of mercury.  Per ER physician reportedly patient had some hematuria for 2 days. Patient was being treated for pneumonia recently. In intensive care unit, receiving management for septic shock related to healthcare associated pneumonia and urinary tract infection.  On intravenous Boni-Synephrine.     Per nursing reports, he is Rahman catheter dependent and had a 16 Israeli Rahman catheter in place.  Attempts to place a 24 Israeli 3 way Rahman catheter for CBI were unsuccessful, and so a 24 Israeli coude Rahman catheter was placed and manually irrigated with some clots removed.  This morning, still draining red urine and clots still coming out with manual irrigation.    Urine culture 9/23/19 had Pseudomonas resistant to Cipro, sensitive to Zosyn.  He is on Zosyn, as well as Levaquin for pneumonia coverage.    He does have a history of gross hematuria, seemingly of prostatic varices source, and had multiple cystoscopies with clot evacuation and fulguration in 2014.  Prostate required fulguration.  Had an ultrasound at that time concerning for mild left hydronephrosis and possible renal mass, though no masses present on CT scan.  Persistent left hydronephrosis is found to be longstanding and likely due to bladder muscle hypertrophy from prostate enlargement.  No bladder lesions or bladder tumors were noted on any previous cystoscopy.    Past Medical History:   Diagnosis Date    AAA (abdominal aortic aneurysm)     Anemia     BPH (benign prostatic hyperplasia)     CHF (congestive heart failure)     COPD (chronic obstructive pulmonary disease)     Coronary artery disease     Dementia     Dementia     Depression     GERD (gastroesophageal reflux disease)     Hyperlipidemia     Hypertension     Hypothyroid     Renal disorder     Respiratory failure, chronic     Ventilator dependence        Past Surgical History:   Procedure Laterality Date    ABDOMINAL SURGERY      CARDIAC SURGERY  1999    GASTROSTOMY TUBE PLACEMENT      SPINE SURGERY      TRACHEOSTOMY TUBE PLACEMENT         History reviewed. No pertinent family history.    Social History     Socioeconomic History    Marital status:      Spouse name: Not on file    Number of children: Not on file    Years of education: Not on file    Highest education level: Not on file   Occupational History    Not on file   Social Needs    Financial resource strain: Not on file    Food insecurity:     Worry: Not on file     Inability: Not on file    Transportation needs:     Medical: Not on file     Non-medical: Not on file   Tobacco Use    Smoking status: Former Smoker    Smokeless tobacco: Never Used   Substance and Sexual Activity    Alcohol use: No    Drug use: No    Sexual activity: Never   Lifestyle    Physical activity:     Days per week: Not on file     Minutes per session: Not on file    Stress: Not on file   Relationships    Social connections:     Talks on phone: Not on file     Gets together:  Not on file     Attends Faith service: Not on file     Active member of club or organization: Not on file     Attends meetings of clubs or organizations: Not on file     Relationship status: Not on file   Other Topics Concern    Not on file   Social History Narrative    Not on file       Review of patient's allergies indicates:   Allergen Reactions    Codeine Other (See Comments)       Medications Reviewed: see MAR    ROS:  Unable to obtain from patient    PHYSICAL EXAM:    Vitals:    10/03/19 0849   BP: (!) 98/53   Pulse: 86   Resp: 16   Temp:      Body mass index is 43.04 kg/m².     General: Alert, but not responding appropriately, bobs head  HEENT: Normocephalic, with trachesostomy in place  Lungs: Respirations unlabored  CV: Warm and well perfused extremities  Abdomen: Soft, non-tender, nondistended  :  24 Montserratian Rahman draining red urine  Extremities: Extremities normal, atraumatic, no cyanosis or edema  Skin: Normal color, texture, and turgor, no rashes or lesions  Psych: Appropriate, well oriented, normal affect, normal mood  Neuro: Non-focal    Procedure: Bladder irrigation, manual  Using a 60 cc catheter tip syringe and sterile water I manually irrigated the patient's bladder through his current indwelling 24 Montserratian coude Rahman catheter.  Urine irrigated clear to light pink though continued to meet resistance and after flushing and irrigating with sterile water, clots lysed and were able to be irrigated free.  These did appear to be old clots in the surrounding clear urine.  3 L of sterile water were used manually 1st by instilling 60 cc into the bladder, and then flushing and irrigating with subsequent syringe fulls.  At times this did require pulling traction of the catheter toward the bladder neck to irrigate free clots that were in the dependent bladder, and slowly advancing the Rahman catheter to irrigated different positions in the bladder. Once resistance was met, Rahman catheter was slowly  withdrawn while withdrawing urine until could be flushed and irrigated and clots lysed removed.  After about 45 min of manual irrigation, significant clots were removed, old clot in the setting of clear urine, and urine was draining clear to light pink.  Patient tolerated well.    LABS:        Urinalysis, Reflex to Urine Culture Urine, Catheterized    Collection Time: 10/02/19  1:21 PM   Result Value Ref Range    Specimen UA Urine, Catheterized     Color, UA Yellow Yellow, Straw, Nila    Appearance, UA Clear Clear    pH, UA 7.0 5.0 - 8.0    Specific Gravity, UA 1.015 1.005 - 1.030    Protein, UA 3+ (A) Negative    Glucose, UA 1+ (A) Negative    Ketones, UA 1+ (A) Negative    Bilirubin (UA) 2+ (A) Negative    Occult Blood UA 3+ (A) Negative    Nitrite, UA Positive (A) Negative    Urobilinogen, UA 4.0-6.0 (A) <2.0 EU/dL    Leukocytes, UA 3+ (A) Negative   Urinalysis Microscopic    Collection Time: 10/02/19  1:21 PM   Result Value Ref Range    RBC, UA >100 (H) 0 - 4 /hpf    WBC, UA 30 (H) 0 - 5 /hpf    Bacteria Moderate (A) None-Occ /hpf    Hyaline Casts, UA 0 0-1/lpf /lpf    Microscopic Comment SEE COMMENT    Lactic acid, plasma    Collection Time: 10/02/19  4:43 PM   Result Value Ref Range    Lactate (Lactic Acid) 2.5 (H) 0.5 - 2.2 mmol/L   CBC auto differential    Collection Time: 10/02/19 11:40 PM   Result Value Ref Range    WBC 28.18 (H) 3.90 - 12.70 K/uL    RBC 3.21 (L) 4.60 - 6.20 M/uL    Hemoglobin 10.0 (L) 14.0 - 18.0 g/dL    Hematocrit 30.6 (L) 40.0 - 54.0 %    Mean Corpuscular Volume 95 82 - 98 fL    Mean Corpuscular Hemoglobin 31.2 (H) 27.0 - 31.0 pg    Mean Corpuscular Hemoglobin Conc 32.7 32.0 - 36.0 g/dL    RDW 13.9 11.5 - 14.5 %    Platelets 146 (L) 150 - 350 K/uL    MPV 12.0 9.2 - 12.9 fL    Immature Grans (Abs) CANCELED 0.00 - 0.04 K/uL    nRBC 0 0 /100 WBC    Gran% 84.0 (H) 38.0 - 73.0 %    Lymph% 9.0 (L) 18.0 - 48.0 %    Mono% 1.0 (L) 4.0 - 15.0 %    Eosinophil% 1.0 0.0 - 8.0 %    Basophil% 0.0 0.0  - 1.9 %    Bands 5.0 %    Toxic Granulation Present     Differential Method Manual    Basic metabolic panel    Collection Time: 10/02/19 11:40 PM   Result Value Ref Range    Sodium 139 136 - 145 mmol/L    Potassium 4.8 3.5 - 5.1 mmol/L    Chloride 103 95 - 110 mmol/L    CO2 26 23 - 29 mmol/L    Glucose 138 (H) 70 - 110 mg/dL    BUN, Bld 48 (H) 8 - 23 mg/dL    Creatinine 2.6 (H) 0.5 - 1.4 mg/dL    Calcium 9.4 8.7 - 10.5 mg/dL    Anion Gap 10 8 - 16 mmol/L    eGFR if African American 26 (A) >60 mL/min/1.73 m^2    eGFR if non African American 22 (A) >60 mL/min/1.73 m^2   Magnesium    Collection Time: 10/02/19 11:40 PM   Result Value Ref Range    Magnesium 2.2 1.6 - 2.6 mg/dL   CBC auto differential    Collection Time: 10/03/19  6:08 AM   Result Value Ref Range    WBC 19.18 (H) 3.90 - 12.70 K/uL    RBC 3.55 (L) 4.60 - 6.20 M/uL    Hemoglobin 10.7 (L) 14.0 - 18.0 g/dL    Hematocrit 33.4 (L) 40.0 - 54.0 %    Mean Corpuscular Volume 94 82 - 98 fL    Mean Corpuscular Hemoglobin 30.1 27.0 - 31.0 pg    Mean Corpuscular Hemoglobin Conc 32.0 32.0 - 36.0 g/dL    RDW 13.9 11.5 - 14.5 %    Platelets 129 (L) 150 - 350 K/uL    MPV 11.7 9.2 - 12.9 fL    Gran # (ANC) 15.9 (H) 1.8 - 7.7 K/uL    Immature Grans (Abs) 0.14 (H) 0.00 - 0.04 K/uL    Lymph # 1.1 1.0 - 4.8 K/uL    Mono # 1.8 (H) 0.3 - 1.0 K/uL    Eos # 0.2 0.0 - 0.5 K/uL    Baso # 0.07 0.00 - 0.20 K/uL    nRBC 0 0 /100 WBC    Gran% 83.1 (H) 38.0 - 73.0 %    Lymph% 5.6 (L) 18.0 - 48.0 %    Mono% 9.2 4.0 - 15.0 %    Eosinophil% 1.0 0.0 - 8.0 %    Basophil% 0.4 0.0 - 1.9 %    Differential Method Automated    CBC auto differential    Collection Time: 10/03/19  6:08 AM   Result Value Ref Range    WBC 19.18 (H) 3.90 - 12.70 K/uL    RBC 3.55 (L) 4.60 - 6.20 M/uL    Hemoglobin 10.7 (L) 14.0 - 18.0 g/dL    Hematocrit 33.4 (L) 40.0 - 54.0 %    Mean Corpuscular Volume 94 82 - 98 fL    Mean Corpuscular Hemoglobin 30.1 27.0 - 31.0 pg    Mean Corpuscular Hemoglobin Conc 32.0 32.0 - 36.0  g/dL    RDW 13.9 11.5 - 14.5 %    Platelets 129 (L) 150 - 350 K/uL    MPV 11.7 9.2 - 12.9 fL    Gran # (ANC) 15.9 (H) 1.8 - 7.7 K/uL    Immature Grans (Abs) 0.14 (H) 0.00 - 0.04 K/uL    Lymph # 1.1 1.0 - 4.8 K/uL    Mono # 1.8 (H) 0.3 - 1.0 K/uL    Eos # 0.2 0.0 - 0.5 K/uL    Baso # 0.07 0.00 - 0.20 K/uL    nRBC 0 0 /100 WBC    Gran% 83.1 (H) 38.0 - 73.0 %    Lymph% 5.6 (L) 18.0 - 48.0 %    Mono% 9.2 4.0 - 15.0 %    Eosinophil% 1.0 0.0 - 8.0 %    Basophil% 0.4 0.0 - 1.9 %    Differential Method Automated    Basic metabolic panel    Collection Time: 10/03/19  6:09 AM   Result Value Ref Range    Sodium 139 136 - 145 mmol/L    Potassium 4.7 3.5 - 5.1 mmol/L    Chloride 105 95 - 110 mmol/L    CO2 26 23 - 29 mmol/L    Glucose 112 (H) 70 - 110 mg/dL    BUN, Bld 46 (H) 8 - 23 mg/dL    Creatinine 2.4 (H) 0.5 - 1.4 mg/dL    Calcium 9.3 8.7 - 10.5 mg/dL    Anion Gap 8 8 - 16 mmol/L    eGFR if African American 28 (A) >60 mL/min/1.73 m^2    eGFR if non African American 25 (A) >60 mL/min/1.73 m^2   Comprehensive metabolic panel    Collection Time: 10/03/19  6:09 AM   Result Value Ref Range    Sodium 139 136 - 145 mmol/L    Potassium 4.7 3.5 - 5.1 mmol/L    Chloride 105 95 - 110 mmol/L    CO2 26 23 - 29 mmol/L    Glucose 112 (H) 70 - 110 mg/dL    BUN, Bld 46 (H) 8 - 23 mg/dL    Creatinine 2.4 (H) 0.5 - 1.4 mg/dL    Calcium 9.3 8.7 - 10.5 mg/dL    Total Protein 6.6 6.0 - 8.4 g/dL    Albumin 2.3 (L) 3.5 - 5.2 g/dL    Total Bilirubin 1.0 0.1 - 1.0 mg/dL    Alkaline Phosphatase 72 55 - 135 U/L    AST 23 10 - 40 U/L    ALT 16 10 - 44 U/L    Anion Gap 8 8 - 16 mmol/L    eGFR if African American 28 (A) >60 mL/min/1.73 m^2    eGFR if non African American 25 (A) >60 mL/min/1.73 m^2     Assessment/Diagnosis:    Gross hematuria/clot retention  Septic shock  Recent Pseudomonas urinary tract infection    Plans:  Based on past history and previous cystoscopic evaluations, etiology of his gross hematuria is likely secondary to UTI in the  setting of prostatic varices.  Would ultimately need complete workup with repeat CT urogram and cystoscopic evaluation to rule out any new source, as last complete workup in cystoscopic evaluation was 5 years ago.  He does have left hydronephrosis, nonspecific, noted to be chronic all the way down to the level of the bladder which was present previously, and may be due to longstanding bladder muscle hypertrophy, or in this instance may be due to clot.  Given comorbidities, septic shock, and likely concurrent UTI, would avoid urinary tract instrumentation unless urgent or emergent.    I was able to irrigate out a significant clot burden that appeared to be old clot with no signs of active bleeding and a 24 French catheters holding tamponade of any prostatic urethra source.  After significant manual irrigation as above in the morning, nursing staff did manually irrigate 2 more times with clots removed and I did return at approximately 3:30 p.m. to personally manually irrigate which was completely clear and had no evidence of clots, and was clear to clear yellow at that time.    Continue culture specific antibiotics from his past urine culture and await new urine culture.  Managed expectantly.  As he is Rahman catheter dependent when appropriate for discharge can return to nursing home with this catheter in place and when changed, can make note to use coude Rahman catheter though can downsize to 16 or 18 French    Would recommend starting finasteride given his history of prostate source bleeding    If any recurrence of his gross hematuria with clot formation, notify on-call urology

## 2019-10-04 PROBLEM — E83.39 HYPOPHOSPHATEMIA: Status: ACTIVE | Noted: 2019-01-01

## 2019-10-04 NOTE — PLAN OF CARE
Pt remain in ICU on levophed at 0.05 mcg/kg/min. Started on tube feeds at 20 ml/hr. Irrigating bladder every 3 to 4 fours. Urine much clearer after irrigating throughout day. Safety maintained.

## 2019-10-04 NOTE — PROGRESS NOTES
Pharmacokinetic Assessment Follow Up: IV Vancomycin    Vancomycin serum concentration assessment(s):    The random level was drawn correctly and can be used to guide therapy at this time. The measurement is within the desired definitive target range of 15 to 20 mcg/mL.    Vancomycin Regimen Plan:    Pt's renal function is not stable.  Pharmacy will dose by level. Target goal is 15-20 mg/dL.   Vanc level 10/4/2019 at 1700 was 15.6 mg/dL.  Pharmacy will dose vancomycin 2000 mg x1.  A vancomycin level will be ordered on 10/05 at 1730.     Drug levels (last 3 results):  Recent Labs   Lab Result Units 10/03/19  1534 10/04/19  1700   Vancomycin, Random ug/mL 6.6 15.6       Pharmacy will continue to follow and monitor vancomycin.    Please contact pharmacy at extension [phone] for questions regarding this assessment.    Thank you for the consult,   Tylor Dailey       Patient brief summary:  Masood Messina is a 79 y.o. male initiated on antimicrobial therapy with IV Vancomycin for treatment of lower respiratory infection    The patient's current regimen is dose by level.    Drug Allergies:   Review of patient's allergies indicates:   Allergen Reactions    Codeine Other (See Comments)       Actual Body Weight:   131.5kg    Renal Function:   Estimated Creatinine Clearance: 43.5 mL/min (A) (based on SCr of 1.9 mg/dL (H)).,       CBC (last 72 hours):  Recent Labs   Lab Result Units 10/02/19  1236 10/02/19  2340 10/03/19  0608 10/04/19  0339   WBC K/uL 19.07* 28.18* 19.18*  19.18* 15.93*  15.93*   Hemoglobin g/dL 11.1* 10.0* 10.7*  10.7* 8.6*  8.6*   Hematocrit % 35.4* 30.6* 33.4*  33.4* 27.0*  27.0*   Platelets K/uL 149* 146* 129*  129* 129*  129*   Gran% % 84.9* 84.0* 83.1*  83.1* 74.9*  74.9*   Lymph% % 4.7* 9.0* 5.6*  5.6* 10.9*  10.9*   Mono% % 8.3 1.0* 9.2  9.2 11.2  11.2   Eosinophil% % 1.0 1.0 1.0  1.0 2.0  2.0   Basophil% % 0.4 0.0 0.4  0.4 0.4  0.4   Differential Method  Automated Manual Automated   Automated Automated  Automated       Metabolic Panel (last 72 hours):  Recent Labs   Lab Result Units 10/02/19  1236 10/02/19  1321 10/02/19  2340 10/03/19  0609 10/04/19  0339   Sodium mmol/L 137  --  139 139  139 137  137   Potassium mmol/L 5.1  --  4.8 4.7  4.7 3.9  3.9   Chloride mmol/L 97  --  103 105  105 104  104   CO2 mmol/L 32*  --  26 26  26 25  25   Glucose mg/dL 124*  --  138* 112*  112* 120*  120*   Glucose, UA   --  1+*  --   --   --    BUN, Bld mg/dL 47*  --  48* 46*  46* 37*  37*   Creatinine mg/dL 2.7*  --  2.6* 2.4*  2.4* 1.9*  1.9*   Albumin g/dL 2.9*  --   --  2.3* 2.3*   Total Bilirubin mg/dL 0.7  --   --  1.0 0.8   Alkaline Phosphatase U/L 83  --   --  72 79   AST U/L 24  --   --  23 19   ALT U/L 19  --   --  16 18   Magnesium mg/dL 2.5  --  2.2 2.1 1.8   Phosphorus mg/dL  --   --   --  2.4* 2.3*       Vancomycin Administrations:  vancomycin given in the last 96 hours                     vancomycin (VANCOCIN) 2,000 mg in dextrose 5 % 500 mL IVPB (mg) 2,000 mg New Bag 10/03/19 6746                      Microbiologic Results:  Microbiology Results (last 7 days)       Procedure Component Value Units Date/Time    Culture, Respiratory with Gram Stain [[id number]] Collected:  10/04/19 0020    Order Status:  Completed Specimen:  Respiratory from Tracheal Aspirate Updated:  10/04/19 1159     Gram Stain (Respiratory) <10 epithelial cells per low power field.     Gram Stain (Respiratory) Rare WBC's     Gram Stain (Respiratory) Many Gram negative rods     Gram Stain (Respiratory) Few Gram positive rods     Gram Stain (Respiratory) Rare Gram positive cocci    Blood culture #1 **CANNOT BE ORDERED STAT** [[id number]] Collected:  10/02/19 1305    Order Status:  Completed Specimen:  Blood Updated:  10/04/19 0612     Blood Culture, Routine No Growth to date      No Growth to date    Blood culture #2 **CANNOT BE ORDERED STAT** [[id number]] Collected:  10/02/19 1301    Order Status:  Completed  Specimen:  Blood Updated:  10/04/19 0612     Blood Culture, Routine No Growth to date      No Growth to date    Urine culture [[id number]]  (Abnormal) Collected:  10/02/19 1321    Order Status:  Completed Specimen:  Urine Updated:  10/03/19 2113     Urine Culture, Routine GRAM NEGATIVE TOM  10,000 - 49,999 cfu/ml  Identification and susceptibility pending      Narrative:       Preferred Collection Type->Urine, Catheterized    Influenza A & B by Molecular [[id number]] Collected:  10/02/19 1300    Order Status:  Completed Specimen:  Nasopharyngeal Swab Updated:  10/02/19 1349     Influenza A, Molecular Negative     Influenza B, Molecular Negative     Flu A & B Source Nasal swab

## 2019-10-04 NOTE — HOSPITAL COURSE
Patient was managed for septic shock and has been weaned off intravenous pressor agents.  Patient is noted to have Pseudomonas species in urine culture and sputum cultures.  Pseudomonas is multi-drug resistant.  Infectious Disease specialist was consulted.  Patient is getting antibiotics as per Infectious Disease recommendations.  Patient has also developed acute C diff colitis for which receiving enteral vancomycin.  Hematuria has resolved, patient was managed by urologist. ID on 10/12.  Stopped  cefepime.  continued Oral vanc .

## 2019-10-04 NOTE — PROGRESS NOTES
[facility] Medicine  Progress Note    Patient Name: Masood Messina  MRN: [id number]  Patient Class: IP- Inpatient   Admission Date: 10/2/2019  Length of Stay: 2 days  Attending Physician: Melissa Mayfield MD  Primary Care Provider: Jeramie Lombardi MD        Subjective:     Principal Problem:Septic shock        HPI:  Patient is an 79-year-old morbidly obese male from [facility] with past medical history significant for multiple medical problems including chronic hypoxic hypercarbic respiratory failure (ventilator dependent status post tracheostomy), status post PEG tube placement, hypertension, hyperlipidemia, coronary artery disease, history of cerebrovascular accident, hypothyroidism and prior history of cerebrovascular accident who is functional quadriplegic is being admitted to Hospital Medicine from [facility] where patient presented with septic shock.  Upon arrival patient is unresponsive and blood pressure was noted to be around 72/48 mm of mercury.  Per ER physician reportedly patient had some hematuria for 2 days.  Patient was being treated for pneumonia recently. No further details of HPI.     Overview/Hospital Course:  Patient has been weaned off intravenous pressor agent.  Urine cultures are growing g negative amish.  Patient having ongoing intermittent gross hematuria requiring bladder irrigation.  Urology team following.    Interval History:  In intensive care unit, receiving management for septic shock related to healthcare associated pneumonia and urinary tract infection.  Patient is off intravenous pressor agent.  Urine culture growing gram-negative amish species.  Patient is more alert and responsive today.  No focal complaints.  T-max 101.3 degree F.    Review of Systems   Unable to perform ROS: Patient unresponsive     Objective:     Vital Signs (Most Recent):  Temp: 100 °F (37.8 °C) (10/04/19 0400)  Pulse: 87 (10/04/19  0615)  Resp: 16 (10/04/19 0615)  BP: (!) 110/56 (10/04/19 0615)  SpO2: 97 % (10/04/19 0615) Vital Signs (24h Range):  Temp:  [97.6 °F (36.4 °C)-101.3 °F (38.5 °C)] 100 °F (37.8 °C)  Pulse:  [] 87  Resp:  [14-30] 16  SpO2:  [90 %-100 %] 97 %  BP: ()/() 110/56     Weight: 131.5 kg (289 lb 14.5 oz)  Body mass index is 39.32 kg/m².    Intake/Output Summary (Last 24 hours) at 10/4/2019 0753  Last data filed at 10/4/2019 0600  Gross per 24 hour   Intake 4300 ml   Output 2895 ml   Net 1405 ml      Physical Exam   Constitutional: He appears well-developed.   HENT:   Head: Normocephalic and atraumatic.   Nose: Nose normal. No septal deviation.   Mouth/Throat: Oropharynx is clear and moist.   Eyes: Pupils are equal, round, and reactive to light. Conjunctivae are normal.   Neck: No JVD present. No tracheal tenderness present. No tracheal deviation present. No thyroid mass present.   Tracheostomy site appears fine   Cardiovascular: Normal rate, regular rhythm, S1 normal and S2 normal. Exam reveals no gallop and no friction rub.   No murmur heard.  Pulmonary/Chest: Effort normal and breath sounds normal. He has no decreased breath sounds. He has no wheezes. He has no rhonchi. He has no rales.   Abdominal: Soft. Normal appearance and bowel sounds are normal. He exhibits no distension and no mass. There is no hepatosplenomegaly, splenomegaly or hepatomegaly. There is no tenderness.   Peg site appears fine   Musculoskeletal:   Trace pedal edema bilateral lower extremities   Neurological: He has normal strength. No cranial nerve deficit or sensory deficit.   Patient is more alert and responsive but unable to interact verbally.   Skin: No rash noted. No cyanosis.   Nursing note and vitals reviewed.    Significant Labs:   Blood Culture:   Recent Labs   Lab 10/02/19  1305   LABBLOO No Growth to date  No Growth to date  No Growth to date  No Growth to date     CBC:   Recent Labs   Lab 10/02/19  2340 10/03/19  0608  10/04/19  0339   WBC 28.18* 19.18*  19.18* 15.93*  15.93*   HGB 10.0* 10.7*  10.7* 8.6*  8.6*   HCT 30.6* 33.4*  33.4* 27.0*  27.0*   * 129*  129* 129*  129*     CMP:   Recent Labs   Lab 10/02/19  1236 10/02/19  2340 10/03/19  0609 10/04/19  0339    139 139  139 137  137   K 5.1 4.8 4.7  4.7 3.9  3.9   CL 97 103 105  105 104  104   CO2 32* 26 26  26 25  25   * 138* 112*  112* 120*  120*   BUN 47* 48* 46*  46* 37*  37*   CREATININE 2.7* 2.6* 2.4*  2.4* 1.9*  1.9*   CALCIUM 10.4 9.4 9.3  9.3 8.8  8.8   PROT 8.1  --  6.6 6.7   ALBUMIN 2.9*  --  2.3* 2.3*   BILITOT 0.7  --  1.0 0.8   ALKPHOS 83  --  72 79   AST 24  --  23 19   ALT 19  --  16 18   ANIONGAP 8 10 8  8 8  8   EGFRNONAA 21* 22* 25*  25* 33*  33*     Coagulation:   Recent Labs   Lab 10/02/19  1236   INR 1.2   APTT 32.6*     Lactic Acid:   Recent Labs   Lab 10/02/19  1236 10/02/19  1643 10/03/19  1104   LACTATE 1.4 2.5* 1.4     Troponin:   Recent Labs   Lab 10/02/19  1236   TROPONINI 0.028*     Urine Culture:   Recent Labs   Lab 10/02/19  1321   LABURIN GRAM NEGATIVE TOM  10,000 - 49,999 cfu/ml  Identification and susceptibility pending  *       Significant Imaging:   CXR:   New or increased right lung infiltrate, diffusely but more so right upper lung field.  Interval decrease of the left lung infiltrate with left lung today appearing clear.  Right lung infiltrates suggest pneumonia.     CT head without contrast: No evidence of an acute intracranial abnormality.      Assessment/Plan:      * Septic shock  Masood Messina is a 79 y.o. male who presents to the Emergency Department with septic shock.  This patient does) have evidence of infective focus  My overall impression is healthcare associated pneumonia and UTI.  Lab Results   Component Value Date    WBC 19.18 (H) 10/03/2019    WBC 19.18 (H) 10/03/2019    HGB 10.7 (L) 10/03/2019    HGB 10.7 (L) 10/03/2019    HCT 33.4 (L) 10/03/2019    HCT 33.4 (L)  10/03/2019    MCV 94 10/03/2019    MCV 94 10/03/2019     (L) 10/03/2019     (L) 10/03/2019     Organ dysfunction indicated by altered mental status and acute kidney injury  Vital signs post fluid administrations were-    Temp Readings from Last 1 Encounters:   10/03/19 98.5 °F (36.9 °C) (Axillary)     BP Readings from Last 1 Encounters:   10/03/19 (!) 98/53     Pulse Readings from Last 1 Encounters:   10/03/19 86    Use intravenous pressor agent to maintain mean arterial pressure above 65 mm of mercury as needed.  Repeat lactic acid.        Hypophosphatemia  Replete phosphorus and follow level.    Coronary artery disease  Telemonitoring.  Holding antihypertensive agent due to hypotension.      Acquired hypothyroidism  Chronic problem. Will continue chronic medications and monitor for any changes, adjusting as needed.          PEG (percutaneous endoscopic gastrostomy) status  PEG care.  Resume tube feeding.      Urinary tract infection without hematuria  As above      Hematuria  Monitor for any worsening hematuria.  If symptoms persist consult urologist.      Chronic respiratory failure with hypoxia and hypercapnia  Continue mechanical ventilation as before      Tracheostomy in place  Trach care      Acute on chronic respiratory failure with hypoxia and hypercapnia  Continue mechanical ventilation as before.  Supplemental O2 via nasal canula; titrate O2 saturation to >92%.   Follow Pulmonary recommendations.  Following contact isolation protocol for recent Pseudomonas pneumonia.  Continue beta 2 agonist bronchodilator treatments.   Continue IV antibiotics  - vancomycin/Zosyn/Levaquin  Check sputum GS and Cx.   Continue routine medications as before.             Functional quadriplegia  Supportive care, skin care, rotate patient every 2 hr        VTE Risk Mitigation (From admission, onward)         Ordered     IP VTE HIGH RISK PATIENT  Once      10/02/19 1710     Place sequential compression device   Until discontinued      10/02/19 1710     Place EYAD hose  Until discontinued      10/02/19 1710                Critical care time spent on the evaluation and treatment of severe organ dysfunction, review of pertinent labs and imaging studies, discussions with consulting providers and discussions with patient/family: 36 minutes.      Melissa Mayfield MD  Department of Hospital Medicine   [facility]

## 2019-10-04 NOTE — PLAN OF CARE
10/04/19 0814   Patient Assessment/Suction   All Lung Fields Breath Sounds diminished   $ Suction Charges Inline Suction Procedure Stat Charge   Secretions Amount scant;small   Secretions Color pale;yellow   Secretions Characteristics thick   PRE-TX-O2   O2 Device (Oxygen Therapy) ventilator   $ Is the patient on Low Flow Oxygen? Yes   Oxygen Concentration (%) 30   SpO2 99 %   Pulse Oximetry Type Continuous   $ Pulse Oximetry - Multiple Charge Pulse Oximetry - Multiple   Pulse 76   Resp 16   ETCO2   $ ETCO2 Charge Exhaled CO2 Monitoring   $ ETCO2 Usage Currently wearing   ETCO2 (mmHg) 27 mmHg   ETCO2 Device Type Bedside Monitor        Surgical Airway Portex Cuffed;Fenestrated   No Placement Date or Time found.   Present Prior to Hospital Arrival?: Yes  Inserted by: Present Prior to Hospital Arrival  Type: Tracheostomy  Brand: Portex  Airway Device Size: 8.0  Style: Cuffed;Fenestrated   Status Secured   Ties Assessment Secure   Vent Select   Conventional Vent Y   Charged w/in last 24h YES   IHI Ventilator Associated Pneumonia Bundle   Oral Care Mouth suctioned   Preset Conventional Ventilator Settings   Vent Type    Ventilation Type VC   Vent Mode A/C   Humidity HME   Set Rate 16 bmp   Vt Set 700 mL   PEEP/CPAP 7 cmH20   Pressure Support 0 cmH20   Waveform RAMP   Peak Flow 62 L/min   Set Inspiratory Pressure 0 cmH20   Insp Time 0 Sec(s)   Plateau Set/Insp. Hold (sec) 0   Insp Rise Time  0 %   Trigger Sensitivity Flow/I-Trigger 1.5 L/min   P High 0 cm H2O   P Low 0 cm H2O   T High 0 sec   T Low 0 sec   Patient Ventilator Parameters   Resp Rate Total 16 br/min   Peak Airway Pressure 37 cmH2O   Mean Airway Pressure 17 cmH20   Plateau Pressure 0 cmH20   Exhaled Vt 706 mL   Total Ve 11.4 mL   Spont Ve 0 L   I:E Ratio Measured 1:2.00   Conventional Ventilator Alarms   Alarms On Y

## 2019-10-04 NOTE — PLAN OF CARE
Plan of care reviewed. Patient tolerating tube feedings at goal. Irrigating tay every 3 to 4 hours with small blood clots noted.  Weaned levophed off.  Patients vital signs stable and afebrile throughout this shift. Safety maintained this shift.

## 2019-10-04 NOTE — SUBJECTIVE & OBJECTIVE
Interval History:  In intensive care unit, receiving management for septic shock related to healthcare associated pneumonia and urinary tract infection.  Patient is off intravenous pressor agent.  Urine culture growing gram-negative amish species.  Patient is more alert and responsive today.  No focal complaints.    Review of Systems   Unable to perform ROS: Patient unresponsive     Objective:     Vital Signs (Most Recent):  Temp: 100 °F (37.8 °C) (10/04/19 0400)  Pulse: 87 (10/04/19 0615)  Resp: 16 (10/04/19 0615)  BP: (!) 110/56 (10/04/19 0615)  SpO2: 97 % (10/04/19 0615) Vital Signs (24h Range):  Temp:  [97.6 °F (36.4 °C)-101.3 °F (38.5 °C)] 100 °F (37.8 °C)  Pulse:  [] 87  Resp:  [14-30] 16  SpO2:  [90 %-100 %] 97 %  BP: ()/() 110/56     Weight: 131.5 kg (289 lb 14.5 oz)  Body mass index is 39.32 kg/m².    Intake/Output Summary (Last 24 hours) at 10/4/2019 0753  Last data filed at 10/4/2019 0600  Gross per 24 hour   Intake 4300 ml   Output 2895 ml   Net 1405 ml      Physical Exam   Constitutional: He appears well-developed.   HENT:   Head: Normocephalic and atraumatic.   Nose: Nose normal. No septal deviation.   Mouth/Throat: Oropharynx is clear and moist.   Eyes: Pupils are equal, round, and reactive to light. Conjunctivae are normal.   Neck: No JVD present. No tracheal tenderness present. No tracheal deviation present. No thyroid mass present.   Tracheostomy site appears fine   Cardiovascular: Normal rate, regular rhythm, S1 normal and S2 normal. Exam reveals no gallop and no friction rub.   No murmur heard.  Pulmonary/Chest: Effort normal and breath sounds normal. He has no decreased breath sounds. He has no wheezes. He has no rhonchi. He has no rales.   Abdominal: Soft. Normal appearance and bowel sounds are normal. He exhibits no distension and no mass. There is no hepatosplenomegaly, splenomegaly or hepatomegaly. There is no tenderness.   Peg site appears fine   Musculoskeletal:   Trace  pedal edema bilateral lower extremities   Neurological: He has normal strength. No cranial nerve deficit or sensory deficit.   Patient is more alert and responsive but unable to interact verbally.   Skin: No rash noted. No cyanosis.   Nursing note and vitals reviewed.    Significant Labs:   Blood Culture:   Recent Labs   Lab 10/02/19  1305   LABBLOO No Growth to date  No Growth to date  No Growth to date  No Growth to date     CBC:   Recent Labs   Lab 10/02/19  2340 10/03/19  0608 10/04/19  0339   WBC 28.18* 19.18*  19.18* 15.93*  15.93*   HGB 10.0* 10.7*  10.7* 8.6*  8.6*   HCT 30.6* 33.4*  33.4* 27.0*  27.0*   * 129*  129* 129*  129*     CMP:   Recent Labs   Lab 10/02/19  1236 10/02/19  2340 10/03/19  0609 10/04/19  0339    139 139  139 137  137   K 5.1 4.8 4.7  4.7 3.9  3.9   CL 97 103 105  105 104  104   CO2 32* 26 26  26 25  25   * 138* 112*  112* 120*  120*   BUN 47* 48* 46*  46* 37*  37*   CREATININE 2.7* 2.6* 2.4*  2.4* 1.9*  1.9*   CALCIUM 10.4 9.4 9.3  9.3 8.8  8.8   PROT 8.1  --  6.6 6.7   ALBUMIN 2.9*  --  2.3* 2.3*   BILITOT 0.7  --  1.0 0.8   ALKPHOS 83  --  72 79   AST 24  --  23 19   ALT 19  --  16 18   ANIONGAP 8 10 8  8 8  8   EGFRNONAA 21* 22* 25*  25* 33*  33*     Coagulation:   Recent Labs   Lab 10/02/19  1236   INR 1.2   APTT 32.6*     Lactic Acid:   Recent Labs   Lab 10/02/19  1236 10/02/19  1643 10/03/19  1104   LACTATE 1.4 2.5* 1.4     Troponin:   Recent Labs   Lab 10/02/19  1236   TROPONINI 0.028*     Urine Culture:   Recent Labs   Lab 10/02/19  1321   LABURIN GRAM NEGATIVE TOM  10,000 - 49,999 cfu/ml  Identification and susceptibility pending  *       Significant Imaging:   CXR:   New or increased right lung infiltrate, diffusely but more so right upper lung field.  Interval decrease of the left lung infiltrate with left lung today appearing clear.  Right lung infiltrates suggest pneumonia.     CT head without contrast: No evidence of an  acute intracranial abnormality.

## 2019-10-04 NOTE — PLAN OF CARE
Maintained on vent to trach. Tube feed at goal rate.Small blood clots still noted in tay when irrigated,draining well. Levophed on low dose. Temp Max 101.3 oral. Safety maintained and bed on rotate.

## 2019-10-04 NOTE — CARE UPDATE
10/04/19 0000   Patient Assessment/Suction   Level of Consciousness (AVPU) alert   Respiratory Effort Unlabored   Expansion/Accessory Muscles/Retractions no use of accessory muscles;no retractions   All Lung Fields Breath Sounds coarse   Rhythm/Pattern, Respiratory assisted mechanically   Cough Frequency with stimulation   Cough Type fair   Suction Method tracheal   $ Suction Charges Inline Suction Procedure Stat Charge   Secretions Amount scant   Secretions Color cloudy   Secretions Characteristics thin   Sputum Collection sample obtained per suctioning   $ Swab or suction? Suction   Aspirate Toleration TIFFANIE (no adverse reactions)   Sent to the lab? Yes   PRE-TX-O2   O2 Device (Oxygen Therapy) ventilator   $ Is the patient on Low Flow Oxygen? Yes   Oxygen Concentration (%) 40   SpO2 100 %   Pulse Oximetry Type Continuous   $ Pulse Oximetry - Multiple Charge Pulse Oximetry - Multiple   Oximetry Probe Site Assessed;Intact   Pulse 102   Resp 19   Temp (!) 101.3 °F (38.5 °C)   BP (!) 106/59   ETCO2   $ ETCO2 Charge Exhaled CO2 Monitoring   $ ETCO2 Usage Currently wearing   ETCO2 (mmHg) 30 mmHg   ETCO2 Device Type Ventilator;Bedside Monitor        Surgical Airway Portex Cuffed;Fenestrated   No Placement Date or Time found.   Present Prior to Hospital Arrival?: Yes  Inserted by: Present Prior to Hospital Arrival  Type: Tracheostomy  Brand: Portex  Airway Device Size: 8.0  Style: Cuffed;Fenestrated   Cuff Pressure 30 cm H2O   Cuff Inflation? Inflated   Status Secured   Site Assessment Clean   Site Care Protective barrier to skin   Inner Cannula Care Changed/new   Ties Assessment Secure   Vent Select   Charged w/in last 24h YES   Preset Conventional Ventilator Settings   Vent Type    Ventilation Type VC   Vent Mode A/C   Humidity HME   Set Rate 16 bmp   Vt Set 700 mL   PEEP/CPAP 7 cmH20   Pressure Support 0 cmH20   Waveform RAMP   Peak Flow 60 L/min   Set Inspiratory Pressure 0 cmH20   Insp Time 0 Sec(s)   Plateau  Set/Insp. Hold (sec) 0   Insp Rise Time  0 %   I-Trigger Type  Flow   Trigger Sensitivity Flow/I-Trigger 3 L/min   P High 0 cm H2O   P Low 0 cm H2O   T High 0 sec   T Low 0 sec   Patient Ventilator Parameters   Resp Rate Total 16 br/min   Peak Airway Pressure 34 cmH2O   Mean Airway Pressure 16 cmH20   Plateau Pressure 0 cmH20   Exhaled Vt 702 mL   Total Ve 11.3 mL   Spont Ve 0 L   I:E Ratio Measured 1:1.90   Conventional Ventilator Alarms   Alarms On Y   Ve High Alarm 16 L/min   Resp Rate High Alarm 50 br/min   Press High Alarm 60 cmH2O   Apnea Rate 16   Apnea Volume (mL) 700 mL   Apnea Oxygen Concentration  100   Apnea Flow Rate (L/min) 60   T Apnea 20 sec(s)   Ready to Wean/Extubation Screen   FIO2<=50 (chart decimal) 0.4   MV<16L (chart vol.) 11.3   PEEP <=8 (chart #) 7   Ready to Wean Parameters   F/VT Ratio<105 (RSBI) (!) 27.07

## 2019-10-04 NOTE — PROGRESS NOTES
Progress Note  PULMONARY    Admit Date: 10/2/2019   10/04/2019      SUBJECTIVE:     Oct 4, no new/c/o, trach- animated.  No distress      PFSH and Allergies reviewed.    OBJECTIVE:     Vitals (Most recent):  Vitals:    10/04/19 0814   BP:    Pulse: 76   Resp: 16   Temp:        Vitals (24 hour range):  Temp:  [97.6 °F (36.4 °C)-101.3 °F (38.5 °C)]   Pulse:  []   Resp:  [14-30]   BP: ()/()   SpO2:  [90 %-100 %]       Intake/Output Summary (Last 24 hours) at 10/4/2019 0835  Last data filed at 10/4/2019 0600  Gross per 24 hour   Intake 4300 ml   Output 2895 ml   Net 1405 ml          Physical Exam:  The patient's neuro status (alertness,orientation,cognitive function,motor skills,), pharyngeal exam (oral lesions, hygiene, abn dentition,), Neck (jvd,mass,thyroid,nodes in neck and above/below clavicle),RESPIRATORY(symmetry,effort,fremitus,percussion,auscultation),  Cor(rhythm,heart tones including gallops,perfusion,edema)ABD(distention,hepatic&splenomegaly,tenderness,masses), Skin(rash,cyanosis),Psyc(affect,judgement,).  Exam negative except for these pertinent findings:    Trach, left paresis, chest is symmetric, no distress, normal percussion, normal fremitus and decreased, edema.    Radiographs reviewed: view by direct vision  No new 10/2/2019 no new  Results for orders placed during the hospital encounter of 10/06/17   X-Ray Chest 1 View for PICC_Central line    Narrative A PICC has been placed on the right and ends in the distal superior vena cava. Tracheostomy tube remains in position. The patient has had a prior sternotomy. Cardiac size is within normal limits. There is no pulmonary mass or infiltrate is seen.    Impression  PICC in good position without pneumothorax. Tracheostomy tube in place. Prior sternotomy.      Electronically signed by: Alexsander Adrian MD  Date:     10/09/17  Time:    15:43    ]    Labs     Recent Labs   Lab 10/04/19  0339   WBC 15.93*  15.93*   HGB 8.6*  8.6*   HCT 27.0*   27.0*   *  129*     Recent Labs   Lab 10/03/19  1104 10/04/19  0339   NA  --  137  137   K  --  3.9  3.9   CL  --  104  104   CO2  --  25  25   BUN  --  37*  37*   CREATININE  --  1.9*  1.9*   GLU  --  120*  120*   CALCIUM  --  8.8  8.8   MG  --  1.8   PHOS  --  2.3*   AST  --  19   ALT  --  18   ALKPHOS  --  79   BILITOT  --  0.8   PROT  --  6.7   ALBUMIN  --  2.3*   LACTATE 1.4  --    No results for input(s): PH, PCO2, PO2, HCO3 in the last 24 hours.  Microbiology Results (last 7 days)     Procedure Component Value Units Date/Time    Blood culture #1 **CANNOT BE ORDERED STAT** [[id number]] Collected:  10/02/19 1305    Order Status:  Completed Specimen:  Blood Updated:  10/04/19 0612     Blood Culture, Routine No Growth to date      No Growth to date    Blood culture #2 **CANNOT BE ORDERED STAT** [[id number]] Collected:  10/02/19 1305    Order Status:  Completed Specimen:  Blood Updated:  10/04/19 0612     Blood Culture, Routine No Growth to date      No Growth to date    Culture, Respiratory with Gram Stain [[id number]] Collected:  10/04/19 0018    Order Status:  Sent Specimen:  Respiratory from Tracheal Aspirate Updated:  10/04/19 0019    Urine culture [[id number]]  (Abnormal) Collected:  10/02/19 1321    Order Status:  Completed Specimen:  Urine Updated:  10/03/19 2113     Urine Culture, Routine GRAM NEGATIVE TOM  10,000 - 49,999 cfu/ml  Identification and susceptibility pending      Narrative:       Preferred Collection Type->Urine, Catheterized    Influenza A & B by Molecular [[id number]] Collected:  10/02/19 1300    Order Status:  Completed Specimen:  Nasopharyngeal Swab Updated:  10/02/19 1349     Influenza A, Molecular Negative     Influenza B, Molecular Negative     Flu A & B Source Nasal swab          Impression:  Active Hospital Problems    Diagnosis  POA    *Septic shock [A41.9, R65.21]  Yes    Hypophosphatemia [E83.39]  No    Pneumonia of right upper lobe due to Pseudomonas species  [J15.1]  Yes    Acute on chronic diastolic congestive heart failure [I50.33]  Yes    Thrombocytopenia [D69.6]  Yes    Coronary artery disease [I25.10]  Yes    Urinary tract infection without hematuria [N39.0]  Yes    PEG (percutaneous endoscopic gastrostomy) status [Z93.1]  Not Applicable    Acquired hypothyroidism [E03.9]  Yes    Hematuria [R31.9]  Yes    Functional quadriplegia [R53.2]  Yes    Acute on chronic respiratory failure with hypoxia and hypercapnia [J96.21, J96.22]  Yes    Chronic respiratory failure with hypoxia and hypercapnia [J96.11, J96.12]  Yes    Tracheostomy in place [Z93.0]  Not Applicable      Resolved Hospital Problems   No resolved problems to display.               Plan:   Oct 4, no new-  Temp to 101.3 on 3rd, wbc 16 from 28- sputum culture - had prior pseudomonas r to levaquin- was dosed on 2nd with levaquin, on zosyn/vanc     Will stop fluids, 10 mg bid ivp lasix 10/5- increase as needed.                                  .

## 2019-10-04 NOTE — CARE UPDATE
10/03/19 1900   Patient Assessment/Suction   Level of Consciousness (AVPU) alert   Respiratory Effort Unlabored   Expansion/Accessory Muscles/Retractions no use of accessory muscles;no retractions;expansion symmetric   All Lung Fields Breath Sounds diminished;coarse   Rhythm/Pattern, Respiratory assisted mechanically;no shortness of breath reported;unlabored   Suction Method oral;tracheal   $ Suction Charges Inline Suction Procedure Stat Charge   Secretions Amount scant   Secretions Color white   PRE-TX-O2   O2 Device (Oxygen Therapy) ventilator   $ Is the patient on Low Flow Oxygen? Yes   Oxygen Concentration (%) 40   SpO2 99 %   Pulse 85   Resp 16   BP (!) 89/51   ETCO2   $ ETCO2 Charge Exhaled CO2 Monitoring   $ ETCO2 Usage Currently wearing   ETCO2 (mmHg) 31 mmHg   ETCO2 Device Type Bedside Monitor;Ventilator   Wound Care   $ Wound Care Tech Time 15 min   Area of Concern Neck under tracheostomy;Back of head   Skin Color/Characteristics pale;without discoloration   Skin Temperature warm        Surgical Airway Portex Cuffed;Fenestrated   No Placement Date or Time found.   Present Prior to Hospital Arrival?: Yes  Inserted by: Present Prior to Hospital Arrival  Type: Tracheostomy  Brand: Portex  Airway Device Size: 8.0  Style: Cuffed;Fenestrated   Cuff Pressure 30 cm H2O   Cuff Inflation? Inflated   Status Secured   Site Assessment Clean;Dry;No bleeding   Ties Assessment Clean;Dry;Intact;Secure   Vent Select   Charged w/in last 24h YES   Preset Conventional Ventilator Settings   Ventilation Type VC   Vent Mode A/C   Set Rate 16 bmp   Vt Set 700 mL   PEEP/CPAP 7 cmH20   Pressure Support 0 cmH20   Waveform RAMP   Peak Flow 60 L/min   Set Inspiratory Pressure 0 cmH20   Insp Time 0 Sec(s)   Plateau Set/Insp. Hold (sec) 0   Insp Rise Time  0 %   Trigger Sensitivity Flow/I-Trigger 3 L/min   P High 0 cm H2O   P Low 0 cm H2O   T High 0 sec   T Low 0 sec   Patient Ventilator Parameters   Resp Rate Total 16 br/min   Peak  Airway Pressure 37 cmH2O   Mean Airway Pressure 16 cmH20   Plateau Pressure 0 cmH20   Exhaled Vt 708 mL   Total Ve 11.4 mL   Spont Ve 0 L   I:E Ratio Measured 1:1.90   Conventional Ventilator Alarms   Ve High Alarm 16 L/min   Resp Rate High Alarm 50 br/min   Press High Alarm 60 cmH2O   Apnea Rate 16   Apnea Volume (mL) 700 mL   Apnea Oxygen Concentration  100   Apnea Flow Rate (L/min) 60   T Apnea 20 sec(s)   Ready to Wean/Extubation Screen   FIO2<=50 (chart decimal) 0.4   MV<16L (chart vol.) 11.4   PEEP <=8 (chart #) 7   Ready to Wean Parameters   F/VT Ratio<105 (RSBI) (!) 22.6   Airway Safety   Ambu bag with the patient? Yes, Adult Ambu   Is mask with the patient? Yes, Adult Mask

## 2019-10-05 PROBLEM — R31.9 URINARY TRACT INFECTION WITH HEMATURIA: Status: ACTIVE | Noted: 2017-03-21

## 2019-10-05 NOTE — SUBJECTIVE & OBJECTIVE
Interval History: Patient resting in bed. Isn't verbal.     Review of Systems   Unable to perform ROS: Patient nonverbal     Objective:     Vital Signs (Most Recent):  Temp: 98 °F (36.7 °C) (10/05/19 1615)  Pulse: 82 (10/05/19 1730)  Resp: (!) 23 (10/05/19 1730)  BP: (!) 158/86 (10/05/19 1730)  SpO2: 95 % (10/05/19 1730) Vital Signs (24h Range):  Temp:  [98 °F (36.7 °C)-99.3 °F (37.4 °C)] 98 °F (36.7 °C)  Pulse:  [68-88] 82  Resp:  [16-33] 23  SpO2:  [88 %-100 %] 95 %  BP: ()/(56-95) 158/86     Weight: 131.5 kg (289 lb 14.5 oz)  Body mass index is 39.32 kg/m².    Intake/Output Summary (Last 24 hours) at 10/5/2019 1814  Last data filed at 10/5/2019 1700  Gross per 24 hour   Intake 1150 ml   Output 3550 ml   Net -2400 ml      Physical Exam   Constitutional: He appears well-developed and well-nourished.   HENT:   Head: Normocephalic and atraumatic.   Cardiovascular: Normal rate, regular rhythm and normal heart sounds.   Pulmonary/Chest: Effort normal and breath sounds normal.   On vent   Abdominal: Soft. Bowel sounds are normal.   Neurological: He is alert.   Skin: Skin is warm.   Psychiatric: He has a normal mood and affect. His behavior is normal.       Significant Labs:   Recent Lab Results       10/05/19  1724   10/05/19  1626   10/05/19  0435        Albumin     2.1     Alkaline Phosphatase     67     ALT     22     Anion Gap     7          7     AST     21     Baso #   0.04 0.04          0.04     Basophil%   0.4 0.5          0.5     BILIRUBIN TOTAL     0.5  Comment:  For infants and newborns, interpretation of results should be based  on gestational age, weight and in agreement with clinical  observations.  Premature Infant recommended reference ranges:  Up to 24 hours.............<8.0 mg/dL  Up to 48 hours............<12.0 mg/dL  3-5 days..................<15.0 mg/dL  6-29 days.................<15.0 mg/dL       BUN, Bld     34          34     Calcium     7.8          7.8     Chloride     101          101      CO2     24          24     Creatinine     1.5          1.5     Differential Method   Automated Automated          Automated     eGFR if      50          50     eGFR if non      43  Comment:  Calculation used to obtain the estimated glomerular filtration  rate (eGFR) is the CKD-EPI equation.             43  Comment:  Calculation used to obtain the estimated glomerular filtration  rate (eGFR) is the CKD-EPI equation.        Eos #   0.3 0.4          0.4     Eosinophil%   3.1 4.2          4.2     Glucose     199          199     Gran # (ANC)   5.9 5.2          5.2     Gran%   63.9 63.5          63.5     Hematocrit   26.8 23.3          23.3     Hemoglobin   8.6 7.4          7.4     Immature Grans (Abs)   0.07  Comment:  Mild elevation in immature granulocytes is non specific and   can be seen in a variety of conditions including stress response,   acute inflammation, trauma and pregnancy. Correlation with other   laboratory and clinical findings is essential.   0.05  Comment:  Mild elevation in immature granulocytes is non specific and   can be seen in a variety of conditions including stress response,   acute inflammation, trauma and pregnancy. Correlation with other   laboratory and clinical findings is essential.            0.05  Comment:  Mild elevation in immature granulocytes is non specific and   can be seen in a variety of conditions including stress response,   acute inflammation, trauma and pregnancy. Correlation with other   laboratory and clinical findings is essential.       Lymph #   1.7 1.3          1.3     Lymph%   18.4 16.2          16.2     Magnesium     1.6     MCH   30.1 30.5          30.5     MCHC   32.1 31.8          31.8     MCV   94 96          96     Mono #   1.2 1.2          1.2     Mono%   13.4 15.0          15.0     MPV   11.3 11.5          11.5     nRBC   0 0          0     Phosphorus     2.0     Platelets   134 104          104     Potassium     3.6           3.6     PROTEIN TOTAL     6.1     RBC   2.86 2.43          2.43     RDW   13.8 14.0          14.0     Sodium     132          132     Vancomycin, Random 23.3         WBC   9.22 8.26          8.26

## 2019-10-05 NOTE — PROGRESS NOTES
[facility] Medicine  Progress Note    Patient Name: Masood Messina  MRN: [id number]  Patient Class: IP- Inpatient   Admission Date: 10/2/2019  Length of Stay: 3 days  Attending Physician: Melissa Mayfield MD  Primary Care Provider: Jeramie Lombardi MD        Subjective:     Principal Problem:Septic shock        HPI:  Patient is an 79-year-old morbidly obese male from [facility] with past medical history significant for multiple medical problems including chronic hypoxic hypercarbic respiratory failure (ventilator dependent status post tracheostomy), status post PEG tube placement, hypertension, hyperlipidemia, coronary artery disease, history of cerebrovascular accident, hypothyroidism and prior history of cerebrovascular accident who is functional quadriplegic is being admitted to Hospital Medicine from [facility] where patient presented with septic shock.  Upon arrival patient is unresponsive and blood pressure was noted to be around 72/48 mm of mercury.  Per ER physician reportedly patient had some hematuria for 2 days.  Patient was being treated for pneumonia recently. No further details of HPI.     Overview/Hospital Course:  Patient has been weaned off intravenous pressor agent.  Urine cultures are growing g negative amish.  Patient having ongoing intermittent gross hematuria requiring bladder irrigation.  Urology team following.    Interval History: Patient resting in bed. Isn't verbal.     Review of Systems   Unable to perform ROS: Patient nonverbal     Objective:     Vital Signs (Most Recent):  Temp: 98 °F (36.7 °C) (10/05/19 1615)  Pulse: 82 (10/05/19 1730)  Resp: (!) 23 (10/05/19 1730)  BP: (!) 158/86 (10/05/19 1730)  SpO2: 95 % (10/05/19 1730) Vital Signs (24h Range):  Temp:  [98 °F (36.7 °C)-99.3 °F (37.4 °C)] 98 °F (36.7 °C)  Pulse:  [68-88] 82  Resp:  [16-33] 23  SpO2:  [88 %-100 %] 95 %  BP: ()/(56-95) 158/86     Weight:  131.5 kg (289 lb 14.5 oz)  Body mass index is 39.32 kg/m².    Intake/Output Summary (Last 24 hours) at 10/5/2019 1814  Last data filed at 10/5/2019 1700  Gross per 24 hour   Intake 1150 ml   Output 3550 ml   Net -2400 ml      Physical Exam   Constitutional: He appears well-developed and well-nourished.   HENT:   Head: Normocephalic and atraumatic.   Cardiovascular: Normal rate, regular rhythm and normal heart sounds.   Pulmonary/Chest: Effort normal and breath sounds normal.   On vent   Abdominal: Soft. Bowel sounds are normal.   Neurological: He is alert.   Skin: Skin is warm.   Psychiatric: He has a normal mood and affect. His behavior is normal.       Significant Labs:   Recent Lab Results       10/05/19  1724   10/05/19  1626   10/05/19  0435        Albumin     2.1     Alkaline Phosphatase     67     ALT     22     Anion Gap     7          7     AST     21     Baso #   0.04 0.04          0.04     Basophil%   0.4 0.5          0.5     BILIRUBIN TOTAL     0.5  Comment:  For infants and newborns, interpretation of results should be based  on gestational age, weight and in agreement with clinical  observations.  Premature Infant recommended reference ranges:  Up to 24 hours.............<8.0 mg/dL  Up to 48 hours............<12.0 mg/dL  3-5 days..................<15.0 mg/dL  6-29 days.................<15.0 mg/dL       BUN, Bld     34          34     Calcium     7.8          7.8     Chloride     101          101     CO2     24          24     Creatinine     1.5          1.5     Differential Method   Automated Automated          Automated     eGFR if      50          50     eGFR if non      43  Comment:  Calculation used to obtain the estimated glomerular filtration  rate (eGFR) is the CKD-EPI equation.             43  Comment:  Calculation used to obtain the estimated glomerular filtration  rate (eGFR) is the CKD-EPI equation.        Eos #   0.3 0.4          0.4     Eosinophil%   3.1 4.2           4.2     Glucose     199          199     Gran # (ANC)   5.9 5.2          5.2     Gran%   63.9 63.5          63.5     Hematocrit   26.8 23.3          23.3     Hemoglobin   8.6 7.4          7.4     Immature Grans (Abs)   0.07  Comment:  Mild elevation in immature granulocytes is non specific and   can be seen in a variety of conditions including stress response,   acute inflammation, trauma and pregnancy. Correlation with other   laboratory and clinical findings is essential.   0.05  Comment:  Mild elevation in immature granulocytes is non specific and   can be seen in a variety of conditions including stress response,   acute inflammation, trauma and pregnancy. Correlation with other   laboratory and clinical findings is essential.            0.05  Comment:  Mild elevation in immature granulocytes is non specific and   can be seen in a variety of conditions including stress response,   acute inflammation, trauma and pregnancy. Correlation with other   laboratory and clinical findings is essential.       Lymph #   1.7 1.3          1.3     Lymph%   18.4 16.2          16.2     Magnesium     1.6     MCH   30.1 30.5          30.5     MCHC   32.1 31.8          31.8     MCV   94 96          96     Mono #   1.2 1.2          1.2     Mono%   13.4 15.0          15.0     MPV   11.3 11.5          11.5     nRBC   0 0          0     Phosphorus     2.0     Platelets   134 104          104     Potassium     3.6          3.6     PROTEIN TOTAL     6.1     RBC   2.86 2.43          2.43     RDW   13.8 14.0          14.0     Sodium     132          132     Vancomycin, Random 23.3         WBC   9.22 8.26          8.26           Assessment/Plan:      * Septic shock  Masood Messina is a 79 y.o. male who presents to the Emergency Department with septic shock.  This patient does) have evidence of infective focus  My overall impression is healthcare associated pneumonia and UTI.    F/u cultures  Lab Results   Component Value Date     WBC 9.22 10/05/2019    HGB 8.6 (L) 10/05/2019    HCT 26.8 (L) 10/05/2019    MCV 94 10/05/2019     (L) 10/05/2019     Organ dysfunction indicated by altered mental status and acute kidney injury  Vital signs post fluid administrations were-    Temp Readings from Last 1 Encounters:   10/05/19 98 °F (36.7 °C)     BP Readings from Last 1 Encounters:   10/05/19 (!) 158/86     Pulse Readings from Last 1 Encounters:   10/05/19 82    Use intravenous pressor agent to maintain mean arterial pressure above 65 mm of mercury as needed.  Repeat lactic acid.        Hypophosphatemia  Replete phosphorus and follow level.    Acute on chronic diastolic congestive heart failure        Pneumonia of right upper lobe due to Pseudomonas species  Currently on vanc, levaquin, and zosyn      Thrombocytopenia        Anemia        Coronary artery disease  Telemonitoring.    Acquired hypothyroidism  Chronic problem. Will continue chronic medications and monitor for any changes, adjusting as needed.          PEG (percutaneous endoscopic gastrostomy) status  PEG care.      Urinary tract infection with hematuria  Continue abx  Positive for pseudomonas      Hematuria  Urology following.     Chronic respiratory failure with hypoxia and hypercapnia  Continue mechanical ventilation as before      Tracheostomy in place  Trach care      Acute on chronic respiratory failure with hypoxia and hypercapnia  Continue mechanical ventilation as before.  Supplemental O2 via nasal canula; titrate O2 saturation to >92%.   Follow Pulmonary recommendations.  Sputum positive for possible pseudomonas.   Continue beta 2 agonist bronchodilator treatments.   On levaquin, vanc, and zosyn          Functional quadriplegia  Supportive care, skin care, rotate patient every 2 hr        VTE Risk Mitigation (From admission, onward)         Ordered     IP VTE HIGH RISK PATIENT  Once      10/02/19 1710     Place sequential compression device  Until discontinued       10/02/19 1710     Place EYAD hose  Until discontinued      10/02/19 1710                Critical care time spent on the evaluation and treatment of severe organ dysfunction, review of pertinent labs and imaging studies, discussions with consulting providers and discussions with patient/family: 30 minutes.      Raysa Washington MD  Department of Hospital Medicine   [facility]

## 2019-10-05 NOTE — PROGRESS NOTES
Urology Consult Progress Note-[place]  Staff: Teofilo/Hai/Margaret/Rene    Referring physician or department:     Subjective:      Masood Messina is a 79 y.o. 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  He does have a history of gross hematuria, seemingly of prostatic varices source, and had multiple cystoscopies with clot evacuation and fulguration in 2014. Prostate required fulguration.  Had an ultrasound at that time concerning for mild left hydronephrosis and possible renal mass, though no masses present on CT scan.  Persistent left hydronephrosis is found to be longstanding and likely due to bladder muscle hypertrophy from prostate enlargement.  No bladder lesions or bladder tumors were noted on any previous cystoscopy.    10/3/19 - Attempts to place a 24 Turkish 3 way Tay catheter for CBI were unsuccessful, and so a 24 Turkish coude Tay catheter was placed and manually irrigated with some clots removed. On zosyn and levaquin.   10/4/19 - draining red urine and clots still coming out with manual irrigation.  irrigated out multiple large clots.   10/5/19-  Wbc improved to 9, urine clear yellow today in tay.      Objective:     Temp:  [98.5 °F (36.9 °C)-99.3 °F (37.4 °C)] 98.5 °F (36.9 °C)  Pulse:  [68-88] 76  Resp:  [16-28] 23  SpO2:  [88 %-100 %] 98 %  BP: ()/(51-90) 148/77  I/O last 3 completed shifts:  In: 7261.4 [I.V.:3931.4; NG/GT:2880; IV Piggyback:450]  Out: 3405 [Urine:3405]  I/O this shift:  In: 180 [NG/GT:180]  Out: 500 [Urine:500]    UOP - 2261/460/180    Physical Exam   Nursing note and vitals reviewed.  Constitutional: Pt is oriented to person, place, and time. Pt appears well-developed and well-nourished.   HENT:   Head: Normocephalic and atraumatic.   Eyes: Pupils are equal, round, and reactive to light.   Cardiovascular: no pallor  Pulmonary/Chest: Effort normal.   Abdominal: no  distension  Musculoskeletal: Normal range of motion.   Neurological: Pt is alert and oriented to person, place, and time.   Skin: Skin is intact.     Psychiatric: Pt has a normal mood and affect.     Tay with clear yellow urine    Labs:     Recent Labs   Lab 10/03/19  0609 10/04/19  0339 10/05/19  0435     139 137  137 132*  132*   K 4.7  4.7 3.9  3.9 3.6  3.6     105 104  104 101  101   CO2 26  26 25  25 24  24   BUN 46*  46* 37*  37* 34*  34*   CREATININE 2.4*  2.4* 1.9*  1.9* 1.5*  1.5*   CALCIUM 9.3  9.3 8.8  8.8 7.8*  7.8*     Recent Labs   Lab 10/03/19  0608 10/04/19  0339 10/05/19  0435   WBC 19.18*  19.18* 15.93*  15.93* 8.26  8.26   HGB 10.7*  10.7* 8.6*  8.6* 7.4*  7.4*   HCT 33.4*  33.4* 27.0*  27.0* 23.3*  23.3*   *  129* 129*  129* 104*  104*       Radiology:  ctap 9/12/19  1. Tiny bilateral pleural effusions with scattered dependent airspace opacities most likely reflecting atelectasis.  2. Heterogeneous density in gallbladder either reflecting cholelithiasis or sludge.  3. Nonobstructing bilateral renal calculi as described.  4. Improvement of left hydronephrosis since 2014, although left hydroureter does persist through level of left UVJ.  This could reflect sequelae of chronic vesicoureteral reflux or congenital megaureter.  5. Interval enlargement of infrarenal abdominal aortic aneurysm currently measuring 5.2 x 5.4 cm.  6. Unchanged enlarged prostate.  7. Unchanged metallic foreign body in superficial subcutaneous fat of right gluteal soft tissues.    Assessment:     Masood Messina is a 79 y.o. male with Pseudomonas UTI, chronic L hydro, BPH and gross hematuria.         Plan:     Urine much clearer today. Clear yellow.      Prior to discharge back to [place] recommend  -changing tay to 16fr coude catheter tomorrow morning.     On discharge back to [facility] recommend  -continuing finasteride (through NGT)  -discontinuing bethanochol (pt  is catheter dependent)  -complete UTI treatment bc pt is having gross hematuria, otherwise would avoid treatment of asymptomatic bacteriuria  -tay catheter change every 4 weeks with 16 Irish coude   -follow-up with /urology NP for outpt workup of hematuria (last cysto 5 years ago)    Liza Red MD  [facility] (Hai/Margaret/Teofilo/Rene)   Office: [phone]

## 2019-10-05 NOTE — ASSESSMENT & PLAN NOTE
Continue mechanical ventilation as before.  Supplemental O2 via nasal canula; titrate O2 saturation to >92%.   Follow Pulmonary recommendations.  Sputum positive for possible pseudomonas.   Continue beta 2 agonist bronchodilator treatments.   On levaquin, vanc, and zosyn

## 2019-10-05 NOTE — PLAN OF CARE
Plan of care reviewed. Patient stable on vent, tolerating tube feedings at goal. Responded very well to the lasix, no blood clots noted in urine.  Dr. Red ordered for tay to be changed tomorrow to 16 Greek coude cath. Copious thin secretions noted coming around trach. Safety maintained.    Universal Safety Interventions

## 2019-10-05 NOTE — CARE UPDATE
10/05/19 1856   Patient Assessment/Suction   Level of Consciousness (AVPU) alert   Respiratory Effort Unlabored   Expansion/Accessory Muscles/Retractions no use of accessory muscles;no retractions   All Lung Fields Breath Sounds coarse   Rhythm/Pattern, Respiratory assisted mechanically   Cough Frequency with stimulation   Cough Type fair   Suction Method tracheal   $ Suction Charges Inline Suction Procedure Stat Charge   Secretions Amount small   Secretions Color cloudy   Secretions Characteristics thick   Aspirate Toleration TIFFANIE (no adverse reactions)   PRE-TX-O2   O2 Device (Oxygen Therapy) ventilator   $ Is the patient on Low Flow Oxygen? Yes   Oxygen Concentration (%) 30   SpO2 100 %   Pulse Oximetry Type Continuous   $ Pulse Oximetry - Multiple Charge Pulse Oximetry - Multiple   Pulse 85   Resp (!) 27   ETCO2   $ ETCO2 Charge Exhaled CO2 Monitoring   $ ETCO2 Usage Currently wearing   ETCO2 (mmHg) 31 mmHg   ETCO2 Device Type Bedside Monitor;Ventilator        Surgical Airway Portex Cuffed;Fenestrated   No Placement Date or Time found.   Present Prior to Hospital Arrival?: Yes  Inserted by: Present Prior to Hospital Arrival  Type: Tracheostomy  Brand: Portex  Airway Device Size: 8.0  Style: Cuffed;Fenestrated   Cuff Pressure 30 cm H2O   Cuff Inflation? Inflated   Status Secured   Site Assessment No drainage   Site Care Dried   Ties Assessment Secure;Intact   Vent Select   Charged w/in last 24h YES   Preset Conventional Ventilator Settings   Vent Type    Ventilation Type VC   Vent Mode A/C   Humidity HME   Set Rate 16 bmp   Vt Set 700 mL   PEEP/CPAP 7 cmH20   Pressure Support 0 cmH20   Waveform RAMP   Peak Flow 62 L/min   Set Inspiratory Pressure 0 cmH20   Insp Time 0 Sec(s)   Plateau Set/Insp. Hold (sec) 0   Insp Rise Time  0 %   I-Trigger Type  Flow   Trigger Sensitivity Flow/I-Trigger 1.5 L/min   P High 0 cm H2O   P Low 0 cm H2O   T High 0 sec   T Low 0 sec   Patient Ventilator Parameters   Resp Rate  Total 19 br/min   Peak Airway Pressure 33 cmH2O   Mean Airway Pressure 18 cmH20   Plateau Pressure 28 cmH20   Exhaled Vt 741 mL   Total Ve 13.5 mL   Spont Ve 0 L   I:E Ratio Measured 1:1.80   Conventional Ventilator Alarms   Alarms On Y   Ve High Alarm 16 L/min   Resp Rate High Alarm 40 br/min   Press High Alarm 55 cmH2O   Apnea Rate 16   Apnea Volume (mL) 700 mL   Apnea Oxygen Concentration  100   Apnea Flow Rate (L/min) 60   T Apnea 20 sec(s)   Ready to Wean/Extubation Screen   FIO2<=50 (chart decimal) 0.3   MV<16L (chart vol.) 13.5   PEEP <=8 (chart #) 7   Ready to Wean Parameters   F/VT Ratio<105 (RSBI) (!) 36.44

## 2019-10-05 NOTE — CARE UPDATE
10/05/19 0000   Patient Assessment/Suction   Level of Consciousness (AVPU) alert   Respiratory Effort Unlabored   Expansion/Accessory Muscles/Retractions no use of accessory muscles;no retractions   All Lung Fields Breath Sounds coarse   Rhythm/Pattern, Respiratory assisted mechanically   Cough Frequency with stimulation   Cough Type fair   Suction Method tracheal   $ Suction Charges Inline Suction Procedure Stat Charge   Secretions Amount moderate   Secretions Color cloudy   Secretions Characteristics thick   Aspirate Toleration TIFFANIE (no adverse reactions)   PRE-TX-O2   O2 Device (Oxygen Therapy) ventilator   Oxygen Concentration (%) 30   SpO2 100 %   Pulse 71   Resp 16   BP (!) 111/56   ETCO2   $ ETCO2 Charge Exhaled CO2 Monitoring   $ ETCO2 Usage Currently wearing   ETCO2 (mmHg) 31 mmHg   ETCO2 Device Type Bedside Monitor        Surgical Airway Portex Cuffed;Fenestrated   No Placement Date or Time found.   Present Prior to Hospital Arrival?: Yes  Inserted by: Present Prior to Hospital Arrival  Type: Tracheostomy  Brand: Portex  Airway Device Size: 8.0  Style: Cuffed;Fenestrated   Cuff Pressure 30 cm H2O   Cuff Inflation? Inflated   Status Secured   Site Assessment Dry   Site Care Protective barrier to skin   Ties Assessment Secure   Vent Select   Charged w/in last 24h YES   Preset Conventional Ventilator Settings   Ventilation Type VC   Vent Mode A/C   Set Rate 16 bmp   Vt Set 700 mL   PEEP/CPAP 7 cmH20   Pressure Support 0 cmH20   Waveform RAMP   Peak Flow 62 L/min   Set Inspiratory Pressure 0 cmH20   Insp Time 0 Sec(s)   Plateau Set/Insp. Hold (sec) 0   Insp Rise Time  0 %   Trigger Sensitivity Flow/I-Trigger 1.5 L/min   P High 0 cm H2O   P Low 0 cm H2O   T High 0 sec   T Low 0 sec   Patient Ventilator Parameters   Resp Rate Total 16 br/min   Peak Airway Pressure 39 cmH2O   Mean Airway Pressure 17 cmH20   Plateau Pressure 0 cmH20   Exhaled Vt 706 mL   Total Ve 11.2 mL   Spont Ve 0 L   I:E Ratio Measured  1:2.00   Conventional Ventilator Alarms   Ve High Alarm 16 L/min   Resp Rate High Alarm 40 br/min   Press High Alarm 55 cmH2O   Apnea Rate 16   Apnea Volume (mL) 700 mL   Apnea Oxygen Concentration  100   Apnea Flow Rate (L/min) 60   T Apnea 20 sec(s)   Ready to Wean/Extubation Screen   FIO2<=50 (chart decimal) 0.3   MV<16L (chart vol.) 11.2   PEEP <=8 (chart #) 7   Ready to Wean Parameters   F/VT Ratio<105 (RSBI) (!) 22.66        10/05/19 0000   Patient Assessment/Suction   Level of Consciousness (AVPU) alert   Respiratory Effort Unlabored   Expansion/Accessory Muscles/Retractions no use of accessory muscles;no retractions   All Lung Fields Breath Sounds coarse   Rhythm/Pattern, Respiratory assisted mechanically   Cough Frequency with stimulation   Cough Type fair   Suction Method tracheal   $ Suction Charges Inline Suction Procedure Stat Charge   Secretions Amount moderate   Secretions Color cloudy   Secretions Characteristics thick   Aspirate Toleration TIFFANIE (no adverse reactions)   PRE-TX-O2   O2 Device (Oxygen Therapy) ventilator   Oxygen Concentration (%) 30   SpO2 100 %   Pulse 71   Resp 16   BP (!) 111/56   ETCO2   $ ETCO2 Charge Exhaled CO2 Monitoring   $ ETCO2 Usage Currently wearing   ETCO2 (mmHg) 31 mmHg   ETCO2 Device Type Bedside Monitor        Surgical Airway Portex Cuffed;Fenestrated   No Placement Date or Time found.   Present Prior to Hospital Arrival?: Yes  Inserted by: Present Prior to Hospital Arrival  Type: Tracheostomy  Brand: Portex  Airway Device Size: 8.0  Style: Cuffed;Fenestrated   Cuff Pressure 30 cm H2O   Cuff Inflation? Inflated   Status Secured   Site Assessment Dry   Site Care Protective barrier to skin   Ties Assessment Secure   Vent Select   Charged w/in last 24h YES   Preset Conventional Ventilator Settings   Ventilation Type VC   Vent Mode A/C   Set Rate 16 bmp   Vt Set 700 mL   PEEP/CPAP 7 cmH20   Pressure Support 0 cmH20   Waveform RAMP   Peak Flow 62 L/min   Set Inspiratory  Pressure 0 cmH20   Insp Time 0 Sec(s)   Plateau Set/Insp. Hold (sec) 0   Insp Rise Time  0 %   Trigger Sensitivity Flow/I-Trigger 1.5 L/min   P High 0 cm H2O   P Low 0 cm H2O   T High 0 sec   T Low 0 sec   Patient Ventilator Parameters   Resp Rate Total 16 br/min   Peak Airway Pressure 39 cmH2O   Mean Airway Pressure 17 cmH20   Plateau Pressure 0 cmH20   Exhaled Vt 706 mL   Total Ve 11.2 mL   Spont Ve 0 L   I:E Ratio Measured 1:2.00   Conventional Ventilator Alarms   Ve High Alarm 16 L/min   Resp Rate High Alarm 40 br/min   Press High Alarm 55 cmH2O   Apnea Rate 16   Apnea Volume (mL) 700 mL   Apnea Oxygen Concentration  100   Apnea Flow Rate (L/min) 60   T Apnea 20 sec(s)   Ready to Wean/Extubation Screen   FIO2<=50 (chart decimal) 0.3   MV<16L (chart vol.) 11.2   PEEP <=8 (chart #) 7   Ready to Wean Parameters   F/VT Ratio<105 (RSBI) (!) 22.66

## 2019-10-05 NOTE — CARE UPDATE
10/04/19 1900   Patient Assessment/Suction   Level of Consciousness (AVPU) alert   Respiratory Effort Unlabored   Expansion/Accessory Muscles/Retractions no use of accessory muscles;no retractions   All Lung Fields Breath Sounds coarse   Rhythm/Pattern, Respiratory assisted mechanically   Cough Frequency with stimulation   Cough Type fair   Suction Method oral;tracheal   $ Suction Charges Inline Suction Procedure Stat Charge   Secretions Amount small   Secretions Color cloudy   Secretions Characteristics thick   Aspirate Toleration TIFFANIE (no adverse reactions)   PRE-TX-O2   O2 Device (Oxygen Therapy) ventilator   $ Is the patient on Low Flow Oxygen? Yes   Oxygen Concentration (%) 30   SpO2 99 %   Pulse Oximetry Type Continuous   $ Pulse Oximetry - Multiple Charge Pulse Oximetry - Multiple   Oximetry Probe Site Assessed;Intact   Pulse 74   Resp 16   BP (!) 105/58   ETCO2   $ ETCO2 Charge Exhaled CO2 Monitoring   $ ETCO2 Usage Currently wearing   ETCO2 (mmHg) 31 mmHg   ETCO2 Device Type Bedside Monitor;Ventilator        Surgical Airway Portex Cuffed;Fenestrated   No Placement Date or Time found.   Present Prior to Hospital Arrival?: Yes  Inserted by: Present Prior to Hospital Arrival  Type: Tracheostomy  Brand: Portex  Airway Device Size: 8.0  Style: Cuffed;Fenestrated   Cuff Pressure 30 cm H2O   Cuff Inflation? Inflated   Status Secured   Site Assessment Clean;Dry   Site Care Cleansed;Dried;Protective barrier to skin   Ties Assessment Dry;Clean;Secure   Vent Select   Conventional Vent Y   Ventilator Initiated No   Charged w/in last 24h YES   Preset Conventional Ventilator Settings   Vent Type    Ventilation Type VC   Vent Mode A/C   Humidity HME   Set Rate 16 bmp   Vt Set 700 mL   PEEP/CPAP 7 cmH20   Pressure Support 0 cmH20   Waveform RAMP   Peak Flow 62 L/min   Set Inspiratory Pressure 0 cmH20   Insp Time 0 Sec(s)   Plateau Set/Insp. Hold (sec) 0   Insp Rise Time  0 %   I-Trigger Type  Flow   Trigger  Sensitivity Flow/I-Trigger 1.5 L/min   P High 0 cm H2O   P Low 0 cm H2O   T High 0 sec   T Low 0 sec   Patient Ventilator Parameters   Resp Rate Total 16 br/min   Peak Airway Pressure 36 cmH2O   Mean Airway Pressure 16 cmH20   Plateau Pressure 0 cmH20   Exhaled Vt 602 mL   Total Ve 11.3 mL   Spont Ve 0 L   I:E Ratio Measured 1:2.00   Conventional Ventilator Alarms   Alarms On Y   Ve High Alarm 16 L/min   Resp Rate High Alarm 50 br/min   Press High Alarm 60 cmH2O   Apnea Rate 16   Apnea Volume (mL) 700 mL   Apnea Oxygen Concentration  100   Apnea Flow Rate (L/min) 60   T Apnea 20 sec(s)   Ready to Wean/Extubation Screen   FIO2<=50 (chart decimal) 0.3   MV<16L (chart vol.) 11.3   PEEP <=8 (chart #) 7   Ready to Wean Parameters   F/VT Ratio<105 (RSBI) (!) 26.58   Airway Safety   Ambu bag with the patient? Yes, Adult Ambu   Is mask with the patient? Yes, Adult Mask

## 2019-10-05 NOTE — PROGRESS NOTES
[facility]  Pulmonology  Progress Note    Patient Name: Masood Messina  MRN: [id number]  Admission Date: 10/2/2019  Hospital Length of Stay: 3 days  Code Status: Full Code  Attending Provider: Melissa Mayfield MD  Primary Care Provider: Jeramie Lombardi MD   Principal Problem: Septic shock    Subjective:     Interval History:     10/5/2019 - Stable overnight, no new respiratory issues reported.  No changes with the ventilator.  Pt remains volume overloaded and abdomen is very distended.  HGB has decreased but no acute blood loss reported    Review of Systems   Unable to perform ROS: Mental acuity         Objective:     Vital Signs (Most Recent):  Temp: 98.5 °F (36.9 °C) (10/05/19 0800)  Pulse: 76 (10/05/19 1145)  Resp: (!) 23 (10/05/19 1145)  BP: (!) 148/77 (10/05/19 1130)  SpO2: 98 % (10/05/19 1145) Vital Signs (24h Range):  Temp:  [98.5 °F (36.9 °C)-99.3 °F (37.4 °C)] 98.5 °F (36.9 °C)  Pulse:  [68-88] 76  Resp:  [16-28] 23  SpO2:  [88 %-100 %] 98 %  BP: ()/(51-90) 148/77     Weight: 131.5 kg (289 lb 14.5 oz)  Body mass index is 39.32 kg/m².      Intake/Output Summary (Last 24 hours) at 10/5/2019 1226  Last data filed at 10/5/2019 0941  Gross per 24 hour   Intake 2901.4 ml   Output 1615 ml   Net 1286.4 ml       Physical Exam   Constitutional: He appears well-nourished. No distress.   Obese male, nad, alert but not very responsive   HENT:   Head: Normocephalic and atraumatic.   Nose: Nose normal.   Mouth/Throat: Oropharynx is clear and moist.   Eyes: Pupils are equal, round, and reactive to light. EOM are normal.   Neck: Normal range of motion. Neck supple. No JVD present. No tracheal deviation present. No thyromegaly present.   Trach with some secretions   Cardiovascular: Normal rate, regular rhythm, normal heart sounds and intact distal pulses. Exam reveals no gallop and no friction rub.   No murmur heard.  Pulmonary/Chest: Effort normal and breath sounds normal. No stridor. No  respiratory distress. He has no wheezes. He has no rales. He exhibits no tenderness.   Few rhonchi, ventilated   Abdominal: Soft. Bowel sounds are normal. He exhibits distension. There is no tenderness. There is no rebound and no guarding.   Hypoactive  PEG   Musculoskeletal: Normal range of motion. He exhibits edema. He exhibits no tenderness.   Lymphadenopathy:     He has no cervical adenopathy.   Neurological: He is alert. He has normal reflexes. No cranial nerve deficit.   Chronic changes   Skin: He is not diaphoretic.   Nursing note and vitals reviewed.      Vents:  Vent Mode: A/C (10/05/19 0758)  Ventilator Initiated: No (10/04/19 1900)  Set Rate: 16 bmp (10/05/19 0758)  Vt Set: 700 mL (10/05/19 0758)  Pressure Support: 0 cmH20 (10/05/19 0758)  PEEP/CPAP: 7 cmH20 (10/05/19 0758)  Oxygen Concentration (%): 30 (10/05/19 1145)  Peak Airway Pressure: 35 cmH2O (10/05/19 0758)  Plateau Pressure: 28 cmH20 (10/05/19 0758)  Total Ve: 10.8 mL (10/05/19 0758)  F/VT Ratio<105 (RSBI): (!) 29.29 (10/05/19 0758)    Lines/Drains/Airways     Central Venous Catheter Line                 Percutaneous Central Line Insertion/Assessment - triple lumen  10/02/19 1800 2 days          Drain                 Gastrostomy/Enterostomy -- days         Gastrostomy/Enterostomy  Gastrostomy tube w/ balloon;Gastrostomy-jejunostomy midline feeding -- days         Gastrostomy/Enterostomy 1335 Percutaneous endoscopic gastrostomy (PEG) LUQ feeding -- days         Urethral Catheter 10/07/17 0136 Latex 16 Fr. 728 days         Urethral Catheter 10/02/19 2200 Coude 24 Fr. 2 days          Airway                 Surgical Airway Portex Cuffed -- days         Surgical Airway Portex Cuffed;Fenestrated -- days                Significant Labs:    CBC/Anemia Profile:  Recent Labs   Lab 10/04/19  0339 10/05/19  0435   WBC 15.93*  15.93* 8.26  8.26   HGB 8.6*  8.6* 7.4*  7.4*   HCT 27.0*  27.0* 23.3*  23.3*   *  129* 104*  104*   MCV 95  95 96   96   RDW 14.1  14.1 14.0  14.0        Chemistries:  Recent Labs   Lab 10/04/19  0339 10/05/19  0435     137 132*  132*   K 3.9  3.9 3.6  3.6     104 101  101   CO2 25  25 24  24   BUN 37*  37* 34*  34*   CREATININE 1.9*  1.9* 1.5*  1.5*   CALCIUM 8.8  8.8 7.8*  7.8*   ALBUMIN 2.3* 2.1*   PROT 6.7 6.1   BILITOT 0.8 0.5   ALKPHOS 79 67   ALT 18 22   AST 19 21   MG 1.8 1.6   PHOS 2.3* 2.0*       All pertinent labs within the past 24 hours have been reviewed.    Microbiology Results (last 7 days)     Procedure Component Value Units Date/Time    Culture, Respiratory with Gram Stain [[id number]]  (Abnormal) Collected:  10/04/19 0020    Order Status:  Completed Specimen:  Respiratory from Tracheal Aspirate Updated:  10/05/19 0839     Respiratory Culture PRESUMPTIVE PSEUDOMONAS SPECIES  Many  Identification and susceptibility pending  Normal respiratory gabriela also present       Gram Stain (Respiratory) <10 epithelial cells per low power field.     Gram Stain (Respiratory) Rare WBC's     Gram Stain (Respiratory) Many Gram negative rods     Gram Stain (Respiratory) Few Gram positive rods     Gram Stain (Respiratory) Rare Gram positive cocci    Blood culture #1 **CANNOT BE ORDERED STAT** [[id number]] Collected:  10/02/19 1305    Order Status:  Completed Specimen:  Blood Updated:  10/05/19 0612     Blood Culture, Routine No Growth to date      No Growth to date      No Growth to date    Blood culture #2 **CANNOT BE ORDERED STAT** [[id number]] Collected:  10/02/19 1305    Order Status:  Completed Specimen:  Blood Updated:  10/05/19 0612     Blood Culture, Routine No Growth to date      No Growth to date      No Growth to date    Urine culture [[id number]]  (Abnormal)  (Susceptibility) Collected:  10/02/19 1321    Order Status:  Completed Specimen:  Urine Updated:  10/05/19 0439     Urine Culture, Routine PSEUDOMONAS AERUGINOSA  10,000 - 49,999 cfu/ml      Narrative:       Preferred Collection Type->Urine,  Catheterized    Influenza A & B by Molecular [[id number]] Collected:  10/02/19 1300    Order Status:  Completed Specimen:  Nasopharyngeal Swab Updated:  10/02/19 1349     Influenza A, Molecular Negative     Influenza B, Molecular Negative     Flu A & B Source Nasal swab            Significant Imaging:  I have reviewed and interpreted all pertinent imaging results/findings within the past 24 hours.    Assessment/Plan:     * Septic shock  · Better  · Sources include urine and sputum  · Continue antibiotics and await sensitivities    Acute on chronic respiratory failure with hypoxia and hypercapnia  · Wean FiO2 as able  · Continue ventilatory support  · He is chronically vent dependent    Hypophosphatemia  · Replace (d/w nursing)    Thrombocytopenia  · Platelets have decreased some, follow    Anemia  · hgb has decreased but no acute blood loss reported  · Follow   · tansfuse for hgb < 7    Coronary artery disease  · No acute ACS    PEG (percutaneous endoscopic gastrostomy) status  · Aware     Urinary tract infection without hematuria  · UC + pseudomonas, await sensitivity  · Continue present meds for now    Tracheostomy in place  · No problems with trach at this time    Functional quadriplegia  · Aware            Shai Bai MD  Pulmonology  [facility]

## 2019-10-05 NOTE — SUBJECTIVE & OBJECTIVE
Interval History:     10/5/2019 - Stable overnight, no new respiratory issues reported.  No changes with the ventilator.  Pt remains volume overloaded and abdomen is very distended.  HGB has decreased but no acute blood loss reported    Review of Systems   Unable to perform ROS: Mental acuity         Objective:     Vital Signs (Most Recent):  Temp: 98.5 °F (36.9 °C) (10/05/19 0800)  Pulse: 76 (10/05/19 1145)  Resp: (!) 23 (10/05/19 1145)  BP: (!) 148/77 (10/05/19 1130)  SpO2: 98 % (10/05/19 1145) Vital Signs (24h Range):  Temp:  [98.5 °F (36.9 °C)-99.3 °F (37.4 °C)] 98.5 °F (36.9 °C)  Pulse:  [68-88] 76  Resp:  [16-28] 23  SpO2:  [88 %-100 %] 98 %  BP: ()/(51-90) 148/77     Weight: 131.5 kg (289 lb 14.5 oz)  Body mass index is 39.32 kg/m².      Intake/Output Summary (Last 24 hours) at 10/5/2019 1226  Last data filed at 10/5/2019 0941  Gross per 24 hour   Intake 2901.4 ml   Output 1615 ml   Net 1286.4 ml       Physical Exam   Constitutional: He appears well-nourished. No distress.   Obese male, nad, alert but not very responsive   HENT:   Head: Normocephalic and atraumatic.   Nose: Nose normal.   Mouth/Throat: Oropharynx is clear and moist.   Eyes: Pupils are equal, round, and reactive to light. EOM are normal.   Neck: Normal range of motion. Neck supple. No JVD present. No tracheal deviation present. No thyromegaly present.   Trach with some secretions   Cardiovascular: Normal rate, regular rhythm, normal heart sounds and intact distal pulses. Exam reveals no gallop and no friction rub.   No murmur heard.  Pulmonary/Chest: Effort normal and breath sounds normal. No stridor. No respiratory distress. He has no wheezes. He has no rales. He exhibits no tenderness.   Few rhonchi, ventilated   Abdominal: Soft. Bowel sounds are normal. He exhibits distension. There is no tenderness. There is no rebound and no guarding.   Hypoactive  PEG   Musculoskeletal: Normal range of motion. He exhibits edema. He exhibits no  tenderness.   Lymphadenopathy:     He has no cervical adenopathy.   Neurological: He is alert. He has normal reflexes. No cranial nerve deficit.   Chronic changes   Skin: He is not diaphoretic.   Nursing note and vitals reviewed.      Vents:  Vent Mode: A/C (10/05/19 0758)  Ventilator Initiated: No (10/04/19 1900)  Set Rate: 16 bmp (10/05/19 0758)  Vt Set: 700 mL (10/05/19 0758)  Pressure Support: 0 cmH20 (10/05/19 0758)  PEEP/CPAP: 7 cmH20 (10/05/19 0758)  Oxygen Concentration (%): 30 (10/05/19 1145)  Peak Airway Pressure: 35 cmH2O (10/05/19 0758)  Plateau Pressure: 28 cmH20 (10/05/19 0758)  Total Ve: 10.8 mL (10/05/19 0758)  F/VT Ratio<105 (RSBI): (!) 29.29 (10/05/19 0758)    Lines/Drains/Airways     Central Venous Catheter Line                 Percutaneous Central Line Insertion/Assessment - triple lumen  10/02/19 1800 2 days          Drain                 Gastrostomy/Enterostomy -- days         Gastrostomy/Enterostomy  Gastrostomy tube w/ balloon;Gastrostomy-jejunostomy midline feeding -- days         Gastrostomy/Enterostomy 1335 Percutaneous endoscopic gastrostomy (PEG) LUQ feeding -- days         Urethral Catheter 10/07/17 0136 Latex 16 Fr. 728 days         Urethral Catheter 10/02/19 2200 Coude 24 Fr. 2 days          Airway                 Surgical Airway Portex Cuffed -- days         Surgical Airway Portex Cuffed;Fenestrated -- days                Significant Labs:    CBC/Anemia Profile:  Recent Labs   Lab 10/04/19  0339 10/05/19  0435   WBC 15.93*  15.93* 8.26  8.26   HGB 8.6*  8.6* 7.4*  7.4*   HCT 27.0*  27.0* 23.3*  23.3*   *  129* 104*  104*   MCV 95  95 96  96   RDW 14.1  14.1 14.0  14.0        Chemistries:  Recent Labs   Lab 10/04/19  0339 10/05/19  0435     137 132*  132*   K 3.9  3.9 3.6  3.6     104 101  101   CO2 25  25 24  24   BUN 37*  37* 34*  34*   CREATININE 1.9*  1.9* 1.5*  1.5*   CALCIUM 8.8  8.8 7.8*  7.8*   ALBUMIN 2.3* 2.1*   PROT 6.7 6.1    BILITOT 0.8 0.5   ALKPHOS 79 67   ALT 18 22   AST 19 21   MG 1.8 1.6   PHOS 2.3* 2.0*       All pertinent labs within the past 24 hours have been reviewed.    Microbiology Results (last 7 days)     Procedure Component Value Units Date/Time    Culture, Respiratory with Gram Stain [[id number]]  (Abnormal) Collected:  10/04/19 0020    Order Status:  Completed Specimen:  Respiratory from Tracheal Aspirate Updated:  10/05/19 0839     Respiratory Culture PRESUMPTIVE PSEUDOMONAS SPECIES  Many  Identification and susceptibility pending  Normal respiratory gabriela also present       Gram Stain (Respiratory) <10 epithelial cells per low power field.     Gram Stain (Respiratory) Rare WBC's     Gram Stain (Respiratory) Many Gram negative rods     Gram Stain (Respiratory) Few Gram positive rods     Gram Stain (Respiratory) Rare Gram positive cocci    Blood culture #1 **CANNOT BE ORDERED STAT** [[id number]] Collected:  10/02/19 1305    Order Status:  Completed Specimen:  Blood Updated:  10/05/19 0612     Blood Culture, Routine No Growth to date      No Growth to date      No Growth to date    Blood culture #2 **CANNOT BE ORDERED STAT** [[id number]] Collected:  10/02/19 1305    Order Status:  Completed Specimen:  Blood Updated:  10/05/19 0612     Blood Culture, Routine No Growth to date      No Growth to date      No Growth to date    Urine culture [[id number]]  (Abnormal)  (Susceptibility) Collected:  10/02/19 1321    Order Status:  Completed Specimen:  Urine Updated:  10/05/19 0439     Urine Culture, Routine PSEUDOMONAS AERUGINOSA  10,000 - 49,999 cfu/ml      Narrative:       Preferred Collection Type->Urine, Catheterized    Influenza A & B by Molecular [[id number]] Collected:  10/02/19 1300    Order Status:  Completed Specimen:  Nasopharyngeal Swab Updated:  10/02/19 1349     Influenza A, Molecular Negative     Influenza B, Molecular Negative     Flu A & B Source Nasal swab            Significant Imaging:  I have reviewed and  interpreted all pertinent imaging results/findings within the past 24 hours.

## 2019-10-05 NOTE — PROGRESS NOTES
Pharmacokinetic Assessment Follow Up: IV Vancomycin    Vancomycin serum concentration assessment(s):    The random level was 23.3 mcg/ml and drawn correctly and can be used to guide therapy at this time. The measurement is above the desired definitive target range of 15 to 20 mcg/mL.    Vancomycin Regimen Plan:    Change regimen to Vancomycin 1500 mg IV x 1 dose for now due to unstable renal function with next serum random concentration measured at 2030 prior to next dose dose on 10/06.   Start new dose after 3 hours of holding.    Drug levels (last 3 results):  Recent Labs   Lab Result Units 10/03/19  1534 10/04/19  1700 10/05/19  1724   Vancomycin, Random ug/mL 6.6 15.6 23.3       Pharmacy will continue to follow and monitor vancomycin.    Please contact pharmacy at extension [phone] for questions regarding this assessment.    Thank you for the consult,   Chencho Aragon, PharmD       Patient brief summary:  Masood Messina is a 79 y.o. male initiated on antimicrobial therapy with IV Vancomycin for treatment of sepsis    The patient's current regimen is 2000 mg q24 hrs.    Drug Allergies:   Review of patient's allergies indicates:   Allergen Reactions    Codeine Other (See Comments)       Actual Body Weight:   131.5 kg    Renal Function:   Estimated Creatinine Clearance: 55.1 mL/min (A) (based on SCr of 1.5 mg/dL (H)).,     Dialysis Method (if applicable):  N/A    CBC (last 72 hours):  Recent Labs   Lab Result Units 10/02/19  2340 10/03/19  0608 10/04/19  0339 10/05/19  0435 10/05/19  1626   WBC K/uL 28.18* 19.18*  19.18* 15.93*  15.93* 8.26  8.26 9.22   Hemoglobin g/dL 10.0* 10.7*  10.7* 8.6*  8.6* 7.4*  7.4* 8.6*   Hematocrit % 30.6* 33.4*  33.4* 27.0*  27.0* 23.3*  23.3* 26.8*   Platelets K/uL 146* 129*  129* 129*  129* 104*  104* 134*   Gran% % 84.0* 83.1*  83.1* 74.9*  74.9* 63.5  63.5 63.9   Lymph% % 9.0* 5.6*  5.6* 10.9*  10.9* 16.2*  16.2* 18.4   Mono% % 1.0* 9.2  9.2 11.2  11.2 15.0   15.0 13.4   Eosinophil% % 1.0 1.0  1.0 2.0  2.0 4.2  4.2 3.1   Basophil% % 0.0 0.4  0.4 0.4  0.4 0.5  0.5 0.4   Differential Method  Manual Automated  Automated Automated  Automated Automated  Automated Automated       Metabolic Panel (last 72 hours):  Recent Labs   Lab Result Units 10/02/19  2340 10/03/19  0609 10/04/19  0339 10/05/19  0435   Sodium mmol/L 139 139  139 137  137 132*  132*   Potassium mmol/L 4.8 4.7  4.7 3.9  3.9 3.6  3.6   Chloride mmol/L 103 105  105 104  104 101  101   CO2 mmol/L 26 26  26 25  25 24  24   Glucose mg/dL 138* 112*  112* 120*  120* 199*  199*   BUN, Bld mg/dL 48* 46*  46* 37*  37* 34*  34*   Creatinine mg/dL 2.6* 2.4*  2.4* 1.9*  1.9* 1.5*  1.5*   Albumin g/dL  --  2.3* 2.3* 2.1*   Total Bilirubin mg/dL  --  1.0 0.8 0.5   Alkaline Phosphatase U/L  --  72 79 67   AST U/L  --  23 19 21   ALT U/L  --  16 18 22   Magnesium mg/dL 2.2 2.1 1.8 1.6   Phosphorus mg/dL  --  2.4* 2.3* 2.0*       Vancomycin Administrations:  vancomycin given in the last 96 hours                     vancomycin (VANCOCIN) 2,000 mg in dextrose 5 % 500 mL IVPB (mg) 2,000 mg New Bag 10/04/19 1811    vancomycin (VANCOCIN) 2,000 mg in dextrose 5 % 500 mL IVPB (mg) 2,000 mg New Bag 10/03/19 1735                      Microbiologic Results:  Microbiology Results (last 7 days)       Procedure Component Value Units Date/Time    Culture, Respiratory with Gram Stain [[id number]]  (Abnormal) Collected:  10/04/19 0020    Order Status:  Completed Specimen:  Respiratory from Tracheal Aspirate Updated:  10/05/19 0839     Respiratory Culture PRESUMPTIVE PSEUDOMONAS SPECIES  Many  Identification and susceptibility pending  Normal respiratory gabriela also present       Gram Stain (Respiratory) <10 epithelial cells per low power field.     Gram Stain (Respiratory) Rare WBC's     Gram Stain (Respiratory) Many Gram negative rods     Gram Stain (Respiratory) Few Gram positive rods     Gram Stain (Respiratory)  Rare Gram positive cocci    Blood culture #1 **CANNOT BE ORDERED STAT** [[id number]] Collected:  10/02/19 1305    Order Status:  Completed Specimen:  Blood Updated:  10/05/19 0612     Blood Culture, Routine No Growth to date      No Growth to date      No Growth to date    Blood culture #2 **CANNOT BE ORDERED STAT** [[id number]] Collected:  10/02/19 1305    Order Status:  Completed Specimen:  Blood Updated:  10/05/19 0612     Blood Culture, Routine No Growth to date      No Growth to date      No Growth to date    Urine culture [[id number]]  (Abnormal)  (Susceptibility) Collected:  10/02/19 1321    Order Status:  Completed Specimen:  Urine Updated:  10/05/19 0439     Urine Culture, Routine PSEUDOMONAS AERUGINOSA  10,000 - 49,999 cfu/ml      Narrative:       Preferred Collection Type->Urine, Catheterized    Influenza A & B by Molecular [[id number]] Collected:  10/02/19 1300    Order Status:  Completed Specimen:  Nasopharyngeal Swab Updated:  10/02/19 1349     Influenza A, Molecular Negative     Influenza B, Molecular Negative     Flu A & B Source Nasal swab

## 2019-10-05 NOTE — ASSESSMENT & PLAN NOTE
Masood Messina is a 79 y.o. male who presents to the Emergency Department with septic shock.  This patient does) have evidence of infective focus  My overall impression is healthcare associated pneumonia and UTI.    F/u cultures  Lab Results   Component Value Date    WBC 9.22 10/05/2019    HGB 8.6 (L) 10/05/2019    HCT 26.8 (L) 10/05/2019    MCV 94 10/05/2019     (L) 10/05/2019     Organ dysfunction indicated by altered mental status and acute kidney injury  Vital signs post fluid administrations were-    Temp Readings from Last 1 Encounters:   10/05/19 98 °F (36.7 °C)     BP Readings from Last 1 Encounters:   10/05/19 (!) 158/86     Pulse Readings from Last 1 Encounters:   10/05/19 82    Use intravenous pressor agent to maintain mean arterial pressure above 65 mm of mercury as needed.  Repeat lactic acid.

## 2019-10-06 PROBLEM — G89.29 CHRONIC PAIN: Status: ACTIVE | Noted: 2019-01-01

## 2019-10-06 NOTE — PLAN OF CARE
10/06/19 0811   Patient Assessment/Suction   All Lung Fields Breath Sounds coarse   Suction Method tracheal   $ Suction Charges Inline Suction Procedure Stat Charge   Secretions Amount moderate   Secretions Color yellow   Secretions Characteristics thick;tenacious   Aspirate Toleration TIFFANIE (no adverse reactions)   PRE-TX-O2   O2 Device (Oxygen Therapy) ventilator   $ Is the patient on Low Flow Oxygen? Yes   Oxygen Concentration (%) 30   SpO2 99 %   Pulse Oximetry Type Continuous   $ Pulse Oximetry - Multiple Charge Pulse Oximetry - Multiple   Pulse 81   Resp (!) 27   ETCO2   $ ETCO2 Charge Exhaled CO2 Monitoring   $ ETCO2 Usage Currently wearing   ETCO2 (mmHg) 32 mmHg   ETCO2 Device Type Bedside Monitor        Surgical Airway Portex Cuffed;Fenestrated   No Placement Date or Time found.   Present Prior to Hospital Arrival?: Yes  Inserted by: Present Prior to Hospital Arrival  Type: Tracheostomy  Brand: Portex  Airway Device Size: 8.0  Style: Cuffed;Fenestrated   Cuff Inflation? Inflated   Status Secured   Site Assessment Oozing secretions   Site Care Cleansed;Dressing applied   Ties Assessment Dry;Secure;Intact   Vent Select   Conventional Vent Y   $ Ventilator Subsequent 1   Charged w/in last 24h YES   Preset Conventional Ventilator Settings   Vent Type    Ventilation Type VC   Vent Mode A/C   Humidity HME   Set Rate 16 bmp   Vt Set 700 mL   PEEP/CPAP 7 cmH20   Pressure Support 0 cmH20   Waveform RAMP   Peak Flow 62 L/min   Set Inspiratory Pressure 0 cmH20   Insp Time 0 Sec(s)   Plateau Set/Insp. Hold (sec) 0   Insp Rise Time  0 %   Trigger Sensitivity Flow/I-Trigger 1.5 L/min   P High 0 cm H2O   P Low 0 cm H2O   T High 0 sec   T Low 0 sec   Patient Ventilator Parameters   Resp Rate Total 16 br/min   Peak Airway Pressure 33 cmH2O   Mean Airway Pressure 16 cmH20   Plateau Pressure 28 cmH20   Exhaled Vt 726 mL   Total Ve 11.6 mL   Spont Ve 0 L   I:E Ratio Measured 1:2.00   Conventional Ventilator Alarms   Ve  High Alarm 16 L/min   Resp Rate High Alarm 40 br/min   Press High Alarm 55 cmH2O   Apnea Rate 16   Apnea Volume (mL) 700 mL   Apnea Oxygen Concentration  100   Apnea Flow Rate (L/min) 60   T Apnea 20 sec(s)

## 2019-10-06 NOTE — PROGRESS NOTES
[facility] Medicine  Progress Note    Patient Name: Masood Messina  MRN: [id number]  Patient Class: IP- Inpatient   Admission Date: 10/2/2019  Length of Stay: 4 days  Attending Physician: Melissa Mayfield MD  Primary Care Provider: Jeramie Lombardi MD        Subjective:     Principal Problem:Septic shock        HPI:  Patient is an 79-year-old morbidly obese male from [facility] with past medical history significant for multiple medical problems including chronic hypoxic hypercarbic respiratory failure (ventilator dependent status post tracheostomy), status post PEG tube placement, hypertension, hyperlipidemia, coronary artery disease, history of cerebrovascular accident, hypothyroidism and prior history of cerebrovascular accident who is functional quadriplegic is being admitted to Hospital Medicine from [facility] where patient presented with septic shock.  Upon arrival patient is unresponsive and blood pressure was noted to be around 72/48 mm of mercury.  Per ER physician reportedly patient had some hematuria for 2 days.  Patient was being treated for pneumonia recently. No further details of HPI.     Overview/Hospital Course:  Patient has been weaned off intravenous pressor agent.  Urine cultures are growing g negative amish.  Patient having ongoing intermittent gross hematuria requiring bladder irrigation.  Urology team following.    Interval History: Patient resting in bed. Nursing concerned about patient being in pain.     Review of Systems   Unable to perform ROS: Patient nonverbal     Objective:     Vital Signs (Most Recent):  Temp: 97.4 °F (36.3 °C) (10/06/19 0800)  Pulse: 81 (10/06/19 1200)  Resp: (!) 23 (10/06/19 1200)  BP: (!) 159/80 (10/06/19 1100)  SpO2: 100 % (10/06/19 1200) Vital Signs (24h Range):  Temp:  [97.4 °F (36.3 °C)-99.3 °F (37.4 °C)] 97.4 °F (36.3 °C)  Pulse:  [73-87] 81  Resp:  [16-33] 23  SpO2:  [95 %-100 %] 100  %  BP: (128-175)/(74-95) 159/80     Weight: 131.5 kg (289 lb 14.5 oz)  Body mass index is 39.32 kg/m².    Intake/Output Summary (Last 24 hours) at 10/6/2019 1408  Last data filed at 10/6/2019 1224  Gross per 24 hour   Intake 3420 ml   Output 6750 ml   Net -3330 ml      Physical Exam   Constitutional: He appears well-developed and well-nourished.   HENT:   Head: Normocephalic and atraumatic.   Eyes: Conjunctivae are normal.   Cardiovascular: Normal rate, regular rhythm and normal heart sounds.   Pulmonary/Chest: Effort normal and breath sounds normal.   Abdominal: Soft. Bowel sounds are normal.   Neurological: He is alert.   Skin: Skin is warm.   Vitals reviewed.      Significant Labs:   Recent Lab Results       10/06/19  0332   10/05/19  1724   10/05/19  1626        Albumin 2.4         Alkaline Phosphatase 78         ALT 29         Anion Gap 9          9         AST 30         Baso # 0.05   0.04      0.05         Basophil% 0.6   0.4      0.6         BILIRUBIN TOTAL 0.6  Comment:  For infants and newborns, interpretation of results should be based  on gestational age, weight and in agreement with clinical  observations.  Premature Infant recommended reference ranges:  Up to 24 hours.............<8.0 mg/dL  Up to 48 hours............<12.0 mg/dL  3-5 days..................<15.0 mg/dL  6-29 days.................<15.0 mg/dL           BUN, Bld 31          31         Calcium 8.6          8.6         Chloride 97          97         CO2 28          28         Creatinine 1.6          1.6         Differential Method Automated   Automated      Automated         eGFR if  46          46         eGFR if non  40  Comment:  Calculation used to obtain the estimated glomerular filtration  rate (eGFR) is the CKD-EPI equation.             40  Comment:  Calculation used to obtain the estimated glomerular filtration  rate (eGFR) is the CKD-EPI equation.            Eos # 0.4   0.3      0.4          Eosinophil% 4.2   3.1      4.2         Glucose 116          116         Gran # (ANC) 5.4   5.9      5.4         Gran% 61.7   63.9      61.7         Hematocrit 26.3   26.8      26.3         Hemoglobin 8.6   8.6      8.6         Immature Grans (Abs) 0.07  Comment:  Mild elevation in immature granulocytes is non specific and   can be seen in a variety of conditions including stress response,   acute inflammation, trauma and pregnancy. Correlation with other   laboratory and clinical findings is essential.     0.07  Comment:  Mild elevation in immature granulocytes is non specific and   can be seen in a variety of conditions including stress response,   acute inflammation, trauma and pregnancy. Correlation with other   laboratory and clinical findings is essential.        0.07  Comment:  Mild elevation in immature granulocytes is non specific and   can be seen in a variety of conditions including stress response,   acute inflammation, trauma and pregnancy. Correlation with other   laboratory and clinical findings is essential.           Lymph # 1.5   1.7      1.5         Lymph% 17.1   18.4      17.1         Magnesium 1.6         MCH 30.4   30.1      30.4         MCHC 32.7   32.1      32.7         MCV 93   94      93         Mono # 1.4   1.2      1.4         Mono% 15.6   13.4      15.6         MPV 11.9   11.3      11.9         nRBC 0   0      0         Phosphorus 2.4         Platelets 144   134      144         Potassium 3.5          3.5         PROTEIN TOTAL 7.3         RBC 2.83   2.86      2.83         RDW 13.5   13.8      13.5         Sodium 134          134         Vancomycin, Random   23.3       WBC 8.79   9.22      8.79               Assessment/Plan:      * Septic shock  Masood Messina is a 79 y.o. male who presents to the Emergency Department with septic shock.  This patient does) have evidence of infective focus  My overall impression is healthcare associated pneumonia and UTI.    F/u cultures  Lab Results    Component Value Date    WBC 8.79 10/06/2019    WBC 8.79 10/06/2019    HGB 8.6 (L) 10/06/2019    HGB 8.6 (L) 10/06/2019    HCT 26.3 (L) 10/06/2019    HCT 26.3 (L) 10/06/2019    MCV 93 10/06/2019    MCV 93 10/06/2019     (L) 10/06/2019     (L) 10/06/2019     Organ dysfunction indicated by altered mental status and acute kidney injury  Vital signs post fluid administrations were-    Temp Readings from Last 1 Encounters:   10/06/19 97.4 °F (36.3 °C) (Axillary)     BP Readings from Last 1 Encounters:   10/06/19 (!) 159/80     Pulse Readings from Last 1 Encounters:   10/06/19 81    Use intravenous pressor agent to maintain mean arterial pressure above 65 mm of mercury as needed.  Repeat lactic acid.        Chronic pain  Patient can't say what's hurting him. Was given morphine.  Monitor for pain      Hypophosphatemia  Replete phosphorus and follow level.    Acute on chronic diastolic congestive heart failure        Pneumonia of right upper lobe due to Pseudomonas species  Currently on vanc, levaquin, and zosyn      Thrombocytopenia        Anemia        Coronary artery disease  Telemonitoring.    Acquired hypothyroidism  Chronic problem. Will continue chronic medications and monitor for any changes, adjusting as needed.          PEG (percutaneous endoscopic gastrostomy) status  PEG care.      Urinary tract infection with hematuria  Continue abx  Positive for pseudomonas      Hematuria  Urology consulted    Chronic respiratory failure with hypoxia and hypercapnia  Continue mechanical ventilation as before      Tracheostomy in place  Trach care      Acute on chronic respiratory failure with hypoxia and hypercapnia  Continue mechanical ventilation as before.  Supplemental O2 via nasal canula; titrate O2 saturation to >92%.   Follow Pulmonary recommendations.  Sputum positive for possible pseudomonas.   Continue beta 2 agonist bronchodilator treatments.   On levaquin, vanc, and zosyn          Functional  quadriplegia  Supportive care, skin care, rotate patient every 2 hr        VTE Risk Mitigation (From admission, onward)         Ordered     IP VTE HIGH RISK PATIENT  Once      10/02/19 1710     Place sequential compression device  Until discontinued      10/02/19 1710     Place EYAD hose  Until discontinued      10/02/19 1710                Critical care time spent on the evaluation and treatment of severe organ dysfunction, review of pertinent labs and imaging studies, discussions with consulting providers and discussions with patient/family: 30 minutes.      Raysa Washington MD  Department of Hospital Medicine   [facility]

## 2019-10-06 NOTE — SUBJECTIVE & OBJECTIVE
Interval History:     10/5/2019 - Stable overnight, no new respiratory issues reported.  No changes with the ventilator.  Pt remains volume overloaded and abdomen is very distended.  HGB has decreased but no acute blood loss reported    10/6/2019 - Stable overnight, no new issues reported.  He is more awake and responsive and nursing feels that he has pain which is not controlled.  Still with increased secretions    Review of Systems   Unable to perform ROS: Mental acuity         Objective:     Vital Signs (Most Recent):  Temp: 97.4 °F (36.3 °C) (10/06/19 0800)  Pulse: 81 (10/06/19 1200)  Resp: (!) 23 (10/06/19 1200)  BP: (!) 159/80 (10/06/19 1100)  SpO2: 100 % (10/06/19 1200) Vital Signs (24h Range):  Temp:  [97.4 °F (36.3 °C)-99.3 °F (37.4 °C)] 97.4 °F (36.3 °C)  Pulse:  [73-87] 81  Resp:  [16-33] 23  SpO2:  [91 %-100 %] 100 %  BP: (128-178)/(74-95) 159/80     Weight: 131.5 kg (289 lb 14.5 oz)  Body mass index is 39.32 kg/m².      Intake/Output Summary (Last 24 hours) at 10/6/2019 1205  Last data filed at 10/6/2019 0800  Gross per 24 hour   Intake 3140 ml   Output 4650 ml   Net -1510 ml       Physical Exam   Constitutional: He appears well-nourished. No distress.   Obese male, nad, alert but not very responsive   HENT:   Head: Normocephalic and atraumatic.   Nose: Nose normal.   Mouth/Throat: Oropharynx is clear and moist.   Eyes: Pupils are equal, round, and reactive to light. EOM are normal.   Neck: Normal range of motion. Neck supple. No JVD present. No tracheal deviation present. No thyromegaly present.   Trach with some secretions   Cardiovascular: Normal rate, regular rhythm, normal heart sounds and intact distal pulses. Exam reveals no gallop and no friction rub.   No murmur heard.  Pulmonary/Chest: Effort normal and breath sounds normal. No stridor. No respiratory distress. He has no wheezes. He has no rales. He exhibits no tenderness.   Few rhonchi, ventilated   Abdominal: Soft. Bowel sounds are normal.  He exhibits distension. There is no tenderness. There is no rebound and no guarding.   Hypoactive  PEG   Musculoskeletal: Normal range of motion. He exhibits edema. He exhibits no tenderness.   Lymphadenopathy:     He has no cervical adenopathy.   Neurological: He is alert. He has normal reflexes. No cranial nerve deficit.   Chronic changes   Skin: He is not diaphoretic.   Nursing note and vitals reviewed.      Vents:  Vent Mode: A/C (10/06/19 1200)  Ventilator Initiated: No (10/04/19 1900)  Set Rate: 16 bmp (10/06/19 1200)  Vt Set: 700 mL (10/06/19 1200)  Pressure Support: 0 cmH20 (10/06/19 1200)  PEEP/CPAP: 5 cmH20 (10/06/19 1200)  Oxygen Concentration (%): 30 (10/06/19 1200)  Peak Airway Pressure: 37 cmH2O (10/06/19 1200)  Plateau Pressure: 28 cmH20 (10/06/19 1200)  Total Ve: 12 mL (10/06/19 1200)  F/VT Ratio<105 (RSBI): (!) 31.72 (10/06/19 1200)    Lines/Drains/Airways     Central Venous Catheter Line                 Percutaneous Central Line Insertion/Assessment - triple lumen  10/02/19 1800 3 days          Drain                 Gastrostomy/Enterostomy -- days         Gastrostomy/Enterostomy  Gastrostomy tube w/ balloon;Gastrostomy-jejunostomy midline feeding -- days         Gastrostomy/Enterostomy 1335 Percutaneous endoscopic gastrostomy (PEG) LUQ feeding -- days         Urethral Catheter 10/07/17 0136 Latex 16 Fr. 729 days         Urethral Catheter 10/02/19 2200 Coude 24 Fr. 3 days          Airway                 Surgical Airway Portex Cuffed -- days         Surgical Airway Portex Cuffed;Fenestrated -- days                Significant Labs:    CBC/Anemia Profile:  Recent Labs   Lab 10/05/19  0435 10/05/19  1626 10/06/19  0332   WBC 8.26  8.26 9.22 8.79  8.79   HGB 7.4*  7.4* 8.6* 8.6*  8.6*   HCT 23.3*  23.3* 26.8* 26.3*  26.3*   *  104* 134* 144*  144*   MCV 96  96 94 93  93   RDW 14.0  14.0 13.8 13.5  13.5        Chemistries:  Recent Labs   Lab 10/05/19  0435 10/06/19  0332   *   132* 134*  134*   K 3.6  3.6 3.5  3.5     101 97  97   CO2 24  24 28  28   BUN 34*  34* 31*  31*   CREATININE 1.5*  1.5* 1.6*  1.6*   CALCIUM 7.8*  7.8* 8.6*  8.6*   ALBUMIN 2.1* 2.4*   PROT 6.1 7.3   BILITOT 0.5 0.6   ALKPHOS 67 78   ALT 22 29   AST 21 30   MG 1.6 1.6   PHOS 2.0* 2.4*       All pertinent labs within the past 24 hours have been reviewed.    Microbiology Results (last 7 days)     Procedure Component Value Units Date/Time    Blood culture #1 **CANNOT BE ORDERED STAT** [[id number]] Collected:  10/02/19 1305    Order Status:  Completed Specimen:  Blood Updated:  10/06/19 0612     Blood Culture, Routine No Growth to date      No Growth to date      No Growth to date      No Growth to date    Blood culture #2 **CANNOT BE ORDERED STAT** [[id number]] Collected:  10/02/19 1305    Order Status:  Completed Specimen:  Blood Updated:  10/06/19 0612     Blood Culture, Routine No Growth to date      No Growth to date      No Growth to date      No Growth to date    Culture, Respiratory with Gram Stain [[id number]]  (Abnormal) Collected:  10/04/19 0020    Order Status:  Completed Specimen:  Respiratory from Tracheal Aspirate Updated:  10/05/19 0839     Respiratory Culture PRESUMPTIVE PSEUDOMONAS SPECIES  Many  Identification and susceptibility pending  Normal respiratory gabriela also present       Gram Stain (Respiratory) <10 epithelial cells per low power field.     Gram Stain (Respiratory) Rare WBC's     Gram Stain (Respiratory) Many Gram negative rods     Gram Stain (Respiratory) Few Gram positive rods     Gram Stain (Respiratory) Rare Gram positive cocci    Urine culture [[id number]]  (Abnormal)  (Susceptibility) Collected:  10/02/19 1321    Order Status:  Completed Specimen:  Urine Updated:  10/05/19 0439     Urine Culture, Routine PSEUDOMONAS AERUGINOSA  10,000 - 49,999 cfu/ml      Narrative:       Preferred Collection Type->Urine, Catheterized    Influenza A & B by Molecular [[id number]]  Collected:  10/02/19 1300    Order Status:  Completed Specimen:  Nasopharyngeal Swab Updated:  10/02/19 1349     Influenza A, Molecular Negative     Influenza B, Molecular Negative     Flu A & B Source Nasal swab            Significant Imaging:  I have reviewed and interpreted all pertinent imaging results/findings within the past 24 hours.

## 2019-10-06 NOTE — ASSESSMENT & PLAN NOTE
Masood Messina is a 79 y.o. male who presents to the Emergency Department with septic shock.  This patient does) have evidence of infective focus  My overall impression is healthcare associated pneumonia and UTI.    F/u cultures  Lab Results   Component Value Date    WBC 8.79 10/06/2019    WBC 8.79 10/06/2019    HGB 8.6 (L) 10/06/2019    HGB 8.6 (L) 10/06/2019    HCT 26.3 (L) 10/06/2019    HCT 26.3 (L) 10/06/2019    MCV 93 10/06/2019    MCV 93 10/06/2019     (L) 10/06/2019     (L) 10/06/2019     Organ dysfunction indicated by altered mental status and acute kidney injury  Vital signs post fluid administrations were-    Temp Readings from Last 1 Encounters:   10/06/19 97.4 °F (36.3 °C) (Axillary)     BP Readings from Last 1 Encounters:   10/06/19 (!) 159/80     Pulse Readings from Last 1 Encounters:   10/06/19 81    Use intravenous pressor agent to maintain mean arterial pressure above 65 mm of mercury as needed.  Repeat lactic acid.

## 2019-10-06 NOTE — SUBJECTIVE & OBJECTIVE
Interval History: Patient resting in bed. Nursing concerned about patient being in pain.     Review of Systems   Unable to perform ROS: Patient nonverbal     Objective:     Vital Signs (Most Recent):  Temp: 97.4 °F (36.3 °C) (10/06/19 0800)  Pulse: 81 (10/06/19 1200)  Resp: (!) 23 (10/06/19 1200)  BP: (!) 159/80 (10/06/19 1100)  SpO2: 100 % (10/06/19 1200) Vital Signs (24h Range):  Temp:  [97.4 °F (36.3 °C)-99.3 °F (37.4 °C)] 97.4 °F (36.3 °C)  Pulse:  [73-87] 81  Resp:  [16-33] 23  SpO2:  [95 %-100 %] 100 %  BP: (128-175)/(74-95) 159/80     Weight: 131.5 kg (289 lb 14.5 oz)  Body mass index is 39.32 kg/m².    Intake/Output Summary (Last 24 hours) at 10/6/2019 1408  Last data filed at 10/6/2019 1224  Gross per 24 hour   Intake 3420 ml   Output 6750 ml   Net -3330 ml      Physical Exam   Constitutional: He appears well-developed and well-nourished.   HENT:   Head: Normocephalic and atraumatic.   Eyes: Conjunctivae are normal.   Cardiovascular: Normal rate, regular rhythm and normal heart sounds.   Pulmonary/Chest: Effort normal and breath sounds normal.   Abdominal: Soft. Bowel sounds are normal.   Neurological: He is alert.   Skin: Skin is warm.   Vitals reviewed.      Significant Labs:   Recent Lab Results       10/06/19  0332   10/05/19  1724   10/05/19  1626        Albumin 2.4         Alkaline Phosphatase 78         ALT 29         Anion Gap 9          9         AST 30         Baso # 0.05   0.04      0.05         Basophil% 0.6   0.4      0.6         BILIRUBIN TOTAL 0.6  Comment:  For infants and newborns, interpretation of results should be based  on gestational age, weight and in agreement with clinical  observations.  Premature Infant recommended reference ranges:  Up to 24 hours.............<8.0 mg/dL  Up to 48 hours............<12.0 mg/dL  3-5 days..................<15.0 mg/dL  6-29 days.................<15.0 mg/dL           BUN, Bld 31          31         Calcium 8.6          8.6         Chloride 97           97         CO2 28          28         Creatinine 1.6          1.6         Differential Method Automated   Automated      Automated         eGFR if  46          46         eGFR if non  40  Comment:  Calculation used to obtain the estimated glomerular filtration  rate (eGFR) is the CKD-EPI equation.             40  Comment:  Calculation used to obtain the estimated glomerular filtration  rate (eGFR) is the CKD-EPI equation.            Eos # 0.4   0.3      0.4         Eosinophil% 4.2   3.1      4.2         Glucose 116          116         Gran # (ANC) 5.4   5.9      5.4         Gran% 61.7   63.9      61.7         Hematocrit 26.3   26.8      26.3         Hemoglobin 8.6   8.6      8.6         Immature Grans (Abs) 0.07  Comment:  Mild elevation in immature granulocytes is non specific and   can be seen in a variety of conditions including stress response,   acute inflammation, trauma and pregnancy. Correlation with other   laboratory and clinical findings is essential.     0.07  Comment:  Mild elevation in immature granulocytes is non specific and   can be seen in a variety of conditions including stress response,   acute inflammation, trauma and pregnancy. Correlation with other   laboratory and clinical findings is essential.        0.07  Comment:  Mild elevation in immature granulocytes is non specific and   can be seen in a variety of conditions including stress response,   acute inflammation, trauma and pregnancy. Correlation with other   laboratory and clinical findings is essential.           Lymph # 1.5   1.7      1.5         Lymph% 17.1   18.4      17.1         Magnesium 1.6         MCH 30.4   30.1      30.4         MCHC 32.7   32.1      32.7         MCV 93   94      93         Mono # 1.4   1.2      1.4         Mono% 15.6   13.4      15.6         MPV 11.9   11.3      11.9         nRBC 0   0      0         Phosphorus 2.4         Platelets 144   134      144         Potassium 3.5           3.5         PROTEIN TOTAL 7.3         RBC 2.83   2.86      2.83         RDW 13.5   13.8      13.5         Sodium 134          134         Vancomycin, Random   23.3       WBC 8.79   9.22      8.79

## 2019-10-06 NOTE — PROGRESS NOTES
[facility]  Pulmonology  Progress Note    Patient Name: Masood Messina  MRN: [id number]  Admission Date: 10/2/2019  Hospital Length of Stay: 4 days  Code Status: Full Code  Attending Provider: Melissa Mayfield MD  Primary Care Provider: Jeramie Lombardi MD   Principal Problem: Septic shock    Subjective:     Interval History:     10/5/2019 - Stable overnight, no new respiratory issues reported.  No changes with the ventilator.  Pt remains volume overloaded and abdomen is very distended.  HGB has decreased but no acute blood loss reported    10/6/2019 - Stable overnight, no new issues reported.  He is more awake and responsive and nursing feels that he has pain which is not controlled.  Still with increased secretions    Review of Systems   Unable to perform ROS: Mental acuity         Objective:     Vital Signs (Most Recent):  Temp: 97.4 °F (36.3 °C) (10/06/19 0800)  Pulse: 81 (10/06/19 1200)  Resp: (!) 23 (10/06/19 1200)  BP: (!) 159/80 (10/06/19 1100)  SpO2: 100 % (10/06/19 1200) Vital Signs (24h Range):  Temp:  [97.4 °F (36.3 °C)-99.3 °F (37.4 °C)] 97.4 °F (36.3 °C)  Pulse:  [73-87] 81  Resp:  [16-33] 23  SpO2:  [91 %-100 %] 100 %  BP: (128-178)/(74-95) 159/80     Weight: 131.5 kg (289 lb 14.5 oz)  Body mass index is 39.32 kg/m².      Intake/Output Summary (Last 24 hours) at 10/6/2019 1205  Last data filed at 10/6/2019 0800  Gross per 24 hour   Intake 3140 ml   Output 4650 ml   Net -1510 ml       Physical Exam   Constitutional: He appears well-nourished. No distress.   Obese male, nad, alert but not very responsive   HENT:   Head: Normocephalic and atraumatic.   Nose: Nose normal.   Mouth/Throat: Oropharynx is clear and moist.   Eyes: Pupils are equal, round, and reactive to light. EOM are normal.   Neck: Normal range of motion. Neck supple. No JVD present. No tracheal deviation present. No thyromegaly present.   Trach with some secretions   Cardiovascular: Normal rate, regular rhythm, normal  heart sounds and intact distal pulses. Exam reveals no gallop and no friction rub.   No murmur heard.  Pulmonary/Chest: Effort normal and breath sounds normal. No stridor. No respiratory distress. He has no wheezes. He has no rales. He exhibits no tenderness.   Few rhonchi, ventilated   Abdominal: Soft. Bowel sounds are normal. He exhibits distension. There is no tenderness. There is no rebound and no guarding.   Hypoactive  PEG   Musculoskeletal: Normal range of motion. He exhibits edema. He exhibits no tenderness.   Lymphadenopathy:     He has no cervical adenopathy.   Neurological: He is alert. He has normal reflexes. No cranial nerve deficit.   Chronic changes   Skin: He is not diaphoretic.   Nursing note and vitals reviewed.      Vents:  Vent Mode: A/C (10/06/19 1200)  Ventilator Initiated: No (10/04/19 1900)  Set Rate: 16 bmp (10/06/19 1200)  Vt Set: 700 mL (10/06/19 1200)  Pressure Support: 0 cmH20 (10/06/19 1200)  PEEP/CPAP: 5 cmH20 (10/06/19 1200)  Oxygen Concentration (%): 30 (10/06/19 1200)  Peak Airway Pressure: 37 cmH2O (10/06/19 1200)  Plateau Pressure: 28 cmH20 (10/06/19 1200)  Total Ve: 12 mL (10/06/19 1200)  F/VT Ratio<105 (RSBI): (!) 31.72 (10/06/19 1200)    Lines/Drains/Airways     Central Venous Catheter Line                 Percutaneous Central Line Insertion/Assessment - triple lumen  10/02/19 1800 3 days          Drain                 Gastrostomy/Enterostomy -- days         Gastrostomy/Enterostomy  Gastrostomy tube w/ balloon;Gastrostomy-jejunostomy midline feeding -- days         Gastrostomy/Enterostomy 1335 Percutaneous endoscopic gastrostomy (PEG) LUQ feeding -- days         Urethral Catheter 10/07/17 0136 Latex 16 Fr. 729 days         Urethral Catheter 10/02/19 2200 Coude 24 Fr. 3 days          Airway                 Surgical Airway Portex Cuffed -- days         Surgical Airway Portex Cuffed;Fenestrated -- days                Significant Labs:    CBC/Anemia Profile:  Recent Labs   Lab  10/05/19  0435 10/05/19  1626 10/06/19  0332   WBC 8.26  8.26 9.22 8.79  8.79   HGB 7.4*  7.4* 8.6* 8.6*  8.6*   HCT 23.3*  23.3* 26.8* 26.3*  26.3*   *  104* 134* 144*  144*   MCV 96  96 94 93  93   RDW 14.0  14.0 13.8 13.5  13.5        Chemistries:  Recent Labs   Lab 10/05/19  0435 10/06/19  0332   *  132* 134*  134*   K 3.6  3.6 3.5  3.5     101 97  97   CO2 24  24 28  28   BUN 34*  34* 31*  31*   CREATININE 1.5*  1.5* 1.6*  1.6*   CALCIUM 7.8*  7.8* 8.6*  8.6*   ALBUMIN 2.1* 2.4*   PROT 6.1 7.3   BILITOT 0.5 0.6   ALKPHOS 67 78   ALT 22 29   AST 21 30   MG 1.6 1.6   PHOS 2.0* 2.4*       All pertinent labs within the past 24 hours have been reviewed.    Microbiology Results (last 7 days)     Procedure Component Value Units Date/Time    Blood culture #1 **CANNOT BE ORDERED STAT** [[id number]] Collected:  10/02/19 1305    Order Status:  Completed Specimen:  Blood Updated:  10/06/19 0612     Blood Culture, Routine No Growth to date      No Growth to date      No Growth to date      No Growth to date    Blood culture #2 **CANNOT BE ORDERED STAT** [[id number]] Collected:  10/02/19 1305    Order Status:  Completed Specimen:  Blood Updated:  10/06/19 0612     Blood Culture, Routine No Growth to date      No Growth to date      No Growth to date      No Growth to date    Culture, Respiratory with Gram Stain [[id number]]  (Abnormal) Collected:  10/04/19 0020    Order Status:  Completed Specimen:  Respiratory from Tracheal Aspirate Updated:  10/05/19 0839     Respiratory Culture PRESUMPTIVE PSEUDOMONAS SPECIES  Many  Identification and susceptibility pending  Normal respiratory gabriela also present       Gram Stain (Respiratory) <10 epithelial cells per low power field.     Gram Stain (Respiratory) Rare WBC's     Gram Stain (Respiratory) Many Gram negative rods     Gram Stain (Respiratory) Few Gram positive rods     Gram Stain (Respiratory) Rare Gram positive cocci    Urine culture  [[id number]]  (Abnormal)  (Susceptibility) Collected:  10/02/19 1321    Order Status:  Completed Specimen:  Urine Updated:  10/05/19 0439     Urine Culture, Routine PSEUDOMONAS AERUGINOSA  10,000 - 49,999 cfu/ml      Narrative:       Preferred Collection Type->Urine, Catheterized    Influenza A & B by Molecular [[id number]] Collected:  10/02/19 1300    Order Status:  Completed Specimen:  Nasopharyngeal Swab Updated:  10/02/19 1349     Influenza A, Molecular Negative     Influenza B, Molecular Negative     Flu A & B Source Nasal swab            Significant Imaging:  I have reviewed and interpreted all pertinent imaging results/findings within the past 24 hours.    Assessment/Plan:     * Septic shock  · Better  · Sources include urine and sputum  · Continue antibiotics and await sensitivities    Acute on chronic respiratory failure with hypoxia and hypercapnia  · Wean FiO2 as able  · Continue ventilatory support  · He is chronically vent dependent    Hypophosphatemia  · Replace (d/w nursing)    Thrombocytopenia  · Better, follow    Anemia  · hgb has decreased but no acute blood loss reported  · Follow   · transfuse for hgb < 7    Coronary artery disease  · No acute ACS    PEG (percutaneous endoscopic gastrostomy) status  · Aware     Urinary tract infection with hematuria  · UC + pseudomonas, await sensitivity  · Continue present meds for now    Tracheostomy in place  · No problems with trach at this time    Functional quadriplegia  · Aware            Shai Bai MD  Pulmonology  [facility]

## 2019-10-07 NOTE — PLAN OF CARE
Pt remains in ICU.  Chronic trach/vent and PEG.  Afebrile.  Pseud in urine and sputum.  ID consulted.  Watery stool and flexi seal placed.  Electrolytes replaced this AM.  TEDs/SCDs and heal protectors in place.  TQ2.  Contact precautions maintained.  Safety maintained.

## 2019-10-07 NOTE — PLAN OF CARE
10/07/19 0739   Patient Assessment/Suction   Level of Consciousness (AVPU) alert   Respiratory Effort Unlabored   All Lung Fields Breath Sounds coarse   Rhythm/Pattern, Respiratory assisted mechanically   Suction Method tracheal   $ Suction Charges Inline Suction Procedure Stat Charge   Secretions Amount moderate   Secretions Color cloudy;yellow   Secretions Characteristics thin   PRE-TX-O2   O2 Device (Oxygen Therapy) ventilator   $ Is the patient on Low Flow Oxygen? Yes   Oxygen Concentration (%) 30   SpO2 95 %   Pulse Oximetry Type Continuous   $ Pulse Oximetry - Multiple Charge Pulse Oximetry - Multiple   Pulse 83   Resp 18   ETCO2   $ ETCO2 Charge Exhaled CO2 Monitoring   $ ETCO2 Usage Currently wearing  (in line)   ETCO2 (mmHg) 32 mmHg   ETCO2 Device Type Bedside Monitor   Wound Care   $ Wound Care Tech Time 15 min   Area of Concern Neck under tracheostomy   Skin Color/Characteristics without discoloration   Skin Temperature warm        Surgical Airway Portex Cuffed;Fenestrated   No Placement Date or Time found.   Present Prior to Hospital Arrival?: Yes  Inserted by: Present Prior to Hospital Arrival  Type: Tracheostomy  Brand: Portex  Airway Device Size: 8.0  Style: Cuffed;Fenestrated   Cuff Pressure 32 cm H2O   Cuff Inflation? Inflated   Status Secured   Site Assessment Oozing secretions   Ties Assessment Secure;Intact   Vent Select   Conventional Vent Y   $ Ventilator Subsequent 1   Charged w/in last 24h YES   Preset Conventional Ventilator Settings   Vent Type    Ventilation Type VC   Vent Mode A/C   Set Rate 16 bmp   Vt Set 700 mL   PEEP/CPAP 5 cmH20   Pressure Support 0 cmH20   Waveform RAMP   Peak Flow 62 L/min   Set Inspiratory Pressure 0 cmH20   Insp Time 0 Sec(s)   Plateau Set/Insp. Hold (sec) 0   Insp Rise Time  0 %   Trigger Sensitivity Flow/I-Trigger 1.5 L/min   P High 0 cm H2O   P Low 0 cm H2O   T High 0 sec   T Low 0 sec   Patient Ventilator Parameters   Resp Rate Total 16 br/min   Peak  Airway Pressure 38 cmH2O   Mean Airway Pressure 16 cmH20   Plateau Pressure 28 cmH20   Exhaled Vt 706 mL   Total Ve 11.6 mL   Spont Ve 0 L   I:E Ratio Measured 1:2.00   Conventional Ventilator Alarms   Alarms On Y   Ve High Alarm 16 L/min   Resp Rate High Alarm 40 br/min   Press High Alarm 55 cmH2O   Apnea Rate 16   Apnea Volume (mL) 700 mL   Apnea Oxygen Concentration  100   Apnea Flow Rate (L/min) 60   T Apnea 20 sec(s)   Ready to Wean/Extubation Screen   FIO2<=50 (chart decimal) 0.3   MV<16L (chart vol.) 11.6   PEEP <=8 (chart #) 5   Ready to Wean Parameters   F/VT Ratio<105 (RSBI) (!) 25.5   Pt is trached and remains on the  vent.  The vent alarms are set and functioning with an ambu bag, mask and flowmeter @ [facility].

## 2019-10-07 NOTE — NURSING
1530: Pt with firm and distended abd.  Not new for patient.  Watery stool noted.  No residuals to PEG but much air present and removed from abd.  flexi seal placed immediate watery stool and excessive gas noted.  Feedings held briefly at this time.  Pt denies nausea. Dr Mayfield notified and aware.    1620:  Gas again released from flexi.  Pt able to nod that feels better and with out abd discomfort.  Air removed from PEG at this time as well.  PEG site cleaned and redressed.

## 2019-10-07 NOTE — PROGRESS NOTES
Masood Messina [id number] is a 79 y.o. male who has been consulted for vancomycin dosing.    Pharmacy consult for vancomycin dosing in no longer required.  Vancomycin was discontinued.    Thank you for allowing us to participate in this patient's care.     Tylor Dailey, PharmD

## 2019-10-07 NOTE — SUBJECTIVE & OBJECTIVE
Interval History:  In intensive care unit, septic shock has resolved.  Urine and sputum cultures growing Pseudomonas species.  Renal panel improving.    Review of Systems   Unable to perform ROS: Patient unresponsive     Objective:     Vital Signs (Most Recent):  Temp: 97.1 °F (36.2 °C) (10/07/19 0800)  Pulse: 82 (10/07/19 0900)  Resp: (!) 22 (10/07/19 0900)  BP: 131/76 (10/07/19 0900)  SpO2: (!) 94 % (10/07/19 0900) Vital Signs (24h Range):  Temp:  [97.1 °F (36.2 °C)-99 °F (37.2 °C)] 97.1 °F (36.2 °C)  Pulse:  [] 82  Resp:  [16-27] 22  SpO2:  [83 %-100 %] 94 %  BP: (128-177)/() 131/76     Weight: 131.5 kg (289 lb 14.5 oz)  Body mass index is 39.32 kg/m².    Intake/Output Summary (Last 24 hours) at 10/7/2019 0913  Last data filed at 10/7/2019 0800  Gross per 24 hour   Intake 2030 ml   Output 3075 ml   Net -1045 ml      Physical Exam   Constitutional: He appears well-developed.   HENT:   Head: Normocephalic and atraumatic.   Nose: Nose normal. No septal deviation.   Mouth/Throat: Oropharynx is clear and moist.   Eyes: Pupils are equal, round, and reactive to light. Conjunctivae are normal.   Neck: No JVD present. No tracheal tenderness present. No tracheal deviation present. No thyroid mass present.   Tracheostomy site appears fine   Cardiovascular: Normal rate, regular rhythm, S1 normal and S2 normal. Exam reveals no gallop and no friction rub.   No murmur heard.  Pulmonary/Chest: Effort normal and breath sounds normal. He has no decreased breath sounds. He has no wheezes. He has no rhonchi. He has no rales.   Abdominal: Soft. Normal appearance and bowel sounds are normal. He exhibits no distension and no mass. There is no hepatosplenomegaly, splenomegaly or hepatomegaly. There is no tenderness.   Peg site appears fine   Musculoskeletal:   Trace pedal edema bilateral lower extremities   Neurological: He has normal strength. No cranial nerve deficit or sensory deficit.   Patient is more alert and  responsive but unable to interact verbally.   Skin: No rash noted. No cyanosis.   Nursing note and vitals reviewed.    Significant Labs:   Blood Culture:   No results for input(s): LABBLOO in the last 48 hours.  CBC:   Recent Labs   Lab 10/05/19  1626 10/06/19  0332 10/07/19  0329   WBC 9.22 8.79  8.79 11.76   HGB 8.6* 8.6*  8.6* 9.4*   HCT 26.8* 26.3*  26.3* 28.9*   * 144*  144* 180     CMP:   Recent Labs   Lab 10/06/19  0332 10/06/19  1559 10/07/19  0329   *  134* 135* 136   K 3.5  3.5 3.2* 3.8   CL 97  97 94* 95   CO2 28  28 31* 31*   *  116* 106 103   BUN 31*  31* 28* 30*   CREATININE 1.6*  1.6* 1.5* 1.6*   CALCIUM 8.6*  8.6* 9.0 8.9   PROT 7.3  --  7.6   ALBUMIN 2.4*  --  2.5*   BILITOT 0.6  --  0.7   ALKPHOS 78  --  135   AST 30  --  35   ALT 29  --  38   ANIONGAP 9  9 10 10   EGFRNONAA 40*  40* 43* 40*     Coagulation:   No results for input(s): PT, INR, APTT in the last 48 hours.  Lactic Acid:   No results for input(s): LACTATE in the last 48 hours.  Troponin:   No results for input(s): TROPONINI in the last 48 hours.  Urine Culture:   No results for input(s): LABURIN in the last 48 hours.    Significant Imaging:   CXR:   New or increased right lung infiltrate, diffusely but more so right upper lung field.  Interval decrease of the left lung infiltrate with left lung today appearing clear.  Right lung infiltrates suggest pneumonia.     CT head without contrast: No evidence of an acute intracranial abnormality.

## 2019-10-07 NOTE — CONSULTS
Consult Note  Infectious Disease    Reason for Consult:  MDRO pseudomonas    HPI: Masood Messina is an  79 y.o. male with whom I am familiar from prior consults. He is quadriplegic, vent and PEG dependent. He was admitted 9/12 to [facility] for trach problem and was discharged back to NH. The events that led to a culture of sputum(GBS) followed by augmentin, then followed by IV maxipime for several days are unclear but there is a radiology report describing right sided pneumonia. He was sent her ish 10/2 with altered mental status and hypotension and hematuria. He had a RUL infiltrate and was placed on vanc and zosyn and levaquin. He required extensive manual irrigation of his bladder to remove clots. Cultures of the urine showed the usual pseudomonas and cultures of the sputum grew pseudomonas whose sensitivities were reported with resistance to everything but the aminoglycosides. He is having no fever, and has resolution of leukocytosis, and urine is clear now. He has now developed abdominal distention and liquid stools requiring a rectal tube.     Review of patient's allergies indicates:   Allergen Reactions    Codeine Other (See Comments)     Past Medical History:   Diagnosis Date    AAA (abdominal aortic aneurysm)     Anemia     BPH (benign prostatic hyperplasia)     CHF (congestive heart failure)     COPD (chronic obstructive pulmonary disease)     Coronary artery disease     Dementia     Dementia     Depression     GERD (gastroesophageal reflux disease)     Hyperlipidemia     Hypertension     Hypothyroid     Renal disorder     Respiratory failure, chronic     Ventilator dependence      Past Surgical History:   Procedure Laterality Date    ABDOMINAL SURGERY      CARDIAC SURGERY  1999    GASTROSTOMY TUBE PLACEMENT      SPINE SURGERY      TRACHEOSTOMY TUBE PLACEMENT       Social History     Socioeconomic History    Marital status:      Spouse name: Not on file    Number of  children: Not on file    Years of education: Not on file    Highest education level: Not on file   Occupational History    Not on file   Social Needs    Financial resource strain: Not on file    Food insecurity:     Worry: Not on file     Inability: Not on file    Transportation needs:     Medical: Not on file     Non-medical: Not on file   Tobacco Use    Smoking status: Former Smoker    Smokeless tobacco: Never Used   Substance and Sexual Activity    Alcohol use: No    Drug use: No    Sexual activity: Never   Lifestyle    Physical activity:     Days per week: Not on file     Minutes per session: Not on file    Stress: Not on file   Relationships    Social connections:     Talks on phone: Not on file     Gets together: Not on file     Attends [employer] service: Not on file     Active member of club or organization: Not on file     Attends meetings of clubs or organizations: Not on file     Relationship status: Not on file   Other Topics Concern    Not on file   Social History Narrative    Not on file     History reviewed. No pertinent family history.    Pertinent medications noted:     Review of Systems: limited by cognition and quadriparesis  No pain in mouth or throat.   No chest pain,    Endorses SOB  No nausea, vomiting,but having  diarrhea,No swelling of joints, redness of joints,   Bleeding in urine  No new rashes, lesions, or wounds  No recent/prior steroids  Antibiotic History: cipro prior to 9/12 [facility] admit, then a course of augmentin for GBS in sputum? And cefepime IV prior to this admission    EXAM & DIAGNOSTICS REVIEWED:   Vitals:     Temp:  [97.1 °F (36.2 °C)-99 °F (37.2 °C)]   Temp: 98.8 °F (37.1 °C) (10/07/19 1505)  Pulse: 68 (10/07/19 1800)  Resp: 17 (10/07/19 1800)  BP: 129/70 (10/07/19 1700)  SpO2: 98 % (10/07/19 1800)    Intake/Output Summary (Last 24 hours) at 10/7/2019 2115  Last data filed at 10/7/2019 1800  Gross per 24 hour   Intake 1640 ml   Output 1220 ml   Net 420 ml        General:  In NAD. Attends, cooperates to the extent he can  Eyes:  Anicteric, PERRL, EOMI  ENT:  No ulcers, exudates, thrush, nares patent. Tardive mouth movements  Neck:  supple, tracheostomy  Lungs: Clear. Sputum is light green  Heart:  RRR, no gallop/murmur/rub noted  Abd:  Soft, mild tenderness, mildly distended, normal BS, no masses or organomegaly appreciated. Rectal tube with liquid non bloody stool  :   Rahman, urine clear, scrotum is mildly swollen but not cellulitic. Meatus not visible  Musc:  Joints without effusion, swelling, erythema, synovitis,   Skin:  No rashes. No palmar or plantar lesions. No subungual petechiae  Wound:   Neuro:  Alert, attentive, speech fluent, face symmetric, quadriparetic  Psych:  Calm, cooperative, answers most questions. Tardive mouth movements  Extrem: Mild chronic LE edema,no  erythema, phlebitis, cellulitis, warm    VAD:   Left groin TLC 10/2     Isolation:  contact    Lines/Tubes/Drains:    General Labs reviewed:  Recent Labs   Lab 10/05/19  1626 10/06/19  0332 10/07/19  0329   WBC 9.22 8.79  8.79 11.76   HGB 8.6* 8.6*  8.6* 9.4*   HCT 26.8* 26.3*  26.3* 28.9*   * 144*  144* 180       Recent Labs   Lab 10/05/19  0435 10/06/19  0332 10/06/19  1559 10/07/19  0329   *  132* 134*  134* 135* 136   K 3.6  3.6 3.5  3.5 3.2* 3.8     101 97  97 94* 95   CO2 24  24 28  28 31* 31*   BUN 34*  34* 31*  31* 28* 30*   CREATININE 1.5*  1.5* 1.6*  1.6* 1.5* 1.6*   CALCIUM 7.8*  7.8* 8.6*  8.6* 9.0 8.9   PROT 6.1 7.3  --  7.6   BILITOT 0.5 0.6  --  0.7   ALKPHOS 67 78  --  135   ALT 22 29  --  38   AST 21 30  --  35           Micro:  Microbiology Results (last 7 days)     Procedure Component Value Units Date/Time    Clostridium difficile EIA [[id number]] Collected:  10/07/19 1755    Order Status:  Sent Specimen:  Stool Updated:  10/07/19 1946    Urine Culture High Risk [[id number]] Collected:  10/07/19 1754    Order Status:  Sent Specimen:  Urine,  Catheterized Updated:  10/07/19 1755    Culture, Respiratory with Gram Stain [[id number]]  (Abnormal)  (Susceptibility) Collected:  10/04/19 0020    Order Status:  Completed Specimen:  Respiratory from Tracheal Aspirate Updated:  10/07/19 1012     Respiratory Culture PSEUDOMONAS AERUGINOSA   Many  Susceptibility pending  Normal respiratory gabriela also present       Gram Stain (Respiratory) <10 epithelial cells per low power field.     Gram Stain (Respiratory) Rare WBC's     Gram Stain (Respiratory) Many Gram negative rods     Gram Stain (Respiratory) Few Gram positive rods     Gram Stain (Respiratory) Rare Gram positive cocci    Blood culture #2 **CANNOT BE ORDERED STAT** [[id number]] Collected:  10/02/19 1305    Order Status:  Completed Specimen:  Blood Updated:  10/07/19 0612     Blood Culture, Routine No Growth to date      No Growth to date      No Growth to date      No Growth to date      No Growth to date    Blood culture #1 **CANNOT BE ORDERED STAT** [[id number]] Collected:  10/02/19 1305    Order Status:  Completed Specimen:  Blood Updated:  10/07/19 0612     Blood Culture, Routine No Growth to date      No Growth to date      No Growth to date      No Growth to date      No Growth to date    Urine culture [[id number]]  (Abnormal)  (Susceptibility) Collected:  10/02/19 1321    Order Status:  Completed Specimen:  Urine Updated:  10/05/19 0439     Urine Culture, Routine PSEUDOMONAS AERUGINOSA  10,000 - 49,999 cfu/ml      Narrative:       Preferred Collection Type->Urine, Catheterized    Influenza A & B by Molecular [[id number]] Collected:  10/02/19 1300    Order Status:  Completed Specimen:  Nasopharyngeal Swab Updated:  10/02/19 1349     Influenza A, Molecular Negative     Influenza B, Molecular Negative     Flu A & B Source Nasal swab        Imaging Reviewed:   CXRs  Cardiology:    IMPRESSION & PLAN   Imp: extensive exposure to antibiotics resulting in MDRO pseudomonas in sputum. At the onset of this pneumonia  he had a relatively sensitive organism. Repeat culture now reflects selection by antibiotics.    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

## 2019-10-07 NOTE — PROGRESS NOTES
[facility] Medicine  Progress Note    Patient Name: Masood Messina  MRN: [id number]  Patient Class: IP- Inpatient   Admission Date: 10/2/2019  Length of Stay: 5 days  Attending Physician: Melissa Mayfield MD  Primary Care Provider: Jeramie Lombardi MD        Subjective:     Principal Problem:Septic shock    HPI:  Patient is an 79-year-old morbidly obese male from [facility] with past medical history significant for multiple medical problems including chronic hypoxic hypercarbic respiratory failure (ventilator dependent status post tracheostomy), status post PEG tube placement, hypertension, hyperlipidemia, coronary artery disease, history of cerebrovascular accident, hypothyroidism and prior history of cerebrovascular accident who is functional quadriplegic is being admitted to Hospital Medicine from [facility] where patient presented with septic shock.  Upon arrival patient is unresponsive and blood pressure was noted to be around 72/48 mm of mercury.  Per ER physician reportedly patient had some hematuria for 2 days.  Patient was being treated for pneumonia recently. No further details of HPI.     Overview/Hospital Course:  Patient has been weaned off intravenous pressor agent.  Urine cultures are growing g negative amish.  Patient having ongoing intermittent gross hematuria requiring bladder irrigation.  Urology team following.  Renal functions improving.    Interval History:  Patient is in intensive care unit after management of septic shock related to Pseudomonas related complicated UTI and pneumonia which is multi-drug resistant.    Review of Systems   Unable to perform ROS: Patient unresponsive     Objective:     Vital Signs (Most Recent):  Temp: 97.1 °F (36.2 °C) (10/07/19 0800)  Pulse: 82 (10/07/19 0900)  Resp: (!) 22 (10/07/19 0900)  BP: 131/76 (10/07/19 0900)  SpO2: (!) 94 % (10/07/19 0900) Vital Signs (24h Range):  Temp:  [97.1 °F  (36.2 °C)-99 °F (37.2 °C)] 97.1 °F (36.2 °C)  Pulse:  [] 82  Resp:  [16-27] 22  SpO2:  [83 %-100 %] 94 %  BP: (128-177)/() 131/76     Weight: 131.5 kg (289 lb 14.5 oz)  Body mass index is 39.32 kg/m².    Intake/Output Summary (Last 24 hours) at 10/7/2019 0913  Last data filed at 10/7/2019 0800  Gross per 24 hour   Intake 2030 ml   Output 3075 ml   Net -1045 ml      Physical Exam   Constitutional: He appears well-developed.   HENT:   Head: Normocephalic and atraumatic.   Nose: Nose normal. No septal deviation.   Mouth/Throat: Oropharynx is clear and moist.   Eyes: Pupils are equal, round, and reactive to light. Conjunctivae are normal.   Neck: No JVD present. No tracheal tenderness present. No tracheal deviation present. No thyroid mass present.   Tracheostomy site appears fine   Cardiovascular: Normal rate, regular rhythm, S1 normal and S2 normal. Exam reveals no gallop and no friction rub.   No murmur heard.  Pulmonary/Chest: Effort normal and breath sounds normal. He has no decreased breath sounds. He has no wheezes. He has no rhonchi. He has no rales.   Abdominal: Soft. Normal appearance and bowel sounds are normal. He exhibits no distension and no mass. There is no hepatosplenomegaly, splenomegaly or hepatomegaly. There is no tenderness.   Peg site appears fine   Musculoskeletal:   Trace pedal edema bilateral lower extremities   Neurological: He has normal strength. No cranial nerve deficit or sensory deficit.   Patient is more alert and responsive but unable to interact verbally.   Skin: No rash noted. No cyanosis.   Nursing note and vitals reviewed.    Significant Labs:   Blood Culture:   No results for input(s): LABBLOO in the last 48 hours.  CBC:   Recent Labs   Lab 10/05/19  1626 10/06/19  0332 10/07/19  0329   WBC 9.22 8.79  8.79 11.76   HGB 8.6* 8.6*  8.6* 9.4*   HCT 26.8* 26.3*  26.3* 28.9*   * 144*  144* 180     CMP:   Recent Labs   Lab 10/06/19  0332 10/06/19  1559 10/07/19  0321    *  134* 135* 136   K 3.5  3.5 3.2* 3.8   CL 97  97 94* 95   CO2 28  28 31* 31*   *  116* 106 103   BUN 31*  31* 28* 30*   CREATININE 1.6*  1.6* 1.5* 1.6*   CALCIUM 8.6*  8.6* 9.0 8.9   PROT 7.3  --  7.6   ALBUMIN 2.4*  --  2.5*   BILITOT 0.6  --  0.7   ALKPHOS 78  --  135   AST 30  --  35   ALT 29  --  38   ANIONGAP 9  9 10 10   EGFRNONAA 40*  40* 43* 40*     Coagulation:   No results for input(s): PT, INR, APTT in the last 48 hours.  Lactic Acid:   No results for input(s): LACTATE in the last 48 hours.  Troponin:   No results for input(s): TROPONINI in the last 48 hours.  Urine Culture:   No results for input(s): LABURIN in the last 48 hours.    Significant Imaging:   CXR:   New or increased right lung infiltrate, diffusely but more so right upper lung field.  Interval decrease of the left lung infiltrate with left lung today appearing clear.  Right lung infiltrates suggest pneumonia.     CT head without contrast: No evidence of an acute intracranial abnormality.      Assessment/Plan:      * Septic shock - resolved  Masood Messina is a 79 y.o. male who presents to the Emergency Department with septic shock.  This patient does) have evidence of infective focus  My overall impression is healthcare associated pneumonia and UTI due to Pseudomonas pneumonia.    F/u cultures  Lab Results   Component Value Date    WBC 8.79 10/06/2019    WBC 8.79 10/06/2019    HGB 8.6 (L) 10/06/2019    HGB 8.6 (L) 10/06/2019    HCT 26.3 (L) 10/06/2019    HCT 26.3 (L) 10/06/2019    MCV 93 10/06/2019    MCV 93 10/06/2019     (L) 10/06/2019     (L) 10/06/2019     Organ dysfunction indicated by altered mental status and acute kidney injury  Consulting infectious disease specialist for antibiotic recommendations.    Chronic pain  Monitor for pain and treat as needed.    Hypophosphatemia  Replete phosphorus and follow level.    Acute on chronic diastolic congestive heart failure    Pneumonia of right  upper lobe due to Pseudomonas species  Currently on levaquin, and zosyn    Coronary artery disease  Telemonitoring.    Acquired hypothyroidism  Chronic problem. Will continue chronic medications and monitor for any changes, adjusting as needed.    PEG (percutaneous endoscopic gastrostomy) status  PEG care.    Urinary tract infection with hematuria  Continue abx  Positive for pseudomonas    Hematuria  Resolved.  Urology following.    Chronic respiratory failure with hypoxia and hypercapnia  Continue mechanical ventilation as before    Tracheostomy in place  Trach care    Acute on chronic respiratory failure with hypoxia and hypercapnia  Continue mechanical ventilation as before.  Supplemental O2 via nasal canula; titrate O2 saturation to >92%.   Follow Pulmonary recommendations.  Sputum positive for possible pseudomonas.   Continue beta 2 agonist bronchodilator treatments.   On levaquin, vanc, and zosyn    Functional quadriplegia  Supportive care, skin care, rotate patient every 2 hr    VTE Risk Mitigation (From admission, onward)         Ordered     IP VTE HIGH RISK PATIENT  Once      10/02/19 1710     Place sequential compression device  Until discontinued      10/02/19 1710     Place EYAD hose  Until discontinued      10/02/19 1710                Critical care time spent on the evaluation and treatment of severe organ dysfunction, review of pertinent labs and imaging studies, discussions with consulting providers and discussions with patient/family: 36 minutes.      Melissa Mayfield MD  Department of Hospital Medicine   [facility]

## 2019-10-07 NOTE — NURSING
Urine and stool collected and sent to lab as ordered.  Release of information faxed to Jennifer.  Tube feeding restarted.  abd still rounded/distened but less and less firm.

## 2019-10-07 NOTE — PROGRESS NOTES
Progress Note  PULMONARY    Admit Date: 10/2/2019   10/07/2019      SUBJECTIVE:     Oct 4, no new/c/o, trach- animated.  No distress  October 8th, animated, no complaints, denies shortness of breath    PFSH and Allergies reviewed.    OBJECTIVE:     Vitals (Most recent):  Vitals:    10/07/19 1214   BP:    Pulse: 74   Resp: 20   Temp:        Vitals (24 hour range):  Temp:  [97.1 °F (36.2 °C)-99 °F (37.2 °C)]   Pulse:  []   Resp:  [16-27]   BP: (120-177)/()   SpO2:  [83 %-100 %]       Intake/Output Summary (Last 24 hours) at 10/7/2019 1318  Last data filed at 10/7/2019 1149  Gross per 24 hour   Intake 1750 ml   Output 1825 ml   Net -75 ml          Physical Exam:  The patient's neuro status (alertness,orientation,cognitive function,motor skills,), pharyngeal exam (oral lesions, hygiene, abn dentition,), Neck (jvd,mass,thyroid,nodes in neck and above/below clavicle),RESPIRATORY(symmetry,effort,fremitus,percussion,auscultation),  Cor(rhythm,heart tones including gallops,perfusion,edema)ABD(distention,hepatic&splenomegaly,tenderness,masses), Skin(rash,cyanosis),Psyc(affect,judgement,).  Exam negative except for these pertinent findings:    Trach, left paresis, chest is symmetric, no distress, normal percussion, normal fremitus and decreased, edema.    Radiographs reviewed: view by direct vision  No new 10/2/2019 no new  Results for orders placed during the hospital encounter of 10/06/17   X-Ray Chest 1 View for PICC_Central line    Narrative A PICC has been placed on the right and ends in the distal superior vena cava. Tracheostomy tube remains in position. The patient has had a prior sternotomy. Cardiac size is within normal limits. There is no pulmonary mass or infiltrate is seen.    Impression  PICC in good position without pneumothorax. Tracheostomy tube in place. Prior sternotomy.      Electronically signed by: Alexsander Adrian MD  Date:     10/09/17  Time:    15:43    ]    Labs     Recent Labs   Lab  10/07/19  0329   WBC 11.76   HGB 9.4*   HCT 28.9*        Recent Labs   Lab 10/07/19  0329      K 3.8   CL 95   CO2 31*   BUN 30*   CREATININE 1.6*      CALCIUM 8.9   MG 1.6   PHOS 2.2*   AST 35   ALT 38   ALKPHOS 135   BILITOT 0.7   PROT 7.6   ALBUMIN 2.5*   No results for input(s): PH, PCO2, PO2, HCO3 in the last 24 hours.  Microbiology Results (last 7 days)     Procedure Component Value Units Date/Time    Culture, Respiratory with Gram Stain [[id number]]  (Abnormal)  (Susceptibility) Collected:  10/04/19 0020    Order Status:  Completed Specimen:  Respiratory from Tracheal Aspirate Updated:  10/07/19 1012     Respiratory Culture PSEUDOMONAS AERUGINOSA   Many  Susceptibility pending  Normal respiratory gabriela also present       Gram Stain (Respiratory) <10 epithelial cells per low power field.     Gram Stain (Respiratory) Rare WBC's     Gram Stain (Respiratory) Many Gram negative rods     Gram Stain (Respiratory) Few Gram positive rods     Gram Stain (Respiratory) Rare Gram positive cocci    Blood culture #2 **CANNOT BE ORDERED STAT** [[id number]] Collected:  10/02/19 1305    Order Status:  Completed Specimen:  Blood Updated:  10/07/19 0612     Blood Culture, Routine No Growth to date      No Growth to date      No Growth to date      No Growth to date      No Growth to date    Blood culture #1 **CANNOT BE ORDERED STAT** [[id number]] Collected:  10/02/19 1305    Order Status:  Completed Specimen:  Blood Updated:  10/07/19 0612     Blood Culture, Routine No Growth to date      No Growth to date      No Growth to date      No Growth to date      No Growth to date    Urine culture [[id number]]  (Abnormal)  (Susceptibility) Collected:  10/02/19 1321    Order Status:  Completed Specimen:  Urine Updated:  10/05/19 0439     Urine Culture, Routine PSEUDOMONAS AERUGINOSA  10,000 - 49,999 cfu/ml      Narrative:       Preferred Collection Type->Urine, Catheterized    Influenza A & B by Molecular [[id number]]  Collected:  10/02/19 1300    Order Status:  Completed Specimen:  Nasopharyngeal Swab Updated:  10/02/19 1349     Influenza A, Molecular Negative     Influenza B, Molecular Negative     Flu A & B Source Nasal swab          Impression:  Active Hospital Problems    Diagnosis  POA    *Septic shock [A41.9, R65.21]  Yes    Chronic pain [G89.29]  Unknown    Hypophosphatemia [E83.39]  No    Pneumonia of right upper lobe due to Pseudomonas species [J15.1]  Yes    Acute on chronic diastolic congestive heart failure [I50.33]  Yes    Thrombocytopenia [D69.6]  Yes    Coronary artery disease [I25.10]  Yes    Anemia [D64.9]  Unknown    Urinary tract infection with hematuria [N39.0, R31.9]  Yes    PEG (percutaneous endoscopic gastrostomy) status [Z93.1]  Not Applicable    Acquired hypothyroidism [E03.9]  Yes    Hematuria [R31.9]  Yes    Functional quadriplegia [R53.2]  Yes    Acute on chronic respiratory failure with hypoxia and hypercapnia [J96.21, J96.22]  Yes    Chronic respiratory failure with hypoxia and hypercapnia [J96.11, J96.12]  Yes    Tracheostomy in place [Z93.0]  Not Applicable      Resolved Hospital Problems   No resolved problems to display.               Plan:   Oct 4, no new-  Temp to 101.3 on 3rd, wbc 16 from 28- sputum culture - had prior pseudomonas r to levaquin- was dosed on 2nd with levaquin, on zosyn/vanc     Will stop fluids, 10 mg bid ivp lasix 10/5- increase as needed.      October 8th, seems to have a little less edema.  Could go back to [place] soon.                            .

## 2019-10-07 NOTE — EICU
This is an eICU summary note. Notified unit that during patient rounds when patient asked if he needed help patient nodded yes. Patient in no apparent distress. RN to follow up.

## 2019-10-07 NOTE — PROGRESS NOTES
Pharmacokinetic Assessment Follow Up: IV Vancomycin    Vancomycin serum concentration assessment(s):    The random level was drawn correctly and can be used to guide therapy at this time. The measurement is above the desired definitive target range of 15 to 20 mcg/mL.    Vancomycin Regimen Plan:    Discontinue the scheduled vancomycin regimen and re-dose when the random level is less than 20 mcg/mL, next level to be drawn at 0830 on 10/7.    Drug levels (last 3 results):  Recent Labs   Lab Result Units 10/04/19  1700 10/05/19  1724 10/06/19  2058   Vancomycin, Random ug/mL 15.6 23.3 27.2       Pharmacy will continue to follow and monitor vancomycin.    Please contact pharmacy at extension [phone] for questions regarding this assessment.    Thank you for the consult,   Isa Boston       Patient brief summary:  Masood Messina is a 79 y.o. male initiated on antimicrobial therapy with IV Vancomycin for treatment of sepsis    The patient's current regimen is pulse dosing. Last dose 1500 mg.    Drug Allergies:   Review of patient's allergies indicates:   Allergen Reactions    Codeine Other (See Comments)       Actual Body Weight:   131.5 kg      Renal Function:   Estimated Creatinine Clearance: 55.1 mL/min (A) (based on SCr of 1.5 mg/dL (H)).,         CBC (last 72 hours):  Recent Labs   Lab Result Units 10/04/19  0339 10/05/19  0435 10/05/19  1626 10/06/19  0332   WBC K/uL 15.93*  15.93* 8.26  8.26 9.22 8.79  8.79   Hemoglobin g/dL 8.6*  8.6* 7.4*  7.4* 8.6* 8.6*  8.6*   Hematocrit % 27.0*  27.0* 23.3*  23.3* 26.8* 26.3*  26.3*   Platelets K/uL 129*  129* 104*  104* 134* 144*  144*   Gran% % 74.9*  74.9* 63.5  63.5 63.9 61.7  61.7   Lymph% % 10.9*  10.9* 16.2*  16.2* 18.4 17.1*  17.1*   Mono% % 11.2  11.2 15.0  15.0 13.4 15.6*  15.6*   Eosinophil% % 2.0  2.0 4.2  4.2 3.1 4.2  4.2   Basophil% % 0.4  0.4 0.5  0.5 0.4 0.6  0.6   Differential Method  Automated  Automated Automated   Automated Automated Automated  Automated       Metabolic Panel (last 72 hours):  Recent Labs   Lab Result Units 10/04/19  0339 10/05/19  0435 10/06/19  0332 10/06/19  1559   Sodium mmol/L 137  137 132*  132* 134*  134* 135*   Potassium mmol/L 3.9  3.9 3.6  3.6 3.5  3.5 3.2*   Chloride mmol/L 104  104 101  101 97  97 94*   CO2 mmol/L 25  25 24  24 28  28 31*   Glucose mg/dL 120*  120* 199*  199* 116*  116* 106   BUN, Bld mg/dL 37*  37* 34*  34* 31*  31* 28*   Creatinine mg/dL 1.9*  1.9* 1.5*  1.5* 1.6*  1.6* 1.5*   Albumin g/dL 2.3* 2.1* 2.4*  --    Total Bilirubin mg/dL 0.8 0.5 0.6  --    Alkaline Phosphatase U/L 79 67 78  --    AST U/L 19 21 30  --    ALT U/L 18 22 29  --    Magnesium mg/dL 1.8 1.6 1.6 1.7   Phosphorus mg/dL 2.3* 2.0* 2.4*  --        Vancomycin Administrations:  vancomycin given in the last 96 hours                     vancomycin 1.5 g in dextrose 5 % 250 mL IVPB (ready to mix) (mg) 1,500 mg New Bag 10/05/19 2117                      Microbiologic Results:  Microbiology Results (last 7 days)       Procedure Component Value Units Date/Time    Blood culture #1 **CANNOT BE ORDERED STAT** [[id number]] Collected:  10/02/19 1305    Order Status:  Completed Specimen:  Blood Updated:  10/06/19 0612     Blood Culture, Routine No Growth to date      No Growth to date      No Growth to date      No Growth to date    Blood culture #2 **CANNOT BE ORDERED STAT** [[id number]] Collected:  10/02/19 1305    Order Status:  Completed Specimen:  Blood Updated:  10/06/19 0612     Blood Culture, Routine No Growth to date      No Growth to date      No Growth to date      No Growth to date    Culture, Respiratory with Gram Stain [[id number]]  (Abnormal) Collected:  10/04/19 0020    Order Status:  Completed Specimen:  Respiratory from Tracheal Aspirate Updated:  10/05/19 0839     Respiratory Culture PRESUMPTIVE PSEUDOMONAS SPECIES  Many  Identification and susceptibility pending  Normal respiratory gabriela  also present       Gram Stain (Respiratory) <10 epithelial cells per low power field.     Gram Stain (Respiratory) Rare WBC's     Gram Stain (Respiratory) Many Gram negative rods     Gram Stain (Respiratory) Few Gram positive rods     Gram Stain (Respiratory) Rare Gram positive cocci    Urine culture [[id number]]  (Abnormal)  (Susceptibility) Collected:  10/02/19 1321    Order Status:  Completed Specimen:  Urine Updated:  10/05/19 0439     Urine Culture, Routine PSEUDOMONAS AERUGINOSA  10,000 - 49,999 cfu/ml      Narrative:       Preferred Collection Type->Urine, Catheterized    Influenza A & B by Molecular [[id number]] Collected:  10/02/19 1300    Order Status:  Completed Specimen:  Nasopharyngeal Swab Updated:  10/02/19 1349     Influenza A, Molecular Negative     Influenza B, Molecular Negative     Flu A & B Source Nasal swab

## 2019-10-07 NOTE — CARE UPDATE
10/06/19 1845   Patient Assessment/Suction   Level of Consciousness (AVPU) alert   Respiratory Effort Unlabored   Expansion/Accessory Muscles/Retractions no use of accessory muscles;no retractions   All Lung Fields Breath Sounds coarse   Rhythm/Pattern, Respiratory assisted mechanically   Cough Frequency with stimulation   Cough Type fair;productive   Suction Method tracheal   $ Suction Charges Inline Suction Procedure Stat Charge   Secretions Amount moderate   Secretions Color cloudy   Secretions Characteristics thick   Aspirate Toleration TIFFANIE (no adverse reactions)   PRE-TX-O2   O2 Device (Oxygen Therapy) ventilator   $ Is the patient on Low Flow Oxygen? Yes   Oxygen Concentration (%) 30   SpO2 (!) 89 %   Pulse Oximetry Type Continuous   $ Pulse Oximetry - Multiple Charge Pulse Oximetry - Multiple   Oximetry Probe Site Assessed;Intact   Pulse 73   Resp 16   ETCO2   $ ETCO2 Charge Exhaled CO2 Monitoring   $ ETCO2 Usage Currently wearing   ETCO2 (mmHg) 28 mmHg   ETCO2 Device Type Bedside Monitor;Ventilator        Surgical Airway Portex Cuffed;Fenestrated   No Placement Date or Time found.   Present Prior to Hospital Arrival?: Yes  Inserted by: Present Prior to Hospital Arrival  Type: Tracheostomy  Brand: Portex  Airway Device Size: 8.0  Style: Cuffed;Fenestrated   Cuff Pressure 30 cm H2O   Cuff Inflation? Inflated   Status Secured   Site Assessment Oozing secretions   Site Care Cleansed;Dried   Inner Cannula Care Cleansed/dried   Ties Assessment Secure;Intact   Vent Select   Charged w/in last 24h YES   Preset Conventional Ventilator Settings   Vent Type    Ventilation Type VC   Vent Mode A/C   Humidity Heated wire   Set Rate 16 bmp   Vt Set 700 mL   PEEP/CPAP 5 cmH20   Pressure Support 0 cmH20   Waveform RAMP   Peak Flow 62 L/min   Set Inspiratory Pressure 0 cmH20   Insp Time 0 Sec(s)   Plateau Set/Insp. Hold (sec) 0   Insp Rise Time  0 %   I-Trigger Type  Flow   Trigger Sensitivity Flow/I-Trigger 1.5 L/min    P High 0 cm H2O   P Low 0 cm H2O   T High 0 sec   T Low 0 sec   Patient Ventilator Parameters   Resp Rate Total 16 br/min   Peak Airway Pressure 34 cmH2O   Mean Airway Pressure 15 cmH20   Plateau Pressure 28 cmH20   Exhaled Vt 697 mL   Total Ve 11.6 mL   Spont Ve 0 L   I:E Ratio Measured 1:1.90   Conventional Ventilator Alarms   Alarms On Y   Ve High Alarm 16 L/min   Resp Rate High Alarm 40 br/min   Press High Alarm 55 cmH2O   Apnea Rate 16   Apnea Volume (mL) 700 mL   Apnea Oxygen Concentration  100   Apnea Flow Rate (L/min) 60   T Apnea 20 sec(s)   Ready to Wean/Extubation Screen   FIO2<=50 (chart decimal) 0.3   MV<16L (chart vol.) 11.6   PEEP <=8 (chart #) 5   Ready to Wean Parameters   F/VT Ratio<105 (RSBI) (!) 22.96   Airway Safety   Ambu bag with the patient? Yes, Adult Ambu   Is mask with the patient? Yes, Adult Mask

## 2019-10-07 NOTE — PROGRESS NOTES
Pharmacokinetic Assessment Follow Up: IV Vancomycin    Vancomycin serum concentration assessment(s):    The random level was drawn correctly and can be used to guide therapy at this time. The measurement is above the desired definitive target range of 15 to 20 mcg/mL.    Vancomycin Regimen Plan:    The random level drawn today at 0808 was 21.7 mcg/mL and above the definitive target goal of 15-20 mcg/mL.  Pharmacy will continue to hold vancomycin until the serum concentration is less than 20 mcg/mL.  The next random level will be drawn 10/7 at 2030, and Pharmacy will re-dose if level is less than 20 mcg/mL.  Continue to target goal of 15-20 mcg/mL for suspected sepsis.    Drug levels (last 3 results):  Recent Labs   Lab Result Units 10/05/19  1724 10/06/19  2058 10/07/19  0808   Vancomycin, Random ug/mL 23.3 27.2 21.2       Pharmacy will continue to follow and monitor vancomycin.    Please contact pharmacy at extension [phone] for questions regarding this assessment.    Thank you for the consult,   Whitley Macias, PharmD       Patient brief summary:  Masood Messina is a 79 y.o. male initiated on antimicrobial therapy with IV Vancomycin for treatment of sepsis.    The patient's current regimen is pulse dosing. The last dose received was vancomycin 1500 mg IV on 10/5 at 2117.    Drug Allergies:   Review of patient's allergies indicates:   Allergen Reactions    Codeine Other (See Comments)       Actual Body Weight:   131.5 kg    Renal Function:   Estimated Creatinine Clearance: 51.7 mL/min (A) (based on SCr of 1.6 mg/dL (H)).,     Dialysis Method (if applicable):  N/A    CBC (last 72 hours):  Recent Labs   Lab Result Units 10/05/19  0435 10/05/19  1626 10/06/19  0332 10/07/19  0329   WBC K/uL 8.26  8.26 9.22 8.79  8.79 11.76   Hemoglobin g/dL 7.4*  7.4* 8.6* 8.6*  8.6* 9.4*   Hematocrit % 23.3*  23.3* 26.8* 26.3*  26.3* 28.9*   Platelets K/uL 104*  104* 134* 144*  144* 180   Gran% % 63.5  63.5 63.9 61.7   61.7 66.7   Lymph% % 16.2*  16.2* 18.4 17.1*  17.1* 14.7*   Mono% % 15.0  15.0 13.4 15.6*  15.6* 13.0   Eosinophil% % 4.2  4.2 3.1 4.2  4.2 4.2   Basophil% % 0.5  0.5 0.4 0.6  0.6 0.6   Differential Method  Automated  Automated Automated Automated  Automated Automated       Metabolic Panel (last 72 hours):  Recent Labs   Lab Result Units 10/05/19  0435 10/06/19  0332 10/06/19  1559 10/07/19  0329   Sodium mmol/L 132*  132* 134*  134* 135* 136   Potassium mmol/L 3.6  3.6 3.5  3.5 3.2* 3.8   Chloride mmol/L 101  101 97  97 94* 95   CO2 mmol/L 24  24 28  28 31* 31*   Glucose mg/dL 199*  199* 116*  116* 106 103   BUN, Bld mg/dL 34*  34* 31*  31* 28* 30*   Creatinine mg/dL 1.5*  1.5* 1.6*  1.6* 1.5* 1.6*   Albumin g/dL 2.1* 2.4*  --  2.5*   Total Bilirubin mg/dL 0.5 0.6  --  0.7   Alkaline Phosphatase U/L 67 78  --  135   AST U/L 21 30  --  35   ALT U/L 22 29  --  38   Magnesium mg/dL 1.6 1.6 1.7 1.6   Phosphorus mg/dL 2.0* 2.4*  --  2.2*       Vancomycin Administrations:  vancomycin given in the last 96 hours                     vancomycin 1.5 g in dextrose 5 % 250 mL IVPB (ready to mix) (mg) 1,500 mg New Bag 10/05/19 2117                      Microbiologic Results:  Microbiology Results (last 7 days)       Procedure Component Value Units Date/Time    Blood culture #2 **CANNOT BE ORDERED STAT** [[id number]] Collected:  10/02/19 1305    Order Status:  Completed Specimen:  Blood Updated:  10/07/19 0612     Blood Culture, Routine No Growth to date      No Growth to date      No Growth to date      No Growth to date      No Growth to date    Blood culture #1 **CANNOT BE ORDERED STAT** [[id number]] Collected:  10/02/19 1305    Order Status:  Completed Specimen:  Blood Updated:  10/07/19 0612     Blood Culture, Routine No Growth to date      No Growth to date      No Growth to date      No Growth to date      No Growth to date    Culture, Respiratory with Gram Stain [[id number]]  (Abnormal) Collected:   10/04/19 0020    Order Status:  Completed Specimen:  Respiratory from Tracheal Aspirate Updated:  10/05/19 0839     Respiratory Culture PRESUMPTIVE PSEUDOMONAS SPECIES  Many  Identification and susceptibility pending  Normal respiratory gabriela also present       Gram Stain (Respiratory) <10 epithelial cells per low power field.     Gram Stain (Respiratory) Rare WBC's     Gram Stain (Respiratory) Many Gram negative rods     Gram Stain (Respiratory) Few Gram positive rods     Gram Stain (Respiratory) Rare Gram positive cocci    Urine culture [[id number]]  (Abnormal)  (Susceptibility) Collected:  10/02/19 1321    Order Status:  Completed Specimen:  Urine Updated:  10/05/19 0439     Urine Culture, Routine PSEUDOMONAS AERUGINOSA  10,000 - 49,999 cfu/ml      Narrative:       Preferred Collection Type->Urine, Catheterized    Influenza A & B by Molecular [[id number]] Collected:  10/02/19 1300    Order Status:  Completed Specimen:  Nasopharyngeal Swab Updated:  10/02/19 1349     Influenza A, Molecular Negative     Influenza B, Molecular Negative     Flu A & B Source Nasal swab

## 2019-10-07 NOTE — PROGRESS NOTES
Urology Consult Progress Note-[place]  Staff: Teofilo/Hai/Margaret/Rene    Referring physician or department:     Subjective:      Masood Messina is a 79 y.o. 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  He does have a history of gross hematuria, seemingly of prostatic varices source, and had multiple cystoscopies with clot evacuation and fulguration in 2014. Prostate required fulguration.  Had an ultrasound at that time concerning for mild left hydronephrosis and possible renal mass, though no masses present on CT scan.  Persistent left hydronephrosis is found to be longstanding and likely due to bladder muscle hypertrophy from prostate enlargement.  No bladder lesions or bladder tumors were noted on any previous cystoscopy.    10/3/19 - Attempts to place a 24 Malay 3 way Tay catheter for CBI were unsuccessful, and so a 24 Malay coude Tay catheter was placed and manually irrigated with some clots removed. On zosyn and levaquin.   10/4/19 - draining red urine and clots still coming out with manual irrigation.  irrigated out multiple large clots.   10/5/19-  Wbc improved to 9, urine clear yellow today in tay.   10/6/19- urine remains clear. No fevers overnight. Tay changed from 24 fr coude to 16 fr coude by me.      Objective:     Temp:  [97.4 °F (36.3 °C)-99.3 °F (37.4 °C)] 97.9 °F (36.6 °C)  Pulse:  [73-87] 73  Resp:  [16-27] 16  SpO2:  [89 %-100 %] 89 %  BP: (128-177)/() 137/100  I/O last 3 completed shifts:  In: 4810 [I.V.:250; NG/GT:3660; IV Piggyback:900]  Out: 8550 [Urine:8550]  No intake/output data recorded.    Uop: 1180/460/1210    Physical Exam   Nursing note and vitals reviewed.  Constitutional: Pt is oriented to person, place, and time. Pt appears well-developed and well-nourished.   HENT:   Head: Normocephalic and atraumatic.   Eyes: Pupils are equal, round, and reactive to light.    Cardiovascular: no pallor  Pulmonary/Chest: Effort normal.   Abdominal: no distension  Musculoskeletal: Normal range of motion.   Neurological: Pt is alert and oriented to person, place, and time.   Skin: Skin is intact.     Psychiatric: Pt has a normal mood and affect.     10/6/19  60cc flushed into catheter via 24 Italian catheter   Tay catheter removed  New 16fr coude tay catheter placed under sterile conditions  Edematous scrotum with buried penis    Labs:     Recent Labs   Lab 10/05/19  0435 10/06/19  0332 10/06/19  1559   *  132* 134*  134* 135*   K 3.6  3.6 3.5  3.5 3.2*     101 97  97 94*   CO2 24  24 28  28 31*   BUN 34*  34* 31*  31* 28*   CREATININE 1.5*  1.5* 1.6*  1.6* 1.5*   CALCIUM 7.8*  7.8* 8.6*  8.6* 9.0     Recent Labs   Lab 10/05/19  0435 10/05/19  1626 10/06/19  0332   WBC 8.26  8.26 9.22 8.79  8.79   HGB 7.4*  7.4* 8.6* 8.6*  8.6*   HCT 23.3*  23.3* 26.8* 26.3*  26.3*   *  104* 134* 144*  144*       Radiology:  ctap 9/12/19  1. Tiny bilateral pleural effusions with scattered dependent airspace opacities most likely reflecting atelectasis.  2. Heterogeneous density in gallbladder either reflecting cholelithiasis or sludge.  3. Nonobstructing bilateral renal calculi as described.  4. Improvement of left hydronephrosis since 2014, although left hydroureter does persist through level of left UVJ.  This could reflect sequelae of chronic vesicoureteral reflux or congenital megaureter.  5. Interval enlargement of infrarenal abdominal aortic aneurysm currently measuring 5.2 x 5.4 cm.  6. Unchanged enlarged prostate.  7. Unchanged metallic foreign body in superficial subcutaneous fat of right gluteal soft tissues.    Assessment:     Masood Messina is a 79 y.o. male with Pseudomonas UTI, chronic L hydro, BPH and gross hematuria.         Plan:     Urine clear today. Clear yellow.      I changed his tay catheter today 10/6/19 to 16fr coude.   Want to  ensure urine remains clear.    On discharge back to [place] recommend- gross hematuria likely secondary to trauma if new catheter not hubbed before balloon inflated   -continuing finasteride (through NGT)  -discontinuing bethanochol (pt is catheter dependent)  -complete UTI treatment bc pt is having gross hematuria, otherwise would avoid treatment of asymptomatic bacteriuria  -tay catheter change every 4 weeks with 16 Faroese coude with 10cc of water in balloon. He has edematous scrotum with buried penis. When catheter is changed they need to make sure to hub the catheter before inflating the balloon or they will likely cause trauma to prostate.   -follow-up with /urology NP for outpt workup of hematuria (last cysto 5 years ago)    Liza Red MD  [facility] ([facility]Margaret/Teofilo/Rene)   Office: [phone]

## 2019-10-08 NOTE — PROGRESS NOTES
[facility] Medicine  Progress Note    Patient Name: Masood Messina  MRN: [id number]  Patient Class: IP- Inpatient   Admission Date: 10/2/2019  Length of Stay: 6 days  Attending Physician: Melissa Mayfield MD  Primary Care Provider: Jeramie Lombardi MD        Subjective:     Principal Problem:Septic shock        HPI:  Patient is an 79-year-old morbidly obese male from [facility] with past medical history significant for multiple medical problems including chronic hypoxic hypercarbic respiratory failure (ventilator dependent status post tracheostomy), status post PEG tube placement, hypertension, hyperlipidemia, coronary artery disease, history of cerebrovascular accident, hypothyroidism and prior history of cerebrovascular accident who is functional quadriplegic is being admitted to Hospital Medicine from [facility] where patient presented with septic shock.  Upon arrival patient is unresponsive and blood pressure was noted to be around 72/48 mm of mercury.  Per ER physician reportedly patient had some hematuria for 2 days.  Patient was being treated for pneumonia recently. No further details of HPI.     Overview/Hospital Course:  Patient has been weaned off intravenous pressor agent.  Urine cultures are growing g negative amish.  Patient having ongoing intermittent gross hematuria requiring bladder irrigation.  Urology team following.    Interval History:  In intensive care unit, septic shock has resolved.  Urine and sputum cultures growing Pseudomonas species.  Renal panel improving.  Rectal tube is in place.  C diff antigen is positive.  Groin central line is to be removed today.    Review of Systems   Patient is much more alert and responsive.  Able to speak few sentences.  Patient denies any focal complaints or pain.    Objective:     Vital Signs (Most Recent):  Temp: 98.4 °F (36.9 °C) (10/08/19 0400)  Pulse: 69 (10/08/19 0800)  Resp: 18  (10/08/19 0800)  BP: 119/60 (10/08/19 0800)  SpO2: 95 % (10/08/19 0800) Vital Signs (24h Range):  Temp:  [98.4 °F (36.9 °C)-99.1 °F (37.3 °C)] 98.4 °F (36.9 °C)  Pulse:  [68-87] 69  Resp:  [16-27] 18  SpO2:  [93 %-100 %] 95 %  BP: (106-160)/() 119/60     Weight: 128.2 kg (282 lb 10.1 oz)  Body mass index is 38.33 kg/m².    Intake/Output Summary (Last 24 hours) at 10/8/2019 0931  Last data filed at 10/8/2019 0900  Gross per 24 hour   Intake 2920 ml   Output 2265 ml   Net 655 ml      Physical Exam   Constitutional: He appears well-developed.   HENT:   Head: Normocephalic and atraumatic.   Nose: Nose normal. No septal deviation.   Mouth/Throat: Oropharynx is clear and moist.   Eyes: Pupils are equal, round, and reactive to light. Conjunctivae are normal.   Neck: No JVD present. No tracheal tenderness present. No tracheal deviation present. No thyroid mass present.   Tracheostomy site appears fine   Cardiovascular: Normal rate, regular rhythm, S1 normal and S2 normal. Exam reveals no gallop and no friction rub.   No murmur heard.  Pulmonary/Chest: Effort normal and breath sounds normal. He has no decreased breath sounds. He has no wheezes. He has no rhonchi. He has no rales.   Abdominal: Soft. Normal appearance and bowel sounds are normal. He exhibits no distension and no mass. There is no hepatosplenomegaly, splenomegaly or hepatomegaly. There is no tenderness.   Peg site appears fine   Musculoskeletal:   Trace pedal edema bilateral lower extremities   Neurological: He has normal strength. No cranial nerve deficit or sensory deficit.   Patient is more alert and responsive and more interactive today.  Skin: No rash noted. No cyanosis.   Nursing note and vitals reviewed.    Significant Labs:   Blood Culture:   No results for input(s): LABBLOO in the last 48 hours.  CBC:   Recent Labs   Lab 10/07/19  0329 10/08/19  0335   WBC 11.76 9.56   HGB 9.4* 8.7*   HCT 28.9* 26.9*    164     CMP:   Recent Labs   Lab  10/06/19  1559 10/07/19  0329 10/08/19  0335   * 136 134*   K 3.2* 3.8 3.2*   CL 94* 95 95   CO2 31* 31* 28    103 100   BUN 28* 30* 26*   CREATININE 1.5* 1.6* 1.4   CALCIUM 9.0 8.9 8.7   PROT  --  7.6 7.3   ALBUMIN  --  2.5* 2.4*   BILITOT  --  0.7 0.6   ALKPHOS  --  135 105   AST  --  35 24   ALT  --  38 28   ANIONGAP 10 10 11   EGFRNONAA 43* 40* 47*     Coagulation:   No results for input(s): PT, INR, APTT in the last 48 hours.  Lactic Acid:   No results for input(s): LACTATE in the last 48 hours.  Troponin:   No results for input(s): TROPONINI in the last 48 hours.  Urine Culture:   No results for input(s): LABURIN in the last 48 hours.    Significant Imaging:   CXR:   New or increased right lung infiltrate, diffusely but more so right upper lung field.  Interval decrease of the left lung infiltrate with left lung today appearing clear.  Right lung infiltrates suggest pneumonia.     CT head without contrast: No evidence of an acute intracranial abnormality.Interval History:  In intensive care unit, septic shock has resolved.  Urine and sputum cultures growing Pseudomonas multi-drug resistant species.  Renal panel improving.    Review of Systems   Unable to perform ROS: Patient unresponsive     Objective:     Vital Signs (Most Recent):  Temp: 98.4 °F (36.9 °C) (10/08/19 0400)  Pulse: 69 (10/08/19 0800)  Resp: 18 (10/08/19 0800)  BP: 119/60 (10/08/19 0800)  SpO2: 95 % (10/08/19 0800) Vital Signs (24h Range):  Temp:  [98.4 °F (36.9 °C)-99.1 °F (37.3 °C)] 98.4 °F (36.9 °C)  Pulse:  [68-87] 69  Resp:  [16-27] 18  SpO2:  [93 %-100 %] 95 %  BP: (106-160)/() 119/60     Weight: 128.2 kg (282 lb 10.1 oz)  Body mass index is 38.33 kg/m².    Intake/Output Summary (Last 24 hours) at 10/8/2019 0929  Last data filed at 10/8/2019 0900  Gross per 24 hour   Intake 2920 ml   Output 2265 ml   Net 655 ml      Physical Exam   Constitutional: He appears well-developed.   HENT:   Head: Normocephalic and atraumatic.    Nose: Nose normal. No septal deviation.   Mouth/Throat: Oropharynx is clear and moist.   Eyes: Pupils are equal, round, and reactive to light. Conjunctivae are normal.   Neck: No JVD present. No tracheal tenderness present. No tracheal deviation present. No thyroid mass present.   Tracheostomy site appears fine   Cardiovascular: Normal rate, regular rhythm, S1 normal and S2 normal. Exam reveals no gallop and no friction rub.   No murmur heard.  Pulmonary/Chest: Effort normal and breath sounds normal. He has no decreased breath sounds. He has no wheezes. He has no rhonchi. He has no rales.   Abdominal: Soft. Normal appearance and bowel sounds are normal. He exhibits no distension and no mass. There is no hepatosplenomegaly, splenomegaly or hepatomegaly. There is no tenderness.   Peg site appears fine   Musculoskeletal:   Trace pedal edema bilateral lower extremities   Neurological: He has normal strength. No cranial nerve deficit or sensory deficit.   Patient is more alert and responsive but unable to interact verbally.   Skin: No rash noted. No cyanosis.   Nursing note and vitals reviewed.    Significant Labs:   Blood Culture:   No results for input(s): LABBLOO in the last 48 hours.  CBC:   Recent Labs   Lab 10/07/19  0329 10/08/19  0335   WBC 11.76 9.56   HGB 9.4* 8.7*   HCT 28.9* 26.9*    164     CMP:   Recent Labs   Lab 10/06/19  1559 10/07/19  0329 10/08/19  0335   * 136 134*   K 3.2* 3.8 3.2*   CL 94* 95 95   CO2 31* 31* 28    103 100   BUN 28* 30* 26*   CREATININE 1.5* 1.6* 1.4   CALCIUM 9.0 8.9 8.7   PROT  --  7.6 7.3   ALBUMIN  --  2.5* 2.4*   BILITOT  --  0.7 0.6   ALKPHOS  --  135 105   AST  --  35 24   ALT  --  38 28   ANIONGAP 10 10 11   EGFRNONAA 43* 40* 47*     Coagulation:   No results for input(s): PT, INR, APTT in the last 48 hours.  Lactic Acid:   No results for input(s): LACTATE in the last 48 hours.  Troponin:   No results for input(s): TROPONINI in the last 48 hours.  Urine  Culture:   No results for input(s): LABURIN in the last 48 hours.    Significant Imaging:   CXR:   New or increased right lung infiltrate, diffusely but more so right upper lung field.  Interval decrease of the left lung infiltrate with left lung today appearing clear.  Right lung infiltrates suggest pneumonia.     CT head without contrast: No evidence of an acute intracranial abnormality.    CXR: Mild decreased infiltrate at the right lung apex.  Continued atelectasis of the right midlung field and chronic elevation of the right hemidiaphragm.  Prior sternotomy.  Tracheostomy tube in position.        Assessment/Plan:      * Septic shock  Masood Messina is a 79 y.o. male who presents to the Emergency Department with septic shock.  This patient does) have evidence of infective focus.  Follow Infectious Disease recommendations.  Continue intravenous vancomycin.  Pharmacist is dosing vancomycin.  Will discontinue Zosyn and start intravenous cefepime 1 g Q 12 since chest x-ray is improving.  My overall impression is healthcare associated pneumonia and UTI.    F/u cultures  Lab Results   Component Value Date    WBC 8.79 10/06/2019    WBC 8.79 10/06/2019    HGB 8.6 (L) 10/06/2019    HGB 8.6 (L) 10/06/2019    HCT 26.3 (L) 10/06/2019    HCT 26.3 (L) 10/06/2019    MCV 93 10/06/2019    MCV 93 10/06/2019     (L) 10/06/2019     (L) 10/06/2019     Organ dysfunction indicated by altered mental status and acute kidney injury  Vital signs post fluid administrations were-    Temp Readings from Last 1 Encounters:   10/06/19 97.4 °F (36.3 °C) (Axillary)     BP Readings from Last 1 Encounters:   10/06/19 (!) 159/80     Pulse Readings from Last 1 Encounters:   10/06/19 81   Patient is off intravenous pressor agent.    Chronic pain  Patient can't say what's hurting him. Was given morphine.  Monitor for pain      Hypophosphatemia  Replete phosphorus and follow level.    Acute on chronic diastolic congestive heart  failure  Compensated.    Pneumonia of right upper lobe due to Pseudomonas species  Currently on vanc, levaquin, and zosyn    Coronary artery disease  Telemonitoring.    Acquired hypothyroidism  Chronic problem. Will continue chronic medications and monitor for any changes, adjusting as needed.    PEG (percutaneous endoscopic gastrostomy) status  PEG care.    Urinary tract infection with hematuria  Continue abx  Positive for pseudomonas    Hematuria  Urology consulted    Chronic respiratory failure with hypoxia and hypercapnia  Continue mechanical ventilation as before    Tracheostomy in place  Trach care    Acute on chronic respiratory failure with hypoxia and hypercapnia  Continue mechanical ventilation as before.  Supplemental O2 via nasal canula; titrate O2 saturation to >92%.   Follow Pulmonary recommendations.  Sputum positive for possible pseudomonas.   Continue beta 2 agonist bronchodilator treatments.   On levaquin, vanc, and zosyn    Functional quadriplegia  Supportive care, skin care, rotate patient every 2 hr    VTE Risk Mitigation (From admission, onward)         Ordered     IP VTE HIGH RISK PATIENT  Once      10/02/19 1710     Place sequential compression device  Until discontinued      10/02/19 1710     Place EYAD hose  Until discontinued      10/02/19 1710                Critical care time spent on the evaluation and treatment of severe organ dysfunction, review of pertinent labs and imaging studies, discussions with consulting providers and discussions with patient/family: 34 minutes.      Melissa Mayfield MD  Department of Hospital Medicine   [facility]

## 2019-10-08 NOTE — PLAN OF CARE
Rahman draining well. Maintained on vent. Tolerating Tube feed at goal rate. Flexicel draining watery brown stool.Maintained on contact isolation. Given Morphine for c/o back pain. Safety maintained.

## 2019-10-08 NOTE — PROGRESS NOTES
[facility]  Adult Nutrition  Progress Note    SUMMARY      Intervention: Enteral Nutrition Therapy   Current Intake: Nutren 1.5 @ 60 ml/hr + 180 ml flush q 4 hr once IVF d/c'd   ( 2160 kcal ( 96% EEN), 97 g protien( 93%EPN), and 1094 ml free water)    Recommendation:   1) Continue TF via PEG- Nutren 1.5 @ 60 ml/hr + 180 ml flush q 4 hr  2) Weigh pt weekly, replete lytes  3) If abd. Distention occurs again, consider switching to peptide based formula peptamen 1.5 with prebio      Goals: 1) Nutrition support initiated in < 5 days 2) pt continues to tolerate TF at goal by f/u  Nutrition Goal Status: Met/ new  Communication of RD Recs: (POC, sticky note, reviewed with RN)     Reason for Assessment     Reason For Assessment: Follow-Up  Diagnosis: (septic shock)  Relevant Medical History: + trach + PEG, [place] resident, stroke, HTN, HLD, COPD, CHF, CAD, dementia, anemia, GERD, morbid obesity  Interdisciplinary Rounds: attended     General Information Comments: 80 y/o male from [place] + PEG and trach admitted with sepsis, Pneumonia and UTI. Not alert at time of visit. Per [place] notes, last on Isosource HN @ 70 ml/hr x 22 hr ( holding one hour before and after synthroid) + 60 ml flush q 22 hr. NPO x 1 day. On low dose pressor. NFPE done 10/3/19, no wasting seen appears obese. Significant wt gain 115-140 kg 10/2017-9/12/19, with 8 kg loss since then ? % r/t fluid shifts.  10/8/19 10/3-10/4 pt noted to be receiving TF up tto 40 ml/hr. ? Receiving 10/5-10/7 as no order for TF was in computer and no rate noted in flowsheets. RN note states that pt's abd. Distended yesterday, but TF re-started and now tolerating at goal, flush per recommendation.      Nutrition Discharge Planning: To be determined- TF as above     Nutrition Risk Screen     Nutrition Risk Screen: tube feeding or parenteral nutrition     Nutrition/Diet History     Spiritual, Cultural Beliefs, [employer] Practices, Values that Affect Care:  no  Food Allergies: NKFA  Factors Affecting Nutritional Intake: NPO, difficulty/impaired swallowing     Anthropometrics  Height Method: Stated([facility] 19)  Height: 6'  Height (inches): 72 in  Weight Method: Bed Scale  Weight: 128.2 kg 10/8/19  Weight (lb): 291.45 lb  Ideal Body Weight (IBW), Male: 178 lb  % Ideal Body Weight, Male (lb): 163.74 lb  BMI (Calculated): 39.6  BMI Grade: 35 - 39.9 - obesity - grade II  Usual Body Weight (UBW), k kg(10/2017)  Weight Change Amount: (140 kg 19), 132.2 kg (10/3/19)     Lab/Procedures/Meds     Pertinent Labs Reviewed: reviewed  BMP  Lab Results   Component Value Date     (L) 10/08/2019    K 3.2 (L) 10/08/2019    CL 95 10/08/2019    CO2 28 10/08/2019    BUN 26 (H) 10/08/2019    CREATININE 1.4 10/08/2019    CALCIUM 8.7 10/08/2019    ANIONGAP 11 10/08/2019    ESTGFRAFRICA 54 (A) 10/08/2019    EGFRNONAA 47 (A) 10/08/2019     No results found for: IRON, TIBC, FERRITIN, SATURATEDIRO  Lab Results   Component Value Date    CALCIUM 8.7 10/08/2019    PHOS 2.3 (L) 10/08/2019        Pertinent Medications Reviewed: reviewed  Pertinent Medications Comments: polyethylene glycol, NS @ 125 ml/hr, Vitamin C, norepinephrine @ 8 ml/hr     Estimated/Assessed Needs   Admission  Weight Used For Calorie Calculations: 132.2 kg (291 lb 7.2 oz)  Energy Calorie Requirements (kcal): [place] modified: 2240 kcal  Energy Need Method: [place]  Protein Requirements: 0.8-1.0 g protein/kg ( 108-132 g protein)  Weight Used For Protein Calculations: 132.2 kg (291 lb 7.2 oz)  Fluid Requirements (mL): 2240 ml  Estimated Fluid Requirement Method: RDA Method  CHO Requirement: N/A        Nutrition Prescription Ordered     Current Diet Order: NPO + TF above     Evaluation of Received Nutrient/Fluid Intake     Energy Calories Required: meeting needs  Protein Required: meeting needs  Fluid Required meeting needs  Total Fluid Intake flush above + 10 ml/hr IVF  Tolerance: other (see  comments)(NPO)  % Intake of Estimated Energy Needs: 96%  % Meal Intake: NPO+ TF     Nutrition Risk     Level of Risk/Frequency of Follow-up: moderate 2 x weekly     Assessment and Plan  Inadequate energy intake  R/t NPO  As evidenced by PO intakes < 50% EEN x 1 day  Resolved- TF      Monitor and Evaluation     Food and Nutrient Intake: energy intake  Food and Nutrient Adminstration: enteral and parenteral nutrition administration  Anthropometric Measurements: weight  Biochemical Data, Medical Tests and Procedures: electrolyte and renal panel, gastrointestinal profile  Nutrition-Focused Physical Findings: overall appearance      Malnutrition Assessment  Skin (Micronutrient): (Brandon = 13, no PI noted)  Teeth (Micronutrient): (WDL)   Micronutrient Evaluation: suspected deficiency  Micronutrient Evaluation Comments: check iron and replete if needed, replete phos              Edema (Fluid Accumulation): 3+   Subcutaneous Fat Loss (Final Summary): well nourished  Muscle Loss Evaluation (Final Summary): well nourished       Nutrition Follow-Up     RD Follow-up?: Yes

## 2019-10-08 NOTE — NURSING
Received call from radiology.  Unable to perform portable abd xray d/t pt size.  Updated ordering phys and ok to cancel order.

## 2019-10-08 NOTE — PROGRESS NOTES
"Consult Note  Infectious Disease    Reason for Consult:  MDRO pseudomonas    HPI: Masood Messina is an  79 y.o. male with whom I am familiar from prior consults. He is quadriplegic, vent and PEG dependent. He was admitted 9/12 to [facility] for trach problem and was discharged back to [facility]. The events that led to a culture of sputum(GBS) followed by augmentin, then followed by IV maxipime for several days are unclear but there is a radiology report describing right sided pneumonia. He was sent her ish 10/2 with altered mental status and hypotension and hematuria. He had a RUL infiltrate and was placed on vanc and zosyn and levaquin. He required extensive manual irrigation of his bladder to remove clots. Cultures of the urine showed the usual pseudomonas and cultures of the sputum grew pseudomonas whose sensitivities were reported with resistance to everything but the aminoglycosides. He is having no fever, and has resolution of leukocytosis, and urine is clear now. He has now developed abdominal distention and liquid stools requiring a rectal tube.     10/8: afebrile, procalcitonin normal. Cdiff pcr pos, abdominal distention and stool volume are decreased. He feels "fair to middlin". Secretions remain purulent but CXR was improved. No hematuria. Repeat urine culture is in progress.     EXAM & DIAGNOSTICS REVIEWED:   Vitals:     Temp:  [98.4 °F (36.9 °C)-99.1 °F (37.3 °C)]   Temp: 98.4 °F (36.9 °C) (10/08/19 1200)  Pulse: 74 (10/08/19 1400)  Resp: 18 (10/08/19 1400)  BP: 133/81 (10/08/19 1400)  SpO2: 96 % (10/08/19 1400)    Intake/Output Summary (Last 24 hours) at 10/8/2019 1508  Last data filed at 10/8/2019 1400  Gross per 24 hour   Intake 2650 ml   Output 2165 ml   Net 485 ml       General:  In NAD. Attends, cooperates to the extent he can  Eyes:  Anicteric, PERRL, EOMI  ENT:  No ulcers, exudates, thrush, nares patent. Tardive mouth movements  Neck:  supple, tracheostomy  Lungs: Clear. Sputum is light green, " creamy  Heart:  RRR, no gallop/murmur/rub noted  Abd:  Soft, mild tenderness, mildly distended, normal BS, no masses or organomegaly appreciated. Rectal tube with liquid non bloody stool  :   Rahman, urine clear,  . Meatus not visible  Musc:  Joints without effusion, swelling, erythema, synovitis,   Skin:  No rashes.   Wound:   Neuro:  Alert, attentive, speech fluent, face symmetric, quadriparetic  Psych:  Calm, cooperative, answers most questions. Tardive mouth movements  Extrem: Mild chronic LE edema,no  erythema, phlebitis, cellulitis, warm    VAD:   Left groin TLC 10/2     Isolation:  contact    Lines/Tubes/Drains:    General Labs reviewed:  Recent Labs   Lab 10/06/19  0332 10/07/19  0329 10/08/19  0335   WBC 8.79  8.79 11.76 9.56   HGB 8.6*  8.6* 9.4* 8.7*   HCT 26.3*  26.3* 28.9* 26.9*   *  144* 180 164       Recent Labs   Lab 10/06/19  0332 10/06/19  1559 10/07/19  0329 10/08/19  0335   *  134* 135* 136 134*   K 3.5  3.5 3.2* 3.8 3.2*   CL 97  97 94* 95 95   CO2 28  28 31* 31* 28   BUN 31*  31* 28* 30* 26*   CREATININE 1.6*  1.6* 1.5* 1.6* 1.4   CALCIUM 8.6*  8.6* 9.0 8.9 8.7   PROT 7.3  --  7.6 7.3   BILITOT 0.6  --  0.7 0.6   ALKPHOS 78  --  135 105   ALT 29  --  38 28   AST 30  --  35 24         Micro:  Microbiology Results (last 7 days)     Procedure Component Value Units Date/Time    C Diff Toxin by PCR [[id number]]  (Abnormal) Collected:  10/07/19 1755    Order Status:  Completed Updated:  10/08/19 1339     C. diff PCR Positive    Culture, Respiratory with Gram Stain [[id number]]  (Abnormal)  (Susceptibility) Collected:  10/04/19 0020    Order Status:  Completed Specimen:  Respiratory from Tracheal Aspirate Updated:  10/08/19 1121     Respiratory Culture No S aureus isolated.      PSEUDOMONAS AERUGINOSA   Many  Normal respiratory gabriela also present       Gram Stain (Respiratory) <10 epithelial cells per low power field.     Gram Stain (Respiratory) Rare WBC's     Gram Stain  (Respiratory) Many Gram negative rods     Gram Stain (Respiratory) Few Gram positive rods     Gram Stain (Respiratory) Rare Gram positive cocci    Blood culture #1 **CANNOT BE ORDERED STAT** [[id number]] Collected:  10/02/19 1305    Order Status:  Completed Specimen:  Blood Updated:  10/08/19 0612     Blood Culture, Routine No growth after 5 days.    Blood culture #2 **CANNOT BE ORDERED STAT** [[id number]] Collected:  10/02/19 1305    Order Status:  Completed Specimen:  Blood Updated:  10/08/19 0612     Blood Culture, Routine No growth after 5 days.    Clostridium difficile EIA [[id number]]  (Abnormal) Collected:  10/07/19 1755    Order Status:  Completed Specimen:  Stool Updated:  10/08/19 0136     C. diff Antigen Positive     C difficile Toxins A+B, EIA Negative     Comment: Testing not recommended for children <24 months old.       Urine Culture High Risk [[id number]] Collected:  10/07/19 1745    Order Status:  Sent Specimen:  Urine, Catheterized Updated:  10/08/19 0120    Urine culture [[id number]]  (Abnormal)  (Susceptibility) Collected:  10/02/19 1321    Order Status:  Completed Specimen:  Urine Updated:  10/05/19 0439     Urine Culture, Routine PSEUDOMONAS AERUGINOSA  10,000 - 49,999 cfu/ml      Narrative:       Preferred Collection Type->Urine, Catheterized    Influenza A & B by Molecular [[id number]] Collected:  10/02/19 1300    Order Status:  Completed Specimen:  Nasopharyngeal Swab Updated:  10/02/19 1349     Influenza A, Molecular Negative     Influenza B, Molecular Negative     Flu A & B Source Nasal swab        Imaging Reviewed:   CXRs  Cardiology:    IMPRESSION & PLAN   Imp: extensive exposure to antibiotics resulting in MDRO pseudomonas in sputum. At the onset of this pneumonia he had a relatively sensitive organism. Repeat culture now reflects selection by antibiotics. 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 Difficile again 10/7    Rec: repeat CXR in am    continue cefepime, and I increased the dose   Adding tobramycin aerosols, to bridge after stopping the cefepime tomorrow   Oral vanc  14d then taper and avoid more antibiotics in near future for chronic colonization     Can the groin line be moved?

## 2019-10-08 NOTE — SUBJECTIVE & OBJECTIVE
Interval History:  In intensive care unit, septic shock has resolved.  Urine and sputum cultures growing Pseudomonas species.  Renal panel improving.    Review of Systems   Unable to perform ROS: Patient unresponsive     Objective:     Vital Signs (Most Recent):  Temp: 98.4 °F (36.9 °C) (10/08/19 0400)  Pulse: 69 (10/08/19 0800)  Resp: 18 (10/08/19 0800)  BP: 119/60 (10/08/19 0800)  SpO2: 95 % (10/08/19 0800) Vital Signs (24h Range):  Temp:  [98.4 °F (36.9 °C)-99.1 °F (37.3 °C)] 98.4 °F (36.9 °C)  Pulse:  [68-87] 69  Resp:  [16-27] 18  SpO2:  [93 %-100 %] 95 %  BP: (106-160)/() 119/60     Weight: 128.2 kg (282 lb 10.1 oz)  Body mass index is 38.33 kg/m².    Intake/Output Summary (Last 24 hours) at 10/8/2019 0931  Last data filed at 10/8/2019 0900  Gross per 24 hour   Intake 2920 ml   Output 2265 ml   Net 655 ml      Physical Exam   Constitutional: He appears well-developed.   HENT:   Head: Normocephalic and atraumatic.   Nose: Nose normal. No septal deviation.   Mouth/Throat: Oropharynx is clear and moist.   Eyes: Pupils are equal, round, and reactive to light. Conjunctivae are normal.   Neck: No JVD present. No tracheal tenderness present. No tracheal deviation present. No thyroid mass present.   Tracheostomy site appears fine   Cardiovascular: Normal rate, regular rhythm, S1 normal and S2 normal. Exam reveals no gallop and no friction rub.   No murmur heard.  Pulmonary/Chest: Effort normal and breath sounds normal. He has no decreased breath sounds. He has no wheezes. He has no rhonchi. He has no rales.   Abdominal: Soft. Normal appearance and bowel sounds are normal. He exhibits no distension and no mass. There is no hepatosplenomegaly, splenomegaly or hepatomegaly. There is no tenderness.   Peg site appears fine   Musculoskeletal:   Trace pedal edema bilateral lower extremities   Neurological: He has normal strength. No cranial nerve deficit or sensory deficit.   Patient is more alert and responsive but  unable to interact verbally.   Skin: No rash noted. No cyanosis.   Nursing note and vitals reviewed.    Significant Labs:   Blood Culture:   No results for input(s): LABBLOO in the last 48 hours.  CBC:   Recent Labs   Lab 10/07/19  0329 10/08/19  0335   WBC 11.76 9.56   HGB 9.4* 8.7*   HCT 28.9* 26.9*    164     CMP:   Recent Labs   Lab 10/06/19  1559 10/07/19  0329 10/08/19  0335   * 136 134*   K 3.2* 3.8 3.2*   CL 94* 95 95   CO2 31* 31* 28    103 100   BUN 28* 30* 26*   CREATININE 1.5* 1.6* 1.4   CALCIUM 9.0 8.9 8.7   PROT  --  7.6 7.3   ALBUMIN  --  2.5* 2.4*   BILITOT  --  0.7 0.6   ALKPHOS  --  135 105   AST  --  35 24   ALT  --  38 28   ANIONGAP 10 10 11   EGFRNONAA 43* 40* 47*     Coagulation:   No results for input(s): PT, INR, APTT in the last 48 hours.  Lactic Acid:   No results for input(s): LACTATE in the last 48 hours.  Troponin:   No results for input(s): TROPONINI in the last 48 hours.  Urine Culture:   No results for input(s): LABURIN in the last 48 hours.    Significant Imaging:   CXR:   New or increased right lung infiltrate, diffusely but more so right upper lung field.  Interval decrease of the left lung infiltrate with left lung today appearing clear.  Right lung infiltrates suggest pneumonia.     CT head without contrast: No evidence of an acute intracranial abnormality.Interval History:  In intensive care unit, septic shock has resolved.  Urine and sputum cultures growing Pseudomonas multi-drug resistant species.  Renal panel improving.    Review of Systems   Unable to perform ROS: Patient unresponsive     Objective:     Vital Signs (Most Recent):  Temp: 98.4 °F (36.9 °C) (10/08/19 0400)  Pulse: 69 (10/08/19 0800)  Resp: 18 (10/08/19 0800)  BP: 119/60 (10/08/19 0800)  SpO2: 95 % (10/08/19 0800) Vital Signs (24h Range):  Temp:  [98.4 °F (36.9 °C)-99.1 °F (37.3 °C)] 98.4 °F (36.9 °C)  Pulse:  [68-87] 69  Resp:  [16-27] 18  SpO2:  [93 %-100 %] 95 %  BP: (106-160)/()  119/60     Weight: 128.2 kg (282 lb 10.1 oz)  Body mass index is 38.33 kg/m².    Intake/Output Summary (Last 24 hours) at 10/8/2019 0929  Last data filed at 10/8/2019 0900  Gross per 24 hour   Intake 2920 ml   Output 2265 ml   Net 655 ml      Physical Exam   Constitutional: He appears well-developed.   HENT:   Head: Normocephalic and atraumatic.   Nose: Nose normal. No septal deviation.   Mouth/Throat: Oropharynx is clear and moist.   Eyes: Pupils are equal, round, and reactive to light. Conjunctivae are normal.   Neck: No JVD present. No tracheal tenderness present. No tracheal deviation present. No thyroid mass present.   Tracheostomy site appears fine   Cardiovascular: Normal rate, regular rhythm, S1 normal and S2 normal. Exam reveals no gallop and no friction rub.   No murmur heard.  Pulmonary/Chest: Effort normal and breath sounds normal. He has no decreased breath sounds. He has no wheezes. He has no rhonchi. He has no rales.   Abdominal: Soft. Normal appearance and bowel sounds are normal. He exhibits no distension and no mass. There is no hepatosplenomegaly, splenomegaly or hepatomegaly. There is no tenderness.   Peg site appears fine   Musculoskeletal:   Trace pedal edema bilateral lower extremities   Neurological: He has normal strength. No cranial nerve deficit or sensory deficit.   Patient is more alert and responsive but unable to interact verbally.   Skin: No rash noted. No cyanosis.   Nursing note and vitals reviewed.    Significant Labs:   Blood Culture:   No results for input(s): LABBLOO in the last 48 hours.  CBC:   Recent Labs   Lab 10/07/19  0329 10/08/19  0335   WBC 11.76 9.56   HGB 9.4* 8.7*   HCT 28.9* 26.9*    164     CMP:   Recent Labs   Lab 10/06/19  1559 10/07/19  0329 10/08/19  0335   * 136 134*   K 3.2* 3.8 3.2*   CL 94* 95 95   CO2 31* 31* 28    103 100   BUN 28* 30* 26*   CREATININE 1.5* 1.6* 1.4   CALCIUM 9.0 8.9 8.7   PROT  --  7.6 7.3   ALBUMIN  --  2.5* 2.4*    BILITOT  --  0.7 0.6   ALKPHOS  --  135 105   AST  --  35 24   ALT  --  38 28   ANIONGAP 10 10 11   EGFRNONAA 43* 40* 47*     Coagulation:   No results for input(s): PT, INR, APTT in the last 48 hours.  Lactic Acid:   No results for input(s): LACTATE in the last 48 hours.  Troponin:   No results for input(s): TROPONINI in the last 48 hours.  Urine Culture:   No results for input(s): LABURIN in the last 48 hours.    Significant Imaging:   CXR:   New or increased right lung infiltrate, diffusely but more so right upper lung field.  Interval decrease of the left lung infiltrate with left lung today appearing clear.  Right lung infiltrates suggest pneumonia.     CT head without contrast: No evidence of an acute intracranial abnormality.    CXR: Mild decreased infiltrate at the right lung apex.  Continued atelectasis of the right midlung field and chronic elevation of the right hemidiaphragm.  Prior sternotomy.  Tracheostomy tube in position.

## 2019-10-08 NOTE — PLAN OF CARE
Pt continues on present ventilator setting. Culture/ lab results received confirming C.Diff positive, sputum and urine with + for Pseudomonas. Pt continues to have diarrhea, with abdominal pain. Tolerating tube feedings. PICC line placed this shift in order to discontinue femoral central line. Waiting for radiology verification of PICC placement prior to removal of central line.

## 2019-10-08 NOTE — PLAN OF CARE
10/08/19 0702   Patient Assessment/Suction   Level of Consciousness (AVPU) alert   Respiratory Effort Normal;Unlabored   Expansion/Accessory Muscles/Retractions no use of accessory muscles;no retractions   All Lung Fields Breath Sounds coarse   Rhythm/Pattern, Respiratory assisted mechanically   $ Suction Charges Inline Suction Procedure Stat Charge   Secretions Amount moderate   Secretions Color clear   Secretions Characteristics thin   PRE-TX-O2   O2 Device (Oxygen Therapy) ventilator   $ Is the patient on Low Flow Oxygen? Yes   Oxygen Concentration (%) 30   SpO2 95 %   Pulse Oximetry Type Continuous   $ Pulse Oximetry - Multiple Charge Pulse Oximetry - Multiple   Pulse 71   Resp 16   BP (!) 117/56   ETCO2   $ ETCO2 Charge Exhaled CO2 Monitoring   $ ETCO2 Usage Currently wearing   ETCO2 (mmHg) 32 mmHg        Surgical Airway Portex Cuffed;Fenestrated   No Placement Date or Time found.   Present Prior to Hospital Arrival?: Yes  Inserted by: Present Prior to Hospital Arrival  Type: Tracheostomy  Brand: Portex  Airway Device Size: 8.0  Style: Cuffed;Fenestrated   Cuff Pressure 32 cm H2O   Cuff Inflation? Inflated   Status Secured   Site Assessment Oozing secretions  (clear)   Site Care Dried;Protective barrier to skin   Ties Assessment Clean   Vent Select   Conventional Vent Y   Charged w/in last 24h YES   Preset Conventional Ventilator Settings   Vent Type    Ventilation Type VC   Vent Mode A/C   Set Rate 16 bmp   Vt Set 700 mL   PEEP/CPAP 5 cmH20   Pressure Support 0 cmH20   Waveform RAMP   Peak Flow 62 L/min   Set Inspiratory Pressure 0 cmH20   Insp Time 0 Sec(s)   Plateau Set/Insp. Hold (sec) 0   Insp Rise Time  0 %   Trigger Sensitivity Flow/I-Trigger 1.5 L/min   P High 0 cm H2O   P Low 0 cm H2O   T High 0 sec   T Low 0 sec   Patient Ventilator Parameters   Resp Rate Total 16 br/min   Peak Airway Pressure 41 cmH2O   Mean Airway Pressure 16 cmH20   Plateau Pressure 34 cmH20   Exhaled Vt 717 mL   Total Ve  11.4 mL   Spont Ve 0 L   I:E Ratio Measured 1:2.00   Conventional Ventilator Alarms   Ve High Alarm 16 L/min   Resp Rate High Alarm 40 br/min   Press High Alarm 55 cmH2O   Apnea Rate 16   Apnea Volume (mL) 700 mL   Apnea Oxygen Concentration  100   Apnea Flow Rate (L/min) 60   T Apnea 20 sec(s)   Ready to Wean/Extubation Screen   FIO2<=50 (chart decimal) 0.3   MV<16L (chart vol.) 11.4   PEEP <=8 (chart #) 5   Ready to Wean Parameters   F/VT Ratio<105 (RSBI) (!) 22.32

## 2019-10-08 NOTE — NURSING
Attempted to obtain consent for central line placement from three listed emergency contacts. Unable to reach. Contacted ordering provider, Dr Mayfield who okayed placement per physician override.

## 2019-10-08 NOTE — PROCEDURES
Masood Messina is a 79 y.o. male patient.    Temp: 99.3 °F (37.4 °C) (10/08/19 1515)  Pulse: 72 (10/08/19 1700)  Resp: 19 (10/08/19 1600)  BP: (!) 115/58 (10/08/19 1700)  SpO2: 95 % (10/08/19 1700)  Weight: 128.2 kg (282 lb 10.1 oz) (10/08/19 0600)  Height: 6' (182.9 cm) (10/03/19 1207)    PICC  Date/Time: 10/8/2019 5:29 PM  Performed by: Lubna Munoz RN  Consent Done: Yes  Time out: Immediately prior to procedure a time out was called to verify the correct patient, procedure, equipment, support staff and site/side marked as required  Indications: med administration and vascular access  Anesthesia: local infiltration  Local anesthetic: lidocaine 1% without epinephrine  Anesthetic Total (mL): 2  Preparation: skin prepped with ChloraPrep  Skin prep agent dried: skin prep agent completely dried prior to procedure  Sterile barriers: all five maximum sterile barriers used - cap, mask, sterile gown, sterile gloves, and large sterile sheet  Hand hygiene: hand hygiene performed prior to central venous catheter insertion  Location details: right basilic  Catheter type: double lumen  Catheter size: 5 Fr  Catheter Length: 45cm    Ultrasound guidance: yes  Vessel Caliber: large and patent, compressibility normal  Needle advanced into vessel with real time Ultrasound guidance.  Guidewire confirmed in vessel.  Image recorded and saved.  Sterile sheath used.  Number of attempts: 1  Post-procedure: blood return through all ports, chlorhexidine patch and sterile dressing applied  Technical procedures used: MST 3CG    Assessment: placement verified by x-ray, no pneumothorax on x-ray, tip termination and successful placement  Tip Termination Explanation: distal svc  Complications: none          Lubna Munoz  10/8/2019

## 2019-10-08 NOTE — PLAN OF CARE
Intervention: Enteral Nutrition Therapy   Current Intake: Nutren 1.5 @ 60 ml/hr + 180 ml flush q 4 hr once IVF d/c'd   ( 2160 kcal ( 96% EEN), 97 g protien( 93%EPN), and 1094 ml free water)     Recommendation:   1) Continue TF via PEG- Nutren 1.5 @ 60 ml/hr + 180 ml flush q 4 hr  2) Weigh pt weekly, replete lytes  3) If abd. Distention occurs again, consider switching to peptide based formula peptamen 1.5 with prebio       Goals: 1) Nutrition support initiated in < 5 days 2) pt continues to tolerate TF at goal by f/u  Nutrition Goal Status: Met/ new  Communication of RD Recs: (POC, sticky note, reviewed with RN)

## 2019-10-09 NOTE — PROGRESS NOTES
[facility]  Progress Note    Patient Name: Masood Messina  MRN: [id number]  Patient Class: IP- Inpatient   Admission Date: 10/2/2019  Length of Stay: 7 days  Attending Physician: Melissa Mayfield MD  Primary Care Provider: Jeramie Lombardi MD        Subjective:     Principal Problem:Septic shock        HPI:  Patient is an 79-year-old morbidly obese male from [facility] with past medical history significant for multiple medical problems including chronic hypoxic hypercarbic respiratory failure (ventilator dependent status post tracheostomy), status post PEG tube placement, hypertension, hyperlipidemia, coronary artery disease, history of cerebrovascular accident, hypothyroidism and prior history of cerebrovascular accident who is functional quadriplegic is being admitted to Hospital Medicine from [facility] where patient presented with septic shock.  Upon arrival patient is unresponsive and blood pressure was noted to be around 72/48 mm of mercury.  Per ER physician reportedly patient had some hematuria for 2 days.  Patient was being treated for pneumonia recently. No further details of HPI.     Overview/Hospital Course:  Patient has been weaned off intravenous pressor agent.  Urine cultures are growing g negative amish.  Patient having ongoing intermittent gross hematuria requiring bladder irrigation.  Urology team following.    Interval History:  In intensive care unit, septic shock has resolved.  T-max 99.6 degree F Urine and sputum cultures growing Pseudomonas species.  Renal panel improving.  Having thick tenacious respiratory secretions.    Review of Systems   Patient denies all subjective complaints.  No acute distress noted.    Objective:     Vital Signs (Most Recent):  Temp: 98.4 °F (36.9 °C) (10/09/19 0730)  Pulse: (!) 55 (10/09/19 0800)  Resp: 16 (10/09/19 0800)  BP: 107/62 (10/09/19 0800)  SpO2: 98 % (10/09/19 0800) Vital  Signs (24h Range):  Temp:  [97.8 °F (36.6 °C)-99.6 °F (37.6 °C)] 98.4 °F (36.9 °C)  Pulse:  [55-75] 55  Resp:  [12-30] 16  SpO2:  [91 %-100 %] 98 %  BP: (107-156)/(55-81) 107/62     Weight: 129.9 kg (286 lb 6 oz)  Body mass index is 38.84 kg/m².    Intake/Output Summary (Last 24 hours) at 10/9/2019 0851  Last data filed at 10/9/2019 0600  Gross per 24 hour   Intake 2990 ml   Output 2335 ml   Net 655 ml      Physical Exam   Constitutional: He appears well-developed.   HENT:   Head: Normocephalic and atraumatic.   Nose: Nose normal. No septal deviation.   Mouth/Throat: Oropharynx is clear and moist.   Eyes: Pupils are equal, round, and reactive to light. Conjunctivae are normal.   Neck: No JVD present. No tracheal tenderness present. No tracheal deviation present. No thyroid mass present.   Tracheostomy site appears fine   Cardiovascular: Normal rate, regular rhythm, S1 normal and S2 normal. Exam reveals no gallop and no friction rub.   No murmur heard.  Pulmonary/Chest: Effort normal and breath sounds normal. He has no decreased breath sounds. He has no wheezes. He has no rhonchi. He has no rales.   Abdominal: Soft. Normal appearance and bowel sounds are normal. He exhibits no distension and no mass. There is no hepatosplenomegaly, splenomegaly or hepatomegaly. There is no tenderness.   Peg site appears fine   Musculoskeletal:   Trace pedal edema bilateral lower extremities   Neurological: He has normal strength. No cranial nerve deficit or sensory deficit.   Patient is more alert and responsive but unable to interact verbally.   Skin: No rash noted. No cyanosis.   Nursing note and vitals reviewed.    Significant Labs:   Blood Culture:   No results for input(s): LABBLOO in the last 48 hours.  CBC:   Recent Labs   Lab 10/08/19  0335 10/09/19  0419   WBC 9.56 8.64   HGB 8.7* 8.4*   HCT 26.9* 26.3*    159     CMP:   Recent Labs   Lab 10/08/19  0335 10/09/19  0419   * 133*   K 3.2* 3.4*   CL 95 96   CO2 28  28    108   BUN 26* 22   CREATININE 1.4 1.2   CALCIUM 8.7 8.5*   PROT 7.3 7.1   ALBUMIN 2.4* 2.4*   BILITOT 0.6 0.5   ALKPHOS 105 85   AST 24 19   ALT 28 24   ANIONGAP 11 9   EGFRNONAA 47* 57*     Coagulation:   No results for input(s): PT, INR, APTT in the last 48 hours.  Lactic Acid:   No results for input(s): LACTATE in the last 48 hours.  Troponin:   No results for input(s): TROPONINI in the last 48 hours.  Urine Culture:   No results for input(s): LABURIN in the last 48 hours.    Significant Imaging:   CXR:   New or increased right lung infiltrate, diffusely but more so right upper lung field.  Interval decrease of the left lung infiltrate with left lung today appearing clear.  Right lung infiltrates suggest pneumonia.     CT head without contrast: No evidence of an acute intracranial abnormality.Interval History:  In intensive care unit, septic shock has resolved.  Urine and sputum cultures growing Pseudomonas multi-drug resistant species.  Renal panel improving.    Objective:     Vital Signs (Most Recent):  Temp: 98.4 °F (36.9 °C) (10/09/19 0730)  Pulse: (!) 55 (10/09/19 0800)  Resp: 16 (10/09/19 0800)  BP: 107/62 (10/09/19 0800)  SpO2: 98 % (10/09/19 0800) Vital Signs (24h Range):  Temp:  [97.8 °F (36.6 °C)-99.6 °F (37.6 °C)] 98.4 °F (36.9 °C)  Pulse:  [55-75] 55  Resp:  [12-30] 16  SpO2:  [91 %-100 %] 98 %  BP: (107-156)/(55-81) 107/62     Weight: 129.9 kg (286 lb 6 oz)  Body mass index is 38.84 kg/m².    Intake/Output Summary (Last 24 hours) at 10/9/2019 0851  Last data filed at 10/9/2019 0600  Gross per 24 hour   Intake 2990 ml   Output 2335 ml   Net 655 ml      Physical Exam   Constitutional: He appears well-developed.   HENT:   Head: Normocephalic and atraumatic.   Nose: Nose normal. No septal deviation.   Mouth/Throat: Oropharynx is clear and moist.   Eyes: Pupils are equal, round, and reactive to light. Conjunctivae are normal.   Neck: No JVD present. No tracheal tenderness present. No tracheal  deviation present. No thyroid mass present.   Tracheostomy site appears fine   Cardiovascular: Normal rate, regular rhythm, S1 normal and S2 normal. Exam reveals no gallop and no friction rub.   No murmur heard.  Pulmonary/Chest: Effort normal and breath sounds normal. He has no decreased breath sounds. He has no wheezes. He has no rhonchi. He has no rales.   Abdominal: Soft. Normal appearance and bowel sounds are normal. He exhibits no distension and no mass. There is no hepatosplenomegaly, splenomegaly or hepatomegaly. There is no tenderness.   Peg site appears fine   Musculoskeletal:   Trace pedal edema bilateral lower extremities   Neurological: He has normal strength. No cranial nerve deficit or sensory deficit.   Patient is more alert and responsive but unable to interact verbally.   Skin: No rash noted. No cyanosis.   Nursing note and vitals reviewed.    Significant Labs:   Blood Culture:   No results for input(s): LABBLOO in the last 48 hours.  CBC:   Recent Labs   Lab 10/08/19  0335 10/09/19  0419   WBC 9.56 8.64   HGB 8.7* 8.4*   HCT 26.9* 26.3*    159     CMP:   Recent Labs   Lab 10/08/19  0335 10/09/19  0419   * 133*   K 3.2* 3.4*   CL 95 96   CO2 28 28    108   BUN 26* 22   CREATININE 1.4 1.2   CALCIUM 8.7 8.5*   PROT 7.3 7.1   ALBUMIN 2.4* 2.4*   BILITOT 0.6 0.5   ALKPHOS 105 85   AST 24 19   ALT 28 24   ANIONGAP 11 9   EGFRNONAA 47* 57*     Microbiology Results (last 7 days)     Procedure Component Value Units Date/Time    IV catheter culture [[id number]] Collected:  10/08/19 2252    Order Status:  Sent Specimen:  Catheter Tip, NOS Updated:  10/08/19 2253    C Diff Toxin by PCR [[id number]]  (Abnormal) Collected:  10/07/19 1755    Order Status:  Completed Updated:  10/08/19 1339     C. diff PCR Positive    Culture, Respiratory with Gram Stain [[id number]]  (Abnormal)  (Susceptibility) Collected:  10/04/19 0020    Order Status:  Completed Specimen:  Respiratory from Tracheal Aspirate  Updated:  10/08/19 1121     Respiratory Culture No S aureus isolated.      PSEUDOMONAS AERUGINOSA   Many  Normal respiratory gabriela also present       Gram Stain (Respiratory) <10 epithelial cells per low power field.     Gram Stain (Respiratory) Rare WBC's     Gram Stain (Respiratory) Many Gram negative rods     Gram Stain (Respiratory) Few Gram positive rods     Gram Stain (Respiratory) Rare Gram positive cocci    Blood culture #1 **CANNOT BE ORDERED STAT** [[id number]] Collected:  10/02/19 1305    Order Status:  Completed Specimen:  Blood Updated:  10/08/19 0612     Blood Culture, Routine No growth after 5 days.    Blood culture #2 **CANNOT BE ORDERED STAT** [[id number]] Collected:  10/02/19 1305    Order Status:  Completed Specimen:  Blood Updated:  10/08/19 0612     Blood Culture, Routine No growth after 5 days.    Clostridium difficile EIA [[id number]]  (Abnormal) Collected:  10/07/19 1755    Order Status:  Completed Specimen:  Stool Updated:  10/08/19 0136     C. diff Antigen Positive     C difficile Toxins A+B, EIA Negative     Comment: Testing not recommended for children <24 months old.       Urine Culture High Risk [[id number]] Collected:  10/07/19 1745    Order Status:  Sent Specimen:  Urine, Catheterized Updated:  10/08/19 0120    Urine culture [[id number]]  (Abnormal)  (Susceptibility) Collected:  10/02/19 1321    Order Status:  Completed Specimen:  Urine Updated:  10/05/19 0439     Urine Culture, Routine PSEUDOMONAS AERUGINOSA  10,000 - 49,999 cfu/ml      Narrative:       Preferred Collection Type->Urine, Catheterized    Influenza A & B by Molecular [[id number]] Collected:  10/02/19 1300    Order Status:  Completed Specimen:  Nasopharyngeal Swab Updated:  10/02/19 1349     Influenza A, Molecular Negative     Influenza B, Molecular Negative     Flu A & B Source Nasal swab        Significant Imaging:   CXR:   New or increased right lung infiltrate, diffusely but more so right upper lung field.  Interval  decrease of the left lung infiltrate with left lung today appearing clear.  Right lung infiltrates suggest pneumonia.     CT head without contrast: No evidence of an acute intracranial abnormality.    CXR: Mild decreased infiltrate at the right lung apex.  Continued atelectasis of the right midlung field and chronic elevation of the right hemidiaphragm.  Prior sternotomy.  Tracheostomy tube in position.    CXR: Tracheostomy tube and PICC line in place.  Prior CABG.  Increasing density of the right upper lobe consistent with infiltrate and atelectasis.  Chronic elevation of the right hemidiaphragm.      Assessment/Plan:      * Septic shock  Masood Messina is a 79 y.o. male who presents to the Emergency Department with septic shock.  This patient does) have evidence of infective focus  My overall impression is healthcare associated pneumonia and UTI.  On intravenous cefepime and tobramycin nebulizations.  Follow Infectious Disease recommendations.    Lab Results   Component Value Date    WBC 8.79 10/06/2019    WBC 8.79 10/06/2019    HGB 8.6 (L) 10/06/2019    HGB 8.6 (L) 10/06/2019    HCT 26.3 (L) 10/06/2019    HCT 26.3 (L) 10/06/2019    MCV 93 10/06/2019    MCV 93 10/06/2019     (L) 10/06/2019     (L) 10/06/2019     Organ dysfunction indicated by altered mental status and acute kidney injury  Vital signs post fluid administrations were-    Temp Readings from Last 1 Encounters:   10/06/19 97.4 °F (36.3 °C) (Axillary)     BP Readings from Last 1 Encounters:   10/06/19 (!) 159/80     Pulse Readings from Last 1 Encounters:   10/06/19 81       Chronic pain  Patient can't say what's hurting him. Was given morphine.  Monitor for pain    Hypophosphatemia  Replete phosphorus and follow level.    Acute on chronic diastolic congestive heart failure  Compensated.    Pneumonia of right upper lobe due to Pseudomonas species  Currently on cefepime and tobramycin.    Coronary artery  disease  Telemonitoring.    Acquired hypothyroidism  Chronic problem. Will continue chronic medications and monitor for any changes, adjusting as needed.      PEG (percutaneous endoscopic gastrostomy) status  PEG care.    Urinary tract infection with hematuria  Continue abx  Positive for pseudomonas    Hematuria  Stable.  Being followed by urologist.    Chronic respiratory failure with hypoxia and hypercapnia  Continue mechanical ventilation as before    Tracheostomy in place  Trach care    Acute on chronic respiratory failure with hypoxia and hypercapnia  Continue mechanical ventilation as before.  Supplemental O2 via nasal canula; titrate O2 saturation to >92%.   Follow Pulmonary and Infectious Disease recommendations.  Sputum positive for possible pseudomonas.   Continue beta 2 agonist bronchodilator treatments.   On intravenous cefepime and tobramycin nebulizations and enteral vancomycin.    Functional quadriplegia  Supportive care, skin care, rotate patient every 2 hr    VTE Risk Mitigation (From admission, onward)         Ordered     IP VTE HIGH RISK PATIENT  Once      10/02/19 1710     Place sequential compression device  Until discontinued      10/02/19 1710     Place EYAD hose  Until discontinued      10/02/19 1710              Critical care time spent on the evaluation and treatment of severe organ dysfunction, review of pertinent labs and imaging studies, discussions with consulting providers and discussions with patient/family: 36 minutes.      Melissa Mayfield MD  Department of Hospital Medicine   [facility]

## 2019-10-09 NOTE — PHYSICIAN QUERY
PT Name: Masood Messina  MR #: [id number]    Physician Query Form - Cause and Effect Relationship Clarification      CDS/: Meme Richard RN                 Contact information:[email or web address]    This form is a permanent document in the medical record.     Query Date: October 9, 2019    By submitting this query, we are merely seeking further clarification of documentation. Please utilize your independent clinical judgment when addressing the question(s) below.    The Medical record contains the following:  Supporting Clinical Findings   Location in record   The patient was emergently evaluated in the emergency department, his evaluation was significant for an elderly male who is altered from his baseline.  The patient's chest x-ray was significant for worsening pneumonia.  The patient's labs were significant for an elevated creatinine, an elevated white blood cell count, an infected urine, and a normal lactic acid level.  The patient states initial hypotensive episode was initially responsive to IV fluids given in the emergency department.  The patient was also treated with broad-spectrum IV antibiotics.  The patient did have a repeat occurrence in his hypotension in the emergency department, and required further IV fluids, which did eventually improve his blood pressure.  I did use sepsis IV fluid boluses based on an ideal body weight.  The patient is morbidly obese and has a history of heart failure.  I was unsuccessful and placing a central line in this patient, and discussed the case with anesthesiology who will place a central line the patient in the intensive care unit.  The patient will be admitted to the hospitalist septic shock.  I discussed the case with the hospitalist on call, Dr. Mayfield.  He has accepted the patient for admission.  Genitourinary Comments: Rahman catheter with dark red blood noted in bag.                                                                                                                                                                   Urinary tract infection without hematuria  As above         Masood Messina is a 79 y.o. male who presents to the Emergency Department with septic shock.  This patient does) have evidence of infective focus  My overall impression is healthcare associated pneumonia and UTI.        Trend lactic acid.  Organ dysfunction indicated by altered mental status and acute kidney injury  Fluid bolus is being given.  Vital signs post fluid administrations      Urinary tract infection with hematuria  Continue abx  Positive for pseudomonas ED Prov note 10/2                                                  Hosp Med H&P 10/2                          Hosp Med PN 10/8         Provider, please clarify if there is any correlation between __UTI__ and __foley__.           Are the conditions:      [ x ] Due to or associated with each other   [  ] Unrelated to each other   [  ] Other (Please Specify): _________________________   [  ] Clinically Undetermined

## 2019-10-09 NOTE — PLAN OF CARE
More alert and talking. Confused and forgets things. Tolerating tube feed with frequent burping of air from PEG. PICC line in use. Flexicel draining well. Bed in rotation mode and safety maintained.

## 2019-10-09 NOTE — PROGRESS NOTES
"Consult Note  Infectious Disease    Reason for Consult:  MDRO pseudomonas    HPI: Masood Messina is an  79 y.o. male with whom I am familiar from prior consults. He is quadriplegic, vent and PEG dependent. He was admitted 9/12 to [facility] for trach problem and was discharged back to NH. The events that led to a culture of sputum(GBS) followed by augmentin, then followed by IV maxipime for several days are unclear but there is a radiology report describing right sided pneumonia. He was sent her ish 10/2 with altered mental status and hypotension and hematuria. He had a RUL infiltrate and was placed on vanc and zosyn and levaquin. He required extensive manual irrigation of his bladder to remove clots. Cultures of the urine showed the usual pseudomonas and cultures of the sputum grew pseudomonas whose sensitivities were reported with resistance to everything but the aminoglycosides. He is having no fever, and has resolution of leukocytosis, and urine is clear now. He has now developed abdominal distention and liquid stools requiring a rectal tube.     10/8: afebrile, procalcitonin normal. Cdiff pcr pos, abdominal distention and stool volume are decreased. He feels "fair to middlin". Secretions remain purulent but CXR was improved. No hematuria. Repeat urine culture is in progress.   10/9: afebrile. Groin line out and picc in. CXR with increased RUL infiltrate again, ?taken in expiration? Urine culture still has a modest colony count of pseudomonas. He is tolerating tube feeds. RN reports more confusion and agitation today.     EXAM & DIAGNOSTICS REVIEWED:   Vitals:     Temp:  [97.8 °F (36.6 °C)-99.6 °F (37.6 °C)]   Temp: 98.5 °F (36.9 °C) (10/09/19 1600)  Pulse: 64 (10/09/19 1600)  Resp: (!) 29 (10/09/19 1600)  BP: 125/66 (10/09/19 1600)  SpO2: 99 % (10/09/19 1600)    Intake/Output Summary (Last 24 hours) at 10/9/2019 1725  Last data filed at 10/9/2019 1200  Gross per 24 hour   Intake 2100 ml   Output 1960 ml   Net " 140 ml       General:  In NAD. Attends, cooperates to the extent he can  Eyes:  Anicteric, PERRL, EOMI  ENT:  No ulcers, exudates, thrush, nares patent. Tardive mouth movements  Neck:  supple, tracheostomy  Lungs: Clear. Sputum is light green, creamy  Heart:  RRR, no gallop/murmur/rub noted  Abd:  Soft, mild tenderness, mildly distended, normal BS, no masses or organomegaly appreciated. Rectal tube with liquid non bloody stool  :   Rahman, urine clear with minimal sediment,  . Meatus not visible  Musc:  Joints without effusion, swelling, erythema, synovitis,   Skin:  No rashes.   Wound:   Neuro:  Alert, attentive,  face symmetric, quadriparetic  Psych:  Calm, cooperative, answers most questions. Tardive mouth movements  Extrem: Mild chronic LE edema,no  erythema, phlebitis, cellulitis, warm    VAD:   picc right arm    Isolation:  contact    Lines/Tubes/Drains:    General Labs reviewed:  Recent Labs   Lab 10/07/19  0329 10/08/19  0335 10/09/19  0419   WBC 11.76 9.56 8.64   HGB 9.4* 8.7* 8.4*   HCT 28.9* 26.9* 26.3*    164 159       Recent Labs   Lab 10/07/19  0329 10/08/19  0335 10/09/19  0419    134* 133*   K 3.8 3.2* 3.4*   CL 95 95 96   CO2 31* 28 28   BUN 30* 26* 22   CREATININE 1.6* 1.4 1.2   CALCIUM 8.9 8.7 8.5*   PROT 7.6 7.3 7.1   BILITOT 0.7 0.6 0.5   ALKPHOS 135 105 85   ALT 38 28 24   AST 35 24 19         Micro:  Microbiology Results (last 7 days)     Procedure Component Value Units Date/Time    Urine Culture High Risk [[id number]]  (Abnormal) Collected:  10/07/19 1745    Order Status:  Completed Specimen:  Urine, Catheterized Updated:  10/09/19 1420     Urine Culture, Routine PSEUDOMONAS AERUGINOSA  10,000 - 49,999 cfu/ml  Susceptibility pending      Narrative:       Indicated criteria for high risk culture:->Other  Other (specify):->recent gross hematuria and UTI    IV catheter culture [[id number]] Collected:  10/08/19 2230    Order Status:  Sent Specimen:  Catheter Tip, NOS Updated:   10/09/19 0953    C Diff Toxin by PCR [[id number]]  (Abnormal) Collected:  10/07/19 1755    Order Status:  Completed Updated:  10/08/19 1339     C. diff PCR Positive    Culture, Respiratory with Gram Stain [[id number]]  (Abnormal)  (Susceptibility) Collected:  10/04/19 0020    Order Status:  Completed Specimen:  Respiratory from Tracheal Aspirate Updated:  10/08/19 1121     Respiratory Culture No S aureus isolated.      PSEUDOMONAS AERUGINOSA   Many  Normal respiratory gabriela also present       Gram Stain (Respiratory) <10 epithelial cells per low power field.     Gram Stain (Respiratory) Rare WBC's     Gram Stain (Respiratory) Many Gram negative rods     Gram Stain (Respiratory) Few Gram positive rods     Gram Stain (Respiratory) Rare Gram positive cocci    Blood culture #1 **CANNOT BE ORDERED STAT** [[id number]] Collected:  10/02/19 1305    Order Status:  Completed Specimen:  Blood Updated:  10/08/19 0612     Blood Culture, Routine No growth after 5 days.    Blood culture #2 **CANNOT BE ORDERED STAT** [[id number]] Collected:  10/02/19 1305    Order Status:  Completed Specimen:  Blood Updated:  10/08/19 0612     Blood Culture, Routine No growth after 5 days.    Clostridium difficile EIA [[id number]]  (Abnormal) Collected:  10/07/19 1755    Order Status:  Completed Specimen:  Stool Updated:  10/08/19 0136     C. diff Antigen Positive     C difficile Toxins A+B, EIA Negative     Comment: Testing not recommended for children <24 months old.       Urine culture [[id number]]  (Abnormal)  (Susceptibility) Collected:  10/02/19 1321    Order Status:  Completed Specimen:  Urine Updated:  10/05/19 0439     Urine Culture, Routine PSEUDOMONAS AERUGINOSA  10,000 - 49,999 cfu/ml      Narrative:       Preferred Collection Type->Urine, Catheterized        Imaging Reviewed:   CXRs  Cardiology:    IMPRESSION & PLAN   Imp: extensive exposure to antibiotics resulting in MDRO pseudomonas in sputum. At the onset of this pneumonia he had a  relatively sensitive organism. Repeat culture now reflects selection by antibiotics.  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 Difficile again 10/7    Rec: repeat CXR in am    continue cefepime because of persistent bacteriuria in the setting of recent hematuria/manipulation,   tobramycin aerosols   Repeat procalcitonin in am   Oral vanc  14d then taper and avoid more antibiotics in near future for chronic colonization

## 2019-10-09 NOTE — PLAN OF CARE
Patient remains in ICU with trach on chronic vent. Pt confused earlier, asked to walk in [facility] and go to [facility]. Re-oriented patient to hospital. Watery tan stools with flexi-seal in place. Tolerating tube feeds. Urine remains yellow. Afebrile this shift. Safety maintained.

## 2019-10-09 NOTE — CARE UPDATE
Left femoral TLC taken out and cath tip sent for culture. Pressure held to site. No bleeding noted.

## 2019-10-09 NOTE — PLAN OF CARE
10/09/19 0645   Patient Assessment/Suction   Level of Consciousness (AVPU) alert   Respiratory Effort Normal;Unlabored   Expansion/Accessory Muscles/Retractions no use of accessory muscles;no retractions   All Lung Fields Breath Sounds coarse   Rhythm/Pattern, Respiratory assisted mechanically   $ Suction Charges Inline Suction Procedure Stat Charge   Secretions Amount moderate   Secretions Color cloudy   Secretions Characteristics thick   Aspirate Toleration TIFFANIE (no adverse reactions)   PRE-TX-O2   O2 Device (Oxygen Therapy) ventilator   $ Is the patient on Low Flow Oxygen? Yes   Oxygen Concentration (%) 30   SpO2 100 %   Pulse Oximetry Type Continuous   $ Pulse Oximetry - Multiple Charge Pulse Oximetry - Multiple   Pulse 61   Resp 18   ETCO2   $ ETCO2 Charge Exhaled CO2 Monitoring   $ ETCO2 Usage Currently wearing   ETCO2 (mmHg) 33 mmHg   Aerosol Therapy   $ Aerosol Therapy Charges Aerosol Treatment   Respiratory Treatment Status (SVN) given   Treatment Route (SVN) in-line   Patient Position (SVN) HOB elevated   Post Treatment Assessment (SVN) breath sounds unchanged   Signs of Intolerance (SVN) none   Breath Sounds Post-Respiratory Treatment   Post-treatment Heart Rate (beats/min) 58   Post-treatment Resp Rate (breaths/min) 16   Vent Select   Conventional Vent Y   $ Ventilator Subsequent 1   Charged w/in last 24h YES   Preset Conventional Ventilator Settings   Vent Type    Ventilation Type VC   Vent Mode A/C   Set Rate 16 bmp   Vt Set 700 mL   PEEP/CPAP 5 cmH20   Pressure Support 0 cmH20   Waveform RAMP   Peak Flow 62 L/min   Set Inspiratory Pressure 0 cmH20   Insp Time 0 Sec(s)   Plateau Set/Insp. Hold (sec) 0   Insp Rise Time  0 %   Trigger Sensitivity Flow/I-Trigger 1.5 L/min   P High 0 cm H2O   P Low 0 cm H2O   T High 0 sec   T Low 0 sec   Patient Ventilator Parameters   Resp Rate Total 18 br/min   Peak Airway Pressure 44 cmH2O   Mean Airway Pressure 15 cmH20   Plateau Pressure 34 cmH20   Exhaled Vt  672 mL   Total Ve 9.3 mL   Spont Ve 0 L   I:E Ratio Measured 1:2.00   Conventional Ventilator Alarms   Ve High Alarm 30 L/min   Resp Rate High Alarm 40 br/min   Press High Alarm 55 cmH2O   Apnea Rate 16   Apnea Volume (mL) 700 mL   Apnea Oxygen Concentration  100   Apnea Flow Rate (L/min) 60   T Apnea 20 sec(s)   Ready to Wean/Extubation Screen   FIO2<=50 (chart decimal) 0.3   MV<16L (chart vol.) 9.3   PEEP <=8 (chart #) 5   Ready to Wean Parameters   F/VT Ratio<105 (RSBI) (!) 26.79

## 2019-10-09 NOTE — SUBJECTIVE & OBJECTIVE
Interval History:  In intensive care unit, septic shock has resolved.  Urine and sputum cultures growing Pseudomonas species.  Renal panel improving.    Review of Systems       Objective:     Vital Signs (Most Recent):  Temp: 98.4 °F (36.9 °C) (10/09/19 0730)  Pulse: (!) 55 (10/09/19 0800)  Resp: 16 (10/09/19 0800)  BP: 107/62 (10/09/19 0800)  SpO2: 98 % (10/09/19 0800) Vital Signs (24h Range):  Temp:  [97.8 °F (36.6 °C)-99.6 °F (37.6 °C)] 98.4 °F (36.9 °C)  Pulse:  [55-75] 55  Resp:  [12-30] 16  SpO2:  [91 %-100 %] 98 %  BP: (107-156)/(55-81) 107/62     Weight: 129.9 kg (286 lb 6 oz)  Body mass index is 38.84 kg/m².    Intake/Output Summary (Last 24 hours) at 10/9/2019 0851  Last data filed at 10/9/2019 0600  Gross per 24 hour   Intake 2990 ml   Output 2335 ml   Net 655 ml      Physical Exam   Constitutional: He appears well-developed.   HENT:   Head: Normocephalic and atraumatic.   Nose: Nose normal. No septal deviation.   Mouth/Throat: Oropharynx is clear and moist.   Eyes: Pupils are equal, round, and reactive to light. Conjunctivae are normal.   Neck: No JVD present. No tracheal tenderness present. No tracheal deviation present. No thyroid mass present.   Tracheostomy site appears fine   Cardiovascular: Normal rate, regular rhythm, S1 normal and S2 normal. Exam reveals no gallop and no friction rub.   No murmur heard.  Pulmonary/Chest: Effort normal and breath sounds normal. He has no decreased breath sounds. He has no wheezes. He has no rhonchi. He has no rales.   Abdominal: Soft. Normal appearance and bowel sounds are normal. He exhibits no distension and no mass. There is no hepatosplenomegaly, splenomegaly or hepatomegaly. There is no tenderness.   Peg site appears fine   Musculoskeletal:   Trace pedal edema bilateral lower extremities   Neurological: He has normal strength. No cranial nerve deficit or sensory deficit.   Patient is more alert and responsive but unable to interact verbally.   Skin: No rash  noted. No cyanosis.   Nursing note and vitals reviewed.    Significant Labs:   Blood Culture:   No results for input(s): LABBLOO in the last 48 hours.  CBC:   Recent Labs   Lab 10/08/19  0335 10/09/19  0419   WBC 9.56 8.64   HGB 8.7* 8.4*   HCT 26.9* 26.3*    159     CMP:   Recent Labs   Lab 10/08/19  0335 10/09/19  0419   * 133*   K 3.2* 3.4*   CL 95 96   CO2 28 28    108   BUN 26* 22   CREATININE 1.4 1.2   CALCIUM 8.7 8.5*   PROT 7.3 7.1   ALBUMIN 2.4* 2.4*   BILITOT 0.6 0.5   ALKPHOS 105 85   AST 24 19   ALT 28 24   ANIONGAP 11 9   EGFRNONAA 47* 57*     Coagulation:   No results for input(s): PT, INR, APTT in the last 48 hours.  Lactic Acid:   No results for input(s): LACTATE in the last 48 hours.  Troponin:   No results for input(s): TROPONINI in the last 48 hours.  Urine Culture:   No results for input(s): LABURIN in the last 48 hours.    Significant Imaging:   CXR:   New or increased right lung infiltrate, diffusely but more so right upper lung field.  Interval decrease of the left lung infiltrate with left lung today appearing clear.  Right lung infiltrates suggest pneumonia.     CT head without contrast: No evidence of an acute intracranial abnormality.Interval History:  In intensive care unit, septic shock has resolved.  Urine and sputum cultures growing Pseudomonas multi-drug resistant species.  Renal panel improving.    Review of Systems   Unable to perform ROS: Patient unresponsive     Objective:     Vital Signs (Most Recent):  Temp: 98.4 °F (36.9 °C) (10/09/19 0730)  Pulse: (!) 55 (10/09/19 0800)  Resp: 16 (10/09/19 0800)  BP: 107/62 (10/09/19 0800)  SpO2: 98 % (10/09/19 0800) Vital Signs (24h Range):  Temp:  [97.8 °F (36.6 °C)-99.6 °F (37.6 °C)] 98.4 °F (36.9 °C)  Pulse:  [55-75] 55  Resp:  [12-30] 16  SpO2:  [91 %-100 %] 98 %  BP: (107-156)/(55-81) 107/62     Weight: 129.9 kg (286 lb 6 oz)  Body mass index is 38.84 kg/m².    Intake/Output Summary (Last 24 hours) at 10/9/2019  0851  Last data filed at 10/9/2019 0600  Gross per 24 hour   Intake 2990 ml   Output 2335 ml   Net 655 ml      Physical Exam   Constitutional: He appears well-developed.   HENT:   Head: Normocephalic and atraumatic.   Nose: Nose normal. No septal deviation.   Mouth/Throat: Oropharynx is clear and moist.   Eyes: Pupils are equal, round, and reactive to light. Conjunctivae are normal.   Neck: No JVD present. No tracheal tenderness present. No tracheal deviation present. No thyroid mass present.   Tracheostomy site appears fine   Cardiovascular: Normal rate, regular rhythm, S1 normal and S2 normal. Exam reveals no gallop and no friction rub.   No murmur heard.  Pulmonary/Chest: Effort normal and breath sounds normal. He has no decreased breath sounds. He has no wheezes. He has no rhonchi. He has no rales.   Abdominal: Soft. Normal appearance and bowel sounds are normal. He exhibits no distension and no mass. There is no hepatosplenomegaly, splenomegaly or hepatomegaly. There is no tenderness.   Peg site appears fine   Musculoskeletal:   Trace pedal edema bilateral lower extremities   Neurological: He has normal strength. No cranial nerve deficit or sensory deficit.   Patient is more alert and responsive but unable to interact verbally.   Skin: No rash noted. No cyanosis.   Nursing note and vitals reviewed.    Significant Labs:   Blood Culture:   No results for input(s): LABBLOO in the last 48 hours.  CBC:   Recent Labs   Lab 10/08/19  0335 10/09/19  0419   WBC 9.56 8.64   HGB 8.7* 8.4*   HCT 26.9* 26.3*    159     CMP:   Recent Labs   Lab 10/08/19  0335 10/09/19  0419   * 133*   K 3.2* 3.4*   CL 95 96   CO2 28 28    108   BUN 26* 22   CREATININE 1.4 1.2   CALCIUM 8.7 8.5*   PROT 7.3 7.1   ALBUMIN 2.4* 2.4*   BILITOT 0.6 0.5   ALKPHOS 105 85   AST 24 19   ALT 28 24   ANIONGAP 11 9   EGFRNONAA 47* 57*     Coagulation:   No results for input(s): PT, INR, APTT in the last 48 hours.  Lactic Acid:   No  results for input(s): LACTATE in the last 48 hours.  Troponin:   No results for input(s): TROPONINI in the last 48 hours.  Urine Culture:   No results for input(s): LABURIN in the last 48 hours.    Significant Imaging:   CXR:   New or increased right lung infiltrate, diffusely but more so right upper lung field.  Interval decrease of the left lung infiltrate with left lung today appearing clear.  Right lung infiltrates suggest pneumonia.     CT head without contrast: No evidence of an acute intracranial abnormality.    CXR: Mild decreased infiltrate at the right lung apex.  Continued atelectasis of the right midlung field and chronic elevation of the right hemidiaphragm.  Prior sternotomy.  Tracheostomy tube in position.    CXR: Tracheostomy tube and PICC line in place.  Prior CABG.  Increasing density of the right upper lobe consistent with infiltrate and atelectasis.  Chronic elevation of the right hemidiaphragm.

## 2019-10-10 NOTE — SUBJECTIVE & OBJECTIVE
Interval History:  In intensive care unit, septic shock has resolved.  Urine and sputum cultures grew Pseudomonas species.  C difficile infection is being treated with enteral vancomycin.  Rectal tube is in place, liquid brown stool present.  Renal panel improving.    Review of Systems   Patient is more animated, denies any subjective complaints.  Afebrile.    Objective:     Vital Signs (Most Recent):  Temp: 98.3 °F (36.8 °C) (10/10/19 0432)  Pulse: 60 (10/10/19 0705)  Resp: 17 (10/10/19 0705)  BP: (!) 116/59 (10/10/19 0705)  SpO2: 98 % (10/10/19 0705) Vital Signs (24h Range):  Temp:  [98 °F (36.7 °C)-98.6 °F (37 °C)] 98.3 °F (36.8 °C)  Pulse:  [55-81] 60  Resp:  [11-33] 17  SpO2:  [93 %-100 %] 98 %  BP: (104-154)/(52-93) 116/59     Weight: 127.4 kg (280 lb 13.9 oz)  Body mass index is 38.09 kg/m².    Intake/Output Summary (Last 24 hours) at 10/10/2019 0712  Last data filed at 10/10/2019 0548  Gross per 24 hour   Intake 3000 ml   Output 2295 ml   Net 705 ml      Physical Exam   Constitutional: He appears well-developed.   HENT:   Head: Normocephalic and atraumatic.   Nose: Nose normal. No septal deviation.   Mouth/Throat: Oropharynx is clear and moist.   Eyes: Pupils are equal, round, and reactive to light. Conjunctivae are normal.   Neck: No JVD present. No tracheal tenderness present. No tracheal deviation present. No thyroid mass present.   Tracheostomy site appears fine   Cardiovascular: Normal rate, regular rhythm, S1 normal and S2 normal. Exam reveals no gallop and no friction rub.   No murmur heard.  Pulmonary/Chest: Effort normal and breath sounds normal. He has no decreased breath sounds. He has no wheezes. He has no rhonchi. He has no rales.   Abdominal: Soft. Normal appearance and bowel sounds are normal. He exhibits no distension and no mass. There is no hepatosplenomegaly, splenomegaly or hepatomegaly. There is no tenderness.   Peg site appears fine   Musculoskeletal:   Trace pedal edema bilateral lower  extremities   Neurological: He has normal strength. No cranial nerve deficit or sensory deficit.   Patient is more alert and responsive and more animated.  Skin: No rash noted. No cyanosis.   Nursing note and vitals reviewed.    Significant Labs:   Blood Culture:   No results for input(s): LABBLOO in the last 48 hours.  CBC:   Recent Labs   Lab 10/09/19  0419 10/10/19  0348   WBC 8.64 7.86   HGB 8.4* 8.6*   HCT 26.3* 26.9*    163     CMP:   Recent Labs   Lab 10/09/19  0419 10/10/19  0348   * 135*   K 3.4* 3.6   CL 96 99   CO2 28 28    113*   BUN 22 20   CREATININE 1.2 1.1   CALCIUM 8.5* 8.7   PROT 7.1 7.1   ALBUMIN 2.4* 2.4*   BILITOT 0.5 0.5   ALKPHOS 85 79   AST 19 18   ALT 24 21   ANIONGAP 9 8   EGFRNONAA 57* >60     Significant Imaging:   CXR:   New or increased right lung infiltrate, diffusely but more so right upper lung field.  Interval decrease of the left lung infiltrate with left lung today appearing clear.  Right lung infiltrates suggest pneumonia.     CT head without contrast: No evidence of an acute intracranial abnormality.Interval History:  In intensive care unit, septic shock has resolved.  Urine and sputum cultures growing Pseudomonas multi-drug resistant species.  Renal panel improving.    Objective:     Vital Signs (Most Recent):  Temp: 98.3 °F (36.8 °C) (10/10/19 0432)  Pulse: 60 (10/10/19 0705)  Resp: 17 (10/10/19 0705)  BP: (!) 116/59 (10/10/19 0705)  SpO2: 98 % (10/10/19 0705) Vital Signs (24h Range):  Temp:  [98 °F (36.7 °C)-98.6 °F (37 °C)] 98.3 °F (36.8 °C)  Pulse:  [55-81] 60  Resp:  [11-33] 17  SpO2:  [93 %-100 %] 98 %  BP: (104-154)/(52-93) 116/59     Weight: 127.4 kg (280 lb 13.9 oz)  Body mass index is 38.09 kg/m².    Intake/Output Summary (Last 24 hours) at 10/10/2019 0712  Last data filed at 10/10/2019 0548  Gross per 24 hour   Intake 3000 ml   Output 2295 ml   Net 705 ml      Physical Exam   Constitutional: He appears well-developed.   HENT:   Head: Normocephalic  and atraumatic.   Nose: Nose normal. No septal deviation.   Mouth/Throat: Oropharynx is clear and moist.   Eyes: Pupils are equal, round, and reactive to light. Conjunctivae are normal.   Neck: No JVD present. No tracheal tenderness present. No tracheal deviation present. No thyroid mass present.   Tracheostomy site appears fine   Cardiovascular: Normal rate, regular rhythm, S1 normal and S2 normal. Exam reveals no gallop and no friction rub.   No murmur heard.  Pulmonary/Chest: Effort normal and breath sounds normal. He has no decreased breath sounds. He has no wheezes. He has no rhonchi. He has no rales.   Abdominal: Soft. Normal appearance and bowel sounds are normal. He exhibits no distension and no mass. There is no hepatosplenomegaly, splenomegaly or hepatomegaly. There is no tenderness.   Peg site appears fine   Musculoskeletal:   Trace pedal edema bilateral lower extremities   Neurological: He has normal strength. No cranial nerve deficit or sensory deficit.   Patient is more alert and responsive but unable to interact verbally.   Skin: No rash noted. No cyanosis.   Nursing note and vitals reviewed.    Significant Labs:   Blood Culture:   No results for input(s): LABBLOO in the last 48 hours.  CBC:   Recent Labs   Lab 10/09/19  0419 10/10/19  0348   WBC 8.64 7.86   HGB 8.4* 8.6*   HCT 26.3* 26.9*    163     CMP:   Recent Labs   Lab 10/09/19  0419 10/10/19  0348   * 135*   K 3.4* 3.6   CL 96 99   CO2 28 28    113*   BUN 22 20   CREATININE 1.2 1.1   CALCIUM 8.5* 8.7   PROT 7.1 7.1   ALBUMIN 2.4* 2.4*   BILITOT 0.5 0.5   ALKPHOS 85 79   AST 19 18   ALT 24 21   ANIONGAP 9 8   EGFRNONAA 57* >60     Coagulation:   No results for input(s): PT, INR, APTT in the last 48 hours.  Lactic Acid:   No results for input(s): LACTATE in the last 48 hours.  Troponin:   No results for input(s): TROPONINI in the last 48 hours.  Urine Culture:   No results for input(s): LABURIN in the last 48  hours.    Significant Imaging:   CXR:   New or increased right lung infiltrate, diffusely but more so right upper lung field.  Interval decrease of the left lung infiltrate with left lung today appearing clear.  Right lung infiltrates suggest pneumonia.     CT head without contrast: No evidence of an acute intracranial abnormality.    CXR: Mild decreased infiltrate at the right lung apex.  Continued atelectasis of the right midlung field and chronic elevation of the right hemidiaphragm.  Prior sternotomy.  Tracheostomy tube in position.    CXR: Tracheostomy tube and PICC line in place.  Prior CABG.  Increasing density of the right upper lobe consistent with infiltrate and atelectasis.  Chronic elevation of the right hemidiaphragm.

## 2019-10-10 NOTE — PROGRESS NOTES
[facility] Hospital Medicine  Progress Note    Patient Name: Masood Messina  MRN: [id number]  Patient Class: IP- Inpatient   Admission Date: 10/2/2019  Length of Stay: 8 days  Attending Physician: Melissa Mayfield MD  Primary Care Provider: Jeramie Lombardi MD        Subjective:     Principal Problem:Septic shock    HPI:  Patient is an 79-year-old morbidly obese male from [employer] with past medical history significant for multiple medical problems including chronic hypoxic hypercarbic respiratory failure (ventilator dependent status post tracheostomy), status post PEG tube placement, hypertension, hyperlipidemia, coronary artery disease, history of cerebrovascular accident, hypothyroidism and prior history of cerebrovascular accident who is functional quadriplegic is being admitted to Hospital Medicine from [facility] where patient presented with septic shock.  Upon arrival patient is unresponsive and blood pressure was noted to be around 72/48 mm of mercury.  Per ER physician reportedly patient had some hematuria for 2 days.  Patient was being treated for pneumonia recently. No further details of HPI.     Overview/Hospital Course:  Patient has been weaned off intravenous pressor agent.  Urine cultures are growing g negative amish.  Patient having ongoing intermittent gross hematuria requiring bladder irrigation.  Urology team following.    Interval History:    Patient appears alert and bright.  No subjective complaints.  Patient has a rectal tube with liquid brown stools.  No abdominal pain reported.  Urology team following.    Review of Systems   Patient is more animated, denies any subjective complaints.  Afebrile.    Objective:     Vital Signs (Most Recent):  Temp: 98.3 °F (36.8 °C) (10/10/19 0432)  Pulse: 60 (10/10/19 0705)  Resp: 17 (10/10/19 0705)  BP: (!) 116/59 (10/10/19 0705)  SpO2: 98 % (10/10/19 0705) Vital Signs (24h Range):  Temp:  [98  °F (36.7 °C)-98.6 °F (37 °C)] 98.3 °F (36.8 °C)  Pulse:  [55-81] 60  Resp:  [11-33] 17  SpO2:  [93 %-100 %] 98 %  BP: (104-154)/(52-93) 116/59     Weight: 127.4 kg (280 lb 13.9 oz)  Body mass index is 38.09 kg/m².    Intake/Output Summary (Last 24 hours) at 10/10/2019 0712  Last data filed at 10/10/2019 0548  Gross per 24 hour   Intake 3000 ml   Output 2295 ml   Net 705 ml      Physical Exam   Constitutional: He appears well-developed.   HENT:   Head: Normocephalic and atraumatic.   Nose: Nose normal. No septal deviation.   Mouth/Throat: Oropharynx is clear and moist.   Eyes: Pupils are equal, round, and reactive to light. Conjunctivae are normal.   Neck: No JVD present. No tracheal tenderness present. No tracheal deviation present. No thyroid mass present.   Tracheostomy site appears fine   Cardiovascular: Normal rate, regular rhythm, S1 normal and S2 normal. Exam reveals no gallop and no friction rub.   No murmur heard.  Pulmonary/Chest: Effort normal and breath sounds normal. He has no decreased breath sounds. He has no wheezes. He has no rhonchi. He has no rales.   Abdominal: Soft. Normal appearance and bowel sounds are normal. He exhibits no distension and no mass. There is no hepatosplenomegaly, splenomegaly or hepatomegaly. There is no tenderness.   Peg site appears fine   Musculoskeletal:   Trace pedal edema bilateral lower extremities   Neurological: He has normal strength. No cranial nerve deficit or sensory deficit.   Patient is more alert and responsive and more animated.  Skin: No rash noted. No cyanosis.   Nursing note and vitals reviewed.    Significant Labs:   Blood Culture:   No results for input(s): LABBLOO in the last 48 hours.  CBC:   Recent Labs   Lab 10/09/19  0419 10/10/19  0348   WBC 8.64 7.86   HGB 8.4* 8.6*   HCT 26.3* 26.9*    163     CMP:   Recent Labs   Lab 10/09/19  0419 10/10/19  0348   * 135*   K 3.4* 3.6   CL 96 99   CO2 28 28    113*   BUN 22 20   CREATININE 1.2  1.1   CALCIUM 8.5* 8.7   PROT 7.1 7.1   ALBUMIN 2.4* 2.4*   BILITOT 0.5 0.5   ALKPHOS 85 79   AST 19 18   ALT 24 21   ANIONGAP 9 8   EGFRNONAA 57* >60     Significant Imaging:   CXR:   New or increased right lung infiltrate, diffusely but more so right upper lung field.  Interval decrease of the left lung infiltrate with left lung today appearing clear.  Right lung infiltrates suggest pneumonia.     CT head without contrast: No evidence of an acute intracranial abnormality.Interval History:  In intensive care unit, septic shock has resolved.  Urine and sputum cultures growing Pseudomonas multi-drug resistant species.  Renal panel improving.    Significant Labs:   Blood Culture:   No results for input(s): LABBLOO in the last 48 hours.  CBC:   Recent Labs   Lab 10/09/19  0419 10/10/19  0348   WBC 8.64 7.86   HGB 8.4* 8.6*   HCT 26.3* 26.9*    163     CMP:   Recent Labs   Lab 10/09/19  0419 10/10/19  0348   * 135*   K 3.4* 3.6   CL 96 99   CO2 28 28    113*   BUN 22 20   CREATININE 1.2 1.1   CALCIUM 8.5* 8.7   PROT 7.1 7.1   ALBUMIN 2.4* 2.4*   BILITOT 0.5 0.5   ALKPHOS 85 79   AST 19 18   ALT 24 21   ANIONGAP 9 8   EGFRNONAA 57* >60     Coagulation:   No results for input(s): PT, INR, APTT in the last 48 hours.  Lactic Acid:   No results for input(s): LACTATE in the last 48 hours.  Troponin:   No results for input(s): TROPONINI in the last 48 hours.  Urine Culture:   No results for input(s): LABURIN in the last 48 hours.    Significant Imaging:   CXR:   New or increased right lung infiltrate, diffusely but more so right upper lung field.  Interval decrease of the left lung infiltrate with left lung today appearing clear.  Right lung infiltrates suggest pneumonia.     CT head without contrast: No evidence of an acute intracranial abnormality.    CXR: Mild decreased infiltrate at the right lung apex.  Continued atelectasis of the right midlung field and chronic elevation of the right hemidiaphragm.   Prior sternotomy.  Tracheostomy tube in position.    CXR: Tracheostomy tube and PICC line in place.  Prior CABG.  Increasing density of the right upper lobe consistent with infiltrate and atelectasis.  Chronic elevation of the right hemidiaphragm.      Assessment/Plan:      * Septic shock  Masood Messina is a 79 y.o. male who presents to the Emergency Department with septic shock.  This patient does) have evidence of infective focus  My overall impression is healthcare associated pneumonia and UTI.  On intravenous cefepime and tobramycin nebulizations.  Follow Infectious Disease recommendations.    F/u cultures  Lab Results   Component Value Date    WBC 8.79 10/06/2019    WBC 8.79 10/06/2019    HGB 8.6 (L) 10/06/2019    HGB 8.6 (L) 10/06/2019    HCT 26.3 (L) 10/06/2019    HCT 26.3 (L) 10/06/2019    MCV 93 10/06/2019    MCV 93 10/06/2019     (L) 10/06/2019     (L) 10/06/2019     Organ dysfunction indicated by altered mental status and acute kidney injury  Vital signs post fluid administrations were-    Temp Readings from Last 1 Encounters:   10/06/19 97.4 °F (36.3 °C) (Axillary)     BP Readings from Last 1 Encounters:   10/06/19 (!) 159/80     Pulse Readings from Last 1 Encounters:   10/06/19 81    Use intravenous pressor agent to maintain mean arterial pressure above 65 mm of mercury as needed.  Repeat lactic acid.    Chronic pain  Patient can't say what's hurting him. Was given morphine.  Monitor for pain      Hypophosphatemia  Replete phosphorus and follow level.    Acute on chronic diastolic congestive heart failure        Pneumonia of right upper lobe due to Pseudomonas species  Currently on vanc, levaquin, and zosyn    Coronary artery disease  Telemonitoring.    Acquired hypothyroidism  Chronic problem. Will continue chronic medications and monitor for any changes, adjusting as needed.    PEG (percutaneous endoscopic gastrostomy) status  PEG care.    Urinary tract infection with  hematuria  Continue abx  Positive for pseudomonas    Hematuria  Stable.  Being followed by urologist.      Chronic respiratory failure with hypoxia and hypercapnia  Continue mechanical ventilation as before      Tracheostomy in place  Trach care      Acute on chronic respiratory failure with hypoxia and hypercapnia  Continue mechanical ventilation as before.  Supplemental O2 via nasal canula; titrate O2 saturation to >92%.   Follow Pulmonary and Infectious Disease recommendations.  Sputum positive for possible pseudomonas.   Continue beta 2 agonist bronchodilator treatments.   On intravenous cefepime and tobramycin nebulizations and enteral vancomycin.    Functional quadriplegia  Supportive care, skin care, rotate patient every 2 hr    VTE Risk Mitigation (From admission, onward)         Ordered     IP VTE HIGH RISK PATIENT  Once      10/02/19 1710     Place sequential compression device  Until discontinued      10/02/19 1710     Place EYAD hose  Until discontinued      10/02/19 1710                Critical care time spent on the evaluation and treatment of severe organ dysfunction, review of pertinent labs and imaging studies, discussions with consulting providers and discussions with patient/family: 33 minutes.      Melissa Mayfield MD  Department of Hospital Medicine   [facility]

## 2019-10-10 NOTE — PLAN OF CARE
POC reviewed with pt. VSS stable. Pain controlled with PRN medications. Antibiotics continued and infection control/ prevention was discussed with pt.; needs reinforcement and encouragement especially with oral care. Planning to send pt back to [facility] tomorrow to continue abx treatment there. Comfort promoted & safety maintained.

## 2019-10-10 NOTE — PLAN OF CARE
10/10/19 0705   Patient Assessment/Suction   Level of Consciousness (AVPU) alert   Respiratory Effort Normal;Unlabored   Expansion/Accessory Muscles/Retractions no use of accessory muscles;no retractions   $ Suction Charges Inline Suction Procedure Stat Charge   Secretions Amount scant   PRE-TX-O2   O2 Device (Oxygen Therapy) ventilator   $ Is the patient on Low Flow Oxygen? Yes   Oxygen Concentration (%) 30   SpO2 98 %   Pulse Oximetry Type Continuous   $ Pulse Oximetry - Multiple Charge Pulse Oximetry - Multiple   Pulse 60   Resp 17   BP (!) 116/59   ETCO2   $ ETCO2 Charge Exhaled CO2 Monitoring   $ ETCO2 Usage Currently wearing   ETCO2 (mmHg) 32 mmHg   Wound Care   $ Wound Care Tech Time 15 min   Area of Concern Neck under tracheostomy   Skin Color/Characteristics without discoloration        Surgical Airway Portex Cuffed;Fenestrated   No Placement Date or Time found.   Present Prior to Hospital Arrival?: Yes  Inserted by: Present Prior to Hospital Arrival  Type: Tracheostomy  Brand: Portex  Airway Device Size: 8.0  Style: Cuffed;Fenestrated   Site Assessment Clean;Dry   Ties Assessment Intact;Secure   Vent Select   Conventional Vent Y   Charged w/in last 24h YES   Preset Conventional Ventilator Settings   Vent Type    Ventilation Type VC   Vent Mode A/C   Set Rate 16 bmp   Vt Set 700 mL   PEEP/CPAP 5 cmH20   Pressure Support 0 cmH20   Waveform RAMP   Peak Flow 62 L/min   Set Inspiratory Pressure 0 cmH20   Insp Time 0 Sec(s)   Plateau Set/Insp. Hold (sec) 0   Insp Rise Time  0 %   Trigger Sensitivity Flow/I-Trigger 1.5 L/min   P High 0 cm H2O   P Low 0 cm H2O   T High 0 sec   T Low 0 sec   Patient Ventilator Parameters   Resp Rate Total 17 br/min   Peak Airway Pressure 32 cmH2O   Mean Airway Pressure 17 cmH20   Plateau Pressure 34 cmH20   Exhaled Vt 864 mL   Total Ve 12.3 mL   Spont Ve 0 L   I:E Ratio Measured 1:2.00   Conventional Ventilator Alarms   Ve High Alarm 30 L/min   Resp Rate High Alarm 40  br/min   Press High Alarm 55 cmH2O   Apnea Rate 16   Apnea Volume (mL) 700 mL   Apnea Oxygen Concentration  100   Apnea Flow Rate (L/min) 60   T Apnea 20 sec(s)   Ready to Wean/Extubation Screen   FIO2<=50 (chart decimal) 0.3   MV<16L (chart vol.) 12.3   PEEP <=8 (chart #) 5   Ready to Wean Parameters   F/VT Ratio<105 (RSBI) (!) 19.68

## 2019-10-11 PROBLEM — A04.72 C. DIFFICILE COLITIS: Status: ACTIVE | Noted: 2019-01-01

## 2019-10-11 NOTE — PLAN OF CARE
10/10/19 1950   Patient Assessment/Suction   Level of Consciousness (AVPU) alert   Respiratory Effort Normal;Unlabored   Expansion/Accessory Muscles/Retractions expansion symmetric   All Lung Fields Breath Sounds coarse   Rhythm/Pattern, Respiratory assisted mechanically   Cough Frequency infrequent   Cough Type good   $ Suction Charges Inline Suction Procedure Stat Charge   Secretions Amount small;moderate   Secretions Color white;yellow   Secretions Characteristics thick   PRE-TX-O2   O2 Device (Oxygen Therapy) ventilator   Oxygen Concentration (%) 30   SpO2 100 %   Pulse Oximetry Type Continuous   Pulse 70   Resp 20   ETCO2   ETCO2 (mmHg) 28 mmHg   Aerosol Therapy   $ Aerosol Therapy Charges Aerosol Treatment   Respiratory Treatment Status (SVN) given   Treatment Route (SVN) in-line   Patient Position (SVN) HOB elevated   Post Treatment Assessment (SVN) breath sounds unchanged   Signs of Intolerance (SVN) none   Breath Sounds Post-Respiratory Treatment   Post-treatment Heart Rate (beats/min) 59   Post-treatment Resp Rate (breaths/min) 17        Surgical Airway Portex Cuffed;Fenestrated   No Placement Date or Time found.   Present Prior to Hospital Arrival?: Yes  Inserted by: Present Prior to Hospital Arrival  Type: Tracheostomy  Brand: Portex  Airway Device Size: 8.0  Style: Cuffed;Fenestrated   Cuff Pressure 35 cm H2O   Cuff Inflation? Inflated   Site Assessment Clean   Ties Assessment Intact;Secure   Vent Select   Conventional Vent Y   Charged w/in last 24h YES   Preset Conventional Ventilator Settings   Vent Type    Ventilation Type VC   Vent Mode A/C   Humidity HME   Set Rate 16 bmp   Vt Set 700 mL   PEEP/CPAP 5 cmH20   Pressure Support 0 cmH20   Waveform RAMP   Peak Flow 62 L/min   Set Inspiratory Pressure 0 cmH20   Insp Time 0 Sec(s)   Plateau Set/Insp. Hold (sec) 0   Insp Rise Time  0 %   Trigger Sensitivity Flow/I-Trigger 1.5 L/min   P High 0 cm H2O   P Low 0 cm H2O   T High 0 sec   T Low 0 sec    Patient Ventilator Parameters   Resp Rate Total 16 br/min   Peak Airway Pressure 28 cmH2O   Mean Airway Pressure 13 cmH20   Plateau Pressure 34 cmH20   Exhaled Vt 719 mL   Total Ve 11.6 mL   Spont Ve 0 L   I:E Ratio Measured 1:2.00   Conventional Ventilator Alarms   Ve High Alarm 30 L/min   Resp Rate High Alarm 40 br/min   Press High Alarm 55 cmH2O   Apnea Rate 16   Apnea Volume (mL) 700 mL   Apnea Oxygen Concentration  100   Apnea Flow Rate (L/min) 60   T Apnea 20 sec(s)   Ready to Wean/Extubation Screen   FIO2<=50 (chart decimal) 0.3   MV<16L (chart vol.) 11.6   PEEP <=8 (chart #) 5   Ready to Wean Parameters   F/VT Ratio<105 (RSBI) (!) 27.82

## 2019-10-11 NOTE — ASSESSMENT & PLAN NOTE
Masood Messina is a 79 y.o. male who presents to the Emergency Department with septic shock.  This patient does) have evidence of infective focus  My overall impression is healthcare associated pneumonia and UTI.  Now on tobramycin nebulizations.  Cefepime has been discontinued.  Continue enteral vancomycin for C diff colitis.  Observe contact isolation.    F/u cultures  Lab Results   Component Value Date    WBC 7.32 10/11/2019    HGB 8.6 (L) 10/11/2019    HCT 27.4 (L) 10/11/2019    MCV 95 10/11/2019     10/11/2019     Organ dysfunction indicated by altered mental status and acute kidney injury  Vital signs post fluid administrations were-    Temp Readings from Last 1 Encounters:   10/11/19 98.6 °F (37 °C) (Axillary)     BP Readings from Last 1 Encounters:   10/11/19 (!) 115/56     Pulse Readings from Last 1 Encounters:   10/11/19 (!) 56

## 2019-10-11 NOTE — ASSESSMENT & PLAN NOTE
Observe contact isolation.  Continue enteral vancomycin as per Infectious Disease recommendations.  Still requiring rectal tube due to ongoing liquid diarrhea.

## 2019-10-11 NOTE — PROGRESS NOTES
"Progress Note  Infectious Disease    Reason for Consult:  MDRO pseudomonas    HPI: Masood Messina is an  79 y.o. male with whom I am familiar from prior consults. He is quadriplegic, vent and PEG dependent. He was admitted 9/12 to [facility] for trach problem and was discharged back to NH. The events that led to a culture of sputum(GBS) followed by augmentin, then followed by IV maxipime for several days are unclear but there is a radiology report describing right sided pneumonia. He was sent her ish 10/2 with altered mental status and hypotension and hematuria. He had a RUL infiltrate and was placed on vanc and zosyn and levaquin. He required extensive manual irrigation of his bladder to remove clots. Cultures of the urine showed the usual pseudomonas and cultures of the sputum grew pseudomonas whose sensitivities were reported with resistance to everything but the aminoglycosides. He is having no fever, and has resolution of leukocytosis, and urine is clear now. He has now developed abdominal distention and liquid stools requiring a rectal tube.     10/8: afebrile, procalcitonin normal. Cdiff pcr pos, abdominal distention and stool volume are decreased. He feels "fair to middlin". Secretions remain purulent but CXR was improved. No hematuria. Repeat urine culture is in progress.   10/9: afebrile. Groin line out and picc in. CXR with increased RUL infiltrate again, ?taken in expiration? Urine culture still has a modest colony count of pseudomonas. He is tolerating tube feeds. RN reports more confusion and agitation today.   10/10: afebrile. procalcitonin normal x 2. CXR about the same. Secretions are decreased on Nadege aerosols. His abdomen is a little more distended today. No problems with tube feeds. He is mildly agitated.   10/11/2019  tamx 98.9 Rectal tube is in place, liquid brown stool present.denies complaints by  head movement. Limited interaction. He was watching a movie, looking comfortable    EXAM & " DIAGNOSTICS REVIEWED:   Vitals:     Temp:  [98.4 °F (36.9 °C)-98.9 °F (37.2 °C)]   Temp: 98.9 °F (37.2 °C) (10/11/19 1600)  Pulse: 61 (10/11/19 1730)  Resp: (!) 32 (10/11/19 1730)  BP: 133/60 (10/11/19 1700)  SpO2: 96 % (10/11/19 1730)    Intake/Output Summary (Last 24 hours) at 10/11/2019 1839  Last data filed at 10/11/2019 1400  Gross per 24 hour   Intake 630 ml   Output 2175 ml   Net -1545 ml       General:  In NAD. Attends, cooperates to the extent he can  Eyes:  Anicteric, PERRL, EOMI  ENT:  No ulcers, exudates, thrush, nares patent. Tardive mouth movements  Neck:  supple, tracheostomy  Lungs: Clear. Volume of secretions has decreased   Heart:  RRR, no gallop/murmur/rub noted  Abd:  Soft, mild tenderness, moderately distended, normal BS, no masses or organomegaly appreciated. Rectal tube with watery non bloody stool  :   Rahman, urine clear with minimal sediment,    Musc:  Joints without effusion, swelling, erythema, synovitis,   Skin:  No rashes.   Wound:   Neuro:  Alert, attentive,  face symmetric, quadriparetic  Psych:  Calm, cooperative, answers most questions but I cannot understand him. Tardive mouth movements  Extrem: Mild chronic LE edema,no  erythema, phlebitis, cellulitis, warm    VAD:  picc right arm    Isolation:  contact    Lines/Tubes/Drains:    General Labs reviewed:  Recent Labs   Lab 10/09/19  0419 10/10/19  0348 10/11/19  0348   WBC 8.64 7.86 7.32   HGB 8.4* 8.6* 8.6*   HCT 26.3* 26.9* 27.4*    163 187       Recent Labs   Lab 10/09/19  0419 10/10/19  0348 10/11/19  0348   * 135* 135*   K 3.4* 3.6 3.8   CL 96 99 100   CO2 28 28 27   BUN 22 20 22   CREATININE 1.2 1.1 1.1   CALCIUM 8.5* 8.7 8.7   PROT 7.1 7.1 7.1   BILITOT 0.5 0.5 0.5   ALKPHOS 85 79 70   ALT 24 21 19   AST 19 18 19         Micro:  Microbiology Results (last 7 days)     Procedure Component Value Units Date/Time    IV catheter culture [[id number]] Collected:  10/08/19 2230    Order Status:  Completed Specimen:   Catheter Tip, NOS Updated:  10/11/19 1101     Aerobic Culture - Cath tip No growth    Narrative:       Left femoral TLC cath tip    Urine Culture High Risk [[id number]]  (Abnormal)  (Susceptibility) Collected:  10/07/19 1745    Order Status:  Completed Specimen:  Urine, Catheterized Updated:  10/10/19 1200     Urine Culture, Routine PSEUDOMONAS AERUGINOSA  10,000 - 49,999 cfu/ml      Narrative:       Indicated criteria for high risk culture:->Other  Other (specify):->recent gross hematuria and UTI    C Diff Toxin by PCR [[id number]]  (Abnormal) Collected:  10/07/19 1755    Order Status:  Completed Updated:  10/08/19 1339     C. diff PCR Positive    Culture, Respiratory with Gram Stain [[id number]]  (Abnormal)  (Susceptibility) Collected:  10/04/19 0020    Order Status:  Completed Specimen:  Respiratory from Tracheal Aspirate Updated:  10/08/19 1121     Respiratory Culture No S aureus isolated.      PSEUDOMONAS AERUGINOSA   Many  Normal respiratory gabriela also present       Gram Stain (Respiratory) <10 epithelial cells per low power field.     Gram Stain (Respiratory) Rare WBC's     Gram Stain (Respiratory) Many Gram negative rods     Gram Stain (Respiratory) Few Gram positive rods     Gram Stain (Respiratory) Rare Gram positive cocci    Blood culture #1 **CANNOT BE ORDERED STAT** [[id number]] Collected:  10/02/19 1305    Order Status:  Completed Specimen:  Blood Updated:  10/08/19 0612     Blood Culture, Routine No growth after 5 days.    Blood culture #2 **CANNOT BE ORDERED STAT** [[id number]] Collected:  10/02/19 1305    Order Status:  Completed Specimen:  Blood Updated:  10/08/19 0612     Blood Culture, Routine No growth after 5 days.    Clostridium difficile EIA [[id number]]  (Abnormal) Collected:  10/07/19 1755    Order Status:  Completed Specimen:  Stool Updated:  10/08/19 0136     C. diff Antigen Positive     C difficile Toxins A+B, EIA Negative     Comment: Testing not recommended for children <24 months old.        Urine culture [[id number]]  (Abnormal)  (Susceptibility) Collected:  10/02/19 1321    Order Status:  Completed Specimen:  Urine Updated:  10/05/19 0439     Urine Culture, Routine PSEUDOMONAS AERUGINOSA  10,000 - 49,999 cfu/ml      Narrative:       Preferred Collection Type->Urine, Catheterized        Culture, Respiratory with Gram Stain [[id number]] (Abnormal)  Collected: 10/04/19 0020   Order Status: Completed Specimen: Respiratory from Tracheal Aspirate Updated: 10/08/19 1121    Respiratory Culture No S aureus isolated.     PSEUDOMONAS AERUGINOSA    Many   Normal respiratory gabriela also present   Abnormal     Gram Stain (Respiratory) <10 epithelial cells per low power field.    Gram Stain (Respiratory) Rare WBC's    Gram Stain (Respiratory) Many Gram negative rods    Gram Stain (Respiratory) Few Gram positive rods    Gram Stain (Respiratory) Rare Gram positive cocci   Susceptibility      Pseudomonas aeruginosa      CULTURE, RESPIRATORY     Cefepime >16 mcg/mL Resistant     Ciprofloxacin >2 mcg/mL Resistant     Colistin .50 mcg/mL Sensitive1     Gentamicin <=4 mcg/mL Sensitive     Meropenem >8 mcg/mL Resistant     Piperacillin/Tazo >64 mcg/mL Resistant     Tobramycin <=4 mcg/mL Sensitive              Imaging Reviewed:   CXR   Unchanged and appropriate position of assist devices.  Unchanged right lung airspace disease.    09/13/2019 echo:  · Mild left ventricular enlargement.  · Low normal left ventricular systolic function. The estimated ejection fraction is 50%  · Grade III (severe) left ventricular diastolic dysfunction consistent with restrictive physiology.  · Elevated left atrial pressure.  · Mechanically ventilated; cannot use inferior caval vein diameter to estimate central venous pressure.  · Moderate left atrial enlargement.  · Mild mitral regurgitation.  · Technically challenging study; contrast enhanced.      IMPRESSION & PLAN       Imp: extensive exposure to antibiotics resulting in MDRO  pseudomonas in sputum. At the onset of this pneumonia he had a relatively sensitive organism. Repeat culture now reflects selection by antibiotics.  RUL infiltrate waxes and wanes, procalcitonin is normal x 2 and oxygen requirment has not increased.    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 Difficile again 10/7    Rec:  Stopped  cefepime 10/10/2019   Continue tobra aerosols for another 2-3 days.    Oral vanc QIDx  14d then taper and avoid more antibiotics in near future for chronic colonization of sputum and urine

## 2019-10-11 NOTE — PROGRESS NOTES
[facility] Medicine  Progress Note    Patient Name: Masood Messina  MRN: [id number]  Patient Class: IP- Inpatient   Admission Date: 10/2/2019  Length of Stay: 9 days  Attending Physician: Melissa Mayfield MD  Primary Care Provider: Jeramie Lombardi MD        Subjective:     Principal Problem:Septic shock    HPI:  Patient is an 79-year-old morbidly obese male from [facility] with past medical history significant for multiple medical problems including chronic hypoxic hypercarbic respiratory failure (ventilator dependent status post tracheostomy), status post PEG tube placement, hypertension, hyperlipidemia, coronary artery disease, history of cerebrovascular accident, hypothyroidism and prior history of cerebrovascular accident who is functional quadriplegic is being admitted to Hospital Medicine from [facility] where patient presented with septic shock.  Upon arrival patient is unresponsive and blood pressure was noted to be around 72/48 mm of mercury.  Per ER physician reportedly patient had some hematuria for 2 days.  Patient was being treated for pneumonia recently. No further details of HPI.     Overview/Hospital Course:  Patient was managed for septic shock and has been weaned off intravenous pressor agents.  Patient is noted to have Pseudomonas species in urine culture and sputum cultures.  Pseudomonas is multi-drug resistant.  Infectious Disease specialist was consulted.  Patient is getting antibiotics as per Infectious Disease recommendations.  Patient has also developed acute C diff colitis for which receiving enteral vancomycin.  Hematuria has resolved, patient was managed by urologist.    Interval History:  In intensive care unit, septic shock has resolved.  Urine and sputum cultures grew Pseudomonas species.  C difficile infection is being treated with enteral vancomycin.  Rectal tube is in place, liquid brown stool present.   Renal panel improving.    Review of Systems   Patient is more animated, denies any subjective complaints.  Afebrile.    Objective:     Vital Signs (Most Recent):  Temp: 98.6 °F (37 °C) (10/11/19 0358)  Pulse: (!) 56 (10/11/19 0721)  Resp: 16 (10/11/19 0721)  BP: (!) 115/56 (10/11/19 0600)  SpO2: 98 % (10/11/19 0721) Vital Signs (24h Range):  Temp:  [98.4 °F (36.9 °C)-100.1 °F (37.8 °C)] 98.6 °F (37 °C)  Pulse:  [] 56  Resp:  [16-40] 16  SpO2:  [81 %-100 %] 98 %  BP: ()/(44-71) 115/56     Weight: 127.4 kg (280 lb 13.9 oz)  Body mass index is 38.09 kg/m².    Intake/Output Summary (Last 24 hours) at 10/11/2019 0806  Last data filed at 10/11/2019 0542  Gross per 24 hour   Intake 770 ml   Output 2005 ml   Net -1235 ml      Physical Exam   Constitutional: He appears well-developed.   HENT:   Head: Normocephalic and atraumatic.   Nose: Nose normal. No septal deviation.   Mouth/Throat: Oropharynx is clear and moist.   Eyes: Pupils are equal, round, and reactive to light. Conjunctivae are normal.   Neck: No JVD present. No tracheal tenderness present. No tracheal deviation present. No thyroid mass present.   Tracheostomy site appears fine   Cardiovascular: Normal rate, regular rhythm, S1 normal and S2 normal. Exam reveals no gallop and no friction rub.   No murmur heard.  Pulmonary/Chest: Effort normal and breath sounds normal. He has no decreased breath sounds. He has no wheezes. He has no rhonchi. He has no rales.   Abdominal: Soft. Normal appearance and bowel sounds are normal. He exhibits no distension and no mass. There is no hepatosplenomegaly, splenomegaly or hepatomegaly. There is no tenderness.   Peg site appears fine   Musculoskeletal:   Trace pedal edema bilateral lower extremities   Neurological: He has normal strength. No cranial nerve deficit or sensory deficit.   Patient is more alert and responsive and more animated.  Skin: No rash noted. No cyanosis.   Nursing note and vitals  reviewed.    Significant Labs:   Blood Culture:   No results for input(s): LABBLOO in the last 48 hours.  CBC:   Recent Labs   Lab 10/10/19  0348 10/11/19  0348   WBC 7.86 7.32   HGB 8.6* 8.6*   HCT 26.9* 27.4*    187     CMP:   Recent Labs   Lab 10/10/19  0348 10/11/19  0348   * 135*   K 3.6 3.8   CL 99 100   CO2 28 27   * 99   BUN 20 22   CREATININE 1.1 1.1   CALCIUM 8.7 8.7   PROT 7.1 7.1   ALBUMIN 2.4* 2.4*   BILITOT 0.5 0.5   ALKPHOS 79 70   AST 18 19   ALT 21 19   ANIONGAP 8 8   EGFRNONAA >60 >60     Significant Imaging:   CXR:   New or increased right lung infiltrate, diffusely but more so right upper lung field.  Interval decrease of the left lung infiltrate with left lung today appearing clear.  Right lung infiltrates suggest pneumonia.     CT head without contrast: No evidence of an acute intracranial abnormality.    CXR: Mild decreased infiltrate at the right lung apex.  Continued atelectasis of the right midlung field and chronic elevation of the right hemidiaphragm.  Prior sternotomy.  Tracheostomy tube in position.    CXR: Tracheostomy tube and PICC line in place.  Prior CABG.  Increasing density of the right upper lobe consistent with infiltrate and atelectasis.  Chronic elevation of the right hemidiaphragm.    Assessment/Plan:      * Septic shock  Masood Messina is a 79 y.o. male who presents to the Emergency Department with septic shock.  This patient does have evidence of infective focus.  My overall impression is healthcare associated pneumonia and UTI.  Now on tobramycin nebulizations.  Cefepime has been discontinued.  Continue enteral vancomycin for C diff colitis.  Observe contact isolation.  Lab Results   Component Value Date    WBC 7.32 10/11/2019    HGB 8.6 (L) 10/11/2019    HCT 27.4 (L) 10/11/2019    MCV 95 10/11/2019     10/11/2019     Organ dysfunction indicated by altered mental status and acute kidney injury  Vital signs post fluid administrations  were-  Temp Readings from Last 1 Encounters:   10/11/19 98.6 °F (37 °C) (Axillary)     BP Readings from Last 1 Encounters:   10/11/19 (!) 115/56     Pulse Readings from Last 1 Encounters:   10/11/19 (!) 56     C. difficile colitis  Observe contact isolation.  Continue enteral vancomycin as per Infectious Disease recommendations.  Still requiring rectal tube due to ongoing liquid diarrhea.  Add Questran to PEG tube.    Chronic pain  Monitor for pain and treat accordingly.    Hypophosphatemia  Replete phosphorus and follow level.    Acute on chronic diastolic congestive heart failure  Compensated.    Pneumonia of right upper lobe due to Pseudomonas species  Cefepime has been discontinued.  On tobramycin nebulizations.  Continue beta 2 agonist bronchodilator nebulization treatments.  Her respiratory toileting and frequent suctioning.    Anemia  Follow CBC and transfuse as needed.    Coronary artery disease  Telemonitoring.    Acquired hypothyroidism  Chronic problem. Will continue chronic medications and monitor for any changes, adjusting as needed.    PEG (percutaneous endoscopic gastrostomy) status  PEG care.    Urinary tract infection with hematuria  Continue abx  Positive for pseudomonas    Hematuria  Stable. Urology following.    Chronic respiratory failure with hypoxia and hypercapnia  Continue mechanical ventilation as before.    Tracheostomy in place  Trach care.    Acute on chronic respiratory failure with hypoxia and hypercapnia  Continue mechanical ventilation as before.  Supplemental O2 via nasal canula; titrate O2 saturation to >92%.   Follow Pulmonary recommendations.  Sputum positive for possible pseudomonas.   Continue beta 2 agonist bronchodilator treatments.   On tobramycin nebulization.    Functional quadriplegia  Supportive care, skin care, rotate patient every 2 hr    VTE Risk Mitigation (From admission, onward)         Ordered     IP VTE HIGH RISK PATIENT  Once      10/02/19 1710     Place  sequential compression device  Until discontinued      10/02/19 1710     Place EYAD hose  Until discontinued      10/02/19 1710            Critical care time spent on the evaluation and treatment of severe organ dysfunction, review of pertinent labs and imaging studies, discussions with consulting providers and discussions with patient/family: 35 minutes.      Melissa Mayfield MD  Department of Hospital Medicine   [facility]

## 2019-10-11 NOTE — PLAN OF CARE
Remains trached on pb840 at ordered settings. Alarms on and working, ambu and mask at hob. Receiving aerosol tx BID, trach care Qshift.       10/11/19 0816   Patient Assessment/Suction   Level of Consciousness (AVPU) alert   Respiratory Effort Unlabored   Expansion/Accessory Muscles/Retractions expansion symmetric   All Lung Fields Breath Sounds coarse   Rhythm/Pattern, Respiratory assisted mechanically   $ Suction Charges Inline Suction Procedure Stat Charge   Secretions Amount moderate   Secretions Color creamy   Secretions Characteristics thick   PRE-TX-O2   SpO2 98 %   Pulse (!) 54   Resp 16   Aerosol Therapy   $ Aerosol Therapy Charges Aerosol Treatment   Respiratory Treatment Status (SVN) given   Treatment Route (SVN) in-line   Patient Position (SVN) [facility] elevated   Post Treatment Assessment (SVN) breath sounds unchanged   Signs of Intolerance (SVN) none   Breath Sounds Post-Respiratory Treatment   Throughout All Fields Post-Treatment All Fields   Throughout All Fields Post-Treatment no change   Post-treatment Heart Rate (beats/min) 53   Post-treatment Resp Rate (breaths/min) 16

## 2019-10-11 NOTE — ASSESSMENT & PLAN NOTE
Cefepime has been discontinued.  On tobramycin nebulizations.  Continue beta 2 agonist bronchodilator nebulization treatments.  Her respiratory toileting and frequent suctioning.

## 2019-10-11 NOTE — SUBJECTIVE & OBJECTIVE
Interval History:  In intensive care unit, septic shock has resolved.  Urine and sputum cultures grew Pseudomonas species.  C difficile infection is being treated with enteral vancomycin.  Rectal tube is in place, liquid brown stool present.  Renal panel improving.    Review of Systems   Patient is more animated, denies any subjective complaints.  Afebrile.    Objective:     Vital Signs (Most Recent):  Temp: 98.6 °F (37 °C) (10/11/19 0358)  Pulse: (!) 56 (10/11/19 0721)  Resp: 16 (10/11/19 0721)  BP: (!) 115/56 (10/11/19 0600)  SpO2: 98 % (10/11/19 0721) Vital Signs (24h Range):  Temp:  [98.4 °F (36.9 °C)-100.1 °F (37.8 °C)] 98.6 °F (37 °C)  Pulse:  [] 56  Resp:  [16-40] 16  SpO2:  [81 %-100 %] 98 %  BP: ()/(44-71) 115/56     Weight: 127.4 kg (280 lb 13.9 oz)  Body mass index is 38.09 kg/m².    Intake/Output Summary (Last 24 hours) at 10/11/2019 0806  Last data filed at 10/11/2019 0542  Gross per 24 hour   Intake 770 ml   Output 2005 ml   Net -1235 ml      Physical Exam   Constitutional: He appears well-developed.   HENT:   Head: Normocephalic and atraumatic.   Nose: Nose normal. No septal deviation.   Mouth/Throat: Oropharynx is clear and moist.   Eyes: Pupils are equal, round, and reactive to light. Conjunctivae are normal.   Neck: No JVD present. No tracheal tenderness present. No tracheal deviation present. No thyroid mass present.   Tracheostomy site appears fine   Cardiovascular: Normal rate, regular rhythm, S1 normal and S2 normal. Exam reveals no gallop and no friction rub.   No murmur heard.  Pulmonary/Chest: Effort normal and breath sounds normal. He has no decreased breath sounds. He has no wheezes. He has no rhonchi. He has no rales.   Abdominal: Soft. Normal appearance and bowel sounds are normal. He exhibits no distension and no mass. There is no hepatosplenomegaly, splenomegaly or hepatomegaly. There is no tenderness.   Peg site appears fine   Musculoskeletal:   Trace pedal edema bilateral  lower extremities   Neurological: He has normal strength. No cranial nerve deficit or sensory deficit.   Patient is more alert and responsive and more animated.  Skin: No rash noted. No cyanosis.   Nursing note and vitals reviewed.    Significant Labs:   Blood Culture:   No results for input(s): LABBLOO in the last 48 hours.  CBC:   Recent Labs   Lab 10/10/19  0348 10/11/19  0348   WBC 7.86 7.32   HGB 8.6* 8.6*   HCT 26.9* 27.4*    187     CMP:   Recent Labs   Lab 10/10/19  0348 10/11/19  0348   * 135*   K 3.6 3.8   CL 99 100   CO2 28 27   * 99   BUN 20 22   CREATININE 1.1 1.1   CALCIUM 8.7 8.7   PROT 7.1 7.1   ALBUMIN 2.4* 2.4*   BILITOT 0.5 0.5   ALKPHOS 79 70   AST 18 19   ALT 21 19   ANIONGAP 8 8   EGFRNONAA >60 >60     Significant Imaging:   CXR:   New or increased right lung infiltrate, diffusely but more so right upper lung field.  Interval decrease of the left lung infiltrate with left lung today appearing clear.  Right lung infiltrates suggest pneumonia.     CT head without contrast: No evidence of an acute intracranial abnormality.    CXR: Mild decreased infiltrate at the right lung apex.  Continued atelectasis of the right midlung field and chronic elevation of the right hemidiaphragm.  Prior sternotomy.  Tracheostomy tube in position.    CXR: Tracheostomy tube and PICC line in place.  Prior CABG.  Increasing density of the right upper lobe consistent with infiltrate and atelectasis.  Chronic elevation of the right hemidiaphragm.

## 2019-10-11 NOTE — ASSESSMENT & PLAN NOTE
Continue mechanical ventilation as before.  Supplemental O2 via nasal canula; titrate O2 saturation to >92%.   Follow Pulmonary recommendations.  Sputum positive for possible pseudomonas.   Continue beta 2 agonist bronchodilator treatments.   On tobramycin nebulization.

## 2019-10-11 NOTE — PLAN OF CARE
Afebrile  Suctioned thick white secretions from trach  Pt very often touching trach and pulling off vent  Continues with loose stools; flexiseal intact without s/s irritation  Coude cath with cloudy yellow urine in drainage bag  Tolerating tube feedings well  Plan is to go back to [facility] today  Cefepime d/c'd per ID  Will continue vanc via PEG

## 2019-10-12 NOTE — PLAN OF CARE
Pt without fever; VSS; pt alert, oriented to self and place; pt denies pain; TF with min residual; pt able to follow simple commands

## 2019-10-12 NOTE — PLAN OF CARE
Pt remains in ICU.  Chronic trach/vent.  PEG feedings at goal and pt tolerating.  Atx neb treatments and vanc per PEG.  Adequate urine output and liquid stool to flexi seal.  Afebrile.  Able to communicate some but difficult to understanding.  No signs of distress.  SB on monitor.  Plan is to return back to [facility] when cleared.  Safety maintained.  POC discussed.

## 2019-10-12 NOTE — PLAN OF CARE
Recmains intubated on [id number] at ordered settings. ambu and mask at [facility], alarms on and working. Receiving aerosol tx q12.       10/12/19 0728   Patient Assessment/Suction   Level of Consciousness (AVPU) alert   Respiratory Effort Unlabored   Expansion/Accessory Muscles/Retractions expansion symmetric   All Lung Fields Breath Sounds coarse   Rhythm/Pattern, Respiratory assisted mechanically   PRE-TX-O2   O2 Device (Oxygen Therapy) ventilator   $ Is the patient on Low Flow Oxygen? Yes   Oxygen Concentration (%) 30   SpO2 99 %   Pulse (!) 58   Resp 16   ETCO2   $ ETCO2 Usage Currently wearing   ETCO2 (mmHg) 31 mmHg   Aerosol Therapy   $ Aerosol Therapy Charges Aerosol Treatment   Respiratory Treatment Status (SVN) given   Treatment Route (SVN) in-line   Patient Position (SVN) [facility] elevated   Post Treatment Assessment (SVN) breath sounds unchanged   Signs of Intolerance (SVN) none   Breath Sounds Post-Respiratory Treatment   Throughout All Fields Post-Treatment All Fields   Throughout All Fields Post-Treatment no change   Post-treatment Heart Rate (beats/min) 53   Post-treatment Resp Rate (breaths/min) 16   Ready to Wean/Extubation Screen   FIO2<=50 (chart decimal) 0.3

## 2019-10-12 NOTE — PROGRESS NOTES
"Progress Note  Infectious Disease    Reason for Consult:  MDRO pseudomonas    HPI: Masood Messina is an  79 y.o. male with whom I am familiar from prior consults. He is quadriplegic, vent and PEG dependent. He was admitted 9/12 to [facility] for trach problem and was discharged back to NH. The events that led to a culture of sputum(GBS) followed by augmentin, then followed by IV maxipime for several days are unclear but there is a radiology report describing right sided pneumonia. He was sent her ish 10/2 with altered mental status and hypotension and hematuria. He had a RUL infiltrate and was placed on vanc and zosyn and levaquin. He required extensive manual irrigation of his bladder to remove clots. Cultures of the urine showed the usual pseudomonas and cultures of the sputum grew pseudomonas whose sensitivities were reported with resistance to everything but the aminoglycosides. He is having no fever, and has resolution of leukocytosis, and urine is clear now. He has now developed abdominal distention and liquid stools requiring a rectal tube.     10/8: afebrile, procalcitonin normal. Cdiff pcr pos, abdominal distention and stool volume are decreased. He feels "fair to middlin". Secretions remain purulent but CXR was improved. No hematuria. Repeat urine culture is in progress.   10/9: afebrile. Groin line out and picc in. CXR with increased RUL infiltrate again, ?taken in expiration? Urine culture still has a modest colony count of pseudomonas. He is tolerating tube feeds. RN reports more confusion and agitation today.   10/10: afebrile. procalcitonin normal x 2. CXR about the same. Secretions are decreased on Nadege aerosols. His abdomen is a little more distended today. No problems with tube feeds. He is mildly agitated.   10/11/2019  tamx 98.9 Rectal tube is in place, liquid brown stool present.denies complaints by  head movement. Limited interaction. He was watching a movie, looking comfortable  10/12/2019  No " new. He tries to communicate but is hard to understand.     Antibiotics (From admission, onward)    Start     Stop Route Frequency Ordered    10/08/19 2100  tobramycin (PF) 300 mg/5 mL nebulizer solution 300 mg  (tobramycin (PF) (BRISA) 300 mg/5 mL nebulizer solution panel)      -- NEBULIZATION Every 12 hours 10/08/19 1508    10/07/19 1730  vancomycin 250mg / 10ml oral suspension 125 mg      -- Oral Every 6 hours 10/07/19 1720          EXAM & DIAGNOSTICS REVIEWED:   Vitals:     Temp:  [98.1 °F (36.7 °C)-98.9 °F (37.2 °C)]   Temp: 98.3 °F (36.8 °C) (10/12/19 0800)  Pulse: (!) 49 (10/12/19 0800)  Resp: 16 (10/12/19 0800)  BP: 135/62 (10/12/19 0800)  SpO2: 100 % (10/12/19 0800)    Intake/Output Summary (Last 24 hours) at 10/12/2019 0921  Last data filed at 10/12/2019 0800  Gross per 24 hour   Intake 1470 ml   Output 2930 ml   Net -1460 ml       General:  In NAD. Attends, cooperates to the extent he can  Eyes:  Anicteric, PERRL, EOMI  ENT:  No ulcers, exudates, thrush, nares patent.   Neck:  supple, tracheostomy  Lungs: Clear. continues to have secretions suctioned frequently from T tube  Heart:  RRR, no gallop/murmur/rub noted  Abd:  Soft, mild tenderness, moderately distended, normal BS, no masses or organomegaly appreciated. Rectal tube with watery non bloody stool  :   Rahman, urine clear with minimal sediment,    Musc:  Joints without effusion, swelling, erythema, synovitis,   Skin:  No rashes.   Wound:   Neuro:  Alert, attentive,  face symmetric, quadriparetic  Psych:  Calm, cooperative, answers most questions but I cannot understand him. Tardive mouth movements  Extrem: Mild chronic LE edema,no  erythema, phlebitis, cellulitis, warm    VAD:  picc right arm    Isolation:  contact    Lines/Tubes/Drains:    General Labs reviewed:  Recent Labs   Lab 10/10/19  0348 10/11/19  0348 10/12/19  0345   WBC 7.86 7.32 8.58   HGB 8.6* 8.6* 8.9*   HCT 26.9* 27.4* 28.0*    187 187       Recent Labs   Lab 10/10/19  0344  10/11/19  0348 10/12/19  0345   * 135* 135*   K 3.6 3.8 4.1   CL 99 100 100   CO2 28 27 27   BUN 20 22 21   CREATININE 1.1 1.1 1.1   CALCIUM 8.7 8.7 9.1   PROT 7.1 7.1 7.5   BILITOT 0.5 0.5 0.5   ALKPHOS 79 70 72   ALT 21 19 16   AST 18 19 17         Micro:  Microbiology Results (last 7 days)     Procedure Component Value Units Date/Time    IV catheter culture [[id number]] Collected:  10/08/19 2230    Order Status:  Completed Specimen:  Catheter Tip, NOS Updated:  10/11/19 1101     Aerobic Culture - Cath tip No growth    Narrative:       Left femoral TLC cath tip    Urine Culture High Risk [[id number]]  (Abnormal)  (Susceptibility) Collected:  10/07/19 1745    Order Status:  Completed Specimen:  Urine, Catheterized Updated:  10/10/19 1200     Urine Culture, Routine PSEUDOMONAS AERUGINOSA  10,000 - 49,999 cfu/ml      Narrative:       Indicated criteria for high risk culture:->Other  Other (specify):->recent gross hematuria and UTI    C Diff Toxin by PCR [[id number]]  (Abnormal) Collected:  10/07/19 1755    Order Status:  Completed Updated:  10/08/19 1339     C. diff PCR Positive    Culture, Respiratory with Gram Stain [[id number]]  (Abnormal)  (Susceptibility) Collected:  10/04/19 0020    Order Status:  Completed Specimen:  Respiratory from Tracheal Aspirate Updated:  10/08/19 1121     Respiratory Culture No S aureus isolated.      PSEUDOMONAS AERUGINOSA   Many  Normal respiratory gabriela also present       Gram Stain (Respiratory) <10 epithelial cells per low power field.     Gram Stain (Respiratory) Rare WBC's     Gram Stain (Respiratory) Many Gram negative rods     Gram Stain (Respiratory) Few Gram positive rods     Gram Stain (Respiratory) Rare Gram positive cocci    Blood culture #1 **CANNOT BE ORDERED STAT** [[id number]] Collected:  10/02/19 1305    Order Status:  Completed Specimen:  Blood Updated:  10/08/19 0612     Blood Culture, Routine No growth after 5 days.    Blood culture #2 **CANNOT BE ORDERED  STAT** [[id number]] Collected:  10/02/19 1305    Order Status:  Completed Specimen:  Blood Updated:  10/08/19 0612     Blood Culture, Routine No growth after 5 days.    Clostridium difficile EIA [[id number]]  (Abnormal) Collected:  10/07/19 1755    Order Status:  Completed Specimen:  Stool Updated:  10/08/19 0136     C. diff Antigen Positive     C difficile Toxins A+B, EIA Negative     Comment: Testing not recommended for children <24 months old.           Culture, Respiratory with Gram Stain [[id number]] (Abnormal)  Collected: 10/04/19 0020   Order Status: Completed Specimen: Respiratory from Tracheal Aspirate Updated: 10/08/19 1121    Respiratory Culture No S aureus isolated.     PSEUDOMONAS AERUGINOSA    Many   Normal respiratory gabriela also present   Abnormal     Gram Stain (Respiratory) <10 epithelial cells per low power field.    Gram Stain (Respiratory) Rare WBC's    Gram Stain (Respiratory) Many Gram negative rods    Gram Stain (Respiratory) Few Gram positive rods    Gram Stain (Respiratory) Rare Gram positive cocci   Susceptibility      Pseudomonas aeruginosa      CULTURE, RESPIRATORY     Cefepime >16 mcg/mL Resistant     Ciprofloxacin >2 mcg/mL Resistant     Colistin .50 mcg/mL Sensitive1     Gentamicin <=4 mcg/mL Sensitive     Meropenem >8 mcg/mL Resistant     Piperacillin/Tazo >64 mcg/mL Resistant     Tobramycin <=4 mcg/mL Sensitive              Imaging Reviewed:   CXR   Unchanged and appropriate position of assist devices.  Unchanged right lung airspace disease.    09/13/2019 echo:  · Mild left ventricular enlargement.  · Low normal left ventricular systolic function. The estimated ejection fraction is 50%  · Grade III (severe) left ventricular diastolic dysfunction consistent with restrictive physiology.  · Elevated left atrial pressure.  · Mechanically ventilated; cannot use inferior caval vein diameter to estimate central venous pressure.  · Moderate left atrial enlargement.  · Mild mitral  regurgitation.  · Technically challenging study; contrast enhanced.      IMPRESSION & PLAN       Imp: extensive exposure to antibiotics resulting in MDRO pseudomonas in sputum. At the onset of this pneumonia he had a relatively sensitive organism. Repeat culture now reflects selection by antibiotics.  RUL infiltrate waxes and wanes, procalcitonin is normal x 2 and oxygen requirment has not increased.    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 Difficile again 10/7    Rec:  Stopped  cefepime 10/10/2019   Will dc tobra aerosols  soon.    Oral vanc QIDx  14d then taper and avoid more antibiotics in near future for chronic colonization of sputum and urine

## 2019-10-12 NOTE — PLAN OF CARE
10/11/19 2012   Patient Assessment/Suction   Level of Consciousness (AVPU) alert   Respiratory Effort Normal;Unlabored   Expansion/Accessory Muscles/Retractions expansion symmetric   All Lung Fields Breath Sounds coarse   Rhythm/Pattern, Respiratory assisted mechanically   Cough Frequency with stimulation   Cough Type good   $ Suction Charges Inline Suction Procedure Stat Charge   Secretions Amount moderate   Secretions Color white;yellow   Secretions Characteristics thick   PRE-TX-O2   O2 Device (Oxygen Therapy) ventilator   Oxygen Concentration (%) 30   SpO2 99 %   Pulse Oximetry Type Continuous   Pulse (!) 52   Resp 16   ETCO2   ETCO2 (mmHg) 24 mmHg   Aerosol Therapy   $ Aerosol Therapy Charges Aerosol Treatment   Respiratory Treatment Status (SVN) given   Treatment Route (SVN) in-line   Patient Position (SVN) HOB elevated   Post Treatment Assessment (SVN) breath sounds unchanged   Signs of Intolerance (SVN) none   Breath Sounds Post-Respiratory Treatment   Post-treatment Heart Rate (beats/min) 55   Post-treatment Resp Rate (breaths/min) 16   Wound Care   $ Wound Care Tech Time 15 min   Area of Concern Neck under tracheostomy   Skin Color/Characteristics without discoloration   Skin Temperature warm        Surgical Airway Portex Cuffed;Fenestrated   No Placement Date or Time found.   Present Prior to Hospital Arrival?: Yes  Inserted by: Present Prior to Hospital Arrival  Type: Tracheostomy  Brand: Portex  Airway Device Size: 8.0  Style: Cuffed;Fenestrated   Cuff Pressure 32 cm H2O   Cuff Inflation? Inflated   Site Assessment Clean   Ties Assessment Secure   Vent Select   Conventional Vent Y   Charged w/in last 24h YES   Preset Conventional Ventilator Settings   Vent Type    Ventilation Type VC   Vent Mode A/C   Humidity HME   Set Rate 16 bmp   Vt Set 700 mL   PEEP/CPAP 5 cmH20   Pressure Support 0 cmH20   Waveform RAMP   Peak Flow 62 L/min   Set Inspiratory Pressure 0 cmH20   Insp Time 0 Sec(s)   Plateau  Set/Insp. Hold (sec) 0   Insp Rise Time  0 %   Trigger Sensitivity Flow/I-Trigger 1.5 L/min   P High 0 cm H2O   P Low 0 cm H2O   T High 0 sec   T Low 0 sec   Patient Ventilator Parameters   Resp Rate Total 16 br/min   Peak Airway Pressure 31 cmH2O   Mean Airway Pressure 14 cmH20   Plateau Pressure 34 cmH20   Exhaled Vt 733 mL   Total Ve 11.8 mL   Spont Ve 0 L   I:E Ratio Measured 1:2.00   Conventional Ventilator Alarms   Ve High Alarm 30 L/min   Resp Rate High Alarm 40 br/min   Press High Alarm 55 cmH2O   Apnea Rate 16   Apnea Volume (mL) 700 mL   Apnea Oxygen Concentration  100   Apnea Flow Rate (L/min) 60   T Apnea 20 sec(s)   Ready to Wean/Extubation Screen   FIO2<=50 (chart decimal) 0.3   MV<16L (chart vol.) 11.8   PEEP <=8 (chart #) 5   Ready to Wean Parameters   F/VT Ratio<105 (RSBI) (!) 21.83

## 2019-10-12 NOTE — PLAN OF CARE
POC reviewed with pt. MD determined pt needed more monitoring in ICU due to present infections. Oral Vanc continued for C diff. Tube feedings tolerated well at goal of 60 with no to minimal residuals. VSS. Comfort promoted and safety maintained.

## 2019-10-13 PROBLEM — J18.9 HCAP (HEALTHCARE-ASSOCIATED PNEUMONIA): Status: ACTIVE | Noted: 2017-03-21

## 2019-10-13 PROBLEM — I50.30 DIASTOLIC HEART FAILURE: Status: ACTIVE | Noted: 2019-01-01

## 2019-10-13 NOTE — PLAN OF CARE
VSS.  Good UO.  Flexiseal remains in place with light brown liquid stool.  Pt A&O, but confused at times.  Able to use right hand without issue.  TF going at goal rate; no residuals.  POC discussed with pt.  Safety maintained entire shift.

## 2019-10-13 NOTE — PLAN OF CARE
Alert and can communicate appropriately when trach cuff deflated. Forgetful with Dementia symptoms. Moves right arm only and disconnects vent from trach at times when scratching face/beard. Tube feed at goal rate. Rectal flexi tube with creamy brown stool. Contact/special isolation maintained. Rahman draining adequate yellow urine.Safety maintained and bed on rotation.

## 2019-10-13 NOTE — SUBJECTIVE & OBJECTIVE
Interval History: Patient's blood pressure stable. No acute events overnight    Review of Systems   Unable to perform ROS: Patient nonverbal     Objective:     Vital Signs (Most Recent):  Temp: 98.7 °F (37.1 °C) (10/12/19 1600)  Pulse: (!) 59 (10/12/19 1913)  Resp: (!) 23 (10/12/19 1913)  BP: (!) 140/64 (10/12/19 1800)  SpO2: 100 % (10/12/19 1913) Vital Signs (24h Range):  Temp:  [98.1 °F (36.7 °C)-98.7 °F (37.1 °C)] 98.7 °F (37.1 °C)  Pulse:  [49-73] 59  Resp:  [16-34] 23  SpO2:  [94 %-100 %] 100 %  BP: (105-156)/(53-74) 140/64     Weight: 127.4 kg (280 lb 13.9 oz)  Body mass index is 38.09 kg/m².    Intake/Output Summary (Last 24 hours) at 10/12/2019 2001  Last data filed at 10/12/2019 1744  Gross per 24 hour   Intake 1049 ml   Output 3280 ml   Net -2231 ml      Physical Exam   Constitutional: He appears well-developed.   HENT:   Head: Normocephalic and atraumatic.   Nose: Nose normal. No septal deviation.   Mouth/Throat: Oropharynx is clear and moist.   Eyes: Pupils are equal, round, and reactive to light. Conjunctivae are normal.   Neck: No JVD present. No tracheal tenderness present. No tracheal deviation present. No thyroid mass present.   Tracheostomy site appears fine   Cardiovascular: Normal rate, regular rhythm, S1 normal and S2 normal. Exam reveals no gallop and no friction rub.   No murmur heard.  Pulmonary/Chest: Effort normal and breath sounds normal. He has no decreased breath sounds. He has no wheezes. He has no rhonchi. He has no rales.   Abdominal: Soft. Normal appearance and bowel sounds are normal. He exhibits no distension and no mass. There is no hepatosplenomegaly, splenomegaly or hepatomegaly. There is no tenderness.   Peg site appears fine   Musculoskeletal:   Trace pedal edema bilateral lower extremities   Neurological: He has normal strength. No cranial nerve deficit or sensory deficit.   Patient is more alert and responsive but unable to interact verbally.   Skin: No rash noted. No  cyanosis.   Nursing note and vitals reviewed.      Significant Labs:   BMP:   Recent Labs   Lab 10/12/19  0345   GLU 91   *   K 4.1      CO2 27   BUN 21   CREATININE 1.1   CALCIUM 9.1   MG 2.2     CBC:   Recent Labs   Lab 10/11/19  0348 10/12/19  0345   WBC 7.32 8.58   HGB 8.6* 8.9*   HCT 27.4* 28.0*    187       Significant Imaging: I have reviewed all pertinent imaging results/findings within the past 24 hours.

## 2019-10-13 NOTE — ASSESSMENT & PLAN NOTE
On tobramycin nebulizations.  Continue beta 2 agonist bronchodilator nebulization treatments.  Her respiratory toileting and frequent suctioning.  Antibiotics (From admission, onward)    Start     Stop Route Frequency Ordered    10/08/19 2100  tobramycin (PF) 300 mg/5 mL nebulizer solution 300 mg  (tobramycin (PF) (BRISA) 300 mg/5 mL nebulizer solution panel)      -- NEBULIZATION Every 12 hours 10/08/19 1508    10/07/19 1730  vancomycin 250mg / 10ml oral suspension 125 mg      -- Oral Every 6 hours 10/07/19 1720        Microbiology Results (last 7 days)     Procedure Component Value Units Date/Time    IV catheter culture [[id number]] Collected:  10/08/19 2230    Order Status:  Completed Specimen:  Catheter Tip, NOS Updated:  10/11/19 1101     Aerobic Culture - Cath tip No growth    Narrative:       Left femoral TLC cath tip    Urine Culture High Risk [[id number]]  (Abnormal)  (Susceptibility) Collected:  10/07/19 1745    Order Status:  Completed Specimen:  Urine, Catheterized Updated:  10/10/19 1200     Urine Culture, Routine PSEUDOMONAS AERUGINOSA  10,000 - 49,999 cfu/ml      Narrative:       Indicated criteria for high risk culture:->Other  Other (specify):->recent gross hematuria and UTI    C Diff Toxin by PCR [[id number]]  (Abnormal) Collected:  10/07/19 1755    Order Status:  Completed Updated:  10/08/19 1339     C. diff PCR Positive    Culture, Respiratory with Gram Stain [[id number]]  (Abnormal)  (Susceptibility) Collected:  10/04/19 0020    Order Status:  Completed Specimen:  Respiratory from Tracheal Aspirate Updated:  10/08/19 1121     Respiratory Culture No S aureus isolated.      PSEUDOMONAS AERUGINOSA   Many  Normal respiratory gabriela also present       Gram Stain (Respiratory) <10 epithelial cells per low power field.     Gram Stain (Respiratory) Rare WBC's     Gram Stain (Respiratory) Many Gram negative rods     Gram Stain (Respiratory) Few Gram positive rods     Gram Stain (Respiratory) Rare Gram  positive cocci    Blood culture #1 **CANNOT BE ORDERED STAT** [[id number]] Collected:  10/02/19 1305    Order Status:  Completed Specimen:  Blood Updated:  10/08/19 0612     Blood Culture, Routine No growth after 5 days.    Blood culture #2 **CANNOT BE ORDERED STAT** [[id number]] Collected:  10/02/19 1305    Order Status:  Completed Specimen:  Blood Updated:  10/08/19 0612     Blood Culture, Routine No growth after 5 days.    Clostridium difficile EIA [[id number]]  (Abnormal) Collected:  10/07/19 1755    Order Status:  Completed Specimen:  Stool Updated:  10/08/19 0136     C. diff Antigen Positive     C difficile Toxins A+B, EIA Negative     Comment: Testing not recommended for children <24 months old.

## 2019-10-13 NOTE — ASSESSMENT & PLAN NOTE
Continue abx  Positive for pseudomonas  Microbiology Results (last 7 days)     Procedure Component Value Units Date/Time    IV catheter culture [[id number]] Collected:  10/08/19 2230    Order Status:  Completed Specimen:  Catheter Tip, NOS Updated:  10/11/19 1101     Aerobic Culture - Cath tip No growth    Narrative:       Left femoral TLC cath tip    Urine Culture High Risk [[id number]]  (Abnormal)  (Susceptibility) Collected:  10/07/19 1745    Order Status:  Completed Specimen:  Urine, Catheterized Updated:  10/10/19 1200     Urine Culture, Routine PSEUDOMONAS AERUGINOSA  10,000 - 49,999 cfu/ml      Narrative:       Indicated criteria for high risk culture:->Other  Other (specify):->recent gross hematuria and UTI    C Diff Toxin by PCR [[id number]]  (Abnormal) Collected:  10/07/19 1755    Order Status:  Completed Updated:  10/08/19 1339     C. diff PCR Positive    Culture, Respiratory with Gram Stain [[id number]]  (Abnormal)  (Susceptibility) Collected:  10/04/19 0020    Order Status:  Completed Specimen:  Respiratory from Tracheal Aspirate Updated:  10/08/19 1121     Respiratory Culture No S aureus isolated.      PSEUDOMONAS AERUGINOSA   Many  Normal respiratory gabriela also present       Gram Stain (Respiratory) <10 epithelial cells per low power field.     Gram Stain (Respiratory) Rare WBC's     Gram Stain (Respiratory) Many Gram negative rods     Gram Stain (Respiratory) Few Gram positive rods     Gram Stain (Respiratory) Rare Gram positive cocci    Blood culture #1 **CANNOT BE ORDERED STAT** [[id number]] Collected:  10/02/19 1305    Order Status:  Completed Specimen:  Blood Updated:  10/08/19 0612     Blood Culture, Routine No growth after 5 days.    Blood culture #2 **CANNOT BE ORDERED STAT** [[id number]] Collected:  10/02/19 1305    Order Status:  Completed Specimen:  Blood Updated:  10/08/19 0612     Blood Culture, Routine No growth after 5 days.    Clostridium difficile EIA [[id number]]  (Abnormal) Collected:   10/07/19 1755    Order Status:  Completed Specimen:  Stool Updated:  10/08/19 0136     C. diff Antigen Positive     C difficile Toxins A+B, EIA Negative     Comment: Testing not recommended for children <24 months old.

## 2019-10-13 NOTE — ASSESSMENT & PLAN NOTE
Masood Messina is a 79 y.o. male who presents to the Emergency Department with septic shock.  This patient does have evidence of infective focus  My overall impression is healthcare associated pneumonia and UTI.  Now on tobramycin nebulizations.  Cefepime has been discontinued.  Continue enteral vancomycin for C diff colitis.    continue contact isolation.    F/u cultures  Lab Results   Component Value Date    WBC 9.70 10/13/2019    HGB 9.6 (L) 10/13/2019    HCT 30.4 (L) 10/13/2019    MCV 94 10/13/2019     10/13/2019     Organ dysfunction indicated by altered mental status and acute kidney injury  Vital signs post fluid administrations were-    Temp Readings from Last 1 Encounters:   10/13/19 97.9 °F (36.6 °C) (Axillary)     BP Readings from Last 1 Encounters:   10/13/19 129/60     Pulse Readings from Last 1 Encounters:   10/13/19 (!) 58

## 2019-10-13 NOTE — PLAN OF CARE
10/12/19 1913   Patient Assessment/Suction   Level of Consciousness (AVPU) alert   Respiratory Effort Unlabored   Expansion/Accessory Muscles/Retractions expansion symmetric   All Lung Fields Breath Sounds coarse   Cough Frequency with stimulation   Cough Type good   Suction Method tracheal   PRE-TX-O2   O2 Device (Oxygen Therapy) ventilator   Oxygen Concentration (%) 30   SpO2 100 %   Pulse Oximetry Type Continuous   Pulse (!) 59   Resp (!) 23   ETCO2   ETCO2 (mmHg) 26 mmHg   Aerosol Therapy   $ Aerosol Therapy Charges Aerosol Treatment   Respiratory Treatment Status (SVN) given   Treatment Route (SVN) in-line   Patient Position (SVN) HOB elevated   Post Treatment Assessment (SVN) breath sounds unchanged   Signs of Intolerance (SVN) none   Breath Sounds Post-Respiratory Treatment   Post-treatment Heart Rate (beats/min) 58   Post-treatment Resp Rate (breaths/min) 16   Wound Care   Area of Concern Neck under tracheostomy   Skin Color/Characteristics without discoloration   Skin Temperature warm        Surgical Airway Portex Cuffed;Fenestrated   No Placement Date or Time found.   Present Prior to Hospital Arrival?: Yes  Inserted by: Present Prior to Hospital Arrival  Type: Tracheostomy  Brand: Portex  Airway Device Size: 8.0  Style: Cuffed;Fenestrated   Cuff Pressure 34 cm H2O   Cuff Inflation? Inflated   Site Assessment Oozing secretions   Site Care Dressing applied   Ties Assessment Secure   Vent Select   Conventional Vent Y   Charged w/in last 24h YES   Preset Conventional Ventilator Settings   Vent Type    Ventilation Type VC   Vent Mode A/C   Humidity HME   Set Rate 16 bmp   Vt Set 700 mL   PEEP/CPAP 5 cmH20   Pressure Support 0 cmH20   Waveform RAMP   Peak Flow 62 L/min   Set Inspiratory Pressure 0 cmH20   Insp Time 0 Sec(s)   Plateau Set/Insp. Hold (sec) 0   Insp Rise Time  0 %   Trigger Sensitivity Flow/I-Trigger 1.5 L/min   P High 0 cm H2O   P Low 0 cm H2O   T High 0 sec   T Low 0 sec   Patient  Ventilator Parameters   Resp Rate Total 17 br/min   Peak Airway Pressure 35 cmH2O   Mean Airway Pressure 14 cmH20   Plateau Pressure 34 cmH20   Exhaled Vt 683 mL   Total Ve 11.8 mL   Spont Ve 0 L   I:E Ratio Measured 1:2.00   Conventional Ventilator Alarms   Ve High Alarm 30 L/min   Resp Rate High Alarm 40 br/min   Press High Alarm 55 cmH2O   Apnea Rate 16   Apnea Volume (mL) 700 mL   Apnea Oxygen Concentration  100   Apnea Flow Rate (L/min) 60   T Apnea 20 sec(s)   Ready to Wean/Extubation Screen   FIO2<=50 (chart decimal) 0.3   MV<16L (chart vol.) 11.8   PEEP <=8 (chart #) 5   Ready to Wean Parameters   F/VT Ratio<105 (RSBI) (!) 33.67

## 2019-10-13 NOTE — PROGRESS NOTES
[facility] Hospital Medicine  Progress Note    Patient Name: Masood Messina  MRN: [id number]  Patient Class: IP- Inpatient   Admission Date: 10/2/2019  Length of Stay: 11 days  Attending Physician: Stone Cabrera MD  Primary Care Provider: Jeramie Lombardi MD        Subjective:     Principal Problem:HCAP (healthcare-associated pneumonia)        HPI:  Patient is an 79-year-old morbidly obese male from [facility] with past medical history significant for multiple medical problems including chronic hypoxic hypercarbic respiratory failure (ventilator dependent status post tracheostomy), status post PEG tube placement, hypertension, hyperlipidemia, coronary artery disease, history of cerebrovascular accident, hypothyroidism and prior history of cerebrovascular accident who is functional quadriplegic is being admitted to Hospital Medicine from [facility] where patient presented with septic shock.  Upon arrival patient is unresponsive and blood pressure was noted to be around 72/48 mm of mercury.  Per ER physician reportedly patient had some hematuria for 2 days.  Patient was being treated for pneumonia recently. No further details of HPI.     Overview/Hospital Course:  Patient was managed for septic shock and has been weaned off intravenous pressor agents.  Patient is noted to have Pseudomonas species in urine culture and sputum cultures.  Pseudomonas is multi-drug resistant.  Infectious Disease specialist was consulted.  Patient is getting antibiotics as per Infectious Disease recommendations.  Patient has also developed acute C diff colitis for which receiving enteral vancomycin.  Hematuria has resolved, patient was managed by urologist. ID on 10/12.  Stopped  cefepime .  continued Oral vanc .    Interval History: No acute events overnight    Review of Systems   Unable to perform ROS: Patient nonverbal     Objective:     Vital Signs (Most  Recent):  Temp: 97.9 °F (36.6 °C) (10/13/19 1600)  Pulse: (!) 58 (10/13/19 1600)  Resp: 20 (10/13/19 1600)  BP: 129/60 (10/13/19 1600)  SpO2: 98 % (10/13/19 1600) Vital Signs (24h Range):  Temp:  [97.6 °F (36.4 °C)-98.8 °F (37.1 °C)] 97.9 °F (36.6 °C)  Pulse:  [50-66] 58  Resp:  [7-24] 20  SpO2:  [92 %-100 %] 98 %  BP: ()/() 129/60     Weight: 127.4 kg (280 lb 13.9 oz)  Body mass index is 38.09 kg/m².    Intake/Output Summary (Last 24 hours) at 10/13/2019 1616  Last data filed at 10/13/2019 1600  Gross per 24 hour   Intake 2309 ml   Output 2475 ml   Net -166 ml      Physical Exam   Constitutional: He appears well-developed.   HENT:   Head: Normocephalic and atraumatic.   Nose: Nose normal. No septal deviation.   Mouth/Throat: Oropharynx is clear and moist.   Eyes: Pupils are equal, round, and reactive to light. Conjunctivae are normal.   Neck: No JVD present. No tracheal tenderness present. No tracheal deviation present. No thyroid mass present.   Tracheostomy site appears fine   Cardiovascular: Normal rate, regular rhythm, S1 normal and S2 normal. Exam reveals no gallop and no friction rub.   No murmur heard.  Pulmonary/Chest: Effort normal and breath sounds normal. He has no decreased breath sounds. He has no wheezes. He has no rhonchi. He has no rales.   Abdominal: Soft. Normal appearance and bowel sounds are normal. He exhibits no distension and no mass. There is no hepatosplenomegaly, splenomegaly or hepatomegaly. There is no tenderness.   Peg site appears fine   Musculoskeletal:   Trace pedal edema bilateral lower extremities   Neurological: He has normal strength. No cranial nerve deficit or sensory deficit.   Patient is more alert and responsive but unable to interact verbally.   Skin: No rash noted. No cyanosis.   Nursing note and vitals reviewed.      Significant Labs:   BMP:   Recent Labs   Lab 10/13/19  0348   GLU 93   *   K 3.9      CO2 26   BUN 20   CREATININE 1.1   CALCIUM 9.4    MG 2.2     CBC:   Recent Labs   Lab 10/12/19  0345 10/13/19  0348   WBC 8.58 9.70   HGB 8.9* 9.6*   HCT 28.0* 30.4*    205       Significant Imaging: I have reviewed all pertinent imaging results/findings within the past 24 hours.      Assessment/Plan:      * HCAP (healthcare-associated pneumonia)  Masood Messina is a 79 y.o. male who presents to the Emergency Department with septic shock.  This patient does have evidence of infective focus  My overall impression is healthcare associated pneumonia and UTI.  Now on tobramycin nebulizations.  Cefepime has been discontinued.  Continue enteral vancomycin for C diff colitis.    continue contact isolation.    F/u cultures  Lab Results   Component Value Date    WBC 9.70 10/13/2019    HGB 9.6 (L) 10/13/2019    HCT 30.4 (L) 10/13/2019    MCV 94 10/13/2019     10/13/2019     Organ dysfunction indicated by altered mental status and acute kidney injury  Vital signs post fluid administrations were-    Temp Readings from Last 1 Encounters:   10/13/19 97.9 °F (36.6 °C) (Axillary)     BP Readings from Last 1 Encounters:   10/13/19 129/60     Pulse Readings from Last 1 Encounters:   10/13/19 (!) 58         Pneumonia of right upper lobe due to Pseudomonas species   On tobramycin nebulizations.  Continue beta 2 agonist bronchodilator nebulization treatments.  Her respiratory toileting and frequent suctioning.  Antibiotics (From admission, onward)    Start     Stop Route Frequency Ordered    10/08/19 2100  tobramycin (PF) 300 mg/5 mL nebulizer solution 300 mg  (tobramycin (PF) (BRISA) 300 mg/5 mL nebulizer solution panel)      -- NEBULIZATION Every 12 hours 10/08/19 1508    10/07/19 1730  vancomycin 250mg / 10ml oral suspension 125 mg      -- Oral Every 6 hours 10/07/19 1720        Microbiology Results (last 7 days)     Procedure Component Value Units Date/Time    IV catheter culture [[id number]] Collected:  10/08/19 2230    Order Status:  Completed Specimen:   Catheter Tip, NOS Updated:  10/11/19 1101     Aerobic Culture - Cath tip No growth    Narrative:       Left femoral TLC cath tip    Urine Culture High Risk [[id number]]  (Abnormal)  (Susceptibility) Collected:  10/07/19 1745    Order Status:  Completed Specimen:  Urine, Catheterized Updated:  10/10/19 1200     Urine Culture, Routine PSEUDOMONAS AERUGINOSA  10,000 - 49,999 cfu/ml      Narrative:       Indicated criteria for high risk culture:->Other  Other (specify):->recent gross hematuria and UTI    C Diff Toxin by PCR [[id number]]  (Abnormal) Collected:  10/07/19 1755    Order Status:  Completed Updated:  10/08/19 1339     C. diff PCR Positive    Culture, Respiratory with Gram Stain [[id number]]  (Abnormal)  (Susceptibility) Collected:  10/04/19 0020    Order Status:  Completed Specimen:  Respiratory from Tracheal Aspirate Updated:  10/08/19 1121     Respiratory Culture No S aureus isolated.      PSEUDOMONAS AERUGINOSA   Many  Normal respiratory gabriela also present       Gram Stain (Respiratory) <10 epithelial cells per low power field.     Gram Stain (Respiratory) Rare WBC's     Gram Stain (Respiratory) Many Gram negative rods     Gram Stain (Respiratory) Few Gram positive rods     Gram Stain (Respiratory) Rare Gram positive cocci    Blood culture #1 **CANNOT BE ORDERED STAT** [[id number]] Collected:  10/02/19 1305    Order Status:  Completed Specimen:  Blood Updated:  10/08/19 0612     Blood Culture, Routine No growth after 5 days.    Blood culture #2 **CANNOT BE ORDERED STAT** [[id number]] Collected:  10/02/19 1305    Order Status:  Completed Specimen:  Blood Updated:  10/08/19 0612     Blood Culture, Routine No growth after 5 days.    Clostridium difficile EIA [[id number]]  (Abnormal) Collected:  10/07/19 1755    Order Status:  Completed Specimen:  Stool Updated:  10/08/19 0136     C. diff Antigen Positive     C difficile Toxins A+B, EIA Negative     Comment: Testing not recommended for children <24 months old.              Chronic respiratory failure with hypoxia  Continue mechanical ventilation as before.  Supplemental O2 via nasal canula; titrate O2 saturation to >92%.   Follow Pulmonary recommendations.  Sputum positive for possible pseudomonas.   Continue beta 2 agonist bronchodilator treatments.   On tobramycin nebulization.    Microbiology Results (last 7 days)     Procedure Component Value Units Date/Time    IV catheter culture [[id number]] Collected:  10/08/19 2230    Order Status:  Completed Specimen:  Catheter Tip, NOS Updated:  10/11/19 1101     Aerobic Culture - Cath tip No growth    Narrative:       Left femoral TLC cath tip    Urine Culture High Risk [[id number]]  (Abnormal)  (Susceptibility) Collected:  10/07/19 1745    Order Status:  Completed Specimen:  Urine, Catheterized Updated:  10/10/19 1200     Urine Culture, Routine PSEUDOMONAS AERUGINOSA  10,000 - 49,999 cfu/ml      Narrative:       Indicated criteria for high risk culture:->Other  Other (specify):->recent gross hematuria and UTI    C Diff Toxin by PCR [[id number]]  (Abnormal) Collected:  10/07/19 1755    Order Status:  Completed Updated:  10/08/19 1339     C. diff PCR Positive    Culture, Respiratory with Gram Stain [[id number]]  (Abnormal)  (Susceptibility) Collected:  10/04/19 0020    Order Status:  Completed Specimen:  Respiratory from Tracheal Aspirate Updated:  10/08/19 1121     Respiratory Culture No S aureus isolated.      PSEUDOMONAS AERUGINOSA   Many  Normal respiratory gabriela also present       Gram Stain (Respiratory) <10 epithelial cells per low power field.     Gram Stain (Respiratory) Rare WBC's     Gram Stain (Respiratory) Many Gram negative rods     Gram Stain (Respiratory) Few Gram positive rods     Gram Stain (Respiratory) Rare Gram positive cocci    Blood culture #1 **CANNOT BE ORDERED STAT** [[id number]] Collected:  10/02/19 1305    Order Status:  Completed Specimen:  Blood Updated:  10/08/19 0612     Blood Culture, Routine No growth  after 5 days.    Blood culture #2 **CANNOT BE ORDERED STAT** [[id number]] Collected:  10/02/19 1305    Order Status:  Completed Specimen:  Blood Updated:  10/08/19 0612     Blood Culture, Routine No growth after 5 days.    Clostridium difficile EIA [[id number]]  (Abnormal) Collected:  10/07/19 1755    Order Status:  Completed Specimen:  Stool Updated:  10/08/19 0136     C. diff Antigen Positive     C difficile Toxins A+B, EIA Negative     Comment: Testing not recommended for children <24 months old.                   C. difficile colitis  cont contact isolation.  Continue enteral vancomycin as per Infectious Disease recommendations.  Still requiring rectal tube due to ongoing liquid diarrhea.      Chronic pain  Monitor for pain, prn meds in place    Hypophosphatemia  Replete phosphorus and follow level.    Diastolic heart failure  Compensated.      Thrombocytopenia  Will continue to monitor  Lab Results   Component Value Date     10/13/2019         Anemia  Follow CBC and transfuse as needed.      Coronary artery disease  Telemonitoring.    Sacral decubitus ulcer, stage II        Acquired hypothyroidism  Chronic problem. Will continue chronic medications and monitor for any changes, adjusting as needed.          PEG (percutaneous endoscopic gastrostomy) status  PEG care.      Urinary tract infection with hematuria  Continue abx  Positive for pseudomonas  Microbiology Results (last 7 days)     Procedure Component Value Units Date/Time    IV catheter culture [[id number]] Collected:  10/08/19 2230    Order Status:  Completed Specimen:  Catheter Tip, NOS Updated:  10/11/19 1101     Aerobic Culture - Cath tip No growth    Narrative:       Left femoral TLC cath tip    Urine Culture High Risk [[id number]]  (Abnormal)  (Susceptibility) Collected:  10/07/19 1745    Order Status:  Completed Specimen:  Urine, Catheterized Updated:  10/10/19 1200     Urine Culture, Routine PSEUDOMONAS AERUGINOSA  10,000 - 49,999 cfu/ml       Narrative:       Indicated criteria for high risk culture:->Other  Other (specify):->recent gross hematuria and UTI    C Diff Toxin by PCR [[id number]]  (Abnormal) Collected:  10/07/19 1755    Order Status:  Completed Updated:  10/08/19 1339     C. diff PCR Positive    Culture, Respiratory with Gram Stain [[id number]]  (Abnormal)  (Susceptibility) Collected:  10/04/19 0020    Order Status:  Completed Specimen:  Respiratory from Tracheal Aspirate Updated:  10/08/19 1121     Respiratory Culture No S aureus isolated.      PSEUDOMONAS AERUGINOSA   Many  Normal respiratory gabriela also present       Gram Stain (Respiratory) <10 epithelial cells per low power field.     Gram Stain (Respiratory) Rare WBC's     Gram Stain (Respiratory) Many Gram negative rods     Gram Stain (Respiratory) Few Gram positive rods     Gram Stain (Respiratory) Rare Gram positive cocci    Blood culture #1 **CANNOT BE ORDERED STAT** [[id number]] Collected:  10/02/19 1305    Order Status:  Completed Specimen:  Blood Updated:  10/08/19 0612     Blood Culture, Routine No growth after 5 days.    Blood culture #2 **CANNOT BE ORDERED STAT** [[id number]] Collected:  10/02/19 1305    Order Status:  Completed Specimen:  Blood Updated:  10/08/19 0612     Blood Culture, Routine No growth after 5 days.    Clostridium difficile EIA [[id number]]  (Abnormal) Collected:  10/07/19 1755    Order Status:  Completed Specimen:  Stool Updated:  10/08/19 0136     C. diff Antigen Positive     C difficile Toxins A+B, EIA Negative     Comment: Testing not recommended for children <24 months old.             Hematuria  Stable.  Urology following.    Tracheostomy in place  Trach care      Functional quadriplegia  Supportive care, skin care, rotate patient every 2 hr        VTE Risk Mitigation (From admission, onward)         Ordered     IP VTE HIGH RISK PATIENT  Once      10/02/19 1710     Place sequential compression device  Until discontinued      10/02/19 1710     Place EYAD  hose  Until discontinued      10/02/19 5322                Critical care time spent on the evaluation and treatment of severe organ dysfunction, review of pertinent labs and imaging studies, discussions with consulting providers and discussions with patient/family: 60 minutes.      Stone Cabrera MD  Department of Hospital Medicine   [facility]

## 2019-10-13 NOTE — ASSESSMENT & PLAN NOTE
cont contact isolation.  Continue enteral vancomycin as per Infectious Disease recommendations.  Still requiring rectal tube due to ongoing liquid diarrhea.

## 2019-10-13 NOTE — PLAN OF CARE
Remains trached on [id number] at ordered settings. ambu and mask at hob, alarms on and working. Trach care is performed Qshift. Receiving aerosol tx q12       10/13/19 0741   Patient Assessment/Suction   Level of Consciousness (AVPU) alert   Respiratory Effort Unlabored   Expansion/Accessory Muscles/Retractions no use of accessory muscles   All Lung Fields Breath Sounds coarse   $ Suction Charges Inline Suction Procedure Stat Charge   Secretions Amount large   Secretions Color white;yellow   Secretions Characteristics thick   PRE-TX-O2   O2 Device (Oxygen Therapy) ventilator   $ Is the patient on Low Flow Oxygen? Yes   Oxygen Concentration (%) 30   SpO2 99 %   Pulse Oximetry Type Continuous   $ Pulse Oximetry - Multiple Charge Pulse Oximetry - Multiple   Pulse (!) 53   Resp 16   ETCO2   $ ETCO2 Usage Currently wearing   ETCO2 (mmHg) 31 mmHg   Aerosol Therapy   $ Aerosol Therapy Charges Aerosol Treatment   Respiratory Treatment Status (SVN) given   Treatment Route (SVN) in-line;ventilator   Patient Position (SVN) HOB elevated   Post Treatment Assessment (SVN) breath sounds unchanged   Signs of Intolerance (SVN) none   Breath Sounds Post-Respiratory Treatment   Throughout All Fields Post-Treatment All Fields   Throughout All Fields Post-Treatment coarse   Post-treatment Heart Rate (beats/min) 51   Post-treatment Resp Rate (breaths/min) 16   Ready to Wean/Extubation Screen   FIO2<=50 (chart decimal) 0.3

## 2019-10-13 NOTE — ASSESSMENT & PLAN NOTE
Masood Messina is a 79 y.o. male who presents to the Emergency Department with septic shock.  This patient does have evidence of infective focus  My overall impression is healthcare associated pneumonia and UTI.  Now on tobramycin nebulizations.  Cefepime has been discontinued.  Continue enteral vancomycin for C diff colitis.  Observe contact isolation.    F/u cultures  Lab Results   Component Value Date    WBC 8.58 10/12/2019    HGB 8.9 (L) 10/12/2019    HCT 28.0 (L) 10/12/2019    MCV 93 10/12/2019     10/12/2019     Organ dysfunction indicated by altered mental status and acute kidney injury  Vital signs post fluid administrations were-    Temp Readings from Last 1 Encounters:   10/12/19 98.7 °F (37.1 °C) (Axillary)     BP Readings from Last 1 Encounters:   10/12/19 (!) 140/64     Pulse Readings from Last 1 Encounters:   10/12/19 (!) 59

## 2019-10-13 NOTE — ASSESSMENT & PLAN NOTE
Cefepime has been discontinued.  On tobramycin nebulizations.  Continue beta 2 agonist bronchodilator nebulization treatments.  Her respiratory toileting and frequent suctioning.  Antibiotics (From admission, onward)    Start     Stop Route Frequency Ordered    10/08/19 2100  tobramycin (PF) 300 mg/5 mL nebulizer solution 300 mg  (tobramycin (PF) (BRISA) 300 mg/5 mL nebulizer solution panel)      -- NEBULIZATION Every 12 hours 10/08/19 1508    10/07/19 1730  vancomycin 250mg / 10ml oral suspension 125 mg      -- Oral Every 6 hours 10/07/19 1720

## 2019-10-13 NOTE — PROGRESS NOTES
[facility] Medicine  Progress Note    Patient Name: Masood Messina  MRN: [id number]  Patient Class: IP- Inpatient   Admission Date: 10/2/2019  Length of Stay: 10 days  Attending Physician: Stone Cabrera MD  Primary Care Provider: Jeramie Lombardi MD        Subjective:     Principal Problem:Septic shock        HPI:  Patient is an 79-year-old morbidly obese male from [facility] with past medical history significant for multiple medical problems including chronic hypoxic hypercarbic respiratory failure (ventilator dependent status post tracheostomy), status post PEG tube placement, hypertension, hyperlipidemia, coronary artery disease, history of cerebrovascular accident, hypothyroidism and prior history of cerebrovascular accident who is functional quadriplegic is being admitted to Hospital Medicine from [facility] where patient presented with septic shock.  Upon arrival patient is unresponsive and blood pressure was noted to be around 72/48 mm of mercury.  Per ER physician reportedly patient had some hematuria for 2 days.  Patient was being treated for pneumonia recently. No further details of HPI.     Overview/Hospital Course:  Patient was managed for septic shock and has been weaned off intravenous pressor agents.  Patient is noted to have Pseudomonas species in urine culture and sputum cultures.  Pseudomonas is multi-drug resistant.  Infectious Disease specialist was consulted.  Patient is getting antibiotics as per Infectious Disease recommendations.  Patient has also developed acute C diff colitis for which receiving enteral vancomycin.  Hematuria has resolved, patient was managed by urologist.    Interval History: Patient's blood pressure stable. No acute events overnight    Review of Systems   Unable to perform ROS: Patient nonverbal     Objective:     Vital Signs (Most Recent):  Temp: 98.7 °F (37.1 °C) (10/12/19  1600)  Pulse: (!) 59 (10/12/19 1913)  Resp: (!) 23 (10/12/19 1913)  BP: (!) 140/64 (10/12/19 1800)  SpO2: 100 % (10/12/19 1913) Vital Signs (24h Range):  Temp:  [98.1 °F (36.7 °C)-98.7 °F (37.1 °C)] 98.7 °F (37.1 °C)  Pulse:  [49-73] 59  Resp:  [16-34] 23  SpO2:  [94 %-100 %] 100 %  BP: (105-156)/(53-74) 140/64     Weight: 127.4 kg (280 lb 13.9 oz)  Body mass index is 38.09 kg/m².    Intake/Output Summary (Last 24 hours) at 10/12/2019 2001  Last data filed at 10/12/2019 1744  Gross per 24 hour   Intake 1049 ml   Output 3280 ml   Net -2231 ml      Physical Exam   Constitutional: He appears well-developed.   HENT:   Head: Normocephalic and atraumatic.   Nose: Nose normal. No septal deviation.   Mouth/Throat: Oropharynx is clear and moist.   Eyes: Pupils are equal, round, and reactive to light. Conjunctivae are normal.   Neck: No JVD present. No tracheal tenderness present. No tracheal deviation present. No thyroid mass present.   Tracheostomy site appears fine   Cardiovascular: Normal rate, regular rhythm, S1 normal and S2 normal. Exam reveals no gallop and no friction rub.   No murmur heard.  Pulmonary/Chest: Effort normal and breath sounds normal. He has no decreased breath sounds. He has no wheezes. He has no rhonchi. He has no rales.   Abdominal: Soft. Normal appearance and bowel sounds are normal. He exhibits no distension and no mass. There is no hepatosplenomegaly, splenomegaly or hepatomegaly. There is no tenderness.   Peg site appears fine   Musculoskeletal:   Trace pedal edema bilateral lower extremities   Neurological: He has normal strength. No cranial nerve deficit or sensory deficit.   Patient is more alert and responsive but unable to interact verbally.   Skin: No rash noted. No cyanosis.   Nursing note and vitals reviewed.      Significant Labs:   BMP:   Recent Labs   Lab 10/12/19  0345   GLU 91   *   K 4.1      CO2 27   BUN 21   CREATININE 1.1   CALCIUM 9.1   MG 2.2     CBC:   Recent Labs    Lab 10/11/19  0348 10/12/19  0345   WBC 7.32 8.58   HGB 8.6* 8.9*   HCT 27.4* 28.0*    187       Significant Imaging: I have reviewed all pertinent imaging results/findings within the past 24 hours.      Assessment/Plan:      * Septic shock  Masood Messina is a 79 y.o. male who presents to the Emergency Department with septic shock.  This patient does have evidence of infective focus  My overall impression is healthcare associated pneumonia and UTI.  Now on tobramycin nebulizations.  Cefepime has been discontinued.  Continue enteral vancomycin for C diff colitis.  Observe contact isolation.    F/u cultures  Lab Results   Component Value Date    WBC 8.58 10/12/2019    HGB 8.9 (L) 10/12/2019    HCT 28.0 (L) 10/12/2019    MCV 93 10/12/2019     10/12/2019     Organ dysfunction indicated by altered mental status and acute kidney injury  Vital signs post fluid administrations were-    Temp Readings from Last 1 Encounters:   10/12/19 98.7 °F (37.1 °C) (Axillary)     BP Readings from Last 1 Encounters:   10/12/19 (!) 140/64     Pulse Readings from Last 1 Encounters:   10/12/19 (!) 59         Acute on chronic respiratory failure with hypoxia and hypercapnia  Continue mechanical ventilation as before.  Supplemental O2 via nasal canula; titrate O2 saturation to >92%.   Follow Pulmonary recommendations.  Sputum positive for possible pseudomonas.   Continue beta 2 agonist bronchodilator treatments.   On tobramycin nebulization.    Microbiology Results (last 7 days)     Procedure Component Value Units Date/Time    IV catheter culture [[id number]] Collected:  10/08/19 2230    Order Status:  Completed Specimen:  Catheter Tip, NOS Updated:  10/11/19 1101     Aerobic Culture - Cath tip No growth    Narrative:       Left femoral TLC cath tip    Urine Culture High Risk [[id number]]  (Abnormal)  (Susceptibility) Collected:  10/07/19 1745    Order Status:  Completed Specimen:  Urine, Catheterized Updated:  10/10/19  1200     Urine Culture, Routine PSEUDOMONAS AERUGINOSA  10,000 - 49,999 cfu/ml      Narrative:       Indicated criteria for high risk culture:->Other  Other (specify):->recent gross hematuria and UTI    C Diff Toxin by PCR [[id number]]  (Abnormal) Collected:  10/07/19 1755    Order Status:  Completed Updated:  10/08/19 1339     C. diff PCR Positive    Culture, Respiratory with Gram Stain [[id number]]  (Abnormal)  (Susceptibility) Collected:  10/04/19 0020    Order Status:  Completed Specimen:  Respiratory from Tracheal Aspirate Updated:  10/08/19 1121     Respiratory Culture No S aureus isolated.      PSEUDOMONAS AERUGINOSA   Many  Normal respiratory gabriela also present       Gram Stain (Respiratory) <10 epithelial cells per low power field.     Gram Stain (Respiratory) Rare WBC's     Gram Stain (Respiratory) Many Gram negative rods     Gram Stain (Respiratory) Few Gram positive rods     Gram Stain (Respiratory) Rare Gram positive cocci    Blood culture #1 **CANNOT BE ORDERED STAT** [[id number]] Collected:  10/02/19 1305    Order Status:  Completed Specimen:  Blood Updated:  10/08/19 0612     Blood Culture, Routine No growth after 5 days.    Blood culture #2 **CANNOT BE ORDERED STAT** [[id number]] Collected:  10/02/19 1305    Order Status:  Completed Specimen:  Blood Updated:  10/08/19 0612     Blood Culture, Routine No growth after 5 days.    Clostridium difficile EIA [[id number]]  (Abnormal) Collected:  10/07/19 1755    Order Status:  Completed Specimen:  Stool Updated:  10/08/19 0136     C. diff Antigen Positive     C difficile Toxins A+B, EIA Negative     Comment: Testing not recommended for children <24 months old.                   C. difficile colitis  Observe contact isolation.  Continue enteral vancomycin as per Infectious Disease recommendations.  Still requiring rectal tube due to ongoing liquid diarrhea.      Chronic pain  Patient can't say what's hurting him. Was given morphine.  Monitor for  pain      Hypophosphatemia  Replete phosphorus and follow level.    Acute on chronic diastolic congestive heart failure  Compensated.      Pneumonia of right upper lobe due to Pseudomonas species  Cefepime has been discontinued.  On tobramycin nebulizations.  Continue beta 2 agonist bronchodilator nebulization treatments.  Her respiratory toileting and frequent suctioning.  Antibiotics (From admission, onward)    Start     Stop Route Frequency Ordered    10/08/19 2100  tobramycin (PF) 300 mg/5 mL nebulizer solution 300 mg  (tobramycin (PF) (BRISA) 300 mg/5 mL nebulizer solution panel)      -- NEBULIZATION Every 12 hours 10/08/19 1508    10/07/19 1730  vancomycin 250mg / 10ml oral suspension 125 mg      -- Oral Every 6 hours 10/07/19 1720            Thrombocytopenia  Will continue to monitor  Lab Results   Component Value Date     10/12/2019         Anemia  Follow CBC and transfuse as needed.      Coronary artery disease  Telemonitoring.    Acquired hypothyroidism  Chronic problem. Will continue chronic medications and monitor for any changes, adjusting as needed.          PEG (percutaneous endoscopic gastrostomy) status  PEG care.      Urinary tract infection with hematuria  Continue abx  Positive for pseudomonas      Hematuria  Stable.  Urology following.    Chronic respiratory failure with hypoxia and hypercapnia  Continue mechanical ventilation as before      Tracheostomy in place  Trach care      Functional quadriplegia  Supportive care, skin care, rotate patient every 2 hr        VTE Risk Mitigation (From admission, onward)         Ordered     IP VTE HIGH RISK PATIENT  Once      10/02/19 1710     Place sequential compression device  Until discontinued      10/02/19 1710     Place EYAD hose  Until discontinued      10/02/19 1710                Critical care time spent on the evaluation and treatment of severe organ dysfunction, review of pertinent labs and imaging studies, discussions with consulting  providers and discussions with patient/family: 60 minutes.      Stone Cabrera MD  Department of Hospital Medicine   [facility]

## 2019-10-13 NOTE — PROGRESS NOTES
"Progress Note  Infectious Disease    Reason for Consult:  MDRO pseudomonas    HPI: Masood Messina is an  79 y.o. male with whom I am familiar from prior consults. He is quadriplegic, vent and PEG dependent. He was admitted 9/12 to [facility] for trach problem and was discharged back to NH. The events that led to a culture of sputum(GBS) followed by augmentin, then followed by IV maxipime for several days are unclear but there is a radiology report describing right sided pneumonia. He was sent her ish 10/2 with altered mental status and hypotension and hematuria. He had a RUL infiltrate and was placed on vanc and zosyn and levaquin. He required extensive manual irrigation of his bladder to remove clots. Cultures of the urine showed the usual pseudomonas and cultures of the sputum grew pseudomonas whose sensitivities were reported with resistance to everything but the aminoglycosides. He is having no fever, and has resolution of leukocytosis, and urine is clear now. He has now developed abdominal distention and liquid stools requiring a rectal tube.     10/8: afebrile, procalcitonin normal. Cdiff pcr pos, abdominal distention and stool volume are decreased. He feels "fair to middlin". Secretions remain purulent but CXR was improved. No hematuria. Repeat urine culture is in progress.   10/9: afebrile. Groin line out and picc in. CXR with increased RUL infiltrate again, ?taken in expiration? Urine culture still has a modest colony count of pseudomonas. He is tolerating tube feeds. RN reports more confusion and agitation today.   10/10: afebrile. procalcitonin normal x 2. CXR about the same. Secretions are decreased on Nadege aerosols. His abdomen is a little more distended today. No problems with tube feeds. He is mildly agitated.   10/11/2019  tamx 98.9 Rectal tube is in place, liquid brown stool present.denies complaints by  head movement. Limited interaction. He was watching a movie, looking comfortable  10/12/2019  No " new. He tries to communicate but is hard to understand.   10/13/2019  No new events. Stools are getting thicker? He still has the rectal tube in. Tracheal aspirate is looking thinner and lighter in color?    Antibiotics (From admission, onward)    Start     Stop Route Frequency Ordered    10/08/19 2100  tobramycin (PF) 300 mg/5 mL nebulizer solution 300 mg  (tobramycin (PF) (BRISA) 300 mg/5 mL nebulizer solution panel)      -- NEBULIZATION Every 12 hours 10/08/19 1508    10/07/19 1730  vancomycin 250mg / 10ml oral suspension 125 mg      -- Oral Every 6 hours 10/07/19 1720          EXAM & DIAGNOSTICS REVIEWED:   Vitals:     Temp:  [97.6 °F (36.4 °C)-98.7 °F (37.1 °C)]   Temp: 97.7 °F (36.5 °C) (10/13/19 0800)  Pulse: (!) 51 (10/13/19 0800)  Resp: 16 (10/13/19 0800)  BP: 124/61 (10/13/19 0630)  SpO2: 99 % (10/13/19 0800)    Intake/Output Summary (Last 24 hours) at 10/13/2019 0855  Last data filed at 10/13/2019 0600  Gross per 24 hour   Intake 2309 ml   Output 2815 ml   Net -506 ml       General:  In NAD. Attends, cooperates to the extent he can  Eyes:  Anicteric, PERRL, EOMI  ENT:  No ulcers, exudates, thrush, nares patent.   Neck:  supple, tracheostomy  Lungs: Clear. continues to have secretions suctioned frequently from T tube  Heart:  RRR, no gallop/murmur/rub noted  Abd:  Soft, mild tenderness, moderately distended, normal BS, no masses or organomegaly appreciated. Rectal tube with watery non bloody stool---thicker?  :   Rahman, urine clear with minimal sediment,    Musc:  Joints without effusion, swelling, erythema, synovitis,   Skin:  No rashes.   Wound:   Neuro:  Alert, attentive,  face symmetric, quadriparetic  Psych:  Calm, cooperative, answers most questions but I cannot understand him. Tardive mouth movements  Extrem: Mild chronic LE edema,no  erythema, phlebitis, cellulitis, warm    VAD:  picc right arm    Isolation:  contact    Lines/Tubes/Drains:    General Labs reviewed:  Recent Labs   Lab  10/11/19  0348 10/12/19  0345 10/13/19  0348   WBC 7.32 8.58 9.70   HGB 8.6* 8.9* 9.6*   HCT 27.4* 28.0* 30.4*    187 205       Recent Labs   Lab 10/11/19  0348 10/12/19  0345 10/13/19  0348   * 135* 134*   K 3.8 4.1 3.9    100 100   CO2 27 27 26   BUN 22 21 20   CREATININE 1.1 1.1 1.1   CALCIUM 8.7 9.1 9.4   PROT 7.1 7.5 7.9   BILITOT 0.5 0.5 0.5   ALKPHOS 70 72 74   ALT 19 16 15   AST 19 17 18         Micro:  Microbiology Results (last 7 days)     Procedure Component Value Units Date/Time    IV catheter culture [[id number]] Collected:  10/08/19 2230    Order Status:  Completed Specimen:  Catheter Tip, NOS Updated:  10/11/19 1101     Aerobic Culture - Cath tip No growth    Narrative:       Left femoral TLC cath tip    Urine Culture High Risk [[id number]]  (Abnormal)  (Susceptibility) Collected:  10/07/19 1745    Order Status:  Completed Specimen:  Urine, Catheterized Updated:  10/10/19 1200     Urine Culture, Routine PSEUDOMONAS AERUGINOSA  10,000 - 49,999 cfu/ml      Narrative:       Indicated criteria for high risk culture:->Other  Other (specify):->recent gross hematuria and UTI    C Diff Toxin by PCR [[id number]]  (Abnormal) Collected:  10/07/19 1755    Order Status:  Completed Updated:  10/08/19 1339     C. diff PCR Positive    Culture, Respiratory with Gram Stain [[id number]]  (Abnormal)  (Susceptibility) Collected:  10/04/19 0020    Order Status:  Completed Specimen:  Respiratory from Tracheal Aspirate Updated:  10/08/19 1121     Respiratory Culture No S aureus isolated.      PSEUDOMONAS AERUGINOSA   Many  Normal respiratory gabriela also present       Gram Stain (Respiratory) <10 epithelial cells per low power field.     Gram Stain (Respiratory) Rare WBC's     Gram Stain (Respiratory) Many Gram negative rods     Gram Stain (Respiratory) Few Gram positive rods     Gram Stain (Respiratory) Rare Gram positive cocci    Blood culture #1 **CANNOT BE ORDERED STAT** [[id number]] Collected:  10/02/19  1305    Order Status:  Completed Specimen:  Blood Updated:  10/08/19 0612     Blood Culture, Routine No growth after 5 days.    Blood culture #2 **CANNOT BE ORDERED STAT** [[id number]] Collected:  10/02/19 1305    Order Status:  Completed Specimen:  Blood Updated:  10/08/19 0612     Blood Culture, Routine No growth after 5 days.    Clostridium difficile EIA [[id number]]  (Abnormal) Collected:  10/07/19 1755    Order Status:  Completed Specimen:  Stool Updated:  10/08/19 0136     C. diff Antigen Positive     C difficile Toxins A+B, EIA Negative     Comment: Testing not recommended for children <24 months old.           Culture, Respiratory with Gram Stain [[id number]] (Abnormal)  Collected: 10/04/19 0020   Order Status: Completed Specimen: Respiratory from Tracheal Aspirate Updated: 10/08/19 1121    Respiratory Culture No S aureus isolated.     PSEUDOMONAS AERUGINOSA    Many   Normal respiratory gabriela also present   Abnormal     Gram Stain (Respiratory) <10 epithelial cells per low power field.    Gram Stain (Respiratory) Rare WBC's    Gram Stain (Respiratory) Many Gram negative rods    Gram Stain (Respiratory) Few Gram positive rods    Gram Stain (Respiratory) Rare Gram positive cocci   Susceptibility      Pseudomonas aeruginosa      CULTURE, RESPIRATORY     Cefepime >16 mcg/mL Resistant     Ciprofloxacin >2 mcg/mL Resistant     Colistin .50 mcg/mL Sensitive1     Gentamicin <=4 mcg/mL Sensitive     Meropenem >8 mcg/mL Resistant     Piperacillin/Tazo >64 mcg/mL Resistant     Tobramycin <=4 mcg/mL Sensitive              Imaging Reviewed:   CXR   Unchanged and appropriate position of assist devices.  Unchanged right lung airspace disease.    09/13/2019 echo:  · Mild left ventricular enlargement.  · Low normal left ventricular systolic function. The estimated ejection fraction is 50%  · Grade III (severe) left ventricular diastolic dysfunction consistent with restrictive physiology.  · Elevated left atrial  pressure.  · Mechanically ventilated; cannot use inferior caval vein diameter to estimate central venous pressure.  · Moderate left atrial enlargement.  · Mild mitral regurgitation.  · Technically challenging study; contrast enhanced.      IMPRESSION & PLAN       Imp: extensive exposure to antibiotics resulting in MDRO pseudomonas in sputum. At the onset of this pneumonia he had a relatively sensitive organism. Repeat culture now reflects selection by antibiotics.  RUL infiltrate waxes and wanes, procalcitonin is normal x 2 and oxygen requirment has not increased.    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 Difficile again 10/7    Rec:  Stopped  cefepime 10/10/2019   Will dc tobra aerosols  soon.    Oral vanc QIDx  14d then taper and avoid more antibiotics in near future for chronic colonization of sputum and urine

## 2019-10-13 NOTE — ASSESSMENT & PLAN NOTE
Continue mechanical ventilation as before.  Supplemental O2 via nasal canula; titrate O2 saturation to >92%.   Follow Pulmonary recommendations.  Sputum positive for possible pseudomonas.   Continue beta 2 agonist bronchodilator treatments.   On tobramycin nebulization.    Microbiology Results (last 7 days)     Procedure Component Value Units Date/Time    IV catheter culture [[id number]] Collected:  10/08/19 2230    Order Status:  Completed Specimen:  Catheter Tip, NOS Updated:  10/11/19 1101     Aerobic Culture - Cath tip No growth    Narrative:       Left femoral TLC cath tip    Urine Culture High Risk [[id number]]  (Abnormal)  (Susceptibility) Collected:  10/07/19 1745    Order Status:  Completed Specimen:  Urine, Catheterized Updated:  10/10/19 1200     Urine Culture, Routine PSEUDOMONAS AERUGINOSA  10,000 - 49,999 cfu/ml      Narrative:       Indicated criteria for high risk culture:->Other  Other (specify):->recent gross hematuria and UTI    C Diff Toxin by PCR [[id number]]  (Abnormal) Collected:  10/07/19 1755    Order Status:  Completed Updated:  10/08/19 1339     C. diff PCR Positive    Culture, Respiratory with Gram Stain [[id number]]  (Abnormal)  (Susceptibility) Collected:  10/04/19 0020    Order Status:  Completed Specimen:  Respiratory from Tracheal Aspirate Updated:  10/08/19 1121     Respiratory Culture No S aureus isolated.      PSEUDOMONAS AERUGINOSA   Many  Normal respiratory gabriela also present       Gram Stain (Respiratory) <10 epithelial cells per low power field.     Gram Stain (Respiratory) Rare WBC's     Gram Stain (Respiratory) Many Gram negative rods     Gram Stain (Respiratory) Few Gram positive rods     Gram Stain (Respiratory) Rare Gram positive cocci    Blood culture #1 **CANNOT BE ORDERED STAT** [[id number]] Collected:  10/02/19 1305    Order Status:  Completed Specimen:  Blood Updated:  10/08/19 0612     Blood Culture, Routine No growth after 5 days.    Blood culture #2 **CANNOT BE  ORDERED STAT** [[id number]] Collected:  10/02/19 1305    Order Status:  Completed Specimen:  Blood Updated:  10/08/19 0612     Blood Culture, Routine No growth after 5 days.    Clostridium difficile EIA [[id number]]  (Abnormal) Collected:  10/07/19 1755    Order Status:  Completed Specimen:  Stool Updated:  10/08/19 0136     C. diff Antigen Positive     C difficile Toxins A+B, EIA Negative     Comment: Testing not recommended for children <24 months old.

## 2019-10-13 NOTE — ASSESSMENT & PLAN NOTE
Continue mechanical ventilation as before.  Supplemental O2 via nasal canula; titrate O2 saturation to >92%.   Follow Pulmonary recommendations.  Sputum positive for possible pseudomonas.   Continue beta 2 agonist bronchodilator treatments.   On tobramycin nebulization.    Microbiology Results (last 7 days)     Procedure Component Value Units Date/Time    IV catheter culture [[id number]] Collected:  10/08/19 2230    Order Status:  Completed Specimen:  Catheter Tip, NOS Updated:  10/11/19 1101     Aerobic Culture - Cath tip No growth    Narrative:       Left femoral TLC cath tip    Urine Culture High Risk [[id number]]  (Abnormal)  (Susceptibility) Collected:  10/07/19 1745    Order Status:  Completed Specimen:  Urine, Catheterized Updated:  10/10/19 1200     Urine Culture, Routine PSEUDOMONAS AERUGINOSA  10,000 - 49,999 cfu/ml      Narrative:       Indicated criteria for high risk culture:->Other  Other (specify):->recent gross hematuria and UTI    C Diff Toxin by PCR [[id number]]  (Abnormal) Collected:  10/07/19 1755    Order Status:  Completed Updated:  10/08/19 1339     C. diff PCR Positive    Culture, Respiratory with Gram Stain [[id number]]  (Abnormal)  (Susceptibility) Collected:  10/04/19 0020    Order Status:  Completed Specimen:  Respiratory from Tracheal Aspirate Updated:  10/08/19 1121     Respiratory Culture No S aureus isolated.      PSEUDOMONAS AERUGINOSA   Many  Normal respiratory gabriela also present       Gram Stain (Respiratory) <10 epithelial cells per low power field.     Gram Stain (Respiratory) Rare WBC's     Gram Stain (Respiratory) Many Gram negative rods     Gram Stain (Respiratory) Few Gram positive rods     Gram Stain (Respiratory) Rare Gram positive cocci    Blood culture #1 **CANNOT BE ORDERED STAT** [[id number]] Collected:  10/02/19 1305    Order Status:  Completed Specimen:  Blood Updated:  10/08/19 0612     Blood Culture, Routine No growth after 5 days.    Blood culture #2 **CANNOT BE  ORDERED STAT** [[id number]] Collected:  10/02/19 1305    Order Status:  Completed Specimen:  Blood Updated:  10/08/19 0612     Blood Culture, Routine No growth after 5 days.    Clostridium difficile EIA [[id number]]  (Abnormal) Collected:  10/07/19 1755    Order Status:  Completed Specimen:  Stool Updated:  10/08/19 0136     C. diff Antigen Positive     C difficile Toxins A+B, EIA Negative     Comment: Testing not recommended for children <24 months old.

## 2019-10-13 NOTE — SUBJECTIVE & OBJECTIVE
Interval History: No acute events overnight    Review of Systems   Unable to perform ROS: Patient nonverbal     Objective:     Vital Signs (Most Recent):  Temp: 97.9 °F (36.6 °C) (10/13/19 1600)  Pulse: (!) 58 (10/13/19 1600)  Resp: 20 (10/13/19 1600)  BP: 129/60 (10/13/19 1600)  SpO2: 98 % (10/13/19 1600) Vital Signs (24h Range):  Temp:  [97.6 °F (36.4 °C)-98.8 °F (37.1 °C)] 97.9 °F (36.6 °C)  Pulse:  [50-66] 58  Resp:  [7-24] 20  SpO2:  [92 %-100 %] 98 %  BP: ()/() 129/60     Weight: 127.4 kg (280 lb 13.9 oz)  Body mass index is 38.09 kg/m².    Intake/Output Summary (Last 24 hours) at 10/13/2019 1616  Last data filed at 10/13/2019 1600  Gross per 24 hour   Intake 2309 ml   Output 2475 ml   Net -166 ml      Physical Exam   Constitutional: He appears well-developed.   HENT:   Head: Normocephalic and atraumatic.   Nose: Nose normal. No septal deviation.   Mouth/Throat: Oropharynx is clear and moist.   Eyes: Pupils are equal, round, and reactive to light. Conjunctivae are normal.   Neck: No JVD present. No tracheal tenderness present. No tracheal deviation present. No thyroid mass present.   Tracheostomy site appears fine   Cardiovascular: Normal rate, regular rhythm, S1 normal and S2 normal. Exam reveals no gallop and no friction rub.   No murmur heard.  Pulmonary/Chest: Effort normal and breath sounds normal. He has no decreased breath sounds. He has no wheezes. He has no rhonchi. He has no rales.   Abdominal: Soft. Normal appearance and bowel sounds are normal. He exhibits no distension and no mass. There is no hepatosplenomegaly, splenomegaly or hepatomegaly. There is no tenderness.   Peg site appears fine   Musculoskeletal:   Trace pedal edema bilateral lower extremities   Neurological: He has normal strength. No cranial nerve deficit or sensory deficit.   Patient is more alert and responsive but unable to interact verbally.   Skin: No rash noted. No cyanosis.   Nursing note and vitals  reviewed.      Significant Labs:   BMP:   Recent Labs   Lab 10/13/19  0348   GLU 93   *   K 3.9      CO2 26   BUN 20   CREATININE 1.1   CALCIUM 9.4   MG 2.2     CBC:   Recent Labs   Lab 10/12/19  0345 10/13/19  0348   WBC 8.58 9.70   HGB 8.9* 9.6*   HCT 28.0* 30.4*    205       Significant Imaging: I have reviewed all pertinent imaging results/findings within the past 24 hours.

## 2019-10-14 NOTE — DISCHARGE SUMMARY
[facility] Medicine  Discharge Summary      Patient Name: Masood Messina  MRN: [id number]  Admission Date: 10/2/2019  Hospital Length of Stay: 12 days  Discharge Date and Time:  10/14/2019 1:32 PM  Attending Physician: Stone Cabrera MD   Discharging Provider: Stone Cabrera MD  Primary Care Provider: Jeramie Lombardi MD      HPI:   Patient is an 79-year-old morbidly obese male from [facility] with past medical history significant for multiple medical problems including chronic hypoxic hypercarbic respiratory failure (ventilator dependent status post tracheostomy), status post PEG tube placement, hypertension, hyperlipidemia, coronary artery disease, history of cerebrovascular accident, hypothyroidism and prior history of cerebrovascular accident who is functional quadriplegic is being admitted to Hospital Medicine from [facility] where patient presented with septic shock.  Upon arrival patient is unresponsive and blood pressure was noted to be around 72/48 mm of mercury.  Per ER physician reportedly patient had some hematuria for 2 days.  Patient was being treated for pneumonia recently. No further details of HPI.     * No surgery found *      Hospital Course:   Patient was managed for septic shock and has been weaned off intravenous pressor agents.  Patient is noted to have Pseudomonas species in urine culture and sputum cultures.  Pseudomonas is multi-drug resistant.  Infectious Disease specialist was consulted.  Patient is getting antibiotics as per Infectious Disease recommendations.  Patient has also developed acute C diff colitis for which receiving enteral vancomycin.  Hematuria has resolved, patient was managed by urologist. ID on 10/12.  Stopped  cefepime .  continued Oral vanc .     Consults:   Consults (From admission, onward)        Status Ordering Provider     Inpatient consult to Infectious Diseases  Once      Provider:  Cyndy Tyson MD    Completed SULTAN, AQIB     Inpatient consult to PICC team (NIAS)  Once     Provider:  (Not yet assigned)    Acknowledged SULTAN, AQIB     Inpatient consult to Pulmonology  Once     Provider:  Brayan Gasca MD    Completed SULTAN, AQIB     Inpatient consult to Registered Dietitian/Nutritionist  Once     Provider:  (Not yet assigned)    Completed SULTAN, AQIB     Inpatient consult to Urology  Once     Provider:  iLza Red MD    Completed GERARDO POST     IP consult to case management  Once     Provider:  (Not yet assigned)    Completed SULTAN, AQIB          * HCAP (healthcare-associated pneumonia)  Masood Messina is a 79 y.o. male who presents to the Emergency Department with septic shock.  This patient does have evidence of infective focus  My overall impression is healthcare associated pneumonia and UTI.  Will dc tobramycin nebulizations as per ID recs.  Cefepime has been discontinued.  continue contact isolation.    F/u cultures  Lab Results   Component Value Date    WBC 9.96 10/14/2019    HGB 9.5 (L) 10/14/2019    HCT 29.7 (L) 10/14/2019    MCV 92 10/14/2019     10/14/2019     Organ dysfunction indicated by altered mental status and acute kidney injury  Vital signs post fluid administrations were-    Temp Readings from Last 1 Encounters:   10/14/19 97.7 °F (36.5 °C) (Axillary)     BP Readings from Last 1 Encounters:   10/14/19 (!) 129/59     Pulse Readings from Last 1 Encounters:   10/14/19 (!) 52         Pneumonia of right upper lobe due to Pseudomonas species   On tobramycin nebulizations- Will dc tobramycin  Continue beta 2 agonist bronchodilator nebulization treatments.  Her respiratory toileting and frequent suctioning.  Antibiotics (From admission, onward)    Start     Stop Route Frequency Ordered    10/08/19 2100  tobramycin (PF) 300 mg/5 mL nebulizer solution 300 mg  (tobramycin (PF) (BRISA) 300 mg/5 mL nebulizer solution panel)      --  NEBULIZATION Every 12 hours 10/08/19 1508    10/07/19 1730  vancomycin 250mg / 10ml oral suspension 125 mg      -- Oral Every 6 hours 10/07/19 1720        Microbiology Results (last 7 days)     Procedure Component Value Units Date/Time    IV catheter culture [[id number]] Collected:  10/08/19 2230    Order Status:  Completed Specimen:  Catheter Tip, NOS Updated:  10/11/19 1101     Aerobic Culture - Cath tip No growth    Narrative:       Left femoral TLC cath tip    Urine Culture High Risk [[id number]]  (Abnormal)  (Susceptibility) Collected:  10/07/19 1745    Order Status:  Completed Specimen:  Urine, Catheterized Updated:  10/10/19 1200     Urine Culture, Routine PSEUDOMONAS AERUGINOSA  10,000 - 49,999 cfu/ml      Narrative:       Indicated criteria for high risk culture:->Other  Other (specify):->recent gross hematuria and UTI    C Diff Toxin by PCR [[id number]]  (Abnormal) Collected:  10/07/19 1755    Order Status:  Completed Updated:  10/08/19 1339     C. diff PCR Positive    Culture, Respiratory with Gram Stain [[id number]]  (Abnormal)  (Susceptibility) Collected:  10/04/19 0020    Order Status:  Completed Specimen:  Respiratory from Tracheal Aspirate Updated:  10/08/19 1121     Respiratory Culture No S aureus isolated.      PSEUDOMONAS AERUGINOSA   Many  Normal respiratory gabriela also present       Gram Stain (Respiratory) <10 epithelial cells per low power field.     Gram Stain (Respiratory) Rare WBC's     Gram Stain (Respiratory) Many Gram negative rods     Gram Stain (Respiratory) Few Gram positive rods     Gram Stain (Respiratory) Rare Gram positive cocci    Blood culture #1 **CANNOT BE ORDERED STAT** [[id number]] Collected:  10/02/19 1305    Order Status:  Completed Specimen:  Blood Updated:  10/08/19 0612     Blood Culture, Routine No growth after 5 days.    Blood culture #2 **CANNOT BE ORDERED STAT** [[id number]] Collected:  10/02/19 1305    Order Status:  Completed Specimen:  Blood Updated:  10/08/19 0612      Blood Culture, Routine No growth after 5 days.    Clostridium difficile EIA [[id number]]  (Abnormal) Collected:  10/07/19 1755    Order Status:  Completed Specimen:  Stool Updated:  10/08/19 0136     C. diff Antigen Positive     C difficile Toxins A+B, EIA Negative     Comment: Testing not recommended for children <24 months old.             Chronic respiratory failure with hypoxia  Continue mechanical ventilation as before.  Supplemental O2 via nasal canula; titrate O2 saturation to >92%.   Follow Pulmonary recommendations.  Sputum positive for possible pseudomonas.   Continue beta 2 agonist bronchodilator treatments.   On tobramycin nebulization.    Microbiology Results (last 7 days)     Procedure Component Value Units Date/Time    IV catheter culture [[id number]] Collected:  10/08/19 2230    Order Status:  Completed Specimen:  Catheter Tip, NOS Updated:  10/11/19 1101     Aerobic Culture - Cath tip No growth    Narrative:       Left femoral TLC cath tip    Urine Culture High Risk [[id number]]  (Abnormal)  (Susceptibility) Collected:  10/07/19 1745    Order Status:  Completed Specimen:  Urine, Catheterized Updated:  10/10/19 1200     Urine Culture, Routine PSEUDOMONAS AERUGINOSA  10,000 - 49,999 cfu/ml      Narrative:       Indicated criteria for high risk culture:->Other  Other (specify):->recent gross hematuria and UTI    C Diff Toxin by PCR [[id number]]  (Abnormal) Collected:  10/07/19 1755    Order Status:  Completed Updated:  10/08/19 1339     C. diff PCR Positive    Culture, Respiratory with Gram Stain [[id number]]  (Abnormal)  (Susceptibility) Collected:  10/04/19 0020    Order Status:  Completed Specimen:  Respiratory from Tracheal Aspirate Updated:  10/08/19 1121     Respiratory Culture No S aureus isolated.      PSEUDOMONAS AERUGINOSA   Many  Normal respiratory gabriela also present       Gram Stain (Respiratory) <10 epithelial cells per low power field.     Gram Stain (Respiratory) Rare WBC's     Gram  Stain (Respiratory) Many Gram negative rods     Gram Stain (Respiratory) Few Gram positive rods     Gram Stain (Respiratory) Rare Gram positive cocci    Blood culture #1 **CANNOT BE ORDERED STAT** [[id number]] Collected:  10/02/19 1305    Order Status:  Completed Specimen:  Blood Updated:  10/08/19 0612     Blood Culture, Routine No growth after 5 days.    Blood culture #2 **CANNOT BE ORDERED STAT** [[id number]] Collected:  10/02/19 1305    Order Status:  Completed Specimen:  Blood Updated:  10/08/19 0612     Blood Culture, Routine No growth after 5 days.    Clostridium difficile EIA [[id number]]  (Abnormal) Collected:  10/07/19 1755    Order Status:  Completed Specimen:  Stool Updated:  10/08/19 0136     C. diff Antigen Positive     C difficile Toxins A+B, EIA Negative     Comment: Testing not recommended for children <24 months old.                   C. difficile colitis  cont contact isolation.  Continue enteral vancomycin as per Infectious Disease recommendations.  Still requiring rectal tube due to ongoing liquid diarrhea.      Hypophosphatemia  Replete phosphorus and follow level.    Diastolic heart failure  Compensated.      Thrombocytopenia  Will continue to monitor  Lab Results   Component Value Date     10/14/2019         Sacral decubitus ulcer, stage II        Acquired hypothyroidism  Chronic problem. Will continue chronic medications and monitor for any changes, adjusting as needed.          PEG (percutaneous endoscopic gastrostomy) status  PEG care.        Final Active Diagnoses:    Diagnosis Date Noted POA    PRINCIPAL PROBLEM:  HCAP (healthcare-associated pneumonia) [J18.9] 03/21/2017 Yes    Pneumonia of right upper lobe due to Pseudomonas species [J15.1] 10/03/2019 Yes    Chronic respiratory failure with hypoxia [J96.11] 12/05/2013 Yes    C. difficile colitis [A04.72] 10/11/2019 No    Chronic pain [G89.29] 10/06/2019 Unknown    Hypophosphatemia [E83.39] 10/04/2019 No    Diastolic heart  failure [I50.30] 10/03/2019 Yes    Thrombocytopenia [D69.6] 09/13/2019 Yes    Coronary artery disease [I25.10] 09/12/2019 Yes    Anemia [D64.9] 09/12/2019 Yes    Sacral decubitus ulcer, stage II [L89.152] 10/14/2017 Yes    Urinary tract infection with hematuria [N39.0, R31.9] 03/21/2017 Yes    PEG (percutaneous endoscopic gastrostomy) status [Z93.1] 03/21/2017 Not Applicable    Acquired hypothyroidism [E03.9] 03/21/2017 Yes    Hematuria [R31.9] 01/23/2014 Yes    Functional quadriplegia [R53.2] 12/05/2013 Yes    Tracheostomy in place [Z93.0] 12/05/2013 Not Applicable      Problems Resolved During this Admission:       Discharged Condition: stable    Disposition: Home or Self Care    Follow Up:  Follow-up Information     Abiodun Robles MD In 6 weeks.    Specialty:  Urology  Why:  complete hematuria workup ( or Donna Michelle)  Contact information:  [address] [place] [address] DR  [address]  [place] [address]  [phone]             Cyndy Tyson MD In 4 weeks.    Specialty:  Infectious Diseases  Contact information:  [address] [place] [address]  [address]  [place] [address]  [phone]                 Patient Instructions:      Notify your health care provider if you experience any of the following:  temperature >100.4     Notify your health care provider if you experience any of the following:  severe uncontrolled pain     Tube Feedings/Formulas     Order Specific Question Answer Comments   Select Adult Formula: Nutren 1.5    Route: Gastrostomy    Formula Rate (mL/hr): 60      Activity as tolerated       Significant Diagnostic Studies: Labs:   CMP   Recent Labs   Lab 10/13/19  0348 10/14/19  0340   * 134*   K 3.9 4.1    100   CO2 26 26   GLU 93 85   BUN 20 21   CREATININE 1.1 1.0   CALCIUM 9.4 9.4   PROT 7.9 7.9   ALBUMIN 2.8* 2.8*   BILITOT 0.5 0.5   ALKPHOS 74 78   AST 18 21   ALT 15 16   ANIONGAP 8 8   ESTGFRAFRICA >60 >60   EGFRNONAA >60 >60    and CBC   Recent Labs   Lab 10/13/19  0348  10/14/19  0340   WBC 9.70 9.96   HGB 9.6* 9.5*   HCT 30.4* 29.7*    217     Microbiology:   Blood Culture   Lab Results   Component Value Date    LABBLOO No growth after 5 days. 10/02/2019    LABBLOO No growth after 5 days. 10/02/2019   , Sputum Culture   Lab Results   Component Value Date    GSRESP <10 epithelial cells per low power field. 10/04/2019    GSRESP Rare WBC's 10/04/2019    GSRESP Many Gram negative rods 10/04/2019    GSRESP Few Gram positive rods 10/04/2019    GSRESP Rare Gram positive cocci 10/04/2019    RESPIRATORYC No S aureus isolated. 10/04/2019    RESPIRATORYC (A) 10/04/2019     PSEUDOMONAS AERUGINOSA   Many  Normal respiratory gabriela also present      and Urine Culture    Lab Results   Component Value Date    LABURIN PSEUDOMONAS AERUGINOSA  10,000 - 49,999 cfu/ml   (A) 10/07/2019     Radiology:   X-Ray Chest AP Portable [[id number]] Resulted: 10/10/19 0828   Order Status: Completed Updated: 10/10/19 0830   Narrative:     EXAMINATION:  XR CHEST AP PORTABLE    CLINICAL HISTORY:  RUL pneumonia;    TECHNIQUE:  Single frontal view of the chest was performed.    COMPARISON:  10/09/2019 and 11/25/2013    FINDINGS:  Volume loss right lung with chronic elevation of the diaphragm.  Airspace disease right mid to upper lung zone.  Unchanged heart size with prior CABG.  Right-sided PICC line tip near the distal SVC.  Tracheostomy.  No pleural effusion or pneumothorax.   Impression:       Unchanged and appropriate position of assist devices.  Unchanged right lung airspace disease.      Electronically signed by: Shai Silva  Date: 10/10/2019  Time: 08:28   X-Ray Chest AP Portable [[id number]] Resulted: 10/09/19 0751   Order Status: Completed Updated: 10/09/19 0753   Narrative:     EXAMINATION:  XR CHEST AP PORTABLE    CLINICAL HISTORY:  RUL pneumonia;    TECHNIQUE:  Single frontal view of the chest was performed.    COMPARISON:  Chest of October 8, 2019.    FINDINGS:  A tracheostomy tube is noted in  place.  The patient has had a prior sternotomy.  There is chronic elevation of the right hemidiaphragm.  There is increasing density and consistent with infiltrate and atelectasis of the right upper lobe.  The left lung is clear.  A PICC line ends at the level of the superior vena cava.   Impression:       Tracheostomy tube and PICC line in place.  Prior CABG.  Increasing density of the right upper lobe consistent with infiltrate and atelectasis.  Chronic elevation of the right hemidiaphragm.      Electronically signed by: Alexsander Adrian MD  Date: 10/09/2019  Time: 07:51   X-Ray Chest 1 View S/P PICC Line by Nursing [[id number]] Resulted: 10/08/19 1750   Order Status: Completed Updated: 10/08/19 1753   Narrative:     EXAMINATION:  XR CHEST 1 VIEW S/P PICC LINE BY NURSING    CLINICAL HISTORY:  PICC placement;    TECHNIQUE:  Single frontal view of the chest    COMPARISON:  10/07/2019    FINDINGS:  Patient rotated slightly to the right.    Patchy airspace disease and volume loss in the right lung.  Elevated right hemidiaphragm.  Unchanged heart size with prior CABG.  Tracheostomy.  Right PICC line with tip along the distal SVC.  No pleural effusion or pneumothorax.  Moderate bilateral AC joint degenerative changes.  Partially imaged fusion hardware lower cervical spine.  Sternal wires.   Impression:       Appropriately positioned assist devices.  Unchanged right lung airspace disease.      Electronically signed by: Shai Silva  Date: 10/08/2019  Time: 17:50   X-Ray Chest AP Portable [[id number]] Resulted: 10/08/19 0815   Order Status: Completed Updated: 10/08/19 0817   Narrative:     EXAMINATION:  XR CHEST AP PORTABLE    CLINICAL HISTORY:  pneumonia, follow up;    TECHNIQUE:  Single frontal view of the chest was performed.    COMPARISON:  Prior chest of October 2, 2019.    FINDINGS:  A tracheostomy tube remains in position.  The patient has had a prior sternotomy.  There is chronic elevation of the right did  hemidiaphragm and atelectasis in the right midlung field.  There is decreased density of the right lung apex although infiltrate remains.  The left lung is clear.  No pneumothorax is seen.   Impression:       Mild decreased infiltrate at the right lung apex.  Continued atelectasis of the right midlung field and chronic elevation of the right hemidiaphragm.  Prior sternotomy.  Tracheostomy tube in position.         Pending Diagnostic Studies:     None         Medications:  Reconciled Home Medications:      Medication List      START taking these medications    vancomycin 250mg / 10ml Susp  Take 5 mLs (125 mg total) by mouth every 6 (six) hours for 7 days, THEN 5 mLs (125 mg total) 2 (two) times daily for 7 days, THEN 5 mLs (125 mg total) once daily for 7 days, THEN 5 mLs (125 mg total) Every 3 (three) days.  Start taking on:  October 14, 2019        CHANGE how you take these medications    * finasteride 5 mg tablet  Commonly known as:  PROSCAR  5 mg by PEG Tube route once daily.  What changed:  Another medication with the same name was added. Make sure you understand how and when to take each.     * finasteride 5 mg tablet  Commonly known as:  PROSCAR  Take 1 tablet (5 mg total) by mouth once daily.  Start taking on:  October 15, 2019  What changed:  You were already taking a medication with the same name, and this prescription was added. Make sure you understand how and when to take each.     furosemide 20 MG tablet  Commonly known as:  LASIX  Take 1 tablet (20 mg total) by mouth once daily.  What changed:  how to take this         * This list has 2 medication(s) that are the same as other medications prescribed for you. Read the directions carefully, and ask your doctor or other care provider to review them with you.            CONTINUE taking these medications    acetaminophen 160 mg/5 mL Elix  Commonly known as:  TYLENOL  650 mg by Per G Tube route every 4 (four) hours as needed.     baclofen 20 MG  tablet  Commonly known as:  LIORESAL  25 mg by PEG Tube route every 6 (six) hours.     BIOTENE DRY MOUTH MM  Swish and spit 15 mLs 4 (four) times daily. AND/OR PRN     busPIRone 5 MG Tab  Commonly known as:  BUSPAR  5 mg by Per G Tube route 2 (two) times daily.     carvedilol 3.125 MG tablet  Commonly known as:  COREG  Take 1 tablet (3.125 mg total) by mouth 2 (two) times daily.     CERTAVITE-ANTIOXID (IRON GLUC) 9 mg iron/ 15 mL (15 mL) Liqd  Generic drug:  multivit-min-ferrous gluconate  15 mLs by Per G Tube route once daily.     cholestyramine-aspartame 4 gram Pwpk  Commonly known as:  QUESTRAN LIGHT  1 packet (4 g total) by Per G Tube route 2 (two) times daily. Discontinue if no bowel movement in a day     DULoxetine 30 MG capsule  Commonly known as:  CYMBALTA  30 mg by PEG Tube route once daily.     DUONEB 2.5 mg-0.5 mg/3 mL nebulizer solution  Generic drug:  albuterol-ipratropium  Take 3 mLs by nebulization every 6 (six) hours as needed for Wheezing or Shortness of Breath.     enoxaparin 40 mg/0.4 mL Syrg  Commonly known as:  LOVENOX  Inject 40 mg into the skin once daily.     ferrous sulfate 220 mg (44 mg iron)/5 mL solution  220 mg by Per G Tube route once daily.     gabapentin 300 MG capsule  Commonly known as:  NEURONTIN  300 mg by Per G Tube route 3 (three) times daily.     HYDROcodone-acetaminophen  mg per tablet  Commonly known as:  NORCO  1 tablet by Per G Tube route every 6 (six) hours as needed for Pain.     levothyroxine 200 MCG tablet  Commonly known as:  SYNTHROID  Take 1 tablet (200 mcg total) by mouth once daily.     lidocaine 5 % Oint ointment  Commonly known as:  XYLOCAINE  Apply 1 g topically as needed (with each trach change).     nitroGLYCERIN 0.4 MG SL tablet  Commonly known as:  NITROSTAT  Place 0.4 mg under the tongue every 5 (five) minutes as needed for Chest pain. Seek medical help if pain is not relieved by the third dose.     polyethylene glycol 17 gram Pwpk  Commonly known as:   GLYCOLAX  17 g by Per G Tube route every evening.     potassium chloride 10% 20 mEq/15 mL oral solution  Commonly known as:  KAYCIEL  Take 10 mEq by mouth once daily. Per g tube     PROCTOZONE-HC 2.5 % rectal cream  Generic drug:  hydrocortisone  Place 1 application rectally 2 (two) times daily as needed for Hemorrhoids.     RISAMINE 0.44-20.6 % Oint  Generic drug:  menthol-zinc oxide  Apply 1 application topically once daily. AFTER EACH DIAPER CHANGE     rivastigmine 9.5 mg/24 hr Pt24  Commonly known as:  EXELON  Place 1 patch onto the skin once daily.     selenium sulfide 2.5 % Lotn  Apply 1 application topically twice a week. ON Tuesday AND SATURDAY     terazosin 1 MG capsule  Commonly known as:  HYTRIN  1 mg by PEG Tube route once daily.     traZODone 100 MG tablet  Commonly known as:  DESYREL  100 mg by Per G Tube route every evening.     VITAMIN C 500 mg/5 mL Syrp syrup  Generic drug:  ascorbic acid (vitamin C)  500 mg by PEG Tube route 2 (two) times daily.        STOP taking these medications    amoxicillin-clavulanate 875-125mg 875-125 mg per tablet  Commonly known as:  AUGMENTIN     bethanechol 10 MG Tab  Commonly known as:  URECHOLINE     ceFEPIme 1 gram/50 mL Pgbk  Commonly known as:  MAXIPIME     famotidine 20 MG tablet  Commonly known as:  PEPCID     ibuprofen 600 MG tablet  Commonly known as:  ADVIL,MOTRIN     lactulose 10 gram/15 mL solution  Commonly known as:  CHRONULAC            Indwelling Lines/Drains at time of discharge:   Lines/Drains/Airways     Peripherally Inserted Central Catheter Line                 PICC Double Lumen 10/08/19 1725 5 days          Drain                 Gastrostomy/Enterostomy -- days         Gastrostomy/Enterostomy  Gastrostomy tube w/ balloon;Gastrostomy-jejunostomy midline feeding -- days         Gastrostomy/Enterostomy 1335 Percutaneous endoscopic gastrostomy (PEG) LUQ feeding -- days         Urethral Catheter 10/07/17 0136 Latex 16 Fr. 737 days         Urethral  Catheter 10/02/19 2200 Coude 24 Fr. 11 days         Fecal Incontinence  10/07/19 1546 6 days          Airway                 Surgical Airway Portex Cuffed -- days         Surgical Airway Portex Cuffed;Fenestrated -- days          Pressure Ulcer                 Pressure Injury 02/21/14 1130 sacral spine Stage II 2061 days         Pressure Ulcer 10/07/17 0200 posterior sacral spine Stage  days                Time spent on the discharge of patient: 60 minutes  Patient was seen and examined on the date of discharge and determined to be suitable for discharge.    Critical care time spent on the evaluation and treatment of severe organ dysfunction, review of pertinent labs and imaging studies, discussions with consulting providers and discussions with patient/family: 60 minutes.     Stone Cabrera MD  Department of Hospital Medicine  [facility]

## 2019-10-14 NOTE — NURSING
Patient making multiple attempts to remove midline trach tubing / disconnected trach tubing X4. Note patient is trach dependent A/C 16/700/5/62%. Patient educated multiple time stating that he is not removing his trach that it is popping off. Note witnessed removal, with right hand, of trach tubing per patient.

## 2019-10-14 NOTE — NURSING
Report called to Geoffrey at [facility].    EMS called for transport.  Rahman remains as it is chronic.  PICC removed.  flexi remained for loose stool and okayed.  Belongings gathered.  Transport to arrive with in the hour.

## 2019-10-14 NOTE — PLAN OF CARE
Per Eneida with [facility]- she is going to have the nurse call for report at [phone] and the pt will transport via ambulance. I booked the pts discharge destination and updated the pts nurse. Agatha Perkins, ALEX     10/14/19 1459   Post-Acute Status   Post-Acute Authorization Placement   Post-Acute Placement Status Set-up Complete        10/14/19 1459   Post-Acute Status   Post-Acute Authorization Placement   Post-Acute Placement Status Set-up Complete

## 2019-10-14 NOTE — PLAN OF CARE
10/14/19 1000   Medicare Message   Important Message from Medicare regarding Discharge Appeal Rights   (patient unable to sign form/no family available/RN witness)   Date IMM was signed 10/14/19   Time IMM was signed 1000

## 2019-10-14 NOTE — ASSESSMENT & PLAN NOTE
Masood Messina is a 79 y.o. male who presents to the Emergency Department with septic shock.  This patient does have evidence of infective focus  My overall impression is healthcare associated pneumonia and UTI.  Will dc tobramycin nebulizations as per ID recs.  Cefepime has been discontinued.  continue contact isolation.    F/u cultures  Lab Results   Component Value Date    WBC 9.96 10/14/2019    HGB 9.5 (L) 10/14/2019    HCT 29.7 (L) 10/14/2019    MCV 92 10/14/2019     10/14/2019     Organ dysfunction indicated by altered mental status and acute kidney injury  Vital signs post fluid administrations were-    Temp Readings from Last 1 Encounters:   10/14/19 97.7 °F (36.5 °C) (Axillary)     BP Readings from Last 1 Encounters:   10/14/19 (!) 129/59     Pulse Readings from Last 1 Encounters:   10/14/19 (!) 52

## 2019-10-14 NOTE — PLAN OF CARE
I sent the pts AVs to [facility] for review and updated [facility] at [phone]. Agatha Perkins, ALEX     10/14/19 1413   Post-Acute Status   Post-Acute Authorization Placement   Post-Acute Placement Status Referrals Sent

## 2019-10-14 NOTE — PLAN OF CARE
10/14/19 0758   Patient Assessment/Suction   Level of Consciousness (AVPU) alert   Respiratory Effort Unlabored   All Lung Fields Breath Sounds coarse   Rhythm/Pattern, Respiratory assisted mechanically   Suction Method tracheal   $ Suction Charges Inline Suction Procedure Stat Charge   Secretions Amount small   Secretions Color white   Secretions Characteristics thick   PRE-TX-O2   O2 Device (Oxygen Therapy) ventilator   $ Is the patient on Low Flow Oxygen? Yes   Oxygen Concentration (%) 30   SpO2 100 %   Pulse Oximetry Type Continuous   $ Pulse Oximetry - Multiple Charge Pulse Oximetry - Multiple   Pulse (!) 59   Resp 20   /63   ETCO2   $ ETCO2 Charge Exhaled CO2 Monitoring   $ ETCO2 Usage Currently wearing   ETCO2 (mmHg) 29 mmHg   ETCO2 Device Type Bedside Monitor   Aerosol Therapy   $ Aerosol Therapy Charges Aerosol Treatment   Respiratory Treatment Status (SVN) given   Treatment Route (SVN) in-line   Patient Position (SVN) HOB elevated   Signs of Intolerance (SVN) none   Breath Sounds Post-Respiratory Treatment   Throughout All Fields Post-Treatment All Fields   Throughout All Fields Post-Treatment no change   Post-treatment Heart Rate (beats/min) 48   Post-treatment Resp Rate (breaths/min) 16   Wound Care   $ Wound Care Tech Time 15 min   Area of Concern Neck under tracheostomy   Skin Color/Characteristics without discoloration   Skin Temperature warm        Surgical Airway Portex Cuffed;Fenestrated   No Placement Date or Time found.   Present Prior to Hospital Arrival?: Yes  Inserted by: Present Prior to Hospital Arrival  Type: Tracheostomy  Brand: Portex  Airway Device Size: 8.0  Style: Cuffed;Fenestrated   Cuff Pressure 32 cm H2O   Cuff Inflation? Inflated   Status Secured   Vent Select   Conventional Vent Y   $ Ventilator Subsequent 1   Charged w/in last 24h YES   Preset Conventional Ventilator Settings   Vent Type    Ventilation Type VC   Vent Mode A/C   Set Rate 16 bmp   Vt Set 700 mL    PEEP/CPAP 5 cmH20   Pressure Support 0 cmH20   Waveform RAMP   Peak Flow 60 L/min   Set Inspiratory Pressure 0 cmH20   Insp Time 0 Sec(s)   Plateau Set/Insp. Hold (sec) 0   Insp Rise Time  0 %   Trigger Sensitivity Flow/I-Trigger 1.5 L/min   P High 0 cm H2O   P Low 0 cm H2O   T High 0 sec   T Low 0 sec   Patient Ventilator Parameters   Resp Rate Total 18 br/min   Peak Airway Pressure 47 cmH2O   Mean Airway Pressure 16 cmH20   Plateau Pressure 34 cmH20   Exhaled Vt 635 mL   Total Ve 10.7 mL   Spont Ve 0 L   I:E Ratio Measured 1:1.10   Conventional Ventilator Alarms   Alarms On Y   Ve High Alarm 30 L/min   Resp Rate High Alarm 40 br/min   Press High Alarm 55 cmH2O   Apnea Rate 16   Apnea Volume (mL) 700 mL   Apnea Oxygen Concentration  100   Apnea Flow Rate (L/min) 60   T Apnea 20 sec(s)   Ready to Wean/Extubation Screen   FIO2<=50 (chart decimal) 0.3   MV<16L (chart vol.) 10.7   PEEP <=8 (chart #) 5   Ready to Wean Parameters   F/VT Ratio<105 (RSBI) (!) 31.5   The vent alarms are set and functioning with am ambu bag, mask and flowmeter @ [facility].

## 2019-10-14 NOTE — PLAN OF CARE
10/14/19 1518   Final Note   Assessment Type Final Discharge Note   [facility]

## 2019-10-14 NOTE — PLAN OF CARE
Remains in ICU. Chronic trach/vent. Removed/attempt to remove trach/vent multiple times this shift. Education unsuccessful to prevent patient removal of trach/vent tubing. PERRY Sims NP aware. PEG feedings at goal and tolerating. ABT per inhalation treatment and oral vanc per PEG. Adequate urine noted to gravity per tay/yellow/clear. Loose stool/brown to fecal incontinence system. Afebrile. Able to communicate but difficult to understand. SR/SB this shift. Safety maintained. POC discussed.

## 2019-10-14 NOTE — PROGRESS NOTES
10/13/19 2024   Patient Assessment/Suction   Level of Consciousness (AVPU) alert   Respiratory Effort Normal;Unlabored   Expansion/Accessory Muscles/Retractions expansion symmetric   All Lung Fields Breath Sounds coarse   Cough Frequency with stimulation   Suction Method tracheal   $ Suction Charges Inline Suction Procedure Stat Charge   Secretions Amount large   Secretions Color yellow;white   Secretions Characteristics thick   PRE-TX-O2   O2 Device (Oxygen Therapy) ventilator   Oxygen Concentration (%) 30   SpO2 97 %   Pulse Oximetry Type Continuous   Pulse 63   Resp 20   ETCO2   ETCO2 (mmHg) 30 mmHg   Aerosol Therapy   $ Aerosol Therapy Charges Aerosol Treatment   Respiratory Treatment Status (SVN) given   Treatment Route (SVN) in-line   Patient Position (SVN) HOB elevated   Post Treatment Assessment (SVN) breath sounds unchanged   Signs of Intolerance (SVN) none   Breath Sounds Post-Respiratory Treatment   Post-treatment Heart Rate (beats/min) 66   Post-treatment Resp Rate (breaths/min) 18   Wound Care   Area of Concern Neck under tracheostomy   Skin Color/Characteristics without discoloration   Skin Temperature warm        Surgical Airway Portex Cuffed;Fenestrated   No Placement Date or Time found.   Present Prior to Hospital Arrival?: Yes  Inserted by: Present Prior to Hospital Arrival  Type: Tracheostomy  Brand: Portex  Airway Device Size: 8.0  Style: Cuffed;Fenestrated   Cuff Pressure 35 cm H2O   Cuff Inflation? Inflated   Ties Assessment Clean;Secure   Vent Select   Conventional Vent Y   Charged w/in last 24h YES   Preset Conventional Ventilator Settings   Vent Type    Ventilation Type VC   Vent Mode A/C   Humidity HME   Set Rate 16 bmp   Vt Set 700 mL   PEEP/CPAP 5 cmH20   Pressure Support 0 cmH20   Waveform RAMP   Peak Flow 62 L/min   Set Inspiratory Pressure 0 cmH20   Insp Time 0 Sec(s)   Plateau Set/Insp. Hold (sec) 0   Insp Rise Time  0 %   Trigger Sensitivity Flow/I-Trigger 1.5 L/min   P High  0 cm H2O   P Low 0 cm H2O   T High 0 sec   T Low 0 sec   Patient Ventilator Parameters   Resp Rate Total 30 br/min   Peak Airway Pressure 55 cmH2O   Mean Airway Pressure 20 cmH20   Plateau Pressure 34 cmH20   Exhaled Vt 466 mL   Total Ve 13.9 mL   Spont Ve 0 L   I:E Ratio Measured 1:2.90   Conventional Ventilator Alarms   Ve High Alarm 30 L/min   Resp Rate High Alarm 40 br/min   Press High Alarm 55 cmH2O   Apnea Rate 16   Apnea Volume (mL) 700 mL   Apnea Oxygen Concentration  100   Apnea Flow Rate (L/min) 60   T Apnea 20 sec(s)   Ready to Wean/Extubation Screen   FIO2<=50 (chart decimal) 0.3   MV<16L (chart vol.) 13.9   PEEP <=8 (chart #) 5   Ready to Wean Parameters   F/VT Ratio<105 (RSBI) (!) 42.92

## 2019-10-14 NOTE — PLAN OF CARE
Pt discharged back to [facility].  PICC line removed.  Ok per phys to sent pt back to [facility] with flexi seal d/t loose stool.  Receiving nurse Geoffrey in agreeance.  Kphos given per PEG.  Pt aware of returning.  Belongings gathered.  Awaiting EMS for transport.

## 2019-10-14 NOTE — ASSESSMENT & PLAN NOTE
On tobramycin nebulizations- Will dc tobramycin  Continue beta 2 agonist bronchodilator nebulization treatments.  Her respiratory toileting and frequent suctioning.  Antibiotics (From admission, onward)    Start     Stop Route Frequency Ordered    10/08/19 2100  tobramycin (PF) 300 mg/5 mL nebulizer solution 300 mg  (tobramycin (PF) (BRISA) 300 mg/5 mL nebulizer solution panel)      -- NEBULIZATION Every 12 hours 10/08/19 1508    10/07/19 1730  vancomycin 250mg / 10ml oral suspension 125 mg      -- Oral Every 6 hours 10/07/19 1720        Microbiology Results (last 7 days)     Procedure Component Value Units Date/Time    IV catheter culture [[id number]] Collected:  10/08/19 2230    Order Status:  Completed Specimen:  Catheter Tip, NOS Updated:  10/11/19 1101     Aerobic Culture - Cath tip No growth    Narrative:       Left femoral TLC cath tip    Urine Culture High Risk [[id number]]  (Abnormal)  (Susceptibility) Collected:  10/07/19 1745    Order Status:  Completed Specimen:  Urine, Catheterized Updated:  10/10/19 1200     Urine Culture, Routine PSEUDOMONAS AERUGINOSA  10,000 - 49,999 cfu/ml      Narrative:       Indicated criteria for high risk culture:->Other  Other (specify):->recent gross hematuria and UTI    C Diff Toxin by PCR [[id number]]  (Abnormal) Collected:  10/07/19 1755    Order Status:  Completed Updated:  10/08/19 1339     C. diff PCR Positive    Culture, Respiratory with Gram Stain [[id number]]  (Abnormal)  (Susceptibility) Collected:  10/04/19 0020    Order Status:  Completed Specimen:  Respiratory from Tracheal Aspirate Updated:  10/08/19 1121     Respiratory Culture No S aureus isolated.      PSEUDOMONAS AERUGINOSA   Many  Normal respiratory gabriela also present       Gram Stain (Respiratory) <10 epithelial cells per low power field.     Gram Stain (Respiratory) Rare WBC's     Gram Stain (Respiratory) Many Gram negative rods     Gram Stain (Respiratory) Few Gram positive rods     Gram Stain  (Respiratory) Rare Gram positive cocci    Blood culture #1 **CANNOT BE ORDERED STAT** [[id number]] Collected:  10/02/19 1305    Order Status:  Completed Specimen:  Blood Updated:  10/08/19 0612     Blood Culture, Routine No growth after 5 days.    Blood culture #2 **CANNOT BE ORDERED STAT** [[id number]] Collected:  10/02/19 1305    Order Status:  Completed Specimen:  Blood Updated:  10/08/19 0612     Blood Culture, Routine No growth after 5 days.    Clostridium difficile EIA [[id number]]  (Abnormal) Collected:  10/07/19 1755    Order Status:  Completed Specimen:  Stool Updated:  10/08/19 0136     C. diff Antigen Positive     C difficile Toxins A+B, EIA Negative     Comment: Testing not recommended for children <24 months old.

## 2019-10-14 NOTE — ASSESSMENT & PLAN NOTE
Continue mechanical ventilation as before.  Supplemental O2 via nasal canula; titrate O2 saturation to >92%.   Follow Pulmonary recommendations.  Sputum positive for possible pseudomonas.   Continue beta 2 agonist bronchodilator treatments.   On tobramycin nebulization.    Microbiology Results (last 7 days)     Procedure Component Value Units Date/Time    IV catheter culture [[id number]] Collected:  10/08/19 2230    Order Status:  Completed Specimen:  Catheter Tip, NOS Updated:  10/11/19 1101     Aerobic Culture - Cath tip No growth    Narrative:       Left femoral TLC cath tip    Urine Culture High Risk [[id number]]  (Abnormal)  (Susceptibility) Collected:  10/07/19 1745    Order Status:  Completed Specimen:  Urine, Catheterized Updated:  10/10/19 1200     Urine Culture, Routine PSEUDOMONAS AERUGINOSA  10,000 - 49,999 cfu/ml      Narrative:       Indicated criteria for high risk culture:->Other  Other (specify):->recent gross hematuria and UTI    C Diff Toxin by PCR [[id number]]  (Abnormal) Collected:  10/07/19 1755    Order Status:  Completed Updated:  10/08/19 1339     C. diff PCR Positive    Culture, Respiratory with Gram Stain [[id number]]  (Abnormal)  (Susceptibility) Collected:  10/04/19 0020    Order Status:  Completed Specimen:  Respiratory from Tracheal Aspirate Updated:  10/08/19 1121     Respiratory Culture No S aureus isolated.      PSEUDOMONAS AERUGINOSA   Many  Normal respiratory gabriela also present       Gram Stain (Respiratory) <10 epithelial cells per low power field.     Gram Stain (Respiratory) Rare WBC's     Gram Stain (Respiratory) Many Gram negative rods     Gram Stain (Respiratory) Few Gram positive rods     Gram Stain (Respiratory) Rare Gram positive cocci    Blood culture #1 **CANNOT BE ORDERED STAT** [[id number]] Collected:  10/02/19 1305    Order Status:  Completed Specimen:  Blood Updated:  10/08/19 0612     Blood Culture, Routine No growth after 5 days.    Blood culture #2 **CANNOT BE  ORDERED STAT** [[id number]] Collected:  10/02/19 1305    Order Status:  Completed Specimen:  Blood Updated:  10/08/19 0612     Blood Culture, Routine No growth after 5 days.    Clostridium difficile EIA [[id number]]  (Abnormal) Collected:  10/07/19 1755    Order Status:  Completed Specimen:  Stool Updated:  10/08/19 0136     C. diff Antigen Positive     C difficile Toxins A+B, EIA Negative     Comment: Testing not recommended for children <24 months old.

## 2019-10-14 NOTE — DISCHARGE INSTRUCTIONS
Thank you for choosing [facility] for your medical care. The primary doctor who is taking care of you at the time of your discharge is Stone Cabrera MD.     You were admitted to the hospital with HCAP (healthcare-associated pneumonia).     Please note your discharge instructions, including diet/activity restrictions, follow-up appointments, and medication changes.  If you have any questions about your medical issues, prescriptions, or any other questions, please feel free to contact the [facility] Medicine Dept at [phone] and we will help.    If you are previously with Home health, outpatient PT/OT or under a therapy program, you are cleared to return to those programs.    Please direct all long term medication refills and follow up to your primary care provider, Jeramie Lombardi MD. Thank you again for letting us take care of your health care needs.    Please note the following discharge instructions per your discharging physician-  - Need to continue Vancomycin PO   125 mg orally 4 times daily for 7 days, then  125 mg orally twice daily for 7 days, then  125 mg orally once daily for 7 days, then  125 mg orally every 3 days for 30 days    Tay catheter changed 10/6/19 to 16fr coude.   Want to ensure urine remains clear.    On discharge back to [facility] recommend- gross hematuria likely secondary to trauma if new catheter not hubbed before balloon inflated - UROLOGY RECOMMENDATIONS  -continuing finasteride (through [facility])  -discontinuing bethanochol (pt is catheter dependent)  -complete UTI treatment bc pt is having gross hematuria, otherwise would avoid treatment of asymptomatic bacteriuria  -tay catheter change every 4 weeks with 16 Hebrew coude with 10cc of water in balloon. He has edematous scrotum with buried penis. When catheter is changed they need to make sure to hub the catheter before inflating the balloon or they will likely cause trauma to prostate.   -follow-up with  /urology NP for outpt workup of hematuria (last cysto 5 years ago)

## 2019-10-15 NOTE — PROGRESS NOTES
[facility] here to  patient to transfer to [place]. Connected to their portable vent and oxygen and monitor. Resp even and unlabored. Pt very alert and talkative. 310cc yellow urine emptied out of tay. Flexiseal intact. Paperwork sent with ambulance personnel.No distress noted.

## 2019-10-15 NOTE — PLAN OF CARE
10/14/19 1927   Patient Assessment/Suction   Level of Consciousness (AVPU) alert   Respiratory Effort Normal;Unlabored   Expansion/Accessory Muscles/Retractions expansion symmetric   All Lung Fields Breath Sounds coarse   Rhythm/Pattern, Respiratory assisted mechanically   Cough Frequency with stimulation   Cough Type good   Suction Method tracheal   $ Suction Charges Inline Suction Procedure Stat Charge   Secretions Amount moderate   Secretions Color pale;yellow   Secretions Characteristics thick   PRE-TX-O2   O2 Device (Oxygen Therapy) ventilator   Oxygen Concentration (%) 30   SpO2 98 %   Pulse Oximetry Type Continuous   Pulse 70   Resp 16   ETCO2   ETCO2 (mmHg) 28 mmHg   Aerosol Therapy   $ Aerosol Therapy Charges Aerosol Treatment   Respiratory Treatment Status (SVN) given   Treatment Route (SVN) in-line   Patient Position (SVN) HOB elevated   Post Treatment Assessment (SVN) breath sounds unchanged   Signs of Intolerance (SVN) none   Breath Sounds Post-Respiratory Treatment   Post-treatment Heart Rate (beats/min) 70   Post-treatment Resp Rate (breaths/min) 16   Wound Care   Area of Concern Neck under tracheostomy   Skin Color/Characteristics without discoloration   Skin Temperature warm        Surgical Airway Portex Cuffed;Fenestrated   No Placement Date or Time found.   Present Prior to Hospital Arrival?: Yes  Inserted by: Present Prior to Hospital Arrival  Type: Tracheostomy  Brand: Portex  Airway Device Size: 8.0  Style: Cuffed;Fenestrated   Cuff Pressure 36 cm H2O   Cuff Inflation? Inflated   Site Assessment Clean   Ties Assessment Secure   Vent Select   Conventional Vent Y   Charged w/in last 24h YES   Preset Conventional Ventilator Settings   Vent Type    Ventilation Type VC   Vent Mode A/C   Humidity HME   Set Rate 16 bmp   Vt Set 700 mL   PEEP/CPAP 5 cmH20   Pressure Support 0 cmH20   Waveform RAMP   Peak Flow 60 L/min   Set Inspiratory Pressure 0 cmH20   Insp Time 0 Sec(s)   Plateau Set/Insp.  Hold (sec) 0   Insp Rise Time  0 %   Trigger Sensitivity Flow/I-Trigger 1.5 L/min   P High 0 cm H2O   P Low 0 cm H2O   T High 0 sec   T Low 0 sec   Patient Ventilator Parameters   Resp Rate Total 17 br/min   Peak Airway Pressure 55 cmH2O   Mean Airway Pressure 17 cmH20   Plateau Pressure 34 cmH20   Exhaled Vt 893 mL   Total Ve 22.4 mL   Spont Ve 0 L   I:E Ratio Measured 1.20:1   Conventional Ventilator Alarms   Ve High Alarm 30 L/min   Resp Rate High Alarm 40 br/min   Press High Alarm 55 cmH2O   Apnea Rate 16   Apnea Volume (mL) 700 mL   Apnea Oxygen Concentration  100   Apnea Flow Rate (L/min) 60   T Apnea 20 sec(s)   Ready to Wean/Extubation Screen   FIO2<=50 (chart decimal) 0.3   MV<16L (chart vol.) (!) 22.4   PEEP <=8 (chart #) 5   Ready to Wean Parameters   F/VT Ratio<105 (RSBI) (!) 17.92

## 2019-11-05 PROBLEM — D72.829 LEUKOCYTOSIS: Status: ACTIVE | Noted: 2019-01-01

## 2019-11-05 PROBLEM — D63.8 ANEMIA, CHRONIC DISEASE: Status: ACTIVE | Noted: 2019-01-01

## 2019-11-05 PROBLEM — Z16.24 INFECTION DUE TO MULTIDRUG-RESISTANT PSEUDOMONAS AERUGINOSA: Status: ACTIVE | Noted: 2019-01-01

## 2019-11-05 PROBLEM — A41.9 SEVERE SEPSIS: Status: ACTIVE | Noted: 2019-01-01

## 2019-11-05 PROBLEM — Z86.19 HISTORY OF MDR PSEUDOMONAS AERUGINOSA INFECTION: Status: ACTIVE | Noted: 2019-01-01

## 2019-11-05 PROBLEM — R65.20 SEVERE SEPSIS: Status: ACTIVE | Noted: 2019-01-01

## 2019-11-05 PROBLEM — A41.9 SEVERE SEPSIS WITH SEPTIC SHOCK: Status: ACTIVE | Noted: 2019-01-01

## 2019-11-05 PROBLEM — D69.6 THROMBOCYTOPENIA: Status: RESOLVED | Noted: 2019-01-01 | Resolved: 2019-01-01

## 2019-11-05 PROBLEM — R65.21 SEVERE SEPSIS WITH SEPTIC SHOCK: Status: ACTIVE | Noted: 2019-01-01

## 2019-11-05 PROBLEM — A49.8 INFECTION DUE TO MULTIDRUG-RESISTANT PSEUDOMONAS AERUGINOSA: Status: ACTIVE | Noted: 2019-01-01

## 2019-11-05 PROBLEM — E87.70 VOLUME OVERLOAD: Status: ACTIVE | Noted: 2019-01-01

## 2019-11-05 PROBLEM — G92.9 ENCEPHALOPATHY, TOXIC: Status: ACTIVE | Noted: 2019-01-01

## 2019-11-05 PROBLEM — E87.20 LACTIC ACIDOSIS: Status: ACTIVE | Noted: 2019-01-01

## 2019-11-05 PROBLEM — R60.1 ANASARCA: Status: ACTIVE | Noted: 2019-01-01

## 2019-11-05 PROBLEM — E87.1 HYPONATREMIA WITH EXCESS EXTRACELLULAR FLUID VOLUME: Status: ACTIVE | Noted: 2019-01-01

## 2019-11-05 PROBLEM — N13.9 OBSTRUCTIVE UROPATHY: Status: ACTIVE | Noted: 2019-01-01

## 2019-11-05 NOTE — H&P
[facility]  History & Physical    Patient Name: Masood Messina  MRN: [id number]  Admission Date: 11/5/2019  Attending Physician: Maldonado Gil Jr., *   Primary Care Provider: Jeramie Lombardi MD         Patient information was obtained from patient and ER records.     Subjective:     Principal Problem:Severe sepsis with septic shock    Chief Complaint:   Chief Complaint   Patient presents with    Fever    Altered Mental Status        HPI: 79-year-old nursing home resident([place]) with past medical history of chronic respiratory failure status post trach and PEG due to stroke, CAD status post CABG, hypertension, COPD,BPH presented from [facility] due to altered mental status, high fever and significant leukocytosis.  This is his 3rd admission with the same complaint in a span of 45 days  History mainly from charts, nursing home notes and ER MD and staff  At the moment he is getting p.o. vancomycin at retirement for C diff colitis  In the emergency room he was found to be severely septic with significant shock and also he was hyperkalemic  His last sputum culture per chart review grew multidrug resistant Pseudomonas  No further information available    Past Medical History:   Diagnosis Date    AAA (abdominal aortic aneurysm)     Anemia     BPH (benign prostatic hyperplasia)     CHF (congestive heart failure)     COPD (chronic obstructive pulmonary disease)     Coronary artery disease     Dementia     Dementia     Depression     GERD (gastroesophageal reflux disease)     Hyperlipidemia     Hypertension     Hypothyroid     Renal disorder     Respiratory failure, chronic     Ventilator dependence        Past Surgical History:   Procedure Laterality Date    ABDOMINAL SURGERY      CARDIAC SURGERY  1999    GASTROSTOMY TUBE PLACEMENT      SPINE SURGERY      TRACHEOSTOMY TUBE PLACEMENT         Review of patient's allergies indicates:   Allergen  Reactions    Codeine Other (See Comments)       No current facility-administered medications on file prior to encounter.      Current Outpatient Medications on File Prior to Encounter   Medication Sig    ascorbic acid, vitamin C, (VITAMIN C) 500 mg/5 mL Syrp syrup 500 mg by PEG Tube route 2 (two) times daily.     baclofen (LIORESAL) 20 MG tablet 25 mg by PEG Tube route every 6 (six) hours.     banana flakes-t-galactooligos. (BANATROL PLUS) PwPk Take 1 packet by mouth 2 (two) times daily. Mix with water    busPIRone (BUSPAR) 5 MG Tab 5 mg by Per G Tube route 2 (two) times daily.    carvedilol (COREG) 3.125 MG tablet Take 1 tablet (3.125 mg total) by mouth 2 (two) times daily.    chlorhexidine (HIBICLENS) 4 % external liquid Apply 1 application topically 3 (three) times a week. On bath days    cholestyramine-aspartame (QUESTRAN LIGHT) 4 gram PwPk 1 packet (4 g total) by Per G Tube route 2 (two) times daily. Discontinue if no bowel movement in a day    duloxetine (CYMBALTA) 30 MG capsule 30 mg by PEG Tube route once daily.     enoxaparin (LOVENOX) 40 mg/0.4 mL Syrg Inject 40 mg into the skin once daily.    ferrous sulfate 220 mg (44 mg iron)/5 mL solution 220 mg by Per G Tube route once daily.    finasteride (PROSCAR) 5 mg tablet Take 1 tablet (5 mg total) by mouth once daily.    furosemide (LASIX) 20 MG tablet Take 1 tablet (20 mg total) by mouth once daily. (Patient taking differently: 20 mg by Per G Tube route once daily. )    gabapentin (NEURONTIN) 300 MG capsule 300 mg by Per G Tube route 3 (three) times daily.    HYDROcodone-acetaminophen (NORCO)  mg per tablet Take 1 tablet by mouth every 12 (twelve) hours as needed for Pain.    lactose-reduced food (ISOSOURCE HN FEEDING TUBE) 70 mLs by FEEDING TUBE route once daily. 70 ml per hour x22 hrs daily. Hold 1 hr before and after giving synthroid    levothyroxine (SYNTHROID) 200 MCG tablet Take 1 tablet (200 mcg total) by mouth once daily.     menthol-zinc oxide (RISAMINE) 0.44-20.6 % Oint Apply 1 application topically once daily. AFTER EACH DIAPER CHANGE    multivit-min-ferrous gluconate (CERTAVITE-ANTIOXID, IRON GLUC,) 9 mg iron/ 15 mL (15 mL) Liqd 15 mLs by Per G Tube route once daily.     polyethylene glycol (GLYCOLAX) 17 gram PwPk 17 g by Per G Tube route every evening.     potassium chloride 10% (KAYCIEL) 20 mEq/15 mL oral solution Take 10 mEq by mouth once daily. Per g tube     rivastigmine (EXELON) 9.5 mg/24 hr PT24 Place 1 patch onto the skin once daily.    Saccharomyces boulardii (FLORASTOR) 250 mg capsule Take 250 mg by mouth 2 (two) times daily.    saw/vit E/sod sue/lyc/beta/pyg (PROSTATE HEALTH ORAL) Take 30 mLs by mouth 2 (two) times daily.    terazosin (HYTRIN) 1 MG capsule 1 mg by PEG Tube route once daily.    traZODone (DESYREL) 100 MG tablet 100 mg by Per G Tube route every evening.     acetaminophen (TYLENOL) 160 mg/5 mL Elix 650 mg by Per G Tube route every 4 (four) hours as needed.    albuterol-ipratropium 2.5mg-0.5mg/3mL (DUONEB) 0.5 mg-3 mg(2.5 mg base)/3 mL nebulizer solution Take 3 mLs by nebulization every 6 (six) hours as needed for Wheezing or Shortness of Breath.     finasteride (PROSCAR) 5 mg tablet 5 mg by PEG Tube route once daily.     hydrocodone-acetaminophen 10-325mg (NORCO)  mg Tab 1 tablet by Per G Tube route every 6 (six) hours as needed for Pain.    hydrocortisone (PROCTOZONE-HC) 2.5 % rectal cream Place 1 application rectally 2 (two) times daily as needed for Hemorrhoids.    ibuprofen (ADVIL,MOTRIN) 600 MG tablet Take 600 mg by mouth every 6 (six) hours as needed for Pain.    Lactoperoxi/Gluc Oxid/Pot Thio (BIOTENE DRY MOUTH MM) Swish and spit 15 mLs 4 (four) times daily. AND/OR PRN    lidocaine (XYLOCAINE) 5 % Oint ointment Apply 1 g topically as needed (with each trach change).    nitroGLYCERIN (NITROSTAT) 0.4 MG SL tablet Place 0.4 mg under the tongue every 5 (five) minutes as needed for  Chest pain. Seek medical help if pain is not relieved by the third dose.    selenium sulfide 2.5 % Lotn Apply 1 application topically twice a week. ON Tuesday AND SATURDAY    vancomycin 250mg / 10ml Susp Take 5 mLs (125 mg total) by mouth every 6 (six) hours for 7 days, THEN 5 mLs (125 mg total) 2 (two) times daily for 7 days, THEN 5 mLs (125 mg total) once daily for 7 days, THEN 5 mLs (125 mg total) Every 3 (three) days.     Family History     None        Tobacco Use    Smoking status: Former Smoker    Smokeless tobacco: Never Used   Substance and Sexual Activity    Alcohol use: No    Drug use: No    Sexual activity: Never     Review of Systems   Unable to perform ROS: Mental status change     Objective:     Vital Signs (Most Recent):  Temp: (!) 101.6 °F (38.7 °C) (11/05/19 1329)  Pulse: 86 (11/05/19 1500)  Resp: 18 (11/05/19 1430)  BP: (!) 84/49 (11/05/19 1500)  SpO2: 99 % (11/05/19 1500) Vital Signs (24h Range):  Temp:  [101.6 °F (38.7 °C)] 101.6 °F (38.7 °C)  Pulse:  [65-86] 86  Resp:  [18-20] 18  SpO2:  [99 %-100 %] 99 %  BP: (84-93)/(47-55) 84/49     Weight: 110.2 kg (243 lb)  Body mass index is 32.96 kg/m².    Physical Exam   Constitutional: He appears well-developed.   HENT:   Right Ear: External ear normal.   Left Ear: External ear normal.   Eyes: Pupils are equal, round, and reactive to light.   Neck: Normal range of motion and full passive range of motion without pain.   Trach insitu   Cardiovascular: Normal rate and regular rhythm.   Tachycardic   Pulmonary/Chest: Effort normal and breath sounds normal.   Reduced air entry to bases   Abdominal: Soft. Bowel sounds are normal.   PEG tube insitu   Genitourinary: Penis normal.   Genitourinary Comments: Rahman catheter   Musculoskeletal: He exhibits edema.   Neurological:   Patient dependent on Vent   Skin: Skin is warm.   Nursing note and vitals reviewed.        CRANIAL NERVES     CN III, IV, VI   Pupils are equal, round, and reactive to light.        Significant Labs:   CBC:   Recent Labs   Lab 11/05/19  1345   WBC 28.44*   HGB 10.4*   HCT 33.4*        CMP:   Recent Labs   Lab 11/05/19  1345   *   K 6.3*   CL 92*   CO2 29   GLU 96   BUN 66*   CREATININE 2.5*   CALCIUM 8.9   PROT 8.2   ALBUMIN 2.9*   BILITOT 1.3*   ALKPHOS 84   AST 21   ALT 15   ANIONGAP 9   EGFRNONAA 23.4*       Significant Imaging: I have reviewed all pertinent imaging results/findings within the past 24 hours.    Assessment/Plan:     * Severe sepsis with septic shock  Patient getting admitted with severe sepsis with septic shock  He will be started on IV fluid, IV pressors and broad range spectrum of IV antibiotics  He will be started on IV vancomycin along with IV cefepime  Chart review indicating that he has got multidrug resistant Pseudomonas  Will consult Infectious Disease doctor  At this moment will not start patient on colistin in view with acute kidney injury      Chronic respiratory failure with hypoxia  Patient has got a trach and is vent dependent  Culture sputum from previously grew multidrug resistant Pseudomonas      C. difficile colitis  At the moment patient is on p.o. 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  As above      Diastolic heart failure  Stable issue  Will monitor carefully      Essential hypertension  At the moment patient's blood pressure is on the lower range      Coronary artery disease  Stable issue      Chronic obstructive pulmonary disease  Stable issue at the moment  Chest x-ray did not showed any significant abnormalities       Hemiparesis affecting dominant side as late effect of stroke  Residuals from  previous CVA      PEG (percutaneous endoscopic gastrostomy) status  PEG insitu      Tracheostomy in place  Patient will be admitted to ICU and need vent support         VTE Risk Mitigation (From admission, onward)    None             Yoel Lopes MD  Department of Hospital Medicine   [facility]

## 2019-11-05 NOTE — ASSESSMENT & PLAN NOTE
Patient has got a trach and is vent dependent  Culture sputum from previously grew multidrug resistant Pseudomonas

## 2019-11-05 NOTE — ASSESSMENT & PLAN NOTE
Acute kidney injury along with hyperkalemia in the background of sepsis/C diff colitis  Will start patient on IV fluids

## 2019-11-05 NOTE — ED PROVIDER NOTES
Encounter Date: 11/5/2019       History     Chief Complaint   Patient presents with    Fever    Altered Mental Status     This 79-year-old male brought to emergency room from [place] via ambulance with a history of the was noted to have had an elevated fever of 104 at the nursing home.  The patient was also noted to have had some hypercarbia.  He has a history of respiratory failure and is vent dependent.  Patient has a history of multiple medical problems including dementia, COPD, BPH, hyperlipidemia, depression, GERD, hypertension, coronary artery disease, CHF, AAA, hypothyroidism.  The patient is unresponsive and nonverbal and therefore no history can otherwise be obtain.  No other family members when attendance.  The patient does have a PEG tube and trach.        Review of patient's allergies indicates:   Allergen Reactions    Codeine Other (See Comments)     Past Medical History:   Diagnosis Date    AAA (abdominal aortic aneurysm)     Anemia     BPH (benign prostatic hyperplasia)     CHF (congestive heart failure)     COPD (chronic obstructive pulmonary disease)     Coronary artery disease     Dementia     Dementia     Depression     GERD (gastroesophageal reflux disease)     Hyperlipidemia     Hypertension     Hypothyroid     Renal disorder     Respiratory failure, chronic     Ventilator dependence      Past Surgical History:   Procedure Laterality Date    ABDOMINAL SURGERY      CARDIAC SURGERY  1999    GASTROSTOMY TUBE PLACEMENT      SPINE SURGERY      TRACHEOSTOMY TUBE PLACEMENT       No family history on file.  Social History     Tobacco Use    Smoking status: Former Smoker    Smokeless tobacco: Never Used   Substance Use Topics    Alcohol use: No    Drug use: No     Review of Systems   Unable to perform ROS: Intubated   Constitutional: Positive for fever.       Physical Exam     Initial Vitals   BP Pulse Resp Temp SpO2   11/05/19 1326 11/05/19 1326 11/05/19 1326 11/05/19 1329  11/05/19 1326   (!) 86/47 75 20 (!) 101.6 °F (38.7 °C) 100 %      MAP       --                Physical Exam    Constitutional: He appears well-developed and well-nourished. He is not diaphoretic. No distress.   Unresponsive to noxious stimulus   HENT:   Head: Normocephalic and atraumatic.   Nose: Nose normal.   Eyes: Conjunctivae and EOM are normal. Pupils are equal, round, and reactive to light. Right eye exhibits no discharge. No scleral icterus.   Neck: Normal range of motion. Neck supple. No tracheal deviation present.   Cardiovascular: Normal rate, regular rhythm, normal heart sounds and intact distal pulses. Exam reveals no gallop and no friction rub.    No murmur heard.  Pulmonary/Chest: No respiratory distress. He has no wheezes. He has rhonchi. He has no rales.   Abdominal: Soft. Bowel sounds are normal. He exhibits distension. There is no tenderness. There is no rebound and no guarding.   Genitourinary: Penis normal.   Genitourinary Comments: Indwelling Rahman   Musculoskeletal: Normal range of motion. He exhibits no edema or tenderness.   Lymphadenopathy:     He has no cervical adenopathy.   Neurological: No cranial nerve deficit or sensory deficit. GCS eye subscore is 4. GCS verbal subscore is 5. GCS motor subscore is 6.   Skin: Skin is warm and dry. Capillary refill takes less than 2 seconds. No rash noted. No erythema. No pallor.         ED Course   Critical Care  Date/Time: 11/5/2019 5:48 PM  Performed by: Maldonado Gil Jr., MD  Authorized by: Maldonado Gil Jr., MD   Direct patient critical care time: 60 minutes  Additional history critical care time: 15 minutes  Ordering / reviewing critical care time: 10 minutes  Consulting other physicians critical care time: 5 minutes  Total critical care time (exclusive of procedural time) : 90 minutes        Labs Reviewed   CBC W/ AUTO DIFFERENTIAL - Abnormal; Notable for the following components:       Result Value    WBC 28.44 (*)     RBC 3.53 (*)      Hemoglobin 10.4 (*)     Hematocrit 33.4 (*)     Mean Corpuscular Hemoglobin Conc 31.1 (*)     RDW 14.6 (*)     All other components within normal limits   B-TYPE NATRIURETIC PEPTIDE - Abnormal; Notable for the following components:     (*)     All other components within normal limits   PROTIME-INR - Abnormal; Notable for the following components:    PT 19.6 (*)     All other components within normal limits   ISTAT PROCEDURE - Abnormal; Notable for the following components:    POC PO2 125 (*)     All other components within normal limits   CULTURE, BLOOD   CULTURE, BLOOD   CULTURE, RESPIRATORY   COMPREHENSIVE METABOLIC PANEL   TROPONIN I   URINALYSIS, REFLEX TO URINE CULTURE   ISTAT LACTATE   POCT LACTATE          Imaging Results          X-Ray Chest AP Portable (Final result)  Result time 11/05/19 14:18:47    Final result by Justin Harmtan MD (11/05/19 14:18:47)                 Impression:      Pulmonary hypoinflation, with scattered predominantly interstitial opacities in both lungs, right greater than left, having similar appearance to the prior exam of 10/10/2019.      Electronically signed by: Justin Hartman MD  Date:    11/05/2019  Time:    14:18             Narrative:    EXAMINATION:  XR CHEST AP PORTABLE    CLINICAL HISTORY:  Altered mental status, ventilator dependent.    FINDINGS:  Portable chest radiograph at 14:04 hours compared to multiple prior exams including 10/10/2019 shows tracheostomy cannula unchanged in position, with median sternotomy wires and mediastinal surgical clips from prior CABG.  The cardiac silhouette and pulmonary vasculature are grossly stable, with unchanged prominence of the central pulmonary vasculature.    The lungs are hypoexpanded with asymmetric elevation of the right hemidiaphragm.  There are scattered reticulonodular and ground-glass opacities in both lungs, right greater than left, not significantly changed given differences of pulmonary inflation.  There is no  consolidation, large pleural effusion, or evidence of pulmonary edema.  No pneumothorax or acute osseous abnormality.                                              Attending Attestation:             Attending ED Notes:   This 79-year-old nonresponsive vent dependent male transferred from [place] who was being treated for C diff but also had a history of a Pseudomonas infection resistant to multiple antibiotics that grew from sputum and urine, reportedly had a temp of 100° for the nursing home.  Upon ED admission he had a temp of 101.6°.  During the ED course the labs obtained showed a elevated BUN and creatinine of 66 and 2.7.  The potassium was 6.3.  His BNP was 290.  CBC had an elevated white count 28.4 and an H&H of 10.4 and 33.4.  Lactate is 2.44.  Cath urinalysis had greater than 100 WBCs and 61 RBCs per high-power field.  The patient's chest x-ray of showed bilateral interstitial opacities.  During the ED course attempts were made to put a IJ IV central line in unsuccessful and the patient then had a right femoral line placed.  He was given fluid resuscitation and started on pressors, Levophed.  Dr. Lopes, [facility] medicine has admitted the patient to ICU.  During the ED course his hyperkalemia was also addressed with albuterol, glucose and insulin                        Clinical Impression:       ICD-10-CM ICD-9-CM   1. Hyperkalemia E87.5 276.7   2. Chest pain R07.9 786.50   3. Severe sepsis A41.9 038.9    R65.20 995.92   4. Renal insufficiency N28.9 593.9                             Maldonado Gil Jr., MD  11/05/19 1745       Maldonado Gil Jr., MD  11/05/19 1752

## 2019-11-05 NOTE — PROCEDURES
HO5 Procedure Note:    The patient was prepped and draped in the usual sterile fashion.  Central catheter placement was left internal jugular 3 times but secondary to stiffness and significant pain collapse ability secondary shock, was unable to thread the wire into the internal vein.  The procedure was aborted.    The patient was then prepped draped usual sterile fashion central access under ultrasound guidance into the right femoral vein.  Placement of wire was confirmed with ultrasound and catheter was threaded without resistance successfully 1st time.  There were no immediate complications procedure.    Seema Whitley MD  [employer] Emergency Medicine/Internal Medicine PGY-5  11/05/2019 5:57 PM

## 2019-11-05 NOTE — HPI
79-year-old nursing home resident([place]) with past medical history of chronic respiratory failure status post trach and PEG due to stroke, CAD status post CABG, hypertension, COPD,BPH presented from [facility] due to altered mental status, high fever and significant leukocytosis.  This is his 3rd admission with the same complaint in a span of 45 days  History mainly from charts, nursing home notes and ER MD and staff  At the moment he is getting p.o. vancomycin at detention for C diff colitis  In the emergency room he was found to be severely septic with significant shock and also he was hyperkalemic  His last sputum culture per chart review grew multidrug resistant Pseudomonas  No further information available

## 2019-11-05 NOTE — SUBJECTIVE & OBJECTIVE
Past Medical History:   Diagnosis Date    AAA (abdominal aortic aneurysm)     Anemia     BPH (benign prostatic hyperplasia)     CHF (congestive heart failure)     COPD (chronic obstructive pulmonary disease)     Coronary artery disease     Dementia     Dementia     Depression     GERD (gastroesophageal reflux disease)     Hyperlipidemia     Hypertension     Hypothyroid     Renal disorder     Respiratory failure, chronic     Ventilator dependence        Past Surgical History:   Procedure Laterality Date    ABDOMINAL SURGERY      CARDIAC SURGERY  1999    GASTROSTOMY TUBE PLACEMENT      SPINE SURGERY      TRACHEOSTOMY TUBE PLACEMENT         Review of patient's allergies indicates:   Allergen Reactions    Codeine Other (See Comments)       No current facility-administered medications on file prior to encounter.      Current Outpatient Medications on File Prior to Encounter   Medication Sig    ascorbic acid, vitamin C, (VITAMIN C) 500 mg/5 mL Syrp syrup 500 mg by PEG Tube route 2 (two) times daily.     baclofen (LIORESAL) 20 MG tablet 25 mg by PEG Tube route every 6 (six) hours.     banana flakes-t-galactooligos. (BANATROL PLUS) PwPk Take 1 packet by mouth 2 (two) times daily. Mix with water    busPIRone (BUSPAR) 5 MG Tab 5 mg by Per G Tube route 2 (two) times daily.    carvedilol (COREG) 3.125 MG tablet Take 1 tablet (3.125 mg total) by mouth 2 (two) times daily.    chlorhexidine (HIBICLENS) 4 % external liquid Apply 1 application topically 3 (three) times a week. On bath days    cholestyramine-aspartame (QUESTRAN LIGHT) 4 gram PwPk 1 packet (4 g total) by Per G Tube route 2 (two) times daily. Discontinue if no bowel movement in a day    duloxetine (CYMBALTA) 30 MG capsule 30 mg by PEG Tube route once daily.     enoxaparin (LOVENOX) 40 mg/0.4 mL Syrg Inject 40 mg into the skin once daily.    ferrous sulfate 220 mg (44 mg iron)/5 mL solution 220 mg by Per G Tube route once daily.     finasteride (PROSCAR) 5 mg tablet Take 1 tablet (5 mg total) by mouth once daily.    furosemide (LASIX) 20 MG tablet Take 1 tablet (20 mg total) by mouth once daily. (Patient taking differently: 20 mg by Per G Tube route once daily. )    gabapentin (NEURONTIN) 300 MG capsule 300 mg by Per G Tube route 3 (three) times daily.    HYDROcodone-acetaminophen (NORCO)  mg per tablet Take 1 tablet by mouth every 12 (twelve) hours as needed for Pain.    lactose-reduced food (ISOSOURCE HN FEEDING TUBE) 70 mLs by FEEDING TUBE route once daily. 70 ml per hour x22 hrs daily. Hold 1 hr before and after giving synthroid    levothyroxine (SYNTHROID) 200 MCG tablet Take 1 tablet (200 mcg total) by mouth once daily.    menthol-zinc oxide (RISAMINE) 0.44-20.6 % Oint Apply 1 application topically once daily. AFTER EACH DIAPER CHANGE    multivit-min-ferrous gluconate (CERTAVITE-ANTIOXID, IRON GLUC,) 9 mg iron/ 15 mL (15 mL) Liqd 15 mLs by Per G Tube route once daily.     polyethylene glycol (GLYCOLAX) 17 gram PwPk 17 g by Per G Tube route every evening.     potassium chloride 10% (KAYCIEL) 20 mEq/15 mL oral solution Take 10 mEq by mouth once daily. Per g tube     rivastigmine (EXELON) 9.5 mg/24 hr PT24 Place 1 patch onto the skin once daily.    Saccharomyces boulardii (FLORASTOR) 250 mg capsule Take 250 mg by mouth 2 (two) times daily.    saw/vit E/sod sue/lyc/beta/pyg (PROSTATE HEALTH ORAL) Take 30 mLs by mouth 2 (two) times daily.    terazosin (HYTRIN) 1 MG capsule 1 mg by PEG Tube route once daily.    traZODone (DESYREL) 100 MG tablet 100 mg by Per G Tube route every evening.     acetaminophen (TYLENOL) 160 mg/5 mL Elix 650 mg by Per G Tube route every 4 (four) hours as needed.    albuterol-ipratropium 2.5mg-0.5mg/3mL (DUONEB) 0.5 mg-3 mg(2.5 mg base)/3 mL nebulizer solution Take 3 mLs by nebulization every 6 (six) hours as needed for Wheezing or Shortness of Breath.     finasteride (PROSCAR) 5 mg tablet 5 mg by  PEG Tube route once daily.     hydrocodone-acetaminophen 10-325mg (NORCO)  mg Tab 1 tablet by Per G Tube route every 6 (six) hours as needed for Pain.    hydrocortisone (PROCTOZONE-HC) 2.5 % rectal cream Place 1 application rectally 2 (two) times daily as needed for Hemorrhoids.    ibuprofen (ADVIL,MOTRIN) 600 MG tablet Take 600 mg by mouth every 6 (six) hours as needed for Pain.    Lactoperoxi/Gluc Oxid/Pot Thio (BIOTENE DRY MOUTH MM) Swish and spit 15 mLs 4 (four) times daily. AND/OR PRN    lidocaine (XYLOCAINE) 5 % Oint ointment Apply 1 g topically as needed (with each trach change).    nitroGLYCERIN (NITROSTAT) 0.4 MG SL tablet Place 0.4 mg under the tongue every 5 (five) minutes as needed for Chest pain. Seek medical help if pain is not relieved by the third dose.    selenium sulfide 2.5 % Lotn Apply 1 application topically twice a week. ON Tuesday AND SATURDAY    vancomycin 250mg / 10ml Susp Take 5 mLs (125 mg total) by mouth every 6 (six) hours for 7 days, THEN 5 mLs (125 mg total) 2 (two) times daily for 7 days, THEN 5 mLs (125 mg total) once daily for 7 days, THEN 5 mLs (125 mg total) Every 3 (three) days.     Family History     None        Tobacco Use    Smoking status: Former Smoker    Smokeless tobacco: Never Used   Substance and Sexual Activity    Alcohol use: No    Drug use: No    Sexual activity: Never     Review of Systems   Unable to perform ROS: Mental status change     Objective:     Vital Signs (Most Recent):  Temp: (!) 101.6 °F (38.7 °C) (11/05/19 1329)  Pulse: 86 (11/05/19 1500)  Resp: 18 (11/05/19 1430)  BP: (!) 84/49 (11/05/19 1500)  SpO2: 99 % (11/05/19 1500) Vital Signs (24h Range):  Temp:  [101.6 °F (38.7 °C)] 101.6 °F (38.7 °C)  Pulse:  [65-86] 86  Resp:  [18-20] 18  SpO2:  [99 %-100 %] 99 %  BP: (84-93)/(47-55) 84/49     Weight: 110.2 kg (243 lb)  Body mass index is 32.96 kg/m².    Physical Exam   Constitutional: He appears well-developed.   HENT:   Right Ear: External  ear normal.   Left Ear: External ear normal.   Eyes: Pupils are equal, round, and reactive to light.   Neck: Normal range of motion and full passive range of motion without pain.   Trach insitu   Cardiovascular: Normal rate and regular rhythm.   Tachycardic   Pulmonary/Chest: Effort normal and breath sounds normal.   Reduced air entry to bases   Abdominal: Soft. Bowel sounds are normal.   PEG tube insitu   Genitourinary: Penis normal.   Genitourinary Comments: Rahman catheter   Musculoskeletal: He exhibits edema.   Neurological:   Patient dependent on Vent   Skin: Skin is warm.   Nursing note and vitals reviewed.        CRANIAL NERVES     CN III, IV, VI   Pupils are equal, round, and reactive to light.       Significant Labs:   CBC:   Recent Labs   Lab 11/05/19  1345   WBC 28.44*   HGB 10.4*   HCT 33.4*        CMP:   Recent Labs   Lab 11/05/19  1345   *   K 6.3*   CL 92*   CO2 29   GLU 96   BUN 66*   CREATININE 2.5*   CALCIUM 8.9   PROT 8.2   ALBUMIN 2.9*   BILITOT 1.3*   ALKPHOS 84   AST 21   ALT 15   ANIONGAP 9   EGFRNONAA 23.4*       Significant Imaging: I have reviewed all pertinent imaging results/findings within the past 24 hours.

## 2019-11-05 NOTE — ASSESSMENT & PLAN NOTE
Already received dextrose, insulin in the ER  Will monitor and treat again after repeat K levels

## 2019-11-06 PROBLEM — R84.5 SPUTUM CULTURE POSITIVE FOR PSEUDOMONAS: Chronic | Status: ACTIVE | Noted: 2019-01-01

## 2019-11-06 PROBLEM — R56.9 SEIZURE: Status: ACTIVE | Noted: 2019-01-01

## 2019-11-06 PROBLEM — B96.5 PSEUDOMONAS URINARY TRACT INFECTION: Status: ACTIVE | Noted: 2019-01-01

## 2019-11-06 PROBLEM — R78.81 GRAM-NEGATIVE BACTEREMIA: Status: ACTIVE | Noted: 2019-01-01

## 2019-11-06 PROBLEM — N39.0 PSEUDOMONAS URINARY TRACT INFECTION: Status: ACTIVE | Noted: 2019-01-01

## 2019-11-06 PROBLEM — E87.20 LACTIC ACIDOSIS: Status: RESOLVED | Noted: 2019-01-01 | Resolved: 2019-01-01

## 2019-11-06 NOTE — HPI
Mr. Masood Messina is an 79-year-old gentleman well known to our service with past medical history of COPD, coronary artery disease status post CABG, chronic hypoxemic/hypercapnic respiratory failure and ventilator dependence, and tracheostomy dependence, who was transferred to our emergency department this afternoon from the nursing facility where he resides after he was found to be encephalopathic.  No additional documentation is readily available at this time, and the patient is a poor historian at baseline and more so acutely.  Therefore the majority of this history was obtained from a review of the electronic medical record.  Upon arriving in the emergency department he was found to be in septic shock with purulent / foul-smelling urine, and initial urinalysis demonstrated evidence of cystitis.  The urinary catheter catheter was changed, empiric antibiotics administered, and volume resuscitated with bolused crystalloid.  His shock persisted despite volume resuscitation and a norepinephrine infusion started to maintain adequate tissue perfusion.  Hospital Medicine was consulted the patient admitted to the intensive care unit.  I examined the patient shortly after he arrived in the ICU, and found him to be hemodynamically stable on low to moderate doses of norepinephrine.  He was on mechanical ventilation at the time with adequate gas exchange requiring minimal ventilator support and normal pulmonary mechanics.  Suctioning of his tracheostomy yielded no significant secretions.

## 2019-11-06 NOTE — PROGRESS NOTES
VANCOMYCIN PHARMACOKINETIC NOTE:  Vancomycin Day # 1    Objective/Assessment:    Diagnosis/Indication for Vancomycin:  Sepsis    79 y.o., male; Actual Body Weight = 110.2 kg (243 lb).    The patient has the following labs:     11/5/2019 Estimated Creatinine Clearance: 30.2 mL/min (A) (based on SCr of 2.5 mg/dL (H)). Lab Results   Component Value Date    BUN 66 (H) 11/05/2019       Lab Results   Component Value Date    WBC 28.44 (H) 11/05/2019            Plan:  Adjust vancomycin dose and/or frequency based on the patient's actual weight and renal function:  Initiate Vancomycin 1750 mg IV every 24 hours.  Orders have been entered into patient's chart.    Vancomycin dose = 15.9 mg/kg actual body weight    Vancomycin trough level has been ordered for 11-6  at 19:00    Pharmacy will manage vancomycin therapy, monitor serum vancomycin levels, monitor renal function and adjust regimen as necessary.    Will follow random vancomycin levels and redose when serum vancomycin level decreases to 10-15 mcg/mL  Thank you for allowing us to participate in this patient's care.     Brayan Mitchell 11/5/2019 7:55 PM  Department of Pharmacy  Ext [phone]

## 2019-11-06 NOTE — ASSESSMENT & PLAN NOTE
· Appears to be near his respiratory baseline.  · Improved appearance of chest radiograph compared to prior film last month, but still with some evidence of cardiogenic pulmonary edema.  · No clear evidence of bacterial pneumonia or lower respiratory tract infection.  · Close attention to avoiding unnecessary volume resuscitation in the setting of cardiomyopathy.  · No compelling evidence of acute COPD exacerbation, and I doubt that he actually has obstructive lung disease. Rather his chronic respiratory failure likely as a consequence of his body habitus.  · Aerosolized bronchodilators as needed.  Otherwise no indication for systemic corticosteroids.  · Continue lung protective ventilation for now, transition to pressure support ventilation once

## 2019-11-06 NOTE — CONSULTS
Consult Note  Infectious Disease    Consult Requested By: Yoel Lopes MD    Reason for Consult: Septic shock     SUBJECTIVE:     History of Present Illness:  Patient is a 79 y.o. male with hx of CVA and resulting functional quadraplegia, Chronic respiratory failure with trach dependence, and indwelling tay who is a resident of [facility]. He was recently admitted from 10/2 - 10/14 with Pseudomonas UTI and possibly pneumonia and Cdiff. He was treated with Cefepime as an inpatient and discharged on per tube vancomycin. He was sent to [facility] ER yesterday for fever of 101 , hypotension and leukocytosis (28) .  He was also found to be in ARF. He was admitted to ICU for resuscitation. He remains febrile to 101 on pressors today. He has decreased responsiveness. He had one bowel movement overnight. WBC today is up to 44. He is unable to provide any history.     Past Medical History:   Diagnosis Date    AAA (abdominal aortic aneurysm)     Anemia     BPH (benign prostatic hyperplasia)     CHF (congestive heart failure)     COPD (chronic obstructive pulmonary disease)     Coronary artery disease     Dementia     Dementia     Depression     GERD (gastroesophageal reflux disease)     Hyperlipidemia     Hypertension     Hypothyroid     Renal disorder     Respiratory failure, chronic     Ventilator dependence      Past Surgical History:   Procedure Laterality Date    ABDOMINAL SURGERY      CARDIAC SURGERY  1999    GASTROSTOMY TUBE PLACEMENT      SPINE SURGERY      TRACHEOSTOMY TUBE PLACEMENT       History reviewed. No pertinent family history.  Social History     Tobacco Use    Smoking status: Former Smoker    Smokeless tobacco: Never Used   Substance Use Topics    Alcohol use: No    Drug use: No       Review of patient's allergies indicates:   Allergen Reactions    Codeine Other (See Comments)        Antibiotics (From admission, onward)    Start     Stop Route Frequency Ordered    11/06/19 2000  vancomycin  (VANCOCIN) 1,750 mg in dextrose 5 % 500 mL IVPB      -- IV Every 24 hours (non-standard times) 11/05/19 2000 11/05/19 1715  cefepime in dextrose 5 % IVPB 2 g      -- IV Every 8 hours (non-standard times) 11/05/19 1607 11/05/19 1715  vancomycin oral suspension 250 mg      -- Oral 4 times daily 11/05/19 1607          Review of Systems:  Unable to obtain     OBJECTIVE:     Vital Signs (Most Recent)  Temp: 99.1 °F (37.3 °C) (11/05/19 1915)  Pulse: 90 (11/05/19 2013)  Resp: (!) 24 (11/05/19 2013)  BP: (!) 102/51 (11/05/19 2002)  SpO2: 97 % (11/05/19 2013)    Temperature Range Min/Max (Last 24H):  Temp:  [99.1 °F (37.3 °C)-101.6 °F (38.7 °C)]     Physical Exam:  General: well developed, well nourished, moderately obese, not responding to voice, intermittent myoclonic jerks  HENT: Head:normocephalic, atraumatic. Ears:right ear normal, left ear normal. Nose: Nares normal. Septum midline. Mucosa normal. No drainage or sinus tenderness., no discharge. Throat: lips, mucosa, and tongue normal; teeth and gums normal.  Neck: tracheostomy and ventilated   Lungs:  clear to auscultation bilaterally, normal respiratory effort and coarse bs   Cardiovascular: Heart: regular rate and rhythm, S1, S2 normal, no murmur, click, rub or gallop, no click and tachycardic, no murmurs auscultated. Chest Wall: no abnormalities . Extremities: no cyanosis or edema, or clubbing. Pulses: 2+ and symmetric.  Abdomen/Rectal: Abdomen: soft and nondistended. Rectal: not examined  Genitalia: no abnormalities, tay catheter wtih purulent urine   Skin: Skin color, texture, turgor normal. No rashes or lesions  Musculoskeletal:muscle wasting. myoclonic jerking, no purposeful movement s   Left inguinal catheter, periopheral ivs otherwise     Laboratory:  CBC  Recent Labs   Lab 11/05/19  1345 11/05/19 2002   WBC 28.44*  --    RBC 3.53*  --    HGB 10.4*  --    HCT 33.4* 32*     --      BMP  Recent Labs   Lab 11/05/19  1345   CO2 29   BUN 66*    CREATININE 2.5*   CALCIUM 8.9     Urine 10/2 - Pseudomonas Sens to Cefepime   Sputum 10/2 Pseduomonas R to Cefepime.     Urine, Blood, Sputum cx 11/5 pending     Diagnostic Results:  Cxr: Pulmonary hypoinflation, with scattered predominantly interstitial opacities in both lungs, right greater than left, having similar appearance to the prior exam of 10/10/2019.    ASSESSMENT/PLAN:     Active Hospital Problems    Diagnosis  POA    *Severe sepsis with septic shock [A41.9, R65.21]  Yes    Infection due to multidrug-resistant Pseudomonas aeruginosa [A49.8, Z16.24]  Yes    C. difficile colitis [A04.72]  Yes    Diastolic heart failure [I50.30]  Yes    Essential hypertension [I10]  Yes    Hyperkalemia [E87.5]  Yes    GINETTE (acute kidney injury) [N17.9]  Yes    Coronary artery disease [I25.10]  Yes    Chronic obstructive pulmonary disease [J44.9]  Yes    Hemiparesis affecting dominant side as late effect of stroke [I69.359]  Not Applicable    PEG (percutaneous endoscopic gastrostomy) status [Z93.1]  Not Applicable    Chronic respiratory failure with hypoxia [J96.11]  Yes    Tracheostomy in place [Z93.0]  Not Applicable      Resolved Hospital Problems   No resolved problems to display.       Severe septic shock,   Complicated urinary tract infection,   Recent Cdiff colitis,   Leukocytosis   Recent Pseudomonas infection   Chronic respiratory failure   GNR in sputum with stable CXR   ARF     - Change Cefepime to Meropenem 2g IV  q12 renally dosed for severe septic shock   - WIll give a dose of Tigecycline also due to higher prevelance of MDROs at [place] - will also have some C diff activity   - Continue po Vancomycin   - Add IV Flagyl epiricially   - F/u CT scan of abdomen today   - Can stop IV Vancomycin today if blood cx do not grow Gram pos organisms.   - will follow. Please call with questions.

## 2019-11-06 NOTE — PLAN OF CARE
11/06/19 1645   Patient Assessment/Suction   Level of Consciousness (AVPU) unresponsive   Respiratory Effort Unlabored   Expansion/Accessory Muscles/Retractions no use of accessory muscles   All Lung Fields Breath Sounds equal bilaterally;diminished;coarse   $ Suction Charges Inline Suction Procedure Stat Charge   Secretions Amount moderate   Secretions Color clear;white   Secretions Characteristics thick        Surgical Airway Portex Cuffed;Fenestrated   No Placement Date or Time found.   Present Prior to Hospital Arrival?: Yes  Inserted by: Present Prior to Hospital Arrival  Type: Tracheostomy  Brand: Portex  Airway Device Size: 8.0  Style: Cuffed;Fenestrated   Cuff Inflation? Inflated   Status Secured   Site Assessment Clean;Dry   Site Care Dressing applied   Inner Cannula Care Changed/new   Ties Assessment Clean;Intact   Vent Select   Conventional Vent Y   Charged w/in last 24h NO   IHI Ventilator Associated Pneumonia Bundle   Head of Bed Elevated  HOB 30   Preset Conventional Ventilator Settings   Vent ID [id number]   Vent Type    Ventilation Type VC   Vent Mode A/C   Set Rate 26 bmp   Vt Set 450 mL   PEEP/CPAP 5 cmH20   Pressure Support 0 cmH20   Waveform RAMP   Peak Flow 65 L/min   Plateau Set/Insp. Hold (sec) 0   Trigger Sensitivity Flow/I-Trigger 2 L/min   Patient Ventilator Parameters   Resp Rate Total 26 br/min   Peak Airway Pressure 26 cmH2O   Mean Airway Pressure 13 cmH20   Plateau Pressure 0 cmH20   Exhaled Vt 485 mL   Total Ve 12.6 mL   I:E Ratio Measured 1:2.00   Tubing ID (mm) 8 mm   Tube Type Trach   Conventional Ventilator Alarms   Alarms On Y   Resp Rate High Alarm 45 br/min   Press High Alarm 100 cmH2O   Apnea Rate 10   Apnea Volume (mL) 0 mL   Apnea Oxygen Concentration  100   Apnea Flow Rate (L/min) 44   T Apnea 20 sec(s)   Ready to Wean/Extubation Screen   MV<16L (chart vol.) 12.6   PEEP <=8 (chart #) 5

## 2019-11-06 NOTE — ASSESSMENT & PLAN NOTE
· Close attention to maintain adequate glycemic control and avoiding occult hypoglycemia.  · Frequent blood glucose monitoring while NPO.

## 2019-11-06 NOTE — CONSULTS
[facility]  Pulmonary / Critical Care Medicine  Consult Note  Inpatient consult to Pulmonology  Consult performed by: Mauricio Kinsey MD  Consult ordered by: Yoel Lopes MD        Subjective:     HPI:  Mr. Masood Messina is an 79-year-old gentleman well known to our service with past medical history of COPD, coronary artery disease status post CABG, chronic hypoxemic/hypercapnic respiratory failure and ventilator dependence, and tracheostomy dependence, who was transferred to our emergency department this afternoon from the nursing facility where he resides after he was found to be encephalopathic.  No additional documentation is readily available at this time, and the patient is a poor historian at baseline and more so acutely.  Therefore the majority of this history was obtained from a review of the electronic medical record.  Upon arriving in the emergency department he was found to be in septic shock with purulent / foul-smelling urine, and initial urinalysis demonstrated evidence of cystitis.  The urinary catheter catheter was changed, empiric antibiotics administered, and volume resuscitated with bolused crystalloid.  His shock persisted despite volume resuscitation and a norepinephrine infusion started to maintain adequate tissue perfusion.  Hospital Medicine was consulted the patient admitted to the intensive care unit.  I examined the patient shortly after he arrived in the ICU, and found him to be hemodynamically stable on low to moderate doses of norepinephrine.  He was on mechanical ventilation at the time with adequate gas exchange requiring minimal ventilator support and normal pulmonary mechanics.  Suctioning of his tracheostomy yielded no significant secretions.    Past Medical History:   Diagnosis Date    AAA (abdominal aortic aneurysm)     Anemia     BPH (benign prostatic hyperplasia)     CHF (congestive heart failure)     COPD (chronic obstructive pulmonary disease)      Coronary artery disease     Dementia     Dementia     Depression     GERD (gastroesophageal reflux disease)     Hyperlipidemia     Hypertension     Hypothyroid     Renal disorder     Respiratory failure, chronic     Ventilator dependence      Past Surgical History:   Procedure Laterality Date    ABDOMINAL SURGERY      CARDIAC SURGERY  1999    GASTROSTOMY TUBE PLACEMENT      SPINE SURGERY      TRACHEOSTOMY TUBE PLACEMENT       Family History:  History reviewed. No pertinent family history.     Social History:  Unable to obtain at this time.  Reportedly quit smoking several years ago.  Unclear pack year history  No reported alcohol use.  No reported illicit drug use.  Data Unavailable     Allergies:  Codeine     Facility-Administered Medications as of 11/5/2019   Medication Route Frequency    [COMPLETED] 0.9%  NaCl infusion Intravenous ED 1 Time    0.9%  NaCl infusion Intravenous ED 1 Time    [COMPLETED] albuterol-ipratropium 2.5 mg-0.5 mg/3 mL nebulizer solution 3 mL Nebulization ED 1 Time    albuterol-ipratropium 2.5 mg-0.5 mg/3 mL nebulizer solution 3 mL Nebulization Q6H PRN    cefepime in dextrose 5 % IVPB 2 g Intravenous Q8H    [COMPLETED] dextrose 50 % in water (D50W) injection 25 g Intravenous ED 1 Time    enoxaparin injection 40 mg Subcutaneous Daily    [COMPLETED] insulin regular injection 10 Units Subcutaneous ED 1 Time    [COMPLETED] lactated ringers infusion Intravenous ED 1 Time    norepinephrine 4 mg in dextrose 5 % 250 mL infusion Intravenous Continuous    ondansetron injection 4 mg Intravenous Q6H PRN    [START ON 11/6/2019] pantoprazole EC tablet 40 mg Oral Daily    [COMPLETED] piperacillin-tazobactam 3.375 g in dextrose 5 % 50 mL IVPB (ready to mix system) Intravenous ED 1 Time    [START ON 11/6/2019] vancomycin (VANCOCIN) 1,750 mg in dextrose 5 % 500 mL IVPB Intravenous Q24H    [COMPLETED] vancomycin in dextrose 5 % 1 gram/250 mL IVPB 2,000 mg Intravenous Once     vancomycin oral suspension 250 mg Oral QID     Outpatient Medications as of 11/5/2019   Medication    ascorbic acid, vitamin C, (VITAMIN C) 500 mg/5 mL Syrp syrup    baclofen (LIORESAL) 20 MG tablet    busPIRone (BUSPAR) 5 MG Tab    carvedilol (COREG) 3.125 MG tablet    cholestyramine-aspartame (QUESTRAN LIGHT) 4 gram PwPk    duloxetine (CYMBALTA) 30 MG capsule    enoxaparin (LOVENOX) 40 mg/0.4 mL Syrg    ferrous sulfate 220 mg (44 mg iron)/5 mL solution    finasteride (PROSCAR) 5 mg tablet    furosemide (LASIX) 20 MG tablet    gabapentin (NEURONTIN) 300 MG capsule    levothyroxine (SYNTHROID) 200 MCG tablet    menthol-zinc oxide (RISAMINE) 0.44-20.6 % Oint    multivit-min-ferrous gluconate (CERTAVITE-ANTIOXID, IRON GLUC,) 9 mg iron/ 15 mL (15 mL) Liqd    polyethylene glycol (GLYCOLAX) 17 gram PwPk    potassium chloride 10% (KAYCIEL) 20 mEq/15 mL oral solution    rivastigmine (EXELON) 9.5 mg/24 hr PT24    terazosin (HYTRIN) 1 MG capsule    traZODone (DESYREL) 100 MG tablet    acetaminophen (TYLENOL) 160 mg/5 mL Elix    albuterol-ipratropium 2.5mg-0.5mg/3mL (DUONEB) 0.5 mg-3 mg(2.5 mg base)/3 mL nebulizer solution    finasteride (PROSCAR) 5 mg tablet    hydrocodone-acetaminophen 10-325mg (NORCO)  mg Tab    hydrocortisone (PROCTOZONE-HC) 2.5 % rectal cream    Lactoperoxi/Gluc Oxid/Pot Thio (BIOTENE DRY MOUTH MM)    lidocaine (XYLOCAINE) 5 % Oint ointment    nitroGLYCERIN (NITROSTAT) 0.4 MG SL tablet    selenium sulfide 2.5 % Lotn    vancomycin 250mg / 10ml Susp      Review of Systems   Unable to perform ROS: Mental status change      I have personally reviewed the following: active problem list, medication list, allergies, family history, social history, health maintenance, notes from last several encounters, lab results, imaging, other provider's documentation.    OBJECTIVE:     VS: Temp:  [99.1 °F (37.3 °C)-101.6 °F (38.7 °C)]   Pulse:  [50-93]   Resp:  [18-24]   BP:  ()/(31-55)   SpO2:  [78 %-100 %]   Arterial Line BP: ()/(44-55)   Ideal body weight: 77.6 kg (171 lb 1.2 oz)  Adjusted ideal body weight: 100 kg (220 lb 7.4 oz)   I/O: No intake or output data in the 24 hours ending 11/05/19 2220     Vent: SpO2 97% on 40% FiO2  Vent Mode: A/C  Oxygen Concentration (%):  [] 40  Resp Rate Total:  [18 br/min-24 br/min] 24 br/min  Vt Set:  [470 mL-650 mL] 470 mL  PEEP/CPAP:  [5 cmH20] 5 cmH20  Pressure Support:  [0 cmH20] 0 cmH20  Mean Airway Pressure:  [13 yoF10-51 cmH20] 14 cmH20    PE Physical Exam   Constitutional: He appears well-developed and well-nourished. He appears toxic. He has a sickly appearance. No distress. He is obese.   HENT:   Head: Normocephalic and atraumatic.   Right Ear: External ear normal.   Left Ear: External ear normal.   Nose: No mucosal edema or rhinorrhea.   Mouth/Throat: Uvula is midline and oropharynx is clear and moist. No oropharyngeal exudate. Mallampati Score: III.   Neck: Neck supple. No JVD present. No tracheal deviation, no edema and no erythema present. No thyroid mass and no thyromegaly present.   8.0 cuffed tracheostomy secure with cuff inflated   Cardiovascular: Normal rate, regular rhythm, normal heart sounds and intact distal pulses. Exam reveals no gallop and no friction rub.   No murmur heard.  Pulmonary/Chest: Normal expansion, symmetric chest wall expansion and breath sounds normal. He has no wheezes. He has no rhonchi. He has no rales. Chest wall is not dull to percussion. He exhibits no tenderness. Negative for egophony. Negative for tactile fremitus.   Abdominal: Soft. Bowel sounds are normal. He exhibits no distension and no mass. There is no hepatosplenomegaly. No hernia.   Musculoskeletal: Normal range of motion. He exhibits no edema, tenderness or deformity.   Lymphadenopathy: No supraclavicular adenopathy is present.     He has no cervical adenopathy.     He has no axillary adenopathy.   Neurological: He is  unresponsive. He displays no atrophy and no tremor. No cranial nerve deficit. He exhibits normal muscle tone. He displays no seizure activity. GCS eye subscore is 3. GCS motor subscore is 3.   Skin: Skin is warm, dry and intact. No rash noted. He is not diaphoretic. No cyanosis. No pallor. Nails show no clubbing.   Psychiatric: He is withdrawn. Cognition and memory are impaired. He is noncommunicative.      Lines/Catheters:  Gastrostomy, Tracheostomy, TLC, Arterial line     Recent Diagnostic Studies:  I have personally reviewed the following labs/studies/images.  Labs I have personally reviewed all diagnostic studies obtained since presentation.  Recent Labs   Lab 11/05/19  1345  11/05/19 2002   WBC 28.44*  --   --    RBC 3.53*  --   --    HGB 10.4*  --   --    HCT 33.4*  --  32*     --   --    MCV 95  --   --    MCH 29.5  --   --    MCHC 31.1*  --   --    *  --   --    K 6.3*  --   --    CL 92*  --   --    CO2 29  --   --    BUN 66*  --   --    CREATININE 2.5*  --   --    ALT 15  --   --    AST 21  --   --    ALKPHOS 84  --   --    BILITOT 1.3*  --   --    PROT 8.2  --   --    ALBUMIN 2.9*  --   --    PH  --    < > 7.362   PCO2  --    < > 47.6*   PO2  --    < > 105*   HCO3  --    < > 27.1   POCSATURATED  --    < > 98   BE  --    < > 2   INR 1.7  --   --    TROPONINI 0.034  --   --    CRP:  16.69  BNP:  290  Troponin:  0.034  Serum lactate:  2.4  Urinalysis:  Grossly cloudy and orange hue.  2+ protein  2+ occult blood  3+ leukocytes  Sixty-one RBCs/HPF  Greater than 100 WBC/HPF  Greater than 100 squamous epithelial cells/HPF  6589 hyaline cast/HPF      Imaging I have personally reviewed the following images as well as the radiology report.  CXR Radiology report:   Pulmonary hypoinflation, with scattered predominantly interstitial opacities in both lungs, right greater than left, having similar appearance to the prior exam of 10/10/2019  My impression:  Improved aeration of the left and right lung but  still with some persistent hazy/diffuse right-sided ground-glass opacities more pronounced in the right hemithorax.  No focal consolidations or new pleural effusions.  I independently viewed the above imaging/lab studies in addition to reviewing the report      Micro Blood:  (November 5, 2019) no growth to date from 2 separate samples.  C diff:  (October 7, 2019) antigen positive, toxin negative, PCR positive  Urine:  (October 4, 2019) 10-35680 CFU/mL Pseudomonas aeruginosa resistant only to ciprofloxacin  Respiratory:  (October 4, 2019) many Pseudomonas aeruginosa resistant to meropenem, Zosyn, and ciprofloxacin.     Previous Diagnostic Studies:  I have personally reviewed the following labs/studies/images.    Chest x-ray:   Date:  September 13, 2019  My impression:  CHF, pulmonary edema    I independently viewed the above imaging/lab studies in addition to reviewing the report     CT abdomen/pelvis:  Date:  September 12, 2019  Radiology report:    1. Tiny bilateral pleural effusions with scattered dependent airspace opacities most likely reflecting atelectasis.  2. Heterogeneous density in gallbladder either reflecting cholelithiasis or sludge.  3. Nonobstructing bilateral renal calculi as described.  4. Improvement of left hydronephrosis since 2014, although left hydroureter does persist through level of left UVJ.  This could reflect sequelae of chronic vesicoureteral reflux or congenital megaureter.  5. Interval enlargement of infrarenal abdominal aortic aneurysm currently measuring 5.2 x 5.4 cm.  6. Unchanged enlarged prostate.    I independently viewed the above imaging/lab studies in addition to reviewing the report     ECG:  Date:  November 5, 2019  normal EKG, normal sinus rhythm, unchanged from previous tracings.      Echocardiogram:   Date:  September 13, 2019  · Mild left ventricular enlargement.  · Low normal left ventricular systolic function. The estimated ejection fraction is 50%  · Grade III  (severe) left ventricular diastolic dysfunction consistent with restrictive physiology.  · Elevated left atrial pressure.  · Mechanically ventilated; cannot use inferior caval vein diameter to estimate central venous pressure.  · Moderate left atrial enlargement.  · Mild mitral regurgitation.  · Technically challenging study; contrast enhanced.     BNP  September 12, 2019:  182  October 2, 2019:  185  November 5, 2019:  290     Assessment/Plan:     Patient Active Problem List   Diagnosis    Functional quadriplegia    Chronic respiratory failure with hypoxia    Sepsis    Tracheostomy in place    Hematuria    Urinary tract infection with hematuria    HCAP (healthcare-associated pneumonia)    PEG (percutaneous endoscopic gastrostomy) status    Ventilator associated pneumonia    Acquired hypothyroidism    Hemiparesis affecting dominant side as late effect of stroke    Sacral decubitus ulcer, stage II    Cardiogenic pulmonary edema    Coronary artery disease    Anemia, chronic disease    Ventilator dependence    Acute on chronic diastolic HF (heart failure)    Acute cystitis without hematuria    AAA (abdominal aortic aneurysm) without rupture    Enlarging abdominal aortic aneurysm (AAA)    Essential hypertension    Hyperkalemia    GINETTE (acute kidney injury)    Cholelithiases    Bilateral nephrolithiasis    Hydroureter, left    History of CVA (cerebrovascular accident) without residual deficits    Pneumonia of right upper lobe due to Pseudomonas species    Acute on chronic diastolic heart failure    Hypophosphatemia    Chronic pain    C. difficile colitis    Septic shock    History of MDR Pseudomonas aeruginosa infection    Leukocytosis    Volume overload    Anasarca    Lactic acidosis    Obstructive uropathy    Encephalopathy, toxic     Impression:  Purulent cystitis in the setting of chronic indwelling urinary catheter with septic shock and multi organ dysfunction.  Multifactorial  acute kidney injury.    Neuro   Encephalopathy secondary to acute illness   Not back at baseline.   No evidence of occult unaddressed pathology..   No additional investigation needed.  Recommend observation for now..   Continue to treat underlying pathology.   Continued supportive care for now with close observation and frequent neurologic assessment.   Avoid sedating/derliogenic medications.    ENT  · Frequent suctioning and local tracheostomy care.    Derm  · Frequent turning, attention to maintaining adequate nutrition, and local wound care.    Pulmonary  · Appears to be near his respiratory baseline.  · Improved appearance of chest radiograph compared to prior film last month, but still with some evidence of cardiogenic pulmonary edema.  · No clear evidence of bacterial pneumonia or lower respiratory tract infection.  · Close attention to avoiding unnecessary volume resuscitation in the setting of cardiomyopathy.  · No compelling evidence of acute COPD exacerbation, and I doubt that he actually has obstructive lung disease. Rather his chronic respiratory failure likely as a consequence of his body habitus.  · Aerosolized bronchodilators as needed.  Otherwise no indication for systemic corticosteroids.  · Continue lung protective ventilation for now, transition to pressure support ventilation once    Cardiac/Vascular  · Titrate norepinephrine infusion to maintain MAP > 65 mmHg.    Renal/  · A KI likely a consequence combination of distributive shock with prerenal azotemia in addition to obstructive uropathy secondary to urine sediment clogging his urinary catheter.  · Nonoliguric sent for placing his Rahman catheter in the emergency department.  · Repeat renal function panel later this evening to ensure normalization of his chemistry.  · He appears volume replete, and additional crystalloid will only lead to worsening anasarca, nephrogenic edema, and possibly precipitate more renal  dysfunction.  · Hyperkalemia and acidemia noted but reassuring EKG and modest vasopressor requirement suggest that urgent hemodialysis to be avoided.  · Close attention to avoiding nephrotoxin agents and adjusting medication dosages to account for GFR.  · If renal function does not improve readily this evening, recommend consulting Nephrology.  · Frequent flushing of his chronic indwelling urinary catheter.    ID  · Although he has been treated for C diff colitis.  I question the accuracy of this diagnosis and the need for continued treatment.  · Purulent cystitis seems to be the predominant pathology contributing to his acute illness.  · Currently receiving empiric broad-spectrum antibiotics, but prior multi-drug resistant Pseudomonas cultured from his sputum last month was resistant to these agents.  · Hopefully, causative pathogen is another organism.  · Continue current antibiotic regimen for now, but if he deteriorates or fails to improve readily with current selection, will need to adjust to account for previous drug resistant organisms.  · Infectious Diseases consulted.    Oncology  · No compelling indication for blood products at the moment.  · Monitor for now.    Endocrine  · Close attention to maintain adequate glycemic control and avoiding occult hypoglycemia.  · Frequent blood glucose monitoring while NPO.    GI  · Consult nutrition and resume enteral feeds the morning.      Thank you for your consult. I will continue to follow along with you.  Please do not hesitate to call me any questions or concerns.     Critical Care Time: >35 minutes  Critical secondary to acute cystitis and septic shock.  Patient has a condition that poses threat to life and bodily function:  Septic shock, acute renal failure, toxic encephalopathy, acute hypoxemic respiratory failure.     Critical care was time spent personally by me on the following activities: development of treatment plan with patient or surrogate and bedside  caregivers, discussions with consultants, evaluation of patient's response to treatment, examination of patient, ordering and performing treatments and interventions, ordering and review of laboratory studies, ordering and review of radiographic studies, pulse oximetry, re-evaluation of patient's condition. This critical care time did not overlap with that of any other provider or involve time for any procedures.     Mauricio Kinsey MD  Pulmonary / Critical Care Medicine  [facility]  cell [phone]

## 2019-11-06 NOTE — PLAN OF CARE
Problem: Nutrition Impairment (Mechanical Ventilation, Invasive)  Goal: Optimal Nutrition Delivery  Outcome: Ongoing, Progressing     Problem: Skin and Tissue Injury (Mechanical Ventilation, Invasive)  Goal: Absence of Device-Related Skin and Tissue Injury  Outcome: Ongoing, Progressing       Recommendation/Intervention:   1.) Recommend resume PEG feedings as medically appropriate, RD recommends Pulmocare @ 50 ml/hr as goal rate. Will provide: 1800 kcal, 75 g pro, & 948 ml free H20. Rec. 30 ml Q 4 hrs auto flush.     Goals: 1.) TF resumed w/in 24-48 hrs; advance as tolerated to goal rate       Communication of RD Recs: discussed on rounds, POC, sticky note

## 2019-11-06 NOTE — ASSESSMENT & PLAN NOTE
· Although he has been treated for C diff colitis.  I question the accuracy of this diagnosis and the need for continued treatment.  · Purulent cystitis seems to be the predominant pathology contributing to his acute illness.  · Currently receiving empiric broad-spectrum antibiotics, but prior multi-drug resistant Pseudomonas cultured from his sputum last month was resistant to these agents.  · Hopefully, causative pathogen is another organism.  · Continue current antibiotic regimen for now, but if he deteriorates or fails to improve readily with current selection, will need to adjust to account for previous drug resistant organisms.  · Infectious Diseases consulted.

## 2019-11-06 NOTE — ASSESSMENT & PLAN NOTE
 Encephalopathy secondary to acute illness   Not back at baseline.   No evidence of occult unaddressed pathology..   No additional investigation needed.  Recommend observation for now..   Continue to treat underlying pathology.   Continued supportive care for now with close observation and frequent neurologic assessment.   Avoid sedating/derliogenic medications.

## 2019-11-06 NOTE — PLAN OF CARE
11/06/19 0700   Patient Assessment/Suction   Level of Consciousness (AVPU) unresponsive   Respiratory Effort Unlabored   Expansion/Accessory Muscles/Retractions no use of accessory muscles   All Lung Fields Breath Sounds coarse   MEI Breath Sounds coarse   LLL Breath Sounds coarse   RUL Breath Sounds coarse   RML Breath Sounds coarse   RLL Breath Sounds coarse   Rhythm/Pattern, Respiratory assisted mechanically   Cough Frequency with stimulation   Cough Type weak   Suction Method tracheal   Suction Pressure (mmHg) 120 mmHg   $ Suction Charges Inline Suction Procedure Stat Charge   Secretions Amount moderate   Secretions Color tan   Secretions Characteristics thick   PRE-TX-O2   O2 Device (Oxygen Therapy) ventilator   $ Is the patient on Low Flow Oxygen? Yes   Oxygen Concentration (%) 40   SpO2 97 %   Pulse Oximetry Type Continuous   $ Pulse Oximetry - Multiple Charge Pulse Oximetry - Multiple   Probe Placed On (Pulse Ox) Right:;hand   Oximetry Probe Site Assessed   Pulse 92   Resp (!) 26   BP (!) 89/43        Surgical Airway Portex Cuffed;Fenestrated   No Placement Date or Time found.   Present Prior to Hospital Arrival?: Yes  Inserted by: Present Prior to Hospital Arrival  Type: Tracheostomy  Brand: Portex  Airway Device Size: 8.0  Style: Cuffed;Fenestrated   Cuff Inflation? Inflated   Status Secured   Site Assessment Clean;Dry;No bleeding   Ties Assessment Clean;Dry   Respiratory Interventions   Airway/Ventilation Management airway patency maintained   Airway/Ventilation Support pulmonary hygiene promoted   VAP Prevention Bundle HOB elevation maintained;oral care regularly provided   Vent Select   Conventional Vent Y   Charged w/in last 24h YES   IHI Ventilator Associated Pneumonia Bundle   Head of Bed Elevated  HOB 30   Preset Conventional Ventilator Settings   Vent ID [id number]   Vent Type    Ventilation Type VC   Vent Mode A/C   Humidity HME   Set Rate 26 bmp   Vt Set 450 mL   PEEP/CPAP 5 cmH20   Pressure  Support 0 cmH20   Waveform RAMP   Peak Flow 65 L/min   Plateau Set/Insp. Hold (sec) 0   Trigger Sensitivity Flow/I-Trigger 2 L/min   Patient Ventilator Parameters   Resp Rate Total 26 br/min   Peak Airway Pressure 30 cmH2O   Mean Airway Pressure 14 cmH20   Plateau Pressure 21 cmH20   Exhaled Vt 472 mL   Total Ve 12.2 mL   I:E Ratio Measured 1:2.00   Tubing ID (mm) 8 mm   Tube Type Trach   Conventional Ventilator Alarms   Alarms On Y   Resp Rate High Alarm 45 br/min   Press High Alarm 100 cmH2O   Apnea Rate 10   Apnea Volume (mL) 0 mL   Apnea Oxygen Concentration  100   Apnea Flow Rate (L/min) 44   T Apnea 20 sec(s)   Ready to Wean/Extubation Screen   FIO2<=50 (chart decimal) 0.4   MV<16L (chart vol.) 12.2   PEEP <=8 (chart #) 5   Ready to Wean Parameters   F/VT Ratio<105 (RSBI) (!) 55.08

## 2019-11-06 NOTE — PROGRESS NOTES
[facility]  Adult Nutrition   Progress Note (Initial Assessment)     SUMMARY     Recommendations/Interventions:    Recommendation/Intervention: 1.) Recommend resume PEG feedings as medically appropriate, RD recommends Pulmocare @ 50 ml/hr as goal rate. Will provide: 1800 kcal, 75 g pro, & 948 ml free H20. Rec. 30 ml Q 4 hrs auto flush.   Goals: 1.) TF resumed w/in 24-48 hrs; advance as tolerated to goal rate   Nutrition Goal Status: progressing towards goal  Communication of RD Recs: discussed on rounds, POC, sticky note    Reason for Assessment    Reason For Assessment: identified at risk by screening criteria(MST score)  Diagnosis: infection/sepsis  Relevant Medical History: NH resident, PEG, trach, COPD, quad, CABG, HTN, BPH  Interdisciplinary Rounds: attended    Nutrition Risk Screen    Nutrition Risk Screen: tube feeding or parenteral nutrition    Nutrition/Diet History    Patient Reported Diet/Restrictions/Preferences: no oral intake  Food Allergies: NKFA  Factors Affecting Nutritional Intake: None identified at this time  Nutrition Support Formula Prior to Admit: Other (see comments)(Isosource HN )  Nutrition Support Rate Prior to Admit: 70 (ml)  Nutrtion Support Frequency Prior to Admit: ml/hr x 22 hrs (per NH records), FWF: 60 ml/hr  Nutrition Support Provision Prior to Admit: 1848 kcal, 83 g pro, & 1244 ml free h20. + 1320 ml FWF    Anthropometrics    Temp: (!) 101.7 °F (38.7 °C)  Height Method: Stated  Height: 6' (182.9 cm)  Height (inches): 72 in  Weight Method: Bed Scale  Weight: 133.6 kg (294 lb 8.6 oz)  Weight (lb): 294.54 lb  Ideal Body Weight (IBW), Male: 178 lb  % Ideal Body Weight, Male (lb): 165.47 lb  BMI (Calculated): 39.9  BMI Grade: 35 - 39.9 - obesity - grade II  Additional Documentation: Tetraplegia (Quadriplegia) Ideal Body Weight (IBW) Adjustment    Weight History:  Wt Readings from Last 10 Encounters:   11/06/19 133.6 kg (294 lb 8.6 oz)   10/14/19 124.8 kg (275 lb 2.2 oz)    09/12/19 (!) 140.5 kg (309 lb 11.9 oz)   09/13/19 (!) 140.2 kg (309 lb)   10/13/17 110.6 kg (243 lb 13.3 oz)   03/27/17 118.8 kg (262 lb)   03/21/17 119 kg (262 lb 5.6 oz)   05/16/14 (!) 137.8 kg (303 lb 12.7 oz)   03/06/14 126 kg (277 lb 12.8 oz)   02/27/14 126.8 kg (279 lb 8 oz)     Lab/Procedures/Meds: Pertinent Labs Reviewed  Clinical Chemistry:  Recent Labs   Lab 11/05/19  2336 11/06/19  0426 11/06/19  1149   *  132* 132* 128*   K 5.9*  5.9* 6.1* 5.6*   CL 96  96 95 96   CO2 26  26 26 25   *  135* 88 115*   BUN 61*  61* 63* 58*   CREATININE 2.6*  2.6* 2.6* 2.7*   CALCIUM 8.5*  8.5* 8.5* 8.4*   PROT 7.7  7.7 7.9 6.9   ALBUMIN 2.7*  2.7* 2.6* 2.4*   BILITOT 1.8*  1.8* 1.6* 1.3*   ALKPHOS 74  74 75 77   AST 23  23 22 21   ALT 17  17 17 16   ANIONGAP 10  10 11 7*   ESTGFRAFRICA 25.8*  25.8* 25.8* 24.6*   EGFRNONAA 22.3*  22.3* 22.3* 21.3*   MG 2.1 2.1 1.9   PHOS 3.4 3.1 2.5*     CBC:   Recent Labs   Lab 11/06/19  0426   WBC 44.92*   RBC 3.39*   HGB 9.9*   HCT 31.7*      MCV 94   MCH 29.2   MCHC 31.2*     Cardiac Profile:  Recent Labs   Lab 11/05/19  1345   *   TROPONINI 0.034     Inflammatory Labs:  Recent Labs   Lab 11/05/19  1909   CRP 16.69*     Medications: Pertinent Medications reviewed  Scheduled Meds:   chlorhexidine  15 mL Mouth/Throat BID    enoxparin  40 mg Subcutaneous Q24H    meropenem (MERREM) IVPB  2 g Intravenous Q12H    metronidazole  500 mg Intravenous Q8H    mupirocin   Nasal BID    pantoprazole  40 mg Oral Daily    vancomycin (VANCOCIN) IVPB  1,750 mg Intravenous Q24H    vancomycin  250 mg Oral QID     Continuous Infusions:   norepinephrine bitartrate-D5W 0.2 mcg/kg/min (11/06/19 1246)     PRN Meds:.    Estimated/Assessed Needs    Weight Used For Calorie Calculations: 81 kg (178 lb 9.2 oz)(IBW)  Energy Calorie Requirements (kcal): 3096-5247 kcals (22-25 kcal/kg) per IBW  Energy Need Method: Kcal/kg  Protein Requirements:  g (0.6-0.8  g/kg)  Weight Used For Protein Calculations: 133.6 kg (294 lb 8.6 oz)  Fluid Requirements (mL): 1782 or per MD  Estimated Fluid Requirement Method: RDA Method    Nutrition Prescription Ordered    Current Diet Order: NPO    Evaluation of Received Nutrient/Fluid Intake    Energy Calories Required: not meeting needs  Protein Required: not meeting needs  Fluid Required: not meeting needs      Intake/Output Summary (Last 24 hours) at 11/6/2019 1327  Last data filed at 11/6/2019 1246  Gross per 24 hour   Intake 905.97 ml   Output 2700 ml   Net -1794.03 ml      Nutrition Risk    Level of Risk/Frequency of Follow-up: high     Dietitian Rounds Brief:    Pt assessed 2' ICU protocol. Currently on vent support, pressors noted. + septic shock per notes. ID consulted. Pt with hx: PEG/trach. NH records reviewed. RD to follow and monitor # days NPO; recommend resume TFs as appropriate.     Monitor and Evaluation    Food and Nutrient Intake: enteral nutrition intake, energy intake  Food and Nutrient Adminstration: enteral and parenteral nutrition administration  Anthropometric Measurements: weight change  Biochemical Data, Medical Tests and Procedures: electrolyte and renal panel, gastrointestinal profile, glucose/endocrine profile  Nutrition-Focused Physical Findings: overall appearance       Nutrition Follow-Up    RD Follow-up?: Yes    Melissa Nelson RD 11/06/2019 1:27 PM

## 2019-11-06 NOTE — SUBJECTIVE & OBJECTIVE
Past Medical History:   Diagnosis Date    AAA (abdominal aortic aneurysm)     Anemia     BPH (benign prostatic hyperplasia)     CHF (congestive heart failure)     COPD (chronic obstructive pulmonary disease)     Coronary artery disease     Dementia     Dementia     Depression     GERD (gastroesophageal reflux disease)     Hyperlipidemia     Hypertension     Hypothyroid     Renal disorder     Respiratory failure, chronic     Ventilator dependence      Past Surgical History:   Procedure Laterality Date    ABDOMINAL SURGERY      CARDIAC SURGERY  1999    GASTROSTOMY TUBE PLACEMENT      SPINE SURGERY      TRACHEOSTOMY TUBE PLACEMENT       Family History:  History reviewed. No pertinent family history.     Social History:  Unable to obtain at this time.  Reportedly quit smoking several years ago.  Unclear pack year history  No reported alcohol use.  No reported illicit drug use.  Data Unavailable     Allergies:  Codeine     Facility-Administered Medications as of 11/5/2019   Medication Route Frequency    [COMPLETED] 0.9%  NaCl infusion Intravenous ED 1 Time    0.9%  NaCl infusion Intravenous ED 1 Time    [COMPLETED] albuterol-ipratropium 2.5 mg-0.5 mg/3 mL nebulizer solution 3 mL Nebulization ED 1 Time    albuterol-ipratropium 2.5 mg-0.5 mg/3 mL nebulizer solution 3 mL Nebulization Q6H PRN    cefepime in dextrose 5 % IVPB 2 g Intravenous Q8H    [COMPLETED] dextrose 50 % in water (D50W) injection 25 g Intravenous ED 1 Time    enoxaparin injection 40 mg Subcutaneous Daily    [COMPLETED] insulin regular injection 10 Units Subcutaneous ED 1 Time    [COMPLETED] lactated ringers infusion Intravenous ED 1 Time    norepinephrine 4 mg in dextrose 5 % 250 mL infusion Intravenous Continuous    ondansetron injection 4 mg Intravenous Q6H PRN    [START ON 11/6/2019] pantoprazole EC tablet 40 mg Oral Daily    [COMPLETED] piperacillin-tazobactam 3.375 g in dextrose 5 % 50 mL IVPB (ready to mix system)  Intravenous ED 1 Time    [START ON 11/6/2019] vancomycin (VANCOCIN) 1,750 mg in dextrose 5 % 500 mL IVPB Intravenous Q24H    [COMPLETED] vancomycin in dextrose 5 % 1 gram/250 mL IVPB 2,000 mg Intravenous Once    vancomycin oral suspension 250 mg Oral QID     Outpatient Medications as of 11/5/2019   Medication    ascorbic acid, vitamin C, (VITAMIN C) 500 mg/5 mL Syrp syrup    baclofen (LIORESAL) 20 MG tablet    busPIRone (BUSPAR) 5 MG Tab    carvedilol (COREG) 3.125 MG tablet    cholestyramine-aspartame (QUESTRAN LIGHT) 4 gram PwPk    duloxetine (CYMBALTA) 30 MG capsule    enoxaparin (LOVENOX) 40 mg/0.4 mL Syrg    ferrous sulfate 220 mg (44 mg iron)/5 mL solution    finasteride (PROSCAR) 5 mg tablet    furosemide (LASIX) 20 MG tablet    gabapentin (NEURONTIN) 300 MG capsule    levothyroxine (SYNTHROID) 200 MCG tablet    menthol-zinc oxide (RISAMINE) 0.44-20.6 % Oint    multivit-min-ferrous gluconate (CERTAVITE-ANTIOXID, IRON GLUC,) 9 mg iron/ 15 mL (15 mL) Liqd    polyethylene glycol (GLYCOLAX) 17 gram PwPk    potassium chloride 10% (KAYCIEL) 20 mEq/15 mL oral solution    rivastigmine (EXELON) 9.5 mg/24 hr PT24    terazosin (HYTRIN) 1 MG capsule    traZODone (DESYREL) 100 MG tablet    acetaminophen (TYLENOL) 160 mg/5 mL Elix    albuterol-ipratropium 2.5mg-0.5mg/3mL (DUONEB) 0.5 mg-3 mg(2.5 mg base)/3 mL nebulizer solution    finasteride (PROSCAR) 5 mg tablet    hydrocodone-acetaminophen 10-325mg (NORCO)  mg Tab    hydrocortisone (PROCTOZONE-HC) 2.5 % rectal cream    Lactoperoxi/Gluc Oxid/Pot Thio (BIOTENE DRY MOUTH MM)    lidocaine (XYLOCAINE) 5 % Oint ointment    nitroGLYCERIN (NITROSTAT) 0.4 MG SL tablet    selenium sulfide 2.5 % Lotn    vancomycin 250mg / 10ml Susp      Review of Systems   Unable to perform ROS: Mental status change      I have personally reviewed the following: active problem list, medication list, allergies, family history, social history, health  maintenance, notes from last several encounters, lab results, imaging, other provider's documentation.    OBJECTIVE:     VS: Temp:  [99.1 °F (37.3 °C)-101.6 °F (38.7 °C)]   Pulse:  [50-93]   Resp:  [18-24]   BP: ()/(31-55)   SpO2:  [78 %-100 %]   Arterial Line BP: ()/(44-55)   Ideal body weight: 77.6 kg (171 lb 1.2 oz)  Adjusted ideal body weight: 100 kg (220 lb 7.4 oz)   I/O: No intake or output data in the 24 hours ending 11/05/19 2220     Vent: SpO2 97% on 40% FiO2  Vent Mode: A/C  Oxygen Concentration (%):  [] 40  Resp Rate Total:  [18 br/min-24 br/min] 24 br/min  Vt Set:  [470 mL-650 mL] 470 mL  PEEP/CPAP:  [5 cmH20] 5 cmH20  Pressure Support:  [0 cmH20] 0 cmH20  Mean Airway Pressure:  [13 dyW00-20 cmH20] 14 cmH20    PE Physical Exam   Constitutional: He appears well-developed and well-nourished. He appears toxic. He has a sickly appearance. No distress. He is obese.   HENT:   Head: Normocephalic and atraumatic.   Right Ear: External ear normal.   Left Ear: External ear normal.   Nose: No mucosal edema or rhinorrhea.   Mouth/Throat: Uvula is midline and oropharynx is clear and moist. No oropharyngeal exudate. Mallampati Score: III.   Neck: Neck supple. No JVD present. No tracheal deviation, no edema and no erythema present. No thyroid mass and no thyromegaly present.   8.0 cuffed tracheostomy secure with cuff inflated   Cardiovascular: Normal rate, regular rhythm, normal heart sounds and intact distal pulses. Exam reveals no gallop and no friction rub.   No murmur heard.  Pulmonary/Chest: Normal expansion, symmetric chest wall expansion and breath sounds normal. He has no wheezes. He has no rhonchi. He has no rales. Chest wall is not dull to percussion. He exhibits no tenderness. Negative for egophony. Negative for tactile fremitus.   Abdominal: Soft. Bowel sounds are normal. He exhibits no distension and no mass. There is no hepatosplenomegaly. No hernia.   Musculoskeletal: Normal range of  motion. He exhibits no edema, tenderness or deformity.   Lymphadenopathy: No supraclavicular adenopathy is present.     He has no cervical adenopathy.     He has no axillary adenopathy.   Neurological: He is unresponsive. He displays no atrophy and no tremor. No cranial nerve deficit. He exhibits normal muscle tone. He displays no seizure activity. GCS eye subscore is 3. GCS motor subscore is 3.   Skin: Skin is warm, dry and intact. No rash noted. He is not diaphoretic. No cyanosis. No pallor. Nails show no clubbing.   Psychiatric: He is withdrawn. Cognition and memory are impaired. He is noncommunicative.      Lines/Catheters:  Gastrostomy, Tracheostomy, TLC, Arterial line     Recent Diagnostic Studies:  I have personally reviewed the following labs/studies/images.  Labs I have personally reviewed all diagnostic studies obtained since presentation.  Recent Labs   Lab 11/05/19  1345  11/05/19 2002   WBC 28.44*  --   --    RBC 3.53*  --   --    HGB 10.4*  --   --    HCT 33.4*  --  32*     --   --    MCV 95  --   --    MCH 29.5  --   --    MCHC 31.1*  --   --    *  --   --    K 6.3*  --   --    CL 92*  --   --    CO2 29  --   --    BUN 66*  --   --    CREATININE 2.5*  --   --    ALT 15  --   --    AST 21  --   --    ALKPHOS 84  --   --    BILITOT 1.3*  --   --    PROT 8.2  --   --    ALBUMIN 2.9*  --   --    PH  --    < > 7.362   PCO2  --    < > 47.6*   PO2  --    < > 105*   HCO3  --    < > 27.1   POCSATURATED  --    < > 98   BE  --    < > 2   INR 1.7  --   --    TROPONINI 0.034  --   --    CRP:  16.69  BNP:  290  Troponin:  0.034  Serum lactate:  2.4  Urinalysis:  Grossly cloudy and orange hue.  2+ protein  2+ occult blood  3+ leukocytes  Sixty-one RBCs/HPF  Greater than 100 WBC/HPF  Greater than 100 squamous epithelial cells/HPF  6589 hyaline cast/HPF      Imaging I have personally reviewed the following images as well as the radiology report.  CXR Radiology report:   Pulmonary hypoinflation, with  scattered predominantly interstitial opacities in both lungs, right greater than left, having similar appearance to the prior exam of 10/10/2019  My impression:  Improved aeration of the left and right lung but still with some persistent hazy/diffuse right-sided ground-glass opacities more pronounced in the right hemithorax.  No focal consolidations or new pleural effusions.  I independently viewed the above imaging/lab studies in addition to reviewing the report      Micro Blood:  (November 5, 2019) no growth to date from 2 separate samples.  C diff:  (October 7, 2019) antigen positive, toxin negative, PCR positive  Urine:  (October 4, 2019) [id number] CFU/mL Pseudomonas aeruginosa resistant only to ciprofloxacin  Respiratory:  (October 4, 2019) many Pseudomonas aeruginosa resistant to meropenem, Zosyn, and ciprofloxacin.     Previous Diagnostic Studies:  I have personally reviewed the following labs/studies/images.    Chest x-ray:   Date:  September 13, 2019  My impression:  CHF, pulmonary edema    I independently viewed the above imaging/lab studies in addition to reviewing the report     CT abdomen/pelvis:  Date:  September 12, 2019  Radiology report:    1. Tiny bilateral pleural effusions with scattered dependent airspace opacities most likely reflecting atelectasis.  2. Heterogeneous density in gallbladder either reflecting cholelithiasis or sludge.  3. Nonobstructing bilateral renal calculi as described.  4. Improvement of left hydronephrosis since 2014, although left hydroureter does persist through level of left UVJ.  This could reflect sequelae of chronic vesicoureteral reflux or congenital megaureter.  5. Interval enlargement of infrarenal abdominal aortic aneurysm currently measuring 5.2 x 5.4 cm.  6. Unchanged enlarged prostate.    I independently viewed the above imaging/lab studies in addition to reviewing the report     ECG:  Date:  November 5, 2019  normal EKG, normal sinus rhythm, unchanged from  previous tracings.      Echocardiogram:   Date:  September 13, 2019  · Mild left ventricular enlargement.  · Low normal left ventricular systolic function. The estimated ejection fraction is 50%  · Grade III (severe) left ventricular diastolic dysfunction consistent with restrictive physiology.  · Elevated left atrial pressure.  · Mechanically ventilated; cannot use inferior caval vein diameter to estimate central venous pressure.  · Moderate left atrial enlargement.  · Mild mitral regurgitation.  · Technically challenging study; contrast enhanced.     BNP  September 12, 2019:  182  October 2, 2019:  185  November 5, 2019:  290

## 2019-11-06 NOTE — ASSESSMENT & PLAN NOTE
· A KI likely a consequence combination of distributive shock with prerenal azotemia in addition to obstructive uropathy secondary to urine sediment clogging his urinary catheter.  · Nonoliguric sent for placing his Rahman catheter in the emergency department.  · Repeat renal function panel later this evening to ensure normalization of his chemistry.  · He appears volume replete, and additional crystalloid will only lead to worsening anasarca, nephrogenic edema, and possibly precipitate more renal dysfunction.  · Hyperkalemia and acidemia noted but reassuring EKG and modest vasopressor requirement suggest that urgent hemodialysis to be avoided.  · Close attention to avoiding nephrotoxin agents and adjusting medication dosages to account for GFR.  · If renal function does not improve readily this evening, recommend consulting Nephrology.  · Frequent flushing of his chronic indwelling urinary catheter.

## 2019-11-07 NOTE — ASSESSMENT & PLAN NOTE
Vasopressor support with Levophed  Broad IV abx per ID  BCx growing- 1 bottle out of 4  Urine and sputum Cx in progress  IR consulted for LP given fevers, seizures, encephalopathy

## 2019-11-07 NOTE — ASSESSMENT & PLAN NOTE
At the moment patient is on p.o. vancomycin for C diff colitis  Stool reported as formed per nursing

## 2019-11-07 NOTE — PROGRESS NOTES
[facility]  Pulmonary / Critical Care Medicine  Progress Note    S: No major issues overnight, but no significant improvement since admission.  Remains comatose and dependent on moderate but stable vasopressor doses.   Review of systems not obtained due to patient factors :  encephalopathy.    I have personally reviewed the following during today's evaluation:  past medical history, ROS, family history, social history, surgical history, current inpatient medications and drug allergies, as well as vital signs, diagnostic studies and nursing/provider documentation from the past 24 hours.         O: VS: Temp:  [98.8 °F (37.1 °C)-102.7 °F (39.3 °C)]   Pulse:  []   Resp:  [0-26]   BP: ()/(40-61)   SpO2:  [96 %-99 %]   Arterial Line BP: ()/(44-64)   Ideal body weight: 77.6 kg (171 lb 1.2 oz)  Adjusted ideal body weight: 100 kg (220 lb 7.4 oz)    I/O:   Intake/Output Summary (Last 24 hours) at 11/6/2019 2116  Last data filed at 11/6/2019 1831  Gross per 24 hour   Intake 1707.34 ml   Output 3900 ml   Net -2192.66 ml         Vent: SpO2 97% on 40% FiO2  Vent Mode: A/C  Oxygen Concentration (%):  [40] 40  Resp Rate Total:  [24 br/min-28 br/min] 26 br/min  Vt Set:  [450 mL-470 mL] 450 mL  PEEP/CPAP:  [5 cmH20] 5 cmH20  Pressure Support:  [0 cmH20] 0 cmH20  Mean Airway Pressure:  [13 tqY91-12 cmH20] 13 cmH20     PE Physical Exam   Constitutional: He appears well-developed and well-nourished. He appears toxic. He has a sickly appearance. No distress. He is obese.   HENT:   Head: Normocephalic and atraumatic.   Right Ear: External ear normal.   Left Ear: External ear normal.   Nose: No mucosal edema or rhinorrhea.   Mouth/Throat: Uvula is midline and oropharynx is clear and moist. No oropharyngeal exudate. Mallampati Score: III.   Neck: Neck supple. No JVD present. No tracheal deviation, no edema and no erythema present. No thyroid mass and no thyromegaly present.   8.0 cuffed tracheostomy secure  with cuff inflated   Cardiovascular: Normal rate, regular rhythm, normal heart sounds and intact distal pulses. Exam reveals no gallop and no friction rub.   No murmur heard.  Pulmonary/Chest: Normal expansion, symmetric chest wall expansion and breath sounds normal. He has no wheezes. He has no rhonchi. He has no rales. Chest wall is not dull to percussion. He exhibits no tenderness. Negative for egophony. Negative for tactile fremitus.   Abdominal: Soft. Bowel sounds are normal. He exhibits no distension and no mass. There is no hepatosplenomegaly. No hernia.   Musculoskeletal: Normal range of motion. He exhibits no edema, tenderness or deformity.   Lymphadenopathy: No supraclavicular adenopathy is present.     He has no cervical adenopathy.     He has no axillary adenopathy.   Neurological: He is unresponsive. He displays no atrophy and no tremor. No cranial nerve deficit. He exhibits normal muscle tone. He displays no seizure activity. GCS eye subscore is 3. GCS motor subscore is 3.   Skin: Skin is warm, dry and intact. No rash noted. He is not diaphoretic. No cyanosis. No pallor. Nails show no clubbing.   Psychiatric: He is withdrawn. Cognition and memory are impaired. He is noncommunicative.       Labs All pertinent labs within the past 24 hours have been reviewed.  Recent Labs   Lab 11/06/19  0426 11/06/19  0632  11/06/19  1725   WBC 44.92*  --   --   --    RBC 3.39*  --   --   --    HGB 9.9*  --   --   --    HCT 31.7*  --   --   --      --   --   --    MCV 94  --   --   --    MCH 29.2  --   --   --    MCHC 31.2*  --   --   --    *  --    < > 127*   K 6.1*  --    < > 4.9   CL 95  --    < > 95   CO2 26  --    < > 24   BUN 63*  --    < > 62*   CREATININE 2.6*  --    < > 2.9*   MG 2.1  --    < > 2.0   ALT 17  --    < > 17   AST 22  --    < > 21   ALKPHOS 75  --    < > 82   BILITOT 1.6*  --    < > 1.5*   PROT 7.9  --    < > 7.3   ALBUMIN 2.6*  --    < > 2.4*   PH  --  7.318*  --   --    PCO2  --   49.3*  --   --    PO2  --  106*  --   --    HCO3  --  25.3  --   --    POCSATURATED  --  97  --   --    BE  --  -1  --   --     < > = values in this interval not displayed.     Serum Lactate:  1.5  PCT:  9.60  Vancomycin:  13.7    Imaging I have reviewed and interpreted all pertinent imaging results/findings within the past 24 hours.  CT Abdomen/Pelvis:  Stable nonobstructing bilateral renal stones  Stable left hydronephrosis and hydroureter to the level of the ureteral vesicle junction  Cholelithiasis  Stable infrarenal abdominal aortic aneurysm  Moderate degenerative changes of the spine and hips  Stable atelectasis in lung bases    CT Head:  1. No acute intracranial abnormality.  2. Chronic small vessel ischemic changes.      Micro Blood:  (11/5 - right upper arm)  Gram positive cocci  Blood:  (11/5 - left AC)  Gram negative rods  Urine:  (11/5)  Presumptive pseudomonas spp (>100K cfu/ml)  Respiratory: (11/5) Many non-lactose fermenting GNR      Medications Scheduled    chlorhexidine  15 mL Mouth/Throat BID    enoxparin  40 mg Subcutaneous Q24H    [START ON 11/7/2019] lacosamide (VIMPAT) IVPB  100 mg Intravenous Q12H    [START ON 11/7/2019] levetiracetam IVPB  500 mg Intravenous Q12H    meropenem (MERREM) IVPB  2 g Intravenous Q12H    metronidazole  500 mg Intravenous Q8H    mupirocin   Nasal BID    pantoprazole  40 mg Oral Daily    vancomycin (VANCOCIN) IVPB  1,750 mg Intravenous Q24H    vancomycin  250 mg Oral QID      Continuous Infusions:    norepinephrine bitartrate-D5W 0.15 mcg/kg/min (11/06/19 2100)    propofol 30 mcg/kg/min (11/06/19 2048)      PRN           Assessment/Plan:     Patient Active Problem List   Diagnosis    Functional quadriplegia    Chronic respiratory failure with hypoxia    Sepsis    Tracheostomy in place    Hematuria    HCAP (healthcare-associated pneumonia)    PEG (percutaneous endoscopic gastrostomy) status    Acquired hypothyroidism    Hemiparesis affecting  dominant side as late effect of stroke    Sacral decubitus ulcer, stage II    Cardiogenic pulmonary edema    Coronary artery disease    Anemia, chronic disease    Ventilator dependence    Acute on chronic diastolic HF (heart failure)    Pseudomonas urinary tract infection    AAA (abdominal aortic aneurysm) without rupture    Enlarging abdominal aortic aneurysm (AAA)    Essential hypertension    Hyperkalemia    GINETTE (acute kidney injury)    Cholelithiases    Bilateral nephrolithiasis    Hydroureter, left    History of CVA (cerebrovascular accident) without residual deficits    Sputum culture positive for Pseudomonas    Acute on chronic diastolic heart failure    Hypophosphatemia    Chronic pain    C. difficile colitis    Septic shock    History of MDR Pseudomonas aeruginosa infection    Leukocytosis    Volume overload    Anasarca    Obstructive uropathy    Encephalopathy, toxic    Seizure    Gram-negative bacteremia     Impression:  Persistent septic shock with a multitude of possible sources from which MDR organisms have previously been isolated.  Increasing concern for additional neurologic pathology other than ongoing acute illness contributing to his persistent encephalopathy .    Neuro   Encephalopathy secondary to acute illness, sequelae of prior CVA, and suspected seizures   Unchanged this morning.   Concerning for occult unaddressed NCSE vs meningitis/encephalitis.   Additional investigation needed.  Recommend obtaining LP and consulting Neurology to evaluate suspected NCSE.   Continue to treat underlying pathology.   Consider adjusting antibiotics to include empiric coverage for meningitis/encephalitis.   Started fixed dose propofol as a temporizing measure while awaiting input from Neurology re: possible NCSE.   Continued supportive care for now with close observation and frequent neurologic assessment.      ENT  · Frequent suctioning and local tracheostomy  care.    Pulmonary  · Appears to be near his respiratory baseline.  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  · No compelling evidence of acute COPD exacerbation, and I doubt that he actually has obstructive lung disease. Rather his chronic respiratory failure likely as a consequence of his body habitus.  · Aerosolized bronchodilators as needed.  Otherwise no indication for systemic corticosteroids.  · Continue lung protective ventilation for now, with daily SAT/SBT when appropriate.    Cardiac/Vascular  · Titrate norepinephrine infusion to maintain MAP > 65 mmHg.    Renal/  · Developed polyuria after urinary catheter was exchanged this morning, further suggesting obstructive uropathy as the cause of his GINETTE.  · Appears volume replete on exam and no additional evidence of organ hypoperfusion to suggest prerenal azotemia.  · ATN likely contributing.  · BUN/Cr unchanged.  · Hyperkalemia and acedmia improved.  No compelling indication for urgent RRT.  · Nephrology consulted, awaiting recommendations.  · If his condition remains unchanged over the next 24 hours, consider Urology consultation to evaluate for upper urinary tract pathology contributing to persistent shock.    ID  · Infectious Diseases managing antibiotic selection/durration.  · Continue supportive care for septic shock in the mean time and continue to survey for occult uncontrolled infectious sources.    Hematology  · No indication for blood products.  · Monitor daily CBC for now.    GI  · Consult nutrition and resume enteral feeds.  · Cholelithiasis noted.   No evidence of associated cholelithiasis.      Critical Care Time: >35 minutes  Critical secondary to multiple ongoing nosocomial acquired infections.  Patient has a condition that poses threat to life and bodily function: septic shock, encephalopathy, acute hypoxemic respiratory failure.     Critical care was  time spent personally by me on the following activities: development of treatment plan with patient or surrogate and bedside caregivers, discussions with consultants, evaluation of patient's response to treatment, examination of patient, ordering and performing treatments and interventions, ordering and review of laboratory studies, ordering and review of radiographic studies, pulse oximetry, re-evaluation of patient's condition. This critical care time did not overlap with that of any other provider or involve time for any procedures.     Mauricio Kinsey MD  Pulmonary / Critical Care Medicine  [facility]

## 2019-11-07 NOTE — ASSESSMENT & PLAN NOTE
 Encephalopathy secondary to acute illness, sequelae of prior CVA, and suspected seizures   Unchanged this morning.   Concerning for occult unaddressed NCSE vs meningitis/encephalitis.   Additional investigation needed.  Recommend obtaining LP and consulting Neurology to evaluate suspected NCSE.   Continue to treat underlying pathology.   Consider adjusting antibiotics to include empiric coverage for meningitis/encephalitis.   Started fixed dose propofol as a temporizing measure while awaiting input from Neurology re: possible NCSE.   Continued supportive care for now with close observation and frequent neurologic assessment.

## 2019-11-07 NOTE — ASSESSMENT & PLAN NOTE
· Infectious Diseases managing antibiotic selection/durration.  · Continue supportive care for septic shock in the mean time and continue to survey for occult uncontrolled infectious sources.

## 2019-11-07 NOTE — ASSESSMENT & PLAN NOTE
Acute kidney injury along with hyperkalemia in the background of sepsis/C diff colitis  Concern for obstructive uropathy from tay. Changed 11/6.    Consulting renal as not improving   ATVALENTIN harris in setting of shock

## 2019-11-07 NOTE — ASSESSMENT & PLAN NOTE
IV abx per ID  Tay changed 11/6  Suspect may have obstructive uropathy from obstructed tay  Cultures in progress

## 2019-11-07 NOTE — ASSESSMENT & PLAN NOTE
Not improving thus far  Continuing IV abx  CT head done with no acute process  Neurology consulted given seizures

## 2019-11-07 NOTE — ASSESSMENT & PLAN NOTE
· Appears to be near his respiratory baseline.  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  · No compelling evidence of acute COPD exacerbation, and I doubt that he actually has obstructive lung disease. Rather his chronic respiratory failure likely as a consequence of his body habitus.  · Aerosolized bronchodilators as needed.  Otherwise no indication for systemic corticosteroids.  · Continue lung protective ventilation for now, with daily SAT/SBT when appropriate.

## 2019-11-07 NOTE — PLAN OF CARE
11/06/19 2126   Patient Assessment/Suction   Level of Consciousness (AVPU)   (sedated)   Respiratory Effort Normal;Unlabored   Expansion/Accessory Muscles/Retractions no use of accessory muscles   PRE-TX-O2   O2 Device (Oxygen Therapy) ventilator   Oxygen Concentration (%) 40   SpO2 99 %   Pulse Oximetry Type Continuous   Pulse 102   Resp (!) 26   Vent Select   Conventional Vent Y   Charged w/in last 24h NO   IHI Ventilator Associated Pneumonia Bundle   Head of Bed Elevated  HOB 30   Preset Conventional Ventilator Settings   Vent ID 6   Ventilation Type VC   Vent Mode A/C   Set Rate 26 bmp   Vt Set 450 mL   PEEP/CPAP 5 cmH20   Pressure Support 0 cmH20   Waveform RAMP   Peak Flow 65 L/min   Plateau Set/Insp. Hold (sec) 0   Trigger Sensitivity Flow/I-Trigger 2 L/min   Patient Ventilator Parameters   Resp Rate Total 26 br/min   Peak Airway Pressure 27 cmH2O   Mean Airway Pressure 13 cmH20   Plateau Pressure 0 cmH20   Exhaled Vt 485 mL   Total Ve 12.6 mL   I:E Ratio Measured 1:2.00   Tubing ID (mm) 8 mm   Tube Type Trach   Conventional Ventilator Alarms   Alarms On Y   Resp Rate High Alarm 45 br/min   Press High Alarm 60 cmH2O   Apnea Rate 10   Apnea Volume (mL) 0 mL   Apnea Oxygen Concentration  100   Apnea Flow Rate (L/min) 44   T Apnea 20 sec(s)   Ready to Wean/Extubation Screen   FIO2<=50 (chart decimal) 0.4   MV<16L (chart vol.) 12.6   PEEP <=8 (chart #) 5   Ready to Wean Parameters   F/VT Ratio<105 (RSBI) (!) 53.61   maintain adequate ventilation  And oxygenation/q6 duoneb PNA

## 2019-11-07 NOTE — SUBJECTIVE & OBJECTIVE
Interval History: History not able to be obtained as not responsive.  History supplemented by RN and Dr. Kinsey.  Having shaking activity. EEG done and reported to be significant for seizure activity.  Rahman changed.    Review of Systems   Unable to perform ROS: Mental status change     Objective:     Vital Signs (Most Recent):  Temp: 100 °F (37.8 °C) (11/06/19 1830)  Pulse: 107 (11/06/19 2013)  Resp: (!) 26 (11/06/19 2013)  BP: (!) 95/47 (11/06/19 1800)  SpO2: 97 % (11/06/19 2013) Vital Signs (24h Range):  Temp:  [98.8 °F (37.1 °C)-102.7 °F (39.3 °C)] 100 °F (37.8 °C)  Pulse:  [] 107  Resp:  [0-26] 26  SpO2:  [96 %-99 %] 97 %  BP: ()/(40-61) 95/47  Arterial Line BP: ()/(44-64) 139/60     Weight: 133.6 kg (294 lb 8.6 oz)  Body mass index is 39.95 kg/m².    Intake/Output Summary (Last 24 hours) at 11/6/2019 2021  Last data filed at 11/6/2019 1831  Gross per 24 hour   Intake 1707.34 ml   Output 3900 ml   Net -2192.66 ml      Physical Exam   Constitutional: He appears well-developed. No distress.   HENT:   Head: Normocephalic and atraumatic.   Mouth/Throat: Oropharynx is clear and moist.   Trach   Eyes: Pupils are equal, round, and reactive to light. EOM are normal.   Neck: Normal range of motion.   Cardiovascular: Regular rhythm.   No murmur heard.  Pulmonary/Chest: Effort normal and breath sounds normal. He has no wheezes.   Abdominal: Soft. He exhibits no distension. There is no tenderness. There is no rebound and no guarding.   Peg   Genitourinary:   Genitourinary Comments: Lacey   Musculoskeletal: Normal range of motion.   Neurological:   Not responsive to voice or physical stimuli    Skin: No rash noted.   Psychiatric:   Unable to assess   Nursing note and vitals reviewed.      Significant Labs:   Blood Culture:   Recent Labs   Lab 11/05/19  1340 11/05/19  1404 11/06/19  1148   LABBLOO Gram stain reynaldo bottle: Gram positive cocci  Results called to and read back by: Cali Bustos RN on M3 re:gram pos    cocci in blood cx 11/06/2019  09:58  by DRP No Growth to date  No Growth to date  Gram stain aer bottle:Gram negative rods  Called Ana Saavedra RN M3 @ 2015 MS 11/06/19 No Growth to date     CBC:   Recent Labs   Lab 11/05/19  1345 11/05/19 2002 11/06/19  0426   WBC 28.44*  --  44.92*   HGB 10.4*  --  9.9*   HCT 33.4* 32* 31.7*     --  176     CMP:   Recent Labs   Lab 11/06/19  0426 11/06/19  1149 11/06/19  1725   * 128* 127*   K 6.1* 5.6* 4.9   CL 95 96 95   CO2 26 25 24   GLU 88 115* 127*   BUN 63* 58* 62*   CREATININE 2.6* 2.7* 2.9*   CALCIUM 8.5* 8.4* 8.4*   PROT 7.9 6.9 7.3   ALBUMIN 2.6* 2.4* 2.4*   BILITOT 1.6* 1.3* 1.5*   ALKPHOS 75 77 82   AST 22 21 21   ALT 17 16 17   ANIONGAP 11 7* 8   EGFRNONAA 22.3* 21.3* 19.5*     Lactic Acid:   Recent Labs   Lab 11/05/19  1909 11/05/19  2336 11/06/19  1930   LACTATE 2.4* 2.1* 1.5     Urine Culture:   Recent Labs   Lab 11/05/19  1510   LABURIN PRESUMPTIVE PSEUDOMONAS SPECIES  > 100,000 cfu/ml  Identification and susceptibility pending  *       Significant Imaging: tele: personally revieewed and NSR

## 2019-11-07 NOTE — PROGRESS NOTES
Therapy with Vancomycin complete and / or consult / order discontinued by Kathleen Badillo on 11/07/2019  @ 10:48   Pharmacy will sign off, please re-consult as needed.  Thank you for allowing us to participate in this patient's care.  Amberly Greene 11/7/2019 11:43 AM  Dept of Pharmacy  Ext [phone]

## 2019-11-07 NOTE — CONSULTS
Former[facility]  Neurology  Consult Note    Patient Name: Masood Messina  MRN: [id number]  Admission Date: 11/5/2019  Hospital Length of Stay: 1 days  Code Status: DNR   Attending Provider: Dr. Stuart  Consulting Provider: Dr. Garcia Jimenez  Consulting NP: Shyla Barnhart NP  Primary Care Physician: Jeramie Lombardi MD  Principal Problem:Gram-negative bacteremia    Inpatient consult to Neurology  Consult performed by: Shyla Barnhart NP  Consult ordered by: Mauricio Kinsey MD        Subjective:     Chief Complaint: New onset seizures     HPI: 79-year-old nursing home resident([place]) with past medical history of chronic respiratory failure status post trach and PEG due to stroke, CAD status post CABG, hypertension, COPD,BPH presented from [facility] due to altered mental status, high fever and significant leukocytosis.  This is his 3rd admission with the same complaint in a span of 45 days  History mainly from charts, nursing home notes and ER MD and staff  At the moment he is getting p.o. vancomycin at [facility] for C diff colitis  In the emergency room he was found to be severely septic with significant shock and also he was hyperkalemic  His last sputum culture per chart review grew multidrug resistant Pseudomonas    Neurological Interval Note:  Patient is a 80 y/o white male admitted for treatment of Urosepsis, Patient is very ill in ap He has run fever for several days reported by staff. The Patient is a quadriplegic, Patient has a trachea, and peg tube in place.  Patient is sedated on vent, non responsive to tactile or verbal stimuli, unable to follow commands, Patient is reported to be nonverbal baseline. Staff noted seizure like behavior twitching, EEG obtained and per Dr. Garcia Jimenez the patient is have Recurrent Generalized Seizures. CT of Head negative. Patient was given a loading dose of Keppra and Vimipat to treat seizures.     Past Medical History:   Diagnosis Date    AAA (abdominal  aortic aneurysm)     Anemia     BPH (benign prostatic hyperplasia)     CHF (congestive heart failure)     COPD (chronic obstructive pulmonary disease)     Coronary artery disease     Dementia     Dementia     Depression     GERD (gastroesophageal reflux disease)     Hyperlipidemia     Hypertension     Hypothyroid     Renal disorder     Respiratory failure, chronic     Ventilator dependence        Past Surgical History:   Procedure Laterality Date    ABDOMINAL SURGERY      CARDIAC SURGERY  1999    GASTROSTOMY TUBE PLACEMENT      SPINE SURGERY      TRACHEOSTOMY TUBE PLACEMENT         Review of patient's allergies indicates:   Allergen Reactions    Codeine Other (See Comments)       Current Neurological Medications: Vimipat and Keppra     No current facility-administered medications on file prior to encounter.      Current Outpatient Medications on File Prior to Encounter   Medication Sig    ascorbic acid, vitamin C, (VITAMIN C) 500 mg/5 mL Syrp syrup 500 mg by PEG Tube route 2 (two) times daily.     baclofen (LIORESAL) 20 MG tablet 25 mg by PEG Tube route every 6 (six) hours.     banana flakes-t-galactooligos. (BANATROL PLUS) PwPk Take 1 packet by mouth 2 (two) times daily. Mix with water    busPIRone (BUSPAR) 5 MG Tab 5 mg by Per G Tube route 2 (two) times daily.    carvedilol (COREG) 3.125 MG tablet Take 1 tablet (3.125 mg total) by mouth 2 (two) times daily.    chlorhexidine (HIBICLENS) 4 % external liquid Apply 1 application topically 3 (three) times a week. On bath days    cholestyramine-aspartame (QUESTRAN LIGHT) 4 gram PwPk 1 packet (4 g total) by Per G Tube route 2 (two) times daily. Discontinue if no bowel movement in a day    duloxetine (CYMBALTA) 30 MG capsule 30 mg by PEG Tube route once daily.     enoxaparin (LOVENOX) 40 mg/0.4 mL Syrg Inject 40 mg into the skin once daily.    ferrous sulfate 220 mg (44 mg iron)/5 mL solution 220 mg by Per G Tube route once daily.     finasteride (PROSCAR) 5 mg tablet Take 1 tablet (5 mg total) by mouth once daily.    furosemide (LASIX) 20 MG tablet Take 1 tablet (20 mg total) by mouth once daily. (Patient taking differently: 20 mg by Per G Tube route once daily. )    gabapentin (NEURONTIN) 300 MG capsule 300 mg by Per G Tube route 3 (three) times daily.    HYDROcodone-acetaminophen (NORCO)  mg per tablet Take 1 tablet by mouth every 12 (twelve) hours as needed for Pain.    lactose-reduced food (ISOSOURCE HN FEEDING TUBE) 70 mLs by FEEDING TUBE route once daily. 70 ml per hour x22 hrs daily. Hold 1 hr before and after giving synthroid    levothyroxine (SYNTHROID) 200 MCG tablet Take 1 tablet (200 mcg total) by mouth once daily.    menthol-zinc oxide (RISAMINE) 0.44-20.6 % Oint Apply 1 application topically once daily. AFTER EACH DIAPER CHANGE    multivit-min-ferrous gluconate (CERTAVITE-ANTIOXID, IRON GLUC,) 9 mg iron/ 15 mL (15 mL) Liqd 15 mLs by Per G Tube route once daily.     polyethylene glycol (GLYCOLAX) 17 gram PwPk 17 g by Per G Tube route every evening.     potassium chloride 10% (KAYCIEL) 20 mEq/15 mL oral solution Take 10 mEq by mouth once daily. Per g tube     rivastigmine (EXELON) 9.5 mg/24 hr PT24 Place 1 patch onto the skin once daily.    Saccharomyces boulardii (FLORASTOR) 250 mg capsule Take 250 mg by mouth 2 (two) times daily.    saw/vit E/sod sue/lyc/beta/pyg (PROSTATE HEALTH ORAL) Take 30 mLs by mouth 2 (two) times daily.    terazosin (HYTRIN) 1 MG capsule 1 mg by PEG Tube route once daily.    traZODone (DESYREL) 100 MG tablet 100 mg by Per G Tube route every evening.     acetaminophen (TYLENOL) 160 mg/5 mL Elix 650 mg by Per G Tube route every 4 (four) hours as needed.    albuterol-ipratropium 2.5mg-0.5mg/3mL (DUONEB) 0.5 mg-3 mg(2.5 mg base)/3 mL nebulizer solution Take 3 mLs by nebulization every 6 (six) hours as needed for Wheezing or Shortness of Breath.     finasteride (PROSCAR) 5 mg tablet 5 mg by  PEG Tube route once daily.     hydrocodone-acetaminophen 10-325mg (NORCO)  mg Tab 1 tablet by Per G Tube route every 6 (six) hours as needed for Pain.    hydrocortisone (PROCTOZONE-HC) 2.5 % rectal cream Place 1 application rectally 2 (two) times daily as needed for Hemorrhoids.    ibuprofen (ADVIL,MOTRIN) 600 MG tablet Take 600 mg by mouth every 6 (six) hours as needed for Pain.    Lactoperoxi/Gluc Oxid/Pot Thio (BIOTENE DRY MOUTH MM) Swish and spit 15 mLs 4 (four) times daily. AND/OR PRN    lidocaine (XYLOCAINE) 5 % Oint ointment Apply 1 g topically as needed (with each trach change).    nitroGLYCERIN (NITROSTAT) 0.4 MG SL tablet Place 0.4 mg under the tongue every 5 (five) minutes as needed for Chest pain. Seek medical help if pain is not relieved by the third dose.    selenium sulfide 2.5 % Lotn Apply 1 application topically twice a week. ON Tuesday AND SATURDAY    vancomycin 250mg / 10ml Susp Take 5 mLs (125 mg total) by mouth every 6 (six) hours for 7 days, THEN 5 mLs (125 mg total) 2 (two) times daily for 7 days, THEN 5 mLs (125 mg total) once daily for 7 days, THEN 5 mLs (125 mg total) Every 3 (three) days.      Family History     None        Tobacco Use    Smoking status: Former Smoker    Smokeless tobacco: Never Used   Substance and Sexual Activity    Alcohol use: No    Drug use: No    Sexual activity: Never     Review of Systems   Unable to perform ROS: Intubated     Objective:     Vital Signs (Most Recent):  Temp: 98.5 °F (36.9 °C) (11/06/19 2000)  Pulse: 91 (11/06/19 2200)  Resp: (!) 26 (11/06/19 2200)  BP: (!) 111/52 (11/06/19 2200)  SpO2: 98 % (11/06/19 2200) Vital Signs (24h Range):  Temp:  [98.5 °F (36.9 °C)-102.7 °F (39.3 °C)] 98.5 °F (36.9 °C)  Pulse:  [] 91  Resp:  [0-26] 26  SpO2:  [96 %-99 %] 98 %  BP: ()/(30-61) 111/52  Arterial Line BP: ()/(44-82) 138/63     Weight: 133.6 kg (294 lb 8.6 oz)  Body mass index is 39.95 kg/m².    Physical Exam    Constitutional: He appears well-developed.   HENT:   Head: Normocephalic and atraumatic.   Eyes: Pupils are equal, round, and reactive to light. EOM are normal.   Neck:   Trach insitu    Cardiovascular: Normal rate, regular rhythm and normal heart sounds.   Tachycardia    Pulmonary/Chest: He is in respiratory distress.   Patient is vent dependent    Abdominal: Soft. Bowel sounds are normal.   Musculoskeletal: He exhibits edema.   Neurological: He displays abnormal reflex. A sensory deficit is present.   Reflex Scores:       Tricep reflexes are 0 on the right side and 0 on the left side.       Bicep reflexes are 1+ on the right side and 1+ on the left side.       Brachioradialis reflexes are 1+ on the right side and 1+ on the left side.       Patellar reflexes are 0 on the right side and 0 on the left side.       Achilles reflexes are 0 on the right side and 0 on the left side.  Unable to fully assess patient on vent sedation on Limited exam   Skin: Skin is warm and dry. Capillary refill takes 2 to 3 seconds.   Psychiatric:   Calm withdrawn     NEUROLOGICAL EXAMINATION:     MENTAL STATUS   Registration of memory: patient sedated unable to complete exam    Level of consciousness: unresponsive to painful stimuli  Abnormal comprehension.     CRANIAL NERVES     CN III, IV, VI   Pupils are equal, round, and reactive to light.  Extraocular motions are normal.   Right pupil: Size: 2 mm.   Left pupil: Size: 2 mm.        Unable to fully assess patient on vent     REFLEXES     Reflexes   Right brachioradialis: 1+  Left brachioradialis: 1+  Right biceps: 1+  Left biceps: 1+  Right triceps: 0  Left triceps: 0  Right patellar: 0  Left patellar: 0  Right achilles: 0  Left achilles: 0  Right : 0  Left : 0  Right Sims: absent  Left Sims: absent  Right ankle clonus: absent  Left ankle clonus: absent  Right pendular knee jerk: absent  Left pendular knee jerk: absent    SENSORY EXAM   Right arm light touch: decreased  from fingers  Left arm light touch: decreased from fingers  Right leg light touch: decreased from toes  Left leg light touch: decreased from toes  Right leg vibration: decreased from toes  Left leg vibration: decreased from toes  Right leg proprioception: decreased from toes  Left leg proprioception: decreased from toes  Right arm pinprick: decreased from fingers  Right leg pinprick: decreased from toes  Left leg pinprick: decreased from toes    GAIT AND COORDINATION        quadriplegic        Significant Labs:  Lab Results   Component Value Date    CREATININE 2.9 (H) 11/06/2019     Lab Results   Component Value Date    TSH 28.310 (H) 09/13/2019       Significant Imaging:  EXAMINATION:  CT HEAD WITHOUT CONTRAST    CLINICAL HISTORY:  Abnormal reflex;Pt having jerking motions full body;    TECHNIQUE:  Head CT without IV contrast. All CT scans at this facility use dose modulation, iterative reconstruction, and/or weight based dosing when appropriate to reduce radiation dose to as low as reasonably achievable.    COMPARISON:  10/02/2019    FINDINGS:  No acute intracranial hemorrhage, midline shift, or mass effect.    Diffuse cerebral and cerebellar atrophy is evident. Moderate periventricular and subcortical white matter low-attenuation suggests chronic small vessel ischemic changes.  There are bilateral lacunar infarcts within the basal ganglia.    The ventricles and cisterns are maintained.    Calvarium is intact, and visualized sinuses are clear.      Impression       1. No acute intracranial abnormality.    2. Chronic small vessel ischemic changes.     EEG   Abnormal Recurrent Seizure Activity     Assessment and Plan:  79 year old male with Impression of  1. New onset Seizures   2. Ventilator Dependent  3. Febrile  4. Urosepsis  5. Quadriplegic   Stroke Work-up:  1. MRI brain non contrast if patient tolerates  2 TSH, B12, B6, B  1, and folate  4. PT/OT/ST  Seizure Work-up:  1. MRI brain non contrast if patient  tolerates  2. EEG 30 minutes- completed abnormal  LTM for seizures  Gave loading dose of 1 gram Keppra and 200 mg of Vimpat   3. No driving for now   [phone] within 2 weeks from  discharge.  Stroke education was provided including stroke risk factors modification and any acute  neurological changes including weakness, confusion, visual changes to come straight to the ER.  Seizure education was provided including no driving, no swimming by self, no operation of  heavy machinery or climbing on ladders.  All side effects of new medications were discussed with patient and/or next of kin and all  questions were answered.  Active Diagnoses:    Diagnosis Date Noted POA    PRINCIPAL PROBLEM:  Gram-negative bacteremia [R78.81] 11/06/2019 Yes    Seizure [R56.9] 11/06/2019 No    Septic shock [A41.9, R65.21] 11/05/2019 Yes    History of MDR Pseudomonas aeruginosa infection [Z86.19] 11/05/2019 Yes    Leukocytosis [D72.829] 11/05/2019 Yes    Volume overload [E87.70] 11/05/2019 Yes    Anasarca [R60.1] 11/05/2019 Yes    Obstructive uropathy [N13.9] 11/05/2019 Yes    Encephalopathy, toxic [G92] 11/05/2019 Yes    C. difficile colitis [A04.72] 10/11/2019 Yes    Acute on chronic diastolic heart failure [I50.33] 10/03/2019 Yes    Sputum culture positive for Pseudomonas [R84.5] 10/03/2019 Yes     Chronic    Essential hypertension [I10] 09/13/2019 Yes    Hyperkalemia [E87.5] 09/13/2019 Yes    GINETTE (acute kidney injury) [N17.9] 09/13/2019 Yes    Enlarging abdominal aortic aneurysm (AAA) [I71.4] 09/13/2019 Yes    Cholelithiases [K80.20] 09/13/2019 Yes    Bilateral nephrolithiasis [N20.0] 09/13/2019 Yes    Hydroureter, left [N13.4] 09/13/2019 Yes    History of CVA (cerebrovascular accident) without residual deficits [Z86.73] 09/13/2019 Not Applicable    AAA (abdominal aortic aneurysm) without rupture [I71.4] 09/13/2019 Yes    Pseudomonas urinary tract infection  [N39.0, B96.5] 09/13/2019 Yes    Coronary artery disease [I25.10] 09/12/2019 Yes    Cardiogenic pulmonary edema [I50.1] 09/12/2019 Yes    Anemia, chronic disease [D63.8] 09/12/2019 Yes    Ventilator dependence [Z99.11] 09/12/2019 Not Applicable    Hemiparesis affecting dominant side as late effect of stroke [I69.359] 10/14/2017 Not Applicable    PEG (percutaneous endoscopic gastrostomy) status [Z93.1] 03/21/2017 Not Applicable    Acquired hypothyroidism [E03.9] 03/21/2017 Yes    Chronic respiratory failure with hypoxia [J96.11] 12/05/2013 Yes    Tracheostomy in place [Z93.0] 12/05/2013 Not Applicable    Functional quadriplegia [R53.2] 12/05/2013 Yes      Problems Resolved During this Admission:    Diagnosis Date Noted Date Resolved POA    Lactic acidosis [E87.2] 11/05/2019 11/06/2019 Yes       VTE Risk Mitigation (From admission, onward)         Ordered     enoxaparin injection 40 mg  Every 24 hours (non-standard times)      11/05/19 2305     IP VTE HIGH RISK PATIENT  Once      11/05/19 2014                Thank you for your consult. Will continue to follow up with patient     Shyla Barnhart NP  Neurology  [facility], [facility]    I, Dr. Garcia Jimenez, have personally seen and examined the patient with my advanced provider and agree with above. I personally did a focused exam, and reviewed all necessary clinical information. I discussed my management plan with my NP and agree with above. Needs LTM. Recurrent GPEDs. Keppra and Vimpat IV. On propofol, EEG well suppressed. CC 45 mins.

## 2019-11-07 NOTE — SUBJECTIVE & OBJECTIVE
[facility]  Pulmonary / Critical Care Medicine  Progress Note    S: No major issues overnight, but no significant improvement since admission.  Remains comatose and dependent on moderate but stable vasopressor doses.   Review of systems not obtained due to patient factors :  encephalopathy.    I have personally reviewed the following during today's evaluation:  past medical history, ROS, family history, social history, surgical history, current inpatient medications and drug allergies, as well as vital signs, diagnostic studies and nursing/provider documentation from the past 24 hours.         O: VS: Temp:  [98.8 °F (37.1 °C)-102.7 °F (39.3 °C)]   Pulse:  []   Resp:  [0-26]   BP: ()/(40-61)   SpO2:  [96 %-99 %]   Arterial Line BP: ()/(44-64)   Ideal body weight: 77.6 kg (171 lb 1.2 oz)  Adjusted ideal body weight: 100 kg (220 lb 7.4 oz)    I/O:   Intake/Output Summary (Last 24 hours) at 11/6/2019 2116  Last data filed at 11/6/2019 1831  Gross per 24 hour   Intake 1707.34 ml   Output 3900 ml   Net -2192.66 ml         Vent: SpO2 97% on 40% FiO2  Vent Mode: A/C  Oxygen Concentration (%):  [40] 40  Resp Rate Total:  [24 br/min-28 br/min] 26 br/min  Vt Set:  [450 mL-470 mL] 450 mL  PEEP/CPAP:  [5 cmH20] 5 cmH20  Pressure Support:  [0 cmH20] 0 cmH20  Mean Airway Pressure:  [13 rgW65-27 cmH20] 13 cmH20     PE Physical Exam   Constitutional: He appears well-developed and well-nourished. He appears toxic. He has a sickly appearance. No distress. He is obese.   HENT:   Head: Normocephalic and atraumatic.   Right Ear: External ear normal.   Left Ear: External ear normal.   Nose: No mucosal edema or rhinorrhea.   Mouth/Throat: Uvula is midline and oropharynx is clear and moist. No oropharyngeal exudate. Mallampati Score: III.   Neck: Neck supple. No JVD present. No tracheal deviation, no edema and no erythema present. No thyroid mass and no thyromegaly present.   8.0 cuffed tracheostomy secure  with cuff inflated   Cardiovascular: Normal rate, regular rhythm, normal heart sounds and intact distal pulses. Exam reveals no gallop and no friction rub.   No murmur heard.  Pulmonary/Chest: Normal expansion, symmetric chest wall expansion and breath sounds normal. He has no wheezes. He has no rhonchi. He has no rales. Chest wall is not dull to percussion. He exhibits no tenderness. Negative for egophony. Negative for tactile fremitus.   Abdominal: Soft. Bowel sounds are normal. He exhibits no distension and no mass. There is no hepatosplenomegaly. No hernia.   Musculoskeletal: Normal range of motion. He exhibits no edema, tenderness or deformity.   Lymphadenopathy: No supraclavicular adenopathy is present.     He has no cervical adenopathy.     He has no axillary adenopathy.   Neurological: He is unresponsive. He displays no atrophy and no tremor. No cranial nerve deficit. He exhibits normal muscle tone. He displays no seizure activity. GCS eye subscore is 3. GCS motor subscore is 3.   Skin: Skin is warm, dry and intact. No rash noted. He is not diaphoretic. No cyanosis. No pallor. Nails show no clubbing.   Psychiatric: He is withdrawn. Cognition and memory are impaired. He is noncommunicative.       Labs All pertinent labs within the past 24 hours have been reviewed.  Recent Labs   Lab 11/06/19  0426 11/06/19  0632  11/06/19  1725   WBC 44.92*  --   --   --    RBC 3.39*  --   --   --    HGB 9.9*  --   --   --    HCT 31.7*  --   --   --      --   --   --    MCV 94  --   --   --    MCH 29.2  --   --   --    MCHC 31.2*  --   --   --    *  --    < > 127*   K 6.1*  --    < > 4.9   CL 95  --    < > 95   CO2 26  --    < > 24   BUN 63*  --    < > 62*   CREATININE 2.6*  --    < > 2.9*   MG 2.1  --    < > 2.0   ALT 17  --    < > 17   AST 22  --    < > 21   ALKPHOS 75  --    < > 82   BILITOT 1.6*  --    < > 1.5*   PROT 7.9  --    < > 7.3   ALBUMIN 2.6*  --    < > 2.4*   PH  --  7.318*  --   --    PCO2  --   49.3*  --   --    PO2  --  106*  --   --    HCO3  --  25.3  --   --    POCSATURATED  --  97  --   --    BE  --  -1  --   --     < > = values in this interval not displayed.     Serum Lactate:  1.5  PCT:  9.60  Vancomycin:  13.7    Imaging I have reviewed and interpreted all pertinent imaging results/findings within the past 24 hours.  CT Abdomen/Pelvis:  Stable nonobstructing bilateral renal stones  Stable left hydronephrosis and hydroureter to the level of the ureteral vesicle junction  Cholelithiasis  Stable infrarenal abdominal aortic aneurysm  Moderate degenerative changes of the spine and hips  Stable atelectasis in lung bases    CT Head:  1. No acute intracranial abnormality.  2. Chronic small vessel ischemic changes.      Micro Blood:  (11/5 - right upper arm)  Gram positive cocci  Blood:  (11/5 - left AC)  Gram negative rods  Urine:  (11/5)  Presumptive pseudomonas spp (>100K cfu/ml)  Respiratory: (11/5) Many non-lactose fermenting GNR      Medications Scheduled    chlorhexidine  15 mL Mouth/Throat BID    enoxparin  40 mg Subcutaneous Q24H    [START ON 11/7/2019] lacosamide (VIMPAT) IVPB  100 mg Intravenous Q12H    [START ON 11/7/2019] levetiracetam IVPB  500 mg Intravenous Q12H    meropenem (MERREM) IVPB  2 g Intravenous Q12H    metronidazole  500 mg Intravenous Q8H    mupirocin   Nasal BID    pantoprazole  40 mg Oral Daily    vancomycin (VANCOCIN) IVPB  1,750 mg Intravenous Q24H    vancomycin  250 mg Oral QID      Continuous Infusions:    norepinephrine bitartrate-D5W 0.15 mcg/kg/min (11/06/19 2100)    propofol 30 mcg/kg/min (11/06/19 2048)      PRN

## 2019-11-07 NOTE — PROGRESS NOTES
[facility] Medicine  Progress Note    Patient Name: Masood Messina  MRN: [id number]  Patient Class: IP- Inpatient   Admission Date: 11/5/2019  Length of Stay: 1 days  Attending Physician: Yamileth Stuart MD  Primary Care Provider: Jeramie Lombardi MD        Subjective:     Principal Problem:Septic shock        HPI:  79-year-old nursing home resident([place]) with past medical history of chronic respiratory failure status post trach and PEG due to stroke, CAD status post CABG, hypertension, COPD,BPH presented from [facility] due to altered mental status, high fever and significant leukocytosis.  This is his 3rd admission with the same complaint in a span of 45 days  History mainly from charts, nursing home notes and ER MD and staff  At the moment he is getting p.o. vancomycin at assisted for C diff colitis  In the emergency room he was found to be severely septic with significant shock and also he was hyperkalemic  His last sputum culture per chart review grew multidrug resistant Pseudomonas  No further information available    Overview/Hospital Course:  Patient admitted with septic shock, encepahalopathy.  Patient admitted to ICU and started on levophed and Broad IV abx.  He is trach dependent.  Pulmonary critical care consulted.  ID consulted.  BCx- 1 bottle growing.  Tay changed 11/6 reportedly with purulent urine.  Concern for obstructed uropathy with infection which also could be causing his GINETTE.  Urology consulted.  Renal function not improving and unclear if this is ATN or the fact that tay wasn't changed until today.  Nephrology consulted.  Patient does not open eyes or respond which I was told is not his baseline.  Discussed with Dr. Kinsey and there is concern for meningitis.  IR consulted for LP.  Continuing po vancomycin as was on this previously for C. Diff. Fevers persist. Reports of intermittent shaking activity.  EEG was done and reported to have seizure  activity.  Official report pending.  Neurology consulted. Started on propofol. I spoke to his POA and he would not want CPR and thus he has been made DNR but is not comfort care.    Interval History: History not able to be obtained as not responsive.  History supplemented by RN and Dr. Kinsey.  Having shaking activity. EEG done and reported to be significant for seizure activity.  Rahman changed.    Review of Systems   Unable to perform ROS: Mental status change     Objective:     Vital Signs (Most Recent):  Temp: 100 °F (37.8 °C) (11/06/19 1830)  Pulse: 107 (11/06/19 2013)  Resp: (!) 26 (11/06/19 2013)  BP: (!) 95/47 (11/06/19 1800)  SpO2: 97 % (11/06/19 2013) Vital Signs (24h Range):  Temp:  [98.8 °F (37.1 °C)-102.7 °F (39.3 °C)] 100 °F (37.8 °C)  Pulse:  [] 107  Resp:  [0-26] 26  SpO2:  [96 %-99 %] 97 %  BP: ()/(40-61) 95/47  Arterial Line BP: ()/(44-64) 139/60     Weight: 133.6 kg (294 lb 8.6 oz)  Body mass index is 39.95 kg/m².    Intake/Output Summary (Last 24 hours) at 11/6/2019 2021  Last data filed at 11/6/2019 1831  Gross per 24 hour   Intake 1707.34 ml   Output 3900 ml   Net -2192.66 ml      Physical Exam   Constitutional: He appears well-developed. No distress.   HENT:   Head: Normocephalic and atraumatic.   Mouth/Throat: Oropharynx is clear and moist.   Trach   Eyes: Pupils are equal, round, and reactive to light. EOM are normal.   Neck: Normal range of motion.   Cardiovascular: Regular rhythm.   No murmur heard.  Pulmonary/Chest: Effort normal and breath sounds normal. He has no wheezes.   Abdominal: Soft. He exhibits no distension. There is no tenderness. There is no rebound and no guarding.   Peg   Genitourinary:   Genitourinary Comments: Rahman   Musculoskeletal: Normal range of motion.   Neurological:   Not responsive to voice or physical stimuli    Skin: No rash noted.   Psychiatric:   Unable to assess   Nursing note and vitals reviewed.      Significant Labs:   Blood Culture:    Recent Labs   Lab 11/05/19  1340 11/05/19  1404 11/06/19  1148   LABBLOO Gram stain reynaldo bottle: Gram positive cocci  Results called to and read back by: Cali Bustos RN on M3 re:gram pos   cocci in blood cx 11/06/2019  09:58  by BREONNA No Growth to date  No Growth to date  Gram stain aer bottle:Gram negative rods  Called Ana Saavedra RN M3 @ 2015 MS 11/06/19 No Growth to date     CBC:   Recent Labs   Lab 11/05/19  1345 11/05/19  2002 11/06/19  0426   WBC 28.44*  --  44.92*   HGB 10.4*  --  9.9*   HCT 33.4* 32* 31.7*     --  176     CMP:   Recent Labs   Lab 11/06/19  0426 11/06/19  1149 11/06/19  1725   * 128* 127*   K 6.1* 5.6* 4.9   CL 95 96 95   CO2 26 25 24   GLU 88 115* 127*   BUN 63* 58* 62*   CREATININE 2.6* 2.7* 2.9*   CALCIUM 8.5* 8.4* 8.4*   PROT 7.9 6.9 7.3   ALBUMIN 2.6* 2.4* 2.4*   BILITOT 1.6* 1.3* 1.5*   ALKPHOS 75 77 82   AST 22 21 21   ALT 17 16 17   ANIONGAP 11 7* 8   EGFRNONAA 22.3* 21.3* 19.5*     Lactic Acid:   Recent Labs   Lab 11/05/19  1909 11/05/19  2336 11/06/19  1930   LACTATE 2.4* 2.1* 1.5     Urine Culture:   Recent Labs   Lab 11/05/19  1510   LABURIN PRESUMPTIVE PSEUDOMONAS SPECIES  > 100,000 cfu/ml  Identification and susceptibility pending  *       Significant Imaging: tele: personally revieewed and NSR      Assessment/Plan:      * Septic shock  Vasopressor support with Levophed  Broad IV abx per ID  BCx growing- 1 bottle out of 4  Urine and sputum Cx in progress  IR consulted for LP given fevers, seizures, encephalopathy        Seizure  EEG done with reported seizure activity.  Awaiting official report.  Neurology consulted  Discussed with Dr. Kinsey and put on propofol for now.      Encephalopathy, toxic  Not improving thus far  Continuing IV abx  CT head done with no acute process  Neurology consulted given seizures      History of MDR Pseudomonas aeruginosa infection  As above      C. difficile colitis  At the moment patient is on p.o. vancomycin for C diff  colitis  Stool reported as formed per nursing      Acute on chronic diastolic heart failure  Stable issue  Will monitor carefully      Hydroureter, left  Seen on CT scan        GINETTE (acute kidney injury)  Acute kidney injury along with hyperkalemia in the background of sepsis/C diff colitis  Concern for obstructive uropathy from tay. Changed 11/6.    Consulting renal as not improving   ATVALENTIN harris in setting of shock      Hyperkalemia  Already received dextrose, insulin in the ER  K improved after treatment   Following daily labs    Essential hypertension  Currently in shock.  Antihypertensives held    Enlarging abdominal aortic aneurysm (AAA)        Ventilator dependence        Anemia, chronic disease  Monitoring cell counts  AM labs ordered      Coronary artery disease  Stable issue      Cardiogenic pulmonary edema        Hemiparesis affecting dominant side as late effect of stroke  Residuals from previous CVA      PEG (percutaneous endoscopic gastrostomy) status  PEG insitu      Urinary tract infection with hematuria  IV abx per ID  Tay changed 11/6  Suspect may have obstructive uropathy from obstructed tay  Cultures in progress      Tracheostomy in place  Pulmonary following for vent management       Chronic respiratory failure with hypoxia  Patient has got a trach and is vent dependent  Culture sputum from previously grew multidrug resistant Pseudomonas. Repeat sputum culture in progress.      Functional quadriplegia  Chronic condition.    No acute issues  Air mattress  Turning        VTE Risk Mitigation (From admission, onward)         Ordered     enoxaparin injection 40 mg  Every 24 hours (non-standard times)      11/05/19 2305     IP VTE HIGH RISK PATIENT  Once      11/05/19 2014                      Yamileth Stuart MD  Department of Hospital Medicine   [facility]

## 2019-11-07 NOTE — ASSESSMENT & PLAN NOTE
· Developed polyuria after urinary catheter was exchanged this morning, further suggesting obstructive uropathy as the cause of his GINETTE.  · Appears volume replete on exam and no additional evidence of organ hypoperfusion to suggest prerenal azotemia.  · ATN likely contributing.  · BUN/Cr unchanged.  · Hyperkalemia and acedmia improved.  No compelling indication for urgent RRT.  · Nephrology consulted, awaiting recommendations.  · If his condition remains unchanged over the next 24 hours, consider Urology consultation to evaluate for upper urinary tract pathology contributing to persistent shock.

## 2019-11-07 NOTE — ASSESSMENT & PLAN NOTE
· Consult nutrition and resume enteral feeds.  · Cholelithiasis noted.   No evidence of associated cholelithiasis.

## 2019-11-07 NOTE — PROGRESS NOTES
79 yr old male      11/07/19 0826        Wound 11/06/19 2000 Non pressure chronic ulcer distal Toe, fifth   Date First Assessed/Time First Assessed: 11/06/19 2000   Pre-existing: Yes  Primary Wound Type: (c) Non pressure chronic ulcer  Side: Left  Orientation: distal  Location: Toe, fifth   Wound Image     Dressing Appearance Open to air;Dry   Drainage Amount None   Appearance Black   Periwound Area Intact;Dry   Recommendation:  Clean with chlorhexidine/ns.  Pat dry.  Paint with betadine. Air dry.          11/07/19 0836        Pressure Injury 11/06/19 1430  Buttocks    Date First Assessed/Time First Assessed: 11/06/19 1430   Pressure Injury Present on Admission: yes  Side: (c)   Location: Buttocks  Staging: (c)    Wound Image    Staging   (hx stage 4 now scars)   Dressing Appearance Intact;Dry   Drainage Amount None   Appearance   (scar)   Periwound Area Intact  (scar)

## 2019-11-07 NOTE — PROGRESS NOTES
Progress Note  Infectious Disease    Admit Date: 11/5/2019   LOS: 2 days     SUBJECTIVE:     Follow-up For:  Septic shock.     Antibiotics (From admission, onward)    Start     Stop Route Frequency Ordered    11/06/19 2000  vancomycin (VANCOCIN) 1,750 mg in dextrose 5 % 500 mL IVPB      -- IV Every 24 hours (non-standard times) 11/05/19 2000 11/06/19 1115  meropenem (MERREM) 2 g in sodium chloride 0.9% 100 mL IVPB     Note to Pharmacy:  *confirmed dose and freq with Dr. Antoine  Ht: 6' (1.829 m)  Wt: 133.6 kg (294 lb 8.6 oz)  Estimated Creatinine Clearance: 32.1 mL/min (A) (based on SCr of 2.6 mg/dL (H)).   Body mass index is 39.95 kg/m².    -- IV Every 12 hours (non-standard times) 11/06/19 0940    11/06/19 1000  metronidazole IVPB 500 mg      -- IV Every 8 hours (non-standard times) 11/06/19 0936    11/06/19 0915  mupirocin 2 % ointment  (MRSA Decolonization Orders [facility])      11/11 0859 Nasl 2 times daily 11/06/19 0805    11/05/19 1715  vancomycin oral suspension 250 mg      -- Oral 4 times daily 11/05/19 1607          Review of Systems:  11/7   Unable to obtain. Still on pressors. Renal function unchanged. NO bowel movements. No other events. Bld cx just back today with GNR. According to lab, appears to be a Pseudomonas     OBJECTIVE:     Vital Signs (Most Recent)  Temp: 98 °F (36.7 °C) (11/07/19 0701)  Pulse: 110 (11/07/19 0854)  Resp: (!) 22 (11/07/19 0854)  BP: (!) 142/72 (11/07/19 0700)  SpO2: 97 % (11/07/19 0854)    Temperature Range Min/Max (Last 24H):  Temp:  [97.9 °F (36.6 °C)-102.7 °F (39.3 °C)]     I & O (Last 24H):    Intake/Output Summary (Last 24 hours) at 11/7/2019 1039  Last data filed at 11/7/2019 0715  Gross per 24 hour   Intake 2661.6 ml   Output 3400 ml   Net -738.4 ml       Physical Exam:  General: well developed, well nourished, moderately obese, not responding to voice, intermittent myoclonic jerks  HENT: Head:normocephalic, atraumatic. Ears:right ear normal, left ear normal. Nose: Nares  normal. Septum midline. Mucosa normal. No drainage or sinus tenderness., no discharge. Throat: lips, mucosa, and tongue normal; teeth and gums normal.  Neck: tracheostomy and ventilated   Lungs:  clear to auscultation bilaterally, normal respiratory effort and coarse bs   Cardiovascular: Heart: regular rate and rhythm, S1, S2 normal, no murmur, click, rub or gallop, no click and tachycardic, no murmurs auscultated. Chest Wall: no abnormalities . Extremities: no cyanosis or edema, or clubbing. Pulses: 2+ and symmetric.  Abdomen/Rectal: Abdomen: soft and nondistended. Hypoactive bs  Rectal: not examined  Genitalia: no abnormalities, tay catheter wtih purulent urine   Skin: Skin color, texture, turgor normal. No rashes or lesions  Musculoskeletal:muscle wasting. myoclonic jerking, no purposeful movement s   Left inguinal catheter, periopheral ivs otherwise      Lines/Drains:       Laboratory:  CBC  Recent Labs   Lab 11/07/19  0405 11/07/19  0856   WBC 31.53*  --    RBC 3.35*  --    HGB 10.0*  --    HCT 30.0* 32*   *  --    MCV 90  --    MCH 29.9  --    MCHC 33.3  --      BMP  Recent Labs   Lab 11/07/19  0405   *   K 4.2   CL 98   CO2 23   BUN 61*   CREATININE 2.8*   CALCIUM 8.5*     Microbiology Results (last 7 days)     Procedure Component Value Units Date/Time    Blood Culture #2 **CANNOT BE ORDERED STAT** [[id number]]  (Abnormal) Collected:  11/05/19 1404    Order Status:  Completed Specimen:  Blood from Peripheral, Antecubital, Left Updated:  11/07/19 1039     Blood Culture, Routine Gram stain aer bottle:Gram negative rods      Called Ana Saavedra RN M3 @ 2015 MS 11/06/19     Comment: Gram stain aer bottle:Gram negative rods  Called Ana Saavedra RN M3 @ 2015 MS 11/06/19, 11/06/2019 20:17           PRESUMPTIVE PSEUDOMONAS SPECIES  Identification and susceptibility pending      Narrative:       Gram stain aer bottle:Gram negative rods  Called Ana Saavedra RN M3 @ 2015 MS 11/06/19, 11/06/2019  20:17    Culture, Respiratory with Gram Stain [[id number]]  (Abnormal) Collected:  11/06/19 0420    Order Status:  Completed Specimen:  Respiratory from Sputum Updated:  11/07/19 1000     Respiratory Culture Reduced normal respiratory gabriela      GRAM NEGATIVE TOM, LACTOSE   Many  Identification and susceptibility pending        PRESUMPTIVE PROTEUS SPECIES  Moderate  Identification and susceptibility pending       Gram Stain (Respiratory) >10 epithelial cells per low power field     Gram Stain (Respiratory) Moderate WBC's     Gram Stain (Respiratory) Many Gram positive rods resembling diphtheroids     Gram Stain (Respiratory) Few Gram negative rods    Blood Culture #1 **CANNOT BE ORDERED STAT** [[id number]] Collected:  11/05/19 1340    Order Status:  Completed Specimen:  Blood from Peripheral, Upper Arm, Right Updated:  11/07/19 0854     Blood Culture, Routine Gram stain reynaldo bottle: Gram positive cocci      Results called to and read back by: Cali Bustos RN on M3 re:gram pos       cocci in blood cx 11/06/2019  09:58  by DRP      Gram stain aer bottle: Gram negative rods      Results called to and read back by: Martina Murillo RN on M3, RE: GNR in       aerobic bottle  11/07/2019  08:53 vcb    Culture, Respiratory with Gram Stain [[id number]]  (Abnormal)  (Susceptibility) Collected:  11/05/19 1444    Order Status:  Completed Specimen:  Respiratory from Tracheal Aspirate Updated:  11/07/19 0838     Respiratory Culture Reduced normal respiratory gabriela      SERRATIA MARCESCENS  Many       Gram Stain (Respiratory) <10 epithelial cells per low power field.     Gram Stain (Respiratory) Few WBC's     Gram Stain (Respiratory) Moderate Gram positive rods resembling diphtheroids     Gram Stain (Respiratory) Rare Gram negative rods    Urine culture [[id number]]  (Abnormal)  (Susceptibility) Collected:  11/05/19 1510    Order Status:  Completed Specimen:  Urine Updated:  11/07/19 0823     Urine Culture, Routine PSEUDOMONAS  AERUGINOSA  > 100,000 cfu/ml        YEAST  > 100,000 cfu/ml  identification pending      Narrative:       Preferred Collection Type->Urine, Clean Catch  Specimen Source->Urine    Blood culture [[id number]] Collected:  11/06/19 1148    Order Status:  Completed Specimen:  Blood from Line, Arterial, Right Updated:  11/06/19 1917     Blood Culture, Routine No Growth to date    Urine Culture High Risk [[id number]] Collected:  11/05/19 1926    Order Status:  Sent Specimen:  Urine, Catheterized Updated:  11/06/19 1550    Culture, Respiratory with Gram Stain [[id number]]     Order Status:  No result Specimen:  Respiratory     Blood culture [[id number]]     Order Status:  Canceled Specimen:  Blood     Stool culture [[id number]]     Order Status:  No result Specimen:  Stool     Clostridium difficile EIA [[id number]]     Order Status:  Canceled Specimen:  Stool           Diagnostic Results:  Unchanged decreased lung volumes with elevation of the right hemidiaphragm.  2. No significant change in bilateral patchy ground-glass opacities, right greater than left.    ASSESSMENT/PLAN:     Active Hospital Problems    Diagnosis  POA    *Gram-negative bacteremia [R78.81]  Yes    Seizure [R56.9]  No    Septic shock [A41.9, R65.21]  Yes    History of MDR Pseudomonas aeruginosa infection [Z86.19]  Yes    Leukocytosis [D72.829]  Yes    Volume overload [E87.70]  Yes    Anasarca [R60.1]  Yes    Obstructive uropathy [N13.9]  Yes    Encephalopathy, toxic [G92]  Yes    C. difficile colitis [A04.72]  Yes    Acute on chronic diastolic heart failure [I50.33]  Yes    Sputum culture positive for Pseudomonas [R84.5]  Yes     Chronic    Essential hypertension [I10]  Yes    Hyperkalemia [E87.5]  Yes    GINETTE (acute kidney injury) [N17.9]  Yes    Enlarging abdominal aortic aneurysm (AAA) [I71.4]  Yes    Cholelithiases [K80.20]  Yes    Bilateral nephrolithiasis [N20.0]  Yes    Hydroureter, left [N13.4]  Yes    History of CVA (cerebrovascular  accident) without residual deficits [Z86.73]  Not Applicable    AAA (abdominal aortic aneurysm) without rupture [I71.4]  Yes    Pseudomonas urinary tract infection [N39.0, B96.5]  Yes    Coronary artery disease [I25.10]  Yes    Cardiogenic pulmonary edema [I50.1]  Yes    Anemia, chronic disease [D63.8]  Yes    Ventilator dependence [Z99.11]  Not Applicable    Hemiparesis affecting dominant side as late effect of stroke [I69.359]  Not Applicable    PEG (percutaneous endoscopic gastrostomy) status [Z93.1]  Not Applicable    Acquired hypothyroidism [E03.9]  Yes    Chronic respiratory failure with hypoxia [J96.11]  Yes    Tracheostomy in place [Z93.0]  Not Applicable    Functional quadriplegia [R53.2]  Yes      Resolved Hospital Problems    Diagnosis Date Resolved POA    Lactic acidosis [E87.2] 11/06/2019 Yes     Severe septic shock, Pseudomonas UTI and probable bacteremia   Complicated urinary tract infection,   Recent Cdiff colitis,   Chronic respiratory failure   CVA with functional quadraplegia   Serratia + sputum with stable CXR   ARF      - continue Meropenem 2g IV  q12 renally dosed for severe septic shock   - COnsider adding a dose of aminoglycoside or colistn however with deteriorating renal function, will discuss with nephrology first.   - Continue po Vancomycin and IV flagyl for now.   - Can stop IV vancomycin  - prognosis remains guarded   - will follow. Please call with questions.

## 2019-11-07 NOTE — ASSESSMENT & PLAN NOTE
Patient has got a trach and is vent dependent  Culture sputum from previously grew multidrug resistant Pseudomonas. Repeat sputum culture in progress.

## 2019-11-07 NOTE — ASSESSMENT & PLAN NOTE
EEG done with reported seizure activity.  Awaiting official report.  Neurology consulted  Discussed with Dr. Kinsey and put on propofol for now.

## 2019-11-07 NOTE — CONSULTS
Consult Note  Nephrology    Consult Requested By: Yamileth Stuart MD    Reason for Consult: GINETTE    SUBJECTIVE:     History of Present Illness: 78 y/o male patient sent from NH on 11/5, admitted w/septic shock, encephalopathy.  Tay changed 11/6 reportedly with purulent urine.  Concern for obstructed uropathy with infection which also could be causing his GINETTE.  Urology consulted per primary.  Renal function not improving, nephrology consulted for co-management, GINETTE.    11/7  Pt seen and examined.  He had significant hyperkalemia on admit, this has now improved.  Scr 2.5 initially, now up to 2.8.  At last discharge in October, Scr was 1.0.  He is non-oliguric, UOP 5L--polyuria after catheter was exchanged.  Hyponatremic, was 127 yesterday, better today at 131.  CT abd/pelvis showed mild L hydronephrosis, urology is consulted as well.    Assessment/plan:    1.  GINETTE 2/2 hypotension d/t septic shock, infection, obstruction--  CT results as above.  Treat infection per ID recommendations.  Dose all medications for level of renal function.  Got IVF in ER, renal function actually worsened, likely d/t obstruction.  Renal panel daily. Put out over 5 liters yesterday.  Start NS 100cc/hour.    Maintain tay, monitor UOP.  2.  Hyponatremia--improving.  Will get urine Na+ and osmolality.  3.  Hyperkalemia--resolved, monitor.  4.  HTN, now hypotensive--on pressors.  Keep MAP above 55    Past Medical History:   Diagnosis Date    AAA (abdominal aortic aneurysm)     Anemia     BPH (benign prostatic hyperplasia)     CHF (congestive heart failure)     COPD (chronic obstructive pulmonary disease)     Coronary artery disease     Dementia     Dementia     Depression     GERD (gastroesophageal reflux disease)     Hyperlipidemia     Hypertension     Hypothyroid     Renal disorder     Respiratory failure, chronic     Ventilator dependence      Past Surgical History:   Procedure Laterality Date    ABDOMINAL SURGERY       CARDIAC SURGERY  1999    GASTROSTOMY TUBE PLACEMENT      SPINE SURGERY      TRACHEOSTOMY TUBE PLACEMENT       History reviewed. No pertinent family history.  Social History     Tobacco Use    Smoking status: Former Smoker    Smokeless tobacco: Never Used   Substance Use Topics    Alcohol use: No    Drug use: No       Review of patient's allergies indicates:   Allergen Reactions    Codeine Other (See Comments)        Review of Systems:  Unable to obtain 2/2 encephalopathy    OBJECTIVE:     Vital Signs Range (Last 24H):  Temp:  [97.9 °F (36.6 °C)-102.7 °F (39.3 °C)]   Pulse:  []   Resp:  [0-26]   BP: ()/(30-77)   SpO2:  [96 %-99 %]   Arterial Line BP: ()/(45-82)     Physical Exam:  General- NAD noted  HEENT- ncat, trache present  Neck- supple  CV- Regular rate and rhythm  Resp- upper lobes cta, on vent  GI- abd soft  Extrem- No cyanosis, clubbing, edema, quadraplegic  Derm- skin w/d  Neuro-  sedated    Body mass index is 39.47 kg/m².    Laboratory:  CBC:   Recent Labs   Lab 11/07/19  0405   WBC 31.53*   RBC 3.35*   HGB 10.0*   HCT 30.0*   *   MCV 90   MCH 29.9   MCHC 33.3     CMP:   Recent Labs   Lab 11/07/19  0405   *   CALCIUM 8.5*   ALBUMIN 2.3*   PROT 6.9   *   K 4.2   CO2 23   CL 98   BUN 61*   CREATININE 2.8*   ALKPHOS 82   ALT 15   AST 21   BILITOT 1.3*       Diagnostic Results:  Labs: Reviewed      ASSESSMENT/PLAN:     Active Hospital Problems    Diagnosis  POA    *Gram-negative bacteremia [R78.81]  Yes    Seizure [R56.9]  No    Septic shock [A41.9, R65.21]  Yes    History of MDR Pseudomonas aeruginosa infection [Z86.19]  Yes    Leukocytosis [D72.829]  Yes    Volume overload [E87.70]  Yes    Anasarca [R60.1]  Yes    Obstructive uropathy [N13.9]  Yes    Encephalopathy, toxic [G92]  Yes    C. difficile colitis [A04.72]  Yes    Acute on chronic diastolic heart failure [I50.33]  Yes    Sputum culture positive for Pseudomonas [R84.5]  Yes     Chronic     Essential hypertension [I10]  Yes    Hyperkalemia [E87.5]  Yes    GINETTE (acute kidney injury) [N17.9]  Yes    Enlarging abdominal aortic aneurysm (AAA) [I71.4]  Yes    Cholelithiases [K80.20]  Yes    Bilateral nephrolithiasis [N20.0]  Yes    Hydroureter, left [N13.4]  Yes    History of CVA (cerebrovascular accident) without residual deficits [Z86.73]  Not Applicable    AAA (abdominal aortic aneurysm) without rupture [I71.4]  Yes    Pseudomonas urinary tract infection [N39.0, B96.5]  Yes    Coronary artery disease [I25.10]  Yes    Cardiogenic pulmonary edema [I50.1]  Yes    Anemia, chronic disease [D63.8]  Yes    Ventilator dependence [Z99.11]  Not Applicable    Hemiparesis affecting dominant side as late effect of stroke [I69.359]  Not Applicable    PEG (percutaneous endoscopic gastrostomy) status [Z93.1]  Not Applicable    Acquired hypothyroidism [E03.9]  Yes    Chronic respiratory failure with hypoxia [J96.11]  Yes    Tracheostomy in place [Z93.0]  Not Applicable    Functional quadriplegia [R53.2]  Yes      Resolved Hospital Problems    Diagnosis Date Resolved POA    Lactic acidosis [E87.2] 11/06/2019 Yes         Thank you for allowing us to participate in the care of your patient. We will follow the patient and provide recommendations as needed.      Time spent seeing patient( greater than 1/2 spent in direct contact) :

## 2019-11-07 NOTE — PROGRESS NOTES
[facility]  Neurology  Progress Note    Patient Name: Masood Messina  MRN: [id number]  Admission Date: 11/5/2019  Hospital Length of Stay: 2 days  Code Status: DNR   Attending Provider: Yamileth Stuart MD  Primary Care Physician: Jeramie Lombardi MD   Consulting Provider: Dr. Garcia Jimenez   Consulting NP: Shyla Barnhart NP   Principal Problem:Gram-negative bacteremia    Subjective:   Chief Complaint: New onset seizures      HPI: 79-year-old nursing home resident([place]) with past medical history of chronic respiratory failure status post trach and PEG due to stroke, CAD status post CABG, hypertension, COPD,BPH presented from [facility] due to altered mental status, high fever and significant leukocytosis.  This is his 3rd admission with the same complaint in a span of 45 days  History mainly from charts, nursing home notes and ER MD and staff  At the moment he is getting p.o. vancomycin at [facility] for C diff colitis  In the emergency room he was found to be severely septic with significant shock and also he was hyperkalemic  His last sputum culture per chart review grew multidrug resistant Pseudomonas     Neurological Interval Note:  Patient is a 80 y/o white male admitted for treatment of Urosepsis, Patient is very ill in appearing, feverish for several days reported by staff. The Patient is a quadriplegic, Patient has a tracheostomy, and peg tube in place.  Patient continues to be sedated on vent, patient squint eyes but unable to follow commands, Patient is reported to be nonverbal baseline. Will do LTM EEG  Starting in AM with sedation off. Prior EEG report per  Dr. Garcia Jimenez the patient is having Recurrent Generalized Seizures. CT of Head negative. Patient was given a loading dose of Keppra and Vimipat to treat seizures.      Current Neurological Medications:     Current Facility-Administered Medications   Medication Dose Route Frequency Provider Last Rate Last Dose    0.9%   NaCl infusion   Intravenous Continuous Mohinder Barnes MD        albuterol-ipratropium 2.5 mg-0.5 mg/3 mL nebulizer solution 3 mL  3 mL Nebulization Q6H Yamileth Stuart MD   3 mL at 11/07/19 0701    chlorhexidine 0.12 % solution 15 mL  15 mL Mouth/Throat BID Isela Razo PharmD   15 mL at 11/07/19 0822    [START ON 11/8/2019] colistimethate 100 mg in dextrose 5 % 100 mL  100 mg Intravenous Q12H Kathleen Antoine MD        colistimethate 300 mg in dextrose 5 % 100 mL  300 mg Intravenous Once Kathleen Antoine MD        enoxaparin injection 40 mg  40 mg Subcutaneous Q24H Mauricio Kinsey MD   Stopped at 11/06/19 0600    lacosamide (VIMPAT) 100 mg in sodium chloride 0.9% 100 mL IVPB  100 mg Intravenous Q12H Brain Jimenez  mL/hr at 11/07/19 0715 100 mg at 11/07/19 0715    levETIRAcetam in NaCl (iso-os) IVPB 500 mg  500 mg Intravenous Q12H Yamileth Stuart  mL/hr at 11/07/19 0545 500 mg at 11/07/19 0545    meropenem (MERREM) 2 g in sodium chloride 0.9% 100 mL IVPB  2 g Intravenous Q12H Kathleen Antoine MD   Stopped at 11/07/19 0029    metronidazole IVPB 500 mg  500 mg Intravenous Q8H Kathleen Antoine  mL/hr at 11/07/19 0911 500 mg at 11/07/19 0911    mupirocin 2 % ointment   Nasal BID Isela Razo PharmD        norepinephrine 8 mg in dextrose 5 % 250 mL infusion  0.02 mcg/kg/min Intravenous Continuous Yamileth Stuart MD 5 mL/hr at 11/07/19 1021 0.02 mcg/kg/min at 11/07/19 1021    pantoprazole EC tablet 40 mg  40 mg Oral Daily Yoel Lopes MD   40 mg at 11/07/19 0504    propofol (DIPRIVAN) 10 mg/mL infusion  50 mcg/kg/min Intravenous Continuous Yoel Moses, MD 40.1 mL/hr at 11/07/19 0915 50 mcg/kg/min at 11/07/19 0915    vancomycin oral suspension 250 mg  250 mg Oral QID Yoel Lopes MD   250 mg at 11/07/19 0823       Review of Systems   Unable to perform ROS: Mental status change     Objective:     Vital Signs (Most Recent):  Temp: 98.8 °F (37.1 °C) (11/07/19 1101)  Pulse: 102 (11/07/19  1101)  Resp: 20 (11/07/19 1101)  BP: 135/66 (11/07/19 1101)  SpO2: 98 % (11/07/19 1101) Vital Signs (24h Range):  Temp:  [97.9 °F (36.6 °C)-102.7 °F (39.3 °C)] 98.8 °F (37.1 °C)  Pulse:  [] 102  Resp:  [0-26] 20  SpO2:  [96 %-99 %] 98 %  BP: ()/(30-77) 135/66  Arterial Line BP: ()/(46-82) 178/68     Weight: 132 kg (291 lb 0.1 oz)  Body mass index is 39.47 kg/m².    Physical Exam  Constitutional: He appears well-developed.   HENT:   Head: Normocephalic and atraumatic.   Eyes: Pupils are equal, round, and reactive to light. EOM are normal.   Neck:   Trach insitu    Cardiovascular: Normal rate, regular rhythm and normal heart sounds.   Tachycardia    Pulmonary/Chest: He is in respiratory distress.   Patient is vent dependent    Abdominal: Soft. Bowel sounds are normal.   Musculoskeletal: He exhibits edema.   Neurological: He displays abnormal reflex. A sensory deficit is present.   Reflex Scores:       Tricep reflexes are 0 on the right side and 0 on the left side.       Bicep reflexes are 1+ on the right side and 1+ on the left side.       Brachioradialis reflexes are 1+ on the right side and 1+ on the left side.       Patellar reflexes are 0 on the right side and 0 on the left side.       Achilles reflexes are 0 on the right side and 0 on the left side.  Unable to fully assess patient on vent sedation on Limited exam   Skin: Skin is warm and dry. Capillary refill takes 2 to 3 seconds.   Psychiatric:   Calm withdrawn      NEUROLOGICAL EXAMINATION:      MENTAL STATUS   Registration of memory: patient sedated unable to complete exam    Level of consciousness: unresponsive to painful stimuli  Abnormal comprehension.      CRANIAL NERVES      CN III, IV, VI   Pupils are equal, round, and reactive to light.  Extraocular motions are normal.   Right pupil: Size: 2 mm.   Left pupil: Size: 2 mm.        Unable to fully assess patient on vent      REFLEXES      Reflexes   Right brachioradialis: 1+  Left  brachioradialis: 1+  Right biceps: 1+  Left biceps: 1+  Right triceps: 0  Left triceps: 0  Right patellar: 0  Left patellar: 0  Right achilles: 0  Left achilles: 0  Right : 0  Left : 0  Right Sims: absent  Left Sims: absent  Right ankle clonus: absent  Left ankle clonus: absent  Right pendular knee jerk: absent  Left pendular knee jerk: absent     SENSORY EXAM   Right arm light touch: decreased from fingers  Left arm light touch: decreased from fingers  Right leg light touch: decreased from toes  Left leg light touch: decreased from toes  Right leg vibration: decreased from toes  Left leg vibration: decreased from toes  Right leg proprioception: decreased from toes  Left leg proprioception: decreased from toes  Right arm pinprick: decreased from fingers  Right leg pinprick: decreased from toes  Left leg pinprick: decreased from toes     GAIT AND COORDINATION        quadriplegic      Significant Labs:  Lab Results   Component Value Date    TSH 9.980 (H) 11/07/2019     Lab Results   Component Value Date    CREATININE 2.8 (H) 11/07/2019     Significant Imaging:  Significant Imaging:       EXAMINATION:  CT HEAD WITHOUT CONTRAST    CLINICAL HISTORY:  Abnormal reflex;Pt having jerking motions full body;    TECHNIQUE:  Head CT without IV contrast. All CT scans at this facility use dose modulation, iterative reconstruction, and/or weight based dosing when appropriate to reduce radiation dose to as low as reasonably achievable.    COMPARISON:  10/02/2019    FINDINGS:  No acute intracranial hemorrhage, midline shift, or mass effect.    Diffuse cerebral and cerebellar atrophy is evident. Moderate periventricular and subcortical white matter low-attenuation suggests chronic small vessel ischemic changes.  There are bilateral lacunar infarcts within the basal ganglia.    The ventricles and cisterns are maintained.    Calvarium is intact, and visualized sinuses are clear.       Impression         1. No acute  intracranial abnormality.    2. Chronic small vessel ischemic changes.      EEG   Abnormal Recurrent Seizure Activity      Assessment and Plan:  79 year old male with Impression of  1. New onset Seizures   2. Ventilator Dependent  3. Febrile  4. Urosepsis  5. Quadriplegic   Stroke Work-up:  1. MRI brain non contrast if patient tolerates  2 TSH, B12, B6, B  1, and folate  4. PT/OT/ST  Seizure Work-up:  1. MRI brain non contrast if patient tolerates  2. EEG 30 minutes- completed abnormal  LTM for seizures in Am off sedation   Gave loading dose of 1 gram Keppra and 200 mg of Vimpat   3. No driving for now   [phone] within 2 weeks from  discharge.  Stroke education was provided including stroke risk factors modification and any acute  neurological changes including weakness, confusion, visual changes to come straight to the ER.  Seizure education was provided including no driving, no swimming by self, no operation of  heavy machinery or climbing on ladders.  All side effects of new medications were discussed with patient and/or next of kin and all  questions were answered.         Active Diagnoses:    Diagnosis Date Noted POA    PRINCIPAL PROBLEM:  Gram-negative bacteremia [R78.81] 11/06/2019 Yes    Seizure [R56.9] 11/06/2019 No    Septic shock [A41.9, R65.21] 11/05/2019 Yes    History of MDR Pseudomonas aeruginosa infection [Z86.19] 11/05/2019 Yes    Leukocytosis [D72.829] 11/05/2019 Yes    Volume overload [E87.70] 11/05/2019 Yes    Anasarca [R60.1] 11/05/2019 Yes    Obstructive uropathy [N13.9] 11/05/2019 Yes    Encephalopathy, toxic [G92] 11/05/2019 Yes    C. difficile colitis [A04.72] 10/11/2019 Yes    Acute on chronic diastolic heart failure [I50.33] 10/03/2019 Yes    Sputum culture positive for Pseudomonas [R84.5] 10/03/2019 Yes     Chronic    Essential hypertension [I10] 09/13/2019 Yes    Hyperkalemia [E87.5] 09/13/2019 Yes    GINETTE (acute  kidney injury) [N17.9] 09/13/2019 Yes    Enlarging abdominal aortic aneurysm (AAA) [I71.4] 09/13/2019 Yes    Cholelithiases [K80.20] 09/13/2019 Yes    Bilateral nephrolithiasis [N20.0] 09/13/2019 Yes    Hydroureter, left [N13.4] 09/13/2019 Yes    History of CVA (cerebrovascular accident) without residual deficits [Z86.73] 09/13/2019 Not Applicable    AAA (abdominal aortic aneurysm) without rupture [I71.4] 09/13/2019 Yes    Pseudomonas urinary tract infection [N39.0, B96.5] 09/13/2019 Yes    Coronary artery disease [I25.10] 09/12/2019 Yes    Cardiogenic pulmonary edema [I50.1] 09/12/2019 Yes    Anemia, chronic disease [D63.8] 09/12/2019 Yes    Ventilator dependence [Z99.11] 09/12/2019 Not Applicable    Hemiparesis affecting dominant side as late effect of stroke [I69.359] 10/14/2017 Not Applicable    PEG (percutaneous endoscopic gastrostomy) status [Z93.1] 03/21/2017 Not Applicable    Acquired hypothyroidism [E03.9] 03/21/2017 Yes    Chronic respiratory failure with hypoxia [J96.11] 12/05/2013 Yes    Tracheostomy in place [Z93.0] 12/05/2013 Not Applicable    Functional quadriplegia [R53.2] 12/05/2013 Yes      Problems Resolved During this Admission:    Diagnosis Date Noted Date Resolved POA    Lactic acidosis [E87.2] 11/05/2019 11/06/2019 Yes       VTE Risk Mitigation (From admission, onward)         Ordered     enoxaparin injection 40 mg  Every 24 hours (non-standard times)      11/05/19 2305     IP VTE HIGH RISK PATIENT  Once      11/05/19 2014                Shyla Barnhart NP  Neurology  [facility]    I, Dr. Garcia Jimenez, have personally seen and examined the patient with my advanced provider and agree with above. I personally did a focused exam, and reviewed all necessary clinical information. I discussed my management plan with my NP and agree with above. LTM, improved , suppressed waveforms, Will need LP.  CC 45 mins. Discussed with medical /ICU team. Continue both AEDs.

## 2019-11-07 NOTE — HOSPITAL COURSE
Patient admitted with septic shock, encepahalopathy.  Patient admitted to ICU and started on levophed and Broad IV abx.  He is trach dependent.  Pulmonary critical care consulted.  ID consulted.  BCx- 1 bottle growing.  Tay changed 11/6 reportedly with purulent urine.  Concern for obstructed uropathy with infection which also could be causing his GINETTE.  Urology consulted.  Renal function not improving and unclear if this is ATN or the fact that tay wasn't changed until today.  Nephrology consulted.  Patient does not open eyes or respond which I was told is not his baseline.  Discussed with Dr. Kinsey and there is concern for meningitis.  IR consulted for LP but this was deferred as INR is 2.  Continuing po vancomycin as was on this previously for C. Diff. Discussed with Dr. Antoine and will give dose of IV vanc and IV ampicillin and will review coverage for meningitis as well as other covering infections. Last fever 11/6 at 1430. Reports of intermittent shaking activity.  EEG was done and reported to have seizure activity. Neurology consulted and patient started on keppra and vimpat. Patient is still not waking up or following commands but did try to fight me to keep his eye lids closed 11/7 and I find his pupils more responsive 11/7. Started on propofol. I spoke to his POA and he would not want CPR and thus he has been made DNR but is not comfort care.  11/8 still not responsive.  Renal function worse.  Making facial grimaces and thus video EEG is underway for possible continued seizure activity.  Discussed with Dr. Antoine and Meropenem should provide appropriate meningitis coverage for his possible sources.  11/9 Status is unchanged and not opening eyes or following commands.  Having persistent movements of his mouth.  Seen by Neurology after my visit but per my chart review, this is concerning for further seizure activity and thus repeat EEG obtained.  ID changed meropenem back to zosyn to cover all bases regarding  seizure activity though it is not felt this is antibiotic induced. EEG that was done 11/9 was negative for seizure activity.  11/10 patient does spontaneously open his eyes but not purposefully and is not blinking to threat. Started with loose stool and rectal tube placed.  Stool studies ordered.11/11 Patient continues to open his eyes spontaneously but without purpose.  Made a grunting noise but is not conversive or trying to interact.  Per my chart review today, patient's speech has been limited by trach but he was described as trying to speak and interactive though with some cognition deficits- he has not been interactive during this current hospitalization. Chart review also reveals tardive mouth movements which he has been demonstrating intermittently and EEG negative for acute seizure 11/9. Peg transiently clogged overnight and thus received IV flagyl and rectal vanc when vanc per peg could not be delivered.  Peg easily flushed by RN today. Levophed weaned off 11/12/19 at 0500.  Pt started showing some improvement and then on 11/14/19, pt is alert to his baseline  Patient to continue PEG feeding  ID Dr Tyson recommended patient to continue Zosyn till 11/19/2019 (renally dose) for his bacteremia with Pseudomonas and urine infection with Pseudomonas, also to continue Diflucan for 14 days for urinary Candida tropicalis infection, recommended change Rahman in 1 week, elevate scrotum to decrease swelling  We are not treating sputum cultures at this point because he is not needing higher FiO2 and chest x-ray fairly stable  Patient to continue oral vancomycin for his C diff diarrhea and prevention and relapse and will need a long taper (vancomycin 125 mg 4 times a day for 2 weeks, then 3 times a day for 1 weeks then, then 2 times a day for 1 week then 1 times a day for 1 week and then 1 every alternate day for 2 weeks)  Neurology Dr. Garcia Jimenez.  Started patient on Vimpat and Keppra for seizures like activity on  initial EEG on admission which patient will needs to continue.  Patient to continue wound care/local care for pressure sore on the left lateral foot, also need pressure relief off of left 5th toe    Physical exam on the day of discharge  Constitutional: He appears well-developed. No distress.   Morbidly obese, upper body/abdomen   HENT:   Head: Normocephalic and atraumatic.   Mouth/Throat: Oropharynx is clear and moist.   Trach   Eyes: Pupils are equal, round, and reactive to light. EOM are normal.   Neck: Normal range of motion.   trach   Cardiovascular: Normal rate and regular rhythm.   No murmur heard.  Feeble heart sounds    Pulmonary/Chest: Effort normal and breath sounds normal. He has no wheezes.   Abdominal: Soft. He exhibits no distension.   Obese, peg present  Non tender    Genitourinary:   Genitourinary Comments: Rahman  Rectal tube   Musculoskeletal: He exhibits no edema.   Neurological:   sleeping and not responding to verbal stimuli,   No blink to threat reflex  Some lip tremors noted   Quadriplegia at baseline  Not following commands  Skin: No rash noted.   Left fifth toe gangrene, blood filled blister left lateral, plantar surface  Dry skin feet

## 2019-11-07 NOTE — PROCEDURES
REASON FOR STUDY: Altered mental status  DATE OF STUDY: 11/6/2019.  PHYSICIAN REQUESTING/INSTITUTION:  Dr. Kinsey  AED: None.  MENTAL STATUS: Comatose.  METHODS:  EEG was done according to the 10/20 international system.  This is a 30 min routine EEG. Bipolar and referential montages were used. Video recording was used during the study.      CLINICAL HISTORY:  78 yo man with sepsis and encephalopathy.       FINDINGS:  EEG overall symmetric with continuous polymorphic waveforms.       Background rhythm: Delta dominant while eyes are closed, with intermittent suppressed activity.   PDR: 3-5 Hz   Mental status: Patient non responsive   Artifact: There is occasional eye movement, lead and EKG artifacts.       Sleep: None.         Epileptiform discharges: Recurrent epileptiform activity, generalized in nature. 1-1.5 Hz.     Activation procedures:  None.    Abnormal activity: One Seizure noted.      ECG: Regular rhythm          IMPRESSION: Abnormal EEG with recurrent generalized epileptiform activity, and underlying severe encephalopathy. One Seizure is seen.

## 2019-11-08 NOTE — PLAN OF CARE
Problem: Nutrition Impairment (Mechanical Ventilation, Invasive)  Goal: Optimal Nutrition Delivery  Outcome: Ongoing, Progressing       Recommendation/Intervention:   1. Recommend Nepro @ 30 ml/hr as goal rate to provide: 1296 kcal, 58 g pro, & 526 ml free h20.   2. Liquacel BID to meet protein needs (180 kcal, 32 g pro).  3. FWF: 30 ml Q 4 hrs.    Goals: 1. Advance TF to goal rate as tolerated by pt.

## 2019-11-08 NOTE — PROGRESS NOTES
[facility]  Neurology  Progress Note    Patient Name: Masood Messina  MRN: [id number]  Admission Date: 11/5/2019  Hospital Length of Stay: 3 days  Code Status: DNR   Attending Provider: Yamileth Stuart MD  Primary Care Physician: Jeramie Lombardi MD   Principal Problem:Gram-negative bacteremia    Subjective:     Interval History: Patient seen and examined. Currently still sedated. LTM EEG in place. Patient responsive to painful stimuli. Will wean patient off of sedation today. Per kerwin Blunt to d/c LTM EEG. Tolerating keppra 500mg BID and vimpat 100mg BID well. Would like to get an LP when PT/INR is stable. Will continue to follow patient.       Current Neurological Medications:   Scheduled Meds:   albuterol-ipratropium  3 mL Nebulization Q6H    chlorhexidine  15 mL Mouth/Throat BID    enoxparin  40 mg Subcutaneous Q24H    lacosamide (VIMPAT) IVPB  100 mg Intravenous Q12H    levetiracetam IVPB  500 mg Intravenous Q12H    meropenem (MERREM) IVPB  2 g Intravenous Q12H    mupirocin   Nasal BID    pantoprazole  40 mg Oral Daily    vancomycin  250 mg Oral QID     Continuous Infusions:   sodium chloride 0.9% 100 mL/hr at 11/08/19 0701    norepinephrine bitartrate-D5W 0.02 mcg/kg/min (11/08/19 0701)    propofol Stopped (11/08/19 1501)     PRN Meds:.      Current Facility-Administered Medications   Medication Dose Route Frequency Provider Last Rate Last Dose    0.9%  NaCl infusion   Intravenous Continuous Mohinder Barnes  mL/hr at 11/07/19 1235      albuterol-ipratropium 2.5 mg-0.5 mg/3 mL nebulizer solution 3 mL  3 mL Nebulization Q6H Yamileth Stuart MD   3 mL at 11/08/19 0816    chlorhexidine 0.12 % solution 15 mL  15 mL Mouth/Throat BID Isela Razo PharmD   15 mL at 11/07/19 2038    colistimethate 100 mg in dextrose 5 % 100 mL  100 mg Intravenous Q12H Kathleen Antoine MD   100 mg at 11/08/19 0224    enoxaparin injection 40 mg  40 mg Subcutaneous Q24H Mauricio Kinsey  MD   40 mg at 11/08/19 0621    lacosamide (VIMPAT) 100 mg in sodium chloride 0.9% 100 mL IVPB  100 mg Intravenous Q12H Brain Jimenez  mL/hr at 11/08/19 0638 100 mg at 11/08/19 0638    levetiracetam 500 mg in 0.9 % normal saline 100 mL IVPB (ready to mix system)  500 mg Intravenous Q12H Yamileth Stuart MD        meropenem (MERREM) 2 g in sodium chloride 0.9% 100 mL IVPB  2 g Intravenous Q12H Kathleen Antoine MD 33.3 mL/hr at 11/07/19 2329 2 g at 11/07/19 2329    mupirocin 2 % ointment   Nasal BID Isela Razo PharmD        norepinephrine 8 mg in dextrose 5 % 250 mL infusion  0.02 mcg/kg/min Intravenous Continuous Yamileth Stuart MD 5 mL/hr at 11/08/19 0412 0.02 mcg/kg/min at 11/08/19 0412    pantoprazole EC tablet 40 mg  40 mg Oral Daily Yoel Lopes MD   40 mg at 11/08/19 0622    propofol (DIPRIVAN) 10 mg/mL infusion  50 mcg/kg/min Intravenous Continuous Mauricio Kinsey MD 39.6 mL/hr at 11/08/19 0426 50 mcg/kg/min at 11/08/19 0426    vancomycin oral suspension 250 mg  250 mg Oral QID Yoel Lopes MD   250 mg at 11/07/19 2037       Review of Systems   Unable to perform ROS: Mental status change     Objective:     Vital Signs (Most Recent):  Temp: 99 °F (37.2 °C) (11/08/19 0701)  Pulse: 91 (11/08/19 0817)  Resp: (!) 7 (11/08/19 0817)  BP: (!) 145/72 (11/07/19 1501)  SpO2: 99 % (11/08/19 0817) Vital Signs (24h Range):  Temp:  [98 °F (36.7 °C)-99 °F (37.2 °C)] 99 °F (37.2 °C)  Pulse:  [] 91  Resp:  [0-40] 7  SpO2:  [80 %-99 %] 99 %  BP: (103-145)/(56-72) 145/72  Arterial Line BP: ()/(50-70) 127/58     Weight: 132 kg (291 lb 0.1 oz)  Body mass index is 39.47 kg/m².    Physical Exam   Constitutional: He appears well-developed and well-nourished.   HENT:   Head: Normocephalic and atraumatic.   Eyes: Pupils are equal, round, and reactive to light. EOM are normal.   Neck: Normal range of motion. Neck supple.   Cardiovascular: Normal rate.   Pulmonary/Chest: Breath sounds normal.   Abdominal: He  exhibits no distension. There is no tenderness.   Musculoskeletal: Normal range of motion.   Passive ROM normal   Neurological: He displays normal reflexes. No cranial nerve deficit or sensory deficit. He exhibits normal muscle tone. Abnormal coordination: RAMON.   Reflex Scores:       Tricep reflexes are 2+ on the right side and 2+ on the left side.       Bicep reflexes are 2+ on the right side and 2+ on the left side.       Brachioradialis reflexes are 2+ on the right side and 2+ on the left side.       Patellar reflexes are 2+ on the right side and 2+ on the left side.       Achilles reflexes are 2+ on the right side and 2+ on the left side.  sedated   Skin: Skin is warm and dry. Capillary refill takes less than 2 seconds.   Psychiatric:   Sedated       NEUROLOGICAL EXAMINATION:     MENTAL STATUS   Level of consciousness: responsive to painful stimuli       Sedated     CRANIAL NERVES   Cranial nerves II through XII intact.     CN III, IV, VI   Pupils are equal, round, and reactive to light.  Extraocular motions are normal.     MOTOR EXAM   Muscle bulk: normal  Overall muscle tone: normal    REFLEXES     Reflexes   Right brachioradialis: 2+  Left brachioradialis: 2+  Right biceps: 2+  Left biceps: 2+  Right triceps: 2+  Left triceps: 2+  Right patellar: 2+  Left patellar: 2+  Right achilles: 2+  Left achilles: 2+  Right : 2+  Left : 2+    SENSORY EXAM        Responsive to painful stimuli     GAIT AND COORDINATION        RAMON       Significant Labs:   Lab Results   Component Value Date    CREATININE 2.7 (H) 11/08/2019    CREATININE 2.7 (H) 11/08/2019       Lab Results   Component Value Date    LDLCALC 60.6 (L) 09/13/2019     Lab Results   Component Value Date    TSH 9.980 (H) 11/07/2019     Lab Results   Component Value Date    FOLATE >24.8 (H) 11/07/2019     Lab Results   Component Value Date    [id number] 1262 (H) 11/07/2019         Significant Imaging: I have reviewed and interpreted all pertinent imaging  results/findings within the past 24 hours.      CT head without contrast:    Impression       1.  No evidence of an acute intracranial abnormality.           Assessment and Plan:    1. New onset Seizures   -LTM showed one seizure. Will keep the patient on Keppra 500mg BID and vimapt 100mg BID and continue to monitor the patient and AED levels  -Consider MRI brain at a later date  -TSH elevated-management per IM  -PT/OT/ST      2. Septic shock  -Would like to obtain LP when PT/INR is stable  -Management per infectious disease/IM          Seizure precautions:    Patient and/caregiver advised that patients having seizures should not to drive or operate heavy machinery until 6 months seizure free. Also explained that patient is to avoid activities that could be high risk during a seizure, including but not limited to swimming alone, climbing to high levels, and bathing in a tub.         Side effects of seizure medications:     Explained to patient/caregiver that side effects of antiepileptics could include life threatening rashes, severe lab abnormalities, kidney stones, mood changes including depression, weight loss or gain, organ failure, suicidal ideation and death. The patient has been prescribed this medication because seizures have serious risks that in many cases outweight the risks. Advised patient/caregiver to be please be aware of these possible side effects and encouraged them to ask questions if needed.        Patient is to [facility] up with [facility] at [phone] within 2 weeks from discharge       Active Diagnoses:    Diagnosis Date Noted POA    PRINCIPAL PROBLEM:  Gram-negative bacteremia [R78.81] 11/06/2019 Yes    Seizure [R56.9] 11/06/2019 No    Septic shock [A41.9, R65.21] 11/05/2019 Yes    History of MDR Pseudomonas aeruginosa infection [Z86.19] 11/05/2019 Yes    Leukocytosis [D72.829] 11/05/2019 Yes    Volume overload [E87.70] 11/05/2019 Yes    Anasarca [R60.1] 11/05/2019 Yes     Obstructive uropathy [N13.9] 11/05/2019 Yes    Encephalopathy, toxic [G92] 11/05/2019 Yes    C. difficile colitis [A04.72] 10/11/2019 Yes    Acute on chronic diastolic heart failure [I50.33] 10/03/2019 Yes    Sputum culture positive for Pseudomonas [R84.5] 10/03/2019 Yes     Chronic    Essential hypertension [I10] 09/13/2019 Yes    Hyperkalemia [E87.5] 09/13/2019 Yes    GINETTE (acute kidney injury) [N17.9] 09/13/2019 Yes    Enlarging abdominal aortic aneurysm (AAA) [I71.4] 09/13/2019 Yes    Cholelithiases [K80.20] 09/13/2019 Yes    Bilateral nephrolithiasis [N20.0] 09/13/2019 Yes    Hydroureter, left [N13.4] 09/13/2019 Yes    History of CVA (cerebrovascular accident) without residual deficits [Z86.73] 09/13/2019 Not Applicable    AAA (abdominal aortic aneurysm) without rupture [I71.4] 09/13/2019 Yes    Pseudomonas urinary tract infection [N39.0, B96.5] 09/13/2019 Yes    Coronary artery disease [I25.10] 09/12/2019 Yes    Cardiogenic pulmonary edema [I50.1] 09/12/2019 Yes    Anemia, chronic disease [D63.8] 09/12/2019 Yes    Ventilator dependence [Z99.11] 09/12/2019 Not Applicable    Hemiparesis affecting dominant side as late effect of stroke [I69.359] 10/14/2017 Not Applicable    PEG (percutaneous endoscopic gastrostomy) status [Z93.1] 03/21/2017 Not Applicable    Acquired hypothyroidism [E03.9] 03/21/2017 Yes    Chronic respiratory failure with hypoxia [J96.11] 12/05/2013 Yes    Tracheostomy in place [Z93.0] 12/05/2013 Not Applicable    Functional quadriplegia [R53.2] 12/05/2013 Yes      Problems Resolved During this Admission:    Diagnosis Date Noted Date Resolved POA    Lactic acidosis [E87.2] 11/05/2019 11/06/2019 Yes       VTE Risk Mitigation (From admission, onward)         Ordered     enoxaparin injection 40 mg  Every 24 hours (non-standard times)      11/05/19 2305     IP VTE HIGH RISK PATIENT  Once      11/05/19 2014              Patient was seen and examined by Dr. Garcia Jimenez.        Naomi Reis, NP  Neurology  [facility]    I, Dr. Garcia Jimenez, have personally seen and examined the patient with my advanced provider and agree with above. I personally did a focused exam, and reviewed all necessary clinical information. I discussed my management plan with my NP and agree with above. Improving, more alert. EEG improved.

## 2019-11-08 NOTE — SUBJECTIVE & OBJECTIVE
[facility]  Pulmonary / Critical Care Medicine  Progress Note    S: No major issues overnight.  Remains comatose and dependent on moderate/unchanged doses of norepinephrine.   Review of systems not obtained due to patient factors Encephalopathy.    I have personally reviewed the following during today's evaluation:  past medical history, ROS, family history, social history, surgical history, current inpatient medications and drug allergies, as well as vital signs, diagnostic studies and nursing/provider documentation from the past 24 hours.         O: VS: Temp:  [97.9 °F (36.6 °C)-99 °F (37.2 °C)]   Pulse:  []   Resp:  [20-40]   BP: ()/(30-77)   SpO2:  [80 %-99 %]   Arterial Line BP: ()/(46-82)   Ideal body weight: 77.6 kg (171 lb 1.2 oz)  Adjusted ideal body weight: 99.4 kg (219 lb 0.8 oz)    I/O:   Gross per 24 hour   Intake 2569.57 ml   Output 3600 ml   Net -1030.43 ml         Vent: SpO2 96% on 30% FiO2  Vent Mode: A/C  Oxygen Concentration (%):  [32-40] 32  Resp Rate Total:  [22 br/min-26 br/min] 22 br/min  Vt Set:  [450 mL] 450 mL  PEEP/CPAP:  [5 cmH20] 5 cmH20  Pressure Support:  [0 cmH20] 0 cmH20  Mean Airway Pressure:  [12 pvL89-99 cmH20] 14 cmH20     PE Physical Exam   Constitutional: He appears well-developed and well-nourished. He appears toxic. He has a sickly appearance. No distress. He is obese.   HENT:   Head: Normocephalic and atraumatic.   Right Ear: External ear normal.   Left Ear: External ear normal.   Nose: No mucosal edema or rhinorrhea.   Mouth/Throat: Uvula is midline and oropharynx is clear and moist. No oropharyngeal exudate. Mallampati Score: unable to assess.   Neck: Neck supple. No JVD present. No tracheal deviation, no edema and no erythema present. No thyroid mass and no thyromegaly present.   8.0 cuffed tracheostomy secure with cuff inflated   Cardiovascular: Normal rate, regular rhythm, normal heart sounds and intact distal pulses. Exam reveals no  gallop and no friction rub.   No murmur heard.  Pulmonary/Chest: Normal expansion, symmetric chest wall expansion and breath sounds normal. He has no wheezes. He has no rhonchi. He has no rales. Chest wall is not dull to percussion. He exhibits no tenderness. Negative for egophony. Negative for tactile fremitus.   Abdominal: Soft. Bowel sounds are normal. He exhibits no distension and no mass. There is no hepatosplenomegaly. No hernia.   Musculoskeletal: Normal range of motion. He exhibits no edema, tenderness or deformity.   Lymphadenopathy: No supraclavicular adenopathy is present.     He has no cervical adenopathy.     He has no axillary adenopathy.   Neurological: He is unresponsive. He displays no atrophy and no tremor. No cranial nerve deficit. He exhibits normal muscle tone. He displays no seizure activity. GCS eye subscore is 3. GCS motor subscore is 3.   Skin: Skin is warm, dry and intact. No rash noted. He is not diaphoretic. No cyanosis. No pallor. Nails show no clubbing.   Psychiatric:   Unable to assess secondary to coma       Labs All pertinent labs within the past 24 hours have been reviewed.  Recent Labs   Lab 11/07/19  0405  11/07/19  0856 11/07/19  0856   WBC 31.53*  --   --   --    RBC 3.35*  --   --   --    HGB 10.0*  --   --   --    HCT 30.0*  --   --  32*   *  --   --   --    MCV 90  --   --   --    MCH 29.9  --   --   --    MCHC 33.3  --   --   --    *  --   --   --    K 4.2  --   --   --    CL 98  --   --   --    CO2 23  --   --   --    BUN 61*  --   --   --    CREATININE 2.8*  --   --   --    MG 2.0  --   --   --    ALT 15  --   --   --    AST 21  --   --   --    ALKPHOS 82  --   --   --    BILITOT 1.3*  --   --   --    PROT 6.9  --   --   --    ALBUMIN 2.3*  --   --   --    PH  --    < >  --  7.381   PCO2  --    < >  --  43.7   PO2  --    < >  --  82   HCO3  --    < >  --  25.9   POCSATURATED  --    < >  --  96   BE  --    < >  --  1   INR  --   --  2.0  --     < > = values in  this interval not displayed.     TSH:  9.98  Free T4: 0.57    Imaging I have reviewed and interpreted all pertinent imaging results/findings within the past 24 hours.  1. Unchanged decreased lung volumes with elevation of the right hemidiaphragm.  2. No significant change in bilateral patchy ground-glass opacities, right greater than left.      Micro Blood Culture   Lab Results   Component Value Date    LABBLOO No Growth to date 11/06/2019    LABBLOO No Growth to date 11/06/2019   , Sputum Culture   Lab Results   Component Value Date    GSRESP >10 epithelial cells per low power field 11/06/2019    GSRESP Moderate WBC's 11/06/2019    GSRESP Many Gram positive rods resembling diphtheroids 11/06/2019    GSRESP Few Gram negative rods 11/06/2019    RESPIRATORYC Reduced normal respiratory gabriela 11/06/2019    RESPIRATORYC (A) 11/06/2019     GRAM NEGATIVE TOM, LACTOSE   Many  Identification and susceptibility pending      RESPIRATORYC (A) 11/06/2019     PRESUMPTIVE PROTEUS SPECIES  Moderate  Identification and susceptibility pending      and Urine Culture    Lab Results   Component Value Date    LABURIN (A) 11/05/2019     PRESUMPTIVE PSEUDOMONAS SPECIES  > 100,000 cfu/ml  For susceptibility see order # [id number]         Medications Scheduled    albuterol-ipratropium  3 mL Nebulization Q6H    chlorhexidine  15 mL Mouth/Throat BID    [START ON 11/8/2019] custom IVPB builder  100 mg Intravenous Q12H    enoxparin  40 mg Subcutaneous Q24H    lacosamide (VIMPAT) IVPB  100 mg Intravenous Q12H    levetiracetam IVPB  500 mg Intravenous Q12H    meropenem (MERREM) IVPB  2 g Intravenous Q12H    mupirocin   Nasal BID    pantoprazole  40 mg Oral Daily    vancomycin  250 mg Oral QID      Continuous Infusions:    sodium chloride 0.9% 100 mL/hr at 11/07/19 1235    norepinephrine bitartrate-D5W 0.02 mcg/kg/min (11/07/19 1021)      PRN

## 2019-11-08 NOTE — ASSESSMENT & PLAN NOTE
· Primary service reportedly discussed patient's acute illness and current condition with his power of  who is aware of the circumstances.  · Remains full code for now.

## 2019-11-08 NOTE — ASSESSMENT & PLAN NOTE
· Multifactorial acute kidney injury likely a combination of obstructive uropathy and acute tubular necrosis.  · Renal function unchanged over the past 24 hr, but urine output has been adequate.  · Nephrology following, recommendations noted.  · If shock persists much longer, further evaluation by Urology might be appropriate to investigate for inadequate urinary source control.

## 2019-11-08 NOTE — ASSESSMENT & PLAN NOTE
Vasopressor support with Levophed  Broad IV abx per ID  BCx growing- 1 bottle out of 4  Urine Cx growing pseudomonas  IR consulted for LP given fevers, seizures, encephalopathy.  Could not be done due to INR 2. Repeating INR in AM and will revisit with Radiology. I discussed with Dr. Antoine and Meropenem giving good coverage for meningitis.  He lives at [place] and thus has not been out in the community.  Will give a dose of IV vanc and IV ampicillin and will revisit abx selection in AM.

## 2019-11-08 NOTE — SUBJECTIVE & OBJECTIVE
[facility]  Pulmonary / Critical Care Medicine  Progress Note    S: No acute issues overnight.  Shock improved and requiring only low doses of norepinephrine.  Afebrile over the past 24 hr.  Remains unarousable.   Review of systems:  Unable to obtain due to altered mental status.    I have personally reviewed the following during today's evaluation:  past medical history, ROS, family history, social history, surgical history, current inpatient medications and drug allergies, as well as vital signs, diagnostic studies and nursing/provider documentation from the past 24 hours.         O: VS: Temp:  [98 °F (36.7 °C)-99 °F (37.2 °C)]   Pulse:  []   Resp:  [0-29]   BP: (103-132)/(52-67)   SpO2:  [93 %-99 %]   Arterial Line BP: ()/(50-80)   Ideal body weight: 77.6 kg (171 lb 1.2 oz)  Adjusted ideal body weight: 99.4 kg (219 lb 0.8 oz)    I/O:   Intake/Output Summary (Last 24 hours) at 11/8/2019 1700  Last data filed at 11/8/2019 1041  Gross per 24 hour   Intake 968.14 ml   Output 2350 ml   Net -1381.86 ml         Vent: SpO2 97% on 32% FiO2  Vent Mode: A/C  Oxygen Concentration (%):  [32] 32  Resp Rate Total:  [22 br/min-26 br/min] 22 br/min  Vt Set:  [450 mL] 450 mL  PEEP/CPAP:  [5 cmH20] 5 cmH20  Pressure Support:  [0 cmH20] 0 cmH20  Mean Airway Pressure:  [12 xsD31-46 cmH20] 13 cmH20     PE Physical Exam   Constitutional: He appears well-developed and well-nourished.  Non-toxic appearance. He does not have a sickly appearance. No distress. He is obese.   HENT:   Head: Normocephalic and atraumatic.   Right Ear: External ear normal.   Left Ear: External ear normal.   Nose: No mucosal edema or rhinorrhea.   Mouth/Throat: Uvula is midline and oropharynx is clear and moist. No oropharyngeal exudate. Mallampati Score: unable to assess.   Neck: Neck supple. No JVD present. No tracheal deviation, no edema and no erythema present. No thyroid mass and no thyromegaly present.   8.0 cuffed tracheostomy  secure with cuff inflated   Cardiovascular: Normal rate, regular rhythm, normal heart sounds and intact distal pulses. Exam reveals no gallop and no friction rub.   No murmur heard.  Pulmonary/Chest: Normal expansion, symmetric chest wall expansion and breath sounds normal. He has no wheezes. He has no rhonchi. He has no rales. Chest wall is not dull to percussion. He exhibits no tenderness. Negative for egophony. Negative for tactile fremitus.   Abdominal: Soft. Bowel sounds are normal. He exhibits no distension and no mass. There is no hepatosplenomegaly. No hernia.   Musculoskeletal: Normal range of motion. He exhibits no edema, tenderness or deformity.   Lymphadenopathy: No supraclavicular adenopathy is present.     He has no cervical adenopathy.     He has no axillary adenopathy.   Neurological: He is unresponsive. He displays no atrophy and no tremor. No cranial nerve deficit. He exhibits normal muscle tone. He displays no seizure activity. GCS eye subscore is 3. GCS motor subscore is 3.   Skin: Skin is warm, dry and intact. No rash noted. He is not diaphoretic. No cyanosis. No pallor. Nails show no clubbing.   Psychiatric:   Unable to assess secondary to coma     Lines: Rahman, Gastrostomy, Tracheostomy, TLC, Arterial line, Dialysis catheter         Labs All pertinent labs within the past 24 hours have been reviewed.  Recent Labs   Lab 11/08/19  0453   WBC 16.69*   RBC 3.17*   HGB 9.3*   HCT 29.1*   *   MCV 92   MCH 29.3   MCHC 32.0   *  134*   K 3.8  3.8     100   CO2 24  24   BUN 53*  53*   CREATININE 2.7*  2.7*   MG 2.1   ALT 15   AST 19   ALKPHOS 77   BILITOT 1.1*   PROT 6.9   ALBUMIN 2.2*  2.2*   INR 1.7       Imaging I have reviewed and interpreted all pertinent imaging results/findings within the past 24 hours.  No new images today.      Micro Blood Culture   Lab Results   Component Value Date    LABBLOO No Growth to date 11/06/2019    LABBLOO No Growth to date 11/06/2019     LABBLOO No Growth to date 11/06/2019   , Sputum Culture   Lab Results   Component Value Date    GSRESP >10 epithelial cells per low power field 11/06/2019    GSRESP Moderate WBC's 11/06/2019    GSRESP Many Gram positive rods resembling diphtheroids 11/06/2019    GSRESP Few Gram negative rods 11/06/2019    RESPIRATORYC Reduced normal respiratory gabriela 11/06/2019    RESPIRATORYC SERRATIA MARCESCENS  Many   (A) 11/06/2019    RESPIRATORYC (A) 11/06/2019     PROTEUS MIRABILIS ESBL  Moderate  Results called to and read back by: Martina Murillo RN on M3 re: ESBL  by DRP 11/08/2019  09:10      and Urine Culture    Lab Results   Component Value Date    LABURIN (A) 11/05/2019     PRESUMPTIVE PSEUDOMONAS SPECIES  > 100,000 cfu/ml  For susceptibility see order # [id number]      LABURIN (A) 11/05/2019     YEAST  > 100,000 cfu/ml  For identification see order #[id number]         Medications Scheduled    albuterol-ipratropium  3 mL Nebulization Q6H    chlorhexidine  15 mL Mouth/Throat BID    enoxparin  40 mg Subcutaneous Q24H    lacosamide (VIMPAT) IVPB  100 mg Intravenous Q12H    levetiracetam IVPB  500 mg Intravenous Q12H    meropenem (MERREM) IVPB  2 g Intravenous Q12H    mupirocin   Nasal BID    pantoprazole  40 mg Oral Daily    vancomycin  250 mg Oral QID      Continuous Infusions:    sodium chloride 0.9% 100 mL/hr at 11/08/19 0701    norepinephrine bitartrate-D5W 0.02 mcg/kg/min (11/08/19 0701)    propofol 50 mcg/kg/min (11/08/19 1041)      PRN

## 2019-11-08 NOTE — ASSESSMENT & PLAN NOTE
 Encephalopathy persists, unchanged.   Likely precipitated by his acute septic shock compounded by lowering of his seizure threshold and concomitant subclinical seizure.   Neurology following managing AEDs.   Interventional Radiology consulted to assist with obtaining a CSF, but deferring procedure until his INR has improved.   Infectious Disease is following, and managing antibiotic selection.  Will defer whether to include coverage for meningitis/encephalitis to their expertise.   Remains on propofol infusion for burst suppression, and will hold it when he is placed back on an EEG tomorrow morning.

## 2019-11-08 NOTE — ASSESSMENT & PLAN NOTE
· Infectious Diseases managing antibiotic selection/durration.  · Continue supportive care for septic shock in the mean time and continue to survey for occult uncontrolled infectious sources.  · Multiple separate sources growing drug-resistant organisms.  · Continue norepinephrine infusion the meantime to maintain adequate organ perfusion.

## 2019-11-08 NOTE — ASSESSMENT & PLAN NOTE
· Continue trophic feeds.  · Nutrition consulted.  · Increase to goal as tolerated.  · Cholelithiasis noted.   No evidence of associated cholelithiasis.

## 2019-11-08 NOTE — ASSESSMENT & PLAN NOTE
· Close attention to maintain adequate glycemic control and avoiding occult hypoglycemia.  · Frequent blood glucose monitoring while NPO.  · Will defer to primary service for titration of thyroid hormone replacement.

## 2019-11-08 NOTE — ASSESSMENT & PLAN NOTE
· Shock improving and I suspect persistent low-dose vasopressor requirement can be attributed to propofol.  · Titrate norepinephrine infusion to maintain MAP > 65 mmHg.

## 2019-11-08 NOTE — PROGRESS NOTES
Formerly [facility]  Pulmonary / Critical Care Medicine  Progress Note    S: No major issues overnight.  Remains comatose and dependent on moderate/unchanged doses of norepinephrine.   Review of systems not obtained due to patient factors Encephalopathy.    I have personally reviewed the following during today's evaluation:  past medical history, ROS, family history, social history, surgical history, current inpatient medications and drug allergies, as well as vital signs, diagnostic studies and nursing/provider documentation from the past 24 hours.         O: VS: Temp:  [97.9 °F (36.6 °C)-99 °F (37.2 °C)]   Pulse:  []   Resp:  [20-40]   BP: ()/(30-77)   SpO2:  [80 %-99 %]   Arterial Line BP: ()/(46-82)   Ideal body weight: 77.6 kg (171 lb 1.2 oz)  Adjusted ideal body weight: 99.4 kg (219 lb 0.8 oz)    I/O:   Gross per 24 hour   Intake 2569.57 ml   Output 3600 ml   Net -1030.43 ml         Vent: SpO2 96% on 30% FiO2  Vent Mode: A/C  Oxygen Concentration (%):  [32-40] 32  Resp Rate Total:  [22 br/min-26 br/min] 22 br/min  Vt Set:  [450 mL] 450 mL  PEEP/CPAP:  [5 cmH20] 5 cmH20  Pressure Support:  [0 cmH20] 0 cmH20  Mean Airway Pressure:  [12 [id number] cmH20] 14 cmH20     PE Physical Exam   Constitutional: He appears well-developed and well-nourished. He appears toxic. He has a sickly appearance. No distress. He is obese.   HENT:   Head: Normocephalic and atraumatic.   Right Ear: External ear normal.   Left Ear: External ear normal.   Nose: No mucosal edema or rhinorrhea.   Mouth/Throat: Uvula is midline and oropharynx is clear and moist. No oropharyngeal exudate. Mallampati Score: unable to assess.   Neck: Neck supple. No JVD present. No tracheal deviation, no edema and no erythema present. No thyroid mass and no thyromegaly present.   8.0 cuffed tracheostomy secure with cuff inflated   Cardiovascular: Normal rate, regular rhythm, normal heart sounds and intact distal pulses. Exam reveals no  gallop and no friction rub.   No murmur heard.  Pulmonary/Chest: Normal expansion, symmetric chest wall expansion and breath sounds normal. He has no wheezes. He has no rhonchi. He has no rales. Chest wall is not dull to percussion. He exhibits no tenderness. Negative for egophony. Negative for tactile fremitus.   Abdominal: Soft. Bowel sounds are normal. He exhibits no distension and no mass. There is no hepatosplenomegaly. No hernia.   Musculoskeletal: Normal range of motion. He exhibits no edema, tenderness or deformity.   Lymphadenopathy: No supraclavicular adenopathy is present.     He has no cervical adenopathy.     He has no axillary adenopathy.   Neurological: He is unresponsive. He displays no atrophy and no tremor. No cranial nerve deficit. He exhibits normal muscle tone. He displays no seizure activity. GCS eye subscore is 3. GCS motor subscore is 3.   Skin: Skin is warm, dry and intact. No rash noted. He is not diaphoretic. No cyanosis. No pallor. Nails show no clubbing.   Psychiatric:   Unable to assess secondary to coma       Labs All pertinent labs within the past 24 hours have been reviewed.  Recent Labs   Lab 11/07/19  0405  11/07/19  0856 11/07/19  0856   WBC 31.53*  --   --   --    RBC 3.35*  --   --   --    HGB 10.0*  --   --   --    HCT 30.0*  --   --  32*   *  --   --   --    MCV 90  --   --   --    MCH 29.9  --   --   --    MCHC 33.3  --   --   --    *  --   --   --    K 4.2  --   --   --    CL 98  --   --   --    CO2 23  --   --   --    BUN 61*  --   --   --    CREATININE 2.8*  --   --   --    MG 2.0  --   --   --    ALT 15  --   --   --    AST 21  --   --   --    ALKPHOS 82  --   --   --    BILITOT 1.3*  --   --   --    PROT 6.9  --   --   --    ALBUMIN 2.3*  --   --   --    PH  --    < >  --  7.381   PCO2  --    < >  --  43.7   PO2  --    < >  --  82   HCO3  --    < >  --  25.9   POCSATURATED  --    < >  --  96   BE  --    < >  --  1   INR  --   --  2.0  --     < > = values in  this interval not displayed.     TSH:  9.98  Free T4: 0.57    Imaging I have reviewed and interpreted all pertinent imaging results/findings within the past 24 hours.  1. Unchanged decreased lung volumes with elevation of the right hemidiaphragm.  2. No significant change in bilateral patchy ground-glass opacities, right greater than left.      Micro Blood Culture   Lab Results   Component Value Date    LABBLOO No Growth to date 11/06/2019    LABBLOO No Growth to date 11/06/2019   , Sputum Culture   Lab Results   Component Value Date    GSRESP >10 epithelial cells per low power field 11/06/2019    GSRESP Moderate WBC's 11/06/2019    GSRESP Many Gram positive rods resembling diphtheroids 11/06/2019    GSRESP Few Gram negative rods 11/06/2019    RESPIRATORYC Reduced normal respiratory gabriela 11/06/2019    RESPIRATORYC (A) 11/06/2019     GRAM NEGATIVE TOM, LACTOSE   Many  Identification and susceptibility pending      RESPIRATORYC (A) 11/06/2019     PRESUMPTIVE PROTEUS SPECIES  Moderate  Identification and susceptibility pending      and Urine Culture    Lab Results   Component Value Date    LABURIN (A) 11/05/2019     PRESUMPTIVE PSEUDOMONAS SPECIES  > 100,000 cfu/ml  For susceptibility see order # [id number]         Medications Scheduled    albuterol-ipratropium  3 mL Nebulization Q6H    chlorhexidine  15 mL Mouth/Throat BID    [START ON 11/8/2019] custom IVPB builder  100 mg Intravenous Q12H    enoxparin  40 mg Subcutaneous Q24H    lacosamide (VIMPAT) IVPB  100 mg Intravenous Q12H    levetiracetam IVPB  500 mg Intravenous Q12H    meropenem (MERREM) IVPB  2 g Intravenous Q12H    mupirocin   Nasal BID    pantoprazole  40 mg Oral Daily    vancomycin  250 mg Oral QID      Continuous Infusions:    sodium chloride 0.9% 100 mL/hr at 11/07/19 1235    norepinephrine bitartrate-D5W 0.02 mcg/kg/min (11/07/19 1021)      PRN           Assessment/Plan:     Patient Active Problem List   Diagnosis    Functional  quadriplegia    Chronic respiratory failure with hypoxia    Sepsis    Tracheostomy in place    Hematuria    HCAP (healthcare-associated pneumonia)    PEG (percutaneous endoscopic gastrostomy) status    Acquired hypothyroidism    Hemiparesis affecting dominant side as late effect of stroke    Sacral decubitus ulcer, stage II    Cardiogenic pulmonary edema    Coronary artery disease    Anemia, chronic disease    Ventilator dependence    Acute on chronic diastolic HF (heart failure)    Pseudomonas urinary tract infection    AAA (abdominal aortic aneurysm) without rupture    Enlarging abdominal aortic aneurysm (AAA)    Essential hypertension    Hyperkalemia    GINETTE (acute kidney injury)    Cholelithiases    Bilateral nephrolithiasis    Hydroureter, left    History of CVA (cerebrovascular accident) without residual deficits    Sputum culture positive for Pseudomonas    Acute on chronic diastolic heart failure    Hypophosphatemia    Chronic pain    C. difficile colitis    Septic shock    History of MDR Pseudomonas aeruginosa infection    Leukocytosis    Volume overload    Anasarca    Obstructive uropathy    Encephalopathy, toxic    Seizure    Gram-negative bacteremia     Impression: Persistent septic shock  with a multitude of sources growing various organisms.  Persistent encephalopathy beginning to raise suspicion for an irreversible neurologic injury sustained prior to presentation.    Neuro   Encephalopathy persists, unchanged.   Likely precipitated by his acute septic shock compounded by lowering of his seizure threshold and concomitant subclinical seizure.   Neurology following managing AEDs.   Interventional Radiology consulted to assist with obtaining a CSF, but deferring procedure until his INR has improved.   Infectious Disease is following, and managing antibiotic selection.  Will defer whether to include coverage for meningitis/encephalitis to their  expertise.   Remains on propofol infusion for burst suppression, and will hold it when he is placed back on an EEG tomorrow morning.      Derm  · Frequent turning, attention to maintaining adequate nutrition, and local wound care.    Pulmonary  · No evidence of acute pulmonary pathology.  · Normal gas exchange of pulmonary mechanics  · Continue lung protective ventilation for now.  · Daily spontaneous breathing trials when clinically appropriate.    Cardiac/Vascular  · Titrate norepinephrine infusion to maintain MAP > 65 mmHg.    Renal/  · Multifactorial acute kidney injury likely a combination of obstructive uropathy and acute tubular necrosis.  · Renal function unchanged over the past 24 hr, but urine output has been adequate.  · Nephrology following, recommendations noted.  · If shock persists much longer, further evaluation by Urology might be appropriate to investigate for inadequate urinary source control.    ID  · Infectious Diseases managing antibiotic selection/durration.  · Continue supportive care for septic shock in the mean time and continue to survey for occult uncontrolled infectious sources.  · Multiple separate sources growing drug-resistant organisms.  · Continue norepinephrine infusion the meantime to maintain adequate organ perfusion.    Hematology  · No indication for blood products.  · Monitor daily CBC for now.    Endocrine  · Close attention to maintain adequate glycemic control and avoiding occult hypoglycemia.  · Frequent blood glucose monitoring while NPO.  · Will defer to primary service for titration of thyroid hormone replacement.    GI  · Consult nutrition and resume enteral feeds.  · Cholelithiasis noted.   No evidence of associated cholelithiasis.    Palliative Care  · Primary service reportedly discussed patient's acute illness and current condition with his power of  who is aware of the circumstances.  · Remains full code for now.    Critical Care Time: >35  minutes  Critical secondary to multiple ongoing nosocomial acquired infections.  Patient has a condition that poses threat to life and bodily function: septic shock, encephalopathy, acute hypoxemic respiratory failure.     Critical care was time spent personally by me on the following activities: development of treatment plan with patient or surrogate and bedside caregivers, discussions with consultants, evaluation of patient's response to treatment, examination of patient, ordering and performing treatments and interventions, ordering and review of laboratory studies, ordering and review of radiographic studies, pulse oximetry, re-evaluation of patient's condition. This critical care time did not overlap with that of any other provider or involve time for any procedures.     Mauricio Kinsey MD  Pulmonary / Critical Care Medicine  [facility]

## 2019-11-08 NOTE — PROGRESS NOTES
Formerly [facility], later [facility]  Adult Nutrition   Consult Note    SUMMARY     Recommendations/Interventions:    Recommendation/Intervention: 1. Recommend Nepro @ 30 ml/hr as goal rate to provide: 1296 kcal, 58 g pro, & 526 ml free h20. 2. Liquacel BID to meet protein needs (180 kcal, 32 g pro). 3.) FWF: 30 ml Q 4 hrs.  Goals: 1. Advance TF to goal rate as tolerated by pt.    Nutrition Goal Status: progressing towards goal  Communication of RD Recs: reviewed with RN    Reason for Assessment    Reason For Assessment: RD follow-up  Diagnosis: infection/sepsis  Relevant Medical History: NH resident, PEG, trach, COPD, quad, CABG, HTN, BPH  Interdisciplinary Rounds: attended    Nutrition Risk Screen    Nutrition Risk Screen: tube feeding or parenteral nutrition    Nutrition/Diet History    Patient Reported Diet/Restrictions/Preferences: no oral intake  Food Allergies: NKFA  Factors Affecting Nutritional Intake: NPO, on mechanical ventilation  Nutrition Support Formula Prior to Admit: Other (see comments)(Isosource HN )  Nutrition Support Rate Prior to Admit: 70 (ml)  Nutrtion Support Frequency Prior to Admit: ml/hr x 22 hrs (per NH records), FWF: 60 ml/hr  Nutrition Support Provision Prior to Admit: 1848 kcal, 83 g pro, & 1244 ml free h20. + 1320 ml FWF    Anthropometrics    Temp: 99 °F (37.2 °C)  Height Method: Stated  Height: 6' (182.9 cm)  Height (inches): 72 in  Weight Method: Bed Scale  Weight: 132 kg (291 lb 0.1 oz)  Weight (lb): 291.01 lb  Ideal Body Weight (IBW), Male: 178 lb  % Ideal Body Weight, Male (lb): 165.47 lb  BMI (Calculated): 39.5  BMI Grade: 35 - 39.9 - obesity - grade II  Additional Documentation: Tetraplegia (Quadriplegia) Ideal Body Weight (IBW) Adjustment    Weight History:  Wt Readings from Last 10 Encounters:   11/07/19 132 kg (291 lb 0.1 oz)   10/14/19 124.8 kg (275 lb 2.2 oz)   09/12/19 (!) 140.5 kg (309 lb 11.9 oz)   09/13/19 (!) 140.2 kg (309 lb)   10/13/17 110.6 kg (243 lb 13.3 oz)   03/27/17  118.8 kg (262 lb)   03/21/17 119 kg (262 lb 5.6 oz)   05/16/14 (!) 137.8 kg (303 lb 12.7 oz)   03/06/14 126 kg (277 lb 12.8 oz)   02/27/14 126.8 kg (279 lb 8 oz)     Lab/Procedures/Meds: Pertinent Labs Reviewed  Clinical Chemistry:  Recent Labs   Lab 11/07/19  0405 11/07/19  2300 11/08/19  0453   * 134* 134*  134*   K 4.2 3.7 3.8  3.8   CL 98 103 100  100   CO2 23 21* 24  24   * 104 116*  116*   BUN 61* 49* 53*  53*   CREATININE 2.8* 2.5* 2.7*  2.7*   CALCIUM 8.5* 7.5* 8.4*  8.4*   PROT 6.9 6.2 6.9   ALBUMIN 2.3* 2.0* 2.2*  2.2*   BILITOT 1.3* 1.1* 1.1*   ALKPHOS 82 70 77   AST 21 16 19   ALT 15 13 15   ANIONGAP 10 10 10  10   ESTGFRAFRICA 23.6* 27.0* 24.6*  24.6*   EGFRNONAA 20.4* 23.4* 21.3*  21.3*   MG 2.0 1.9 2.1   PHOS 2.3* 3.1 3.3  3.3     CBC:   Recent Labs   Lab 11/08/19  0453   WBC 16.69*   RBC 3.17*   HGB 9.3*   HCT 29.1*   *   MCV 92   MCH 29.3   MCHC 32.0     Cardiac Profile:  Recent Labs   Lab 11/05/19  1345   *   TROPONINI 0.034     Inflammatory Labs:  Recent Labs   Lab 11/05/19  1909   CRP 16.69*     Thyroid & Parathyroid:  Recent Labs   Lab 11/07/19  0405   TSH 9.980*   FREET4 0.57*       Medications: Pertinent Medications reviewed  Scheduled Meds:   albuterol-ipratropium  3 mL Nebulization Q6H    chlorhexidine  15 mL Mouth/Throat BID    custom IVPB builder  100 mg Intravenous Q12H    enoxparin  40 mg Subcutaneous Q24H    lacosamide (VIMPAT) IVPB  100 mg Intravenous Q12H    levetiracetam IVPB  500 mg Intravenous Q12H    meropenem (MERREM) IVPB  2 g Intravenous Q12H    mupirocin   Nasal BID    pantoprazole  40 mg Oral Daily    vancomycin  250 mg Oral QID     Continuous Infusions:   sodium chloride 0.9% 100 mL/hr at 11/07/19 1235    norepinephrine bitartrate-D5W 0.02 mcg/kg/min (11/08/19 0412)    propofol 50 mcg/kg/min (11/08/19 0426)     PRN Meds:.    Estimated/Assessed Needs    Weight Used For Calorie Calculations: 81 kg (178 lb 9.2  oz)(IBW)  Energy Calorie Requirements (kcal): 8221-1915 kcals (22-25 kcal/kg) per IBW  Energy Need Method: Kcal/kg  Protein Requirements:  g (0.6-0.8 g/kg)  Weight Used For Protein Calculations: 133.6 kg (294 lb 8.6 oz)  Fluid Requirements (mL): 1782 or per MD  Estimated Fluid Requirement Method: RDA Method    Nutrition Prescription Ordered    Current Diet Order: NPO  Current Nutrition Support Formula Ordered: Nepro  Current Nutrition Support Rate Ordered: 10 (ml)  Current Nutrition Support Frequency Ordered: ml/hr, trickle feeds ordered    Evaluation of Received Nutrient/Fluid Intake    Lipid Calories (kcals): 396 kcals(propofol @ 15 ml/hr)  IV Fluid (mL): 2400  Energy Calories Required: not meeting needs  Protein Required: not meeting needs  Fluid Required: not meeting needs    % Meal Intake: NPO    Intake/Output Summary (Last 24 hours) at 11/8/2019 1035  Last data filed at 11/8/2019 0500  Gross per 24 hour   Intake 1038.14 ml   Output 2250 ml   Net -1211.86 ml      Nutrition Risk    Level of Risk/Frequency of Follow-up: high     Dietitian Rounds Brief:    Pt assessed 2' consult for TF recommendations. Nepro started overnight --trickle feeds. Remains intubated/sedated. Low dose pressors required. Nephrology following--renal function stable per notes.     Monitor and Evaluation    Food and Nutrient Intake: enteral nutrition intake  Food and Nutrient Adminstration: enteral and parenteral nutrition administration  Anthropometric Measurements: weight change  Biochemical Data, Medical Tests and Procedures: glucose/endocrine profile, gastrointestinal profile, electrolyte and renal panel  Nutrition-Focused Physical Findings: overall appearance       Nutrition Follow-Up    RD Follow-up?: Yes    Melissa Nelson RD 11/08/2019 10:18 AM

## 2019-11-08 NOTE — PROGRESS NOTES
Progress Note  Infectious Disease    Admit Date: 11/5/2019   LOS: 3 days     SUBJECTIVE:     Follow-up For:  Septic shock.     Antibiotics (From admission, onward)    Start     Stop Route Frequency Ordered    11/06/19 1115  meropenem (MERREM) 2 g in sodium chloride 0.9% 100 mL IVPB     Note to Pharmacy:  *confirmed dose and freq with Dr. Antoine  Ht: 6' (1.829 m)  Wt: 133.6 kg (294 lb 8.6 oz)  Estimated Creatinine Clearance: 32.1 mL/min (A) (based on SCr of 2.6 mg/dL (H)).   Body mass index is 39.95 kg/m².    -- IV Every 12 hours (non-standard times) 11/06/19 0940    11/06/19 0915  mupirocin 2 % ointment  (MRSA Decolonization Orders ST)      11/11 0859 Nasl 2 times daily 11/06/19 0805    11/05/19 1715  vancomycin oral suspension 250 mg      -- Oral 4 times daily 11/05/19 1607          Review of Systems:  11/7   Unable to obtain. Still on pressors. Renal function unchanged. NO bowel movements. No other events. Bld cx just back today with GNR. According to lab, appears to be a Pseudomonas     11/8 Still on pressors but trying to get off. Off sedation and minimally responsive. EEG going today. Added Ampicillin for meningitis coverage. Sputum cx with Serratia, Proteus - Sens to Meropenem, Pseudomonas Sens to Meropenem. Blood cx repeat negative, no diarrhea,     OBJECTIVE:     Vital Signs (Most Recent)  Temp: 98 °F (36.7 °C) (11/08/19 1101)  Pulse: (!) 119 (11/08/19 1418)  Resp: (!) 28 (11/08/19 1418)  BP: (!) 103/52 (11/08/19 1414)  SpO2: 95 % (11/08/19 1418)    Temperature Range Min/Max (Last 24H):  Temp:  [98 °F (36.7 °C)-99 °F (37.2 °C)]     I & O (Last 24H):    Intake/Output Summary (Last 24 hours) at 11/8/2019 1557  Last data filed at 11/8/2019 1041  Gross per 24 hour   Intake 968.14 ml   Output 2350 ml   Net -1381.86 ml       Physical Exam:  General: well developed, well nourished, moderately obese, not responding to voice, eeg in progress   HENT: Head:normocephalic, atraumatic. Ears:right ear normal, left ear  normal. Nose: Nares normal. Septum midline. Mucosa normal. No drainage , no discharge. T  Neck: tracheostomy and ventilated   Lungs:  clear to auscultation bilaterally, normal respiratory effort and coarse bs   Cardiovascular: Heart: tachycardic regular rhythm, S1, S2 normal, no murmur, click, rub or gallop, no click . Chest Wall: no abnormalities . Extremities: no cyanosis or edema, or clubbing. Pulses: 2+ and symmetric.  Abdomen/Rectal: Abdomen: soft and nondistended. Hypoactive bs  Rectal: not examined  Genitalia: no abnormalities, tay catheter wtih purulent urine - more clear now   Skin: Skin color, texture, turgor normal. No rashes or lesions  Musculoskeletal:muscle wasting. myoclonic jerking, no purposeful movement s   Left inguinal catheter, periopheral ivs otherwise      Lines/Drains:       Laboratory:  CBC  Recent Labs   Lab 11/08/19  0453   WBC 16.69*   RBC 3.17*   HGB 9.3*   HCT 29.1*   *   MCV 92   MCH 29.3   MCHC 32.0     BMP  Recent Labs   Lab 11/08/19  0453   *  134*   K 3.8  3.8     100   CO2 24  24   BUN 53*  53*   CREATININE 2.7*  2.7*   CALCIUM 8.4*  8.4*     Microbiology Results (last 7 days)     Procedure Component Value Units Date/Time    Blood Culture #2 **CANNOT BE ORDERED STAT** [[id number]]  (Abnormal)  (Susceptibility) Collected:  11/05/19 1404    Order Status:  Completed Specimen:  Blood from Peripheral, Antecubital, Left Updated:  11/08/19 1308     Blood Culture, Routine Gram stain aer bottle:Gram negative rods      Called Ana Saavedra RN M3 @ 2015 MS 11/06/19      Gram stain reynaldo bottle: Gram positive cocci 11/07/2019  10:47       See previous notification     Comment: Gram stain aer bottle:Gram negative rods  Called Ana Saavedra RN M3 @ 2015 MS 11/06/19, 11/06/2019 20:17           PSEUDOMONAS AERUGINOSA      COAGULASE-NEGATIVE STAPHYLOCOCCUS SPECIES  Organism is a probable contaminant      Narrative:       Gram stain aer bottle:Gram negative  rods  Called Ana Saavedra RN M3 @ 2015 MS 11/06/19, 11/06/2019 20:17    Blood culture [[id number]] Collected:  11/06/19 1148    Order Status:  Completed Specimen:  Blood from Line, Arterial, Right Updated:  11/08/19 1232     Blood Culture, Routine No Growth to date      No Growth to date      No Growth to date    Culture, Respiratory with Gram Stain [[id number]]  (Abnormal)  (Susceptibility) Collected:  11/06/19 0420    Order Status:  Completed Specimen:  Respiratory from Sputum Updated:  11/08/19 0911     Respiratory Culture Reduced normal respiratory gabriela      SERRATIA MARCESCENS  Many        PROTEUS MIRABILIS ESBL  Moderate  Results called to and read back by: Martina Murillo RN on M3 re: ESBL  by BREONNA 11/08/2019  09:10       Gram Stain (Respiratory) >10 epithelial cells per low power field     Gram Stain (Respiratory) Moderate WBC's     Gram Stain (Respiratory) Many Gram positive rods resembling diphtheroids     Gram Stain (Respiratory) Few Gram negative rods    Urine Culture High Risk [[id number]]  (Abnormal) Collected:  11/05/19 1926    Order Status:  Completed Specimen:  Urine, Catheterized Updated:  11/08/19 0808     Urine Culture, Routine PRESUMPTIVE PSEUDOMONAS SPECIES  > 100,000 cfu/ml  For susceptibility see order # [id number]        YEAST  > 100,000 cfu/ml  For identification see order #[id number]      Narrative:       Indicated criteria for high risk culture:->Other  Other (specify):->sepsis    Blood Culture #1 **CANNOT BE ORDERED STAT** [[id number]]  (Abnormal) Collected:  11/05/19 1340    Order Status:  Completed Specimen:  Blood from Peripheral, Upper Arm, Right Updated:  11/08/19 0745     Blood Culture, Routine Gram stain reynaldo bottle: Gram positive cocci      Results called to and read back by: Cali Bustos RN on M3 re:gram pos       cocci in blood cx 11/06/2019  09:58  by DRPHIL      Gram stain aer bottle: Gram negative rods      Results called to and read back by: Martina Murillo RN on M3, RE: GNR in        aerobic bottle  11/07/2019  08:53 vcb      COAGULASE-NEGATIVE STAPHYLOCOCCUS SPECIES  Multiple morphotypes - probable contaminants        PSEUDOMONAS AERUGINOSA  For susceptibility see order #[id number]      Culture, Respiratory with Gram Stain [[id number]]  (Abnormal)  (Susceptibility) Collected:  11/05/19 1444    Order Status:  Completed Specimen:  Respiratory from Tracheal Aspirate Updated:  11/07/19 0838     Respiratory Culture Reduced normal respiratory gabriela      SERRATIA MARCESCENS  Many       Gram Stain (Respiratory) <10 epithelial cells per low power field.     Gram Stain (Respiratory) Few WBC's     Gram Stain (Respiratory) Moderate Gram positive rods resembling diphtheroids     Gram Stain (Respiratory) Rare Gram negative rods    Urine culture [[id number]]  (Abnormal)  (Susceptibility) Collected:  11/05/19 1510    Order Status:  Completed Specimen:  Urine Updated:  11/07/19 0823     Urine Culture, Routine PSEUDOMONAS AERUGINOSA  > 100,000 cfu/ml        YEAST  > 100,000 cfu/ml  identification pending      Narrative:       Preferred Collection Type->Urine, Clean Catch  Specimen Source->Urine    Culture, Respiratory with Gram Stain [[id number]]     Order Status:  No result Specimen:  Respiratory     Blood culture [[id number]]     Order Status:  Canceled Specimen:  Blood     Stool culture [[id number]]     Order Status:  No result Specimen:  Stool     Clostridium difficile EIA [[id number]]     Order Status:  Canceled Specimen:  Stool           Diagnostic Results:  Unchanged decreased lung volumes with elevation of the right hemidiaphragm.  2. No significant change in bilateral patchy ground-glass opacities, right greater than left.  No new images today     ASSESSMENT/PLAN:     Active Hospital Problems    Diagnosis  POA    *Gram-negative bacteremia [R78.81]  Yes    Seizure [R56.9]  No    Septic shock [A41.9, R65.21]  Yes    History of MDR Pseudomonas aeruginosa infection [Z86.19]  Yes    Leukocytosis [D72.829]   Yes    Volume overload [E87.70]  Yes    Anasarca [R60.1]  Yes    Obstructive uropathy [N13.9]  Yes    Encephalopathy, toxic [G92]  Yes    C. difficile colitis [A04.72]  Yes    Acute on chronic diastolic heart failure [I50.33]  Yes    Sputum culture positive for Pseudomonas [R84.5]  Yes     Chronic    Essential hypertension [I10]  Yes    Hyperkalemia [E87.5]  Yes    GINETTE (acute kidney injury) [N17.9]  Yes    Enlarging abdominal aortic aneurysm (AAA) [I71.4]  Yes    Cholelithiases [K80.20]  Yes    Bilateral nephrolithiasis [N20.0]  Yes    Hydroureter, left [N13.4]  Yes    History of CVA (cerebrovascular accident) without residual deficits [Z86.73]  Not Applicable    AAA (abdominal aortic aneurysm) without rupture [I71.4]  Yes    Pseudomonas urinary tract infection [N39.0, B96.5]  Yes    Coronary artery disease [I25.10]  Yes    Cardiogenic pulmonary edema [I50.1]  Yes    Anemia, chronic disease [D63.8]  Yes    Ventilator dependence [Z99.11]  Not Applicable    Hemiparesis affecting dominant side as late effect of stroke [I69.359]  Not Applicable    PEG (percutaneous endoscopic gastrostomy) status [Z93.1]  Not Applicable    Acquired hypothyroidism [E03.9]  Yes    Chronic respiratory failure with hypoxia [J96.11]  Yes    Tracheostomy in place [Z93.0]  Not Applicable    Functional quadriplegia [R53.2]  Yes      Resolved Hospital Problems    Diagnosis Date Resolved POA    Lactic acidosis [E87.2] 11/06/2019 Yes     Severe septic shock, Pseudomonas UTI and bacteremia   Complicated urinary tract infection,   Recent Cdiff colitis,   Chronic respiratory failure   CVA with functional quadraplegia   Serratia and Proteus ESBL + sputum with stable CXR   ARF -   Encephalopathy      - continue Meropenem 2g IV  q12 renally dosed for severe septic shock   - Stop Colistin due to renal function and all bacteria sensitive to Meropenem   - Unclear role of Ampicillin. Meropenem should cover Meningitis. Can add  Levaquin for CNS penetration however Pseudomonas is resistant   - stop flagyl but continue po vancomycin for cdiff prophylaxis   - prognosis remains poor   - will follow. Please call with questions. Discussed with Dr. Stuart and nursing

## 2019-11-08 NOTE — SUBJECTIVE & OBJECTIVE
Interval History: Patient encephalopathic and unable to provide history.  Does not open eyes and respond to voice or stimuli.  Did try to fight me when I opened his eye lids which is more responsive than yesterday. No fevers overnight or today. No obvious seizure activity.    Review of Systems   Unable to perform ROS: Mental status change     Objective:     Vital Signs (Most Recent):  Temp: 98.4 °F (36.9 °C) (11/07/19 1915)  Pulse: 91 (11/07/19 2007)  Resp: (!) 22 (11/07/19 2007)  BP: (!) 145/72 (11/07/19 1501)  SpO2: 96 % (11/07/19 2007) Vital Signs (24h Range):  Temp:  [97.9 °F (36.6 °C)-99 °F (37.2 °C)] 98.4 °F (36.9 °C)  Pulse:  [] 91  Resp:  [20-40] 22  SpO2:  [80 %-99 %] 96 %  BP: ()/(52-77) 145/72  Arterial Line BP: (104-178)/(52-74) 104/52     Weight: 132 kg (291 lb 0.1 oz)  Body mass index is 39.47 kg/m².    Intake/Output Summary (Last 24 hours) at 11/7/2019 2111  Last data filed at 11/7/2019 1701  Gross per 24 hour   Intake 2569.57 ml   Output 3600 ml   Net -1030.43 ml      Physical Exam   Constitutional: He is oriented to person, place, and time. He appears well-developed. No distress.   HENT:   Head: Normocephalic and atraumatic.   Mouth/Throat: Oropharynx is clear and moist.   Trach on vent   Eyes: Pupils are equal, round, and reactive to light. EOM are normal.   Neck: Normal range of motion.   Cardiovascular: Regular rhythm.   No murmur heard.  Pulmonary/Chest: Effort normal. He has no wheezes.   Coarse anteriorly    Abdominal: Soft. He exhibits no distension.   Peg tube   Genitourinary:   Genitourinary Comments: tay   Musculoskeletal: Normal range of motion.   Neurological: He is alert and oriented to person, place, and time. No cranial nerve deficit or sensory deficit.   Skin: No rash noted.   Left fifth toe gangrene   Psychiatric:   Unable to assess   Nursing note and vitals reviewed.      Significant Labs:   Blood Culture:   Recent Labs   Lab 11/06/19  1148   LABBLOO No Growth to date   No Growth to date     CBC:   Recent Labs   Lab 11/06/19  0426 11/07/19  0405 11/07/19  0856   WBC 44.92* 31.53*  --    HGB 9.9* 10.0*  --    HCT 31.7* 30.0* 32*    133*  --      CMP:   Recent Labs   Lab 11/06/19  1149 11/06/19  1725 11/07/19  0405   * 127* 131*   K 5.6* 4.9 4.2   CL 96 95 98   CO2 25 24 23   * 127* 152*   BUN 58* 62* 61*   CREATININE 2.7* 2.9* 2.8*   CALCIUM 8.4* 8.4* 8.5*   PROT 6.9 7.3 6.9   ALBUMIN 2.4* 2.4* 2.3*   BILITOT 1.3* 1.5* 1.3*   ALKPHOS 77 82 82   AST 21 21 21   ALT 16 17 15   ANIONGAP 7* 8 10   EGFRNONAA 21.3* 19.5* 20.4*     Respiratory Culture:   Recent Labs   Lab 11/06/19  0420   GSRESP >10 epithelial cells per low power field  Moderate WBC's  Many Gram positive rods resembling diphtheroids  Few Gram negative rods   RESPIRATORYC Reduced normal respiratory gabriela  GRAM NEGATIVE TOM, LACTOSE   Many  Identification and susceptibility pending  *  PRESUMPTIVE PROTEUS SPECIES  Moderate  Identification and susceptibility pending  *     Troponin: No results for input(s): TROPONINI in the last 48 hours.  Urine Culture: No results for input(s): LABURIN in the last 48 hours.    Significant Imaging: Tele personally reviewed and NSR

## 2019-11-08 NOTE — ASSESSMENT & PLAN NOTE
· No evidence of acute pulmonary pathology.  · Normal gas exchange of pulmonary mechanics  · Continue lung protective ventilation for now.  · Daily spontaneous breathing trials when clinically appropriate.

## 2019-11-08 NOTE — ASSESSMENT & PLAN NOTE
· Multifactorial acute kidney injury likely a combination of obstructive uropathy and acute tubular necrosis.  · Renal function unchanged over the past 24 hr.  · Urine output has been adequate.  · Nephrology following, recommendations noted.

## 2019-11-08 NOTE — ASSESSMENT & PLAN NOTE
Patient has got a trach and is vent dependent  Culture sputum from previously grew multidrug resistant Pseudomonas. Colistin started.

## 2019-11-08 NOTE — ASSESSMENT & PLAN NOTE
UCx growing pseudomonas  ID and critical care following  IV abx per ID. Colistin added.  Rahman has been changed.

## 2019-11-08 NOTE — PROGRESS NOTES
Progress Note  Nephrology    Consult Requested By: Yamileth Stuart MD    Reason for Consult: GINETTE    SUBJECTIVE:     History of Present Illness: 78 y/o male patient sent from NH on 11/5, admitted w/septic shock, encephalopathy.  Tay changed 11/6 reportedly with purulent urine.  Concern for obstructed uropathy with infection which also could be causing his GINETTE.  Urology consulted per primary.  Renal function not improving, nephrology consulted for co-management, GINETTE.    11/7  Pt seen and examined.  He had significant hyperkalemia on admit, this has now improved.  Scr 2.5 initially, now up to 2.8.  At last discharge in October, Scr was 1.0.  He is non-oliguric, UOP 5L--polyuria after catheter was exchanged.  Hyponatremic, was 127 yesterday, better today at 131.  CT abd/pelvis showed mild L hydronephrosis, urology is consulted as well.  11/08  Na+ improved.  Renal function stable, on IVF.  UOP only recorded for one shift--850cc.  Electrolytes improved.  NAD noted, on vent.    Assessment/plan:    1.  GINETTE 2/2 hypotension d/t septic shock, infection, obstruction--  CT results as above.  Treat infection per ID recommendations.  Dose all medications for level of renal function.  Got IVF in ER, renal function actually worsened, likely d/t obstruction.  Renal panel daily. Need I/O for all shifts.  Start NS 100cc/hour.    Maintain tay, monitor UOP.  2.  Hyponatremia--improving.  Will get urine Na+ and osmolality.  3.  Hyperkalemia--resolved, monitor.  4.  HTN, now hypotensive--on pressors.  Keep MAP above 55    Past Medical History:   Diagnosis Date    AAA (abdominal aortic aneurysm)     Anemia     BPH (benign prostatic hyperplasia)     CHF (congestive heart failure)     COPD (chronic obstructive pulmonary disease)     Coronary artery disease     Dementia     Dementia     Depression     GERD (gastroesophageal reflux disease)     Hyperlipidemia     Hypertension     Hypothyroid     Renal disorder      Respiratory failure, chronic     Ventilator dependence      Past Surgical History:   Procedure Laterality Date    ABDOMINAL SURGERY      CARDIAC SURGERY  1999    GASTROSTOMY TUBE PLACEMENT      SPINE SURGERY      TRACHEOSTOMY TUBE PLACEMENT       History reviewed. No pertinent family history.  Social History     Tobacco Use    Smoking status: Former Smoker    Smokeless tobacco: Never Used   Substance Use Topics    Alcohol use: No    Drug use: No       Review of patient's allergies indicates:   Allergen Reactions    Codeine Other (See Comments)        Review of Systems:  Unable to obtain 2/2 encephalopathy    OBJECTIVE:     Vital Signs Range (Last 24H):  Temp:  [98 °F (36.7 °C)-99 °F (37.2 °C)]   Pulse:  []   Resp:  [0-40]   BP: (103-145)/(56-72)   SpO2:  [80 %-99 %]   Arterial Line BP: ()/(50-70)     Physical Exam:  General- NAD noted  HEENT- ncat, trache present  Neck- supple  CV- Regular rate and rhythm  Resp- upper lobes cta, on vent  GI- abd soft  Extrem- No cyanosis, clubbing, edema, quadraplegic  Derm- skin w/d  Neuro-  sedated    Body mass index is 39.47 kg/m².    Laboratory:  CBC:   Recent Labs   Lab 11/08/19  0453   WBC 16.69*   RBC 3.17*   HGB 9.3*   HCT 29.1*   *   MCV 92   MCH 29.3   MCHC 32.0     CMP:   Recent Labs   Lab 11/08/19  0453   *  116*   CALCIUM 8.4*  8.4*   ALBUMIN 2.2*  2.2*   PROT 6.9   *  134*   K 3.8  3.8   CO2 24  24     100   BUN 53*  53*   CREATININE 2.7*  2.7*   ALKPHOS 77   ALT 15   AST 19   BILITOT 1.1*       Diagnostic Results:  Labs: Reviewed      ASSESSMENT/PLAN:     Active Hospital Problems    Diagnosis  POA    *Gram-negative bacteremia [R78.81]  Yes    Seizure [R56.9]  No    Septic shock [A41.9, R65.21]  Yes    History of MDR Pseudomonas aeruginosa infection [Z86.19]  Yes    Leukocytosis [D72.829]  Yes    Volume overload [E87.70]  Yes    Anasarca [R60.1]  Yes    Obstructive uropathy [N13.9]  Yes     Encephalopathy, toxic [G92]  Yes    C. difficile colitis [A04.72]  Yes    Acute on chronic diastolic heart failure [I50.33]  Yes    Sputum culture positive for Pseudomonas [R84.5]  Yes     Chronic    Essential hypertension [I10]  Yes    Hyperkalemia [E87.5]  Yes    GINETTE (acute kidney injury) [N17.9]  Yes    Enlarging abdominal aortic aneurysm (AAA) [I71.4]  Yes    Cholelithiases [K80.20]  Yes    Bilateral nephrolithiasis [N20.0]  Yes    Hydroureter, left [N13.4]  Yes    History of CVA (cerebrovascular accident) without residual deficits [Z86.73]  Not Applicable    AAA (abdominal aortic aneurysm) without rupture [I71.4]  Yes    Pseudomonas urinary tract infection [N39.0, B96.5]  Yes    Coronary artery disease [I25.10]  Yes    Cardiogenic pulmonary edema [I50.1]  Yes    Anemia, chronic disease [D63.8]  Yes    Ventilator dependence [Z99.11]  Not Applicable    Hemiparesis affecting dominant side as late effect of stroke [I69.359]  Not Applicable    PEG (percutaneous endoscopic gastrostomy) status [Z93.1]  Not Applicable    Acquired hypothyroidism [E03.9]  Yes    Chronic respiratory failure with hypoxia [J96.11]  Yes    Tracheostomy in place [Z93.0]  Not Applicable    Functional quadriplegia [R53.2]  Yes      Resolved Hospital Problems    Diagnosis Date Resolved POA    Lactic acidosis [E87.2] 11/06/2019 Yes         Thank you for allowing us to participate in the care of your patient. We will follow the patient and provide recommendations as needed.      Time spent seeing patient( greater than 1/2 spent in direct contact) :

## 2019-11-08 NOTE — PLAN OF CARE
11/07/19 2007   Patient Assessment/Suction   Level of Consciousness (AVPU) unresponsive   Respiratory Effort Normal;Unlabored   Expansion/Accessory Muscles/Retractions no use of accessory muscles   All Lung Fields Breath Sounds coarse   Rhythm/Pattern, Respiratory assisted mechanically   Cough Frequency with stimulation   Cough Type weak   Suction Method tracheal   $ Suction Charges Inline Suction Procedure Stat Charge   Secretions Amount large   Secretions Color yellow   Secretions Characteristics thick   PRE-TX-O2   O2 Device (Oxygen Therapy) ventilator   Oxygen Concentration (%) 32   SpO2 96 %   Pulse Oximetry Type Continuous   Pulse 91   Resp (!) 22   Aerosol Therapy   $ Aerosol Therapy Charges Aerosol Treatment   Daily Review of Necessity (SVN) completed   Respiratory Treatment Status (SVN) given   Treatment Route (SVN) in-line   Patient Position (SVN) semi-Arredondo's   Post Treatment Assessment (SVN) congestion decreased   Signs of Intolerance (SVN) none   Breath Sounds Post-Respiratory Treatment   Post-treatment Heart Rate (beats/min) 93   Post-treatment Resp Rate (breaths/min) 22   Vent Select   Conventional Vent Y   Charged w/in last 24h YES   Preset Conventional Ventilator Settings   Vent ID [id number]   Vent Type    Ventilation Type VC   Vent Mode A/C   Humidity HME   Set Rate 22 bmp   Vt Set 450 mL   PEEP/CPAP 5 cmH20   Pressure Support 0 cmH20   Waveform RAMP   Peak Flow 65 L/min   Plateau Set/Insp. Hold (sec) 0   Trigger Sensitivity Flow/I-Trigger 2 L/min   Patient Ventilator Parameters   Resp Rate Total 22 br/min   Peak Airway Pressure 45 cmH2O   Mean Airway Pressure 14 cmH20   Plateau Pressure 0 cmH20   Exhaled Vt 482 mL   Total Ve 10.6 mL   I:E Ratio Measured 1:2.60   Tubing ID (mm) 8 mm   Tube Type Trach   Conventional Ventilator Alarms   Alarms On Y   Resp Rate High Alarm 45 br/min   Press High Alarm 60 cmH2O   Apnea Rate 10   Apnea Volume (mL) 0 mL   Apnea Oxygen Concentration  100   Apnea Flow  Rate (L/min) 44   T Apnea 20 sec(s)   Ready to Wean/Extubation Screen   FIO2<=50 (chart decimal) 0.32   MV<16L (chart vol.) 10.6   PEEP <=8 (chart #) 5   Ready to Wean Parameters   F/VT Ratio<105 (RSBI) (!) 45.64

## 2019-11-08 NOTE — ASSESSMENT & PLAN NOTE
EEG done withseizure activity.   Neurology consulted and following  Vimpat and Keppra started  Seizure precautions

## 2019-11-08 NOTE — PROGRESS NOTES
[facility] Medicine  Progress Note    Patient Name: Masood Messina  MRN: [id number]  Patient Class: IP- Inpatient   Admission Date: 11/5/2019  Length of Stay: 2 days  Attending Physician: Yamileth Stuart MD  Primary Care Provider: Jeramie Lombardi MD        Subjective:     Principal Problem:Gram-negative bacteremia        HPI:  79-year-old nursing home resident([place]) with past medical history of chronic respiratory failure status post trach and PEG due to stroke, CAD status post CABG, hypertension, COPD,BPH presented from [facility] due to altered mental status, high fever and significant leukocytosis.  This is his 3rd admission with the same complaint in a span of 45 days  History mainly from charts, nursing home notes and ER MD and staff  At the moment he is getting p.o. vancomycin at [facility] for C diff colitis  In the emergency room he was found to be severely septic with significant shock and also he was hyperkalemic  His last sputum culture per chart review grew multidrug resistant Pseudomonas  No further information available    Overview/Hospital Course:  Patient admitted with septic shock, encepahalopathy.  Patient admitted to ICU and started on levophed and Broad IV abx.  He is trach dependent.  Pulmonary critical care consulted.  ID consulted.  BCx- 1 bottle growing.  Tay changed 11/6 reportedly with purulent urine.  Concern for obstructed uropathy with infection which also could be causing his GINETTE.  Urology consulted.  Renal function not improving and unclear if this is ATN or the fact that tay wasn't changed until today.  Nephrology consulted.  Patient does not open eyes or respond which I was told is not his baseline.  Discussed with Dr. Kinsey and there is concern for meningitis.  IR consulted for LP but this was deferred as INR is 2.  Continuing po vancomycin as was on this previously for C. Diff. Discussed with Dr. Antoine and will give dose of IV vanc and  IV ampicillin and will review coverage for meningitis as well as other covering infections. Last fever 11/6 at 1430. Reports of intermittent shaking activity.  EEG was done and reported to have seizure activity. Neurology consulted and patient started on keppra and vimpat. Patient is still not waking up or following commands but did try to fight me to keep his eye lids closed 11/7 and I find his pupils more responsive 11/7. Started on propofol. I spoke to his POA and he would not want CPR and thus he has been made DNR but is not comfort care.    Interval History: Patient encephalopathic and unable to provide history.  Does not open eyes and respond to voice or stimuli.  Did try to fight me when I opened his eye lids which is more responsive than yesterday. No fevers overnight or today. No obvious seizure activity.    Review of Systems   Unable to perform ROS: Mental status change     Objective:     Vital Signs (Most Recent):  Temp: 98.4 °F (36.9 °C) (11/07/19 1915)  Pulse: 91 (11/07/19 2007)  Resp: (!) 22 (11/07/19 2007)  BP: (!) 145/72 (11/07/19 1501)  SpO2: 96 % (11/07/19 2007) Vital Signs (24h Range):  Temp:  [97.9 °F (36.6 °C)-99 °F (37.2 °C)] 98.4 °F (36.9 °C)  Pulse:  [] 91  Resp:  [20-40] 22  SpO2:  [80 %-99 %] 96 %  BP: ()/(52-77) 145/72  Arterial Line BP: (104-178)/(52-74) 104/52     Weight: 132 kg (291 lb 0.1 oz)  Body mass index is 39.47 kg/m².    Intake/Output Summary (Last 24 hours) at 11/7/2019 2111  Last data filed at 11/7/2019 1701  Gross per 24 hour   Intake 2569.57 ml   Output 3600 ml   Net -1030.43 ml      Physical Exam   Constitutional: He is oriented to person, place, and time. He appears well-developed. No distress.   HENT:   Head: Normocephalic and atraumatic.   Mouth/Throat: Oropharynx is clear and moist.   Trach on vent   Eyes: Pupils are equal, round, and reactive to light. EOM are normal.   Neck: Normal range of motion.   Cardiovascular: Regular rhythm.   No murmur  heard.  Pulmonary/Chest: Effort normal. He has no wheezes.   Coarse anteriorly    Abdominal: Soft. He exhibits no distension.   Peg tube   Genitourinary:   Genitourinary Comments: tay   Musculoskeletal: Normal range of motion.   Neurological: He is alert and oriented to person, place, and time. No cranial nerve deficit or sensory deficit.   Skin: No rash noted.   Left fifth toe gangrene   Psychiatric:   Unable to assess   Nursing note and vitals reviewed.      Significant Labs:   Blood Culture:   Recent Labs   Lab 11/06/19  1148   LABBLOO No Growth to date  No Growth to date     CBC:   Recent Labs   Lab 11/06/19  0426 11/07/19  0405 11/07/19  0856   WBC 44.92* 31.53*  --    HGB 9.9* 10.0*  --    HCT 31.7* 30.0* 32*    133*  --      CMP:   Recent Labs   Lab 11/06/19  1149 11/06/19  1725 11/07/19  0405   * 127* 131*   K 5.6* 4.9 4.2   CL 96 95 98   CO2 25 24 23   * 127* 152*   BUN 58* 62* 61*   CREATININE 2.7* 2.9* 2.8*   CALCIUM 8.4* 8.4* 8.5*   PROT 6.9 7.3 6.9   ALBUMIN 2.4* 2.4* 2.3*   BILITOT 1.3* 1.5* 1.3*   ALKPHOS 77 82 82   AST 21 21 21   ALT 16 17 15   ANIONGAP 7* 8 10   EGFRNONAA 21.3* 19.5* 20.4*     Respiratory Culture:   Recent Labs   Lab 11/06/19  0420   GSRESP >10 epithelial cells per low power field  Moderate WBC's  Many Gram positive rods resembling diphtheroids  Few Gram negative rods   RESPIRATORYC Reduced normal respiratory gabriela  GRAM NEGATIVE TOM, LACTOSE   Many  Identification and susceptibility pending  *  PRESUMPTIVE PROTEUS SPECIES  Moderate  Identification and susceptibility pending  *     Troponin: No results for input(s): TROPONINI in the last 48 hours.  Urine Culture: No results for input(s): LABURIN in the last 48 hours.    Significant Imaging: Tele personally reviewed and NSR      Assessment/Plan:      * Gram-negative bacteremia        Seizure  EEG done withseizure activity.   Neurology consulted and following  Vimpat and Keppra started  Seizure  precautions       Encephalopathy, toxic  Not improving thus far  Continuing IV abx  CT head done with no acute process  Neurology consulted given seizures      History of MDR Pseudomonas aeruginosa infection  As above      Septic shock  Vasopressor support with Levophed  Broad IV abx per ID  BCx growing- 1 bottle out of 4  Urine Cx growing pseudomonas  IR consulted for LP given fevers, seizures, encephalopathy.  Could not be done due to INR 2. Repeating INR in AM and will revisit with Radiology. I discussed with Dr. Antoine and Meropenem giving good coverage for meningitis.  He lives at [place] and thus has not been out in the community.  Will give a dose of IV vanc and IV ampicillin and will revisit abx selection in AM.        C. difficile colitis  At the moment patient is on p.o. vancomycin for C diff colitis  Stool reported as formed per nursing      Acute on chronic diastolic heart failure  Stable issue  Will monitor carefully      Sputum culture positive for Pseudomonas  IV abx per ID  Coarse on lung exam      Hydroureter, left  Seen on CT scan        GINETTE (acute kidney injury)  Acute kidney injury along with hyperkalemia in the background of sepsis/C diff colitis  Concern for obstructive uropathy from tay. Changed 11/6.    Consulting renal as not improving   ATVALENTIN harris in setting of shock      Hyperkalemia  Already received dextrose, insulin in the ER  K improved after treatment   Following daily labs    Essential hypertension  Currently in shock.  Antihypertensives held    Enlarging abdominal aortic aneurysm (AAA)        Pseudomonas urinary tract infection  UCx growing pseudomonas  ID and critical care following  IV abx per ID. Colistin added.  Tay has been changed.      Ventilator dependence        Anemia, chronic disease  Monitoring cell counts  AM labs ordered      Coronary artery disease  Stable issue      Cardiogenic pulmonary edema        Hemiparesis affecting dominant side as late effect of  stroke  Residuals from previous CVA      Acquired hypothyroidism        PEG (percutaneous endoscopic gastrostomy) status  PEG insitu      Tracheostomy in place  Pulmonary following for vent management       Chronic respiratory failure with hypoxia  Patient has got a trach and is vent dependent  Culture sputum from previously grew multidrug resistant Pseudomonas. Colistin started.      Functional quadriplegia  Chronic condition.    No acute issues  Air mattress  Turning        VTE Risk Mitigation (From admission, onward)         Ordered     enoxaparin injection 40 mg  Every 24 hours (non-standard times)      11/05/19 2305     IP VTE HIGH RISK PATIENT  Once      11/05/19 2014                      Yamileth Stuart MD  Department of Hospital Medicine   [facility]

## 2019-11-08 NOTE — PROGRESS NOTES
[facility]  Pulmonary / Critical Care Medicine  Progress Note    S: No acute issues overnight.  Shock improved and requiring only low doses of norepinephrine.  Afebrile over the past 24 hr.  Remains unarousable.   Review of systems:  Unable to obtain due to altered mental status.    I have personally reviewed the following during today's evaluation:  past medical history, ROS, family history, social history, surgical history, current inpatient medications and drug allergies, as well as vital signs, diagnostic studies and nursing/provider documentation from the past 24 hours.         O: VS: Temp:  [98 °F (36.7 °C)-99 °F (37.2 °C)]   Pulse:  []   Resp:  [0-29]   BP: (103-132)/(52-67)   SpO2:  [93 %-99 %]   Arterial Line BP: ()/(50-80)   Ideal body weight: 77.6 kg (171 lb 1.2 oz)  Adjusted ideal body weight: 99.4 kg (219 lb 0.8 oz)    I/O:   Intake/Output Summary (Last 24 hours) at 11/8/2019 1700  Last data filed at 11/8/2019 1041  Gross per 24 hour   Intake 968.14 ml   Output 2350 ml   Net -1381.86 ml         Vent: SpO2 97% on 32% FiO2  Vent Mode: A/C  Oxygen Concentration (%):  [32] 32  Resp Rate Total:  [22 br/min-26 br/min] 22 br/min  Vt Set:  [450 mL] 450 mL  PEEP/CPAP:  [5 cmH20] 5 cmH20  Pressure Support:  [0 cmH20] 0 cmH20  Mean Airway Pressure:  [12 [id number] cmH20] 13 cmH20     PE Physical Exam   Constitutional: He appears well-developed and well-nourished.  Non-toxic appearance. He does not have a sickly appearance. No distress. He is obese.   HENT:   Head: Normocephalic and atraumatic.   Right Ear: External ear normal.   Left Ear: External ear normal.   Nose: No mucosal edema or rhinorrhea.   Mouth/Throat: Uvula is midline and oropharynx is clear and moist. No oropharyngeal exudate. Mallampati Score: unable to assess.   Neck: Neck supple. No JVD present. No tracheal deviation, no edema and no erythema present. No thyroid mass and no thyromegaly present.   8.0 cuffed tracheostomy  secure with cuff inflated   Cardiovascular: Normal rate, regular rhythm, normal heart sounds and intact distal pulses. Exam reveals no gallop and no friction rub.   No murmur heard.  Pulmonary/Chest: Normal expansion, symmetric chest wall expansion and breath sounds normal. He has no wheezes. He has no rhonchi. He has no rales. Chest wall is not dull to percussion. He exhibits no tenderness. Negative for egophony. Negative for tactile fremitus.   Abdominal: Soft. Bowel sounds are normal. He exhibits no distension and no mass. There is no hepatosplenomegaly. No hernia.   Musculoskeletal: Normal range of motion. He exhibits no edema, tenderness or deformity.   Lymphadenopathy: No supraclavicular adenopathy is present.     He has no cervical adenopathy.     He has no axillary adenopathy.   Neurological: He is unresponsive. He displays no atrophy and no tremor. No cranial nerve deficit. He exhibits normal muscle tone. He displays no seizure activity. GCS eye subscore is 3. GCS motor subscore is 3.   Skin: Skin is warm, dry and intact. No rash noted. He is not diaphoretic. No cyanosis. No pallor. Nails show no clubbing.   Psychiatric:   Unable to assess secondary to coma     Lines: Rahman, Gastrostomy, Tracheostomy, TLC, Arterial line, Dialysis catheter         Labs All pertinent labs within the past 24 hours have been reviewed.  Recent Labs   Lab 11/08/19  0453   WBC 16.69*   RBC 3.17*   HGB 9.3*   HCT 29.1*   *   MCV 92   MCH 29.3   MCHC 32.0   *  134*   K 3.8  3.8     100   CO2 24  24   BUN 53*  53*   CREATININE 2.7*  2.7*   MG 2.1   ALT 15   AST 19   ALKPHOS 77   BILITOT 1.1*   PROT 6.9   ALBUMIN 2.2*  2.2*   INR 1.7       Imaging I have reviewed and interpreted all pertinent imaging results/findings within the past 24 hours.  No new images today.      Micro Blood Culture   Lab Results   Component Value Date    LABBLOO No Growth to date 11/06/2019    LABBLOO No Growth to date 11/06/2019     LABBLOO No Growth to date 11/06/2019   , Sputum Culture   Lab Results   Component Value Date    GSRESP >10 epithelial cells per low power field 11/06/2019    GSRESP Moderate WBC's 11/06/2019    GSRESP Many Gram positive rods resembling diphtheroids 11/06/2019    GSRESP Few Gram negative rods 11/06/2019    RESPIRATORYC Reduced normal respiratory gabriela 11/06/2019    RESPIRATORYC SERRATIA MARCESCENS  Many   (A) 11/06/2019    RESPIRATORYC (A) 11/06/2019     PROTEUS MIRABILIS ESBL  Moderate  Results called to and read back by: Martina Murillo RN on M3 re: ESBL  by DRP 11/08/2019  09:10      and Urine Culture    Lab Results   Component Value Date    LABURIN (A) 11/05/2019     PRESUMPTIVE PSEUDOMONAS SPECIES  > 100,000 cfu/ml  For susceptibility see order # [id number]      LABURIN (A) 11/05/2019     YEAST  > 100,000 cfu/ml  For identification see order #[id number]         Medications Scheduled    albuterol-ipratropium  3 mL Nebulization Q6H    chlorhexidine  15 mL Mouth/Throat BID    enoxparin  40 mg Subcutaneous Q24H    lacosamide (VIMPAT) IVPB  100 mg Intravenous Q12H    levetiracetam IVPB  500 mg Intravenous Q12H    meropenem (MERREM) IVPB  2 g Intravenous Q12H    mupirocin   Nasal BID    pantoprazole  40 mg Oral Daily    vancomycin  250 mg Oral QID      Continuous Infusions:    sodium chloride 0.9% 100 mL/hr at 11/08/19 0701    norepinephrine bitartrate-D5W 0.02 mcg/kg/min (11/08/19 0701)    propofol 50 mcg/kg/min (11/08/19 1041)      PRN           Assessment/Plan:     Patient Active Problem List   Diagnosis    Functional quadriplegia    Chronic respiratory failure with hypoxia    Sepsis    Tracheostomy in place    Hematuria    HCAP (healthcare-associated pneumonia)    PEG (percutaneous endoscopic gastrostomy) status    Acquired hypothyroidism    Hemiparesis affecting dominant side as late effect of stroke    Sacral decubitus ulcer, stage II    Cardiogenic pulmonary edema    Coronary artery disease     Anemia, chronic disease    Ventilator dependence    Acute on chronic diastolic HF (heart failure)    Pseudomonas urinary tract infection    AAA (abdominal aortic aneurysm) without rupture    Enlarging abdominal aortic aneurysm (AAA)    Essential hypertension    Hyperkalemia    GINETTE (acute kidney injury)    Cholelithiases    Bilateral nephrolithiasis    Hydroureter, left    History of CVA (cerebrovascular accident) without residual deficits    Sputum culture positive for Pseudomonas    Acute on chronic diastolic heart failure    Hypophosphatemia    Chronic pain    C. difficile colitis    Septic shock    History of MDR Pseudomonas aeruginosa infection    Leukocytosis    Volume overload    Anasarca    Obstructive uropathy    Encephalopathy, toxic    Seizure    Gram-negative bacteremia     Impression:  Improving septic shock secondary to multiple nosocomial acquired multi drug-resistant infections with persistent acute vegetative state and new onset seizure disorder concerning for hypoglycemia induced neurologic injury.    Neuro   Encephalopathy persists, unchanged.   Likely precipitated by his acute septic shock compounded by lowering of his seizure threshold and concomitant subclinical seizure.   Neurology following managing AEDs.   Interventional Radiology consulted to assist with obtaining a CSF, but deferring procedure until his INR has improved.   Infectious Disease is following, and managing antibiotic selection.  Will defer whether to include coverage for meningitis/encephalitis to their expertise.   Continue propofol while uptitrating AEDs.    Pulmonary  · No evidence of acute pulmonary pathology.  · Normal gas exchange of pulmonary mechanics  · Continue lung protective ventilation for now.  · Daily spontaneous breathing trials when clinically appropriate.    Cardiac/Vascular  · Shock improving and I suspect persistent low-dose vasopressor requirement can be attributed to  propofol.  · Titrate norepinephrine infusion to maintain MAP > 65 mmHg.    Renal/  · Multifactorial acute kidney injury likely a combination of obstructive uropathy and acute tubular necrosis.  · Renal function unchanged over the past 24 hr.  · Urine output has been adequate.  · Nephrology following, recommendations noted.    ID  · Infectious Diseases managing antibiotic selection/durration.  · Continue supportive care for septic shock in the mean time and continue to survey for occult uncontrolled infectious sources.  · Multiple separate sources growing drug-resistant organisms.  · Continue norepinephrine infusion the meantime to maintain adequate organ perfusion.    Hematology  · No indication for blood products.  · Monitor daily CBC for now.    Endocrine  · Close attention to maintain adequate glycemic control and avoiding occult hypoglycemia.  · Frequent blood glucose monitoring while NPO.  · Will defer to primary service for titration of thyroid hormone replacement.    GI  · Continue trophic feeds.  · Nutrition consulted.  · Increase to goal as tolerated.  · Cholelithiasis noted.   No evidence of associated cholelithiasis.     Please call Dr. Gasca with any questions or concerns over the weekend.    Critical Care Time: >35 minutes  Critical secondary to multiple multi drug-resistant infections.  Patient has a condition that poses threat to life and bodily function:  Septic shock, acute hypoxemic respiratory failure, encephalopathy.     Critical care was time spent personally by me on the following activities: development of treatment plan with patient or surrogate and bedside caregivers, discussions with consultants, evaluation of patient's response to treatment, examination of patient, ordering and performing treatments and interventions, ordering and review of laboratory studies, ordering and review of radiographic studies, pulse oximetry, re-evaluation of patient's condition. This critical care time did not  overlap with that of any other provider or involve time for any procedures.     Mauricio Kinsey MD  Pulmonary / Critical Care Medicine  [facility]

## 2019-11-08 NOTE — ASSESSMENT & PLAN NOTE
 Encephalopathy persists, unchanged.   Likely precipitated by his acute septic shock compounded by lowering of his seizure threshold and concomitant subclinical seizure.   Neurology following managing AEDs.   Interventional Radiology consulted to assist with obtaining a CSF, but deferring procedure until his INR has improved.   Infectious Disease is following, and managing antibiotic selection.  Will defer whether to include coverage for meningitis/encephalitis to their expertise.   Continue propofol while uptitrating AEDs.

## 2019-11-08 NOTE — PROGRESS NOTES
Spoke with Missy Carroll concerning one-time Vancomycin dose of  2,750 mg.  Pt had been on vancomycin and had a vanc random of 21.2 at 0800 today. She wants to continue order for vanc x1 but to renal dose as per protocol. Will order another  Vanc random for 11/8 at 2200 in the event this antibiotic is to continue. Wt= 132 kg  crcl= 30 ml/min  age= 79yr

## 2019-11-09 PROBLEM — J15.69 PROTEUS PNEUMONIA: Status: ACTIVE | Noted: 2019-01-01

## 2019-11-09 PROBLEM — J15.69 PNEUMONIA DUE TO SERRATIA MARCESCENS: Status: ACTIVE | Noted: 2019-01-01

## 2019-11-09 PROBLEM — B96.5 BACTEREMIA DUE TO PSEUDOMONAS: Status: ACTIVE | Noted: 2019-01-01

## 2019-11-09 NOTE — PROGRESS NOTES
Progress Note  Infectious Disease    Admit Date: 11/5/2019   LOS: 4 days    Follow-up For:  Septic shock.     SUBJECTIVE:     11/7   Unable to obtain. Still on pressors. Renal function unchanged. NO bowel movements. No other events. Bld cx just back today with GNR. According to lab, appears to be a Pseudomonas     11/8 Still on pressors but trying to get off. Off sedation and minimally responsive. EEG going today. Added Ampicillin for meningitis coverage. Sputum cx with Serratia, Proteus - Sens to Meropenem, Pseudomonas Sens to Meropenem. Blood cx repeat negative, no diarrhea,   11/09/2019 I discussed with Dr. Antoine yesterday.  Given the history of seizures, we were both concerned about using any antibiotics that may lower seizure threshold.  We are trying to stay away from antibiotics like cefepime, meropenem, levofloxacin.  After discussing it together, I changed meropenem to Zosyn.  Patient is afebrile today.  Patient is very sensitive to pressors; nurse is working on weaning them off, but may not be doable today.  He continues to have yellow whitish secretions.  WBC is markedly improved  44.92-- 31.53-- 16.69- 11.48.  It makes me wonder if some of the WBC elevation could be due to stress, seizures, other than due to infection.        Antibiotics (From admission, onward)    Start     Stop Route Frequency Ordered    11/08/19 2245  piperacillin-tazobactam 4.5 g in dextrose 5 % 100 mL IVPB (ready to mix system)      -- IV Every 8 hours (non-standard times) 11/08/19 2132    11/06/19 0915  mupirocin 2 % ointment  (MRSA Decolonization Orders [facility])      11/11 0859 Nasl 2 times daily 11/06/19 0805    11/05/19 1715  vancomycin oral suspension 250 mg      -- Oral 4 times daily 11/05/19 1607            OBJECTIVE:     Vital Signs (Most Recent)  Temp: 98 °F (36.7 °C) (11/09/19 0701)  Pulse: 98 (11/09/19 0754)  Resp: (!) 25 (11/09/19 0754)  BP: 114/66 (11/09/19 0701)  SpO2: 100 % (11/09/19 0754)    Temperature Range Min/Max  (Last 24H):  Temp:  [98 °F (36.7 °C)-98.5 °F (36.9 °C)]     I & O (Last 24H):    Intake/Output Summary (Last 24 hours) at 11/9/2019 0906  Last data filed at 11/9/2019 0500  Gross per 24 hour   Intake 3217.76 ml   Output 1700 ml   Net 1517.76 ml       Physical Exam:  General: well developed, well nourished, moderately obese, minimally responding to voice.  HENT: Head:normocephalic, atraumatic. Nose: Nares normal. Septum midline.  Has some on and off mouth movement, questionable seizure activity?.  Does not allow me to look inside his mouth.  Whichever I can see he has normal dentition.   Neck: tracheostomy and ventilated   Lungs:   diminished at bases and  coarse bs   Cardiovascular: Heart: RRR no murmur, click, rub or gallop, no click . Chest Wall: no abnormalities .  Extremities: no cyanosis or edema, or clubbing. Pulses: 2+ and symmetric.  Onychomycosis  Abdomen/Rectal: Abdomen: soft and nondistended.  PEG present, some granulation tissue surrounding.  Hypoactive bs    Rectal: not examined  Genitalia: no abnormalities, tay catheter with clear urine  Skin: Skin color, texture, turgor normal. No rashes or lesions  Musculoskeletal:muscle wasting. myoclonic jerking, no purposeful movement s      Lines/Drains:Left inguinal catheter       Laboratory:  CBC  Recent Labs   Lab 11/09/19 0434   WBC 11.48   RBC 3.19*   HGB 9.3*   HCT 29.8*   *   MCV 93   MCH 29.2   MCHC 31.2*     BMP  Recent Labs   Lab 11/09/19  0434     136   K 4.0  4.0     102   CO2 26  26   BUN 53*  53*   CREATININE 2.7*  2.7*   CALCIUM 8.6*  8.6*     Microbiology Results (last 7 days)     Procedure Component Value Units Date/Time    Urine culture [[id number]]  (Abnormal)  (Susceptibility) Collected:  11/05/19 1510    Order Status:  Completed Specimen:  Urine Updated:  11/09/19 0906     Urine Culture, Routine PSEUDOMONAS AERUGINOSA  > 100,000 cfu/ml        CANDIDA TROPICALIS  > 100,000 cfu/ml  Treatment of asymptomatic  candiduria is not recommended (except for   specific populations). Candida isolated in the urine typically   represents colonization. If an indwelling urinary catheter is   present it should be removed or replaced.      Narrative:       Preferred Collection Type->Urine, Clean Catch  Specimen Source->Urine    Blood Culture #2 **CANNOT BE ORDERED STAT** [[id number]]  (Abnormal)  (Susceptibility) Collected:  11/05/19 1404    Order Status:  Completed Specimen:  Blood from Peripheral, Antecubital, Left Updated:  11/08/19 1308     Blood Culture, Routine Gram stain aer bottle:Gram negative rods      Called Ana Saavedra RN M3 @ 2015 MS 11/06/19      Gram stain reynaldo bottle: Gram positive cocci 11/07/2019  10:47       See previous notification     Comment: Gram stain aer bottle:Gram negative rods  Called Ana Saavedra RN M3 @ 2015 MS 11/06/19, 11/06/2019 20:17           PSEUDOMONAS AERUGINOSA      COAGULASE-NEGATIVE STAPHYLOCOCCUS SPECIES  Organism is a probable contaminant      Narrative:       Gram stain aer bottle:Gram negative rods  Called Ana Saavedra RN M3 @ 2015 MS 11/06/19, 11/06/2019 20:17    Blood culture [[id number]] Collected:  11/06/19 1148    Order Status:  Completed Specimen:  Blood from Line, Arterial, Right Updated:  11/08/19 1232     Blood Culture, Routine No Growth to date      No Growth to date      No Growth to date    Culture, Respiratory with Gram Stain [[id number]]  (Abnormal)  (Susceptibility) Collected:  11/06/19 0420    Order Status:  Completed Specimen:  Respiratory from Sputum Updated:  11/08/19 0911     Respiratory Culture Reduced normal respiratory gabriela      SERRATIA MARCESCENS  Many        PROTEUS MIRABILIS ESBL  Moderate  Results called to and read back by: Martina Murillo RN on M3 re: ESBL  by BREONNA 11/08/2019  09:10       Gram Stain (Respiratory) >10 epithelial cells per low power field     Gram Stain (Respiratory) Moderate WBC's     Gram Stain (Respiratory) Many Gram positive rods  resembling diphtheroids     Gram Stain (Respiratory) Few Gram negative rods    Urine Culture High Risk [[id number]]  (Abnormal) Collected:  11/05/19 1926    Order Status:  Completed Specimen:  Urine, Catheterized Updated:  11/08/19 0808     Urine Culture, Routine PRESUMPTIVE PSEUDOMONAS SPECIES  > 100,000 cfu/ml  For susceptibility see order # [id number]        YEAST  > 100,000 cfu/ml  For identification see order #[id number]      Narrative:       Indicated criteria for high risk culture:->Other  Other (specify):->sepsis    Blood Culture #1 **CANNOT BE ORDERED STAT** [[id number]]  (Abnormal) Collected:  11/05/19 1340    Order Status:  Completed Specimen:  Blood from Peripheral, Upper Arm, Right Updated:  11/08/19 0745     Blood Culture, Routine Gram stain reynaldo bottle: Gram positive cocci      Results called to and read back by: Cali Bustos RN on M3 re:gram pos       cocci in blood cx 11/06/2019  09:58  by DRP      Gram stain aer bottle: Gram negative rods      Results called to and read back by: Martina Murillo RN on M3, RE: GNR in       aerobic bottle  11/07/2019  08:53 vcb      COAGULASE-NEGATIVE STAPHYLOCOCCUS SPECIES  Multiple morphotypes - probable contaminants        PSEUDOMONAS AERUGINOSA  For susceptibility see order #[id number]      Culture, Respiratory with Gram Stain [[id number]]  (Abnormal)  (Susceptibility) Collected:  11/05/19 1444    Order Status:  Completed Specimen:  Respiratory from Tracheal Aspirate Updated:  11/07/19 0838     Respiratory Culture Reduced normal respiratory gabriela      SERRATIA MARCESCENS  Many       Gram Stain (Respiratory) <10 epithelial cells per low power field.     Gram Stain (Respiratory) Few WBC's     Gram Stain (Respiratory) Moderate Gram positive rods resembling diphtheroids     Gram Stain (Respiratory) Rare Gram negative rods    Culture, Respiratory with Gram Stain [[id number]]     Order Status:  No result Specimen:  Respiratory     Blood culture [[id number]]     Order Status:   Canceled Specimen:  Blood     Stool culture [[id number]]     Order Status:  No result Specimen:  Stool     Clostridium difficile EIA [[id number]]     Order Status:  Canceled Specimen:  Stool           Diagnostic Results:     CT head 11/06/2019 no acute abnormalities.    CT pelvis 11/06/2019 Stable nonobstructing bilateral renal stones.  Stable left hydronephrosis and hydroureter to the level of the ureteral vesicle junction  Cholelithiasis  Stable infrarenal abdominal aortic aneurysm  Moderate degenerative changes of the spine and hips  Stable atelectasis in lung bases      Chest x-ray 11/05/2019Pulmonary hypoinflation, with scattered predominantly interstitial opacities in both lungs, right greater than left, having similar appearance to the prior exam of 10/10/2019.     CXR 11/07  1.  Unchanged decreased lung volumes with elevation of the right hemidiaphragm.  2. No significant change in bilateral patchy ground-glass opacities, right greater than left.      ASSESSMENT/PLAN:     Active Hospital Problems    Diagnosis  POA    *Gram-negative bacteremia [R78.81]  Yes    Seizure [R56.9]  No    Septic shock [A41.9, R65.21]  Yes    History of MDR Pseudomonas aeruginosa infection [Z86.19]  Yes    Leukocytosis [D72.829]  Yes    Volume overload [E87.70]  Yes    Anasarca [R60.1]  Yes    Obstructive uropathy [N13.9]  Yes    Encephalopathy, toxic [G92]  Yes    C. difficile colitis [A04.72]  Yes    Acute on chronic diastolic heart failure [I50.33]  Yes    Sputum culture positive for Pseudomonas [R84.5]  Yes     Chronic    Essential hypertension [I10]  Yes    Hyperkalemia [E87.5]  Yes    GINETTE (acute kidney injury) [N17.9]  Yes    Enlarging abdominal aortic aneurysm (AAA) [I71.4]  Yes    Cholelithiases [K80.20]  Yes    Bilateral nephrolithiasis [N20.0]  Yes    Hydroureter, left [N13.4]  Yes    History of CVA (cerebrovascular accident) without residual deficits [Z86.73]  Not Applicable    AAA (abdominal aortic  aneurysm) without rupture [I71.4]  Yes    Pseudomonas urinary tract infection [N39.0, B96.5]  Yes    Coronary artery disease [I25.10]  Yes    Cardiogenic pulmonary edema [I50.1]  Yes    Anemia, chronic disease [D63.8]  Yes    Ventilator dependence [Z99.11]  Not Applicable    Hemiparesis affecting dominant side as late effect of stroke [I69.359]  Not Applicable    PEG (percutaneous endoscopic gastrostomy) status [Z93.1]  Not Applicable    Acquired hypothyroidism [E03.9]  Yes    Chronic respiratory failure with hypoxia [J96.11]  Yes    Tracheostomy in place [Z93.0]  Not Applicable    Functional quadriplegia [R53.2]  Yes      Resolved Hospital Problems    Diagnosis Date Resolved POA    Lactic acidosis [E87.2] 11/06/2019 Yes       Severe septic shock, Pseudomonas UTI and bacteremia, improving   Complicated urinary tract infection , bilateral nonobstructing renal stones  Serratia and Proteus ESBL + sputum with stable CXR , move monitor chest x-ray and sputum production  Candida tropicalis candiduria  Coag-negative staph in blood cultures 11/05, contaminant  Recent Cdiff colitis, has some minimal loose stools  Chronic respiratory failure , tracheostomy, PEG,  CVA with functional quadraplegia   ARF  Encephalopathy , seizures.  This patient is high risk for life-threatening deterioration and death secondary to above comorbidities and need for IV treatment.  Poor prognosis.  Acute critical care 35 min      PLAN:    -Patient's  seizure activity is NOT related to antibiotics. I think patient is prone to seizures due to history of dementia and CVA.  Nevertheless will try to stay away from antibiotics that can worsen seizures, like meropenem, cefepime, levofloxacin.     - I changed meropenem back to Zosyn.  Pseudomonas bacteremia/UTI will be well covered. Serratia and ESBL Proteus mirabilis in sputum will not be covered, but chest x-ray is stable, patient WBC improved fast even in the beginning when he was on Zosyn.   At this time, I feel  that Serratia and Proteus mirabilis ESBL are just colonizers.    - continue po vancomycin for cdiff prophylaxis     - will repeat UA and procalcitonin tomorrow    -please find another line placement other than the groin.    - monitor need for pressors, fever, WBC, quality and quantity of phlegm.    - patient seems to be having seizures at this time as well, as per neurologist.

## 2019-11-09 NOTE — PROGRESS NOTES
Progress Note  PULMONARY    Admit Date: 11/5/2019 11/09/2019      SUBJECTIVE:     Nov 9- admitted 11/5/19 -  Vent dependant pt presented septic shock, now vegetative, no c/o.      PFSH and Allergies reviewed.    OBJECTIVE:     Vitals (Most recent):  Vitals:    11/09/19 1329   BP:    Pulse: 94   Resp: 17   Temp:        Vitals (24 hour range):  Temp:  [98 °F (36.7 °C)-98.9 °F (37.2 °C)]   Pulse:  []   Resp:  [14-47]   BP: ()/(51-67)   SpO2:  [94 %-100 %]   Arterial Line BP: ()/(50-61)       Intake/Output Summary (Last 24 hours) at 11/9/2019 1425  Last data filed at 11/9/2019 0857  Gross per 24 hour   Intake 3376.76 ml   Output 1775 ml   Net 1601.76 ml          Physical Exam:  The patient's neuro status (alertness,orientation,cognitive function,motor skills,), pharyngeal exam (oral lesions, hygiene, abn dentition,), Neck (jvd,mass,thyroid,nodes in neck and above/below clavicle),RESPIRATORY(symmetry,effort,fremitus,percussion,auscultation),  Cor(rhythm,heart tones including gallops,perfusion,edema)ABD(distention,hepatic&splenomegaly,tenderness,masses), Skin(rash,cyanosis),Psyc(affect,judgement,).  Exam negative except for these pertinent findings:    Not arousing, chest is symmetric, no distress, normal percussion, normal fremitus and good normal breath sounds  Trach,     Radiographs reviewed: view by direct vision - cxr 11/7 similar to 11/5 and sept 2019 films with right lung > left patchy abn.         Results for orders placed during the hospital encounter of 10/06/17   X-Ray Chest 1 View for PICC_Central line    Narrative A PICC has been placed on the right and ends in the distal superior vena cava. Tracheostomy tube remains in position. The patient has had a prior sternotomy. Cardiac size is within normal limits. There is no pulmonary mass or infiltrate is seen.    Impression  PICC in good position without pneumothorax. Tracheostomy tube in place. Prior sternotomy.      Electronically signed  by: Alexsander Adrian MD  Date:     10/09/17  Time:    15:43    ]    Labs     Recent Labs   Lab 11/09/19 0434   WBC 11.48   HGB 9.3*   HCT 29.8*   *   BAND 16.0     Recent Labs   Lab 11/09/19 0434     136   K 4.0  4.0     102   CO2 26  26   BUN 53*  53*   CREATININE 2.7*  2.7*   GLU 95  95   CALCIUM 8.6*  8.6*   MG 2.1   PHOS 3.8  3.8   AST 21   ALT 14   ALKPHOS 101   BILITOT 1.0   PROT 6.9   ALBUMIN 2.2*  2.2*   No results for input(s): PH, PCO2, PO2, HCO3 in the last 24 hours.  Microbiology Results (last 7 days)     Procedure Component Value Units Date/Time    Blood culture [[id number]] Collected:  11/06/19 1148    Order Status:  Completed Specimen:  Blood from Line, Arterial, Right Updated:  11/09/19 1232     Blood Culture, Routine No Growth to date      No Growth to date      No Growth to date      No Growth to date    Urine Culture High Risk [[id number]]  (Abnormal)  (Susceptibility) Collected:  11/05/19 1926    Order Status:  Completed Specimen:  Urine, Catheterized Updated:  11/09/19 0909     Urine Culture, Routine PSEUDOMONAS AERUGINOSA  > 100,000 cfu/ml        CANDIDA TROPICALIS  > 100,000 cfu/ml  Treatment of asymptomatic candiduria is not recommended (except for   specific populations). Candida isolated in the urine typically   represents colonization. If an indwelling urinary catheter is   present it should be removed or replaced.      Narrative:       Indicated criteria for high risk culture:->Other  Other (specify):->sepsis    Urine culture [[id number]]  (Abnormal)  (Susceptibility) Collected:  11/05/19 1510    Order Status:  Completed Specimen:  Urine Updated:  11/09/19 0906     Urine Culture, Routine PSEUDOMONAS AERUGINOSA  > 100,000 cfu/ml        CANDIDA TROPICALIS  > 100,000 cfu/ml  Treatment of asymptomatic candiduria is not recommended (except for   specific populations). Candida isolated in the urine typically   represents colonization. If an indwelling urinary catheter  is   present it should be removed or replaced.      Narrative:       Preferred Collection Type->Urine, Clean Catch  Specimen Source->Urine    Blood Culture #2 **CANNOT BE ORDERED STAT** [[id number]]  (Abnormal)  (Susceptibility) Collected:  11/05/19 1404    Order Status:  Completed Specimen:  Blood from Peripheral, Antecubital, Left Updated:  11/08/19 1308     Blood Culture, Routine Gram stain aer bottle:Gram negative rods      Called Ana Saavedra RN M3 @ 2015 MS 11/06/19      Gram stain reynaldo bottle: Gram positive cocci 11/07/2019  10:47       See previous notification     Comment: Gram stain aer bottle:Gram negative rods  Called Ana Saavedra RN M3 @ 2015 MS 11/06/19, 11/06/2019 20:17           PSEUDOMONAS AERUGINOSA      COAGULASE-NEGATIVE STAPHYLOCOCCUS SPECIES  Organism is a probable contaminant      Narrative:       Gram stain aer bottle:Gram negative rods  Called Ana Saavedra RN M3 @ 2015 MS 11/06/19, 11/06/2019 20:17    Culture, Respiratory with Gram Stain [[id number]]  (Abnormal)  (Susceptibility) Collected:  11/06/19 0420    Order Status:  Completed Specimen:  Respiratory from Sputum Updated:  11/08/19 0911     Respiratory Culture Reduced normal respiratory gabriela      SERRATIA MARCESCENS  Many        PROTEUS MIRABILIS ESBL  Moderate  Results called to and read back by: Martina Murillo RN on M3 re: ESBL  by BREONNA 11/08/2019  09:10       Gram Stain (Respiratory) >10 epithelial cells per low power field     Gram Stain (Respiratory) Moderate WBC's     Gram Stain (Respiratory) Many Gram positive rods resembling diphtheroids     Gram Stain (Respiratory) Few Gram negative rods    Blood Culture #1 **CANNOT BE ORDERED STAT** [[id number]]  (Abnormal) Collected:  11/05/19 1340    Order Status:  Completed Specimen:  Blood from Peripheral, Upper Arm, Right Updated:  11/08/19 0745     Blood Culture, Routine Gram stain reynaldo bottle: Gram positive cocci      Results called to and read back by: Cali Bustos RN on M3 re:gram  pos       cocci in blood cx 11/06/2019  09:58  by BREONNA      Gram stain aer bottle: Gram negative rods      Results called to and read back by: Martina Murillo RN on M3, RE: GNR in       aerobic bottle  11/07/2019  08:53 vcb      COAGULASE-NEGATIVE STAPHYLOCOCCUS SPECIES  Multiple morphotypes - probable contaminants        PSEUDOMONAS AERUGINOSA  For susceptibility see order #[id number]      Culture, Respiratory with Gram Stain [[id number]]  (Abnormal)  (Susceptibility) Collected:  11/05/19 1444    Order Status:  Completed Specimen:  Respiratory from Tracheal Aspirate Updated:  11/07/19 0838     Respiratory Culture Reduced normal respiratory gabriela      SERRATIA MARCESCENS  Many       Gram Stain (Respiratory) <10 epithelial cells per low power field.     Gram Stain (Respiratory) Few WBC's     Gram Stain (Respiratory) Moderate Gram positive rods resembling diphtheroids     Gram Stain (Respiratory) Rare Gram negative rods    Culture, Respiratory with Gram Stain [[id number]]     Order Status:  No result Specimen:  Respiratory     Blood culture [[id number]]     Order Status:  Canceled Specimen:  Blood     Stool culture [[id number]]     Order Status:  No result Specimen:  Stool     Clostridium difficile EIA [[id number]]     Order Status:  Canceled Specimen:  Stool           Impression:  Active Hospital Problems    Diagnosis  POA    *Gram-negative bacteremia [R78.81]  Yes    Pneumonia due to Serratia marcescens [J15.6]  Yes    Proteus pneumonia [J15.6]  Yes    Seizure [R56.9]  No    Septic shock [A41.9, R65.21]  Yes    History of MDR Pseudomonas aeruginosa infection [Z86.19]  Yes    Leukocytosis [D72.829]  Yes    Volume overload [E87.70]  Yes    Anasarca [R60.1]  Yes    Obstructive uropathy [N13.9]  Yes    Encephalopathy, toxic [G92]  Yes    C. difficile colitis [A04.72]  Yes    Acute on chronic diastolic heart failure [I50.33]  Yes    Sputum culture positive for Pseudomonas [R84.5]  Yes     Chronic    Essential  hypertension [I10]  Yes    Hyperkalemia [E87.5]  Yes    GINETTE (acute kidney injury) [N17.9]  Yes    Enlarging abdominal aortic aneurysm (AAA) [I71.4]  Yes    Cholelithiases [K80.20]  Yes    Bilateral nephrolithiasis [N20.0]  Yes    Hydroureter, left [N13.4]  Yes    History of CVA (cerebrovascular accident) without residual deficits [Z86.73]  Not Applicable    AAA (abdominal aortic aneurysm) without rupture [I71.4]  Yes    Pseudomonas urinary tract infection [N39.0, B96.5]  Yes    Coronary artery disease [I25.10]  Yes    Cardiogenic pulmonary edema [I50.1]  Yes    Anemia, chronic disease [D63.8]  Yes    Ventilator dependence [Z99.11]  Not Applicable    Hemiparesis affecting dominant side as late effect of stroke [I69.359]  Not Applicable    PEG (percutaneous endoscopic gastrostomy) status [Z93.1]  Not Applicable    Acquired hypothyroidism [E03.9]  Yes    Chronic respiratory failure with hypoxia [J96.11]  Yes    Tracheostomy in place [Z93.0]  Not Applicable    Functional quadriplegia [R53.2]  Yes      Resolved Hospital Problems    Diagnosis Date Resolved POA    Lactic acidosis [E87.2] 11/06/2019 Yes               Plan:     Nov 11- serratia and esbl proteus in lungs, merrem to zosyn with sz concerns per id.    Dismal outlook.                                    .

## 2019-11-09 NOTE — PROGRESS NOTES
[facility] Medicine  Progress Note    Patient Name: Masood Messina  MRN: [id number]  Patient Class: IP- Inpatient   Admission Date: 11/5/2019  Length of Stay: 3 days  Attending Physician: Yamileth Stuart MD  Primary Care Provider: Jeramie Lombardi MD        Subjective:     Principal Problem:Gram-negative bacteremia        HPI:  79-year-old nursing home resident([place]) with past medical history of chronic respiratory failure status post trach and PEG due to stroke, CAD status post CABG, hypertension, COPD,BPH presented from [facility] due to altered mental status, high fever and significant leukocytosis.  This is his 3rd admission with the same complaint in a span of 45 days  History mainly from charts, nursing home notes and ER MD and staff  At the moment he is getting p.o. vancomycin at [facility] for C diff colitis  In the emergency room he was found to be severely septic with significant shock and also he was hyperkalemic  His last sputum culture per chart review grew multidrug resistant Pseudomonas  No further information available    Overview/Hospital Course:  Patient admitted with septic shock, encepahalopathy.  Patient admitted to ICU and started on levophed and Broad IV abx.  He is trach dependent.  Pulmonary critical care consulted.  ID consulted.  BCx- 1 bottle growing.  Tay changed 11/6 reportedly with purulent urine.  Concern for obstructed uropathy with infection which also could be causing his GINETTE.  Urology consulted.  Renal function not improving and unclear if this is ATN or the fact that tay wasn't changed until today.  Nephrology consulted.  Patient does not open eyes or respond which I was told is not his baseline.  Discussed with Dr. Kinsey and there is concern for meningitis.  IR consulted for LP but this was deferred as INR is 2.  Continuing po vancomycin as was on this previously for C. Diff. Discussed with Dr. Antoine and will give dose of IV vanc and  IV ampicillin and will review coverage for meningitis as well as other covering infections. Last fever 11/6 at 1430. Reports of intermittent shaking activity.  EEG was done and reported to have seizure activity. Neurology consulted and patient started on keppra and vimpat. Patient is still not waking up or following commands but did try to fight me to keep his eye lids closed 11/7 and I find his pupils more responsive 11/7. Started on propofol. I spoke to his POA and he would not want CPR and thus he has been made DNR but is not comfort care.  11/8 still not responsive.  Renal function worse.  Making facial grimaces and thus video EEG is underway for possible continued seizure activity.  Discussed with Dr. Antoine and Meropenem should provide appropriate meningitis coverage for his possible sources.    Interval History: Patient unable to provide history as not responsive.  Neurology uptitrated anti eleptics due to concern that facial grimaces may represent seizure activity. Video EEG underway. Radiology again declined to do LP due to elevated INR.    Review of Systems   Unable to perform ROS: Mental status change     Objective:     Vital Signs (Most Recent):  Temp: 98.5 °F (36.9 °C) (11/08/19 1930)  Pulse: 103 (11/08/19 2200)  Resp: (!) 22 (11/08/19 2200)  BP: (!) 99/51 (11/08/19 1930)  SpO2: 98 % (11/08/19 2200) Vital Signs (24h Range):  Temp:  [98 °F (36.7 °C)-99 °F (37.2 °C)] 98.5 °F (36.9 °C)  Pulse:  [] 103  Resp:  [0-47] 22  SpO2:  [93 %-99 %] 98 %  BP: ()/(51-67) 99/51  Arterial Line BP: ()/(50-80) 99/52     Weight: 132 kg (291 lb 0.1 oz)  Body mass index is 39.47 kg/m².    Intake/Output Summary (Last 24 hours) at 11/8/2019 2251  Last data filed at 11/8/2019 1701  Gross per 24 hour   Intake 2657.84 ml   Output 1850 ml   Net 807.84 ml      Physical Exam   Constitutional: He appears well-developed. No distress.   HENT:   Head: Normocephalic and atraumatic.   Mouth/Throat: Oropharynx is clear and  moist.   Trach   Eyes: Pupils are equal, round, and reactive to light. EOM are normal.   Neck: Normal range of motion.   Cardiovascular: Regular rhythm.   No murmur heard.  Pulmonary/Chest: Effort normal and breath sounds normal. He has no wheezes.   Abdominal: Soft. He exhibits no distension. There is no tenderness. There is no rebound and no guarding.   peg   Genitourinary:   Genitourinary Comments: Rahman   Musculoskeletal: Normal range of motion.   Neurological:   Not responsive.  Does not open eyes to voice or physical stimuli but does purposely squeeze his eyes shut when you are trying to open them.   Periodic facial grimaces   Skin: No rash noted.   Left fifth toe gangrene   Psychiatric:   Unable to assess   Nursing note and vitals reviewed.      Significant Labs:   CBC:   Recent Labs   Lab 11/07/19  0405 11/07/19  0856 11/08/19  0453   WBC 31.53*  --  16.69*   HGB 10.0*  --  9.3*   HCT 30.0* 32* 29.1*   *  --  122*     CMP:   Recent Labs   Lab 11/07/19  0405 11/07/19  2300 11/08/19  0453   * 134* 134*  134*   K 4.2 3.7 3.8  3.8   CL 98 103 100  100   CO2 23 21* 24  24   * 104 116*  116*   BUN 61* 49* 53*  53*   CREATININE 2.8* 2.5* 2.7*  2.7*   CALCIUM 8.5* 7.5* 8.4*  8.4*   PROT 6.9 6.2 6.9   ALBUMIN 2.3* 2.0* 2.2*  2.2*   BILITOT 1.3* 1.1* 1.1*   ALKPHOS 82 70 77   AST 21 16 19   ALT 15 13 15   ANIONGAP 10 10 10  10   EGFRNONAA 20.4* 23.4* 21.3*  21.3*       Significant Imaging: Tele personally reviewed and NSR      Assessment/Plan:      * Gram-negative bacteremia        Seizure  EEG done withseizure activity. Video EEG in progress.  Neurology consulted and following  Vimpat and Keppra started and uptitrated given facial grimaces  Seizure precautions       Encephalopathy, toxic  Not improving thus far  Continuing IV abx  CT head done with no acute process  Neurology consulted given seizures      History of MDR Pseudomonas aeruginosa infection  As above      Septic  shock  Vasopressor support with Levophed  Broad IV abx per ID  BCx growing- 1 bottle out of 4  Urine Cx growing pseudomonas  IR consulted for LP given fevers, seizures, encephalopathy.  Could not be done due to INR 2. Repeating INR in AM and will revisit with Radiology. I discussed with Dr. Antoine and Meropenem giving good coverage for meningitis.  He lives at [facility] and thus has not been out in the community.  Discussed with Dr. Antoine and patient adequately covered with Meropenem.        C. difficile colitis  At the moment patient is on p.o. vancomycin for C diff colitis  Stool reported as formed per nursing      Acute on chronic diastolic heart failure  Stable issue  Will monitor carefully      Sputum culture positive for Pseudomonas  IV abx per ID  Coarse on lung exam      Hydroureter, left  Seen on CT scan        GINETTE (acute kidney injury)  Acute kidney injury along with hyperkalemia in the background of sepsis/C diff colitis  Concern for obstructive uropathy from tay. Changed 11/6.    Consulted renal as not improving.  Worsens.  DOT harris in setting of shock      Hyperkalemia  Already received dextrose, insulin in the ER  K improved after treatment   Following daily labs    Essential hypertension  Currently in shock.  Antihypertensives held    Enlarging abdominal aortic aneurysm (AAA)        Pseudomonas urinary tract infection  UCx growing pseudomonas  ID and critical care following  IV abx per ID. Colistin added.  Tay has been changed.      Ventilator dependence        Anemia, chronic disease  Monitoring cell counts  AM labs ordered      Coronary artery disease  Stable issue      Cardiogenic pulmonary edema        Hemiparesis affecting dominant side as late effect of stroke  Residuals from previous CVA      Acquired hypothyroidism        PEG (percutaneous endoscopic gastrostomy) status  PEG insitu      Tracheostomy in place  Pulmonary following for vent management       Chronic respiratory failure with  hypoxia  Patient has got a trach and is vent dependent  Culture sputum from previously grew multidrug resistant Pseudomonas. Colistin started.      Functional quadriplegia  Chronic condition.    No acute issues  Air mattress  Turning        VTE Risk Mitigation (From admission, onward)         Ordered     enoxaparin injection 40 mg  Every 24 hours (non-standard times)      11/05/19 2305     IP VTE HIGH RISK PATIENT  Once      11/05/19 2014                      Yamileth Stuart MD  Department of Hospital Medicine   [facility]

## 2019-11-09 NOTE — PROGRESS NOTES
Progress Note  Nephrology    Consult Requested By: Yamileth Stuart MD    Reason for Consult: GINETTE    SUBJECTIVE:     History of Present Illness: 80 y/o male patient sent from NH on 11/5, admitted w/septic shock, encephalopathy.  Tay changed 11/6 reportedly with purulent urine.  Concern for obstructed uropathy with infection which also could be causing his GINETTE.  Urology consulted per primary.  Renal function not improving, nephrology consulted for co-management, GINETTE.    11/7  Pt seen and examined.  He had significant hyperkalemia on admit, this has now improved.  Scr 2.5 initially, now up to 2.8.  At last discharge in October, Scr was 1.0.  He is non-oliguric, UOP 5L--polyuria after catheter was exchanged.  Hyponatremic, was 127 yesterday, better today at 131.  CT abd/pelvis showed mild L hydronephrosis, urology is consulted as well.  11/08  Na+ improved.  Renal function stable, on IVF.  UOP only recorded for one shift--850cc.  Electrolytes improved.  NAD noted, on vent.  11/09  Na+ now wnl, Scr bumped to 2.7.  NS at 100cc/hr.  Not responding to verbal stimuli.  UOP 1.8L.    Assessment/plan:    1.  GINETTE 2/2 hypotension d/t septic shock, infection, obstruction--  CT results as above.  Treat infection per ID recommendations.  Dose all medications for level of renal function.  Got IVF in ER, renal function actually worsened, likely d/t obstruction.  Renal panel daily. Need I/O for all shifts.  Keep NS 100cc/hour.    Maintain tay, monitor UOP.  Not a dialysis candidate  2.  Hyponatremia--improving.  urine Na+ and osmolality done.  3.  Hyperkalemia--resolved, monitor.  4.  HTN, now hypotensive--on pressors.  Keep MAP above 55    Past Medical History:   Diagnosis Date    AAA (abdominal aortic aneurysm)     Anemia     BPH (benign prostatic hyperplasia)     CHF (congestive heart failure)     COPD (chronic obstructive pulmonary disease)     Coronary artery disease     Dementia     Dementia     Depression     GERD  (gastroesophageal reflux disease)     Hyperlipidemia     Hypertension     Hypothyroid     Renal disorder     Respiratory failure, chronic     Ventilator dependence      Past Surgical History:   Procedure Laterality Date    ABDOMINAL SURGERY      CARDIAC SURGERY  1999    GASTROSTOMY TUBE PLACEMENT      SPINE SURGERY      TRACHEOSTOMY TUBE PLACEMENT       History reviewed. No pertinent family history.  Social History     Tobacco Use    Smoking status: Former Smoker    Smokeless tobacco: Never Used   Substance Use Topics    Alcohol use: No    Drug use: No       Review of patient's allergies indicates:   Allergen Reactions    Codeine Other (See Comments)        Review of Systems:  Unable to obtain 2/2 encephalopathy    OBJECTIVE:     Vital Signs Range (Last 24H):  Temp:  [98 °F (36.7 °C)-98.5 °F (36.9 °C)]   Pulse:  []   Resp:  [7-47]   BP: ()/(51-67)   SpO2:  [93 %-100 %]   Arterial Line BP: ()/(50-80)     Physical Exam:  General- NAD noted  HEENT- ncat, trache present  Neck- supple  CV- Regular rate and rhythm  Resp- upper lobes cta, on vent  GI- abd soft  Extrem- No cyanosis, clubbing, edema, quadraplegic  Derm- skin w/d  Neuro-  Not interactive    Body mass index is 39.47 kg/m².    Laboratory:  CBC:   Recent Labs   Lab 11/09/19  0434   WBC 11.48   RBC 3.19*   HGB 9.3*   HCT 29.8*   *   MCV 93   MCH 29.2   MCHC 31.2*     CMP:   Recent Labs   Lab 11/09/19  0434   GLU 95  95   CALCIUM 8.6*  8.6*   ALBUMIN 2.2*  2.2*   PROT 6.9     136   K 4.0  4.0   CO2 26  26     102   BUN 53*  53*   CREATININE 2.7*  2.7*   ALKPHOS 101   ALT 14   AST 21   BILITOT 1.0       Diagnostic Results:  Labs: Reviewed      ASSESSMENT/PLAN:     Active Hospital Problems    Diagnosis  POA    *Gram-negative bacteremia [R78.81]  Yes    Seizure [R56.9]  No    Septic shock [A41.9, R65.21]  Yes    History of MDR Pseudomonas aeruginosa infection [Z86.19]  Yes    Leukocytosis [D72.829]   Yes    Volume overload [E87.70]  Yes    Anasarca [R60.1]  Yes    Obstructive uropathy [N13.9]  Yes    Encephalopathy, toxic [G92]  Yes    C. difficile colitis [A04.72]  Yes    Acute on chronic diastolic heart failure [I50.33]  Yes    Sputum culture positive for Pseudomonas [R84.5]  Yes     Chronic    Essential hypertension [I10]  Yes    Hyperkalemia [E87.5]  Yes    GINETTE (acute kidney injury) [N17.9]  Yes    Enlarging abdominal aortic aneurysm (AAA) [I71.4]  Yes    Cholelithiases [K80.20]  Yes    Bilateral nephrolithiasis [N20.0]  Yes    Hydroureter, left [N13.4]  Yes    History of CVA (cerebrovascular accident) without residual deficits [Z86.73]  Not Applicable    AAA (abdominal aortic aneurysm) without rupture [I71.4]  Yes    Pseudomonas urinary tract infection [N39.0, B96.5]  Yes    Coronary artery disease [I25.10]  Yes    Cardiogenic pulmonary edema [I50.1]  Yes    Anemia, chronic disease [D63.8]  Yes    Ventilator dependence [Z99.11]  Not Applicable    Hemiparesis affecting dominant side as late effect of stroke [I69.359]  Not Applicable    PEG (percutaneous endoscopic gastrostomy) status [Z93.1]  Not Applicable    Acquired hypothyroidism [E03.9]  Yes    Chronic respiratory failure with hypoxia [J96.11]  Yes    Tracheostomy in place [Z93.0]  Not Applicable    Functional quadriplegia [R53.2]  Yes      Resolved Hospital Problems    Diagnosis Date Resolved POA    Lactic acidosis [E87.2] 11/06/2019 Yes         Thank you for allowing us to participate in the care of your patient. We will follow the patient and provide recommendations as needed.      Time spent seeing patient( greater than 1/2 spent in direct contact) :

## 2019-11-09 NOTE — SUBJECTIVE & OBJECTIVE
Interval History: Patient unable to provide history as not responsive.  Neurology uptitrated anti eleptics due to concern that facial grimaces may represent seizure activity. Video EEG underway. Radiology again declined to do LP due to elevated INR.    Review of Systems   Unable to perform ROS: Mental status change     Objective:     Vital Signs (Most Recent):  Temp: 98.5 °F (36.9 °C) (11/08/19 1930)  Pulse: 103 (11/08/19 2200)  Resp: (!) 22 (11/08/19 2200)  BP: (!) 99/51 (11/08/19 1930)  SpO2: 98 % (11/08/19 2200) Vital Signs (24h Range):  Temp:  [98 °F (36.7 °C)-99 °F (37.2 °C)] 98.5 °F (36.9 °C)  Pulse:  [] 103  Resp:  [0-47] 22  SpO2:  [93 %-99 %] 98 %  BP: ()/(51-67) 99/51  Arterial Line BP: ()/(50-80) 99/52     Weight: 132 kg (291 lb 0.1 oz)  Body mass index is 39.47 kg/m².    Intake/Output Summary (Last 24 hours) at 11/8/2019 2251  Last data filed at 11/8/2019 1701  Gross per 24 hour   Intake 2657.84 ml   Output 1850 ml   Net 807.84 ml      Physical Exam   Constitutional: He appears well-developed. No distress.   HENT:   Head: Normocephalic and atraumatic.   Mouth/Throat: Oropharynx is clear and moist.   Trach   Eyes: Pupils are equal, round, and reactive to light. EOM are normal.   Neck: Normal range of motion.   Cardiovascular: Regular rhythm.   No murmur heard.  Pulmonary/Chest: Effort normal and breath sounds normal. He has no wheezes.   Abdominal: Soft. He exhibits no distension. There is no tenderness. There is no rebound and no guarding.   peg   Genitourinary:   Genitourinary Comments: Rahman   Musculoskeletal: Normal range of motion.   Neurological:   Not responsive.  Does not open eyes to voice or physical stimuli but does purposely squeeze his eyes shut when you are trying to open them.   Periodic facial grimaces   Skin: No rash noted.   Left fifth toe gangrene   Psychiatric:   Unable to assess   Nursing note and vitals reviewed.      Significant Labs:   CBC:   Recent Labs   Lab  11/07/19  0405 11/07/19  0856 11/08/19  0453   WBC 31.53*  --  16.69*   HGB 10.0*  --  9.3*   HCT 30.0* 32* 29.1*   *  --  122*     CMP:   Recent Labs   Lab 11/07/19  0405 11/07/19  2300 11/08/19 0453   * 134* 134*  134*   K 4.2 3.7 3.8  3.8   CL 98 103 100  100   CO2 23 21* 24  24   * 104 116*  116*   BUN 61* 49* 53*  53*   CREATININE 2.8* 2.5* 2.7*  2.7*   CALCIUM 8.5* 7.5* 8.4*  8.4*   PROT 6.9 6.2 6.9   ALBUMIN 2.3* 2.0* 2.2*  2.2*   BILITOT 1.3* 1.1* 1.1*   ALKPHOS 82 70 77   AST 21 16 19   ALT 15 13 15   ANIONGAP 10 10 10  10   EGFRNONAA 20.4* 23.4* 21.3*  21.3*       Significant Imaging: Tele personally reviewed and NSR

## 2019-11-09 NOTE — NURSING
Chlorhexidine bath given  Linen changed  Central line  Dressing  Changed  Sterile technique  Site w/o issues  trach care  Done  Oral care  Done piv left and right d/shiv jelco intacted  Hemostasis obtained report  Given to dr rg prasad eeg in [facility] for sucpected  sz activity

## 2019-11-09 NOTE — PROGRESS NOTES
[facility]  Neurology  Progress Note    Patient Name: Masood Messina  MRN: [id number]  Admission Date: 11/5/2019  Hospital Length of Stay: 4 days  Code Status: DNR   Attending Provider: Yamileth Stuart MD  Primary Care Physician: Jeramie Lombardi MD   Principal Problem:Gram-negative bacteremia    Subjective:     Interval History: Patient seen and examined with Dr. Garcia Jimenez. Currently off sedation. He is having some abnormal lip/mouth movements currently. Will get a stat EEG. Still on the Keppra 500mg BIG and Vimpat 100mg BID. Will check levels. Would like to get an LP when stable; concern for meningitis. Will follow the patient.     Current Neurological Medications:   Scheduled Meds:   albuterol-ipratropium  3 mL Nebulization Q6H    chlorhexidine  15 mL Mouth/Throat BID    enoxparin  40 mg Subcutaneous Q24H    lacosamide (VIMPAT) IVPB  100 mg Intravenous Q12H    levetiracetam IVPB  500 mg Intravenous Q12H    mupirocin   Nasal BID    pantoprazole  40 mg Oral Daily    piperacillin-tazobactam (ZOSYN) IVPB  4.5 g Intravenous Q8H    vancomycin  250 mg Oral QID     Continuous Infusions:   sodium chloride 0.9% 100 mL/hr at 11/08/19 0701    norepinephrine bitartrate-D5W 0.02 mcg/kg/min (11/08/19 0701)    propofol Stopped (11/08/19 1501)     PRN Meds:.      Current Facility-Administered Medications   Medication Dose Route Frequency Provider Last Rate Last Dose    0.9%  NaCl infusion   Intravenous Continuous Mohinder Barnes  mL/hr at 11/08/19 0701      albuterol-ipratropium 2.5 mg-0.5 mg/3 mL nebulizer solution 3 mL  3 mL Nebulization Q6H Yamileth Stuart MD   3 mL at 11/09/19 0752    chlorhexidine 0.12 % solution 15 mL  15 mL Mouth/Throat BID Isela Razo PharmD   15 mL at 11/08/19 2003    enoxaparin injection 40 mg  40 mg Subcutaneous Q24H Mauricio Kinsey MD   40 mg at 11/09/19 0535    lacosamide (VIMPAT) 100 mg in sodium chloride 0.9% 100 mL IVPB  100 mg Intravenous Q12H Brain Zelaya  MD Barbara 200 mL/hr at 11/09/19 0708 100 mg at 11/09/19 0708    levetiracetam 500 mg in 0.9 % normal saline 100 mL IVPB (ready to mix system)  500 mg Intravenous Q12H Yamileth Stuart MD   500 mg at 11/08/19 2120    mupirocin 2 % ointment   Nasal BID Isela Razo PharmD        norepinephrine 8 mg in dextrose 5 % 250 mL infusion  0.02 mcg/kg/min Intravenous Continuous Yamileth Stuart MD 5 mL/hr at 11/08/19 0701 0.02 mcg/kg/min at 11/08/19 0701    pantoprazole EC tablet 40 mg  40 mg Oral Daily Yoel Lopes MD   40 mg at 11/09/19 0535    piperacillin-tazobactam 4.5 g in dextrose 5 % 100 mL IVPB (ready to mix system)  4.5 g Intravenous Q8H Zhane Badillo MD 25 mL/hr at 11/09/19 0535 4.5 g at 11/09/19 0535    propofol (DIPRIVAN) 10 mg/mL infusion  50 mcg/kg/min Intravenous Continuous Mauricio Kinsey MD   Stopped at 11/08/19 1501    vancomycin oral suspension 250 mg  250 mg Oral QID Yoel Lopes MD   250 mg at 11/08/19 2003       Review of Systems   Unable to perform ROS: Mental status change     Objective:     Vital Signs (Most Recent):  Temp: 98 °F (36.7 °C) (11/09/19 0701)  Pulse: 98 (11/09/19 0754)  Resp: (!) 25 (11/09/19 0754)  BP: 114/66 (11/09/19 0701)  SpO2: 100 % (11/09/19 0754) Vital Signs (24h Range):  Temp:  [98 °F (36.7 °C)-98.5 °F (36.9 °C)] 98 °F (36.7 °C)  Pulse:  [] 98  Resp:  [9-47] 25  SpO2:  [93 %-100 %] 100 %  BP: ()/(51-67) 114/66  Arterial Line BP: ()/(50-80) 124/61     Weight: 132 kg (291 lb 0.1 oz)  Body mass index is 39.47 kg/m².    Physical Exam   Constitutional: He appears well-developed and well-nourished.   HENT:   Head: Normocephalic and atraumatic.   abnormal repetitive movements of mouth/lip   Eyes: Pupils are equal, round, and reactive to light. EOM are normal.   Neck: Normal range of motion. Neck supple.   Cardiovascular: Normal rate.   Pulmonary/Chest: Breath sounds normal.   Abdominal: He exhibits no distension. There is no tenderness.   Musculoskeletal: Normal  range of motion.   Passive ROM normal   Neurological: He displays normal reflexes. No cranial nerve deficit or sensory deficit. He exhibits normal muscle tone. Abnormal coordination: RAMON.   Reflex Scores:       Tricep reflexes are 2+ on the right side and 2+ on the left side.       Bicep reflexes are 2+ on the right side and 2+ on the left side.       Brachioradialis reflexes are 2+ on the right side and 2+ on the left side.       Patellar reflexes are 2+ on the right side and 2+ on the left side.       Achilles reflexes are 2+ on the right side and 2+ on the left side.  sedated   Skin: Skin is warm and dry. Capillary refill takes less than 2 seconds.   Psychiatric:   Sedated       NEUROLOGICAL EXAMINATION:     MENTAL STATUS   Level of consciousness: responsive to painful stimuli       Sedated     CRANIAL NERVES   Cranial nerves II through XII intact.     CN III, IV, VI   Pupils are equal, round, and reactive to light.  Extraocular motions are normal.     MOTOR EXAM   Muscle bulk: normal  Overall muscle tone: normal    REFLEXES     Reflexes   Right brachioradialis: 2+  Left brachioradialis: 2+  Right biceps: 2+  Left biceps: 2+  Right triceps: 2+  Left triceps: 2+  Right patellar: 2+  Left patellar: 2+  Right achilles: 2+  Left achilles: 2+  Right : 2+  Left : 2+    SENSORY EXAM        Responsive to painful stimuli     GAIT AND COORDINATION        RAMON       Significant Labs:   Lab Results   Component Value Date    CREATININE 2.7 (H) 11/09/2019    CREATININE 2.7 (H) 11/09/2019       Lab Results   Component Value Date    LDLCALC 60.6 (L) 09/13/2019     Lab Results   Component Value Date    TSH 9.980 (H) 11/07/2019     Lab Results   Component Value Date    FOLATE >24.8 (H) 11/07/2019     Lab Results   Component Value Date    IECGUAUE54 1262 (H) 11/07/2019         Significant Imaging:    CT head without contrast:    Impression       1.  No evidence of an acute intracranial abnormality.       Stat EEG: results  pending    Assessment and Plan:    1. New onset Seizures   -Seizure like activity with repetat alicia mouth movements-obtaining stat EEG  -Will keep the patient on Keppra 500mg BID and vimapt 100mg BID and continue to monitor the patient and AED levels  -Consider MRI brain at a later date  -PT/OT/ST      2.Encephalopahty  -Would like to obtain LP when PT/INR is stable; concern for meningitis   -TSH elevated-management per IM  -Vit B 1 pending            Seizure precautions:    Patient and/caregiver advised that patients having seizures should not to drive or operate heavy machinery until 6 months seizure free. Also explained that patient is to avoid activities that could be high risk during a seizure, including but not limited to swimming alone, climbing to high levels, and bathing in a tub.         Side effects of seizure medications:     Explained to patient/caregiver that side effects of antiepileptics could include life threatening rashes, severe lab abnormalities, kidney stones, mood changes including depression, weight loss or gain, organ failure, suicidal ideation and death. The patient has been prescribed this medication because seizures have serious risks that in many cases outweight the risks. Advised patient/caregiver to be please be aware of these possible side effects and encouraged them to ask questions if needed.        [phone] 2 weeks from discharge       Active Diagnoses:    Diagnosis Date Noted POA    PRINCIPAL PROBLEM:  Gram-negative bacteremia [R78.81] 11/06/2019 Yes    Seizure [R56.9] 11/06/2019 No    Septic shock [A41.9, R65.21] 11/05/2019 Yes    History of MDR Pseudomonas aeruginosa infection [Z86.19] 11/05/2019 Yes    Leukocytosis [D72.829] 11/05/2019 Yes    Volume overload [E87.70] 11/05/2019 Yes    Anasarca [R60.1] 11/05/2019 Yes    Obstructive uropathy [N13.9] 11/05/2019 Yes    Encephalopathy, toxic [G92] 11/05/2019  Yes    C. difficile colitis [A04.72] 10/11/2019 Yes    Acute on chronic diastolic heart failure [I50.33] 10/03/2019 Yes    Sputum culture positive for Pseudomonas [R84.5] 10/03/2019 Yes     Chronic    Essential hypertension [I10] 09/13/2019 Yes    Hyperkalemia [E87.5] 09/13/2019 Yes    GINETTE (acute kidney injury) [N17.9] 09/13/2019 Yes    Enlarging abdominal aortic aneurysm (AAA) [I71.4] 09/13/2019 Yes    Cholelithiases [K80.20] 09/13/2019 Yes    Bilateral nephrolithiasis [N20.0] 09/13/2019 Yes    Hydroureter, left [N13.4] 09/13/2019 Yes    History of CVA (cerebrovascular accident) without residual deficits [Z86.73] 09/13/2019 Not Applicable    AAA (abdominal aortic aneurysm) without rupture [I71.4] 09/13/2019 Yes    Pseudomonas urinary tract infection [N39.0, B96.5] 09/13/2019 Yes    Coronary artery disease [I25.10] 09/12/2019 Yes    Cardiogenic pulmonary edema [I50.1] 09/12/2019 Yes    Anemia, chronic disease [D63.8] 09/12/2019 Yes    Ventilator dependence [Z99.11] 09/12/2019 Not Applicable    Hemiparesis affecting dominant side as late effect of stroke [I69.359] 10/14/2017 Not Applicable    PEG (percutaneous endoscopic gastrostomy) status [Z93.1] 03/21/2017 Not Applicable    Acquired hypothyroidism [E03.9] 03/21/2017 Yes    Chronic respiratory failure with hypoxia [J96.11] 12/05/2013 Yes    Tracheostomy in place [Z93.0] 12/05/2013 Not Applicable    Functional quadriplegia [R53.2] 12/05/2013 Yes      Problems Resolved During this Admission:    Diagnosis Date Noted Date Resolved POA    Lactic acidosis [E87.2] 11/05/2019 11/06/2019 Yes       VTE Risk Mitigation (From admission, onward)         Ordered     enoxaparin injection 40 mg  Every 24 hours (non-standard times)      11/05/19 2305     IP VTE HIGH RISK PATIENT  Once      11/05/19 2014              Patient was seen and examined by Dr. Garcia Jimenez.       Naomi Reis NP  Neurology  [facility], Dr. Garcia Jimenez, have  personally seen and examined the patient with my advanced provider and agree with above. I personally did a focused exam, and reviewed all necessary clinical information. I discussed my management plan with my NP and agree with above. Mouth shaking. Bilateral mvt. Obtain EEG.

## 2019-11-09 NOTE — PLAN OF CARE
11/09/19 0752   Patient Assessment/Suction   Level of Consciousness (AVPU) unresponsive   All Lung Fields Breath Sounds coarse   Suction Method tracheal   $ Suction Charges Inline Suction Procedure Stat Charge   Secretions Amount moderate   Secretions Color yellow   Secretions Characteristics thick   PRE-TX-O2   O2 Device (Oxygen Therapy) ventilator   $ Is the patient on Low Flow Oxygen? Yes   Oxygen Concentration (%) 32   SpO2 99 %   Pulse Oximetry Type Continuous   $ Pulse Oximetry - Multiple Charge Pulse Oximetry - Multiple   Pulse 97   Resp (!) 24   Aerosol Therapy   $ Aerosol Therapy Charges Aerosol Treatment   Daily Review of Necessity (SVN) completed   Respiratory Treatment Status (SVN) given   Treatment Route (SVN) in-line   Patient Position (SVN) semi-Arredondo's   Post Treatment Assessment (SVN) breath sounds unchanged   Signs of Intolerance (SVN) none   Breath Sounds Post-Respiratory Treatment   Throughout All Fields Post-Treatment All Fields   Throughout All Fields Post-Treatment coarse   Post-treatment Heart Rate (beats/min) 97   Post-treatment Resp Rate (breaths/min) 24   Ready to Wean/Extubation Screen   FIO2<=50 (chart decimal) 0.32

## 2019-11-09 NOTE — PLAN OF CARE
This note also relates to the following rows which could not be included:  Oxygen Concentration (%) - Cannot attach notes to unvalidated device data  Ventilation Type - Cannot attach notes to unvalidated device data  Vent Mode - Cannot attach notes to unvalidated device data  Set Rate - Cannot attach notes to unvalidated device data  Vt Set - Cannot attach notes to unvalidated device data  PEEP/CPAP - Cannot attach notes to unvalidated device data  Pressure Support - Cannot attach notes to unvalidated device data  Waveform - Cannot attach notes to unvalidated device data  Peak Flow - Cannot attach notes to unvalidated device data  Plateau Set/Insp. Hold (sec) - Cannot attach notes to unvalidated device data  Trigger Sensitivity Flow/I-Trigger - Cannot attach notes to unvalidated device data  Resp Rate Total - Cannot attach notes to unvalidated device data  Peak Airway Pressure - Cannot attach notes to unvalidated device data  Mean Airway Pressure - Cannot attach notes to unvalidated device data  Plateau Pressure - Cannot attach notes to unvalidated device data  Exhaled Vt - Cannot attach notes to unvalidated device data  Total Ve - Cannot attach notes to unvalidated device data  I:E Ratio Measured - Cannot attach notes to unvalidated device data  Resp Rate High Alarm - Cannot attach notes to unvalidated device data  Press High Alarm - Cannot attach notes to unvalidated device data  Apnea Rate - Cannot attach notes to unvalidated device data  Apnea Volume (mL) - Cannot attach notes to unvalidated device data  Apnea Oxygen Concentration  - Cannot attach notes to unvalidated device data  Apnea Flow Rate (L/min) - Cannot attach notes to unvalidated device data  T Apnea - Cannot attach notes to unvalidated device data       11/08/19 1941   Patient Assessment/Suction   Level of Consciousness (AVPU) unresponsive   Respiratory Effort Normal;Unlabored   Expansion/Accessory Muscles/Retractions no use of accessory muscles    All Lung Fields Breath Sounds coarse   Rhythm/Pattern, Respiratory assisted mechanically   Cough Frequency with stimulation   Cough Type assisted   Suction Method tracheal   $ Suction Charges Inline Suction Procedure Stat Charge   Secretions Amount large   Secretions Color yellow   Secretions Characteristics thick   PRE-TX-O2   O2 Device (Oxygen Therapy) ventilator   SpO2 99 %   Pulse Oximetry Type Continuous   Pulse 104   Resp (!) 23   Aerosol Therapy   $ Aerosol Therapy Charges Aerosol Treatment   Daily Review of Necessity (SVN) completed   Respiratory Treatment Status (SVN) given   Treatment Route (SVN) in-line   Patient Position (SVN) semi-Arredondo's   Post Treatment Assessment (SVN) breath sounds unchanged   Signs of Intolerance (SVN) none   Breath Sounds Post-Respiratory Treatment   Post-treatment Heart Rate (beats/min) 104   Post-treatment Resp Rate (breaths/min) 25        Surgical Airway Portex Cuffed;Fenestrated   No Placement Date or Time found.   Present Prior to Hospital Arrival?: Yes  Inserted by: Present Prior to Hospital Arrival  Type: Tracheostomy  Brand: Portex  Airway Device Size: 8.0  Style: Cuffed;Fenestrated   Cuff Inflation? Inflated   Status Secured   Vent Select   Charged w/in last 24h YES   Preset Conventional Ventilator Settings   Vent ID 6   Vent Type

## 2019-11-10 NOTE — PROGRESS NOTES
Formerly [facility], later [facility]  Neurology  Progress Note    Patient Name: Masood Messina  MRN: [id number]  Admission Date: 11/5/2019  Hospital Length of Stay: 5 days  Code Status: DNR   Attending Provider: Yamileth Stuart MD  Primary Care Physician: Jeramie Lombardi MD   Principal Problem:Bacteremia due to Pseudomonas    Subjective:     Interval History: Patient seen and examined with Dr. Garcia Jimenez. He is having some abnormal lip/mouth movements still. Not consistent with seizure like activity. EEG from yesterday was negative for seizure activity. Still on the Keppra 500mg BIG and Vimpat 100mg BID. AED levels in progress. Would like to get an LP when stable; PT still elevated at 18.5; there is concern for meningitis. Will follow the patient.     Current Neurological Medications:   Scheduled Meds:   albuterol-ipratropium  3 mL Nebulization Q6H    chlorhexidine  15 mL Mouth/Throat BID    enoxparin  40 mg Subcutaneous Q24H    lacosamide (VIMPAT) IVPB  100 mg Intravenous Q12H    levetiracetam IVPB  500 mg Intravenous Q12H    mupirocin   Nasal BID    pantoprazole  40 mg Oral Daily    piperacillin-tazobactam (ZOSYN) IVPB  4.5 g Intravenous Q8H    vancomycin  250 mg Oral QID     Continuous Infusions:   sodium chloride 0.9% 100 mL/hr at 11/09/19 0701    norepinephrine bitartrate-D5W 0.028 mcg/kg/min (11/09/19 1106)     PRN Meds:.      Current Facility-Administered Medications   Medication Dose Route Frequency Provider Last Rate Last Dose    0.9%  NaCl infusion   Intravenous Continuous Mohinder Barnes  mL/hr at 11/09/19 0701      albuterol-ipratropium 2.5 mg-0.5 mg/3 mL nebulizer solution 3 mL  3 mL Nebulization Q6H Yamileth Stuart MD   3 mL at 11/10/19 0822    chlorhexidine 0.12 % solution 15 mL  15 mL Mouth/Throat BID Isela Razo PharmD   15 mL at 11/10/19 0928    enoxaparin injection 40 mg  40 mg Subcutaneous Q24H Mauricio Kinsey MD   40 mg at 11/10/19 0553    lacosamide (VIMPAT) 100 mg in sodium  chloride 0.9% 100 mL IVPB  100 mg Intravenous Q12H Brain Jimenez  mL/hr at 11/10/19 0553 100 mg at 11/10/19 0553    levetiracetam 500 mg in 0.9 % normal saline 100 mL IVPB (ready to mix system)  500 mg Intravenous Q12H Yamileth Stuart MD   500 mg at 11/10/19 0928    mupirocin 2 % ointment   Nasal BID Isela Razo PharmD        norepinephrine 8 mg in dextrose 5 % 250 mL infusion  0.02 mcg/kg/min Intravenous Continuous Yamileth Stuart MD 7 mL/hr at 11/09/19 1106 0.028 mcg/kg/min at 11/09/19 1106    pantoprazole EC tablet 40 mg  40 mg Oral Daily Yoel Lopes MD   40 mg at 11/10/19 0554    piperacillin-tazobactam 4.5 g in dextrose 5 % 100 mL IVPB (ready to mix system)  4.5 g Intravenous Q8H Zhnae Badillo MD   Stopped at 11/10/19 0700    vancomycin oral suspension 250 mg  250 mg Oral QID Yoel Lopes MD   250 mg at 11/10/19 0928       Review of Systems   Unable to perform ROS: Mental status change     Objective:     Vital Signs (Most Recent):  Temp: 98 °F (36.7 °C) (11/10/19 0800)  Pulse: 96 (11/10/19 0900)  Resp: (!) 22 (11/10/19 0900)  BP: (!) 99/52 (11/10/19 0800)  SpO2: 95 % (11/10/19 0900) Vital Signs (24h Range):  Temp:  [98 °F (36.7 °C)-98.9 °F (37.2 °C)] 98 °F (36.7 °C)  Pulse:  [] 96  Resp:  [10-29] 22  SpO2:  [95 %-100 %] 95 %  BP: ()/(46-73) 99/52  Arterial Line BP: ()/(47-59) 128/56     Weight: 132 kg (291 lb 0.1 oz)  Body mass index is 39.47 kg/m².    Physical Exam   Constitutional: He appears well-developed and well-nourished.   HENT:   Head: Normocephalic and atraumatic.   abnormal repetitive movements of mouth/lip   Eyes: Pupils are equal, round, and reactive to light. EOM are normal.   Neck: Normal range of motion. Neck supple.   Cardiovascular: Normal rate.   Pulmonary/Chest: Breath sounds normal.   Abdominal: He exhibits no distension. There is no tenderness.   Musculoskeletal: Normal range of motion.   Passive ROM normal   Neurological: He displays normal reflexes. No  cranial nerve deficit or sensory deficit. He exhibits normal muscle tone. Coordination (RAMON) normal.   Reflex Scores:       Tricep reflexes are 1+ on the right side and 1+ on the left side.       Bicep reflexes are 1+ on the right side and 1+ on the left side.       Brachioradialis reflexes are 1+ on the right side and 1+ on the left side.       Patellar reflexes are 1+ on the right side and 1+ on the left side.       Achilles reflexes are 1+ on the right side and 1+ on the left side.  Skin: Skin is warm and dry. Capillary refill takes less than 2 seconds.       NEUROLOGICAL EXAMINATION:     MENTAL STATUS   Level of consciousness: responsive to painful stimuli    CRANIAL NERVES   Cranial nerves II through XII intact.     CN III, IV, VI   Pupils are equal, round, and reactive to light.  Extraocular motions are normal.     MOTOR EXAM   Muscle bulk: normal  Overall muscle tone: normal    REFLEXES     Reflexes   Right brachioradialis: 1+  Left brachioradialis: 1+  Right biceps: 1+  Left biceps: 1+  Right triceps: 1+  Left triceps: 1+  Right patellar: 1+  Left patellar: 1+  Right achilles: 1+  Left achilles: 1+  Right : 1+  Left : 1+    SENSORY EXAM        Responsive to painful stimuli     GAIT AND COORDINATION        RAMON       Significant Labs:   Lab Results   Component Value Date    CREATININE 2.8 (H) 11/10/2019    CREATININE 2.8 (H) 11/10/2019       Lab Results   Component Value Date    LDLCALC 60.6 (L) 09/13/2019     Lab Results   Component Value Date    TSH 9.980 (H) 11/07/2019     Lab Results   Component Value Date    FOLATE >24.8 (H) 11/07/2019     Lab Results   Component Value Date    KVEAWHMB63 1262 (H) 11/07/2019         Significant Imaging:    CT head without contrast:    Impression       1.  No evidence of an acute intracranial abnormality.       Stat EEG: IMPRESSION: Severe encephalopathy. Significant muscle artifact.   No Seizures.    Assessment and Plan:    1. New onset Seizures   -repetative mouth  movements-EEG was negative for seizure activity  -Will keep the patient on Keppra 500mg BID and vimapt 100mg BID and continue to monitor the patient and AED levels  -Consider MRI brain at a later date  -PT/OT/ST      2.Encephalopahty  -Would like to obtain LP when PT/INR is stable; concern for meningitis   -TSH elevated-management per IM  -Vit B 1 pending            Seizure precautions:    Patient and/caregiver advised that patients having seizures should not to drive or operate heavy machinery until 6 months seizure free. Also explained that patient is to avoid activities that could be high risk during a seizure, including but not limited to swimming alone, climbing to high levels, and bathing in a tub.         Side effects of seizure medications:     Explained to patient/caregiver that side effects of antiepileptics could include life threatening rashes, severe lab abnormalities, kidney stones, mood changes including depression, weight loss or gain, organ failure, suicidal ideation and death. The patient has been prescribed this medication because seizures have serious risks that in many cases outweight the risks. Advised patient/caregiver to be please be aware of these possible side effects and encouraged them to ask questions if needed.        [phone] within 2 weeks from discharge       Active Diagnoses:    Diagnosis Date Noted POA    PRINCIPAL PROBLEM:  Bacteremia due to Pseudomonas [R78.81] 11/06/2019 Yes    Pneumonia due to Serratia marcescens [J15.6] 11/09/2019 Yes    Proteus pneumonia [J15.6] 11/09/2019 Yes    Seizure [R56.9] 11/06/2019 No    Septic shock [A41.9, R65.21] 11/05/2019 Yes    History of MDR Pseudomonas aeruginosa infection [Z86.19] 11/05/2019 Yes    Leukocytosis [D72.829] 11/05/2019 Yes    Volume overload [E87.70] 11/05/2019 Yes    Anasarca [R60.1] 11/05/2019 Yes    Obstructive uropathy [N13.9] 11/05/2019 Yes     Encephalopathy, toxic [G92] 11/05/2019 Yes    C. difficile colitis [A04.72] 10/11/2019 Yes    Acute on chronic diastolic heart failure [I50.33] 10/03/2019 Yes    Sputum culture positive for Pseudomonas [R84.5] 10/03/2019 Yes     Chronic    Essential hypertension [I10] 09/13/2019 Yes    Hyperkalemia [E87.5] 09/13/2019 Yes    GINETTE (acute kidney injury) [N17.9] 09/13/2019 Yes    Enlarging abdominal aortic aneurysm (AAA) [I71.4] 09/13/2019 Yes    Cholelithiases [K80.20] 09/13/2019 Yes    Bilateral nephrolithiasis [N20.0] 09/13/2019 Yes    Hydroureter, left [N13.4] 09/13/2019 Yes    History of CVA (cerebrovascular accident) without residual deficits [Z86.73] 09/13/2019 Not Applicable    AAA (abdominal aortic aneurysm) without rupture [I71.4] 09/13/2019 Yes    Pseudomonas urinary tract infection [N39.0, B96.5] 09/13/2019 Yes    Coronary artery disease [I25.10] 09/12/2019 Yes    Cardiogenic pulmonary edema [I50.1] 09/12/2019 Yes    Anemia, chronic disease [D63.8] 09/12/2019 Yes    Ventilator dependence [Z99.11] 09/12/2019 Not Applicable    Hemiparesis affecting dominant side as late effect of stroke [I69.359] 10/14/2017 Not Applicable    PEG (percutaneous endoscopic gastrostomy) status [Z93.1] 03/21/2017 Not Applicable    Acquired hypothyroidism [E03.9] 03/21/2017 Yes    Chronic respiratory failure with hypoxia [J96.11] 12/05/2013 Yes    Tracheostomy in place [Z93.0] 12/05/2013 Not Applicable    Functional quadriplegia [R53.2] 12/05/2013 Yes      Problems Resolved During this Admission:    Diagnosis Date Noted Date Resolved POA    Lactic acidosis [E87.2] 11/05/2019 11/06/2019 Yes       VTE Risk Mitigation (From admission, onward)         Ordered     enoxaparin injection 40 mg  Every 24 hours (non-standard times)      11/05/19 2305     IP VTE HIGH RISK PATIENT  Once      11/05/19 2014              Patient was seen and examined by Dr. Garcia Jimenez.       Naomi Reis NP  Neurology  [facility]    I, Dr. Garcia Jimenez, have personally seen and examined the patient with my advanced provider and agree with above. I personally did a focused exam, and reviewed all necessary clinical information. I discussed my management plan with my NP and agree with above. At baseline per nursing staff. Recommend LP.

## 2019-11-10 NOTE — PLAN OF CARE
11/10/19 0824   Patient Assessment/Suction   Level of Consciousness (AVPU) responds to pain   Respiratory Effort Unlabored;Normal   Expansion/Accessory Muscles/Retractions no use of accessory muscles;no retractions   All Lung Fields Breath Sounds coarse   Rhythm/Pattern, Respiratory unlabored;pattern regular;depth regular   Cough Frequency with stimulation   Cough Type assisted   Suction Method tracheal   $ Suction Charges Inline Suction Procedure Stat Charge   Secretions Amount small   Secretions Color yellow;white   Secretions Characteristics thick   PRE-TX-O2   O2 Device (Oxygen Therapy) ventilator   $ Is the patient on Low Flow Oxygen? Yes   Oxygen Concentration (%) 32   SpO2 96 %   Pulse Oximetry Type Continuous   $ Pulse Oximetry - Multiple Charge Pulse Oximetry - Multiple   Probe Placed On (Pulse Ox) Right:;hand   Oximetry Probe Site Assessed   Pulse 92   Resp (!) 25   Aerosol Therapy   $ Aerosol Therapy Charges Aerosol Treatment   Daily Review of Necessity (SVN) completed   Respiratory Treatment Status (SVN) given   Treatment Route (SVN) in-line   Patient Position (SVN) semi-Arredondo's   Post Treatment Assessment (SVN) increased aeration   Signs of Intolerance (SVN) none   Breath Sounds Post-Respiratory Treatment   Throughout All Fields Post-Treatment All Fields   Throughout All Fields Post-Treatment aeration increased   Post-treatment Heart Rate (beats/min) 95   Post-treatment Resp Rate (breaths/min) 22   Vent Select   Conventional Vent Y   $ Ventilator Subsequent 1   Charged w/in last 24h YES   Preset Conventional Ventilator Settings   Vent ID [id number]   Vent Type    Ventilation Type VC   Vent Mode A/C   Humidity Heated wire   Set Rate 22 bmp   Vt Set 450 mL   PEEP/CPAP 5 cmH20   Pressure Support 0 cmH20   Waveform RAMP   Peak Flow 60 L/min   Plateau Set/Insp. Hold (sec) 0   Trigger Sensitivity Flow/I-Trigger 2 L/min   Patient Ventilator Parameters   Resp Rate Total 22 br/min   Peak Airway Pressure 35  cmH2O   Mean Airway Pressure 14 cmH20   Plateau Pressure 27 cmH20   Exhaled Vt 488 mL   Total Ve 10.7 mL   I:E Ratio Measured 1:2.30   Tubing ID (mm) 8 mm   Tube Type Trach   Conventional Ventilator Alarms   Alarms On Y   Resp Rate High Alarm 45 br/min   Press High Alarm 60 cmH2O   Apnea Rate 10   Apnea Volume (mL) 0 mL   Apnea Oxygen Concentration  100   Apnea Flow Rate (L/min) 44   T Apnea 20 sec(s)

## 2019-11-10 NOTE — ASSESSMENT & PLAN NOTE
EEG done with seizure activity. Repeat EEGs being done given persistent mouth movements  Neurology consulted and following  Vimpat and Keppra  Seizure precautions

## 2019-11-10 NOTE — SUBJECTIVE & OBJECTIVE
Interval History: Patient unable to provide history secondary to encephalopathy.  Afebrile.  EEG being obtained due to persistent mouth movement and concern for seizure activity.    Review of Systems   Unable to perform ROS: Mental status change     Objective:     Vital Signs (Most Recent):  Temp: 98.6 °F (37 °C) (11/09/19 1903)  Pulse: 95 (11/09/19 2000)  Resp: 18 (11/09/19 2000)  BP: (!) 91/54 (11/09/19 2000)  SpO2: 98 % (11/09/19 2000) Vital Signs (24h Range):  Temp:  [98 °F (36.7 °C)-98.9 °F (37.2 °C)] 98.6 °F (37 °C)  Pulse:  [] 95  Resp:  [10-29] 18  SpO2:  [94 %-100 %] 98 %  BP: ()/(51-66) 91/54  Arterial Line BP: ()/(47-61) 104/55     Weight: 132 kg (291 lb 0.1 oz)  Body mass index is 39.47 kg/m².    Intake/Output Summary (Last 24 hours) at 11/9/2019 2019  Last data filed at 11/9/2019 1715  Gross per 24 hour   Intake 2820.39 ml   Output 1775 ml   Net 1045.39 ml      Physical Exam   Constitutional: He appears well-developed. No distress.   HENT:   Head: Normocephalic and atraumatic.   Mouth/Throat: Oropharynx is clear and moist.   Eyes: Pupils are equal, round, and reactive to light. EOM are normal.   Neck: Normal range of motion.   Cardiovascular: Regular rhythm.   No murmur heard.  Pulmonary/Chest: Effort normal and breath sounds normal. He has no wheezes.   Abdominal: Soft. He exhibits no distension. There is no tenderness. There is no rebound and no guarding.   peg   Genitourinary:   Genitourinary Comments: tay   Musculoskeletal: Normal range of motion.   Neurological:   Does not open eyes to voice or physical stimuli though does continue to try and turn his head when I attempt to open his eye lids    Persistent mouth movements   Skin: No rash noted.   Fifth toe gangrene   Psychiatric:   Unable to assess   Nursing note and vitals reviewed.      Significant Labs:   CBC:   Recent Labs   Lab 11/08/19  0453 11/09/19  0434   WBC 16.69* 11.48   HGB 9.3* 9.3*   HCT 29.1* 29.8*   * 120*      CMP:   Recent Labs   Lab 11/08/19  0453 11/08/19  2300 11/09/19  0434   *  134* 138 136  136   K 3.8  3.8 3.9 4.0  4.0     100 101 102  102   CO2 24  24 25 26  26   *  116* 165* 95  95   BUN 53*  53* 51* 53*  53*   CREATININE 2.7*  2.7* 2.5* 2.7*  2.7*   CALCIUM 8.4*  8.4* 8.2* 8.6*  8.6*   PROT 6.9 6.5 6.9   ALBUMIN 2.2*  2.2* 2.1* 2.2*  2.2*   BILITOT 1.1* 1.1* 1.0   ALKPHOS 77 84 101   AST 19 18 21   ALT 15 14 14   ANIONGAP 10  10 12 8  8   EGFRNONAA 21.3*  21.3* 23.4* 21.3*  21.3*       Significant Imaging: tele personally reviewed and NSR

## 2019-11-10 NOTE — PROGRESS NOTES
Progress Note  Infectious Disease    Admit Date: 11/5/2019   LOS: 5 days    Follow-up For:  Septic shock.     SUBJECTIVE:     11/7   Unable to obtain. Still on pressors. Renal function unchanged. NO bowel movements. No other events. Bld cx just back today with GNR. According to lab, appears to be a Pseudomonas     11/8 Still on pressors but trying to get off. Off sedation and minimally responsive. EEG going today. Added Ampicillin for meningitis coverage. Sputum cx with Serratia, Proteus - Sens to Meropenem, Pseudomonas Sens to Meropenem. Blood cx repeat negative, no diarrhea,   11/09/2019 I discussed with Dr. Antoine yesterday.  Given the history of seizures, we were both concerned about using any antibiotics that may lower seizure threshold.  We are trying to stay away from antibiotics like cefepime, meropenem, levofloxacin.  After discussing it together, I changed meropenem to Zosyn.  Patient is afebrile today.  Patient is very sensitive to pressors; nurse is working on weaning them off, but may not be doable today.  He continues to have yellow whitish secretions.  WBC is markedly improved  44.92-- 31.53-- 16.69- 11.48.  It makes me wonder if some of the WBC elevation could be due to stress, seizures, other than due to infection.    11/10 - still on low dose pressors. Diarrhea started and rectal tube placed         Antibiotics (From admission, onward)    Start     Stop Route Frequency Ordered    11/08/19 2245  piperacillin-tazobactam 4.5 g in dextrose 5 % 100 mL IVPB (ready to mix system)      -- IV Every 8 hours (non-standard times) 11/08/19 2132    11/06/19 0915  mupirocin 2 % ointment  (MRSA Decolonization Orders [facility])      11/11 0859 Nasl 2 times daily 11/06/19 0805    11/05/19 1715  vancomycin oral suspension 250 mg      -- Oral 4 times daily 11/05/19 1607            OBJECTIVE:     Vital Signs (Most Recent)  Temp: 98 °F (36.7 °C) (11/10/19 0800)  Pulse: 96 (11/10/19 0900)  Resp: (!) 22 (11/10/19 0900)  BP: (!)  99/52 (11/10/19 0800)  SpO2: 95 % (11/10/19 0900)    Temperature Range Min/Max (Last 24H):  Temp:  [98 °F (36.7 °C)-98.9 °F (37.2 °C)]     I & O (Last 24H):    Intake/Output Summary (Last 24 hours) at 11/10/2019 0902  Last data filed at 11/9/2019 2100  Gross per 24 hour   Intake 2351.47 ml   Output 900 ml   Net 1451.47 ml       Physical Exam:  General: well developed, well nourished, moderately obese, minimally responding to voice.  HENT: Head:normocephalic, atraumatic. Nose: Nares normal. Septum midline.     Neck: tracheostomy and ventilated   Lungs:   diminished at bases and  coarse bs   Cardiovascular: Heart: RRR no murmur, click, rub or gallop, no click . Chest Wall: no abnormalities .  Extremities: no cyanosis or edema, or clubbing. Pulses: 2+ and symmetric.  Onychomycosis  Abdomen/Rectal: Abdomen: soft and nondistended.  PEG present, some granulation tissue surrounding.  Hypoactive bs    Rectal: not examined  Genitalia: no abnormalities, tay catheter with clear urine  Skin: Skin color, texture, turgor normal. No rashes or lesions  Musculoskeletal:muscle wasting. myoclonic jerking, no purposeful movement s      Lines/Drains:Left inguinal catheter       Laboratory:  CBC  Recent Labs   Lab 11/10/19  0407   WBC 9.67   RBC 2.81*   HGB 8.0*   HCT 25.9*   *   MCV 92   MCH 28.5   MCHC 30.9*     BMP  Recent Labs   Lab 11/10/19  0407     139   K 4.0  4.0     105   CO2 26  26   BUN 57*  57*   CREATININE 2.8*  2.8*   CALCIUM 8.3*  8.3*     Microbiology Results (last 7 days)     Procedure Component Value Units Date/Time    Blood culture [[id number]] Collected:  11/06/19 1148    Order Status:  Completed Specimen:  Blood from Line, Arterial, Right Updated:  11/10/19 0625     Blood Culture, Routine Gram stain aer bottle: Gram negative rods      Results called to and read back by:Simone Nice RN-3ICU;  11/10/2019        06:25 CJD    Urine Culture High Risk [[id number]] Collected:  11/10/19 0420     Order Status:  Sent Specimen:  Urine, Catheterized Updated:  11/10/19 0444    Stool culture [[id number]] Collected:  11/10/19 0420    Order Status:  Sent Specimen:  Stool Updated:  11/10/19 0444    Urine Culture High Risk [[id number]]  (Abnormal)  (Susceptibility) Collected:  11/05/19 1926    Order Status:  Completed Specimen:  Urine, Catheterized Updated:  11/09/19 0909     Urine Culture, Routine PSEUDOMONAS AERUGINOSA  > 100,000 cfu/ml        CANDIDA TROPICALIS  > 100,000 cfu/ml  Treatment of asymptomatic candiduria is not recommended (except for   specific populations). Candida isolated in the urine typically   represents colonization. If an indwelling urinary catheter is   present it should be removed or replaced.      Narrative:       Indicated criteria for high risk culture:->Other  Other (specify):->sepsis    Urine culture [[id number]]  (Abnormal)  (Susceptibility) Collected:  11/05/19 1510    Order Status:  Completed Specimen:  Urine Updated:  11/09/19 0906     Urine Culture, Routine PSEUDOMONAS AERUGINOSA  > 100,000 cfu/ml        CANDIDA TROPICALIS  > 100,000 cfu/ml  Treatment of asymptomatic candiduria is not recommended (except for   specific populations). Candida isolated in the urine typically   represents colonization. If an indwelling urinary catheter is   present it should be removed or replaced.      Narrative:       Preferred Collection Type->Urine, Clean Catch  Specimen Source->Urine    Blood Culture #2 **CANNOT BE ORDERED STAT** [[id number]]  (Abnormal)  (Susceptibility) Collected:  11/05/19 1404    Order Status:  Completed Specimen:  Blood from Peripheral, Antecubital, Left Updated:  11/08/19 1308     Blood Culture, Routine Gram stain aer bottle:Gram negative rods      Called Ana Saavedra RN M3 @ 2015 MS 11/06/19      Gram stain reynaldo bottle: Gram positive cocci 11/07/2019  10:47       See previous notification     Comment: Gram stain aer bottle:Gram negative rods  Called Ana Saavedra RN M3 @  2015 MS 11/06/19, 11/06/2019 20:17           PSEUDOMONAS AERUGINOSA      COAGULASE-NEGATIVE STAPHYLOCOCCUS SPECIES  Organism is a probable contaminant      Narrative:       Gram stain aer bottle:Gram negative rods  Called Ana Saavedra RN M3 @ 2015 MS 11/06/19, 11/06/2019 20:17    Culture, Respiratory with Gram Stain [[id number]]  (Abnormal)  (Susceptibility) Collected:  11/06/19 0420    Order Status:  Completed Specimen:  Respiratory from Sputum Updated:  11/08/19 0911     Respiratory Culture Reduced normal respiratory gabriela      SERRATIA MARCESCENS  Many        PROTEUS MIRABILIS ESBL  Moderate  Results called to and read back by: Martina Murillo RN on M3 re: ESBL  by DRP 11/08/2019  09:10       Gram Stain (Respiratory) >10 epithelial cells per low power field     Gram Stain (Respiratory) Moderate WBC's     Gram Stain (Respiratory) Many Gram positive rods resembling diphtheroids     Gram Stain (Respiratory) Few Gram negative rods    Blood Culture #1 **CANNOT BE ORDERED STAT** [[id number]]  (Abnormal) Collected:  11/05/19 1340    Order Status:  Completed Specimen:  Blood from Peripheral, Upper Arm, Right Updated:  11/08/19 0745     Blood Culture, Routine Gram stain reynaldo bottle: Gram positive cocci      Results called to and read back by: Cali Bustos RN on M3 re:gram pos       cocci in blood cx 11/06/2019  09:58  by DRP      Gram stain aer bottle: Gram negative rods      Results called to and read back by: Martina Murillo RN on M3, RE: GNR in       aerobic bottle  11/07/2019  08:53 vcb      COAGULASE-NEGATIVE STAPHYLOCOCCUS SPECIES  Multiple morphotypes - probable contaminants        PSEUDOMONAS AERUGINOSA  For susceptibility see order #[id number]      Culture, Respiratory with Gram Stain [[id number]]  (Abnormal)  (Susceptibility) Collected:  11/05/19 1444    Order Status:  Completed Specimen:  Respiratory from Tracheal Aspirate Updated:  11/07/19 0838     Respiratory Culture Reduced normal respiratory gabriela      SERRATIA  MARCESCENS  Many       Gram Stain (Respiratory) <10 epithelial cells per low power field.     Gram Stain (Respiratory) Few WBC's     Gram Stain (Respiratory) Moderate Gram positive rods resembling diphtheroids     Gram Stain (Respiratory) Rare Gram negative rods    Culture, Respiratory with Gram Stain [[id number]]     Order Status:  No result Specimen:  Respiratory     Blood culture [[id number]]     Order Status:  Canceled Specimen:  Blood     Clostridium difficile EIA [[id number]]     Order Status:  Canceled Specimen:  Stool           Diagnostic Results:     CT head 11/06/2019 no acute abnormalities.    CT pelvis 11/06/2019 Stable nonobstructing bilateral renal stones.  Stable left hydronephrosis and hydroureter to the level of the ureteral vesicle junction  Cholelithiasis  Stable infrarenal abdominal aortic aneurysm  Moderate degenerative changes of the spine and hips  Stable atelectasis in lung bases      Chest x-ray 11/05/2019Pulmonary hypoinflation, with scattered predominantly interstitial opacities in both lungs, right greater than left, having similar appearance to the prior exam of 10/10/2019.     CXR 11/07  1.  Unchanged decreased lung volumes with elevation of the right hemidiaphragm.  2. No significant change in bilateral patchy ground-glass opacities, right greater than left.      ASSESSMENT/PLAN:     Active Hospital Problems    Diagnosis  POA    *Bacteremia due to Pseudomonas [R78.81]  Yes    Pneumonia due to Serratia marcescens [J15.6]  Yes    Proteus pneumonia [J15.6]  Yes    Seizure [R56.9]  No    Septic shock [A41.9, R65.21]  Yes    History of MDR Pseudomonas aeruginosa infection [Z86.19]  Yes    Leukocytosis [D72.829]  Yes    Volume overload [E87.70]  Yes    Anasarca [R60.1]  Yes    Obstructive uropathy [N13.9]  Yes    Encephalopathy, toxic [G92]  Yes    C. difficile colitis [A04.72]  Yes    Acute on chronic diastolic heart failure [I50.33]  Yes    Sputum culture positive for  Pseudomonas [R84.5]  Yes     Chronic    Essential hypertension [I10]  Yes    Hyperkalemia [E87.5]  Yes    GINETTE (acute kidney injury) [N17.9]  Yes    Enlarging abdominal aortic aneurysm (AAA) [I71.4]  Yes    Cholelithiases [K80.20]  Yes    Bilateral nephrolithiasis [N20.0]  Yes    Hydroureter, left [N13.4]  Yes    History of CVA (cerebrovascular accident) without residual deficits [Z86.73]  Not Applicable    AAA (abdominal aortic aneurysm) without rupture [I71.4]  Yes    Pseudomonas urinary tract infection [N39.0, B96.5]  Yes    Coronary artery disease [I25.10]  Yes    Cardiogenic pulmonary edema [I50.1]  Yes    Anemia, chronic disease [D63.8]  Yes    Ventilator dependence [Z99.11]  Not Applicable    Hemiparesis affecting dominant side as late effect of stroke [I69.359]  Not Applicable    PEG (percutaneous endoscopic gastrostomy) status [Z93.1]  Not Applicable    Acquired hypothyroidism [E03.9]  Yes    Chronic respiratory failure with hypoxia [J96.11]  Yes    Tracheostomy in place [Z93.0]  Not Applicable    Functional quadriplegia [R53.2]  Yes      Resolved Hospital Problems    Diagnosis Date Resolved POA    Lactic acidosis [E87.2] 11/06/2019 Yes       Severe septic shock, Pseudomonas UTI and bacteremia, improving   Complicated urinary tract infection , bilateral nonobstructing renal stones  Serratia and Proteus ESBL + sputum with stable CXR , move monitor chest x-ray and sputum production  Candida tropicalis candiduria  Coag-negative staph in blood cultures 11/05, contaminant  Recent Cdiff colitis, has some minimal loose stools  Chronic respiratory failure , tracheostomy, PEG,  CVA with functional quadraplegia   ARF  Encephalopathy , seizures.  This patient is high risk for life-threatening deterioration and death secondary to above comorbidities and need for IV treatment.  Poor prognosis.  Acute critical care 35 min      PLAN:  - Continue Zosyn ( less risk of decreasing seizure threshold)   - Do  not treat sputum cultures at this point.   - continue po vancomycin for cdiff prophylaxis   - change central line access when able   - discussed with nursing

## 2019-11-10 NOTE — ASSESSMENT & PLAN NOTE
Patient has got a trach and is vent dependent  Culture sputum from previously grew multidrug resistant Pseudomonas.

## 2019-11-10 NOTE — PROGRESS NOTES
-wears CPAP  -may improve with weight loss Progress Note  Nephrology    Consult Requested By: Yamileth Stuart MD    Reason for Consult: GINETTE    SUBJECTIVE:     History of Present Illness: 80 y/o male patient sent from NH on 11/5, admitted w/septic shock, encephalopathy.  Tay changed 11/6 reportedly with purulent urine.  Concern for obstructed uropathy with infection which also could be causing his GINETTE.  Urology consulted per primary.  Renal function not improving, nephrology consulted for co-management, GINETTE.    11/7  Pt seen and examined.  He had significant hyperkalemia on admit, this has now improved.  Scr 2.5 initially, now up to 2.8.  At last discharge in October, Scr was 1.0.  He is non-oliguric, UOP 5L--polyuria after catheter was exchanged.  Hyponatremic, was 127 yesterday, better today at 131.  CT abd/pelvis showed mild L hydronephrosis, urology is consulted as well.  11/08  Na+ improved.  Renal function stable, on IVF.  UOP only recorded for one shift--850cc.  Electrolytes improved.  NAD noted, on vent.  11/09  Na+ now wnl, Scr bumped to 2.7.  NS at 100cc/hr.  Not responding to verbal stimuli.  UOP 1.8L.  11/10  Scr 2.8, was 2.9 on second lab draw yesterday.  On IVF, CXR looks worse, he is hypotensive.  On vent, NAD noted.  UOP 975cc recorded on one shift only.    Assessment/plan:    1.  GINETTE 2/2 hypotension d/t septic shock, infection, chronic obstruction--  CT results as above.  Treat infection per ID recommendations.  Dose all medications for crcl 10-50.      Renal panel daily. Need I/O for all shifts.  Keep NS 100cc/hour.    Maintain tay, monitor UOP.  Not a dialysis candidate  2.  Hyponatremia--improving.  urine Na+ and osmolality done.  3.  Hyperkalemia--resolved, monitor.  4.  HTN, now hypotensive--on pressors.  Keep MAP above 55    Past Medical History:   Diagnosis Date    AAA (abdominal aortic aneurysm)     Anemia     BPH (benign prostatic hyperplasia)     CHF (congestive heart failure)     COPD (chronic obstructive pulmonary  disease)     Coronary artery disease     Dementia     Dementia     Depression     GERD (gastroesophageal reflux disease)     Hyperlipidemia     Hypertension     Hypothyroid     Renal disorder     Respiratory failure, chronic     Ventilator dependence      Past Surgical History:   Procedure Laterality Date    ABDOMINAL SURGERY      CARDIAC SURGERY  1999    GASTROSTOMY TUBE PLACEMENT      SPINE SURGERY      TRACHEOSTOMY TUBE PLACEMENT       History reviewed. No pertinent family history.  Social History     Tobacco Use    Smoking status: Former Smoker    Smokeless tobacco: Never Used   Substance Use Topics    Alcohol use: No    Drug use: No       Review of patient's allergies indicates:   Allergen Reactions    Codeine Other (See Comments)        Review of Systems:  Unable to obtain 2/2 encephalopathy    OBJECTIVE:     Vital Signs Range (Last 24H):  Temp:  [98 °F (36.7 °C)-98.9 °F (37.2 °C)]   Pulse:  []   Resp:  [10-29]   BP: ()/(46-73)   SpO2:  [95 %-100 %]   Arterial Line BP: ()/(47-59)     Physical Exam:  General- NAD noted  HEENT- ncat, trache present  Neck- supple  CV- Regular rate and rhythm  Resp- upper lobes cta, on vent  GI- abd soft  Extrem- No cyanosis, clubbing, edema, quadraplegic  Derm- skin w/d  Neuro-  Not interactive    Body mass index is 39.47 kg/m².    Laboratory:  CBC:   Recent Labs   Lab 11/10/19  0407   WBC 9.67   RBC 2.81*   HGB 8.0*   HCT 25.9*   *   MCV 92   MCH 28.5   MCHC 30.9*     CMP:   Recent Labs   Lab 11/10/19  0407     101   CALCIUM 8.3*  8.3*   ALBUMIN 2.1*  2.1*   PROT 6.3     139   K 4.0  4.0   CO2 26  26     105   BUN 57*  57*   CREATININE 2.8*  2.8*   ALKPHOS 118   ALT 14   AST 18   BILITOT 1.0       Diagnostic Results:  Labs: Reviewed      ASSESSMENT/PLAN:     Active Hospital Problems    Diagnosis  POA    *Bacteremia due to Pseudomonas [R78.81]  Yes    Pneumonia due to Serratia marcescens [J15.6]  Yes     Proteus pneumonia [J15.6]  Yes    Seizure [R56.9]  No    Septic shock [A41.9, R65.21]  Yes    History of MDR Pseudomonas aeruginosa infection [Z86.19]  Yes    Leukocytosis [D72.829]  Yes    Volume overload [E87.70]  Yes    Anasarca [R60.1]  Yes    Obstructive uropathy [N13.9]  Yes    Encephalopathy, toxic [G92]  Yes    C. difficile colitis [A04.72]  Yes    Acute on chronic diastolic heart failure [I50.33]  Yes    Sputum culture positive for Pseudomonas [R84.5]  Yes     Chronic    Essential hypertension [I10]  Yes    Hyperkalemia [E87.5]  Yes    GINETTE (acute kidney injury) [N17.9]  Yes    Enlarging abdominal aortic aneurysm (AAA) [I71.4]  Yes    Cholelithiases [K80.20]  Yes    Bilateral nephrolithiasis [N20.0]  Yes    Hydroureter, left [N13.4]  Yes    History of CVA (cerebrovascular accident) without residual deficits [Z86.73]  Not Applicable    AAA (abdominal aortic aneurysm) without rupture [I71.4]  Yes    Pseudomonas urinary tract infection [N39.0, B96.5]  Yes    Coronary artery disease [I25.10]  Yes    Cardiogenic pulmonary edema [I50.1]  Yes    Anemia, chronic disease [D63.8]  Yes    Ventilator dependence [Z99.11]  Not Applicable    Hemiparesis affecting dominant side as late effect of stroke [I69.359]  Not Applicable    PEG (percutaneous endoscopic gastrostomy) status [Z93.1]  Not Applicable    Acquired hypothyroidism [E03.9]  Yes    Chronic respiratory failure with hypoxia [J96.11]  Yes    Tracheostomy in place [Z93.0]  Not Applicable    Functional quadriplegia [R53.2]  Yes      Resolved Hospital Problems    Diagnosis Date Resolved POA    Lactic acidosis [E87.2] 11/06/2019 Yes         Thank you for allowing us to participate in the care of your patient. We will follow the patient and provide recommendations as needed.      Time spent seeing patient( greater than 1/2 spent in direct contact) :

## 2019-11-10 NOTE — PLAN OF CARE
11/09/19 1903   Patient Assessment/Suction   Level of Consciousness (AVPU) responds to pain   Respiratory Effort Normal;Unlabored   Expansion/Accessory Muscles/Retractions no use of accessory muscles   All Lung Fields Breath Sounds coarse   PRE-TX-O2   O2 Device (Oxygen Therapy) ventilator   Oxygen Concentration (%) 32   SpO2 98 %   Pulse Oximetry Type Continuous   Pulse 92   Resp (!) 22   Aerosol Therapy   $ Aerosol Therapy Charges Aerosol Treatment   Respiratory Treatment Status (SVN) given   Treatment Route (SVN) in-line   Patient Position (SVN) semi-Arredondo's   Post Treatment Assessment (SVN) breath sounds improved   Signs of Intolerance (SVN) none   Breath Sounds Post-Respiratory Treatment   Throughout All Fields Post-Treatment All Fields   Throughout All Fields Post-Treatment coarse   Post-treatment Heart Rate (beats/min) 92   Post-treatment Resp Rate (breaths/min) 23   Wound Care   $ Wound Care Tech Time 15 min   Area of Concern Neck under tracheostomy   Skin Color/Characteristics without discoloration   Skin Temperature warm        Surgical Airway Portex Cuffed;Fenestrated   No Placement Date or Time found.   Present Prior to Hospital Arrival?: Yes  Inserted by: Present Prior to Hospital Arrival  Type: Tracheostomy  Brand: Portex  Airway Device Size: 8.0  Style: Cuffed;Fenestrated   Cuff Pressure   (mlt)   Cuff Inflation? Inflated   Status Secured   Site Assessment Clean;Dry;No bleeding;No drainage   Ties Assessment Clean;Dry;Intact   Vent Select   Conventional Vent Y   Charged w/in last 24h YES   Preset Conventional Ventilator Settings   Vent ID [id number]   Vent Type    Ventilation Type VC   Vent Mode A/C   Humidity Heated wire   Set Rate 22 bmp   Vt Set 450 mL   PEEP/CPAP 5 cmH20   Pressure Support 0 cmH20   Waveform RAMP   Peak Flow 60 L/min   Plateau Set/Insp. Hold (sec) 0   Trigger Sensitivity Flow/I-Trigger 2 L/min   Patient Ventilator Parameters   Resp Rate Total 22 br/min   Peak Airway Pressure 34  cmH2O   Mean Airway Pressure 14 cmH20   Plateau Pressure 27 cmH20   Exhaled Vt 482 mL   Total Ve 10.6 mL   I:E Ratio Measured 1:2.3   Tubing ID (mm) 8 mm   Tube Type Trach   Conventional Ventilator Alarms   Alarms On Y   Resp Rate High Alarm 45 br/min   Press High Alarm 60 cmH2O   Apnea Rate 10   Apnea Volume (mL) 0 mL   Apnea Oxygen Concentration  100   Apnea Flow Rate (L/min) 44   T Apnea 20 sec(s)   Ready to Wean/Extubation Screen   FIO2<=50 (chart decimal) 0.32   MV<16L (chart vol.) 10.6   PEEP <=8 (chart #) 5   Ready to Wean Parameters   F/VT Ratio<105 (RSBI) (!) 45.64

## 2019-11-10 NOTE — PROGRESS NOTES
[facility]  Adult Nutrition   Progress Note (Follow-Up)    SUMMARY     Recommendations  Recommendation/Intervention: Recommend increasing TF goal rate to 35ml/hr to meet calorie needs.   Goals: Patient will tolerate TF at goal rate  Nutrition Goal Status: progressing towards goal  Communication of RD Recs: reviewed with RN    Reason for Assessment     Follow up    Nutrition Risk Screen    Nutrition Risk Screen: tube feeding or parenteral nutrition       Wound 11/06/19 2000 Non pressure chronic ulcer distal Toe, fifth-Wound Image: (pictures taken)       Wound 11/06/19 1430 Skin Tear Penis-Wound Image: (pictures   on admit  meplex maintained  turning  bed  in pro)       Pressure Injury 11/06/19 1430  Buttocks -Wound Image: Images linked  MST Score: 2  Have you recently lost weight without trying?: Unsure  Weight loss score: 2  Have you been eating poorly because of a decreased appetite?: No  Appetite score: 0     Anthropometrics    Temp: 98.2 °F (36.8 °C)  Height Method: Stated  Height: 6' (182.9 cm)  Height (inches): 72 in  Weight Method: Bed Scale  Weight: 132 kg (291 lb 0.1 oz)  Weight (lb): 291.01 lb  Ideal Body Weight (IBW), Male: 178 lb  % Ideal Body Weight, Male (lb): 165.47 lb  BMI (Calculated): 39.5  BMI Grade: 35 - 39.9 - obesity - grade II  Additional Documentation: Tetraplegia (Quadriplegia) Ideal Body Weight (IBW) Adjustment    Weight History:  Wt Readings from Last 10 Encounters:   11/07/19 132 kg (291 lb 0.1 oz)   10/14/19 124.8 kg (275 lb 2.2 oz)   09/12/19 (!) 140.5 kg (309 lb 11.9 oz)   09/13/19 (!) 140.2 kg (309 lb)   10/13/17 110.6 kg (243 lb 13.3 oz)   03/27/17 118.8 kg (262 lb)   03/21/17 119 kg (262 lb 5.6 oz)   05/16/14 (!) 137.8 kg (303 lb 12.7 oz)   03/06/14 126 kg (277 lb 12.8 oz)   02/27/14 126.8 kg (279 lb 8 oz)       Lab/Procedures/Meds: Pertinent Labs Reviewed  Clinical Chemistry:  Recent Labs   Lab 11/09/19  0434 11/09/19  2327 11/10/19  0407     136 138 139  139    K 4.0  4.0 4.1 4.0  4.0     102 104 105  105   CO2 26  26 24 26  26   GLU 95  95 94 101  101   BUN 53*  53* 56* 57*  57*   CREATININE 2.7*  2.7* 2.9* 2.8*  2.8*   CALCIUM 8.6*  8.6* 8.3* 8.3*  8.3*   PROT 6.9 6.4 6.3   ALBUMIN 2.2*  2.2* 2.0* 2.1*  2.1*   BILITOT 1.0 0.9 1.0   ALKPHOS 101 106 118   AST 21 19 18   ALT 14 14 14   ANIONGAP 8  8 10 8  8   ESTGFRAFRICA 24.6*  24.6* 22.6* 23.6*  23.6*   EGFRNONAA 21.3*  21.3* 19.5* 20.4*  20.4*   MG 2.1 2.1 2.0   PHOS 3.8  3.8 3.6 3.6  3.6     CBC:   Recent Labs   Lab 11/10/19  0407   WBC 9.67   RBC 2.81*   HGB 8.0*   HCT 25.9*   *   MCV 92   MCH 28.5   MCHC 30.9*     Cardiac Profile:  Recent Labs   Lab 11/05/19  1345   *   TROPONINI 0.034     Inflammatory Labs:  Recent Labs   Lab 11/05/19  1909 11/10/19  0407   CRP 16.69* 16.95*     Thyroid & Parathyroid:  Recent Labs   Lab 11/07/19  0405   TSH 9.980*   FREET4 0.57*       Medications: Pertinent Medications reviewed  Scheduled Meds:   albuterol-ipratropium  3 mL Nebulization Q6H    chlorhexidine  15 mL Mouth/Throat BID    enoxparin  40 mg Subcutaneous Q24H    lacosamide (VIMPAT) IVPB  100 mg Intravenous Q12H    levetiracetam IVPB  500 mg Intravenous Q12H    mupirocin   Nasal BID    pantoprazole  40 mg Oral Daily    piperacillin-tazobactam (ZOSYN) IVPB  4.5 g Intravenous Q8H    vancomycin  250 mg Oral QID     Continuous Infusions:   sodium chloride 0.9% 100 mL/hr at 11/09/19 0701    norepinephrine bitartrate-D5W 0.03 mcg/kg/min (11/10/19 1152)     PRN Meds:.    Estimated/Assessed Needs    Weight Used For Calorie Calculations: 81 kg (178 lb 9.2 oz)(IBW)  Energy Calorie Requirements (kcal): 3992-5522 kcals (22-25 kcal/kg) per IBW  Energy Need Method: Kcal/kg  Protein Requirements:  g (0.6-0.8 g/kg)  Weight Used For Protein Calculations: 133.6 kg (294 lb 8.6 oz)  Fluid Requirements (mL): 1782 or per MD  Estimated Fluid Requirement Method: RDA Method  RDA Method  (mL): 1782       Nutrition Prescription Ordered    Current Nutrition Support Rate Ordered: 30 (ml)  Current Nutrition Support Frequency Ordered: ml/hr    Evaluation of Received Nutrient/Fluid Intake    Enteral Calories (kcal): 1296  Enteral Protein (gm): 58  Enteral (Free Water) Fluid (mL): 523  Lipid Calories (kcals): 0 kcals  Energy Calories Required: meeting needs  Protein Required: not meeting needs  Comments: Pt is tolerating TF at goal rate 30ml/hr. He is still on the vent.   Tolerance: tolerating     Intake/Output Summary (Last 24 hours) at 11/10/2019 1359  Last data filed at 2019 2100  Gross per 24 hour   Intake 2351.47 ml   Output 900 ml   Net 1451.47 ml      % Intake of Estimated Energy Needs: 50 - 75 %  % TF providin - 75 %    Dietitian Rounds Brief    Pt is tolerating TF at goal rate 30ml/hr. He is still on the vent.     Nutrition Risk    Level of Risk/Frequency of Follow-up: high     Monitor and Evaluation    Food and Nutrient Intake: enteral nutrition intake  Food and Nutrient Adminstration: enteral and parenteral nutrition administration  Anthropometric Measurements: weight change  Biochemical Data, Medical Tests and Procedures: glucose/endocrine profile, gastrointestinal profile, electrolyte and renal panel  Nutrition-Focused Physical Findings: overall appearance     Nutrition Follow-Up    RD Follow-up?: Yes

## 2019-11-10 NOTE — PROCEDURES
REASON FOR STUDY: Seizures  DATE OF STUDY: 11/9/2019.  PHYSICIAN REQUESTING/INSTITUTION: Dr. Stuart.  AED: Keppra and Vimpat.  MENTAL STATUS: Comatose.  METHODS:  EEG was done according to the 10/20 international system.  This is a 30 min routine EEG. Bipolar and referential montages were used. Video recording was used during the study.      CLINICAL HISTORY:  79 year old man with unresponsiveness and abnormal mouth activity.     FINDINGS:  EEG overall symmetric with low amplitude continuous polymorphic waveforms.       Background rhythm: Delta dominant while eyes are closed.  PDR: 3-5 Hz   Mental status: Patient is comatose during the study.       Artifact: There is occasional eye movement, lead and EKG artifacts.  There is significant muscle artifact.     Sleep: None.         Epileptiform discharges: None     Activation procedures:  Photic stimulation was performed with no abnormalities seen.    Abnormal activity: None          IMPRESSION: Severe encephalopathy. Significant muscle artifact. No Seizures.

## 2019-11-10 NOTE — PROGRESS NOTES
Progress Note  PULMONARY    Admit Date: 11/5/2019   11/10/2019      SUBJECTIVE:     Nov 9- admitted 11/5/19 -  Vent dependant pt presented septic shock, now vegetative, no c/o.  Nov 10- grim maces to exam,no c/o, not arousing?    PFSH and Allergies reviewed.    OBJECTIVE:     Vitals (Most recent):  Vitals:    11/10/19 0900   BP:    Pulse: 96   Resp: (!) 22   Temp:        Vitals (24 hour range):  Temp:  [98 °F (36.7 °C)-98.9 °F (37.2 °C)]   Pulse:  []   Resp:  [10-29]   BP: ()/(46-73)   SpO2:  [95 %-100 %]   Arterial Line BP: ()/(47-59)       Intake/Output Summary (Last 24 hours) at 11/10/2019 0936  Last data filed at 11/9/2019 2100  Gross per 24 hour   Intake 2351.47 ml   Output 900 ml   Net 1451.47 ml          Physical Exam:  The patient's neuro status (alertness,orientation,cognitive function,motor skills,), pharyngeal exam (oral lesions, hygiene, abn dentition,), Neck (jvd,mass,thyroid,nodes in neck and above/below clavicle),RESPIRATORY(symmetry,effort,fremitus,percussion,auscultation),  Cor(rhythm,heart tones including gallops,perfusion,edema)ABD(distention,hepatic&splenomegaly,tenderness,masses), Skin(rash,cyanosis),Psyc(affect,judgement,).  Exam negative except for these pertinent findings:    Not arousing- but grimaces to exam attempts, chest is symmetric, no distress, normal percussion, normal fremitus and good normal breath sounds  Trach,     Radiographs reviewed: view by direct vision - cxr 11/7 similar to 11/5 and sept 2019 films with right lung > left patchy abn.         Results for orders placed during the hospital encounter of 10/06/17   X-Ray Chest 1 View for PICC_Central line    Narrative A PICC has been placed on the right and ends in the distal superior vena cava. Tracheostomy tube remains in position. The patient has had a prior sternotomy. Cardiac size is within normal limits. There is no pulmonary mass or infiltrate is seen.    Impression  PICC in good position without  pneumothorax. Tracheostomy tube in place. Prior sternotomy.      Electronically signed by: Alexsander Adrian MD  Date:     10/09/17  Time:    15:43    ]    Labs     Recent Labs   Lab 11/10/19  0407   WBC 9.67   HGB 8.0*   HCT 25.9*   *     Recent Labs   Lab 11/09/19  1535  11/10/19  0407   NA  --    < > 139  139   K  --    < > 4.0  4.0   CL  --    < > 105  105   CO2  --    < > 26  26   BUN  --    < > 57*  57*   CREATININE  --    < > 2.8*  2.8*   GLU  --    < > 101  101   CALCIUM  --    < > 8.3*  8.3*   MG  --    < > 2.0   PHOS  --    < > 3.6  3.6   AST  --    < > 18   ALT  --    < > 14   ALKPHOS  --    < > 118   BILITOT  --    < > 1.0   PROT  --    < > 6.3   ALBUMIN  --    < > 2.1*  2.1*   CRP  --   --  16.95*   PROCAL  --   --  6.36*   INR 1.6  --   --     < > = values in this interval not displayed.   No results for input(s): PH, PCO2, PO2, HCO3 in the last 24 hours.  Microbiology Results (last 7 days)     Procedure Component Value Units Date/Time    Blood culture [[id number]] Collected:  11/06/19 1148    Order Status:  Completed Specimen:  Blood from Line, Arterial, Right Updated:  11/10/19 0625     Blood Culture, Routine Gram stain aer bottle: Gram negative rods      Results called to and read back by:Simone Nice RN-3ICU;  11/10/2019        06:25 CJD    Urine Culture High Risk [[id number]] Collected:  11/10/19 0420    Order Status:  Sent Specimen:  Urine, Catheterized Updated:  11/10/19 0444    Stool culture [[id number]] Collected:  11/10/19 0420    Order Status:  Sent Specimen:  Stool Updated:  11/10/19 0444    Urine Culture High Risk [[id number]]  (Abnormal)  (Susceptibility) Collected:  11/05/19 1926    Order Status:  Completed Specimen:  Urine, Catheterized Updated:  11/09/19 0909     Urine Culture, Routine PSEUDOMONAS AERUGINOSA  > 100,000 cfu/ml        CANDIDA TROPICALIS  > 100,000 cfu/ml  Treatment of asymptomatic candiduria is not recommended (except for   specific populations). Candida  isolated in the urine typically   represents colonization. If an indwelling urinary catheter is   present it should be removed or replaced.      Narrative:       Indicated criteria for high risk culture:->Other  Other (specify):->sepsis    Urine culture [[id number]]  (Abnormal)  (Susceptibility) Collected:  11/05/19 1510    Order Status:  Completed Specimen:  Urine Updated:  11/09/19 0906     Urine Culture, Routine PSEUDOMONAS AERUGINOSA  > 100,000 cfu/ml        CANDIDA TROPICALIS  > 100,000 cfu/ml  Treatment of asymptomatic candiduria is not recommended (except for   specific populations). Candida isolated in the urine typically   represents colonization. If an indwelling urinary catheter is   present it should be removed or replaced.      Narrative:       Preferred Collection Type->Urine, Clean Catch  Specimen Source->Urine    Blood Culture #2 **CANNOT BE ORDERED STAT** [[id number]]  (Abnormal)  (Susceptibility) Collected:  11/05/19 1404    Order Status:  Completed Specimen:  Blood from Peripheral, Antecubital, Left Updated:  11/08/19 1308     Blood Culture, Routine Gram stain aer bottle:Gram negative rods      Called Ana Saavedra RN M3 @ 2015 MS 11/06/19      Gram stain reynaldo bottle: Gram positive cocci 11/07/2019  10:47       See previous notification     Comment: Gram stain aer bottle:Gram negative rods  Called Ana Saavedra RN M3 @ 2015 MS 11/06/19, 11/06/2019 20:17           PSEUDOMONAS AERUGINOSA      COAGULASE-NEGATIVE STAPHYLOCOCCUS SPECIES  Organism is a probable contaminant      Narrative:       Gram stain aer bottle:Gram negative rods  Called Ana Saavedra RN M3 @ 2015 MS 11/06/19, 11/06/2019 20:17    Culture, Respiratory with Gram Stain [[id number]]  (Abnormal)  (Susceptibility) Collected:  11/06/19 0420    Order Status:  Completed Specimen:  Respiratory from Sputum Updated:  11/08/19 0911     Respiratory Culture Reduced normal respiratory gabriela      SERRATIA MARCESCENS  Many        PROTEUS  MIRABILIS ESBL  Moderate  Results called to and read back by: Martina Murillo RN on M3 re: ESBL  by BREONNA 11/08/2019  09:10       Gram Stain (Respiratory) >10 epithelial cells per low power field     Gram Stain (Respiratory) Moderate WBC's     Gram Stain (Respiratory) Many Gram positive rods resembling diphtheroids     Gram Stain (Respiratory) Few Gram negative rods    Blood Culture #1 **CANNOT BE ORDERED STAT** [[id number]]  (Abnormal) Collected:  11/05/19 1340    Order Status:  Completed Specimen:  Blood from Peripheral, Upper Arm, Right Updated:  11/08/19 0745     Blood Culture, Routine Gram stain reynaldo bottle: Gram positive cocci      Results called to and read back by: Cali Bustos RN on M3 re:gram pos       cocci in blood cx 11/06/2019  09:58  by DRP      Gram stain aer bottle: Gram negative rods      Results called to and read back by: Martina Murillo RN on M3, RE: GNR in       aerobic bottle  11/07/2019  08:53 vcb      COAGULASE-NEGATIVE STAPHYLOCOCCUS SPECIES  Multiple morphotypes - probable contaminants        PSEUDOMONAS AERUGINOSA  For susceptibility see order #[id number]      Culture, Respiratory with Gram Stain [[id number]]  (Abnormal)  (Susceptibility) Collected:  11/05/19 1444    Order Status:  Completed Specimen:  Respiratory from Tracheal Aspirate Updated:  11/07/19 0838     Respiratory Culture Reduced normal respiratory gabriela      SERRATIA MARCESCENS  Many       Gram Stain (Respiratory) <10 epithelial cells per low power field.     Gram Stain (Respiratory) Few WBC's     Gram Stain (Respiratory) Moderate Gram positive rods resembling diphtheroids     Gram Stain (Respiratory) Rare Gram negative rods    Culture, Respiratory with Gram Stain [[id number]]     Order Status:  No result Specimen:  Respiratory     Blood culture [[id number]]     Order Status:  Canceled Specimen:  Blood     Clostridium difficile EIA [[id number]]     Order Status:  Canceled Specimen:  Stool           Impression:  Active Hospital Problems     Diagnosis  POA    *Bacteremia due to Pseudomonas [R78.81]  Yes    Pneumonia due to Serratia marcescens [J15.6]  Yes    Proteus pneumonia [J15.6]  Yes    Seizure [R56.9]  No    Septic shock [A41.9, R65.21]  Yes    History of MDR Pseudomonas aeruginosa infection [Z86.19]  Yes    Leukocytosis [D72.829]  Yes    Volume overload [E87.70]  Yes    Anasarca [R60.1]  Yes    Obstructive uropathy [N13.9]  Yes    Encephalopathy, toxic [G92]  Yes    C. difficile colitis [A04.72]  Yes    Acute on chronic diastolic heart failure [I50.33]  Yes    Sputum culture positive for Pseudomonas [R84.5]  Yes     Chronic    Essential hypertension [I10]  Yes    Hyperkalemia [E87.5]  Yes    GINETTE (acute kidney injury) [N17.9]  Yes    Enlarging abdominal aortic aneurysm (AAA) [I71.4]  Yes    Cholelithiases [K80.20]  Yes    Bilateral nephrolithiasis [N20.0]  Yes    Hydroureter, left [N13.4]  Yes    History of CVA (cerebrovascular accident) without residual deficits [Z86.73]  Not Applicable    AAA (abdominal aortic aneurysm) without rupture [I71.4]  Yes    Pseudomonas urinary tract infection [N39.0, B96.5]  Yes    Coronary artery disease [I25.10]  Yes    Cardiogenic pulmonary edema [I50.1]  Yes    Anemia, chronic disease [D63.8]  Yes    Ventilator dependence [Z99.11]  Not Applicable    Hemiparesis affecting dominant side as late effect of stroke [I69.359]  Not Applicable    PEG (percutaneous endoscopic gastrostomy) status [Z93.1]  Not Applicable    Acquired hypothyroidism [E03.9]  Yes    Chronic respiratory failure with hypoxia [J96.11]  Yes    Tracheostomy in place [Z93.0]  Not Applicable    Functional quadriplegia [R53.2]  Yes      Resolved Hospital Problems    Diagnosis Date Resolved POA    Lactic acidosis [E87.2] 11/06/2019 Yes               Plan:     Nov 11- serratia and esbl proteus in lungs, merrem to zosyn with sz concerns per id.    Dismal outlook.      Nov 10- resp status stable , low dose pressors  ongoing.  No change.                                .

## 2019-11-10 NOTE — ASSESSMENT & PLAN NOTE
Vasopressor support with Levophed. Still requiring and has been unable to completely wean  Broad IV abx per ID  BCx growing pseudomonas   Urine Cx growing pseudomonas  IR consulted for LP given fevers, seizures, encephalopathy.  Could not be done due to INR 2. Repeat InR also still elevated and IR declined LP. I discussed with Dr. Antoine and Meropenem giving good coverage for meningitis.  He lives at [place] and thus has not been out in the community. Will need to rediscuss coverage with ID now that changed back to zosyn.

## 2019-11-10 NOTE — PROGRESS NOTES
[facility] Medicine  Progress Note    Patient Name: Masood Messina  MRN: [id number]  Patient Class: IP- Inpatient   Admission Date: 11/5/2019  Length of Stay: 4 days  Attending Physician: Yamileth Stuart MD  Primary Care Provider: Jeramie Lombardi MD        Subjective:     Principal Problem:Bacteremia due to Pseudomonas        HPI:  79-year-old nursing home resident([place]) with past medical history of chronic respiratory failure status post trach and PEG due to stroke, CAD status post CABG, hypertension, COPD,BPH presented from [facility] due to altered mental status, high fever and significant leukocytosis.  This is his 3rd admission with the same complaint in a span of 45 days  History mainly from charts, nursing home notes and ER MD and staff  At the moment he is getting p.o. vancomycin at [facility] for C diff colitis  In the emergency room he was found to be severely septic with significant shock and also he was hyperkalemic  His last sputum culture per chart review grew multidrug resistant Pseudomonas  No further information available    Overview/Hospital Course:  Patient admitted with septic shock, encepahalopathy.  Patient admitted to ICU and started on levophed and Broad IV abx.  He is trach dependent.  Pulmonary critical care consulted.  ID consulted.  BCx- 1 bottle growing.  Tay changed 11/6 reportedly with purulent urine.  Concern for obstructed uropathy with infection which also could be causing his GINETTE.  Urology consulted.  Renal function not improving and unclear if this is ATN or the fact that tay wasn't changed until today.  Nephrology consulted.  Patient does not open eyes or respond which I was told is not his baseline.  Discussed with Dr. Kinsey and there is concern for meningitis.  IR consulted for LP but this was deferred as INR is 2.  Continuing po vancomycin as was on this previously for C. Diff. Discussed with Dr. Antoine and will give dose of IV vanc  and IV ampicillin and will review coverage for meningitis as well as other covering infections. Last fever 11/6 at 1430. Reports of intermittent shaking activity.  EEG was done and reported to have seizure activity. Neurology consulted and patient started on keppra and vimpat. Patient is still not waking up or following commands but did try to fight me to keep his eye lids closed 11/7 and I find his pupils more responsive 11/7. Started on propofol. I spoke to his POA and he would not want CPR and thus he has been made DNR but is not comfort care.  11/8 still not responsive.  Renal function worse.  Making facial grimaces and thus video EEG is underway for possible continued seizure activity.  Discussed with Dr. Antoine and Meropenem should provide appropriate meningitis coverage for his possible sources.  11/9 Status is unchanged and not opening eyes or following commands.  Having persistent movements of his mouth.  Seen by Neurology after my visit but per my chart review, this is concerning for further seizure activity and thus repeat EEG obtained.  ID changed meropenem back to zosyn to cover all bases regarding seizure activity though it is not felt this is antibiotic induced.    Interval History: Patient unable to provide history secondary to encephalopathy.  Afebrile.  EEG being obtained due to persistent mouth movement and concern for seizure activity.    Review of Systems   Unable to perform ROS: Mental status change     Objective:     Vital Signs (Most Recent):  Temp: 98.6 °F (37 °C) (11/09/19 1903)  Pulse: 95 (11/09/19 2000)  Resp: 18 (11/09/19 2000)  BP: (!) 91/54 (11/09/19 2000)  SpO2: 98 % (11/09/19 2000) Vital Signs (24h Range):  Temp:  [98 °F (36.7 °C)-98.9 °F (37.2 °C)] 98.6 °F (37 °C)  Pulse:  [] 95  Resp:  [10-29] 18  SpO2:  [94 %-100 %] 98 %  BP: ()/(51-66) 91/54  Arterial Line BP: ()/(47-61) 104/55     Weight: 132 kg (291 lb 0.1 oz)  Body mass index is 39.47 kg/m².    Intake/Output  Summary (Last 24 hours) at 11/9/2019 2019  Last data filed at 11/9/2019 1715  Gross per 24 hour   Intake 2820.39 ml   Output 1775 ml   Net 1045.39 ml      Physical Exam   Constitutional: He appears well-developed. No distress.   HENT:   Head: Normocephalic and atraumatic.   Mouth/Throat: Oropharynx is clear and moist.   Eyes: Pupils are equal, round, and reactive to light. EOM are normal.   Neck: Normal range of motion.   Cardiovascular: Regular rhythm.   No murmur heard.  Pulmonary/Chest: Effort normal and breath sounds normal. He has no wheezes.   Abdominal: Soft. He exhibits no distension. There is no tenderness. There is no rebound and no guarding.   peg   Genitourinary:   Genitourinary Comments: tay   Musculoskeletal: Normal range of motion.   Neurological:   Does not open eyes to voice or physical stimuli though does continue to try and turn his head when I attempt to open his eye lids    Persistent mouth movements   Skin: No rash noted.   Fifth toe gangrene   Psychiatric:   Unable to assess   Nursing note and vitals reviewed.      Significant Labs:   CBC:   Recent Labs   Lab 11/08/19  0453 11/09/19  0434   WBC 16.69* 11.48   HGB 9.3* 9.3*   HCT 29.1* 29.8*   * 120*     CMP:   Recent Labs   Lab 11/08/19  0453 11/08/19  2300 11/09/19  0434   *  134* 138 136  136   K 3.8  3.8 3.9 4.0  4.0     100 101 102  102   CO2 24  24 25 26  26   *  116* 165* 95  95   BUN 53*  53* 51* 53*  53*   CREATININE 2.7*  2.7* 2.5* 2.7*  2.7*   CALCIUM 8.4*  8.4* 8.2* 8.6*  8.6*   PROT 6.9 6.5 6.9   ALBUMIN 2.2*  2.2* 2.1* 2.2*  2.2*   BILITOT 1.1* 1.1* 1.0   ALKPHOS 77 84 101   AST 19 18 21   ALT 15 14 14   ANIONGAP 10  10 12 8  8   EGFRNONAA 21.3*  21.3* 23.4* 21.3*  21.3*       Significant Imaging: tele personally reviewed and NSR      Assessment/Plan:      * Bacteremia due to Pseudomonas  IV abx per ID      Seizure  EEG done with seizure activity. Repeat EEGs being done given  persistent mouth movements  Neurology consulted and following  Vimpat and Keppra  Seizure precautions       Encephalopathy, toxic  Not improving thus far  Continuing IV abx  CT head done with no acute process  Neurology consulted given seizures      History of MDR Pseudomonas aeruginosa infection  As above      Septic shock  Vasopressor support with Levophed. Still requiring and has been unable to completely wean  Broad IV abx per ID  BCx growing pseudomonas   Urine Cx growing pseudomonas  IR consulted for LP given fevers, seizures, encephalopathy.  Could not be done due to INR 2. Repeat InR also still elevated and IR declined LP. I discussed with Dr. Antoine and Meropenem giving good coverage for meningitis.  He lives at [place] and thus has not been out in the community. Will need to rediscuss coverage with ID now that changed back to zosyn.        C. difficile colitis  At the moment patient is on p.o. vancomycin for C diff colitis  Stool reported as formed per nursing      Acute on chronic diastolic heart failure  Stable issue  Will monitor carefully      Sputum culture positive for Pseudomonas  IV abx per ID  Coarse on lung exam      Hydroureter, left  Seen on CT scan        GINETTE (acute kidney injury)  Acute kidney injury along with hyperkalemia in the background of sepsis/C diff colitis  Concern for obstructive uropathy from tay. Changed 11/6.    Consulting renal as not improving   ATVALENTIN harris in setting of shock      Hyperkalemia  Already received dextrose, insulin in the ER  K improved after treatment   Following daily labs    Essential hypertension  Currently in shock.  Antihypertensives held    Enlarging abdominal aortic aneurysm (AAA)        Pseudomonas urinary tract infection  UCx growing pseudomonas  ID and critical care following  IV abx per ID. Colistin added.  Tay has been changed.      Ventilator dependence        Anemia, chronic disease  Monitoring cell counts  AM labs ordered      Coronary artery  disease  Stable issue      Cardiogenic pulmonary edema        Hemiparesis affecting dominant side as late effect of stroke  Residuals from previous CVA      Acquired hypothyroidism        PEG (percutaneous endoscopic gastrostomy) status  PEG insitu  PEG tube feeds for nutrition      Tracheostomy in place  Pulmonary following for vent management       Chronic respiratory failure with hypoxia  Patient has got a trach and is vent dependent  Culture sputum from previously grew multidrug resistant Pseudomonas.       Functional quadriplegia  Chronic condition.    No acute issues  Air mattress  Turning        VTE Risk Mitigation (From admission, onward)         Ordered     enoxaparin injection 40 mg  Every 24 hours (non-standard times)      11/05/19 2305     IP VTE HIGH RISK PATIENT  Once      11/05/19 2014                      Yamileth Stuart MD  Department [facility]   [facility]

## 2019-11-11 NOTE — ASSESSMENT & PLAN NOTE
EEG done with seizure activity. Repeat EEGs being done given persistent mouth movements.  EEG done 11/9 with no seizure activity seen  Neurology consulted and following  Vimpat and Keppra  Seizure precautions

## 2019-11-11 NOTE — ASSESSMENT & PLAN NOTE
Acute kidney injury along with hyperkalemia in the background of sepsis/C diff colitis  Concern for obstructive uropathy from tay. Changed 11/6.    Consulted renal as was not improving and they are following  ATN likely in setting of shock  Has turned the corner and renal function starting to improve

## 2019-11-11 NOTE — PROGRESS NOTES
[facility] Medicine  Progress Note    Patient Name: Masood Messina  MRN: [id number]  Patient Class: IP- Inpatient   Admission Date: 11/5/2019  Length of Stay: 5 days  Attending Physician: Yamileth Stuart MD  Primary Care Provider: Jeramie Lombardi MD        Subjective:     Principal Problem:Gram-negative bacteremia        HPI:  79-year-old nursing home resident([place]) with past medical history of chronic respiratory failure status post trach and PEG due to stroke, CAD status post CABG, hypertension, COPD,BPH presented from [facility] due to altered mental status, high fever and significant leukocytosis.  This is his 3rd admission with the same complaint in a span of 45 days  History mainly from charts, nursing home notes and ER MD and staff  At the moment he is getting p.o. vancomycin at [facility] for C diff colitis  In the emergency room he was found to be severely septic with significant shock and also he was hyperkalemic  His last sputum culture per chart review grew multidrug resistant Pseudomonas  No further information available    Overview/Hospital Course:  Patient admitted with septic shock, encepahalopathy.  Patient admitted to ICU and started on levophed and Broad IV abx.  He is trach dependent.  Pulmonary critical care consulted.  ID consulted.  BCx- 1 bottle growing.  Tay changed 11/6 reportedly with purulent urine.  Concern for obstructed uropathy with infection which also could be causing his GINETTE.  Urology consulted.  Renal function not improving and unclear if this is ATN or the fact that tay wasn't changed until today.  Nephrology consulted.  Patient does not open eyes or respond which I was told is not his baseline.  Discussed with Dr. Kinsey and there is concern for meningitis.  IR consulted for LP but this was deferred as INR is 2.  Continuing po vancomycin as was on this previously for C. Diff. Discussed with Dr. Antoine and will give dose of IV vanc and  IV ampicillin and will review coverage for meningitis as well as other covering infections. Last fever 11/6 at 1430. Reports of intermittent shaking activity.  EEG was done and reported to have seizure activity. Neurology consulted and patient started on keppra and vimpat. Patient is still not waking up or following commands but did try to fight me to keep his eye lids closed 11/7 and I find his pupils more responsive 11/7. Started on propofol. I spoke to his POA and he would not want CPR and thus he has been made DNR but is not comfort care.  11/8 still not responsive.  Renal function worse.  Making facial grimaces and thus video EEG is underway for possible continued seizure activity.  Discussed with Dr. Antoine and Meropenem should provide appropriate meningitis coverage for his possible sources.  11/9 Status is unchanged and not opening eyes or following commands.  Having persistent movements of his mouth.  Seen by Neurology after my visit but per my chart review, this is concerning for further seizure activity and thus repeat EEG obtained.  ID changed meropenem back to zosyn to cover all bases regarding seizure activity though it is not felt this is antibiotic induced. EEG that was done 11/9 was negative for seizure activity.  11/10 patient does spontaneously open his eyes but not purposefully and is not blinking to threat. Started with loose stool and rectal tube placed.  Stool studies ordered.    Interval History: Encephalopathic and unable to give history.  History supplemented by RN.  Started having loose stool.  Rectal tube placed.    Review of Systems   Unable to perform ROS: Mental status change     Objective:     Vital Signs (Most Recent):  Temp: 98 °F (36.7 °C) (11/10/19 1549)  Pulse: 85 (11/10/19 1911)  Resp: (!) 22 (11/10/19 1911)  BP: (!) 106/49 (11/10/19 1700)  SpO2: 96 % (11/10/19 1911) Vital Signs (24h Range):  Temp:  [98 °F (36.7 °C)-98.8 °F (37.1 °C)] 98 °F (36.7 °C)  Pulse:  [85-96] 85  Resp:   [17-25] 22  SpO2:  [94 %-99 %] 96 %  BP: ()/(40-73) 106/49  Arterial Line BP: ()/(51-62) 125/60     Weight: 132 kg (291 lb 0.1 oz)  Body mass index is 39.47 kg/m².    Intake/Output Summary (Last 24 hours) at 11/10/2019 1916  Last data filed at 11/10/2019 1700  Gross per 24 hour   Intake 1408.82 ml   Output 1100 ml   Net 308.82 ml      Physical Exam   Constitutional: He appears well-developed. No distress.   HENT:   Head: Normocephalic and atraumatic.   Mouth/Throat: Oropharynx is clear and moist.   Trach   Eyes: Pupils are equal, round, and reactive to light. EOM are normal.   Neck: Normal range of motion.   Cardiovascular: Regular rhythm.   No murmur heard.  Pulmonary/Chest: Effort normal and breath sounds normal. He has no wheezes.   Abdominal: Soft. He exhibits no distension. There is no rebound and no guarding.   peg   Genitourinary:   Genitourinary Comments: Rahman  Rectal tube   Musculoskeletal: Normal range of motion.   Neurological:   Quadriplegia at baseline  Spontaneously opening eyes but does not blink to threat  Does not follow commands  Does not respond to voice or physical stimuli   Skin: No rash noted.   Left fifth toe gangrene   Psychiatric: He has a normal mood and affect.   Nursing note and vitals reviewed.      Significant Labs:   CBC:   Recent Labs   Lab 11/09/19  0434 11/10/19  0407   WBC 11.48 9.67   HGB 9.3* 8.0*   HCT 29.8* 25.9*   * 102*     CMP:   Recent Labs   Lab 11/09/19  2327 11/10/19  0407 11/10/19  1509    139  139 140   K 4.1 4.0  4.0 4.1    105  105 107   CO2 24 26  26 26   GLU 94 101  101 104   BUN 56* 57*  57* 58*   CREATININE 2.9* 2.8*  2.8* 2.7*   CALCIUM 8.3* 8.3*  8.3* 8.4*   PROT 6.4 6.3 6.5   ALBUMIN 2.0* 2.1*  2.1* 2.1*   BILITOT 0.9 1.0 1.0   ALKPHOS 106 118 118   AST 19 18 21   ALT 14 14 14   ANIONGAP 10 8  8 7*   EGFRNONAA 19.5* 20.4*  20.4* 21.3*       Significant Imaging: Tele personally reviewed and NSR      Assessment/Plan:       * Gram-negative bacteremia  IV abx per ID  Appears that blood cultures are still growing. Repeating BCX today.  IF continues to grow, we do not have source control though other clinical indicators were initially improving.       Seizure  EEG done with seizure activity. Repeat EEGs being done given persistent mouth movements.  EEG done 11/9 with no seizure activity seen  Neurology consulted and following  Vimpat and Keppra  Seizure precautions       Encephalopathy, toxic  Not improving thus far  Continuing IV abx  CT head done with no acute process  Neurology consulted given seizures      History of MDR Pseudomonas aeruginosa infection  As above      Septic shock  Vasopressor support with Levophed. Still requiring and has been unable to completely wean  Broad IV abx per ID  BCx growing pseudomonas   Urine Cx growing pseudomonas  IR consulted for LP given fevers, seizures, encephalopathy.  Could not be done due to INR 2. Repeat InR also still elevated and IR declined LP. I discussed with Dr. Antoine and Meropenem giving good coverage for meningitis.  He lives at [place] and thus has not been out in the community. Will need to rediscuss coverage with ID now that changed back to zosyn.        C. difficile colitis  At the moment patient is on p.o. vancomycin for C diff colitis  Stool reported as formed per nursing on admission but started being loose 11/10.  Rechecking stool studies.      Acute on chronic diastolic heart failure  Stable issue  Will monitor carefully      Sputum culture positive for Pseudomonas  Coarse on lung exam  Suspecting that these are colonizers on the sputum culture and per ID, not treating as pathogens      Hydroureter, left  Seen on CT scan        GINETTE (acute kidney injury)  Acute kidney injury along with hyperkalemia in the background of sepsis/C diff colitis  Concern for obstructive uropathy from tay. Changed 11/6.    Consulted renal as was not improving and they are following  ATN likely in  setting of shock      Hyperkalemia  Already received dextrose, insulin in the ER  K improved after treatment   Following daily labs    Essential hypertension  Currently in shock.  Antihypertensives held    Enlarging abdominal aortic aneurysm (AAA)        Pseudomonas urinary tract infection  UCx growing pseudomonas  ID and critical care following  IV abx per ID  Rahman has been changed.      Ventilator dependence        Anemia, chronic disease  Monitoring cell counts  AM labs ordered      Coronary artery disease  Stable issue      Cardiogenic pulmonary edema        Hemiparesis affecting dominant side as late effect of stroke  Residuals from previous CVA      Acquired hypothyroidism        PEG (percutaneous endoscopic gastrostomy) status  PEG insitu  PEG tube feeds for nutrition      Tracheostomy in place  Pulmonary following for vent management       Chronic respiratory failure with hypoxia  Patient has got a trach and is vent dependent  Culture sputum from previously grew multidrug resistant Pseudomonas.       Functional quadriplegia  Chronic condition.    No acute issues  Air mattress  Turning        VTE Risk Mitigation (From admission, onward)         Ordered     enoxaparin injection 40 mg  Every 24 hours (non-standard times)      11/05/19 2305     IP VTE HIGH RISK PATIENT  Once      11/05/19 2014                      Yamileth Stuart MD  Department of Hospital Medicine   [facility]

## 2019-11-11 NOTE — ASSESSMENT & PLAN NOTE
Acute kidney injury along with hyperkalemia in the background of sepsis/C diff colitis  Concern for obstructive uropathy from tay. Changed 11/6.    Consulted renal as was not improving and they are following  ATN likely in setting of shock

## 2019-11-11 NOTE — SUBJECTIVE & OBJECTIVE
Interval History: Encephalopathic and unable to give history.  History supplemented by RN.  Started having loose stool.  Rectal tube placed.    Review of Systems   Unable to perform ROS: Mental status change     Objective:     Vital Signs (Most Recent):  Temp: 98 °F (36.7 °C) (11/10/19 1549)  Pulse: 85 (11/10/19 1911)  Resp: (!) 22 (11/10/19 1911)  BP: (!) 106/49 (11/10/19 1700)  SpO2: 96 % (11/10/19 1911) Vital Signs (24h Range):  Temp:  [98 °F (36.7 °C)-98.8 °F (37.1 °C)] 98 °F (36.7 °C)  Pulse:  [85-96] 85  Resp:  [17-25] 22  SpO2:  [94 %-99 %] 96 %  BP: ()/(40-73) 106/49  Arterial Line BP: ()/(51-62) 125/60     Weight: 132 kg (291 lb 0.1 oz)  Body mass index is 39.47 kg/m².    Intake/Output Summary (Last 24 hours) at 11/10/2019 1916  Last data filed at 11/10/2019 1700  Gross per 24 hour   Intake 1408.82 ml   Output 1100 ml   Net 308.82 ml      Physical Exam   Constitutional: He appears well-developed. No distress.   HENT:   Head: Normocephalic and atraumatic.   Mouth/Throat: Oropharynx is clear and moist.   Trach   Eyes: Pupils are equal, round, and reactive to light. EOM are normal.   Neck: Normal range of motion.   Cardiovascular: Regular rhythm.   No murmur heard.  Pulmonary/Chest: Effort normal and breath sounds normal. He has no wheezes.   Abdominal: Soft. He exhibits no distension. There is no rebound and no guarding.   peg   Genitourinary:   Genitourinary Comments: Rahman  Rectal tube   Musculoskeletal: Normal range of motion.   Neurological:   Quadriplegia at baseline  Spontaneously opening eyes but does not blink to threat  Does not follow commands  Does not respond to voice or physical stimuli   Skin: No rash noted.   Left fifth toe gangrene   Psychiatric: He has a normal mood and affect.   Nursing note and vitals reviewed.      Significant Labs:   CBC:   Recent Labs   Lab 11/09/19  0434 11/10/19  0407   WBC 11.48 9.67   HGB 9.3* 8.0*   HCT 29.8* 25.9*   * 102*     CMP:   Recent Labs    Lab 11/09/19  2327 11/10/19  0407 11/10/19  1509    139  139 140   K 4.1 4.0  4.0 4.1    105  105 107   CO2 24 26  26 26   GLU 94 101  101 104   BUN 56* 57*  57* 58*   CREATININE 2.9* 2.8*  2.8* 2.7*   CALCIUM 8.3* 8.3*  8.3* 8.4*   PROT 6.4 6.3 6.5   ALBUMIN 2.0* 2.1*  2.1* 2.1*   BILITOT 0.9 1.0 1.0   ALKPHOS 106 118 118   AST 19 18 21   ALT 14 14 14   ANIONGAP 10 8  8 7*   EGFRNONAA 19.5* 20.4*  20.4* 21.3*       Significant Imaging: Tele personally reviewed and NSR

## 2019-11-11 NOTE — ASSESSMENT & PLAN NOTE
UCx growing pseudomonas 11/5  ID and critical care following  IV abx per ID  Rahman has been changed.

## 2019-11-11 NOTE — ASSESSMENT & PLAN NOTE
Seen on CT scan  Discussed with Dr. Georges by phone and reports no change from previous.  If UCx and BCx continue to grow pseudomonas, will need to ask for re evaluation  Discussed with Dr. Badillo, ID, 11/11 and per our discussion, will do an US to re evaluate

## 2019-11-11 NOTE — PROGRESS NOTES
[facility]  Progress Note    Patient Name: Masood Messina  MRN: [id number]  Patient Class: IP- Inpatient   Admission Date: 11/5/2019  Length of Stay: 6 days  Attending Physician: Yamileth Stuart MD  Primary Care Provider: Jeramie Lombardi MD        Subjective:     Principal Problem:Bacteremia due to Pseudomonas        HPI:  79-year-old nursing home resident([place]) with past medical history of chronic respiratory failure status post trach and PEG due to stroke, CAD status post CABG, hypertension, COPD,BPH presented from [facility] due to altered mental status, high fever and significant leukocytosis.  This is his 3rd admission with the same complaint in a span of 45 days  History mainly from charts, nursing home notes and ER MD and staff  At the moment he is getting p.o. vancomycin at [facility] for C diff colitis  In the emergency room he was found to be severely septic with significant shock and also he was hyperkalemic  His last sputum culture per chart review grew multidrug resistant Pseudomonas  No further information available    Overview/Hospital Course:  Patient admitted with septic shock, encepahalopathy.  Patient admitted to ICU and started on levophed and Broad IV abx.  He is trach dependent.  Pulmonary critical care consulted.  ID consulted.  BCx- 1 bottle growing.  Tay changed 11/6 reportedly with purulent urine.  Concern for obstructed uropathy with infection which also could be causing his GINETTE.  Urology consulted.  Renal function not improving and unclear if this is ATN or the fact that tay wasn't changed until today.  Nephrology consulted.  Patient does not open eyes or respond which I was told is not his baseline.  Discussed with Dr. Kinsey and there is concern for meningitis.  IR consulted for LP but this was deferred as INR is 2.  Continuing po vancomycin as was on this previously for C. Diff. Discussed with Dr. Antoine and will give dose of IV vanc  and IV ampicillin and will review coverage for meningitis as well as other covering infections. Last fever 11/6 at 1430. Reports of intermittent shaking activity.  EEG was done and reported to have seizure activity. Neurology consulted and patient started on keppra and vimpat. Patient is still not waking up or following commands but did try to fight me to keep his eye lids closed 11/7 and I find his pupils more responsive 11/7. Started on propofol. I spoke to his POA and he would not want CPR and thus he has been made DNR but is not comfort care.  11/8 still not responsive.  Renal function worse.  Making facial grimaces and thus video EEG is underway for possible continued seizure activity.  Discussed with Dr. Antoine and Meropenem should provide appropriate meningitis coverage for his possible sources.  11/9 Status is unchanged and not opening eyes or following commands.  Having persistent movements of his mouth.  Seen by Neurology after my visit but per my chart review, this is concerning for further seizure activity and thus repeat EEG obtained.  ID changed meropenem back to zosyn to cover all bases regarding seizure activity though it is not felt this is antibiotic induced. EEG that was done 11/9 was negative for seizure activity.  11/10 patient does spontaneously open his eyes but not purposefully and is not blinking to threat. Started with loose stool and rectal tube placed.  Stool studies ordered.11/11 Patient continues to open his eyes spontaneously but without purpose.  Made a grunting noise but is not conversive or trying to interact.  Per my chart review today, patient's speech has been limited by trach but he was described as trying to speak and interactive though with some cognition deficits- he has not been interactive during this current hospitalization. Chart review also reveals tardive mouth movements which he has been demonstrating intermittently and EEG negative for acute seizure 11/9. Peg transiently  clogged overnight and thus received IV flagyl and rectal vanc when vanc per peg could not be delivered.  Peg easily flushed by RN today. Levophed weaned off at 0500.    Interval History: Unable to provide history given encephalopathy. RN provides history and no major changes overnight.  Peg tube transiently clogged but appears to be easily flushed this AM.     Review of Systems   Unable to perform ROS: Mental status change     Objective:     Vital Signs (Most Recent):  Temp: 98 °F (36.7 °C) (11/11/19 0900)  Pulse: 77 (11/11/19 0900)  Resp: (!) 0 (11/11/19 0900)  BP: (!) 94/49 (11/11/19 0900)  SpO2: (!) 93 % (11/11/19 0900) Vital Signs (24h Range):  Temp:  [98 °F (36.7 °C)-98.4 °F (36.9 °C)] 98 °F (36.7 °C)  Pulse:  [76-93] 77  Resp:  [0-22] 0  SpO2:  [92 %-97 %] 93 %  BP: ()/(40-60) 94/49  Arterial Line BP: (110-145)/(55-70) 113/55     Weight: 132 kg (291 lb 0.1 oz)  Body mass index is 39.47 kg/m².    Intake/Output Summary (Last 24 hours) at 11/11/2019 0936  Last data filed at 11/11/2019 0500  Gross per 24 hour   Intake 1308.9 ml   Output 1900 ml   Net -591.1 ml      Physical Exam   Constitutional: He appears well-developed. No distress.   HENT:   Head: Normocephalic and atraumatic.   Mouth/Throat: Oropharynx is clear and moist.   Trach  Some discharge to his eyes   Eyes: Pupils are equal, round, and reactive to light. EOM are normal.   Neck: Normal range of motion.   Cardiovascular: Regular rhythm.   No murmur heard.  Pulmonary/Chest: Effort normal and breath sounds normal. He has no wheezes.   Abdominal: Soft. He exhibits no distension.   peg   Genitourinary:   Genitourinary Comments: Rahman  Rectal tube   Musculoskeletal: Normal range of motion.   Neurological:   Opens eyes which is an improvement over the last 48 hours but I do not find he does it purposefully or to command.  No blink to threat reflex  Tardive mouth movements are less today  Quadriplegia at baseline  Not following commands  Not opening eyes  to voice or physical stimuli   Skin: No rash noted.   Left fifth toe gangrene   Psychiatric: He has a normal mood and affect.   Nursing note and vitals reviewed.      Significant Labs:   Blood Culture: No results for input(s): LABBLOO in the last 48 hours.  CBC:   Recent Labs   Lab 11/10/19  0407 11/11/19  0432   WBC 9.67 9.34   HGB 8.0* 7.8*   HCT 25.9* 24.9*   * 120*     CMP:   Recent Labs   Lab 11/09/19  2327 11/10/19  0407 11/10/19  1509 11/11/19  0432    139  139 140 139   K 4.1 4.0  4.0 4.1 3.9    105  105 107 108   CO2 24 26  26 26 25   GLU 94 101  101 104 99   BUN 56* 57*  57* 58* 57*   CREATININE 2.9* 2.8*  2.8* 2.7* 2.6*   CALCIUM 8.3* 8.3*  8.3* 8.4* 8.5*   PROT 6.4 6.3 6.5  --    ALBUMIN 2.0* 2.1*  2.1* 2.1* 2.1*   BILITOT 0.9 1.0 1.0  --    ALKPHOS 106 118 118  --    AST 19 18 21  --    ALT 14 14 14  --    ANIONGAP 10 8  8 7* 6*   EGFRNONAA 19.5* 20.4*  20.4* 21.3* 22.3*     Respiratory Culture: No results for input(s): GSRESP, RESPIRATORYC in the last 48 hours.  Urine Culture: No results for input(s): LABURIN in the last 48 hours.    Significant Imaging: CXR: I have reviewed all pertinent results/findings within the past 24 hours and my personal findings are:  persistent right hemidiaphgram elevation with bilateral infiltrates.  Does not appear changed.      Assessment/Plan:      * Bacteremia due to Pseudomonas  IV abx per ID  11/5 BCx Psuedomonas  11/6 BCx GNR- awaiting speciation   11/10 BCx repeated and is in progress    If continues to grow, clearly demonstrates we don't have source control.       Seizure  EEG done on admission with seizure activity. Repeat EEGs being done given persistent mouth movements.  EEG done 11/9 with no seizure activity seen and review of medical record now reveals these mouth movements have been present prior.  Neurology consulted and following  Vimpat and Keppra  Seizure precautions       Encephalopathy, toxic  Not at baseline as  demonstrated in medical record.  Now opens his eyes spontaneously but not purposefully  Continuing IV abx to treat infectious processes  CT head done with no acute process  Neurology consulted given seizures demonstrated on admission EEG.  Repeat EEGs have not demonstrated further seizure activity.  His mouth movements are well documented on my chart review and thus less concerning to represent seizure activity.  On Anti seizure medication at this time.  Discussed with Dr. Garcia Jimenez and would still like to pursue LP given not improving cognition wise.  I have ordered repeat INR and will maybe need to coordinate with IR for FFP prior to procedure if still high if this is possible.   Was hypoglcyemic on admission per my conversation with Dr. Kinsey. Encephalopathy as results of hypoglycemia?      Obstructive uropathy        Anasarca        Volume overload        Leukocytosis        History of MDR Pseudomonas aeruginosa infection  As above      Septic shock  Vasopressor support with Levophed. Weaned off 11/11 at 0500.  IV abx covering pseudomonas and possible meningitis per ID  BCx growing pseudomonas   Urine Cx growing pseudomonas  On po Vanc for C. Diff colitis- diagnosed during last hospital stay and came in on this regimen  S/p colistin x2 doses, ampicillin x 1 dose, IV vanc  IR consulted for LP given fevers, seizures, encephalopathy.  Could not be done due to INR 2. Repeat INR also still elevated and IR declined LP. I discussed with Dr. Antoine and Meropenem giving good coverage for meningitis.  He lives at [place] and thus has not been out in the community. Will need to rediscuss coverage with ID now that changed back to zosyn.        C. difficile colitis  At the moment patient is on p.o. vancomycin for C diff colitis  Stool reported as formed per nursing on admission but started being loose 11/10.  Rechecking stool studies and these are in progress.      Acute on chronic diastolic heart failure  Stable issue  Will  monitor carefully      Sputum culture positive for Pseudomonas  Sputum positive for pseudomonas, serratia, proteus.  Suspecting that these are colonizers on the sputum culture and per ID, not treating as pathogens      Hydroureter, left  Seen on CT scan  Discussed with Dr. Georges by phone and reports no change from previous.  If UCx and BCx continue to grow pseudomonas, will need to ask for re evaluation  Discussed with Dr. Badillo, ID, 11/11 and per our discussion, will do an US to re evaluate       Bilateral nephrolithiasis        Cholelithiases        GINETTE (acute kidney injury)  Acute kidney injury along with hyperkalemia in the background of sepsis/C diff colitis  Concern for obstructive uropathy from tay. Changed 11/6.    Consulted renal as was not improving and they are following  ATN likely in setting of shock  Has turned the corner and renal function starting to improve    Hyperkalemia  Resolved with treatment and tay change/resolution of obstructive uropathy  Following daily labs    Essential hypertension  Admitted in shock.  Antihypertensives held.  Levophed weaned off 11/11 at approximately 0500.  Monitoring and will add back antihypertensives as appropriate.     Enlarging abdominal aortic aneurysm (AAA)        AAA (abdominal aortic aneurysm) without rupture        Pseudomonas urinary tract infection  UCx growing pseudomonas 11/5  ID and critical care following  IV abx per ID  Tay has been changed.      Ventilator dependence        Anemia, chronic disease  Monitoring cell counts.  Slow trend down.  If continues to trend down, will likely need a transfusion.  No overt blood loss.  AM labs ordered      Coronary artery disease  Chronic, stable issue  Tele monitoring       Hemiparesis affecting dominant side as late effect of stroke  Residuals from previous CVA      Acquired hypothyroidism        PEG (percutaneous endoscopic gastrostomy) status  PEG insitu  PEG tube feeds for nutrition  Transiently clogged  overnight 11/10 and received vanc rectally and iV flagyl as could not give po vanc per peg.  Peg flushes easily 11/11 and appears to be in working order.    Tracheostomy in place  Pulmonary following for vent management       Chronic respiratory failure with hypoxia  Patient has got a trach and is vent dependent  Pulmonary managing vent  CXR has remained unchanged with persistent right elevated hemidiaphragm and bilateral infiltrates    Functional quadriplegia  Chronic condition.    No acute issues  Air mattress  Turning        VTE Risk Mitigation (From admission, onward)         Ordered     enoxaparin injection 40 mg  Every 24 hours (non-standard times)      11/05/19 2305     IP VTE HIGH RISK PATIENT  Once      11/05/19 2014                      Yamileth Stuart MD  Department of Hospital Medicine   [facility]

## 2019-11-11 NOTE — ASSESSMENT & PLAN NOTE
Coarse on lung exam  Suspecting that these are colonizers on the sputum culture and per ID, not treating as pathogens

## 2019-11-11 NOTE — SUBJECTIVE & OBJECTIVE
Interval History: Unable to provide history given encephalopathy. RN provides history and no major changes overnight.  Peg tube transiently clogged but appears to be easily flushed this AM.     Review of Systems   Unable to perform ROS: Mental status change     Objective:     Vital Signs (Most Recent):  Temp: 98 °F (36.7 °C) (11/11/19 0900)  Pulse: 77 (11/11/19 0900)  Resp: (!) 0 (11/11/19 0900)  BP: (!) 94/49 (11/11/19 0900)  SpO2: (!) 93 % (11/11/19 0900) Vital Signs (24h Range):  Temp:  [98 °F (36.7 °C)-98.4 °F (36.9 °C)] 98 °F (36.7 °C)  Pulse:  [76-93] 77  Resp:  [0-22] 0  SpO2:  [92 %-97 %] 93 %  BP: ()/(40-60) 94/49  Arterial Line BP: (110-145)/(55-70) 113/55     Weight: 132 kg (291 lb 0.1 oz)  Body mass index is 39.47 kg/m².    Intake/Output Summary (Last 24 hours) at 11/11/2019 0936  Last data filed at 11/11/2019 0500  Gross per 24 hour   Intake 1308.9 ml   Output 1900 ml   Net -591.1 ml      Physical Exam   Constitutional: He appears well-developed. No distress.   HENT:   Head: Normocephalic and atraumatic.   Mouth/Throat: Oropharynx is clear and moist.   Trach  Some discharge to his eyes   Eyes: Pupils are equal, round, and reactive to light. EOM are normal.   Neck: Normal range of motion.   Cardiovascular: Regular rhythm.   No murmur heard.  Pulmonary/Chest: Effort normal and breath sounds normal. He has no wheezes.   Abdominal: Soft. He exhibits no distension.   peg   Genitourinary:   Genitourinary Comments: Rahman  Rectal tube   Musculoskeletal: Normal range of motion.   Neurological:   Opens eyes which is an improvement over the last 48 hours but I do not find he does it purposefully or to command.  No blink to threat reflex  Tardive mouth movements are less today  Quadriplegia at baseline  Not following commands  Not opening eyes to voice or physical stimuli   Skin: No rash noted.   Left fifth toe gangrene   Psychiatric: He has a normal mood and affect.   Nursing note and vitals  reviewed.      Significant Labs:   Blood Culture: No results for input(s): LABBLOO in the last 48 hours.  CBC:   Recent Labs   Lab 11/10/19  0407 11/11/19  0432   WBC 9.67 9.34   HGB 8.0* 7.8*   HCT 25.9* 24.9*   * 120*     CMP:   Recent Labs   Lab 11/09/19  2327 11/10/19  0407 11/10/19  1509 11/11/19  0432    139  139 140 139   K 4.1 4.0  4.0 4.1 3.9    105  105 107 108   CO2 24 26  26 26 25   GLU 94 101  101 104 99   BUN 56* 57*  57* 58* 57*   CREATININE 2.9* 2.8*  2.8* 2.7* 2.6*   CALCIUM 8.3* 8.3*  8.3* 8.4* 8.5*   PROT 6.4 6.3 6.5  --    ALBUMIN 2.0* 2.1*  2.1* 2.1* 2.1*   BILITOT 0.9 1.0 1.0  --    ALKPHOS 106 118 118  --    AST 19 18 21  --    ALT 14 14 14  --    ANIONGAP 10 8  8 7* 6*   EGFRNONAA 19.5* 20.4*  20.4* 21.3* 22.3*     Respiratory Culture: No results for input(s): GSRESP, RESPIRATORYC in the last 48 hours.  Urine Culture: No results for input(s): LABURIN in the last 48 hours.    Significant Imaging: CXR: I have reviewed all pertinent results/findings within the past 24 hours and my personal findings are:  persistent right hemidiaphgram elevation with bilateral infiltrates.  Does not appear changed.

## 2019-11-11 NOTE — ASSESSMENT & PLAN NOTE
IV abx per ID  11/5 BCx Psuedomonas  11/6 BCx GNR- awaiting speciation   11/10 BCx repeated and is in progress    If continues to grow, clearly demonstrates we don't have source control.

## 2019-11-11 NOTE — PLAN OF CARE
11/11/19 0732   Patient Assessment/Suction   Level of Consciousness (AVPU) responds to voice   Respiratory Effort Normal;Unlabored   All Lung Fields Breath Sounds coarse   Rhythm/Pattern, Respiratory assisted mechanically   Cough Frequency infrequent   Cough Type fair   Suction Method sample obtained and sent to lab   $ Suction Charges Inline Suction Procedure Stat Charge   Secretions Amount small   Secretions Color yellow   Secretions Characteristics thick   PRE-TX-O2   O2 Device (Oxygen Therapy) ventilator   $ Is the patient on Low Flow Oxygen? Yes   Oxygen Concentration (%) 32   SpO2 95 %   Pulse Oximetry Type Continuous   $ Pulse Oximetry - Multiple Charge Pulse Oximetry - Multiple   Pulse 77   Resp (!) 22   Aerosol Therapy   $ Aerosol Therapy Charges Aerosol Treatment   Daily Review of Necessity (SVN) completed   Respiratory Treatment Status (SVN) given   Treatment Route (SVN) in-line   Patient Position (SVN) semi-Arredondo's   Post Treatment Assessment (SVN) increased aeration;congestion decreased   Signs of Intolerance (SVN) none   Breath Sounds Post-Respiratory Treatment   Throughout All Fields Post-Treatment All Fields   Throughout All Fields Post-Treatment aeration increased   Post-treatment Heart Rate (beats/min) 76   Post-treatment Resp Rate (breaths/min) 22   Vent Select   Conventional Vent Y   $ Ventilator Subsequent 1   Charged w/in last 24h YES   IHI Ventilator Associated Pneumonia Bundle   Oral Care Mouth swabbed;Lip moisturizer applied   Additional VAP Prevention Documentation Clean equipment maintained   Preset Conventional Ventilator Settings   Vent ID [id number]   Vent Type    Humidifier Temp Setting 37 degC   Humidifier Temp Actual 36.7 degC   Ready to Wean/Extubation Screen   FIO2<=50 (chart decimal) 0.32   continue ventilator support and q6 tx

## 2019-11-11 NOTE — ASSESSMENT & PLAN NOTE
At the moment patient is on p.o. vancomycin for C diff colitis  Stool reported as formed per nursing on admission but started being loose 11/10.  Rechecking stool studies and these are in progress.

## 2019-11-11 NOTE — PROGRESS NOTES
Progress Note  Nephrology  11/11/2019    Consult Requested By: Yamileth Stuart MD    Reason for Consult: GINETTE    Chief Complaint   Patient presents with    Fever    Altered Mental Status       SUBJECTIVE:     History of Present Illness: 78 y/o male patient sent from NH on 11/5, admitted w/septic shock, encephalopathy.  Rahman changed 11/6 reportedly with purulent urine.  Concern for obstructed uropathy with infection which also could be causing his GINETTE.  Urology consulted per primary.  Renal function not improving, nephrology consulted for co-management, GINETTE.    11/7  Pt seen and examined.  He had significant hyperkalemia on admit, this has now improved.  Scr 2.5 initially, now up to 2.8.  At last discharge in October, Scr was 1.0.  He is non-oliguric, UOP 5L--polyuria after catheter was exchanged.  Hyponatremic, was 127 yesterday, better today at 131.  CT abd/pelvis showed mild L hydronephrosis, urology is consulted as well.  11/08  Na+ improved.  Renal function stable, on IVF.  UOP only recorded for one shift--850cc.  Electrolytes improved.  NAD noted, on vent.  11/09  Na+ now wnl, Scr bumped to 2.7.  NS at 100cc/hr.  Not responding to verbal stimuli.  UOP 1.8L.  11/10  Scr 2.8, was 2.9 on second lab draw yesterday.  On IVF, CXR looks worse, he is hypotensive.  On vent, NAD noted.  UOP 975cc recorded on one shift only.  11/11 SBP , FIO2 32%, remains on levophed, UOP 1.9L    Renal US: Stable left hydronephrosis with nonobstructing left renal stone., Distended bladder.    Assessment/plan:    1. Septic shock - suspected urinary source (pseudomonas)  - remains on levophed and BSA  - spoke with ID- not sure why 11/6 blood cx stayed positive- got meropenem, tigecycline and colistin between 11/5-11/10 with 11/10 blood cx now neg- switched back to zosyn due to seizure activity- told Dr. Justino madden with one time dose of amikacin if needed- infx issues take precedence over renal dysfunction- concerned about MDR bug at  this point  - dose for CrCl < 30    2. Obstructive uropathy with L hydronephrosis  - repeat imaging is stable for hydro but distended bladder noted so tay advanced    3. GINETTE 2/2 ATN + obstruction  - renal function is stable  - no nsaids or IV contrast    4. Anemia of inflammation  - defer transfusion goals to hospitalist    4. Diastolic CHF  - CXR looks wet  - d/c NS IVFs    Review of Systems:  Unable to obtain 2/2 encephalopathy    OBJECTIVE:     Vital Signs Range (Last 24H):  Temp:  [98 °F (36.7 °C)-98.4 °F (36.9 °C)]   Pulse:  [74-93]   Resp:  [9-22]   BP: ()/(42-69)   SpO2:  [92 %-97 %]   Arterial Line BP: (110-145)/(55-70)     Physical Exam:  General- NAD noted  HEENT- ncat, trache present  Neck- supple  CV- Regular rate and rhythm  Resp- upper lobes cta, on vent  GI- abd soft  Extrem- No cyanosis, clubbing, edema, quadraplegic  Derm- skin w/d  Neuro-  Not interactive    Body mass index is 39.47 kg/m².    Laboratory:  CBC:   Recent Labs   Lab 11/11/19  0432   WBC 9.34   RBC 2.72*   HGB 7.8*   HCT 24.9*   *   MCV 92   MCH 28.7   MCHC 31.3*     CMP:   Recent Labs   Lab 11/10/19  1509 11/11/19  0432    99   CALCIUM 8.4* 8.5*   ALBUMIN 2.1* 2.1*   PROT 6.5  --     139   K 4.1 3.9   CO2 26 25    108   BUN 58* 57*   CREATININE 2.7* 2.6*   ALKPHOS 118  --    ALT 14  --    AST 21  --    BILITOT 1.0  --        Diagnostic Results:  Labs: Reviewed    Sharla Rodriguez MD MPH  [facility]  [address] [place] [address] DAMIEN Warner [id number] [phone] (p)  [phone] (f)

## 2019-11-11 NOTE — ASSESSMENT & PLAN NOTE
Vasopressor support with Levophed. Weaned off 11/11 at 0500.  IV abx covering pseudomonas and possible meningitis per ID  BCx growing pseudomonas   Urine Cx growing pseudomonas  On po Vanc for C. Diff colitis- diagnosed during last hospital stay and came in on this regimen  S/p colistin x2 doses, ampicillin x 1 dose, IV vanc  IR consulted for LP given fevers, seizures, encephalopathy.  Could not be done due to INR 2. Repeat INR also still elevated and IR declined LP. I discussed with Dr. Antoine and Meropenem giving good coverage for meningitis.  He lives at [place] and thus has not been out in the community. Will need to rediscuss coverage with ID now that changed back to zosyn.

## 2019-11-11 NOTE — ASSESSMENT & PLAN NOTE
Not at baseline as demonstrated in medical record.  Now opens his eyes spontaneously but not purposefully  Continuing IV abx to treat infectious processes  CT head done with no acute process  Neurology consulted given seizures demonstrated on admission EEG.  Repeat EEGs have not demonstrated further seizure activity.  His mouth movements are well documented on my chart review and thus less concerning to represent seizure activity.  On Anti seizure medication at this time.  Discussed with Dr. Garcia Jimenez and would still like to pursue LP given not improving cognition wise.  I have ordered repeat INR and will maybe need to coordinate with IR for FFP prior to procedure if still high if this is possible.   Was hypoglcyemic on admission per my conversation with Dr. Kinsey. Encephalopathy as results of hypoglycemia?

## 2019-11-11 NOTE — ASSESSMENT & PLAN NOTE
Patient has got a trach and is vent dependent  Pulmonary managing vent  CXR has remained unchanged with persistent right elevated hemidiaphragm and bilateral infiltrates

## 2019-11-11 NOTE — NURSING
Paco valve clogged, unable to infuse or aspirate from PEG tube. Olive jama RN and Sami Nursing supervisor notified. Tube feedings on hold at this time. No changes in condition noted. Small amount of coca cola used to alleviate clog unsuccessful.

## 2019-11-11 NOTE — PROGRESS NOTES
Progress Note  Infectious Disease    Admit Date: 11/5/2019   LOS: 6 days    Follow-up For:  Septic shock.     SUBJECTIVE:     11/7   Unable to obtain. Still on pressors. Renal function unchanged. NO bowel movements. No other events. Bld cx just back today with GNR. According to lab, appears to be a Pseudomonas     11/8 Still on pressors but trying to get off. Off sedation and minimally responsive. EEG going today. Added Ampicillin for meningitis coverage. Sputum cx with Serratia, Proteus - Sens to Meropenem, Pseudomonas Sens to Meropenem. Blood cx repeat negative, no diarrhea,   11/09/2019 I discussed with Dr. Antoine yesterday.  Given the history of seizures, we were both concerned about using any antibiotics that may lower seizure threshold.  We are trying to stay away from antibiotics like cefepime, meropenem, levofloxacin.  After discussing it together, I changed meropenem to Zosyn.  Patient is afebrile today.  Patient is very sensitive to pressors; nurse is working on weaning them off, but may not be doable today.  He continues to have yellow whitish secretions.  WBC is markedly improved  44.92-- 31.53-- 16.69- 11.48.  It makes me wonder if some of the WBC elevation could be due to stress, seizures, other than due to infection.  11/10 - still on low dose pressors. Diarrhea started and rectal tube placed   11/11/2019 afebrile.  Does not offer any complaints due to encephalopathy.  Does not have mouth movement today but this is an old finding that he does it on and off.  PEG was clogged but is okay now.   It  it is concerning that new cultures turned + from 11/06 with LNGNR.  Why with his cultures turn positive while he is on appropriate treatment.      Antibiotics (From admission, onward)    Start     Stop Route Frequency Ordered    11/08/19 2245  piperacillin-tazobactam 4.5 g in dextrose 5 % 100 mL IVPB (ready to mix system)      -- IV Every 8 hours (non-standard times) 11/08/19 2132    11/05/19 1715  vancomycin  oral suspension 250 mg      -- Oral 4 times daily 11/05/19 2877            OBJECTIVE:     Vital Signs (Most Recent)  Temp: 98.4 °F (36.9 °C) (11/11/19 0310)  Pulse: 77 (11/11/19 0732)  Resp: (!) 22 (11/11/19 0732)  BP: 112/60 (11/11/19 0600)  SpO2: 95 % (11/11/19 0732)    Temperature Range Min/Max (Last 24H):  Temp:  [98 °F (36.7 °C)-98.4 °F (36.9 °C)]     I & O (Last 24H):    Intake/Output Summary (Last 24 hours) at 11/11/2019 0829  Last data filed at 11/11/2019 0500  Gross per 24 hour   Intake 1308.9 ml   Output 1900 ml   Net -591.1 ml       Physical Exam:  General: well developed, well nourished, moderately obese, minimally responding to voice, improved to yesterday's exam.  HENT: Head:normocephalic, atraumatic. Nose: Nares normal. Septum midline.     Neck: tracheostomy and ventilated   Lungs:   diminished at bases and  coarse bs   Cardiovascular: Heart: RRR no murmur, click, rub or gallop, no click . Chest Wall: no abnormalities .  Extremities: no cyanosis or edema, or clubbing. Pulses: 2+ and symmetric.  Onychomycosis  Abdomen/Rectal: Abdomen: soft and nondistended.  PEG present, some granulation tissue surrounding.  Hypoactive bs    Rectal: not examined  Genitalia: no abnormalities, tay catheter with clear urine  Skin: Skin color, texture, turgor normal. No rashes or lesions  Musculoskeletal:muscle wasting. myoclonic jerking, no purposeful movement s      Lines/Drains:   Left inguinal catheter       Laboratory:  CBC  Recent Labs   Lab 11/11/19 0432   WBC 9.34   RBC 2.72*   HGB 7.8*   HCT 24.9*   *   MCV 92   MCH 28.7   MCHC 31.3*     BMP  Recent Labs   Lab 11/11/19 0432      K 3.9      CO2 25   BUN 57*   CREATININE 2.6*   CALCIUM 8.5*     Microbiology Results (last 7 days)     Procedure Component Value Units Date/Time    Stool culture [[id number]] Collected:  11/10/19 0420    Order Status:  Completed Specimen:  Stool Updated:  11/11/19 0748     Stool Culture Nothing significant to date     Blood culture [[id number]]  (Abnormal) Collected:  11/06/19 1148    Order Status:  Completed Specimen:  Blood from Line, Arterial, Right Updated:  11/11/19 0633     Blood Culture, Routine Gram stain aer bottle: Gram negative rods      Results called to and read back by:Simone Nice RN-[facility];  11/10/2019        06:25 CJD      GRAM NEGATIVE TOM, NON-LACTOSE   Identification and susceptibility pending      Blood culture [[id number]] Collected:  11/10/19 1508    Order Status:  Sent Specimen:  Blood from Line, Femoral, Left Updated:  11/10/19 1948    Urine Culture High Risk [[id number]] Collected:  11/10/19 0420    Order Status:  Sent Specimen:  Urine, Catheterized Updated:  11/10/19 0444    Urine Culture High Risk [[id number]]  (Abnormal)  (Susceptibility) Collected:  11/05/19 1926    Order Status:  Completed Specimen:  Urine, Catheterized Updated:  11/09/19 0909     Urine Culture, Routine PSEUDOMONAS AERUGINOSA  > 100,000 cfu/ml        CANDIDA TROPICALIS  > 100,000 cfu/ml  Treatment of asymptomatic candiduria is not recommended (except for   specific populations). Candida isolated in the urine typically   represents colonization. If an indwelling urinary catheter is   present it should be removed or replaced.      Narrative:       Indicated criteria for high risk culture:->Other  Other (specify):->sepsis    Urine culture [[id number]]  (Abnormal)  (Susceptibility) Collected:  11/05/19 1510    Order Status:  Completed Specimen:  Urine Updated:  11/09/19 0906     Urine Culture, Routine PSEUDOMONAS AERUGINOSA  > 100,000 cfu/ml        CANDIDA TROPICALIS  > 100,000 cfu/ml  Treatment of asymptomatic candiduria is not recommended (except for   specific populations). Candida isolated in the urine typically   represents colonization. If an indwelling urinary catheter is   present it should be removed or replaced.      Narrative:       Preferred Collection Type->Urine, Clean Catch  Specimen Source->Urine    Blood Culture #2  **CANNOT BE ORDERED STAT** [[id number]]  (Abnormal)  (Susceptibility) Collected:  11/05/19 1404    Order Status:  Completed Specimen:  Blood from Peripheral, Antecubital, Left Updated:  11/08/19 1308     Blood Culture, Routine Gram stain aer bottle:Gram negative rods      Called Ana Saavedra RN M3 @ 2015 MS 11/06/19      Gram stain reynaldo bottle: Gram positive cocci 11/07/2019  10:47       See previous notification     Comment: Gram stain aer bottle:Gram negative rods  Called Ana Saavedra RN M3 @ 2015 MS 11/06/19, 11/06/2019 20:17           PSEUDOMONAS AERUGINOSA      COAGULASE-NEGATIVE STAPHYLOCOCCUS SPECIES  Organism is a probable contaminant      Narrative:       Gram stain aer bottle:Gram negative rods  Called Ana Saavedra RN M3 @ 2015 MS 11/06/19, 11/06/2019 20:17    Culture, Respiratory with Gram Stain [[id number]]  (Abnormal)  (Susceptibility) Collected:  11/06/19 0420    Order Status:  Completed Specimen:  Respiratory from Sputum Updated:  11/08/19 0911     Respiratory Culture Reduced normal respiratory gabriela      SERRATIA MARCESCENS  Many        PROTEUS MIRABILIS ESBL  Moderate  Results called to and read back by: Martina Murillo RN on M3 re: ESBL  by P 11/08/2019  09:10       Gram Stain (Respiratory) >10 epithelial cells per low power field     Gram Stain (Respiratory) Moderate WBC's     Gram Stain (Respiratory) Many Gram positive rods resembling diphtheroids     Gram Stain (Respiratory) Few Gram negative rods    Blood Culture #1 **CANNOT BE ORDERED STAT** [[id number]]  (Abnormal) Collected:  11/05/19 1340    Order Status:  Completed Specimen:  Blood from Peripheral, Upper Arm, Right Updated:  11/08/19 0745     Blood Culture, Routine Gram stain reynaldo bottle: Gram positive cocci      Results called to and read back by: Cali Bustos RN on M3 re:gram pos       cocci in blood cx 11/06/2019  09:58  by DRP      Gram stain aer bottle: Gram negative rods      Results called to and read back by: Martina Murillo RN on  M3, RE: GNR in       aerobic bottle  11/07/2019  08:53 vcb      COAGULASE-NEGATIVE STAPHYLOCOCCUS SPECIES  Multiple morphotypes - probable contaminants        PSEUDOMONAS AERUGINOSA  For susceptibility see order #[id number]      Culture, Respiratory with Gram Stain [[id number]]  (Abnormal)  (Susceptibility) Collected:  11/05/19 1444    Order Status:  Completed Specimen:  Respiratory from Tracheal Aspirate Updated:  11/07/19 0838     Respiratory Culture Reduced normal respiratory gabriela      SERRATIA MARCESCENS  Many       Gram Stain (Respiratory) <10 epithelial cells per low power field.     Gram Stain (Respiratory) Few WBC's     Gram Stain (Respiratory) Moderate Gram positive rods resembling diphtheroids     Gram Stain (Respiratory) Rare Gram negative rods    Culture, Respiratory with Gram Stain [[id number]]     Order Status:  No result Specimen:  Respiratory     Blood culture [[id number]]     Order Status:  Canceled Specimen:  Blood     Clostridium difficile EIA [[id number]]     Order Status:  Canceled Specimen:  Stool           Diagnostic Results:   11/11/2019 renal ultrasound.  Same mild hydronephrosis and nonobstructing renal stones.    Chest x-ray 11/11/2019Cardiomegaly with moderate elevation the right hemidiaphragm.  There is still slight increase in size of the right pleural effusion and right lower lobe atelectasis  Mild prominence of the pulmonary vasculature suggestive of congestion    CT head 11/06/2019 no acute abnormalities.    CT pelvis 11/06/2019 Stable nonobstructing bilateral renal stones.  Stable left hydronephrosis and hydroureter to the level of the ureteral vesicle junction  Cholelithiasis  Stable infrarenal abdominal aortic aneurysm  Moderate degenerative changes of the spine and hips  Stable atelectasis in lung bases      Chest x-ray 11/05/2019Pulmonary hypoinflation, with scattered predominantly interstitial opacities in both lungs, right greater than left, having similar appearance to the prior  exam of 10/10/2019.     CXR 11/07  1.  Unchanged decreased lung volumes with elevation of the right hemidiaphragm.  2. No significant change in bilateral patchy ground-glass opacities, right greater than left.      ASSESSMENT/PLAN:         Severe septic shock, Pseudomonas UTI and bacteremia, improving   Complicated urinary tract infection , bilateral nonobstructing renal stones  Serratia and Proteus ESBL + sputum with stable CXR ,  monitor chest x-ray and sputum production  Candida tropicalis candiduria  Coag-negative staph in blood cultures 11/05, contaminant  Recent Cdiff colitis, has some minimal loose stools  Chronic respiratory failure , tracheostomy, PEG,  CVA with functional quadraplegia   ARF  Encephalopathy , seizures.  This patient is high risk for life-threatening deterioration and death secondary to above comorbidities and need for IV treatment.  Poor prognosis.  Acute critical care 35 min discussed with hospitalist.  Discussed with nephrologist.  Discussed with nurse.      PLAN:  - Continue Zosyn ( less risk of decreasing seizure threshold)   - we are not treat sputum cultures at this point because he is not needing higher FiO2 and chest x-ray stable.  -   It  it is concerning that new cultures turned + from 11/06 with LNGNR.  Why did his cultures turn positive while he is on appropriate treatment.  The left groin line HAS to GO.  Discussed with nurse who will try to get a peripheral IV and discontinue left inguinal line.

## 2019-11-11 NOTE — ASSESSMENT & PLAN NOTE
UCx growing pseudomonas  ID and critical care following  IV abx per ID  Rahman has been changed.

## 2019-11-11 NOTE — PLAN OF CARE
11/10/19 1911   Patient Assessment/Suction   Level of Consciousness (AVPU) responds to pain   Respiratory Effort Normal;Unlabored   Expansion/Accessory Muscles/Retractions no use of accessory muscles   All Lung Fields Breath Sounds coarse   $ Suction Charges Inline Suction Procedure Stat Charge   Secretions Amount small   Secretions Color clear   Secretions Characteristics thin   PRE-TX-O2   O2 Device (Oxygen Therapy) ventilator   Oxygen Concentration (%) 32   SpO2 96 %   Pulse Oximetry Type Continuous   Pulse 85   Resp (!) 22   Aerosol Therapy   $ Aerosol Therapy Charges Aerosol Treatment   Respiratory Treatment Status (SVN) given   Treatment Route (SVN) in-line   Patient Position (SVN) HOB elevated   Post Treatment Assessment (SVN) breath sounds improved   Signs of Intolerance (SVN) none   Breath Sounds Post-Respiratory Treatment   Throughout All Fields Post-Treatment All Fields   Throughout All Fields Post-Treatment aeration increased   Post-treatment Heart Rate (beats/min) 84   Post-treatment Resp Rate (breaths/min) 22   Wound Care   $ Wound Care Tech Time 15 min   Area of Concern Neck under tracheostomy   Skin Color/Characteristics without discoloration   Skin Temperature warm        Surgical Airway Portex Cuffed;Fenestrated   No Placement Date or Time found.   Present Prior to Hospital Arrival?: Yes  Inserted by: Present Prior to Hospital Arrival  Type: Tracheostomy  Brand: Portex  Airway Device Size: 8.0  Style: Cuffed;Fenestrated   Cuff Pressure   (mlt)   Cuff Inflation? Inflated   Status Secured   Site Assessment Clean;Dry;No bleeding;No drainage   Vent Select   Conventional Vent Y   Charged w/in last 24h YES   Preset Conventional Ventilator Settings   Vent ID [id number]   Vent Type    Ventilation Type VC   Vent Mode A/C   Humidity Heated wire   Humidifier Temp Setting 37 degC   Humidifier Temp Actual 37 degC   Set Rate 22 bmp   Vt Set 450 mL   PEEP/CPAP 5 cmH20   Pressure Support 0 cmH20   Waveform RAMP    Peak Flow 60 L/min   Plateau Set/Insp. Hold (sec) 0   Trigger Sensitivity Flow/I-Trigger 2 L/min   Patient Ventilator Parameters   Resp Rate Total 22 br/min   Peak Airway Pressure 35 cmH2O   Mean Airway Pressure 14 cmH20   Plateau Pressure 27 cmH20   Exhaled Vt 486 mL   Total Ve 10.7 mL   I:E Ratio Measured 1:2.30   Conventional Ventilator Alarms   Alarms On Y   Resp Rate High Alarm 45 br/min   Press High Alarm 60 cmH2O   Apnea Rate 10   Apnea Volume (mL) 0 mL   Apnea Oxygen Concentration  100   Apnea Flow Rate (L/min) 44   T Apnea 20 sec(s)   Ready to Wean/Extubation Screen   FIO2<=50 (chart decimal) 0.32   MV<16L (chart vol.) 10.7   PEEP <=8 (chart #) 5   Ready to Wean Parameters   F/VT Ratio<105 (RSBI) (!) 45.27

## 2019-11-11 NOTE — ASSESSMENT & PLAN NOTE
Admitted in shock.  Antihypertensives held.  Levophed weaned off 11/11 at approximately 0500.  Monitoring and will add back antihypertensives as appropriate.

## 2019-11-11 NOTE — ASSESSMENT & PLAN NOTE
At the moment patient is on p.o. vancomycin for C diff colitis  Stool reported as formed per nursing on admission but started being loose 11/10.  Rechecking stool studies.

## 2019-11-11 NOTE — ASSESSMENT & PLAN NOTE
Sputum positive for pseudomonas, serratia, proteus.  Suspecting that these are colonizers on the sputum culture and per ID, not treating as pathogens

## 2019-11-11 NOTE — ASSESSMENT & PLAN NOTE
EEG done on admission with seizure activity. Repeat EEGs being done given persistent mouth movements.  EEG done 11/9 with no seizure activity seen and review of medical record now reveals these mouth movements have been present prior.  Neurology consulted and following  Vimpat and Keppra  Seizure precautions

## 2019-11-11 NOTE — ASSESSMENT & PLAN NOTE
IV abx per ID  Appears that blood cultures are still growing. Repeating BCX today.  IF continues to grow, we do not have source control though other clinical indicators were initially improving.

## 2019-11-11 NOTE — ASSESSMENT & PLAN NOTE
Monitoring cell counts.  Slow trend down.  If continues to trend down, will likely need a transfusion.  No overt blood loss.  AM labs ordered

## 2019-11-11 NOTE — ASSESSMENT & PLAN NOTE
PEG insitu  PEG tube feeds for nutrition  Transiently clogged overnight 11/10 and received vanc rectally and iV flagyl as could not give po vanc per peg.  Peg flushes easily 11/11 and appears to be in working order.

## 2019-11-11 NOTE — PROGRESS NOTES
I was alerted patient's PEG is currently blocked and despite multiple attempts, no success at unclogging. Chart review. Current medication due through PEG is oral vancomycin for C diff. His AED are through IV. I ordered one time dose of IV flagyl and vancomycin enema to replace current dose of po vancomycin until PEG issue resolved.

## 2019-11-12 PROBLEM — E87.5 HYPERKALEMIA: Status: RESOLVED | Noted: 2019-01-01 | Resolved: 2019-01-01

## 2019-11-12 PROBLEM — E87.70 VOLUME OVERLOAD: Status: RESOLVED | Noted: 2019-01-01 | Resolved: 2019-01-01

## 2019-11-12 PROBLEM — D72.829 LEUKOCYTOSIS: Status: RESOLVED | Noted: 2019-01-01 | Resolved: 2019-01-01

## 2019-11-12 PROBLEM — R56.9 SEIZURE: Status: RESOLVED | Noted: 2019-01-01 | Resolved: 2019-01-01

## 2019-11-12 PROBLEM — R65.21 SEPTIC SHOCK: Status: RESOLVED | Noted: 2019-01-01 | Resolved: 2019-01-01

## 2019-11-12 PROBLEM — N13.9 OBSTRUCTIVE UROPATHY: Status: RESOLVED | Noted: 2019-01-01 | Resolved: 2019-01-01

## 2019-11-12 PROBLEM — A41.9 SEPTIC SHOCK: Status: RESOLVED | Noted: 2019-01-01 | Resolved: 2019-01-01

## 2019-11-12 NOTE — PROGRESS NOTES
[facility]  Neurology  Progress Note    Patient Name: Masood Messina  MRN: [id number]  Admission Date: 11/5/2019  Hospital Length of Stay: 7 days  Code Status: DNR   Attending Provider: Oliver Lott MD  Primary Care Physician: Jeramie Lombardi MD   Principal Problem:Bacteremia due to Pseudomonas    Subjective:     Interval History: Patient seen and examined with Dr. Garcia Jimenez. Patient Alert back to baseline, negative for seizure activity. Still on the Keppra 500mg BIG and Vimpat 100mg BID. AED levels in progress. discontinue LP and EEG patient signs and symptoms have improved,  Will follow the patient.     Current Neurological Medications:   Scheduled Meds:   albuterol-ipratropium  3 mL Nebulization Q6H    enoxparin  40 mg Subcutaneous Q24H    fluconazole 40 mg/ml  200 mg Oral Daily    lacosamide (VIMPAT) IVPB  100 mg Intravenous Q12H    levetiracetam IVPB  500 mg Intravenous Q12H    pantoprazole  40 mg Oral Daily    piperacillin-tazobactam (ZOSYN) IVPB  4.5 g Intravenous Q8H    vancomycin  250 mg Oral QID     Continuous Infusions:   norepinephrine bitartrate-D5W Stopped (11/11/19 0500)     PRN Meds:.      Current Facility-Administered Medications   Medication Dose Route Frequency Provider Last Rate Last Dose    albuterol-ipratropium 2.5 mg-0.5 mg/3 mL nebulizer solution 3 mL  3 mL Nebulization Q6H Yamileth Stuart MD   3 mL at 11/12/19 0737    enoxaparin injection 40 mg  40 mg Subcutaneous Q24H Mauricio Kinsey MD   40 mg at 11/12/19 0535    fluconazole 40 mg/ml suspension 200 mg  200 mg Oral Daily Oliver Lott MD   200 mg at 11/12/19 0919    lacosamide (VIMPAT) 100 mg in sodium chloride 0.9% 100 mL IVPB  100 mg Intravenous Q12H Brain Jimenez  mL/hr at 11/12/19 0535 100 mg at 11/12/19 0535    levetiracetam 500 mg in 0.9 % normal saline 100 mL IVPB (ready to mix system)  500 mg Intravenous Q12H Yamileth Stuart MD   500 mg at 11/12/19 0919    norepinephrine 8 mg in dextrose 5 %  250 mL infusion  0.02 mcg/kg/min Intravenous Continuous Yamileth Stuart MD   Stopped at 11/11/19 0500    pantoprazole EC tablet 40 mg  40 mg Oral Daily Yoel Lopes MD   40 mg at 11/12/19 0535    piperacillin-tazobactam 4.5 g in dextrose 5 % 100 mL IVPB (ready to mix system)  4.5 g Intravenous Q8H Zhane Badillo MD 25 mL/hr at 11/12/19 0548 4.5 g at 11/12/19 0548    vancomycin oral suspension 250 mg  250 mg Oral QID Yoel Lopes MD   250 mg at 11/12/19 0919       Review of Systems   Unable to perform ROS: Mental status change     Objective:     Vital Signs (Most Recent):  Temp: 98 °F (36.7 °C) (11/12/19 0715)  Pulse: 63 (11/12/19 0852)  Resp: 18 (11/12/19 0852)  BP: 127/71 (11/12/19 0700)  SpO2: 100 % (11/12/19 0852) Vital Signs (24h Range):  Temp:  [98 °F (36.7 °C)-98.6 °F (37 °C)] 98 °F (36.7 °C)  Pulse:  [63-80] 63  Resp:  [0-20] 18  SpO2:  [95 %-100 %] 100 %  BP: ()/(51-72) 127/71  Arterial Line BP: (111-148)/(56-70) 142/69     Weight: 132 kg (291 lb 0.1 oz)  Body mass index is 39.47 kg/m².    Physical Exam   Constitutional: He appears well-developed and well-nourished.   HENT:   Head: Normocephalic and atraumatic.   abnormal repetitive movements of mouth/lip   Eyes: Pupils are equal, round, and reactive to light. EOM are normal.   Neck: Normal range of motion. Neck supple.   Cardiovascular: Normal rate.   Pulmonary/Chest: Breath sounds normal.   Abdominal: He exhibits no distension. There is no tenderness.   Musculoskeletal: Normal range of motion.   Passive ROM normal   Neurological: He displays normal reflexes. No cranial nerve deficit or sensory deficit. He exhibits normal muscle tone. Coordination (RAMON) normal.   Reflex Scores:       Tricep reflexes are 1+ on the right side and 1+ on the left side.       Bicep reflexes are 1+ on the right side and 1+ on the left side.       Brachioradialis reflexes are 1+ on the right side and 1+ on the left side.       Patellar reflexes are 1+ on the right side and 1+  on the left side.       Achilles reflexes are 1+ on the right side and 1+ on the left side.  Skin: Skin is warm and dry. Capillary refill takes less than 2 seconds.       NEUROLOGICAL EXAMINATION:     MENTAL STATUS   Level of consciousness: responsive to painful stimuli    CRANIAL NERVES   Cranial nerves II through XII intact.     CN III, IV, VI   Pupils are equal, round, and reactive to light.  Extraocular motions are normal.     MOTOR EXAM   Muscle bulk: normal  Overall muscle tone: normal    REFLEXES     Reflexes   Right brachioradialis: 1+  Left brachioradialis: 1+  Right biceps: 1+  Left biceps: 1+  Right triceps: 1+  Left triceps: 1+  Right patellar: 1+  Left patellar: 1+  Right achilles: 1+  Left achilles: 1+  Right : 1+  Left : 1+    SENSORY EXAM        Responsive to painful stimuli     GAIT AND COORDINATION        RAMON       Significant Labs:   Lab Results   Component Value Date    CREATININE 2.4 (H) 11/12/2019       Lab Results   Component Value Date    LDLCALC 60.6 (L) 09/13/2019     Lab Results   Component Value Date    TSH 9.980 (H) 11/07/2019     Lab Results   Component Value Date    FOLATE >24.8 (H) 11/07/2019     Lab Results   Component Value Date    [id number] 1262 (H) 11/07/2019         Significant Imaging:    CT head without contrast:    Impression       1.  No evidence of an acute intracranial abnormality.       Stat EEG: IMPRESSION: Severe encephalopathy. Significant muscle artifact.   No Seizures.    Assessment and Plan:    1. New onset Seizures   -repetative mouth movements-EEG was negative for seizure activity  -Will keep the patient on Keppra 500mg BID and vimapt 100mg BID and continue to monitor the patient and AED levels  -Consider MRI brain at a later date  -PT/OT/ST  Patient at baseline, no further need for EEG repeat or LP Patient is clinically better      2.Encephalopahty  -Would like to obtain LP when PT/INR is stable; concern for meningitis   -TSH elevated-management per  IM  -Vit B 1 pending            Seizure precautions:    Patient and/caregiver advised that patients having seizures should not to drive or operate heavy machinery until 6 months seizure free. Also explained that patient is to avoid activities that could be high risk during a seizure, including but not limited to swimming alone, climbing to high levels, and bathing in a tub.         Side effects of seizure medications:     Explained to patient/caregiver that side effects of antiepileptics could include life threatening rashes, severe lab abnormalities, kidney stones, mood changes including depression, weight loss or gain, organ failure, suicidal ideation and death. The patient has been prescribed this medication because seizures have serious risks that in many cases outweight the risks. Advised patient/caregiver to be please be aware of these possible side effects and encouraged them to ask questions if needed.        [phone] within 2 weeks from discharge       Active Diagnoses:    Diagnosis Date Noted POA    PRINCIPAL PROBLEM:  Bacteremia due to Pseudomonas [R78.81] 11/06/2019 Yes    Seizure [R56.9] 11/06/2019 No    Septic shock [A41.9, R65.21] 11/05/2019 Yes    History of MDR Pseudomonas aeruginosa infection [Z86.19] 11/05/2019 Yes    Leukocytosis [D72.829] 11/05/2019 Yes    Volume overload [E87.70] 11/05/2019 Yes    Anasarca [R60.1] 11/05/2019 Yes    Obstructive uropathy [N13.9] 11/05/2019 Yes    Encephalopathy, toxic [G92] 11/05/2019 Yes    C. difficile colitis [A04.72] 10/11/2019 Yes    Acute on chronic diastolic heart failure [I50.33] 10/03/2019 Yes    Sputum culture positive for Pseudomonas [R84.5] 10/03/2019 Yes     Chronic    Essential hypertension [I10] 09/13/2019 Yes    Hyperkalemia [E87.5] 09/13/2019 Yes    GINETTE (acute kidney injury) [N17.9] 09/13/2019 Yes    Enlarging abdominal aortic aneurysm (AAA) [I71.4] 09/13/2019 Yes     Cholelithiases [K80.20] 09/13/2019 Yes    Bilateral nephrolithiasis [N20.0] 09/13/2019 Yes    Hydroureter, left [N13.4] 09/13/2019 Yes    AAA (abdominal aortic aneurysm) without rupture [I71.4] 09/13/2019 Yes    Pseudomonas urinary tract infection [N39.0, B96.5] 09/13/2019 Yes    Coronary artery disease [I25.10] 09/12/2019 Yes    Anemia, chronic disease [D63.8] 09/12/2019 Yes    Ventilator dependence [Z99.11] 09/12/2019 Not Applicable    Hemiparesis affecting dominant side as late effect of stroke [I69.359] 10/14/2017 Not Applicable    PEG (percutaneous endoscopic gastrostomy) status [Z93.1] 03/21/2017 Not Applicable    Acquired hypothyroidism [E03.9] 03/21/2017 Yes    Chronic respiratory failure with hypoxia [J96.11] 12/05/2013 Yes    Tracheostomy in place [Z93.0] 12/05/2013 Not Applicable    Functional quadriplegia [R53.2] 12/05/2013 Yes      Problems Resolved During this Admission:    Diagnosis Date Noted Date Resolved POA    Lactic acidosis [E87.2] 11/05/2019 11/06/2019 Yes       VTE Risk Mitigation (From admission, onward)         Ordered     enoxaparin injection 40 mg  Every 24 hours (non-standard times)      11/05/19 2305     IP VTE HIGH RISK PATIENT  Once      11/05/19 2014              Patient was seen and examined by Dr. Garcia Jimenez.       Shyla Barnhart, [place]  Neurology  [facility]    I, Dr. Garcia Jimenez, have personally seen and examined the patient with my advanced provider and agree with above. I personally did a focused exam, and reviewed all necessary clinical information. I discussed my management plan with my NP and agree with above. At baseline per nursing staff. At baseline. Hold on LP if ok with primary team.

## 2019-11-12 NOTE — PLAN OF CARE
11/12/19 1324   Patient Assessment/Suction   Level of Consciousness (AVPU) alert   Respiratory Effort Normal;Unlabored   All Lung Fields Breath Sounds coarse   $ Suction Charges Inline Suction Procedure Stat Charge   Secretions Amount moderate   Secretions Color yellow   Secretions Characteristics thick   PRE-TX-O2   O2 Device (Oxygen Therapy) ventilator   $ Is the patient on Low Flow Oxygen? Yes   SpO2 99 %   Pulse 63   Resp 16   Vent Select   Charged w/in last 24h YES   Preset Conventional Ventilator Settings   Vent ID [id number]   Vent Type    Ventilation Type VC   Vent Mode Spont   Pressure Support 15 cmH20   spontonius cpap trial ps 15

## 2019-11-12 NOTE — PROGRESS NOTES
Progress Note  Infectious Disease    Admit Date: 11/5/2019   LOS: 7 days    Follow-up For:  Septic shock.     SUBJECTIVE:     11/7   Unable to obtain. Still on pressors. Renal function unchanged. NO bowel movements. No other events. Bld cx just back today with GNR. According to lab, appears to be a Pseudomonas     11/8 Still on pressors but trying to get off. Off sedation and minimally responsive. EEG going today. Added Ampicillin for meningitis coverage. Sputum cx with Serratia, Proteus - Sens to Meropenem, Pseudomonas Sens to Meropenem. Blood cx repeat negative, no diarrhea,   11/09/2019 I discussed with Dr. Antoine yesterday.  Given the history of seizures, we were both concerned about using any antibiotics that may lower seizure threshold.  We are trying to stay away from antibiotics like cefepime, meropenem, levofloxacin.  After discussing it together, I changed meropenem to Zosyn.  Patient is afebrile today.  Patient is very sensitive to pressors; nurse is working on weaning them off, but may not be doable today.  He continues to have yellow whitish secretions.  WBC is markedly improved  44.92-- 31.53-- 16.69- 11.48.  It makes me wonder if some of the WBC elevation could be due to stress, seizures, other than due to infection.  11/10 - still on low dose pressors. Diarrhea started and rectal tube placed   11/11/2019 afebrile.  Does not offer any complaints due to encephalopathy.  Does not have mouth movement today but this is an old finding that he does it on and off.  PEG was clogged but is okay now.   It  it is concerning that new cultures turned + from 11/06 with LNGNR.  Why with his cultures turn positive while he is on appropriate treatment.    11/12:  Interim reviewed and discussed.  Afebrile, hemodynamically stable, requiring very little support from the ventilator.  Secretions are purulent.  He does not attend.  Ultrasound of the kidneys yesterday did reveal a distended bladder.  The Rahman catheter was  advanced and 1200 cc of urine was relieved.  Today is urine is milky and nonbloody but he does have blood at the meatus.  Chest x-ray has improved aeration at the right base      Antibiotics (From admission, onward)    Start     Stop Route Frequency Ordered    11/08/19 2245  piperacillin-tazobactam 4.5 g in dextrose 5 % 100 mL IVPB (ready to mix system)      -- IV Every 8 hours (non-standard times) 11/08/19 2132    11/05/19 1715  vancomycin oral suspension 250 mg      -- Oral 4 times daily 11/05/19 1607            OBJECTIVE:     Vital Signs (Most Recent)  Temp: 98.6 °F (37 °C) (11/12/19 0315)  Pulse: 64 (11/12/19 0600)  Resp: 18 (11/12/19 0600)  BP: 134/72 (11/12/19 0600)  SpO2: 99 % (11/12/19 0600)    Temperature Range Min/Max (Last 24H):  Temp:  [98 °F (36.7 °C)-98.6 °F (37 °C)]     I & O (Last 24H):    Intake/Output Summary (Last 24 hours) at 11/12/2019 0725  Last data filed at 11/12/2019 0400  Gross per 24 hour   Intake 1740 ml   Output 2850 ml   Net -1110 ml       Physical Exam:  General:   awake, does not attend moderately obese, minimally responding to voice .  Pale  HENT:   Head:normocephalic, atraumatic. Nose: Nares normal. Septum midline.     Neck:   tracheostomy and ventilated   Lungs:    diminished at bases right greater than left    Cardio:   Heart: RRR no murmur, click, rub or gallop, no click . Chest Wall: no abnormalities .    Abdomen :   soft and nondistended.  PEG present, some granulation tissue surrounding.  Hypoactive bs    Rectal: not examined, rectal tube in place  Genitalia:  blood at the meatus, urine is cloudy, milky  Skin:  Skin color, texture, turgor normal. Very scaly skin on his feet .Left 5th   toenail associated with an eschar over the distal left 5th toe which I removed and his toenail came off as well revealing a pink granulating base, without purulence  Musculoskeletal: muscle wasting.  no purposeful movement   Extremities:  no cyanosis or edema, or clubbing. Pulses: 2+ and  symmetric.  Onychomycosis  Neuro:  He is awake, he does not attend, he will occasionally track to voice.  No tardive movements at present.  This is the least responsive I have seen him     Lines/Drains:   Left inguinal TLC       Laboratory:  CBC  Recent Labs   Lab 11/12/19  0409   WBC 8.62   RBC 2.81*   HGB 8.2*   HCT 26.1*   *   MCV 93   MCH 29.2   MCHC 31.4*     BMP  Recent Labs   Lab 11/12/19  0409      K 3.8      CO2 26   BUN 57*   CREATININE 2.4*   CALCIUM 8.4*     Microbiology Results (last 7 days)     Procedure Component Value Units Date/Time    Blood culture [[id number]]  (Abnormal)  (Susceptibility) Collected:  11/06/19 1148    Order Status:  Completed Specimen:  Blood from Line, Arterial, Right Updated:  11/12/19 0634     Blood Culture, Routine Gram stain aer bottle: Gram negative rods      Results called to and read back by:Simone Nice RN-3ICU;  11/10/2019        06:25 CJD      PSEUDOMONAS AERUGINOSA    Blood culture [[id number]] Collected:  11/10/19 1508    Order Status:  Completed Specimen:  Blood from Line, Femoral, Left Updated:  11/11/19 2032     Blood Culture, Routine No Growth to date      No Growth to date    IV catheter culture [[id number]]     Order Status:  No result Specimen:  Catheter Tip, NOS     Urine Culture High Risk [[id number]]  (Abnormal) Collected:  11/10/19 0420    Order Status:  Completed Specimen:  Urine, Catheterized Updated:  11/11/19 1031     Urine Culture, Routine PRESUMPTIVE MARCUS ALBICANS  10,000 - 49,999 cfu/ml      Narrative:       Indicated criteria for high risk culture:->Prior to urologic  procedures    Stool culture [[id number]] Collected:  11/10/19 0420    Order Status:  Completed Specimen:  Stool Updated:  11/11/19 0748     Stool Culture Nothing significant to date    Urine Culture High Risk [[id number]]  (Abnormal)  (Susceptibility) Collected:  11/05/19 1926    Order Status:  Completed Specimen:  Urine, Catheterized Updated:  11/09/19 0909      Urine Culture, Routine PSEUDOMONAS AERUGINOSA  > 100,000 cfu/ml        CANDIDA TROPICALIS  > 100,000 cfu/ml  Treatment of asymptomatic candiduria is not recommended (except for   specific populations). Candida isolated in the urine typically   represents colonization. If an indwelling urinary catheter is   present it should be removed or replaced.      Narrative:       Indicated criteria for high risk culture:->Other  Other (specify):->sepsis    Urine culture [[id number]]  (Abnormal)  (Susceptibility) Collected:  11/05/19 1510    Order Status:  Completed Specimen:  Urine Updated:  11/09/19 0906     Urine Culture, Routine PSEUDOMONAS AERUGINOSA  > 100,000 cfu/ml        CANDIDA TROPICALIS  > 100,000 cfu/ml  Treatment of asymptomatic candiduria is not recommended (except for   specific populations). Candida isolated in the urine typically   represents colonization. If an indwelling urinary catheter is   present it should be removed or replaced.      Narrative:       Preferred Collection Type->Urine, Clean Catch  Specimen Source->Urine    Blood Culture #2 **CANNOT BE ORDERED STAT** [[id number]]  (Abnormal)  (Susceptibility) Collected:  11/05/19 1404    Order Status:  Completed Specimen:  Blood from Peripheral, Antecubital, Left Updated:  11/08/19 1308     Blood Culture, Routine Gram stain aer bottle:Gram negative rods      Called Ana Saavedra RN M3 @ 2015 MS 11/06/19      Gram stain reynaldo bottle: Gram positive cocci 11/07/2019  10:47       See previous notification     Comment: Gram stain aer bottle:Gram negative rods  Called Ana Saavedra RN M3 @ 2015 MS 11/06/19, 11/06/2019 20:17           PSEUDOMONAS AERUGINOSA      COAGULASE-NEGATIVE STAPHYLOCOCCUS SPECIES  Organism is a probable contaminant      Narrative:       Gram stain aer bottle:Gram negative rods  Called Ana Saavedra RN M3 @ 2015 MS 11/06/19, 11/06/2019 20:17    Culture, Respiratory with Gram Stain [[id number]]  (Abnormal)  (Susceptibility) Collected:   11/06/19 0420    Order Status:  Completed Specimen:  Respiratory from Sputum Updated:  11/08/19 0911     Respiratory Culture Reduced normal respiratory gabriela      SERRATIA MARCESCENS  Many        PROTEUS MIRABILIS ESBL  Moderate  Results called to and read back by: Martina Murillo RN on M3 re: ESBL  by DRP 11/08/2019  09:10       Gram Stain (Respiratory) >10 epithelial cells per low power field     Gram Stain (Respiratory) Moderate WBC's     Gram Stain (Respiratory) Many Gram positive rods resembling diphtheroids     Gram Stain (Respiratory) Few Gram negative rods    Blood Culture #1 **CANNOT BE ORDERED STAT** [[id number]]  (Abnormal) Collected:  11/05/19 1340    Order Status:  Completed Specimen:  Blood from Peripheral, Upper Arm, Right Updated:  11/08/19 0745     Blood Culture, Routine Gram stain reynaldo bottle: Gram positive cocci      Results called to and read back by: Cali Bustos RN on M3 re:gram pos       cocci in blood cx 11/06/2019  09:58  by DRP      Gram stain aer bottle: Gram negative rods      Results called to and read back by: Martina Murillo RN on M3, RE: GNR in       aerobic bottle  11/07/2019  08:53 vcb      COAGULASE-NEGATIVE STAPHYLOCOCCUS SPECIES  Multiple morphotypes - probable contaminants        PSEUDOMONAS AERUGINOSA  For susceptibility see order #[id number]      Culture, Respiratory with Gram Stain [[id number]]  (Abnormal)  (Susceptibility) Collected:  11/05/19 1444    Order Status:  Completed Specimen:  Respiratory from Tracheal Aspirate Updated:  11/07/19 0838     Respiratory Culture Reduced normal respiratory gabriela      SERRATIA MARCESCENS  Many       Gram Stain (Respiratory) <10 epithelial cells per low power field.     Gram Stain (Respiratory) Few WBC's     Gram Stain (Respiratory) Moderate Gram positive rods resembling diphtheroids     Gram Stain (Respiratory) Rare Gram negative rods    Culture, Respiratory with Gram Stain [[id number]]     Order Status:  No result Specimen:  Respiratory     Blood  culture [[id number]]     Order Status:  Canceled Specimen:  Blood     Clostridium difficile EIA [[id number]]     Order Status:  Canceled Specimen:  Stool           Diagnostic Results:   11/11/2019 renal ultrasound.  Same mild hydronephrosis and nonobstructing renal stones.  Bladder scan 11/11 distended and over 1200cc relieved with advancement of tay    Chest x-ray 11/11/2019Cardiomegaly with moderate elevation the right hemidiaphragm.  There is still slight increase in size of the right pleural effusion and right lower lobe atelectasis  Mild prominence of the pulmonary vasculature suggestive of congestion    CT head 11/06/2019 no acute abnormalities.    CT pelvis 11/06/2019 Stable nonobstructing bilateral renal stones.  Stable left hydronephrosis and hydroureter to the level of the ureteral vesicle junction  Cholelithiasis  Stable infrarenal abdominal aortic aneurysm  Moderate degenerative changes of the spine and hips  Stable atelectasis in lung bases       ASSESSMENT/PLAN:     Severe septic shock, Pseudomonas UTI and bacteremia, improving   Complicated urinary tract infection , bilateral nonobstructing renal stones  Inadequate bladder emptying secondary to?  Tay malposition, repositioned 11/11    Serratia and Proteus ESBL + sputum with stable CXR ,  monitor chest x-ray and sputum production  Candida tropicalis/albicans candiduria  Coag-negative staph in blood cultures 11/05, contaminant  Recent Cdiff colitis, has some minimal loose stools  Chronic respiratory failure , tracheostomy, PEG, right basilar atelectasis  CVA with functional quadraplegia   ARF  Encephalopathy , persistent   seizures.  This patient is high risk for life-threatening deterioration and death secondary to above comorbidities and need for IV treatment.  Poor prognosis.  Discussed with nurse.      PLAN:  - Continue Zosyn ( less risk of decreasing seizure threshold)   - we are not treating sputum cultures at this point because he is not  needing higher FiO2 and chest x-ray fairly stable.  -  obtaining PICC line today and removing groin line  -removing rectal tube  -local care to left 5th toe  -adding Diflucan for milky urine with yeast  -consider repeat CT brain  -very poor prognosis

## 2019-11-12 NOTE — PROGRESS NOTES
Progress Note  Nephrology  11/12/2019    Consult Requested By: Oliver Lott MD    Reason for Consult: GINETTE    Chief Complaint   Patient presents with    Fever    Altered Mental Status       SUBJECTIVE:     History of Present Illness: 80 y/o male patient sent from NH on 11/5, admitted w/septic shock, encephalopathy.  Tay changed 11/6 reportedly with purulent urine.  Concern for obstructed uropathy with infection which also could be causing his GINETTE.  Urology consulted per primary.  Renal function not improving, nephrology consulted for co-management, GINETTE.    11/7  Pt seen and examined.  He had significant hyperkalemia on admit, this has now improved.  Scr 2.5 initially, now up to 2.8.  At last discharge in October, Scr was 1.0.  He is non-oliguric, UOP 5L--polyuria after catheter was exchanged.  Hyponatremic, was 127 yesterday, better today at 131.  CT abd/pelvis showed mild L hydronephrosis, urology is consulted as well.  11/08  Na+ improved.  Renal function stable, on IVF.  UOP only recorded for one shift--850cc.  Electrolytes improved.  NAD noted, on vent.  11/09  Na+ now wnl, Scr bumped to 2.7.  NS at 100cc/hr.  Not responding to verbal stimuli.  UOP 1.8L.  11/10  Scr 2.8, was 2.9 on second lab draw yesterday.  On IVF, CXR looks worse, he is hypotensive.  On vent, NAD noted.  UOP 975cc recorded on one shift only.  11/11 SBP , FIO2 32%, remains on levophed, UOP 1.9L  11/12 VSS, remains on ventilator, off pressors, UOP 2.8L    Renal US: Stable left hydronephrosis with nonobstructing left renal stone., Distended bladder.    Assessment/plan:    1. Septic shock - suspected urinary source (pseudomonas)  - off pressors  - on zosyn- dose for CrCl < 30  - ID note reviewed    2. Obstructive uropathy with L hydronephrosis  - stable as of 11/1    3. GINETTE 2/2 ATN + obstruction  - renal function is improving  - he is nonoliguric  - continue tay  - no nsaids or IV contrast    4. Anemia of inflammation  - defer  transfusion goals to hospitalist    5. Diastolic CHF  - keep off IVFs  - ok with IV lasix PRN    6. Hypokalemia  - replete K PRN    Review of Systems:  Unable to obtain 2/2 encephalopathy    OBJECTIVE:     Vital Signs Range (Last 24H):  Temp:  [98 °F (36.7 °C)-98.6 °F (37 °C)]   Pulse:  [63-76]   Resp:  [0-20]   BP: ()/(51-72)   SpO2:  [95 %-100 %]   Arterial Line BP: (111-148)/(56-70)     Physical Exam:  General- NAD noted  HEENT- ncat, trache present  Neck- supple  CV- Regular rate and rhythm  Resp- upper lobes cta, on vent  GI- abd soft  Extrem- No cyanosis, clubbing, edema, quadraplegic  Derm- skin w/d  Neuro-  Not interactive    Body mass index is 39.47 kg/m².    Laboratory:  CBC:   Recent Labs   Lab 11/12/19  0409   WBC 8.62   RBC 2.81*   HGB 8.2*   HCT 26.1*   *   MCV 93   MCH 29.2   MCHC 31.4*     CMP:   Recent Labs   Lab 11/10/19  1509  11/12/19  0409      < > 113*   CALCIUM 8.4*   < > 8.4*   ALBUMIN 2.1*   < > 2.3*   PROT 6.5  --   --       < > 140   K 4.1   < > 3.8   CO2 26   < > 26      < > 107   BUN 58*   < > 57*   CREATININE 2.7*   < > 2.4*   ALKPHOS 118  --   --    ALT 14  --   --    AST 21  --   --    BILITOT 1.0  --   --     < > = values in this interval not displayed.       Diagnostic Results:  Labs: Reviewed    Sharla Rodriguez MD MPH  [facility] [place]  [address] [place] [address]  [phone] (p)  [phone] (f)

## 2019-11-12 NOTE — PROGRESS NOTES
Critical access hospital  Adult Nutrition   Progress Note (Follow-Up)    SUMMARY     Recommendations/Interventions:    Recommendation/Intervention: Recommend increasing TF rate to goal of 35 ml/hr. Administer Liquacel BID (180 kcal, 32 g pro) as ordered.   Goals: Pt to tolerate TF @ goal rate.   Nutrition Goal Status: progressing towards goal  Communication of RD Recs: reviewed with RN    Reason for Assessment    Reason For Assessment: RD follow-up  Diagnosis: infection/sepsis  Relevant Medical History: NH resident, PEG, trach, COPD, quad, CABG, HTN, BPH  Interdisciplinary Rounds: attended    Nutrition Risk Screen    Nutrition Risk Screen: tube feeding or parenteral nutrition    Nutrition/Diet History    Patient Reported Diet/Restrictions/Preferences: no oral intake  Food Allergies: NKFA  Factors Affecting Nutritional Intake: NPO, on mechanical ventilation  Nutrition Support Formula Prior to Admit: Other (see comments)(Isosource HN )  Nutrition Support Rate Prior to Admit: 70 (ml)  Nutrtion Support Frequency Prior to Admit: ml/hr x 22 hrs (per NH records), FWF: 60 ml/hr  Nutrition Support Provision Prior to Admit: 1848 kcal, 83 g pro, & 1244 ml free h20. + 1320 ml FWF    Anthropometrics    Temp: 98 °F (36.7 °C)  Height Method: Stated  Height: 6' (182.9 cm)  Height (inches): 72 in  Weight Method: Bed Scale  Weight: 132 kg (291 lb 0.1 oz)  Weight (lb): 291.01 lb  Ideal Body Weight (IBW), Male: 178 lb  % Ideal Body Weight, Male (lb): 165.47 lb  BMI (Calculated): 39.5  BMI Grade: 35 - 39.9 - obesity - grade II  Additional Documentation: Tetraplegia (Quadriplegia) Ideal Body Weight (IBW) Adjustment    Weight History:  Wt Readings from Last 10 Encounters:   11/07/19 132 kg (291 lb 0.1 oz)   10/14/19 124.8 kg (275 lb 2.2 oz)   09/12/19 (!) 140.5 kg (309 lb 11.9 oz)   09/13/19 (!) 140.2 kg (309 lb)   10/13/17 110.6 kg (243 lb 13.3 oz)   03/27/17 118.8 kg (262 lb)   03/21/17 119 kg (262 lb 5.6 oz)   05/16/14 (!) 137.8 kg  (303 lb 12.7 oz)   03/06/14 126 kg (277 lb 12.8 oz)   02/27/14 126.8 kg (279 lb 8 oz)       Lab/Procedures/Meds: Pertinent Labs Reviewed  Clinical Chemistry:  Recent Labs   Lab 11/10/19  1509 11/11/19  0432 11/12/19  0409    139 140   K 4.1 3.9 3.8    108 107   CO2 26 25 26    99 113*   BUN 58* 57* 57*   CREATININE 2.7* 2.6* 2.4*   CALCIUM 8.4* 8.5* 8.4*   PROT 6.5  --   --    ALBUMIN 2.1* 2.1* 2.3*   BILITOT 1.0  --   --    ALKPHOS 118  --   --    AST 21  --   --    ALT 14  --   --    ANIONGAP 7* 6* 7*   ESTGFRAFRICA 24.6* 25.8* 28.4*   EGFRNONAA 21.3* 22.3* 24.6*   MG 2.0  --   --    PHOS 3.6 3.8 3.8     CBC:   Recent Labs   Lab 11/12/19  0409   WBC 8.62   RBC 2.81*   HGB 8.2*   HCT 26.1*   *   MCV 93   MCH 29.2   MCHC 31.4*     Inflammatory Labs:  Recent Labs   Lab 11/05/19  1909 11/10/19  0407   CRP 16.69* 16.95*     Thyroid & Parathyroid:  Recent Labs   Lab 11/07/19  0405   TSH 9.980*   FREET4 0.57*       Medications: Pertinent Medications reviewed  Scheduled Meds:   albuterol-ipratropium  3 mL Nebulization Q6H    enoxparin  40 mg Subcutaneous Q24H    fluconazole 40 mg/ml  200 mg Oral Daily    lacosamide (VIMPAT) IVPB  100 mg Intravenous Q12H    levetiracetam IVPB  500 mg Intravenous Q12H    pantoprazole  40 mg Oral Daily    piperacillin-tazobactam (ZOSYN) IVPB  4.5 g Intravenous Q8H    vancomycin  250 mg Oral QID     Continuous Infusions:   norepinephrine bitartrate-D5W Stopped (11/11/19 0500)     PRN Meds:.    Estimated/Assessed Needs    Weight Used For Calorie Calculations: 81 kg (178 lb 9.2 oz)(IBW)  Energy Calorie Requirements (kcal): 0911-3081 kcals (22-25 kcal/kg) per IBW  Energy Need Method: Kcal/kg  Protein Requirements:  g (0.6-0.8 g/kg)  Weight Used For Protein Calculations: 133.6 kg (294 lb 8.6 oz)  Fluid Requirements (mL): 1782 or per MD  Estimated Fluid Requirement Method: RDA Method    Nutrition Prescription Ordered    Current Diet Order: NPO  Current  Nutrition Support Formula Ordered: Nepro  Current Nutrition Support Rate Ordered: 30 (ml)  Current Nutrition Support Frequency Ordered: ml/hr    Evaluation of Received Nutrient/Fluid Intake    Enteral Calories (kcal): 1296  Enteral Protein (gm): 58  Enteral (Free Water) Fluid (mL): 523    Energy Calories Required: not meeting needs  Protein Required: not meeting needs  Fluid Required: meeting needs  Tolerance: tolerating      Intake/Output Summary (Last 24 hours) at 11/12/2019 1442  Last data filed at 11/12/2019 0400  Gross per 24 hour   Intake 1740 ml   Output 2850 ml   Net -1110 ml      Nutrition Risk    Level of Risk/Frequency of Follow-up: high     Dietitian Rounds Brief:    Pt seen for f/u. TF infusing @ 30 ml/hr. Tolerating per RN. No issues reported. Noted propofol off and pt is off pressor support. Recommend advancing to goal rate 35 ml/hr + Liquacel BID to meet estimated needs.    Monitor and Evaluation    Food and Nutrient Intake: enteral nutrition intake  Food and Nutrient Adminstration: enteral and parenteral nutrition administration  Anthropometric Measurements: weight change, weight  Biochemical Data, Medical Tests and Procedures: gastrointestinal profile, glucose/endocrine profile, electrolyte and renal panel  Nutrition-Focused Physical Findings: overall appearance       Nutrition Follow-Up    RD Follow-up?: Yes    Melissa Nelson RD 11/12/2019 2:43 PM

## 2019-11-12 NOTE — PROGRESS NOTES
[facility]  Neurology  Progress Note    Patient Name: Masood Messina  MRN: [id number]  Admission Date: 11/5/2019  Hospital Length of Stay: 6 days  Code Status: DNR   Attending Provider: Yamileth Stuart MD  Primary Care Physician: Jeramie Lombardi MD   Principal Problem:Bacteremia due to Pseudomonas    Subjective:     Interval History: Patient seen and examined with Dr. Garcia Jimenez. Patient Arousable with tactile stimulation, patient turns head. Not consistent with seizure like activity. EEG from yesterday was negative for seizure activity. Still on the Keppra 500mg BIG and Vimpat 100mg BID. AED levels in progress. Obtain  LP when stable; PT still elevated at 18.5; there is concern for meningitis signs and symptoms improved, closer to baseline. Will follow the patient.     Current Neurological Medications:   Scheduled Meds:   albuterol-ipratropium  3 mL Nebulization Q6H    enoxparin  40 mg Subcutaneous Q24H    lacosamide (VIMPAT) IVPB  100 mg Intravenous Q12H    levetiracetam IVPB  500 mg Intravenous Q12H    pantoprazole  40 mg Oral Daily    piperacillin-tazobactam (ZOSYN) IVPB  4.5 g Intravenous Q8H    vancomycin  250 mg Oral QID     Continuous Infusions:   norepinephrine bitartrate-D5W Stopped (11/11/19 0500)     PRN Meds:.      Current Facility-Administered Medications   Medication Dose Route Frequency Provider Last Rate Last Dose    albuterol-ipratropium 2.5 mg-0.5 mg/3 mL nebulizer solution 3 mL  3 mL Nebulization Q6H Yamileth Stuart MD   3 mL at 11/11/19 1912    enoxaparin injection 40 mg  40 mg Subcutaneous Q24H Mauricio Kinsey MD   40 mg at 11/11/19 0527    lacosamide (VIMPAT) 100 mg in sodium chloride 0.9% 100 mL IVPB  100 mg Intravenous Q12H Brain Jimenez  mL/hr at 11/11/19 1855 100 mg at 11/11/19 1855    levetiracetam 500 mg in 0.9 % normal saline 100 mL IVPB (ready to mix system)  500 mg Intravenous Q12H Yamileth Stuart MD   500 mg at 11/11/19 2118    norepinephrine 8  mg in dextrose 5 % 250 mL infusion  0.02 mcg/kg/min Intravenous Continuous Yamileth Stuart MD   Stopped at 11/11/19 0500    pantoprazole EC tablet 40 mg  40 mg Oral Daily Yoel Lopes MD   Stopped at 11/11/19 0600    piperacillin-tazobactam 4.5 g in dextrose 5 % 100 mL IVPB (ready to mix system)  4.5 g Intravenous Q8H Zhane Badillo MD   Stopped at 11/11/19 1814    vancomycin oral suspension 250 mg  250 mg Oral QID Yoel Lopes MD   250 mg at 11/11/19 2118       Review of Systems   Unable to perform ROS: Mental status change     Objective:     Vital Signs (Most Recent):  Temp: 98 °F (36.7 °C) (11/11/19 1912)  Pulse: 65 (11/11/19 2131)  Resp: 18 (11/11/19 2131)  BP: (!) 96/54 (11/11/19 2100)  SpO2: 96 % (11/11/19 2131) Vital Signs (24h Range):  Temp:  [98 °F (36.7 °C)-98.4 °F (36.9 °C)] 98 °F (36.7 °C)  Pulse:  [64-90] 65  Resp:  [0-22] 18  SpO2:  [92 %-100 %] 96 %  BP: ()/(42-70) 96/54  Arterial Line BP: (110-148)/(55-70) 125/59     Weight: 132 kg (291 lb 0.1 oz)  Body mass index is 39.47 kg/m².    Physical Exam   Constitutional: He appears well-developed and well-nourished.   HENT:   Head: Normocephalic and atraumatic.   abnormal repetitive movements of mouth/lip   Eyes: Pupils are equal, round, and reactive to light. EOM are normal.   Neck: Normal range of motion. Neck supple.   Cardiovascular: Normal rate.   Pulmonary/Chest: Breath sounds normal.   Abdominal: He exhibits no distension. There is no tenderness.   Musculoskeletal: Normal range of motion.   Passive ROM normal   Neurological: He displays normal reflexes. No cranial nerve deficit or sensory deficit. He exhibits normal muscle tone. Coordination (RAMON) normal.   Reflex Scores:       Tricep reflexes are 1+ on the right side and 1+ on the left side.       Bicep reflexes are 1+ on the right side and 1+ on the left side.       Brachioradialis reflexes are 1+ on the right side and 1+ on the left side.       Patellar reflexes are 1+ on the right side and 1+  on the left side.       Achilles reflexes are 1+ on the right side and 1+ on the left side.  Skin: Skin is warm and dry. Capillary refill takes less than 2 seconds.       NEUROLOGICAL EXAMINATION:     MENTAL STATUS   Level of consciousness: responsive to painful stimuli    CRANIAL NERVES   Cranial nerves II through XII intact.     CN III, IV, VI   Pupils are equal, round, and reactive to light.  Extraocular motions are normal.     MOTOR EXAM   Muscle bulk: normal  Overall muscle tone: normal    REFLEXES     Reflexes   Right brachioradialis: 1+  Left brachioradialis: 1+  Right biceps: 1+  Left biceps: 1+  Right triceps: 1+  Left triceps: 1+  Right patellar: 1+  Left patellar: 1+  Right achilles: 1+  Left achilles: 1+  Right : 1+  Left : 1+    SENSORY EXAM        Responsive to painful stimuli     GAIT AND COORDINATION        RAMON       Significant Labs:   Lab Results   Component Value Date    CREATININE 2.6 (H) 11/11/2019       Lab Results   Component Value Date    LDLCALC 60.6 (L) 09/13/2019     Lab Results   Component Value Date    TSH 9.980 (H) 11/07/2019     Lab Results   Component Value Date    FOLATE >24.8 (H) 11/07/2019     Lab Results   Component Value Date    GZAFERIA81 1262 (H) 11/07/2019         Significant Imaging:    CT head without contrast:    Impression       1.  No evidence of an acute intracranial abnormality.       Stat EEG: IMPRESSION: Severe encephalopathy. Significant muscle artifact.   No Seizures.    Assessment and Plan:    1. New onset Seizures   -repetative mouth movements-EEG was negative for seizure activity  -Will keep the patient on Keppra 500mg BID and vimapt 100mg BID and continue to monitor the patient and AED levels  -Consider MRI brain at a later date  -PT/OT/ST      2.Encephalopahty  -Would like to obtain LP when PT/INR is stable; concern for meningitis   -TSH elevated-management per IM  -Vit B 1 pending            Seizure precautions:    Patient and/caregiver advised that  patients having seizures should not to drive or operate heavy machinery until 6 months seizure free. Also explained that patient is to avoid activities that could be high risk during a seizure, including but not limited to swimming alone, climbing to high levels, and bathing in a tub.         Side effects of seizure medications:     Explained to patient/caregiver that side effects of antiepileptics could include life threatening rashes, severe lab abnormalities, kidney stones, mood changes including depression, weight loss or gain, organ failure, suicidal ideation and death. The patient has been prescribed this medication because seizures have serious risks that in many cases outweight the risks. Advised patient/caregiver to be please be aware of these possible side effects and encouraged them to ask questions if needed.        Patient is to follow [facility] with [facility] at [phone] within 2 weeks from discharge       Active Diagnoses:    Diagnosis Date Noted POA    PRINCIPAL PROBLEM:  Bacteremia due to Pseudomonas [R78.81] 11/06/2019 Yes    Seizure [R56.9] 11/06/2019 No    Septic shock [A41.9, R65.21] 11/05/2019 Yes    History of MDR Pseudomonas aeruginosa infection [Z86.19] 11/05/2019 Yes    Leukocytosis [D72.829] 11/05/2019 Yes    Volume overload [E87.70] 11/05/2019 Yes    Anasarca [R60.1] 11/05/2019 Yes    Obstructive uropathy [N13.9] 11/05/2019 Yes    Encephalopathy, toxic [G92] 11/05/2019 Yes    C. difficile colitis [A04.72] 10/11/2019 Yes    Acute on chronic diastolic heart failure [I50.33] 10/03/2019 Yes    Sputum culture positive for Pseudomonas [R84.5] 10/03/2019 Yes     Chronic    Essential hypertension [I10] 09/13/2019 Yes    Hyperkalemia [E87.5] 09/13/2019 Yes    GINETTE (acute kidney injury) [N17.9] 09/13/2019 Yes    Enlarging abdominal aortic aneurysm (AAA) [I71.4] 09/13/2019 Yes    Cholelithiases [K80.20] 09/13/2019 Yes    Bilateral nephrolithiasis [N20.0] 09/13/2019 Yes     Hydroureter, left [N13.4] 09/13/2019 Yes    AAA (abdominal aortic aneurysm) without rupture [I71.4] 09/13/2019 Yes    Pseudomonas urinary tract infection [N39.0, B96.5] 09/13/2019 Yes    Coronary artery disease [I25.10] 09/12/2019 Yes    Anemia, chronic disease [D63.8] 09/12/2019 Yes    Ventilator dependence [Z99.11] 09/12/2019 Not Applicable    Hemiparesis affecting dominant side as late effect of stroke [I69.359] 10/14/2017 Not Applicable    PEG (percutaneous endoscopic gastrostomy) status [Z93.1] 03/21/2017 Not Applicable    Acquired hypothyroidism [E03.9] 03/21/2017 Yes    Chronic respiratory failure with hypoxia [J96.11] 12/05/2013 Yes    Tracheostomy in place [Z93.0] 12/05/2013 Not Applicable    Functional quadriplegia [R53.2] 12/05/2013 Yes      Problems Resolved During this Admission:    Diagnosis Date Noted Date Resolved POA    Lactic acidosis [E87.2] 11/05/2019 11/06/2019 Yes       VTE Risk Mitigation (From admission, onward)         Ordered     enoxaparin injection 40 mg  Every 24 hours (non-standard times)      11/05/19 2305     IP VTE HIGH RISK PATIENT  Once      11/05/19 2014              Patient was seen and examined by Dr. Garcia Jimenez.       Shyla Barnhart, EASTON  Neurology  [facility]    I, Dr. Garcia Jimenez, have personally seen and examined the patient with my advanced provider and agree with above. I personally did a focused exam, and reviewed all necessary clinical information. I discussed my management plan with my NP and agree with above. At baseline per nursing staff.   Improving.

## 2019-11-12 NOTE — PLAN OF CARE
Problem: Nutrition Impairment (Mechanical Ventilation, Invasive)  Goal: Optimal Nutrition Delivery  Outcome: Ongoing, Progressing       Recommendation/Intervention:   Recommend increasing TF rate to goal of 35 ml/hr. Administer Liquacel BID (180 kcal, 32 g pro) as ordered.     Goals: Pt to tolerate TF @ goal rate.

## 2019-11-12 NOTE — PROGRESS NOTES
[facility]  Pulmonary / Critical Care Medicine  Progress Note    Subjective     No major issues overnight.  Much more alert this morning and following commands.   Review of Systems:  Review of systems not obtained due to patient factors Tracheostomy and inability to phonate with inflated cuff...    I have personally reviewed the following during today's evaluation:  past medical history, ROS, family history, social history, surgical history, current inpatient medications and drug allergies, as well as vital signs, diagnostic studies and nursing/provider documentation from the past 24 hours.         Objective     VS: Temp:  [98 °F (36.7 °C)-98.6 °F (37 °C)]   Pulse:  [63-77]   Resp:  [0-20]   BP: ()/(51-72)   SpO2:  [95 %-100 %]   Arterial Line BP: (111-148)/(56-70)   Ideal body weight: 77.6 kg (171 lb 1.2 oz)  Adjusted ideal body weight: 99.4 kg (219 lb 0.8 oz)   I/O:   Intake/Output Summary (Last 24 hours) at 11/12/2019 1039  Last data filed at 11/12/2019 0400  Gross per 24 hour   Intake 1740 ml   Output 2850 ml   Net -1110 ml         Vent: SpO2 100%  Vent Mode: A/C  Oxygen Concentration (%):  [32] 32  Resp Rate Total:  [18 br/min-40 br/min] 18 br/min  Vt Set:  [450 mL] 450 mL  PEEP/CPAP:  [5 cmH20] 5 cmH20  Pressure Support:  [0 cmH20] 0 cmH20  Mean Airway Pressure:  [11 woI39-27 cmH20] 12 cmH20     PE Physical Exam   Constitutional: He appears well-developed and well-nourished.   Awake, alert, and following commands.  Nontoxic-appearing. He is obese.   HENT:   Head: Normocephalic.   Right Ear: External ear normal.   Left Ear: External ear normal.   Nose: Nose normal.   Mouth/Throat: Oropharynx is clear and moist. Mallampati Score: III.   Clear oropharynx, moist mucous membranes.   Neck: Normal range of motion. Neck supple. No JVD present. No thyromegaly present.   8.0 cuffed tracheostomy secure in pain.   Cardiovascular: Normal rate, regular rhythm, normal heart sounds and intact distal  pulses. Exam reveals no gallop and no friction rub.   No murmur heard.  Pulmonary/Chest: Normal expansion, symmetric chest wall expansion and breath sounds normal. He has no wheezes. He has no rhonchi. He has no rales.   Abdominal: Soft. Bowel sounds are normal. He exhibits no distension and no mass. There is no rebound.   G-tube secure and functioning.   Musculoskeletal: Normal range of motion. He exhibits no edema, tenderness or deformity.   Lymphadenopathy:     He has no cervical adenopathy.   Neurological: He is alert. He has normal strength. He displays no tremor. No cranial nerve deficit or sensory deficit. He displays no seizure activity. GCS eye subscore is 4. GCS verbal subscore is 1. GCS motor subscore is 6.   Skin: Skin is warm and dry. No rash noted. No cyanosis or erythema. Nails show no clubbing.   Psychiatric: He is slowed.   Nursing note and vitals reviewed.     Lines: Rahman, Gastrostomy, Tracheostomy, TLC, Arterial line         Labs I have personally reviewed and interpreted the following diagnostic studies.  All pertinent labs within the past 24 hours have been reviewed.  Recent Labs   Lab 11/12/19  0409   WBC 8.62   RBC 2.81*   HGB 8.2*   HCT 26.1*   *   MCV 93   MCH 29.2   MCHC 31.4*      K 3.8      CO2 26   BUN 57*   CREATININE 2.4*   ALBUMIN 2.3*       Imaging I have reviewed and interpreted all pertinent imaging results/findings within the past 24 hours.  Chest x-ray:  Persistent hazy opacification of the right hemithorax unchanged compared to film obtained 1 day prior.    Micro I have personally reviewed the available culture data and the findings are as follows.  Blood Culture   Lab Results   Component Value Date    LABBLOO No Growth to date 11/10/2019    LABBLOO No Growth to date 11/10/2019   , Sputum Culture   Lab Results   Component Value Date    GSRESP >10 epithelial cells per low power field 11/06/2019    GSRESP Moderate WBC's 11/06/2019    GSRESP Many Gram positive  rods resembling diphtheroids 11/06/2019    GSRESP Few Gram negative rods 11/06/2019    RESPIRATORYC Reduced normal respiratory gabriela 11/06/2019    RESPIRATORYC SERRATIA MARCESCENS  Many   (A) 11/06/2019    RESPIRATORYC (A) 11/06/2019     PROTEUS MIRABILIS ESBL  Moderate  Results called to and read back by: Martina Murillo RN on M3 re: ESBL  by DRP 11/08/2019  09:10      and Urine Culture    Lab Results   Component Value Date    LABURIN (A) 11/10/2019     PRESUMPTIVE MARCUS ALBICANS  10,000 - 49,999 cfu/ml         Medications Scheduled    albuterol-ipratropium  3 mL Nebulization Q6H    enoxparin  40 mg Subcutaneous Q24H    fluconazole 40 mg/ml  200 mg Oral Daily    lacosamide (VIMPAT) IVPB  100 mg Intravenous Q12H    levetiracetam IVPB  500 mg Intravenous Q12H    pantoprazole  40 mg Oral Daily    piperacillin-tazobactam (ZOSYN) IVPB  4.5 g Intravenous Q8H    vancomycin  250 mg Oral QID      Continuous Infusions:    norepinephrine bitartrate-D5W Stopped (11/11/19 0500)      PRN           Assessment/Plan     Impression:   Slowly improving acute encephalopathy secondary to sepsis from multiple nosocomial infections with drug-resistant organisms.  Clinically improving.    Critical Illness:  Acute coma/unconsciousness not secondary to hypoglycemia    Additional Acute Problems  Patient Active Problem List   Diagnosis    Functional quadriplegia    Chronic respiratory failure with hypoxia    Sepsis    Tracheostomy in place    Hematuria    HCAP (healthcare-associated pneumonia)    PEG (percutaneous endoscopic gastrostomy) status    Acquired hypothyroidism    Hemiparesis affecting dominant side as late effect of stroke    Sacral decubitus ulcer, stage II    Coronary artery disease    Anemia, chronic disease    Ventilator dependence    Acute on chronic diastolic HF (heart failure)    Pseudomonas urinary tract infection    AAA (abdominal aortic aneurysm) without rupture    Enlarging abdominal aortic  aneurysm (AAA)    Essential hypertension    GINETTE (acute kidney injury)    Cholelithiases    Bilateral nephrolithiasis    Hydroureter, left    Sputum culture positive for Pseudomonas    Acute on chronic diastolic heart failure    Hypophosphatemia    Chronic pain    C. difficile colitis    History of MDR Pseudomonas aeruginosa infection    Anasarca    Encephalopathy, toxic    Bacteremia due to Pseudomonas     1. Neuro:  · Encephalopathy improving.  · Seizures resolved.  · Continue Keppra and lacosamide as ordered by Neurology.  · Neurology following recommendations noted.  · Continue to avoid sedating medications and monitor serial neurologic exams.    2. Cardiovascular:  · Hemodynamically stable and no longer requiring vasopressors.  · Discontinue arterial line and femoral TLC.    3. Pulmonary:  · Currently at his baseline chronic hypoxemic/hypercapnic respiratory failure secondary to morbid obesity.  · Chronic radiographic abnormality is UTI and respiratory colonization without compelling evidence acute pneumonia  · Currently tolerating pressure support ventilation.  · Place back on volume control if needed.    4.  FEN/GI:  · No acute issues.  · Continue tube feeds and recommended goal rate.  · Nutrition following recommendations noted.    5. Renal:  · Stable nonoliguric acute kidney injury initially due to obstructive uropathy now with a component residual ATN.  · Nephrology following.  Recommendations noted.  · No compelling indication for urgent renal replacement therapy.  · Continue to avoid nephrotoxins, and renally adjust medication doses as appropriate.    6. Heme/ID:  · Stable anemia of chronic disease  · Pseudomonal bacteremia  · Respiratory tract colonization with Proteus and Serratia  · Improving urinary tract infection with Pseudomonas aeruginosa and Candida tropicalis.  · Currently receiving vancomycin and Zosyn.  · Infectious Disease is following and recommendations  noted.    7. Endocrine:  · No active issues.  · Continue to monitor serial blood glucoses.    Stress Ulcer PPX: Oral PPI  DVT/VTE Ppx:  Prophylactic SQ LMWH  Nutrition:  Enteral feeds at goal rate  Mobilization:  Consult PT/OT.   SAT:   Not currently receiving sedation  SBT:   Placed on pressure support ventilation this morning and currently tolerating it.  CODE Status:  DNR (Do Not Resuscitate)  Disposition:  Unchanged.  Remains critically ill.      Critical Care Time: Approximately >35 minutes    The patient is critically ill due to the following conditions that actively pose threat to life and bodily function:  Acute coma/unconsciousness not secondary to hypoglycemia    The patient is at high risk of death due to central nervous system failure and requiring ongoing treatment including invasive mechanical ventilation, frequent neurologic assessment to prevent further life-threatening deterioration.  Due to a high probability of clinically significant, life threatening deterioration, the patient required my highest level of preparedness to intervene emergently and I personally spent this critical care time directly and personally managing the patient.     Critical care was time spent personally by me on the following activities: Obtaining a history, examination of patient, reviewing pulse oximetry, providing medical care at the patients bedside, developing a treatment plan with patient or surrogate and bedside caregivers, ordering and reviewing laboratory studies, radiographic studies, and pulse oximetry, ordering and performing treatments and interventions, evaluation of patient's response to treatment, discussions with consultants while on the unit and immediately available to the patient, re-evaluation of the patient's condition, and documentation in the medical record.  This critical care time did not overlap with that of any other provider or involve time for any procedures.     Mauricio Kinsey MD  Pulmonary /  Critical Care Medicine  [facility]  11/12/2019  10:39 AM

## 2019-11-12 NOTE — PROGRESS NOTES
Eschar cap removed earlier by MD.      11/12/19 1115        Wound 11/06/19 2000 Non pressure chronic ulcer distal Toe, fifth   Date First Assessed/Time First Assessed: 11/06/19 2000   Pre-existing: Yes  Primary Wound Type: (c) Non pressure chronic ulcer  Side: Left  Orientation: distal  Location: Toe, fifth   Wound Image     Dressing Appearance Open to air   Drainage Amount Small   Drainage Characteristics/Odor Serosanguineous   Appearance Red;Black   Periwound Area Intact   Recommendation: Clean with chlorhexidine/ns.  Pat dry. Apply Medihoney and cover with large band aid daily.

## 2019-11-12 NOTE — PROGRESS NOTES
[facility]  Pulmonary / Critical Care Medicine  Progress Note    S: No major issues overnight.  Remains comatose without any significant changes overall condition.  No longer requiring vasopressors.    Review of Systems:  Review of systems not obtained due to patient factors Acute cryptogenic encephalopathy..    I have personally reviewed the following during today's evaluation:  past medical history, ROS, family history, social history, surgical history, current inpatient medications and drug allergies, as well as vital signs, diagnostic studies and nursing/provider documentation from the past 24 hours.         O: VS: Temp:  [98 °F (36.7 °C)-98.4 °F (36.9 °C)]   Pulse:  [64-90]   Resp:  [0-22]   BP: ()/(42-70)   SpO2:  [92 %-100 %]   Arterial Line BP: (110-148)/(55-70)   Ideal body weight: 77.6 kg (171 lb 1.2 oz)  Adjusted ideal body weight: 99.4 kg (219 lb 0.8 oz)    I/O:   Intake/Output Summary (Last 24 hours) at 11/11/2019 2209  Last data filed at 11/11/2019 1800  Gross per 24 hour   Intake 1340.08 ml   Output 3000 ml   Net -1659.92 ml         Vent: SpO2 96%  Vent Mode: A/C  Oxygen Concentration (%):  [32] 32  Resp Rate Total:  [18 br/min-40 br/min] 18 br/min  Vt Set:  [450 mL] 450 mL  PEEP/CPAP:  [5 cmH20] 5 cmH20  Pressure Support:  [0 cmH20] 0 cmH20  Mean Airway Pressure:  [11 qaD09-18 cmH20] 12 cmH20     PE Physical Exam   Constitutional: He appears well-developed and well-nourished. He is obese.   HENT:   Head: Normocephalic.   Right Ear: External ear normal.   Left Ear: External ear normal.   Nose: Nose normal.   Mouth/Throat: Oropharynx is clear and moist. Mallampati Score: III.   Neck: Normal range of motion. Neck supple. No JVD present. No thyromegaly present.   8.0 cuffed tracheostomy secure in pain.   Cardiovascular: Normal rate, regular rhythm, normal heart sounds and intact distal pulses. Exam reveals no gallop and no friction rub.   No murmur heard.  Pulmonary/Chest: Normal  expansion, symmetric chest wall expansion and breath sounds normal. He has no wheezes. He has no rhonchi. He has no rales.   Abdominal: Soft. Bowel sounds are normal. He exhibits no distension and no mass. There is no rebound.   Musculoskeletal: Normal range of motion. He exhibits no edema, tenderness or deformity.   Lymphadenopathy:     He has no cervical adenopathy.   Neurological: He is unresponsive. He displays no tremor. No cranial nerve deficit. He displays no seizure activity. GCS eye subscore is 1. GCS verbal subscore is 1. GCS motor subscore is 1.   Skin: Skin is warm and dry. No rash noted. No cyanosis or erythema. Nails show no clubbing.   Nursing note and vitals reviewed.     Lines: Rahman, Tracheostomy, Arterial line, Dialysis catheter         Labs I have personally reviewed and interpreted the following diagnostic studies.  All pertinent labs within the past 24 hours have been reviewed.  Recent Labs   Lab 11/11/19  0432 11/11/19  0950   WBC 9.34  --    RBC 2.72*  --    HGB 7.8*  --    HCT 24.9*  --    *  --    MCV 92  --    MCH 28.7  --    MCHC 31.3*  --      --    K 3.9  --      --    CO2 25  --    BUN 57*  --    CREATININE 2.6*  --    ALBUMIN 2.1*  --    INR  --  1.6       Imaging I have reviewed and interpreted all pertinent imaging results/findings within the past 24 hours.  Renal U/S:   Stable left hydronephrosis with nonobstructing left renal stone  Distended bladder with echogenic debris.  A left ureteral jet is identified.    CXR:  My impression:  Slight increase in the right pleural effusion.  Otherwise unchanged compared to previous x-rays    Micro   Blood Culture   Lab Results   Component Value Date    LABBLOO No Growth to date 11/10/2019    LABBLOO No Growth to date 11/10/2019   , Sputum Culture   Lab Results   Component Value Date    GSRESP >10 epithelial cells per low power field 11/06/2019    GSRESP Moderate WBC's 11/06/2019    GSRESP Many Gram positive rods resembling  diphtheroids 11/06/2019    GSRESP Few Gram negative rods 11/06/2019    RESPIRATORYC Reduced normal respiratory gabriela 11/06/2019    RESPIRATORYC SERRATIA MARCESCENS  Many   (A) 11/06/2019    RESPIRATORYC (A) 11/06/2019     PROTEUS MIRABILIS ESBL  Moderate  Results called to and read back by: Martina Murillo RN on M3 re: ESBL  by DRP 11/08/2019  09:10      and Urine Culture    Lab Results   Component Value Date    LABURIN (A) 11/10/2019     PRESUMPTIVE MARCUS ALBICANS  10,000 - 49,999 cfu/ml         Medications Scheduled    albuterol-ipratropium  3 mL Nebulization Q6H    enoxparin  40 mg Subcutaneous Q24H    lacosamide (VIMPAT) IVPB  100 mg Intravenous Q12H    levetiracetam IVPB  500 mg Intravenous Q12H    pantoprazole  40 mg Oral Daily    piperacillin-tazobactam (ZOSYN) IVPB  4.5 g Intravenous Q8H    vancomycin  250 mg Oral QID      Continuous Infusions:    norepinephrine bitartrate-D5W Stopped (11/11/19 0500)      PRN           Assessment/Plan     Impression:   Resolved septic shock with persistent bacteremia, with multidrug resistant organisms isolated from multiple sources.  Persistent cryptogenic acute encephalopathy.    Critical Illness:  Acute coma/unconsciousness not secondary to hypoglycemia    Additional Acute Problems  Patient Active Problem List   Diagnosis    Functional quadriplegia    Chronic respiratory failure with hypoxia    Sepsis    Tracheostomy in place    Hematuria    HCAP (healthcare-associated pneumonia)    PEG (percutaneous endoscopic gastrostomy) status    Acquired hypothyroidism    Hemiparesis affecting dominant side as late effect of stroke    Sacral decubitus ulcer, stage II    Coronary artery disease    Anemia, chronic disease    Ventilator dependence    Acute on chronic diastolic HF (heart failure)    Pseudomonas urinary tract infection    AAA (abdominal aortic aneurysm) without rupture    Enlarging abdominal aortic aneurysm (AAA)    Essential hypertension     Hyperkalemia    GINETTE (acute kidney injury)    Cholelithiases    Bilateral nephrolithiasis    Hydroureter, left    Sputum culture positive for Pseudomonas    Acute on chronic diastolic heart failure    Hypophosphatemia    Chronic pain    C. difficile colitis    Septic shock    History of MDR Pseudomonas aeruginosa infection    Leukocytosis    Volume overload    Anasarca    Obstructive uropathy    Encephalopathy, toxic    Seizure    Bacteremia due to Pseudomonas     1. Neuro:  · Acute soft Laba the persists without any noticeable improvement despite adequate management of new onset seizure disorder.  · Increasingly concerning for neurologic injury sustained secondary to persistent hypoglycemia prior to presentation  · Continue to hold sedating medications.  · Receiving Keppra and lacosamide.  · Neurology following.  · Interventional Radiology consulted for lumbar puncture, but deferring procedure until INR has been corrected.    2. Cardiovascular:  · Resolved septic shock, and no longer requiring vasopressors.  · Re-initiate vasopressors only if MAP < 65 mmHg.    3. Pulmonary:  · Chronic hypercapnic and hypoxemic respiratory failure seems to be at baseline, and well compensated by the ventilator  · Continue volume control ventilation for now without any adjustments in current settings.  · Tolerated pressure support ventilation for approximately 15 min today, but placed back on AC due to low tidal volumes    4.  FEN/GI:  · No acute issues  · Uptitrate enteral feeds to goal.  · Nutrition following.  · Continue to monitor electrolytes on daily basis.    5. Renal:  · Persistent non oliguric acute renal failure  · Known nonobstructing kidney stones without hydronephrosis.  GINETTE secondary to obstructive uropathy initially but now predominantly due to ATN.  Adequate urine output over the past 24 hours.  UOP unchanged.  Most recent chemistry demonstrates unchanged renal function.  No urgent indication for  RRT.  Nephrology following.  Recommendations noted.  Continue to closely monitor chemistry and urine output.  Avoid nephrotoxic agents and adjust medication dosages as appropriate.  · Urology consult seems appropriate this point.    6. Heme/ID:  · Improved septic shock.  · Bacteremia resolved.  · Multiple nosocomial infections with multi-drug resistant organisms  · Infectious Disease is following recommendations noted.  · Currently receiving vancomycin and pip/tazo    7. Endocrine:  · Decent glycemic control the past 24 hr.  · Although difficult to interpret in the setting of an acute illness, is abnormal thyroid function tests warrant up titration of his thyroid hormone supplementation to ensure myxedema coma is not contributing to his encephalopathy.  · Resume levothyroxine and up titrate as needed.  · Continue frequent blood glucose monitoring.    Stress Ulcer PPX: Oral PPI  DVT/VTE Ppx: Prophylactic SQ LMWH  Nutrition:  Enteral feeds at goal rate and Nutrition following.  Mobilization:  Bed rest   SAT:   Performed  SBT:   Failed after 15 minutes secondary to Increasing minute ventilation and an adequate tidal volume.  CODE Status: DNR (Do Not Resuscitate)  Disposition:  Unchanged.  Remains critically ill.      Critical Care Time: Approximately >35 minutes    The patient is critically ill due to the following conditions that actively pose threat to life and bodily function:  Acute coma/unconsciousness not secondary to hypoglycemia    The patient is at high risk of death due to central nervous system failure and requiring ongoing treatment including frequent neurologic assessment to prevent further life-threatening deterioration.  Due to a high probability of clinically significant, life threatening deterioration, the patient required my highest level of preparedness to intervene emergently and I personally spent this critical care time directly and personally managing the patient.     Critical care was time spent  personally by me on the following activities: Obtaining a history, examination of patient, reviewing pulse oximetry, providing medical care at the patients bedside, developing a treatment plan with patient or surrogate and bedside caregivers, ordering and reviewing laboratory studies, radiographic studies, and pulse oximetry, ordering and performing treatments and interventions, evaluation of patient's response to treatment, discussions with consultants while on the unit and immediately available to the patient, re-evaluation of the patient's condition, and documentation in the medical record.  This critical care time did not overlap with that of any other provider or involve time for any procedures.     Mauricio Kinsey MD  Pulmonary / Critical Care Medicine  [facility]  11/11/2019   10:09 PM

## 2019-11-12 NOTE — PLAN OF CARE
11/12/19 0737   Patient Assessment/Suction   Level of Consciousness (AVPU) alert   Respiratory Effort Normal;Unlabored   All Lung Fields Breath Sounds coarse   Rhythm/Pattern, Respiratory assisted mechanically   Cough Frequency with stimulation   Cough Type assisted   Suction Method tracheal   $ Suction Charges Inline Suction Procedure Stat Charge   Secretions Amount moderate   Secretions Color yellow   Secretions Characteristics tenacious   PRE-TX-O2   O2 Device (Oxygen Therapy) ventilator   $ Is the patient on Low Flow Oxygen? Yes   Oxygen Concentration (%) 32   SpO2 99 %   Pulse 65   Resp 18        Surgical Airway Portex Cuffed   No Placement Date or Time found.   Present Prior to Hospital Arrival?: Yes  Type: Tracheostomy  Brand: Portex  Airway Device Size: 7.0  Style: Cuffed   Cuff Inflation? Inflated   Site Assessment Oozing secretions   Site Care Dried   Inner Cannula Care Changed/new   Ties Assessment Clean   Vent Select   $ Ventilator Subsequent 1   Charged w/in last 24h YES   Preset Conventional Ventilator Settings   Vent ID [id number]   Vent Type    Ventilation Type VC   Vent Mode A/C   Set Rate 18 bmp   Vt Set 450 mL   PEEP/CPAP 5 cmH20   Pressure Support 0 cmH20   Waveform RAMP   Peak Flow 70 L/min   Plateau Set/Insp. Hold (sec) 0   Trigger Sensitivity Flow/I-Trigger 3 L/min   Patient Ventilator Parameters   Resp Rate Total 18 br/min   Peak Airway Pressure 38 cmH2O   Mean Airway Pressure 11 cmH20   Plateau Pressure 27 cmH20   Exhaled Vt 510 mL   Total Ve 9.07 mL   I:E Ratio Measured 1:3.80   Tubing ID (mm) 8 mm   Tube Type Trach   Conventional Ventilator Alarms   Alarms On Y   Resp Rate High Alarm 45 br/min   Press High Alarm 60 cmH2O   Apnea Rate 10   Apnea Volume (mL) 0 mL   Apnea Oxygen Concentration  100   Apnea Flow Rate (L/min) 44   T Apnea 20 sec(s)   Ready to Wean/Extubation Screen   FIO2<=50 (chart decimal) 0.32   MV<16L (chart vol.) 9.07   PEEP <=8 (chart #) 5   Ready to Wean Parameters   F/VT  Ratio<105 (RSBI) (!) 35.29   continue therapy q6

## 2019-11-12 NOTE — PLAN OF CARE
This note also relates to the following rows which could not be included:  Oxygen Concentration (%) - Cannot attach notes to unvalidated device data  Ventilation Type - Cannot attach notes to unvalidated device data  Vent Mode - Cannot attach notes to unvalidated device data  Set Rate - Cannot attach notes to unvalidated device data  Vt Set - Cannot attach notes to unvalidated device data  PEEP/CPAP - Cannot attach notes to unvalidated device data  Pressure Support - Cannot attach notes to unvalidated device data  Waveform - Cannot attach notes to unvalidated device data  Peak Flow - Cannot attach notes to unvalidated device data  Plateau Set/Insp. Hold (sec) - Cannot attach notes to unvalidated device data  Trigger Sensitivity Flow/I-Trigger - Cannot attach notes to unvalidated device data  Resp Rate Total - Cannot attach notes to unvalidated device data  Peak Airway Pressure - Cannot attach notes to unvalidated device data  Mean Airway Pressure - Cannot attach notes to unvalidated device data  Plateau Pressure - Cannot attach notes to unvalidated device data  Exhaled Vt - Cannot attach notes to unvalidated device data  Total Ve - Cannot attach notes to unvalidated device data  I:E Ratio Measured - Cannot attach notes to unvalidated device data  Resp Rate High Alarm - Cannot attach notes to unvalidated device data  Press High Alarm - Cannot attach notes to unvalidated device data  Apnea Rate - Cannot attach notes to unvalidated device data  Apnea Volume (mL) - Cannot attach notes to unvalidated device data  Apnea Oxygen Concentration  - Cannot attach notes to unvalidated device data  Apnea Flow Rate (L/min) - Cannot attach notes to unvalidated device data  T Apnea - Cannot attach notes to unvalidated device data       11/11/19 1912   Patient Assessment/Suction   Level of Consciousness (AVPU) responds to voice   Respiratory Effort Normal;Unlabored   Expansion/Accessory Muscles/Retractions no use of accessory muscles    All Lung Fields Breath Sounds coarse   Rhythm/Pattern, Respiratory unlabored;assisted mechanically   Cough Frequency with stimulation   Cough Type assisted   Suction Method tracheal   $ Suction Charges Inline Suction Procedure Stat Charge   Secretions Amount copious   Secretions Color yellow   Secretions Characteristics thick   PRE-TX-O2   O2 Device (Oxygen Therapy) ventilator   $ Is the patient on Low Flow Oxygen? Yes   SpO2 97 %   Pulse 65   Resp 18   Aerosol Therapy   $ Aerosol Therapy Charges Aerosol Treatment   Daily Review of Necessity (SVN) completed   Respiratory Treatment Status (SVN) given   Treatment Route (SVN) in-line   Patient Position (SVN) semi-Arredondo's   Post Treatment Assessment (SVN) increased aeration   Signs of Intolerance (SVN) none   Breath Sounds Post-Respiratory Treatment   Post-treatment Heart Rate (beats/min) 65   Post-treatment Resp Rate (breaths/min) 20        Surgical Airway Portex Cuffed   No Placement Date or Time found.   Present Prior to Hospital Arrival?: Yes  Type: Tracheostomy  Brand: Portex  Airway Device Size: 7.0  Style: Cuffed   Cuff Inflation? Inflated   Vent Select   Conventional Vent Y   Charged w/in last 24h YES   Preset Conventional Ventilator Settings   Vent ID 9   Vent Type    Patient Ventilator Parameters   Tubing ID (mm) 8 mm   Tube Type Trach   Conventional Ventilator Alarms   Alarms On Y

## 2019-11-13 NOTE — PROGRESS NOTES
[facility] Medicine  Progress Note    Patient Name: Masood Messina  MRN: [id number]  Patient Class: IP- Inpatient   Admission Date: 11/5/2019  Length of Stay: 7 days  Attending Physician: Oliver Lott MD  Primary Care Provider: Jeramie Lombardi MD        Subjective:     Principal Problem:Bacteremia due to Pseudomonas        HPI:  79-year-old nursing home resident([place]) with past medical history of chronic respiratory failure status post trach and PEG due to stroke, CAD status post CABG, hypertension, COPD,BPH presented from [facility] due to altered mental status, high fever and significant leukocytosis.  This is his 3rd admission with the same complaint in a span of 45 days  History mainly from charts, nursing home notes and ER MD and staff  At the moment he is getting p.o. vancomycin at correction for C diff colitis  In the emergency room he was found to be severely septic with significant shock and also he was hyperkalemic  His last sputum culture per chart review grew multidrug resistant Pseudomonas  No further information available    Overview/Hospital Course:  Patient admitted with septic shock, encepahalopathy.  Patient admitted to ICU and started on levophed and Broad IV abx.  He is trach dependent.  Pulmonary critical care consulted.  ID consulted.  BCx- 1 bottle growing.  Tay changed 11/6 reportedly with purulent urine.  Concern for obstructed uropathy with infection which also could be causing his GINETTE.  Urology consulted.  Renal function not improving and unclear if this is ATN or the fact that tay wasn't changed until today.  Nephrology consulted.  Patient does not open eyes or respond which I was told is not his baseline.  Discussed with Dr. Kinsey and there is concern for meningitis.  IR consulted for LP but this was deferred as INR is 2.  Continuing po vancomycin as was on this previously for C. Diff. Discussed with Dr. Antoine and will give dose of IV vanc and  IV ampicillin and will review coverage for meningitis as well as other covering infections. Last fever 11/6 at 1430. Reports of intermittent shaking activity.  EEG was done and reported to have seizure activity. Neurology consulted and patient started on keppra and vimpat. Patient is still not waking up or following commands but did try to fight me to keep his eye lids closed 11/7 and I find his pupils more responsive 11/7. Started on propofol. I spoke to his POA and he would not want CPR and thus he has been made DNR but is not comfort care.  11/8 still not responsive.  Renal function worse.  Making facial grimaces and thus video EEG is underway for possible continued seizure activity.  Discussed with Dr. Antoine and Meropenem should provide appropriate meningitis coverage for his possible sources.  11/9 Status is unchanged and not opening eyes or following commands.  Having persistent movements of his mouth.  Seen by Neurology after my visit but per my chart review, this is concerning for further seizure activity and thus repeat EEG obtained.  ID changed meropenem back to zosyn to cover all bases regarding seizure activity though it is not felt this is antibiotic induced. EEG that was done 11/9 was negative for seizure activity.  11/10 patient does spontaneously open his eyes but not purposefully and is not blinking to threat. Started with loose stool and rectal tube placed.  Stool studies ordered.11/11 Patient continues to open his eyes spontaneously but without purpose.  Made a grunting noise but is not conversive or trying to interact.  Per my chart review today, patient's speech has been limited by trach but he was described as trying to speak and interactive though with some cognition deficits- he has not been interactive during this current hospitalization. Chart review also reveals tardive mouth movements which he has been demonstrating intermittently and EEG negative for acute seizure 11/9. Peg transiently  clogged overnight and thus received IV flagyl and rectal vanc when vanc per peg could not be delivered.  Peg easily flushed by RN today. Levophed weaned off at 0500.    Interval History: Pt seen and examines. Pt awake but does not respond to greetings but will randomly look at me. Pt did not show any signs of grimace on examination including abdominal palpation, checking pretiibial edema     Review of Systems   Unable to perform ROS: Patient nonverbal     Objective:     Vital Signs (Most Recent):  Temp: 98.2 °F (36.8 °C) (11/12/19 1913)  Pulse: 68 (11/12/19 2000)  Resp: 14 (11/12/19 2000)  BP: (!) 149/75 (11/12/19 2000)  SpO2: 100 % (11/12/19 2000) Vital Signs (24h Range):  Temp:  [98 °F (36.7 °C)-98.6 °F (37 °C)] 98.2 °F (36.8 °C)  Pulse:  [62-71] 68  Resp:  [8-23] 14  SpO2:  [95 %-100 %] 100 %  BP: ()/(53-76) 149/75  Arterial Line BP: ()/(58-91) 127/76     Weight: 132 kg (291 lb 0.1 oz)  Body mass index is 39.47 kg/m².    Intake/Output Summary (Last 24 hours) at 11/12/2019 2038  Last data filed at 11/12/2019 1901  Gross per 24 hour   Intake 1780 ml   Output 2850 ml   Net -1070 ml      Physical Exam   Constitutional: He appears well-developed. No distress.   Morbidly obese, upper body/abdomen   HENT:   Head: Normocephalic and atraumatic.   Mouth/Throat: Oropharynx is clear and moist.   Trach   Eyes: Pupils are equal, round, and reactive to light. EOM are normal.   Neck: Normal range of motion.   trach   Cardiovascular: Normal rate and regular rhythm.   No murmur heard.  Feeble heart sounds    Pulmonary/Chest: Effort normal and breath sounds normal. He has no wheezes.   Abdominal: Soft. He exhibits no distension.   Obese, peg present  Non tender    Genitourinary:   Genitourinary Comments: Rahman  Rectal tube   Musculoskeletal: He exhibits no edema.   Neurological:   Eyes open but not responding to verbal stimuli,   No blink to threat reflex  Some lip tremors noted   Quadriplegia at baseline  Not following  commands     Skin: No rash noted.   Left fifth toe gangrene  Dry skin feet   Psychiatric: He has a normal mood and affect.   Nursing note and vitals reviewed.      Significant Labs:   A1C: No results for input(s): HGBA1C in the last 4320 hours.  ABGs: No results for input(s): PH, PCO2, HCO3, POCSATURATED, BE, TOTALHB, COHB, METHB, O2HB, POCFIO2 in the last 48 hours.  Blood Culture: No results for input(s): LABBLOO in the last 48 hours.  BMP:   Recent Labs   Lab 11/12/19 0409   *      K 3.8      CO2 26   BUN 57*   CREATININE 2.4*   CALCIUM 8.4*     CBC:   Recent Labs   Lab 11/11/19  0432 11/12/19 0409   WBC 9.34 8.62   HGB 7.8* 8.2*   HCT 24.9* 26.1*   * 133*     Cardiac Markers: No results for input(s): CKMB, MYOGLOBIN, BNP, TROPISTAT in the last 48 hours.  Lactic Acid: No results for input(s): LACTATE in the last 48 hours.  Lipid Panel: No results for input(s): CHOL, HDL, LDLCALC, TRIG, CHOLHDL in the last 48 hours.  Magnesium: No results for input(s): MG in the last 48 hours.  TSH:   Recent Labs   Lab 11/07/19  0405   TSH 9.980*     Urine Culture: No results for input(s): LABURIN in the last 48 hours.    Microbiology Results (last 7 days)     Procedure Component Value Units Date/Time    Blood culture [[id number]] Collected:  11/10/19 1508    Order Status:  Completed Specimen:  Blood from Line, Femoral, Left Updated:  11/12/19 2032     Blood Culture, Routine No Growth to date      No Growth to date      No Growth to date    IV catheter culture [[id number]] Collected:  11/12/19 1352    Order Status:  Sent Specimen:  Catheter Tip, NOS Updated:  11/12/19 1355    Stool culture [[id number]] Collected:  11/10/19 0420    Order Status:  Completed Specimen:  Stool Updated:  11/12/19 4417     Stool Culture No Salmonella,Shigella,Vibrio,Campylobacter,      E coli 0157:H7 isolated.    Blood culture [[id number]]  (Abnormal)  (Susceptibility) Collected:  11/06/19 1148    Order Status:  Completed Specimen:   Blood from Line, Arterial, Right Updated:  11/12/19 0634     Blood Culture, Routine Gram stain aer bottle: Gram negative rods      Results called to and read back by:Simone Nice RN-3ICU;  11/10/2019        06:25 CJD      PSEUDOMONAS AERUGINOSA    Urine Culture High Risk [[id number]]  (Abnormal) Collected:  11/10/19 0420    Order Status:  Completed Specimen:  Urine, Catheterized Updated:  11/11/19 1031     Urine Culture, Routine PRESUMPTIVE MARCUS ALBICANS  10,000 - 49,999 cfu/ml      Narrative:       Indicated criteria for high risk culture:->Prior to urologic  procedures    Urine Culture High Risk [[id number]]  (Abnormal)  (Susceptibility) Collected:  11/05/19 1926    Order Status:  Completed Specimen:  Urine, Catheterized Updated:  11/09/19 0909     Urine Culture, Routine PSEUDOMONAS AERUGINOSA  > 100,000 cfu/ml        CANDIDA TROPICALIS  > 100,000 cfu/ml  Treatment of asymptomatic candiduria is not recommended (except for   specific populations). Candida isolated in the urine typically   represents colonization. If an indwelling urinary catheter is   present it should be removed or replaced.      Narrative:       Indicated criteria for high risk culture:->Other  Other (specify):->sepsis    Urine culture [[id number]]  (Abnormal)  (Susceptibility) Collected:  11/05/19 1510    Order Status:  Completed Specimen:  Urine Updated:  11/09/19 0906     Urine Culture, Routine PSEUDOMONAS AERUGINOSA  > 100,000 cfu/ml        CANDIDA TROPICALIS  > 100,000 cfu/ml  Treatment of asymptomatic candiduria is not recommended (except for   specific populations). Candida isolated in the urine typically   represents colonization. If an indwelling urinary catheter is   present it should be removed or replaced.      Narrative:       Preferred Collection Type->Urine, Clean Catch  Specimen Source->Urine    Blood Culture #2 **CANNOT BE ORDERED STAT** [[id number]]  (Abnormal)  (Susceptibility) Collected:  11/05/19 1404    Order Status:   Completed Specimen:  Blood from Peripheral, Antecubital, Left Updated:  11/08/19 1308     Blood Culture, Routine Gram stain aer bottle:Gram negative rods      Called Ana Saavedra RN M3 @ 2015 MS 11/06/19      Gram stain reynaldo bottle: Gram positive cocci 11/07/2019  10:47       See previous notification     Comment: Gram stain aer bottle:Gram negative rods  Called Ana Saavedra RN M3 @ 2015 MS 11/06/19, 11/06/2019 20:17           PSEUDOMONAS AERUGINOSA      COAGULASE-NEGATIVE STAPHYLOCOCCUS SPECIES  Organism is a probable contaminant      Narrative:       Gram stain aer bottle:Gram negative rods  Called Ana Saavedra RN M3 @ 2015 MS 11/06/19, 11/06/2019 20:17    Culture, Respiratory with Gram Stain [[id number]]  (Abnormal)  (Susceptibility) Collected:  11/06/19 0420    Order Status:  Completed Specimen:  Respiratory from Sputum Updated:  11/08/19 0911     Respiratory Culture Reduced normal respiratory gabriela      SERRATIA MARCESCENS  Many        PROTEUS MIRABILIS ESBL  Moderate  Results called to and read back by: Martina Murillo RN on  re: ESBL  by BREONNA 11/08/2019  09:10       Gram Stain (Respiratory) >10 epithelial cells per low power field     Gram Stain (Respiratory) Moderate WBC's     Gram Stain (Respiratory) Many Gram positive rods resembling diphtheroids     Gram Stain (Respiratory) Few Gram negative rods    Blood Culture #1 **CANNOT BE ORDERED STAT** [[id number]]  (Abnormal) Collected:  11/05/19 1340    Order Status:  Completed Specimen:  Blood from Peripheral, Upper Arm, Right Updated:  11/08/19 0745     Blood Culture, Routine Gram stain reynaldo bottle: Gram positive cocci      Results called to and read back by: Cali Bustos RN on M3 re:gram pos       cocci in blood cx 11/06/2019  09:58  by DRP      Gram stain aer bottle: Gram negative rods      Results called to and read back by: Martina Murillo RN on M3, RE: GNR in       aerobic bottle  11/07/2019  08:53 vcb      COAGULASE-NEGATIVE STAPHYLOCOCCUS  SPECIES  Multiple morphotypes - probable contaminants        PSEUDOMONAS AERUGINOSA  For susceptibility see order #[id number]      Culture, Respiratory with Gram Stain [[id number]]  (Abnormal)  (Susceptibility) Collected:  11/05/19 1444    Order Status:  Completed Specimen:  Respiratory from Tracheal Aspirate Updated:  11/07/19 0838     Respiratory Culture Reduced normal respiratory gabriela      SERRATIA MARCESCENS  Many       Gram Stain (Respiratory) <10 epithelial cells per low power field.     Gram Stain (Respiratory) Few WBC's     Gram Stain (Respiratory) Moderate Gram positive rods resembling diphtheroids     Gram Stain (Respiratory) Rare Gram negative rods    Culture, Respiratory with Gram Stain [[id number]]     Order Status:  No result Specimen:  Respiratory           Significant Imaging: I have reviewed all pertinent imaging results/findings within the past 24 hours.     CXR 11/12/19: Interval placement of a left-sided PICC with tip at the level of the SVC, otherwise unchanged radiograph the chest      Assessment/Plan:      * Bacteremia due to Pseudomonas  IV abx per ID  11/5 BCx Psuedomonas  11/5/19: Urine Cx  Pseudomonas and Candida Tropicalis  11/6 BCx Pseudomonas   11/10 BCx repeated - no growth so far  11/12/19: Repeat blood cx: pending     If continues to grow, clearly demonstrates we don't have source control.       Encephalopathy, toxic  Not at baseline as demonstrated in medical record.  Now opens his eyes spontaneously but not purposefully  Continuing IV abx to treat infectious processes  CT head done with no acute process  Neurology consulted given seizures demonstrated on admission EEG.  Repeat EEGs have not demonstrated further seizure activity.  His mouth movements are well documented on my chart review and thus less concerning to represent seizure activity.  On Anti seizure medication at this time.  Discussed with Dr. Garcia Jimenez and would still like to pursue LP given not improving cognition wise.   I have ordered repeat INR and will maybe need to coordinate with IR for FFP prior to procedure if still high if this is possible.   Was hypoglcyemic on admission per my conversation with Dr. Kinsey. Encephalopathy as results of hypoglycemia?      Anasarca        History of MDR Pseudomonas aeruginosa infection  As above      C. difficile colitis  At the moment patient is on p.o. vancomycin for C diff colitis  Stool reported as formed per nursing on admission but started being loose 11/10.  Rechecking stool studies and these are in progress.      Acute on chronic diastolic heart failure  Stable issue  Will monitor carefully      Sputum culture positive for Pseudomonas  Sputum positive for pseudomonas, serratia, proteus.  Suspecting that these are colonizers on the sputum culture and per ID, not treating as pathogens      Hydroureter, left  Seen on CT scan  Discussed with Dr. Georges by phone and reports no change from previous.  If UCx and BCx continue to grow pseudomonas, will need to ask for re evaluation  Discussed with Dr. Badillo, ID, 11/11 and per our discussion, will do an US to re evaluate       Bilateral nephrolithiasis        Cholelithiases        GINETTE (acute kidney injury)  Acute kidney injury along with hyperkalemia in the background of sepsis/C diff colitis  Concern for obstructive uropathy from tay. Changed 11/6.    Consulted renal as was not improving and they are following  ATN likely in setting of shock  Has turned the corner and renal function starting to improve    Essential hypertension  Admitted in shock.  Antihypertensives held.  Levophed weaned off 11/11 at approximately 0500.  Monitoring and will add back antihypertensives as appropriate.     Enlarging abdominal aortic aneurysm (AAA)        AAA (abdominal aortic aneurysm) without rupture        Pseudomonas urinary tract infection  UCx growing pseudomonas 11/5  ID and critical care following  IV abx per ID  Tay has been changed.      Ventilator  dependence        Anemia, chronic disease  Monitoring cell counts.  Slow trend down.  If continues to trend down, will likely need a transfusion.  No overt blood loss.  AM labs ordered      Coronary artery disease  Chronic, stable issue  Tele monitoring       Hemiparesis affecting dominant side as late effect of stroke  Residuals from previous CVA      Acquired hypothyroidism        PEG (percutaneous endoscopic gastrostomy) status  PEG insitu  PEG tube feeds for nutrition  Transiently clogged overnight 11/10 and received vanc rectally and iV flagyl as could not give po vanc per peg.  Peg flushes easily 11/11 and appears to be in working order.    Tracheostomy in place  Pulmonary following for vent management       Chronic respiratory failure with hypoxia  Patient has got a trach and is vent dependent  Pulmonary managing vent  CXR has remained unchanged with persistent right elevated hemidiaphragm and bilateral infiltrates    Functional quadriplegia  Chronic condition.    No acute issues  Air mattress  Turning        VTE Risk Mitigation (From admission, onward)         Ordered     enoxaparin injection 40 mg  Every 24 hours (non-standard times)      11/05/19 2305     IP VTE HIGH RISK PATIENT  Once      11/05/19 2014                      Oliver Lott MD  Department of Hospital Medicine   [facility]

## 2019-11-13 NOTE — PROGRESS NOTES
Progress Note  Nephrology  11/13/2019    Consult Requested By: Oliver Lott MD    Reason for Consult: GINETTE    Chief Complaint   Patient presents with    Fever    Altered Mental Status       SUBJECTIVE:     History of Present Illness: 80 y/o male patient sent from NH on 11/5, admitted w/septic shock, encephalopathy.  Tay changed 11/6 reportedly with purulent urine.  Concern for obstructed uropathy with infection which also could be causing his GINETTE.  Urology consulted per primary.  Renal function not improving, nephrology consulted for co-management, GINETTE.    11/7  Pt seen and examined.  He had significant hyperkalemia on admit, this has now improved.  Scr 2.5 initially, now up to 2.8.  At last discharge in October, Scr was 1.0.  He is non-oliguric, UOP 5L--polyuria after catheter was exchanged.  Hyponatremic, was 127 yesterday, better today at 131.  CT abd/pelvis showed mild L hydronephrosis, urology is consulted as well.  11/08  Na+ improved.  Renal function stable, on IVF.  UOP only recorded for one shift--850cc.  Electrolytes improved.  NAD noted, on vent.  11/09  Na+ now wnl, Scr bumped to 2.7.  NS at 100cc/hr.  Not responding to verbal stimuli.  UOP 1.8L.  11/10  Scr 2.8, was 2.9 on second lab draw yesterday.  On IVF, CXR looks worse, he is hypotensive.  On vent, NAD noted.  UOP 975cc recorded on one shift only.  11/11 SBP , FIO2 32%, remains on levophed, UOP 1.9L  11/12 VSS, remains on ventilator, off pressors, UOP 2.8L  11/13 -160, stable vent settings, UOP 2.2L    Assessment/plan:    1. Septic shock - suspected urinary source (pseudomonas)  - resolved  - on zosyn- dose for CrCl < 30  - ID note today reviewed    2. Obstructive uropathy with L hydronephrosis  - stable as of 11/1    3. GNIETTE 2/2 ATN + obstruction  - renal function is improving  - he is nonoliguric  - continue tay  - no nsaids or IV contrast    4. Anemia of inflammation  - Hb is improved    5. Diastolic CHF  - his vent settings are  stable but he is getting pretty swollen and is now hypertensive- once I correct metabolic issues, will give IV lasix    6. Hypokalemia  - ordered KCl 40mEq x 1.    7. HTN  - resume coreg     Review of Systems:  Unable to obtain 2/2 ventilator    OBJECTIVE:     Vital Signs Range (Last 24H):  Temp:  [98 °F (36.7 °C)-98.4 °F (36.9 °C)]   Pulse:  [60-74]   Resp:  [14-23]   BP: (132-162)/(68-79)   SpO2:  [98 %-100 %]   Arterial Line BP: ()/(65-91)     Physical Exam:  Constitutional: nad, alert, awake, cannot assess orientation  Heart: rrr, no m/r/g, wwp, + total body edema  Lungs: coarse, no w/r/r/c, no lb  Abdomen: s/nt/nd, +BS    Body mass index is 39.47 kg/m².    Laboratory:  CBC:   Recent Labs   Lab 11/13/19  0348   WBC 9.23   RBC 3.18*   HGB 9.2*   HCT 29.5*      MCV 93   MCH 28.9   MCHC 31.2*     CMP:   Recent Labs   Lab 11/10/19  1509  11/13/19  0348      < > 115*   CALCIUM 8.4*   < > 8.6*   ALBUMIN 2.1*   < > 2.4*   PROT 6.5  --   --       < > 140   K 4.1   < > 3.5   CO2 26   < > 25      < > 105   BUN 58*   < > 52*   CREATININE 2.7*   < > 2.1*   ALKPHOS 118  --   --    ALT 14  --   --    AST 21  --   --    BILITOT 1.0  --   --     < > = values in this interval not displayed.       Diagnostic Results:  Labs: Reviewed    Sharla Rodriguez MD MPH  [facility]  [address] [place] [address]  [place]AM[place] [address]  [phone] (p)  [phone] (f)

## 2019-11-13 NOTE — PROGRESS NOTES
[facility]  Neurology  Progress Note    Patient Name: Masood Messina  MRN: [id number]  Admission Date: 11/5/2019  Hospital Length of Stay: 8 days  Code Status: DNR   Attending Provider: Oliver Lott MD  Primary Care Physician: Jeramie Lombardi MD   Principal Problem:Bacteremia due to Pseudomonas    Subjective:     Interval History: Patient seen and examined with Dr. Garcia Jimenez. Patient alert back to his baseline, negative for seizure activity. Still on the Keppra 500mg BID and Vimpat 100mg BID. AED levels in progress. No longer needs LP. Will continue to monitor the patient.       Current Neurological Medications:   Scheduled Meds:   albuterol-ipratropium  3 mL Nebulization Q6H    carvedilol  3.125 mg Oral BID    enoxparin  40 mg Subcutaneous Q24H    fluconazole 40 mg/ml  200 mg Oral Daily    lacosamide (VIMPAT) IVPB  100 mg Intravenous Q12H    levetiracetam IVPB  500 mg Intravenous Q12H    pantoprazole  40 mg Oral Daily    piperacillin-tazobactam (ZOSYN) IVPB  4.5 g Intravenous Q8H    potassium chloride 10%  40 mEq Oral Once    vancomycin  250 mg Oral QID     Continuous Infusions:   norepinephrine bitartrate-D5W Stopped (11/11/19 0500)     PRN Meds:.      Current Facility-Administered Medications   Medication Dose Route Frequency Provider Last Rate Last Dose    albuterol-ipratropium 2.5 mg-0.5 mg/3 mL nebulizer solution 3 mL  3 mL Nebulization Q6H Yamileth Stuart MD   3 mL at 11/13/19 0752    carvedilol tablet 3.125 mg  3.125 mg Oral BID Sharla Rodriguez MD        enoxaparin injection 40 mg  40 mg Subcutaneous Q24H Mauricio Kinsey MD   40 mg at 11/13/19 0628    fluconazole 40 mg/ml suspension 200 mg  200 mg Oral Daily Oliver Lott MD   200 mg at 11/13/19 0826    lacosamide (VIMPAT) 100 mg in sodium chloride 0.9% 100 mL IVPB  100 mg Intravenous Q12H Brain Jimenez  mL/hr at 11/13/19 0817 100 mg at 11/13/19 0817    levetiracetam 500 mg in 0.9 % normal saline 100 mL IVPB (ready  to mix system)  500 mg Intravenous Q12H Yamileth Stuart MD   500 mg at 11/13/19 0912    norepinephrine 8 mg in dextrose 5 % 250 mL infusion  0.02 mcg/kg/min Intravenous Continuous Yamileth Stuart MD   Stopped at 11/11/19 0500    pantoprazole EC tablet 40 mg  40 mg Oral Daily Yoel Lopes MD   40 mg at 11/13/19 0628    piperacillin-tazobactam 4.5 g in dextrose 5 % 100 mL IVPB (ready to mix system)  4.5 g Intravenous Q8H Zhane Badillo MD 25 mL/hr at 11/13/19 0628 4.5 g at 11/13/19 0628    potassium chloride 10% oral solution 40 mEq  40 mEq Oral Once Sharla Rodriguez MD        vancomycin oral suspension 250 mg  250 mg Oral QID Yoel Lopes MD   250 mg at 11/12/19 2051       Review of Systems   Unable to perform ROS: Mental status change     Objective:     Vital Signs (Most Recent):  Temp: 98.4 °F (36.9 °C) (11/13/19 0700)  Pulse: 74 (11/13/19 0907)  Resp: 19 (11/13/19 0907)  BP: (!) 159/79 (11/13/19 0700)  SpO2: 100 % (11/13/19 0907) Vital Signs (24h Range):  Temp:  [98 °F (36.7 °C)-98.4 °F (36.9 °C)] 98.4 °F (36.9 °C)  Pulse:  [60-74] 74  Resp:  [14-23] 19  SpO2:  [98 %-100 %] 100 %  BP: (132-162)/(68-79) 159/79  Arterial Line BP: ()/(65-91) 176/75     Weight: 132 kg (291 lb 0.1 oz)  Body mass index is 39.47 kg/m².    Physical Exam   Constitutional: He appears well-developed and well-nourished.   HENT:   Head: Normocephalic and atraumatic.   abnormal repetitive movements of mouth/lip   Eyes: Pupils are equal, round, and reactive to light. EOM are normal.   Neck: Normal range of motion. Neck supple.   Cardiovascular: Normal rate.   Pulmonary/Chest: Breath sounds normal.   Abdominal: He exhibits no distension. There is no tenderness.   Musculoskeletal: Normal range of motion.   Passive ROM normal   Neurological: He displays normal reflexes. No cranial nerve deficit or sensory deficit. He exhibits normal muscle tone. Coordination (RAMON) normal.   Reflex Scores:       Tricep reflexes are 1+ on the right side and 1+  on the left side.       Bicep reflexes are 1+ on the right side and 1+ on the left side.       Brachioradialis reflexes are 1+ on the right side and 1+ on the left side.       Patellar reflexes are 1+ on the right side and 1+ on the left side.       Achilles reflexes are 1+ on the right side and 1+ on the left side.  Skin: Skin is warm and dry. Capillary refill takes less than 2 seconds.       NEUROLOGICAL EXAMINATION:     MENTAL STATUS   Level of consciousness: responsive to painful stimuli    CRANIAL NERVES   Cranial nerves II through XII intact.     CN III, IV, VI   Pupils are equal, round, and reactive to light.  Extraocular motions are normal.     MOTOR EXAM   Muscle bulk: normal  Overall muscle tone: normal    REFLEXES     Reflexes   Right brachioradialis: 1+  Left brachioradialis: 1+  Right biceps: 1+  Left biceps: 1+  Right triceps: 1+  Left triceps: 1+  Right patellar: 1+  Left patellar: 1+  Right achilles: 1+  Left achilles: 1+  Right : 1+  Left : 1+    SENSORY EXAM        Responsive to painful stimuli     GAIT AND COORDINATION        RAMON       Significant Labs:   Lab Results   Component Value Date    CREATININE 2.1 (H) 11/13/2019       Lab Results   Component Value Date    LDLCALC 60.6 (L) 09/13/2019     Lab Results   Component Value Date    TSH 9.980 (H) 11/07/2019     Lab Results   Component Value Date    FOLATE >24.8 (H) 11/07/2019     Lab Results   Component Value Date    CLDUJXPU51 1262 (H) 11/07/2019         Significant Imaging:    CT head without contrast:    Impression       1.  No evidence of an acute intracranial abnormality.       Stat EEG: IMPRESSION: Severe encephalopathy. Significant muscle artifact.   No Seizures.    Assessment and Plan:    1. New onset Seizures   -repetative mouth movements-EEG was negative for seizure activity  -Will keep the patient on Keppra 500mg BID and vimapt 100mg BID and continue to monitor the patient and AED levels  -PT/OT/ST  Patient at baseline, no  further need for EEG repeat or LP, patient is clinically stable       2.Encephalopahty  -Improved, back to baseline  -TSH elevated-management per IM  -Vit B 1 WNL      '  Patient is neurologically at his baseline.     Seizure precautions:    Patient and/caregiver advised that patients having seizures should not to drive or operate heavy machinery until 6 months seizure free. Also explained that patient is to avoid activities that could be high risk during a seizure, including but not limited to swimming alone, climbing to high levels, and bathing in a tub.         Side effects of seizure medications:     Explained to patient/caregiver that side effects of antiepileptics could include life threatening rashes, severe lab abnormalities, kidney stones, mood changes including depression, weight loss or gain, organ failure, suicidal ideation and death. The patient has been prescribed this medication because seizures have serious risks that in many cases outweight the risks. Advised patient/caregiver to be please be aware of these possible side effects and encouraged them to ask questions if needed.        Patient is to follow up with [facility] at [phone] within 2 weeks from discharge       Active Diagnoses:    Diagnosis Date Noted POA    PRINCIPAL PROBLEM:  Bacteremia due to Pseudomonas [R78.81] 11/06/2019 Yes    History of MDR Pseudomonas aeruginosa infection [Z86.19] 11/05/2019 Yes    Anasarca [R60.1] 11/05/2019 Yes    Encephalopathy, toxic [G92] 11/05/2019 Yes    C. difficile colitis [A04.72] 10/11/2019 Yes    Acute on chronic diastolic heart failure [I50.33] 10/03/2019 Yes    Sputum culture positive for Pseudomonas [R84.5] 10/03/2019 Yes     Chronic    Essential hypertension [I10] 09/13/2019 Yes    GINETTE (acute kidney injury) [N17.9] 09/13/2019 Yes    Enlarging abdominal aortic aneurysm (AAA) [I71.4] 09/13/2019 Yes    Cholelithiases [K80.20] 09/13/2019 Yes    Bilateral nephrolithiasis  [N20.0] 09/13/2019 Yes    Hydroureter, left [N13.4] 09/13/2019 Yes    AAA (abdominal aortic aneurysm) without rupture [I71.4] 09/13/2019 Yes    Pseudomonas urinary tract infection [N39.0, B96.5] 09/13/2019 Yes    Coronary artery disease [I25.10] 09/12/2019 Yes    Anemia, chronic disease [D63.8] 09/12/2019 Yes    Ventilator dependence [Z99.11] 09/12/2019 Not Applicable    Hemiparesis affecting dominant side as late effect of stroke [I69.359] 10/14/2017 Not Applicable    PEG (percutaneous endoscopic gastrostomy) status [Z93.1] 03/21/2017 Not Applicable    Acquired hypothyroidism [E03.9] 03/21/2017 Yes    Chronic respiratory failure with hypoxia [J96.11] 12/05/2013 Yes    Tracheostomy in place [Z93.0] 12/05/2013 Not Applicable    Functional quadriplegia [R53.2] 12/05/2013 Yes      Problems Resolved During this Admission:    Diagnosis Date Noted Date Resolved POA    Seizure [R56.9] 11/06/2019 11/12/2019 No    Septic shock [A41.9, R65.21] 11/05/2019 11/12/2019 Yes    Leukocytosis [D72.829] 11/05/2019 11/12/2019 Yes    Volume overload [E87.70] 11/05/2019 11/12/2019 Yes    Lactic acidosis [E87.2] 11/05/2019 11/06/2019 Yes    Obstructive uropathy [N13.9] 11/05/2019 11/12/2019 Yes    Hyperkalemia [E87.5] 09/13/2019 11/12/2019 Yes       VTE Risk Mitigation (From admission, onward)         Ordered     enoxaparin injection 40 mg  Every 24 hours (non-standard times)      11/05/19 2305     IP VTE HIGH RISK PATIENT  Once      11/05/19 2014              Patient was seen and examined by Dr. Garcia Jimenez.       Naomi Reis, EAST[facility]  Neurology  [facility]    I, Dr. Garcia Jimenez, have personally seen and examined the patient with my advanced provider and agree with above. I personally did a focused exam, and reviewed all necessary clinical information. I discussed my management plan with my NP and agree with above. At baseline.

## 2019-11-13 NOTE — ASSESSMENT & PLAN NOTE
IV abx per ID  11/5 BCx Psuedomonas  11/5/19: Urine Cx  Pseudomonas and Candida Tropicalis  11/6 BCx Pseudomonas   11/10 BCx repeated - no growth so far  11/12/19: Repeat blood cx: pending     If continues to grow, clearly demonstrates we don't have source control.

## 2019-11-13 NOTE — SUBJECTIVE & OBJECTIVE
Interval History: Pt seen and examines. Pt awake but does not respond to greetings but will randomly look at me. Pt did not show any signs of grimace on examination including abdominal palpation, checking pretiibial edema     Review of Systems   Unable to perform ROS: Patient nonverbal     Objective:     Vital Signs (Most Recent):  Temp: 98.2 °F (36.8 °C) (11/12/19 1913)  Pulse: 68 (11/12/19 2000)  Resp: 14 (11/12/19 2000)  BP: (!) 149/75 (11/12/19 2000)  SpO2: 100 % (11/12/19 2000) Vital Signs (24h Range):  Temp:  [98 °F (36.7 °C)-98.6 °F (37 °C)] 98.2 °F (36.8 °C)  Pulse:  [62-71] 68  Resp:  [8-23] 14  SpO2:  [95 %-100 %] 100 %  BP: ()/(53-76) 149/75  Arterial Line BP: ()/(58-91) 127/76     Weight: 132 kg (291 lb 0.1 oz)  Body mass index is 39.47 kg/m².    Intake/Output Summary (Last 24 hours) at 11/12/2019 2038  Last data filed at 11/12/2019 1901  Gross per 24 hour   Intake 1780 ml   Output 2850 ml   Net -1070 ml      Physical Exam   Constitutional: He appears well-developed. No distress.   Morbidly obese, upper body/abdomen   HENT:   Head: Normocephalic and atraumatic.   Mouth/Throat: Oropharynx is clear and moist.   Trach   Eyes: Pupils are equal, round, and reactive to light. EOM are normal.   Neck: Normal range of motion.   trach   Cardiovascular: Normal rate and regular rhythm.   No murmur heard.  Feeble heart sounds    Pulmonary/Chest: Effort normal and breath sounds normal. He has no wheezes.   Abdominal: Soft. He exhibits no distension.   Obese, peg present  Non tender    Genitourinary:   Genitourinary Comments: Rahman  Rectal tube   Musculoskeletal: He exhibits no edema.   Neurological:   Eyes open but not responding to verbal stimuli,   No blink to threat reflex  Some lip tremors noted   Quadriplegia at baseline  Not following commands     Skin: No rash noted.   Left fifth toe gangrene  Dry skin feet   Psychiatric: He has a normal mood and affect.   Nursing note and vitals  reviewed.      Significant Labs:   A1C: No results for input(s): HGBA1C in the last 4320 hours.  ABGs: No results for input(s): PH, PCO2, HCO3, POCSATURATED, BE, TOTALHB, COHB, METHB, O2HB, POCFIO2 in the last 48 hours.  Blood Culture: No results for input(s): LABBLOO in the last 48 hours.  BMP:   Recent Labs   Lab 11/12/19  0409   *      K 3.8      CO2 26   BUN 57*   CREATININE 2.4*   CALCIUM 8.4*     CBC:   Recent Labs   Lab 11/11/19  0432 11/12/19  0409   WBC 9.34 8.62   HGB 7.8* 8.2*   HCT 24.9* 26.1*   * 133*     Cardiac Markers: No results for input(s): CKMB, MYOGLOBIN, BNP, TROPISTAT in the last 48 hours.  Lactic Acid: No results for input(s): LACTATE in the last 48 hours.  Lipid Panel: No results for input(s): CHOL, HDL, LDLCALC, TRIG, CHOLHDL in the last 48 hours.  Magnesium: No results for input(s): MG in the last 48 hours.  TSH:   Recent Labs   Lab 11/07/19  0405   TSH 9.980*     Urine Culture: No results for input(s): LABURIN in the last 48 hours.    Microbiology Results (last 7 days)     Procedure Component Value Units Date/Time    Blood culture [[id number]] Collected:  11/10/19 1508    Order Status:  Completed Specimen:  Blood from Line, Femoral, Left Updated:  11/12/19 2032     Blood Culture, Routine No Growth to date      No Growth to date      No Growth to date    IV catheter culture [[id number]] Collected:  11/12/19 1352    Order Status:  Sent Specimen:  Catheter Tip, NOS Updated:  11/12/19 1355    Stool culture [[id number]] Collected:  11/10/19 0420    Order Status:  Completed Specimen:  Stool Updated:  11/12/19 0757     Stool Culture No Salmonella,Shigella,Vibrio,Campylobacter,      E coli 0157:H7 isolated.    Blood culture [[id number]]  (Abnormal)  (Susceptibility) Collected:  11/06/19 1148    Order Status:  Completed Specimen:  Blood from Line, Arterial, Right Updated:  11/12/19 0634     Blood Culture, Routine Gram stain aer bottle: Gram negative rods      Results called  to and read back by:Simone Nice RN-3ICU;  11/10/2019        06:25 CJD      PSEUDOMONAS AERUGINOSA    Urine Culture High Risk [[id number]]  (Abnormal) Collected:  11/10/19 0420    Order Status:  Completed Specimen:  Urine, Catheterized Updated:  11/11/19 1031     Urine Culture, Routine PRESUMPTIVE MARCUS ALBICANS  10,000 - 49,999 cfu/ml      Narrative:       Indicated criteria for high risk culture:->Prior to urologic  procedures    Urine Culture High Risk [[id number]]  (Abnormal)  (Susceptibility) Collected:  11/05/19 1926    Order Status:  Completed Specimen:  Urine, Catheterized Updated:  11/09/19 0909     Urine Culture, Routine PSEUDOMONAS AERUGINOSA  > 100,000 cfu/ml        CANDIDA TROPICALIS  > 100,000 cfu/ml  Treatment of asymptomatic candiduria is not recommended (except for   specific populations). Candida isolated in the urine typically   represents colonization. If an indwelling urinary catheter is   present it should be removed or replaced.      Narrative:       Indicated criteria for high risk culture:->Other  Other (specify):->sepsis    Urine culture [[id number]]  (Abnormal)  (Susceptibility) Collected:  11/05/19 1510    Order Status:  Completed Specimen:  Urine Updated:  11/09/19 0906     Urine Culture, Routine PSEUDOMONAS AERUGINOSA  > 100,000 cfu/ml        CANDIDA TROPICALIS  > 100,000 cfu/ml  Treatment of asymptomatic candiduria is not recommended (except for   specific populations). Candida isolated in the urine typically   represents colonization. If an indwelling urinary catheter is   present it should be removed or replaced.      Narrative:       Preferred Collection Type->Urine, Clean Catch  Specimen Source->Urine    Blood Culture #2 **CANNOT BE ORDERED STAT** [[id number]]  (Abnormal)  (Susceptibility) Collected:  11/05/19 1404    Order Status:  Completed Specimen:  Blood from Peripheral, Antecubital, Left Updated:  11/08/19 1308     Blood Culture, Routine Gram stain aer bottle:Gram negative  rods      Called Ana Saavedra RN M3 @ 2015 MS 11/06/19      Gram stain reynaldo bottle: Gram positive cocci 11/07/2019  10:47       See previous notification     Comment: Gram stain aer bottle:Gram negative rods  Called Ana Saavedra RN M3 @ 2015 MS 11/06/19, 11/06/2019 20:17           PSEUDOMONAS AERUGINOSA      COAGULASE-NEGATIVE STAPHYLOCOCCUS SPECIES  Organism is a probable contaminant      Narrative:       Gram stain aer bottle:Gram negative rods  Called Ana Saavedra RN M3 @ 2015 MS 11/06/19, 11/06/2019 20:17    Culture, Respiratory with Gram Stain [[id number]]  (Abnormal)  (Susceptibility) Collected:  11/06/19 0420    Order Status:  Completed Specimen:  Respiratory from Sputum Updated:  11/08/19 0911     Respiratory Culture Reduced normal respiratory gabriela      SERRATIA MARCESCENS  Many        PROTEUS MIRABILIS ESBL  Moderate  Results called to and read back by: Martina Murillo RN on M3 re: ESBL  by DRP 11/08/2019  09:10       Gram Stain (Respiratory) >10 epithelial cells per low power field     Gram Stain (Respiratory) Moderate WBC's     Gram Stain (Respiratory) Many Gram positive rods resembling diphtheroids     Gram Stain (Respiratory) Few Gram negative rods    Blood Culture #1 **CANNOT BE ORDERED STAT** [[id number]]  (Abnormal) Collected:  11/05/19 1340    Order Status:  Completed Specimen:  Blood from Peripheral, Upper Arm, Right Updated:  11/08/19 0745     Blood Culture, Routine Gram stain reynaldo bottle: Gram positive cocci      Results called to and read back by: Cali Bustos RN on M3 re:gram pos       cocci in blood cx 11/06/2019  09:58  by DRP      Gram stain aer bottle: Gram negative rods      Results called to and read back by: Martina Murillo RN on M3, RE: GNR in       aerobic bottle  11/07/2019  08:53 vcb      COAGULASE-NEGATIVE STAPHYLOCOCCUS SPECIES  Multiple morphotypes - probable contaminants        PSEUDOMONAS AERUGINOSA  For susceptibility see order #[id number]      Culture, Respiratory with Gram  Stain [[id number]]  (Abnormal)  (Susceptibility) Collected:  11/05/19 1444    Order Status:  Completed Specimen:  Respiratory from Tracheal Aspirate Updated:  11/07/19 0838     Respiratory Culture Reduced normal respiratory gabriela      SERRATIA MARCESCENS  Many       Gram Stain (Respiratory) <10 epithelial cells per low power field.     Gram Stain (Respiratory) Few WBC's     Gram Stain (Respiratory) Moderate Gram positive rods resembling diphtheroids     Gram Stain (Respiratory) Rare Gram negative rods    Culture, Respiratory with Gram Stain [[id number]]     Order Status:  No result Specimen:  Respiratory           Significant Imaging: I have reviewed all pertinent imaging results/findings within the past 24 hours.     CXR 11/12/19: Interval placement of a left-sided PICC with tip at the level of the SVC, otherwise unchanged radiograph the chest

## 2019-11-13 NOTE — PROGRESS NOTES
79 yr old male     11/13/19 0941        Pressure Injury 11/13/19 0939 Left lateral Foot Suspected Unstageable   Date First Assessed/Time First Assessed: 11/13/19 0939   Pressure Injury Present on Admission: suspected hospital acquired  Side: Left  Orientation: lateral  Location: Foot  Is this injury device related?: (c)   Staging: Suspected Unstageable   Wound Image     Staging Suspected Unstageable   Dressing Appearance Open to air   Drainage Amount None   Appearance Gray;Blistered  (grey fluid filled blood? blister)   Periwound Area Blistered   Wound Length (cm) 9 cm   Wound Width (cm) 3 cm   Wound Surface Area (cm^2) 27 cm^2   Care Cleansed with:;Antimicrobial agent   Dressing Island/border     On madi big turn bed with heel boots in place. HOB at 45 for tube feeding.

## 2019-11-13 NOTE — PROGRESS NOTES
Progress Note  Infectious Disease    Admit Date: 11/5/2019   LOS: 8 days    Follow-up For:  Septic shock.     SUBJECTIVE:     11/7   Unable to obtain. Still on pressors. Renal function unchanged. NO bowel movements. No other events. Bld cx just back today with GNR. According to lab, appears to be a Pseudomonas     11/8 Still on pressors but trying to get off. Off sedation and minimally responsive. EEG going today. Added Ampicillin for meningitis coverage. Sputum cx with Serratia, Proteus - Sens to Meropenem, Pseudomonas Sens to Meropenem. Blood cx repeat negative, no diarrhea,   11/09/2019 I discussed with Dr. Antoine yesterday.  Given the history of seizures, we were both concerned about using any antibiotics that may lower seizure threshold.  We are trying to stay away from antibiotics like cefepime, meropenem, levofloxacin.  After discussing it together, I changed meropenem to Zosyn.  Patient is afebrile today.  Patient is very sensitive to pressors; nurse is working on weaning them off, but may not be doable today.  He continues to have yellow whitish secretions.  WBC is markedly improved  44.92-- 31.53-- 16.69- 11.48.  It makes me wonder if some of the WBC elevation could be due to stress, seizures, other than due to infection.  11/10 - still on low dose pressors. Diarrhea started and rectal tube placed   11/11/2019 afebrile.  Does not offer any complaints due to encephalopathy.  Does not have mouth movement today but this is an old finding that he does it on and off.  PEG was clogged but is okay now.   It  it is concerning that new cultures turned + from 11/06 with LNGNR.  Why with his cultures turn positive while he is on appropriate treatment.    11/12:  Interim reviewed and discussed.  Afebrile, hemodynamically stable, requiring very little support from the ventilator.  Secretions are purulent.  He does not attend.  Ultrasound of the kidneys yesterday did reveal a distended bladder.  The Rahman catheter was  advanced and 1200 cc of urine was relieved.  Today is urine is milky and nonbloody but he does have blood at the meatus.  Chest x-ray has improved aeration at the right base  11/13: picc placed, groin line removed, still bleeding from meatus. Urine is milky. No change in vent needs. No more responsive than yesterday.       Antibiotics (From admission, onward)    Start     Stop Route Frequency Ordered    11/08/19 2245  piperacillin-tazobactam 4.5 g in dextrose 5 % 100 mL IVPB (ready to mix system)      -- IV Every 8 hours (non-standard times) 11/08/19 2132    11/05/19 1715  vancomycin oral suspension 250 mg      -- Oral 4 times daily 11/05/19 1607            OBJECTIVE:     Vital Signs (Most Recent)  Temp: 98.4 °F (36.9 °C) (11/13/19 0700)  Pulse: 67 (11/13/19 0700)  Resp: (!) 22 (11/13/19 0700)  BP: (!) 159/79 (11/13/19 0700)  SpO2: 100 % (11/13/19 0700)    Temperature Range Min/Max (Last 24H):  Temp:  [98 °F (36.7 °C)-98.4 °F (36.9 °C)]     I & O (Last 24H):    Intake/Output Summary (Last 24 hours) at 11/13/2019 0735  Last data filed at 11/12/2019 1901  Gross per 24 hour   Intake 1000 ml   Output 2200 ml   Net -1200 ml       Physical Exam:  General:   awake, does not attend moderately obese, minimally responding to voice .  Pale  HENT:   Head:normocephalic, atraumatic. Nose: Nares normal. Septum midline.     Neck:   tracheostomy and ventilated   Lungs:    diminished at bases right greater than left, secretions green    Cardio:   Heart: RRR no murmur, click, rub or gallop, no click . Chest Wall: no abnormalities .    Abdomen :   soft and nondistended.  PEG present,   Hypoactive bs  , tolerating tube feeds  Rectal: not examined, rectal tube in place  Genitalia:  blood at the meatus, urine is cloudy, milky  Skin:  Skin color, texture, turgor normal. Very scaly skin on his feet .Left 5th   toenail associated with an eschar over the distal left 5th toe which I removed and his toenail came off as well revealing a pink  granulating base, without purulence  Musculoskeletal: muscle wasting.  no purposeful movement   Extremities:  no cyanosis or edema, or clubbing. Pulses: 2+ and symmetric.  Onychomycosis  Neuro:  He is awake, he does not attend,  .  No tardive movements at present.  This is the least responsive I have seen him an dhe is not at baseline     Lines/Drains:   picc in place 11/12       Laboratory:  CBC  Recent Labs   Lab 11/13/19  0348   WBC 9.23   RBC 3.18*   HGB 9.2*   HCT 29.5*      MCV 93   MCH 28.9   MCHC 31.2*     BMP  Recent Labs   Lab 11/13/19  0348      K 3.5      CO2 25   BUN 52*   CREATININE 2.1*   CALCIUM 8.6*     Microbiology Results (last 7 days)     Procedure Component Value Units Date/Time    IV catheter culture [[id number]]  (Abnormal) Collected:  11/12/19 1352    Order Status:  Completed Specimen:  Catheter Tip, NOS Updated:  11/13/19 0711     Aerobic Culture - Cath tip PRESUMPTIVE PSEUDOMONAS SPECIES  Too numerous to count  Identification and susceptibility pending      Blood culture [[id number]] Collected:  11/10/19 1508    Order Status:  Completed Specimen:  Blood from Line, Femoral, Left Updated:  11/12/19 2032     Blood Culture, Routine No Growth to date      No Growth to date      No Growth to date    Stool culture [[id number]] Collected:  11/10/19 0420    Order Status:  Completed Specimen:  Stool Updated:  11/12/19 0757     Stool Culture No Salmonella,Shigella,Vibrio,Campylobacter,      E coli 0157:H7 isolated.    Blood culture [[id number]]  (Abnormal)  (Susceptibility) Collected:  11/06/19 1148    Order Status:  Completed Specimen:  Blood from Line, Arterial, Right Updated:  11/12/19 0634     Blood Culture, Routine Gram stain aer bottle: Gram negative rods      Results called to and read back by:Simone Nice RN-3ICU;  11/10/2019        06:25 CJD      PSEUDOMONAS AERUGINOSA    Urine Culture High Risk [[id number]]  (Abnormal) Collected:  11/10/19 0420    Order Status:  Completed  Specimen:  Urine, Catheterized Updated:  11/11/19 1031     Urine Culture, Routine PRESUMPTIVE MARCUS ALBICANS  10,000 - 49,999 cfu/ml      Narrative:       Indicated criteria for high risk culture:->Prior to urologic  procedures    Urine Culture High Risk [[id number]]  (Abnormal)  (Susceptibility) Collected:  11/05/19 1926    Order Status:  Completed Specimen:  Urine, Catheterized Updated:  11/09/19 0909     Urine Culture, Routine PSEUDOMONAS AERUGINOSA  > 100,000 cfu/ml        CANDIDA TROPICALIS  > 100,000 cfu/ml  Treatment of asymptomatic candiduria is not recommended (except for   specific populations). Candida isolated in the urine typically   represents colonization. If an indwelling urinary catheter is   present it should be removed or replaced.      Narrative:       Indicated criteria for high risk culture:->Other  Other (specify):->sepsis    Urine culture [[id number]]  (Abnormal)  (Susceptibility) Collected:  11/05/19 1510    Order Status:  Completed Specimen:  Urine Updated:  11/09/19 0906     Urine Culture, Routine PSEUDOMONAS AERUGINOSA  > 100,000 cfu/ml        CANDIDA TROPICALIS  > 100,000 cfu/ml  Treatment of asymptomatic candiduria is not recommended (except for   specific populations). Candida isolated in the urine typically   represents colonization. If an indwelling urinary catheter is   present it should be removed or replaced.      Narrative:       Preferred Collection Type->Urine, Clean Catch  Specimen Source->Urine    Blood Culture #2 **CANNOT BE ORDERED STAT** [[id number]]  (Abnormal)  (Susceptibility) Collected:  11/05/19 1404    Order Status:  Completed Specimen:  Blood from Peripheral, Antecubital, Left Updated:  11/08/19 1308     Blood Culture, Routine Gram stain aer bottle:Gram negative rods      Called Ana Saavedra RN M3 @ 2015 MS 11/06/19      Gram stain reynaldo bottle: Gram positive cocci 11/07/2019  10:47       See previous notification     Comment: Gram stain aer bottle:Gram negative  rods  Called Ana Saavedra RN M3 @ 2015 MS 11/06/19, 11/06/2019 20:17           PSEUDOMONAS AERUGINOSA      COAGULASE-NEGATIVE STAPHYLOCOCCUS SPECIES  Organism is a probable contaminant      Narrative:       Gram stain aer bottle:Gram negative rods  Called Ana Saavedra RN M3 @ 2015 MS 11/06/19, 11/06/2019 20:17    Culture, Respiratory with Gram Stain [[id number]]  (Abnormal)  (Susceptibility) Collected:  11/06/19 0420    Order Status:  Completed Specimen:  Respiratory from Sputum Updated:  11/08/19 0911     Respiratory Culture Reduced normal respiratory gabriela      SERRATIA MARCESCENS  Many        PROTEUS MIRABILIS ESBL  Moderate  Results called to and read back by: Martina Murillo RN on M3 re: ESBL  by DRP 11/08/2019  09:10       Gram Stain (Respiratory) >10 epithelial cells per low power field     Gram Stain (Respiratory) Moderate WBC's     Gram Stain (Respiratory) Many Gram positive rods resembling diphtheroids     Gram Stain (Respiratory) Few Gram negative rods    Blood Culture #1 **CANNOT BE ORDERED STAT** [[id number]]  (Abnormal) Collected:  11/05/19 1340    Order Status:  Completed Specimen:  Blood from Peripheral, Upper Arm, Right Updated:  11/08/19 0745     Blood Culture, Routine Gram stain reynaldo bottle: Gram positive cocci      Results called to and read back by: Cali Bustos RN on M3 re:gram pos       cocci in blood cx 11/06/2019  09:58  by DRP      Gram stain aer bottle: Gram negative rods      Results called to and read back by: Martina Murillo RN on M3, RE: GNR in       aerobic bottle  11/07/2019  08:53 vcb      COAGULASE-NEGATIVE STAPHYLOCOCCUS SPECIES  Multiple morphotypes - probable contaminants        PSEUDOMONAS AERUGINOSA  For susceptibility see order #[id number]      Culture, Respiratory with Gram Stain [[id number]]  (Abnormal)  (Susceptibility) Collected:  11/05/19 1444    Order Status:  Completed Specimen:  Respiratory from Tracheal Aspirate Updated:  11/07/19 0838     Respiratory Culture Reduced  normal respiratory gabriela      SERRATIA MARCESCENS  Many       Gram Stain (Respiratory) <10 epithelial cells per low power field.     Gram Stain (Respiratory) Few WBC's     Gram Stain (Respiratory) Moderate Gram positive rods resembling diphtheroids     Gram Stain (Respiratory) Rare Gram negative rods          Diagnostic Results:   11/11/2019 renal ultrasound.  Same mild hydronephrosis and nonobstructing renal stones.  Bladder scan 11/11 distended and over 1200cc relieved with advancement of tay    Chest x-ray 11/11/2019Cardiomegaly with moderate elevation the right hemidiaphragm.  There is still slight increase in size of the right pleural effusion and right lower lobe atelectasis  Mild prominence of the pulmonary vasculature suggestive of congestion    CT head 11/06/2019 no acute abnormalities.    CT pelvis 11/06/2019 Stable nonobstructing bilateral renal stones.  Stable left hydronephrosis and hydroureter to the level of the ureteral vesicle junction  Cholelithiasis  Stable infrarenal abdominal aortic aneurysm  Moderate degenerative changes of the spine and hips  Stable atelectasis in lung bases       ASSESSMENT/PLAN:     Severe septic shock, Pseudomonas UTI and bacteremia, improving   Complicated urinary tract infection , bilateral nonobstructing renal stones  Inadequate bladder emptying secondary to?  Tay malposition, repositioned 11/11    Serratia and Proteus ESBL + sputum with stable CXR ,  monitor chest x-ray and sputum production  Candida tropicalis/albicans candiduria  Coag-negative staph in blood cultures 11/05, contaminant  Recent Cdiff colitis, has some minimal loose stools  Left 5th toe callous/nail removed with ulceration and pressure injury left lateral foot    Chronic respiratory failure , tracheostomy, PEG, right basilar atelectasis  CVA with functional quadraplegia   ARF  Encephalopathy , persistent   seizures.  This patient is high risk for life-threatening deterioration and death secondary to  above comorbidities and need for IV treatment.  Poor prognosis.  Discussed with nurse.      PLAN:  - Continue Zosyn ( less risk of decreasing seizure threshold) he will need this for another 5-7 days and a very long taper of oral vanc  - we are not treating sputum cultures at this point because he is not needing higher FiO2 and chest x-ray fairly stable.     -local care to left 5th toe an dpressure sore left lateral foot,, wound care  -continue Diflucan for milky urine with yeast  -consider repeat CT brain to look for evolution of a CVA  -very poor prognosis  Uncomfortable with transfer back today

## 2019-11-14 PROBLEM — L89.93 PRESSURE ULCER, STAGE 3: Status: ACTIVE | Noted: 2019-01-01

## 2019-11-14 NOTE — CONSULTS
[facility]  Podiatry  Consult Note    Patient Name: Masood Messina  MRN: [id number]  Admission Date: 11/5/2019  Hospital Length of Stay: 9 days  Attending Physician: Oliver Lott MD  Primary Care Provider: Jeramie Lombardi MD     Consults  Subjective:     History of Present Illness:   Information obtained from nurse and patient's chart due to patient being mostly unresponsive.     Patient is an 79-year-old male, nursing home resident([facility]),  with functional quadriplegia and chronic respiratory failure with trach dependence.  He was admitted last week for septic shock.  Since being in the hospital, he developed a pressure injury to the plantar lateral left foot. Patient has been   Consistently wearing heel offloading boots since admission.     Plan is for patient to be discharged back to nursing home today.    Scheduled Meds:   albuterol-ipratropium  3 mL Nebulization Q6H    balsam peru-castor oil   Topical (Top) Daily    carvedilol  3.125 mg Oral Daily    enoxparin  40 mg Subcutaneous Q24H    fluconazole 40 mg/ml  200 mg Oral Daily    lacosamide (VIMPAT) IVPB  100 mg Intravenous Q12H    levetiracetam IVPB  500 mg Intravenous Q12H    pantoprazole  40 mg Oral Daily    piperacillin-tazobactam (ZOSYN) IVPB  4.5 g Intravenous Q8H    vancomycin  250 mg Oral QID     Continuous Infusions:   norepinephrine bitartrate-D5W Stopped (11/11/19 0500)     PRN Meds:    Review of patient's allergies indicates:   Allergen Reactions    Codeine Other (See Comments)        Past Medical History:   Diagnosis Date    AAA (abdominal aortic aneurysm)     Anemia     BPH (benign prostatic hyperplasia)     CHF (congestive heart failure)     COPD (chronic obstructive pulmonary disease)     Coronary artery disease     Dementia     Dementia     Depression     GERD (gastroesophageal reflux disease)     Hyperlipidemia     Hypertension     Hypothyroid     Renal disorder     Respiratory failure, chronic      Ventilator dependence      Past Surgical History:   Procedure Laterality Date    ABDOMINAL SURGERY      CARDIAC SURGERY  1999    GASTROSTOMY TUBE PLACEMENT      SPINE SURGERY      TRACHEOSTOMY TUBE PLACEMENT         Family History     None        Tobacco Use    Smoking status: Former Smoker    Smokeless tobacco: Never Used   Substance and Sexual Activity    Alcohol use: No    Drug use: No    Sexual activity: Never     Review of Systems  Objective:     Vital Signs (Most Recent):  Temp: 97.4 °F (36.3 °C) (11/14/19 0800)  Pulse: 64 (11/14/19 1122)  Resp: 18 (11/14/19 1122)  BP: (!) 168/84 (11/14/19 0800)  SpO2: 95 % (11/14/19 1122) Vital Signs (24h Range):  Temp:  [96 °F (35.6 °C)-97.4 °F (36.3 °C)] 97.4 °F (36.3 °C)  Pulse:  [54-84] 64  Resp:  [14-23] 18  SpO2:  [91 %-100 %] 95 %  BP: (100-168)/(58-96) 168/84     Weight: 132 kg (291 lb 0.1 oz)  Body mass index is 39.47 kg/m².    Foot Exam  Pressure injury plantar lateral left foot, with fluid filled blood blister.    Measures approximately 9 cm x 3 cm.  No depth.  Minimal surrounding erythema.  No surrounding edema.      Post nail avulsion of left 5th toe,  done by Dr. Tyson, with a pink granular base underneath.      Toenails 1st, 2nd, 3rd, 4th, 5th  bilateral are hypertrophic, dystrophic, discolored tanish brown with tan, gray crumbly subungual debris.  Tender to distal nail plate pressure, without periungual skin abnormality of each.      Laboratory:  All pertinent labs reviewed within the last 24 hours.     Diagnostic Results:  I have reviewed all pertinent imaging results/findings within the past 24 hours.          Assessment/Plan:     No new Assessment & Plan notes have been filed under this hospital service since the last note was generated.  Service: Podiatry    Wound care to apply Medihoney to plantar blisters daily, and Betadine in between toes for the maceration. Discussed this with his nurse.      Patient to continue wearing heel offloading  boots at all times while lying or sitting        Thank you for your consult. I will sign off. Please contact us if you have any additional questions.    Yin Morris DPM  Podiatry  [facility]

## 2019-11-14 NOTE — DISCHARGE SUMMARY
Pending sale to [facility]  Discharge Summary      Patient Name: Masood Messina  MRN: [id number]  Admission Date: 11/5/2019  Hospital Length of Stay: 9 days  Discharge Date and Time:  11/14/2019 3:02 PM  Attending Physician: Oliver Lott MD   Discharging Provider: Oliver Lott MD  Primary Care Provider: Jeramie Lombardi MD      HPI:   79-year-old nursing home resident([place]) with past medical history of chronic respiratory failure status post trach and PEG due to stroke, CAD status post CABG, hypertension, COPD,BPH presented from [facility] due to altered mental status, high fever and significant leukocytosis.  This is his 3rd admission with the same complaint in a span of 45 days  History mainly from charts, nursing home notes and ER MD and staff  At the moment he is getting p.o. vancomycin at [facility] for C diff colitis  In the emergency room he was found to be severely septic with significant shock and also he was hyperkalemic  His last sputum culture per chart review grew multidrug resistant Pseudomonas  No further information available    * No surgery found *      Hospital Course:   Patient admitted with septic shock, encepahalopathy.  Patient admitted to ICU and started on levophed and Broad IV abx.  He is trach dependent.  Pulmonary critical care consulted.  ID consulted.  BCx- 1 bottle growing.  Tay changed 11/6 reportedly with purulent urine.  Concern for obstructed uropathy with infection which also could be causing his GINETTE.  Urology consulted.  Renal function not improving and unclear if this is ATN or the fact that tay wasn't changed until today.  Nephrology consulted.  Patient does not open eyes or respond which I was told is not his baseline.  Discussed with Dr. Kinsey and there is concern for meningitis.  IR consulted for LP but this was deferred as INR is 2.  Continuing po vancomycin as was on this previously for C. Diff. Discussed with Dr. Antoine and will give dose  of IV vanc and IV ampicillin and will review coverage for meningitis as well as other covering infections. Last fever 11/6 at 1430. Reports of intermittent shaking activity.  EEG was done and reported to have seizure activity. Neurology consulted and patient started on keppra and vimpat. Patient is still not waking up or following commands but did try to fight me to keep his eye lids closed 11/7 and I find his pupils more responsive 11/7. Started on propofol. I spoke to his POA and he would not want CPR and thus he has been made DNR but is not comfort care.  11/8 still not responsive.  Renal function worse.  Making facial grimaces and thus video EEG is underway for possible continued seizure activity.  Discussed with Dr. Antoine and Meropenem should provide appropriate meningitis coverage for his possible sources.  11/9 Status is unchanged and not opening eyes or following commands.  Having persistent movements of his mouth.  Seen by Neurology after my visit but per my chart review, this is concerning for further seizure activity and thus repeat EEG obtained.  ID changed meropenem back to zosyn to cover all bases regarding seizure activity though it is not felt this is antibiotic induced. EEG that was done 11/9 was negative for seizure activity.  11/10 patient does spontaneously open his eyes but not purposefully and is not blinking to threat. Started with loose stool and rectal tube placed.  Stool studies ordered.11/11 Patient continues to open his eyes spontaneously but without purpose.  Made a grunting noise but is not conversive or trying to interact.  Per my chart review today, patient's speech has been limited by trach but he was described as trying to speak and interactive though with some cognition deficits- he has not been interactive during this current hospitalization. Chart review also reveals tardive mouth movements which he has been demonstrating intermittently and EEG negative for acute seizure 11/9. Peg  transiently clogged overnight and thus received IV flagyl and rectal vanc when vanc per peg could not be delivered.  Peg easily flushed by RN today. Levophed weaned off 11/12/19 at 0500.  Pt started showing some improvement and then on 11/14/19, pt is alert to his baseline  Patient to continue PEG feeding  ID Dr Tyson recommended patient to continue Zosyn till 11/19/2019 (renally dose) for his bacteremia with Pseudomonas and urine infection with Pseudomonas, also to continue Diflucan for 14 days for urinary Candida tropicalis infection, recommended change Rahman in 1 week, elevate scrotum to decrease swelling  We are not treating sputum cultures at this point because he is not needing higher FiO2 and chest x-ray fairly stable  Patient to continue oral vancomycin for his C diff diarrhea and prevention and relapse and will need a long taper (vancomycin 125 mg 4 times a day for 2 weeks, then 3 times a day for 1 weeks then, then 2 times a day for 1 week then 1 times a day for 1 week and then 1 every alternate day for 2 weeks)  Neurology Dr. Garcia Jimenez.  Started patient on Vimpat and Keppra for seizures like activity on initial EEG on admission which patient will needs to continue.  Patient to continue wound care/local care for pressure sore on the left lateral foot, also need pressure relief off of left 5th toe    Physical exam on the day of discharge  Constitutional: He appears well-developed. No distress.   Morbidly obese, upper body/abdomen   HENT:   Head: Normocephalic and atraumatic.   Mouth/Throat: Oropharynx is clear and moist.   Trach   Eyes: Pupils are equal, round, and reactive to light. EOM are normal.   Neck: Normal range of motion.   trach   Cardiovascular: Normal rate and regular rhythm.   No murmur heard.  Feeble heart sounds    Pulmonary/Chest: Effort normal and breath sounds normal. He has no wheezes.   Abdominal: Soft. He exhibits no distension.   Obese, peg present  Non tender    Genitourinary:    Genitourinary Comments: Rahman  Rectal tube   Musculoskeletal: He exhibits no edema.   Neurological:   sleeping and not responding to verbal stimuli,   No blink to threat reflex  Some lip tremors noted   Quadriplegia at baseline  Not following commands  Skin: No rash noted.   Left fifth toe gangrene, blood filled blister left lateral, plantar surface  Dry skin feet        Consults:   Consults (From admission, onward)        Status Ordering Provider     Inpatient consult to Hospitalist  Once     Provider:  Yoel Lopes MD    Acknowledged YOEL LOPES     Inpatient consult to Hospitalist  Once     Provider:  Yoel Lopes MD    Acknowledged YOEL LOPES     Inpatient consult to Infectious Diseases  Once     Provider:  Cyndy Tyson MD    Completed YOEL LOPES     Inpatient consult to Nephrology  Once     Provider:  Mohinder Barnes MD    Completed JOSE ARMANDO SCOTT     Inpatient consult to Neurology  Once     Provider:  Brain Jimenez MD    Completed YOEL LOPES     Inpatient consult to Neurology  Once     Provider:  Brain Jimenez MD    Acknowledged LENNY ANAND     Inpatient consult to PICC team ([facility])  Once     Provider:  (Not yet assigned)    Acknowledged RUPINDER MANN     Inpatient consult to Podiatry  Once     Provider:  Yin Morris DPM    Acknowledged PATY ARAUZ     Inpatient consult to Pulmonology  Once     Provider:  Chrissie Duarte MD    Completed YOEL LOPES     Inpatient consult to Registered Dietitian/Nutritionist  Once     Provider:  (Not yet assigned)    Completed JOSE ARMANDO SCOTT     Inpatient consult to Registered Dietitian/Nutritionist  Once     Provider:  (Not yet assigned)    Completed CHRISSIE DUARTE     Inpatient consult to Urology  Once     Provider:  Dione Valles MD    Acknowledged CHRISSIE DUARTE          No new Assessment & Plan notes have been filed under this hospital service since the last note was generated.  Service: Hospital Medicine    Final Active Diagnoses:    Diagnosis Date Noted POA     PRINCIPAL PROBLEM:  Bacteremia due to Pseudomonas [R78.81] 11/06/2019 Yes    Pressure ulcer, stage 3 [L89.93] 11/14/2019 Unknown    History of MDR Pseudomonas aeruginosa infection [Z86.19] 11/05/2019 Yes    Anasarca [R60.1] 11/05/2019 Yes    Encephalopathy, toxic [G92] 11/05/2019 Yes    C. difficile colitis [A04.72] 10/11/2019 Yes    Acute on chronic diastolic heart failure [I50.33] 10/03/2019 Yes    Sputum culture positive for Pseudomonas [R84.5] 10/03/2019 Yes     Chronic    Essential hypertension [I10] 09/13/2019 Yes    GINETTE (acute kidney injury) [N17.9] 09/13/2019 Yes    Enlarging abdominal aortic aneurysm (AAA) [I71.4] 09/13/2019 Yes    Cholelithiases [K80.20] 09/13/2019 Yes    Bilateral nephrolithiasis [N20.0] 09/13/2019 Yes    Hydroureter, left [N13.4] 09/13/2019 Yes    AAA (abdominal aortic aneurysm) without rupture [I71.4] 09/13/2019 Yes    Pseudomonas urinary tract infection [N39.0, B96.5] 09/13/2019 Yes    Coronary artery disease [I25.10] 09/12/2019 Yes    Anemia, chronic disease [D63.8] 09/12/2019 Yes    Ventilator dependence [Z99.11] 09/12/2019 Not Applicable    Hemiparesis affecting dominant side as late effect of stroke [I69.359] 10/14/2017 Not Applicable    PEG (percutaneous endoscopic gastrostomy) status [Z93.1] 03/21/2017 Not Applicable    Acquired hypothyroidism [E03.9] 03/21/2017 Yes    Chronic respiratory failure with hypoxia [J96.11] 12/05/2013 Yes    Tracheostomy in place [Z93.0] 12/05/2013 Not Applicable    Functional quadriplegia [R53.2] 12/05/2013 Yes      Problems Resolved During this Admission:    Diagnosis Date Noted Date Resolved POA    Seizure [R56.9] 11/06/2019 11/12/2019 No    Septic shock [A41.9, R65.21] 11/05/2019 11/12/2019 Yes    Leukocytosis [D72.829] 11/05/2019 11/12/2019 Yes    Volume overload [E87.70] 11/05/2019 11/12/2019 Yes    Lactic acidosis [E87.2] 11/05/2019 11/06/2019 Yes    Obstructive uropathy [N13.9] 11/05/2019 11/12/2019 Yes     Hyperkalemia [E87.5] 09/13/2019 11/12/2019 Yes       Discharged Condition: stable    Disposition: Another Health Care Inst*    Follow Up:    Patient Instructions:   No discharge procedures on file.    Significant Diagnostic Studies: Labs:   CMP   Recent Labs   Lab 11/13/19 0348 11/14/19 0418    141   K 3.5 4.0    105   CO2 25 27   * 109   BUN 52* 48*   CREATININE 2.1* 1.9*   CALCIUM 8.6* 8.9   ALBUMIN 2.4* 2.5*   ANIONGAP 10 9   ESTGFRAFRICA 33.4* 37.7*   EGFRNONAA 28.9* 32.6*   , CBC   Recent Labs   Lab 11/13/19 0348 11/14/19 0418   WBC 9.23 11.28   HGB 9.2* 9.7*   HCT 29.5* 31.3*    217    and INR   Lab Results   Component Value Date    INR 1.6 11/11/2019    INR 1.6 11/09/2019    INR 1.7 11/08/2019       Pending Diagnostic Studies:     Procedure Component Value Units Date/Time    Lacosamide (Vimpat) [[id number]] Collected:  11/12/19 0409    Order Status:  Sent Lab Status:  In process Updated:  11/12/19 0446    Specimen:  Blood          Medications:  Reconciled Home Medications:      Medication List      START taking these medications    balsam peru-castor oil Oint  Apply topically once daily.  Start taking on:  November 15, 2019     fluconazole 40 mg/ml 40 mg/mL suspension  Commonly known as:  DIFLUCAN  Take 5 mLs (200 mg total) by mouth once daily. for 14 days  Start taking on:  November 15, 2019     lacosamide 200 mg Tab tablet  Commonly known as:  VIMPAT  1 tablet (200 mg total) by Per G Tube route every 12 (twelve) hours.     levETIRAcetam 500 MG Tab  Commonly known as:  KEPPRA  1 tablet (500 mg total) by Per G Tube route 2 (two) times daily.     PIPERACILLIN-TAZOBACTAM 4.5G/100ML D5W IVPB (READY TO MIX)  Inject 100 mLs (4.5 g total) into the vein every 8 (eight) hours. for 5 days        CHANGE how you take these medications    furosemide 20 MG tablet  Commonly known as:  LASIX  Take 1 tablet (20 mg total) by mouth once daily.  What changed:  how to take this        CONTINUE taking  these medications    acetaminophen 160 mg/5 mL Elix  Commonly known as:  TYLENOL  650 mg by Per G Tube route every 4 (four) hours as needed.     baclofen 20 MG tablet  Commonly known as:  LIORESAL  25 mg by PEG Tube route every 6 (six) hours.     BANATROL PLUS Pwpk  Generic drug:  banana flakes-t-galactooligos.  Take 1 packet by mouth 2 (two) times daily. Mix with water     BIOTENE DRY MOUTH MM  Swish and spit 15 mLs 4 (four) times daily. AND/OR PRN     busPIRone 5 MG Tab  Commonly known as:  BUSPAR  5 mg by Per G Tube route 2 (two) times daily.     carvedilol 3.125 MG tablet  Commonly known as:  COREG  Take 1 tablet (3.125 mg total) by mouth 2 (two) times daily.     CERTAVITE-ANTIOXID (IRON GLUC) 9 mg iron/ 15 mL (15 mL) Liqd  Generic drug:  multivit-min-ferrous gluconate  15 mLs by Per G Tube route once daily.     chlorhexidine 4 % external liquid  Commonly known as:  HIBICLENS  Apply 1 application topically 3 (three) times a week. On bath days     cholestyramine-aspartame 4 gram Pwpk  Commonly known as:  QUESTRAN LIGHT  1 packet (4 g total) by Per G Tube route 2 (two) times daily. Discontinue if no bowel movement in a day     DULoxetine 30 MG capsule  Commonly known as:  CYMBALTA  30 mg by PEG Tube route once daily.     DUONEB 2.5 mg-0.5 mg/3 mL nebulizer solution  Generic drug:  albuterol-ipratropium  Take 3 mLs by nebulization every 6 (six) hours as needed for Wheezing or Shortness of Breath.     enoxaparin 40 mg/0.4 mL Syrg  Commonly known as:  LOVENOX  Inject 40 mg into the skin once daily.     ferrous sulfate 220 mg (44 mg iron)/5 mL solution  220 mg by Per G Tube route once daily.     * finasteride 5 mg tablet  Commonly known as:  PROSCAR  5 mg by PEG Tube route once daily.     * finasteride 5 mg tablet  Commonly known as:  PROSCAR  Take 1 tablet (5 mg total) by mouth once daily.     gabapentin 300 MG capsule  Commonly known as:  NEURONTIN  300 mg by Per G Tube route 3 (three) times daily.     *  HYDROcodone-acetaminophen  mg per tablet  Commonly known as:  NORCO  Take 1 tablet by mouth every 12 (twelve) hours as needed for Pain.     * HYDROcodone-acetaminophen  mg per tablet  Commonly known as:  NORCO  1 tablet by Per G Tube route every 6 (six) hours as needed for Pain.     ibuprofen 600 MG tablet  Commonly known as:  ADVIL,MOTRIN  Take 600 mg by mouth every 6 (six) hours as needed for Pain.     ISOSOURCE HN FEEDING TUBE  70 mLs by FEEDING TUBE route once daily. 70 ml per hour x22 hrs daily. Hold 1 hr before and after giving synthroid     levothyroxine 200 MCG tablet  Commonly known as:  SYNTHROID  Take 1 tablet (200 mcg total) by mouth once daily.     lidocaine 5 % Oint ointment  Commonly known as:  XYLOCAINE  Apply 1 g topically as needed (with each trach change).     nitroGLYCERIN 0.4 MG SL tablet  Commonly known as:  NITROSTAT  Place 0.4 mg under the tongue every 5 (five) minutes as needed for Chest pain. Seek medical help if pain is not relieved by the third dose.     polyethylene glycol 17 gram Pwpk  Commonly known as:  GLYCOLAX  17 g by Per G Tube route every evening.     potassium chloride 10% 20 mEq/15 mL oral solution  Commonly known as:  KAYCIEL  Take 10 mEq by mouth once daily. Per g tube     PROCTOZONE-HC 2.5 % rectal cream  Generic drug:  hydrocortisone  Place 1 application rectally 2 (two) times daily as needed for Hemorrhoids.     PROSTATE HEALTH ORAL  Take 30 mLs by mouth 2 (two) times daily.     RISAMINE 0.44-20.6 % Oint  Generic drug:  menthol-zinc oxide  Apply 1 application topically once daily. AFTER EACH DIAPER CHANGE     rivastigmine 9.5 mg/24 hr Pt24  Commonly known as:  EXELON  Place 1 patch onto the skin once daily.     Saccharomyces boulardii 250 mg capsule  Commonly known as:  FLORASTOR  Take 250 mg by mouth 2 (two) times daily.     selenium sulfide 2.5 % Lotn  Apply 1 application topically twice a week. ON Tuesday AND SATURDAY     terazosin 1 MG capsule  Commonly  known as:  HYTRIN  1 mg by PEG Tube route once daily.     traZODone 100 MG tablet  Commonly known as:  DESYREL  100 mg by Per G Tube route every evening.     vancomycin 250mg / 10ml Susp  Take 5 mLs (125 mg total) by mouth every 6 (six) hours for 7 days, THEN 5 mLs (125 mg total) 2 (two) times daily for 7 days, THEN 5 mLs (125 mg total) once daily for 7 days, THEN 5 mLs (125 mg total) Every 3 (three) days.  Start taking on:  October 14, 2019     VITAMIN C 500 mg/5 mL Syrp syrup  Generic drug:  ascorbic acid (vitamin C)  500 mg by PEG Tube route 2 (two) times daily.         * This list has 4 medication(s) that are the same as other medications prescribed for you. Read the directions carefully, and ask your doctor or other care provider to review them with you.                Indwelling Lines/Drains at time of discharge:   Lines/Drains/Airways     Peripherally Inserted Central Catheter Line                 PICC Double Lumen 11/12/19 0920 left cephalic 2 days          Drain                 Gastrostomy/Enterostomy -- days         Gastrostomy/Enterostomy  Gastrostomy tube w/ balloon;Gastrostomy-jejunostomy midline feeding -- days         Gastrostomy/Enterostomy 1335 Percutaneous endoscopic gastrostomy (PEG) LUQ feeding -- days         Fecal Incontinence  10/07/19 1546 38 days         Urethral Catheter 11/06/19 1200 8 days         Rectal Tube 11/07/19 1900 fecal management system 6 days          Airway                 Surgical Airway Portex Cuffed -- days         Surgical Airway Portex Cuffed;Fenestrated -- days          Pressure Ulcer                 Pressure Injury 11/06/19 1430  Buttocks  8 days         Pressure Injury 11/13/19 0939 Left lateral Foot Suspected Unstageable 1 day                Time spent on the discharge of patient: 39 minutes  Patient was seen and examined on the date of discharge and determined to be suitable for discharge.         Oliver Lott MD  Department of Hospital Medicine  [place]  [facility]

## 2019-11-14 NOTE — PROGRESS NOTES
[facility]    Progress Note    Patient Name: Masood Messina  MRN: [id number]  Patient Class: IP- Inpatient   Admission Date: 11/5/2019  1:25 PM  Length of Stay: 8  Attending Physician: PATY ARAUZ MD  Primary Care Provider: Jeramie Lombardi MD  Face-to-Face encounter date: 11/13/2019  Chief Complaint: Fever and Altered Mental Status         Patient ID: Masood Messina is a 79 y.o. male admitted for   Active Hospital Problems    Diagnosis  POA    *Bacteremia due to Pseudomonas [R78.81]  Yes    History of MDR Pseudomonas aeruginosa infection [Z86.19]  Yes    Anasarca [R60.1]  Yes    Encephalopathy, toxic [G92]  Yes    C. difficile colitis [A04.72]  Yes    Acute on chronic diastolic heart failure [I50.33]  Yes    Sputum culture positive for Pseudomonas [R84.5]  Yes     Chronic    Essential hypertension [I10]  Yes    GINETTE (acute kidney injury) [N17.9]  Yes    Enlarging abdominal aortic aneurysm (AAA) [I71.4]  Yes    Cholelithiases [K80.20]  Yes    Bilateral nephrolithiasis [N20.0]  Yes    Hydroureter, left [N13.4]  Yes    AAA (abdominal aortic aneurysm) without rupture [I71.4]  Yes    Pseudomonas urinary tract infection [N39.0, B96.5]  Yes    Coronary artery disease [I25.10]  Yes    Anemia, chronic disease [D63.8]  Yes    Ventilator dependence [Z99.11]  Not Applicable    Hemiparesis affecting dominant side as late effect of stroke [I69.359]  Not Applicable    PEG (percutaneous endoscopic gastrostomy) status [Z93.1]  Not Applicable    Acquired hypothyroidism [E03.9]  Yes    Chronic respiratory failure with hypoxia [J96.11]  Yes    Tracheostomy in place [Z93.0]  Not Applicable    Functional quadriplegia [R53.2]  Yes      Resolved Hospital Problems    Diagnosis Date Resolved POA    Seizure [R56.9] 11/12/2019 No    Septic shock [A41.9, R65.21] 11/12/2019 Yes    Leukocytosis [D72.829] 11/12/2019 Yes    Volume overload [E87.70] 11/12/2019 Yes    Lactic  acidosis [E87.2] 11/06/2019 Yes    Obstructive uropathy [N13.9] 11/12/2019 Yes    Hyperkalemia [E87.5] 11/12/2019 Yes          Subjective:    Interval History: Patient stable, still non responsive. Sleeping. Pt is off of pressors since yesterday am No concerns/issues overnight reported by the patient or the nursing staff.    Review of Systems Unable to perform as pt non verba    Objective:     Physical Exam  Vitals:    11/13/19 2100   BP: (!) 100/58   Pulse: (!) 54   Resp:    Temp:      Wt Readings from Last 1 Encounters:   11/07/19 132 kg (291 lb 0.1 oz)      Body mass index is 39.47 kg/m².    Vitals reviewed.  Constitutional: He appears well-developed. No distress.   Morbidly obese, upper body/abdomen   HENT:   Head: Normocephalic and atraumatic.   Mouth/Throat: Oropharynx is clear and moist.   Trach   Eyes: Pupils are equal, round, and reactive to light. EOM are normal.   Neck: Normal range of motion.   trach   Cardiovascular: Normal rate and regular rhythm.   No murmur heard.  Feeble heart sounds    Pulmonary/Chest: Effort normal and breath sounds normal. He has no wheezes.   Abdominal: Soft. He exhibits no distension.   Obese, peg present  Non tender    Genitourinary:   Genitourinary Comments: Rahman  Rectal tube   Musculoskeletal: He exhibits no edema.   Neurological:   sleeping and not responding to verbal stimuli,   No blink to threat reflex  Some lip tremors noted   Quadriplegia at baseline  Not following commands  Skin: No rash noted.   Left fifth toe gangrene, blood filled blister left lateral, plantar surface  Dry skin feet     Following labs were Reviewed   CBC:  Recent Labs   Lab 11/13/19  0348   WBC 9.23   HGB 9.2*   HCT 29.5*        CMP:  Recent Labs   Lab 11/13/19  0348   CALCIUM 8.6*   ALBUMIN 2.4*      K 3.5   CO2 25      BUN 52*   CREATININE 2.1*     Last 72 hour POCT GLUCOSE  No results found for: POCTGLUCOSE     Microbiology Results (last 7 days)     Procedure Component  Value Units Date/Time    Blood culture [[id number]] Collected:  11/10/19 1508    Order Status:  Completed Specimen:  Blood from Line, Femoral, Left Updated:  11/13/19 2032     Blood Culture, Routine No Growth to date      No Growth to date      No Growth to date      No Growth to date    IV catheter culture [[id number]]  (Abnormal) Collected:  11/12/19 1352    Order Status:  Completed Specimen:  Catheter Tip, NOS Updated:  11/13/19 0711     Aerobic Culture - Cath tip PRESUMPTIVE PSEUDOMONAS SPECIES  Too numerous to count  Identification and susceptibility pending      Stool culture [[id number]] Collected:  11/10/19 0420    Order Status:  Completed Specimen:  Stool Updated:  11/12/19 0757     Stool Culture No Salmonella,Shigella,Vibrio,Campylobacter,      E coli 0157:H7 isolated.    Blood culture [[id number]]  (Abnormal)  (Susceptibility) Collected:  11/06/19 1148    Order Status:  Completed Specimen:  Blood from Line, Arterial, Right Updated:  11/12/19 0634     Blood Culture, Routine Gram stain aer bottle: Gram negative rods      Results called to and read back by:Simone Nice RN-3ICU;  11/10/2019        06:25 CJD      PSEUDOMONAS AERUGINOSA    Urine Culture High Risk [[id number]]  (Abnormal) Collected:  11/10/19 0420    Order Status:  Completed Specimen:  Urine, Catheterized Updated:  11/11/19 1031     Urine Culture, Routine PRESUMPTIVE MARCUS ALBICANS  10,000 - 49,999 cfu/ml      Narrative:       Indicated criteria for high risk culture:->Prior to urologic  procedures    Urine Culture High Risk [[id number]]  (Abnormal)  (Susceptibility) Collected:  11/05/19 1926    Order Status:  Completed Specimen:  Urine, Catheterized Updated:  11/09/19 0909     Urine Culture, Routine PSEUDOMONAS AERUGINOSA  > 100,000 cfu/ml        CANDIDA TROPICALIS  > 100,000 cfu/ml  Treatment of asymptomatic candiduria is not recommended (except for   specific populations). Candida isolated in the urine typically   represents colonization. If  an indwelling urinary catheter is   present it should be removed or replaced.      Narrative:       Indicated criteria for high risk culture:->Other  Other (specify):->sepsis    Urine culture [[id number]]  (Abnormal)  (Susceptibility) Collected:  11/05/19 1510    Order Status:  Completed Specimen:  Urine Updated:  11/09/19 0906     Urine Culture, Routine PSEUDOMONAS AERUGINOSA  > 100,000 cfu/ml        CANDIDA TROPICALIS  > 100,000 cfu/ml  Treatment of asymptomatic candiduria is not recommended (except for   specific populations). Candida isolated in the urine typically   represents colonization. If an indwelling urinary catheter is   present it should be removed or replaced.      Narrative:       Preferred Collection Type->Urine, Clean Catch  Specimen Source->Urine    Blood Culture #2 **CANNOT BE ORDERED STAT** [[id number]]  (Abnormal)  (Susceptibility) Collected:  11/05/19 1404    Order Status:  Completed Specimen:  Blood from Peripheral, Antecubital, Left Updated:  11/08/19 1308     Blood Culture, Routine Gram stain aer bottle:Gram negative rods      Called Ana Saavedra RN M3 @ 2015 MS 11/06/19      Gram stain reynaldo bottle: Gram positive cocci 11/07/2019  10:47       See previous notification     Comment: Gram stain aer bottle:Gram negative rods  Called Aan Saavedra RN M3 @ 2015 MS 11/06/19, 11/06/2019 20:17           PSEUDOMONAS AERUGINOSA      COAGULASE-NEGATIVE STAPHYLOCOCCUS SPECIES  Organism is a probable contaminant      Narrative:       Gram stain aer bottle:Gram negative rods  Called Ana Saavedra RN M3 @ 2015 MS 11/06/19, 11/06/2019 20:17    Culture, Respiratory with Gram Stain [[id number]]  (Abnormal)  (Susceptibility) Collected:  11/06/19 0420    Order Status:  Completed Specimen:  Respiratory from Sputum Updated:  11/08/19 0911     Respiratory Culture Reduced normal respiratory gabriela      SERRATIA MARCESCENS  Many        PROTEUS MIRABILIS ESBL  Moderate  Results called to and read back by: Martina  Lidia PHILIP on M3 re: ESBL  by BREONNA 11/08/2019  09:10       Gram Stain (Respiratory) >10 epithelial cells per low power field     Gram Stain (Respiratory) Moderate WBC's     Gram Stain (Respiratory) Many Gram positive rods resembling diphtheroids     Gram Stain (Respiratory) Few Gram negative rods    Blood Culture #1 **CANNOT BE ORDERED STAT** [[id number]]  (Abnormal) Collected:  11/05/19 1340    Order Status:  Completed Specimen:  Blood from Peripheral, Upper Arm, Right Updated:  11/08/19 0745     Blood Culture, Routine Gram stain reynaldo bottle: Gram positive cocci      Results called to and read back by: Cali Bustos RN on M3 re:gram pos       cocci in blood cx 11/06/2019  09:58  by DRP      Gram stain aer bottle: Gram negative rods      Results called to and read back by: Martina Murillo RN on M3, RE: GNR in       aerobic bottle  11/07/2019  08:53 vcb      COAGULASE-NEGATIVE STAPHYLOCOCCUS SPECIES  Multiple morphotypes - probable contaminants        PSEUDOMONAS AERUGINOSA  For susceptibility see order #[id number]      Culture, Respiratory with Gram Stain [[id number]]  (Abnormal)  (Susceptibility) Collected:  11/05/19 1444    Order Status:  Completed Specimen:  Respiratory from Tracheal Aspirate Updated:  11/07/19 0838     Respiratory Culture Reduced normal respiratory gabriela      SERRATIA MARCESCENS  Many       Gram Stain (Respiratory) <10 epithelial cells per low power field.     Gram Stain (Respiratory) Few WBC's     Gram Stain (Respiratory) Moderate Gram positive rods resembling diphtheroids     Gram Stain (Respiratory) Rare Gram negative rods            X-Ray Chest AP Portable   Final Result      Interval placement of a left-sided PICC with tip at the level of the SVC, otherwise unchanged radiograph the chest         Electronically signed by: Markos Renteria MD   Date:    11/12/2019   Time:    10:31      X-Ray Chest AP Portable   Final Result      US Kidney   Final Result      Stable left hydronephrosis with nonobstructing  left renal stone      Distended bladder with echogenic debris.  A left ureteral jet is identified.         Electronically signed by: Silvia Velásquez MD   Date:    11/11/2019   Time:    12:56      X-Ray Chest AP Portable   Final Result      Cardiomegaly with moderate elevation the right hemidiaphragm.  There is still slight increase in size of the right pleural effusion and right lower lobe atelectasis      Mild prominence of the pulmonary vasculature suggestive of congestion         Electronically signed by: Silvia Velásquez MD   Date:    11/11/2019   Time:    06:10      X-Ray Chest AP Portable   Final Result      X-Ray Chest AP Portable   Final Result      1. Unchanged decreased lung volumes with elevation of the right hemidiaphragm.   2. No significant change in bilateral patchy ground-glass opacities, right greater than left.         Electronically signed by: Gio Little MD   Date:    11/07/2019   Time:    09:03      CT Abdomen Pelvis  Without Contrast   Final Result      Stable nonobstructing bilateral renal stones      Stable left hydronephrosis and hydroureter to the level of the ureteral vesicle junction      Cholelithiasis      Stable infrarenal abdominal aortic aneurysm      Moderate degenerative changes of the spine and hips      Stable atelectasis in lung bases         Electronically signed by: Silvia Velásquez MD   Date:    11/06/2019   Time:    10:42      CT Head Without Contrast   Final Result      1. No acute intracranial abnormality.      2. Chronic small vessel ischemic changes.         Electronically signed by: Silvia Velásquez MD   Date:    11/06/2019   Time:    10:35      X-Ray Chest AP Portable   Final Result      Pulmonary hypoinflation, with scattered predominantly interstitial opacities in both lungs, right greater than left, having similar appearance to the prior exam of 10/10/2019.         Electronically signed by: Justin Hartman MD   Date:    11/05/2019   Time:    14:18            Scheduled  Meds:   albuterol-ipratropium  3 mL Nebulization Q6H    balsam peru-castor oil   Topical (Top) Daily    carvedilol  3.125 mg Oral BID    enoxparin  40 mg Subcutaneous Q24H    fluconazole 40 mg/ml  200 mg Oral Daily    lacosamide (VIMPAT) IVPB  100 mg Intravenous Q12H    levetiracetam IVPB  500 mg Intravenous Q12H    pantoprazole  40 mg Oral Daily    piperacillin-tazobactam (ZOSYN) IVPB  4.5 g Intravenous Q8H    vancomycin  250 mg Oral QID     Continuous Infusions:   norepinephrine bitartrate-D5W Stopped (11/11/19 0500)     PRN Meds:.      Assessment & Plan:     * Bacteremia due to Pseudomonas  IV abx per ID  11/5 BCx Psuedomonas  11/5/19: Urine Cx  Pseudomonas and Candida Tropicalis  11/6 BCx Pseudomonas   11/10 BCx repeated - no growth so far  11/12/19: Repeat blood cx: pending   If continues to grow, clearly demonstrates we don't have source control.   On zosyn and Diflucan      Encephalopathy, toxic  Not at baseline as demonstrated in medical record.  Now opens his eyes spontaneously but not purposefully  Continuing IV abx to treat infectious processes  CT head done with no acute process  Neurology consulted given seizures demonstrated on admission EEG.  Repeat EEGs have not demonstrated further seizure activity.  His mouth movements are well documented on chart review and thus less concerning to represent seizure activity.    On Anti seizure medication at this time.  Discussed with Dr. Garcia Jimenez and recommended pt does not need LP given slight improvement in cognition.    Was hypoglcyemic on admission ,encephalopathy as results of hypoglycemia? Hard to say     Sputum culture positive for Pseudomonas  Sputum positive for pseudomonas, serratia, proteus.  Suspecting that these are colonizers on the sputum culture and per ID, not treating as pathogens  Cont zosyn less risk of decreasing seizure threshold, plan to cont for next 5-7 days 11/20/19 will be 7 days    Pseudomonas urinary tract infection  UCx  growing pseudomonas 11/5  ID and critical care following  IV abx per ID, cont zosyn  Tay has been changed.  Urine milky, started Diflucan 11/12/19    Wound feet  New blister to left lateral foot  Wound care on board  Consult podiatry      History of MDR Pseudomonas aeruginosa infection  As above     C. difficile colitis  At the moment patient is on p.o. vancomycin for C diff colitis  Stool reported as formed per nursing on admission but started being loose 11/10.  Rechecking stool studies and these are in progress.    Acute on chronic diastolic heart failure  Stable issue  Will monitor carefully     Hydroureter, left  Seen on CT scan  Discussed with Dr. Georges by phone and reports no change from previous.  If UCx and BCx continue to grow pseudomonas, will need to ask for re evaluation    Bilateral nephrolithiasis    Cholelithiases    GINETTE (acute kidney injury)  Acute kidney injury along with hyperkalemia in the background of sepsis/C diff colitis  Concern for obstructive uropathy from tay. Changed 11/6.    Consulted renal as was not improving and they are following  ATN likely in setting of shock  Has turned the corner and renal function starting to improve     Essential hypertension  Admitted in shock.  Antihypertensives held.  Levophed weaned off 11/12/19 at approximately 0500.  Monitoring and will add back antihypertensives as appropriate.      Enlarging abdominal aortic aneurysm (AAA)    Ventilator dependence/ Trach present    Anemia, chronic disease  Monitoring cell counts.  Slow trend down.  If continues to trend down, will likely need a transfusion.  No overt blood loss.  AM labs ordered     Coronary artery disease  Chronic, stable issue  Tele monitoring     Hemiparesis affecting dominant side as late effect of stroke  Residuals from previous CVA    Acquired hypothyroidism   I dont see levothyroxine or armour thyroid on the list?    PEG (percutaneous endoscopic gastrostomy) status  PEG insitu  PEG tube  feeds for nutrition  Transiently clogged overnight 11/10 and received vanc rectally and iV flagyl as could not give po vanc per peg.  Peg flushes easily 11/11 and appears to be in working order.     Tracheostomy in place  Pulmonary following for vent management                Chronic respiratory failure with hypoxia  Patient has got a trach and is vent dependent  Pulmonary managing vent  CXR has remained unchanged with persistent right elevated hemidiaphragm and bilateral infiltrates     Functional quadriplegia  Chronic condition.    No acute issues  Air mattress  Turning               VTE Risk Mitigation (From admission, onward)                  Ordered        enoxaparin injection 40 mg  Every 24 hours (non-standard times)      11/05/19 2305        IP VTE HIGH RISK PATIENT  Once      11/05/19 2014               Discharge Planning:   Patient once stable will be discharged back to [place]    Above encounter included review of the medical records, interviewing and examining the patient face-to-face, discussion with family and other health care providers, ordering and interpreting lab/test results and formulating a plan of care.     Medical Decision Making:      [_] Low Complexity  [_] Moderate Complexity  [x] High Complexity

## 2019-11-14 NOTE — PROGRESS NOTES
[facility]  Neurology  Progress Note    Patient Name: Masood Messina  MRN: [id number]  Admission Date: 11/5/2019  Hospital Length of Stay: 9 days  Code Status: DNR   Attending Provider: Oliver Lott MD  Primary Care Physician: Jeramie Lombardi MD   Principal Problem:Bacteremia due to Pseudomonas    Subjective:     Interval History: Patient seen and examined with Dr. Garcia Jimenez. Patient alert back to his baseline, negative for seizure activity. Still on the Keppra 500mg BID and Vimpat 100mg BID. Tolerating well. He does seem more alert today.He attempts to open his mouth when asked a question. AED levels in progress. No longer needs LP. Will continue to monitor the patient.       Current Neurological Medications:   Scheduled Meds:   albuterol-ipratropium  3 mL Nebulization Q6H    balsam peru-castor oil   Topical (Top) Daily    carvedilol  3.125 mg Oral Daily    enoxparin  40 mg Subcutaneous Q24H    fluconazole 40 mg/ml  200 mg Oral Daily    lacosamide (VIMPAT) IVPB  100 mg Intravenous Q12H    levetiracetam IVPB  500 mg Intravenous Q12H    pantoprazole  40 mg Oral Daily    piperacillin-tazobactam (ZOSYN) IVPB  4.5 g Intravenous Q8H    vancomycin  250 mg Oral QID     Continuous Infusions:   norepinephrine bitartrate-D5W Stopped (11/11/19 0500)     PRN Meds:.      Current Facility-Administered Medications   Medication Dose Route Frequency Provider Last Rate Last Dose    albuterol-ipratropium 2.5 mg-0.5 mg/3 mL nebulizer solution 3 mL  3 mL Nebulization Q6H Yamileth Stuart MD   3 mL at 11/14/19 0752    balsam peru-castor oil Oint   Topical (Top) Daily Oliver Lott MD        carvedilol tablet 3.125 mg  3.125 mg Oral Daily Sharla Rodriguez MD        enoxaparin injection 40 mg  40 mg Subcutaneous Q24H Mauricio Kinsey MD   40 mg at 11/14/19 0605    fluconazole 40 mg/ml suspension 200 mg  200 mg Oral Daily Oliver Lott MD   200 mg at 11/13/19 0826    lacosamide (VIMPAT) 100 mg in sodium chloride  0.9% 100 mL IVPB  100 mg Intravenous Q12H Brain Jimenez  mL/hr at 11/14/19 0605 100 mg at 11/14/19 0605    levetiracetam 500 mg in 0.9 % normal saline 100 mL IVPB (ready to mix system)  500 mg Intravenous Q12H Yamileth Stuart MD   500 mg at 11/13/19 2225    norepinephrine 8 mg in dextrose 5 % 250 mL infusion  0.02 mcg/kg/min Intravenous Continuous Yamileth Stuart MD   Stopped at 11/11/19 0500    pantoprazole EC tablet 40 mg  40 mg Oral Daily Yoel Lopes MD   40 mg at 11/14/19 0606    piperacillin-tazobactam 4.5 g in dextrose 5 % 100 mL IVPB (ready to mix system)  4.5 g Intravenous Q8H Zhane Badillo MD 25 mL/hr at 11/14/19 0605 4.5 g at 11/14/19 0605    vancomycin oral suspension 250 mg  250 mg Oral QID Yoel Lopes MD   250 mg at 11/13/19 2225       Review of Systems   Unable to perform ROS: Mental status change     Objective:     Vital Signs (Most Recent):  Temp: 97.4 °F (36.3 °C) (11/14/19 0800)  Pulse: 69 (11/14/19 0800)  Resp: 20 (11/14/19 0800)  BP: (!) 168/84 (11/14/19 0800)  SpO2: 95 % (11/14/19 0800) Vital Signs (24h Range):  Temp:  [96 °F (35.6 °C)-97.4 °F (36.3 °C)] 97.4 °F (36.3 °C)  Pulse:  [54-88] 69  Resp:  [14-41] 20  SpO2:  [91 %-100 %] 95 %  BP: (100-168)/(58-96) 168/84     Weight: 132 kg (291 lb 0.1 oz)  Body mass index is 39.47 kg/m².    Physical Exam   Constitutional: He appears well-developed and well-nourished.   HENT:   Head: Normocephalic and atraumatic.   Eyes: Pupils are equal, round, and reactive to light. EOM are normal.   Neck: Normal range of motion. Neck supple.   Cardiovascular: Normal rate.   Pulmonary/Chest: Breath sounds normal.   Abdominal: He exhibits no distension. There is no tenderness.   Musculoskeletal: Normal range of motion.   Passive ROM normal   Neurological: He displays normal reflexes. No cranial nerve deficit or sensory deficit. He exhibits normal muscle tone. Coordination (RAMON) normal.   Reflex Scores:       Tricep reflexes are 1+ on the right side and 1+  on the left side.       Bicep reflexes are 1+ on the right side and 1+ on the left side.       Brachioradialis reflexes are 1+ on the right side and 1+ on the left side.       Patellar reflexes are 1+ on the right side and 1+ on the left side.       Achilles reflexes are 1+ on the right side and 1+ on the left side.  Skin: Skin is warm and dry. Capillary refill takes less than 2 seconds.       NEUROLOGICAL EXAMINATION:     MENTAL STATUS   Level of consciousness: responsive to painful stimuli    CRANIAL NERVES   Cranial nerves II through XII intact.     CN III, IV, VI   Pupils are equal, round, and reactive to light.  Extraocular motions are normal.     MOTOR EXAM   Muscle bulk: normal  Overall muscle tone: normal    REFLEXES     Reflexes   Right brachioradialis: 1+  Left brachioradialis: 1+  Right biceps: 1+  Left biceps: 1+  Right triceps: 1+  Left triceps: 1+  Right patellar: 1+  Left patellar: 1+  Right achilles: 1+  Left achilles: 1+  Right : 1+  Left : 1+    SENSORY EXAM        Responsive to painful stimuli     GAIT AND COORDINATION        RAMON       Significant Labs:   Lab Results   Component Value Date    CREATININE 1.9 (H) 11/14/2019       Lab Results   Component Value Date    LDLCALC 60.6 (L) 09/13/2019     Lab Results   Component Value Date    TSH 9.980 (H) 11/07/2019     Lab Results   Component Value Date    FOLATE >24.8 (H) 11/07/2019     Lab Results   Component Value Date    QFZIJKBX36 1262 (H) 11/07/2019     Levetiracetam Lvl 10.0 - 40.0 ug/mL 35.7          Significant Imaging:    CT head without contrast:    Impression       1.  No evidence of an acute intracranial abnormality.       Stat EEG: IMPRESSION: Severe encephalopathy. Significant muscle artifact.   No Seizures.    Assessment and Plan:    1. New onset Seizures   -Repeat EEG was negative for seizure activity  -Will keep the patient on Keppra 500mg BID and vimapt 100mg BID and continue to monitor the patient and AED levels; keppra level  WNL, still awaiting vimpat level  -PT/OT/ST  Patient at baseline, no further need for EEG repeat or LP, patient is clinically stable       2.Encephalopahty  -Improved, back to baseline  -TSH elevated-management per IM  -Vit B 1 WNL      '  Patient is neurologically at his baseline.     Seizure precautions:    Patient and/caregiver advised that patients having seizures should not to drive or operate heavy machinery until 6 months seizure free. Also explained that patient is to avoid activities that could be high risk during a seizure, including but not limited to swimming alone, climbing to high levels, and bathing in a tub.         Side effects of seizure medications:     Explained to patient/caregiver that side effects of antiepileptics could include life threatening rashes, severe lab abnormalities, kidney stones, mood changes including depression, weight loss or gain, organ failure, suicidal ideation and death. The patient has been prescribed this medication because seizures have serious risks that in many cases outweight the risks. Advised patient/caregiver to be please be aware of these possible side effects and encouraged them to ask questions if needed.        [facility] at [phone] within 2 weeks from discharge       Active Diagnoses:    Diagnosis Date Noted POA    PRINCIPAL PROBLEM:  Bacteremia due to Pseudomonas [R78.81] 11/06/2019 Yes    History of MDR Pseudomonas aeruginosa infection [Z86.19] 11/05/2019 Yes    Anasarca [R60.1] 11/05/2019 Yes    Encephalopathy, toxic [G92] 11/05/2019 Yes    C. difficile colitis [A04.72] 10/11/2019 Yes    Acute on chronic diastolic heart failure [I50.33] 10/03/2019 Yes    Sputum culture positive for Pseudomonas [R84.5] 10/03/2019 Yes     Chronic    Essential hypertension [I10] 09/13/2019 Yes    GINETTE (acute kidney injury) [N17.9] 09/13/2019 Yes    Enlarging abdominal aortic aneurysm (AAA) [I71.4] 09/13/2019 Yes     Cholelithiases [K80.20] 09/13/2019 Yes    Bilateral nephrolithiasis [N20.0] 09/13/2019 Yes    Hydroureter, left [N13.4] 09/13/2019 Yes    AAA (abdominal aortic aneurysm) without rupture [I71.4] 09/13/2019 Yes    Pseudomonas urinary tract infection [N39.0, B96.5] 09/13/2019 Yes    Coronary artery disease [I25.10] 09/12/2019 Yes    Anemia, chronic disease [D63.8] 09/12/2019 Yes    Ventilator dependence [Z99.11] 09/12/2019 Not Applicable    Hemiparesis affecting dominant side as late effect of stroke [I69.359] 10/14/2017 Not Applicable    PEG (percutaneous endoscopic gastrostomy) status [Z93.1] 03/21/2017 Not Applicable    Acquired hypothyroidism [E03.9] 03/21/2017 Yes    Chronic respiratory failure with hypoxia [J96.11] 12/05/2013 Yes    Tracheostomy in place [Z93.0] 12/05/2013 Not Applicable    Functional quadriplegia [R53.2] 12/05/2013 Yes      Problems Resolved During this Admission:    Diagnosis Date Noted Date Resolved POA    Seizure [R56.9] 11/06/2019 11/12/2019 No    Septic shock [A41.9, R65.21] 11/05/2019 11/12/2019 Yes    Leukocytosis [D72.829] 11/05/2019 11/12/2019 Yes    Volume overload [E87.70] 11/05/2019 11/12/2019 Yes    Lactic acidosis [E87.2] 11/05/2019 11/06/2019 Yes    Obstructive uropathy [N13.9] 11/05/2019 11/12/2019 Yes    Hyperkalemia [E87.5] 09/13/2019 11/12/2019 Yes       VTE Risk Mitigation (From admission, onward)         Ordered     enoxaparin injection 40 mg  Every 24 hours (non-standard times)      11/05/19 2305     IP VTE HIGH RISK PATIENT  Once      11/05/19 2014              Patient was seen and examined by Dr. Garcia Jimenez.       Naomi Reis, [facility]  Neurology  [facility]      I, Dr. Garcia Jimenez, have personally seen and examined the patient with my advanced provider and agree with above. I personally did a focused exam, and reviewed all necessary clinical information. I discussed my management plan with my NP and agree with above. AT baseline.

## 2019-11-14 NOTE — PLAN OF CARE
11/14/19 1534   Discharge Reassessment   Assessment Type Discharge Planning Reassessment   Anticipated Discharge Disposition shelter [facility]   Pt is to be discharged to [facility] after [facility] calls [facility] for report and [facility] transfers.

## 2019-11-14 NOTE — PROGRESS NOTES
[facility]  Pulmonary / Critical Care Medicine  Progress Note    Subjective     No major issues overnight.  More alert today than yesterday.  No complaints      Review of Systems:  Unable to obtain due to patient's inability to phonate with his tracheostomy.    I have personally reviewed the following during today's evaluation:  past medical history, ROS, family history, social history, surgical history, current inpatient medications,drug allergies, vital signs over the past 24 hours, results of relevant diagnostic studies and nursing/provider documentation from the past 24 hours.     Objective     VS Temp:  [96.9 °F (36.1 °C)-98.7 °F (37.1 °C)]   Pulse:  [54-75]   Resp:  [6-23]   BP: (100-168)/(58-87)   SpO2:  [91 %-98 %]   Ideal body weight: 77.6 kg (171 lb 1.2 oz)  Adjusted ideal body weight: 99.4 kg (219 lb 0.8 oz)   I/O   Intake/Output Summary (Last 24 hours) at 11/14/2019 6352  Last data filed at 11/14/2019 1625  Gross per 24 hour   Intake 500 ml   Output 2900 ml   Net -2400 ml        Vent SpO2 (!) 94%  Vent Mode: A/C  Oxygen Concentration (%):  [32-35] 35  Resp Rate Total:  [18 br/min-23 br/min] 23 br/min  Vt Set:  [450 mL] 450 mL  PEEP/CPAP:  [5 cmH20] 5 cmH20  Pressure Support:  [0 cmH20-15 cmH20] 0 cmH20  Mean Airway Pressure:  [8.3 nyN08-75 cmH20] 13 cmH20    PE Physical Exam   Constitutional: He appears well-developed and well-nourished. He is active and cooperative. He is easily aroused.  Non-toxic appearance. No distress. He is obese.   HENT:   Head: Normocephalic.   Right Ear: External ear normal.   Left Ear: External ear normal.   Nose: Nose normal.   Mouth/Throat: Oropharynx is clear and moist. Mallampati Score: III.   Clear oropharynx, moist mucous membranes.   Neck: Normal range of motion. Neck supple. No JVD present. No thyromegaly present.   8.0 cuffed tracheostomy secure in pain.   Cardiovascular: Normal rate, regular rhythm, normal heart sounds and intact distal pulses. Exam  reveals no gallop and no friction rub.   No murmur heard.  Pulmonary/Chest: Normal expansion, symmetric chest wall expansion and breath sounds normal. He has no wheezes. He has no rhonchi. He has no rales.   Abdominal: Soft. Bowel sounds are normal. He exhibits no distension and no mass. There is no rebound.   G-tube secure and functioning.   Musculoskeletal: Normal range of motion. He exhibits no edema, tenderness or deformity.   Lymphadenopathy:     He has no cervical adenopathy.   Neurological: He is alert and easily aroused. He has normal strength. He displays no tremor. No cranial nerve deficit or sensory deficit. He displays no seizure activity. GCS eye subscore is 4. GCS verbal subscore is 1. GCS motor subscore is 6.   Skin: Skin is warm and dry. No rash noted. No cyanosis or erythema. Nails show no clubbing.   Psychiatric: He is slowed.   Nursing note and vitals reviewed.     Lines: Rahman, Gastrostomy, Tracheostomy        Labs I have personally reviewed and interpreted all labs / diagnostic studies obtained over the past 24 hours, and relevant results are as follows:  All pertinent labs within the past 24 hours have been reviewed.  Recent Labs   Lab 11/14/19  0418   WBC 11.28   RBC 3.35*   HGB 9.7*   HCT 31.3*      MCV 93   MCH 29.0   MCHC 31.0*      K 4.0      CO2 27   BUN 48*   CREATININE 1.9*   ALBUMIN 2.5*      Imaging I have personally reviewed and interpreted the following images and reviewed the associated Radiology report.  I have reviewed and interpreted all pertinent imaging results/findings within the past 24 hours.  No new images today.     Micro I have personally reviewed and interpreted the available culture data.  Relevant results are as follows.  IV catheter:  November 12/2019:  Presumptive Pseudomonas species.  Too numerous to count.  Identification susceptibility pending.    Blood:  November 10, 2019:  No growth to date    Respiratory:  November 14, 2019:  Many WBCs, many  gram-negative rods.   Medications Scheduled    albuterol-ipratropium  3 mL Nebulization Q6H    balsam peru-castor oil   Topical (Top) Daily    carvedilol  3.125 mg Oral Daily    enoxparin  40 mg Subcutaneous Q24H    fluconazole 40 mg/ml  200 mg Oral Daily    lacosamide (VIMPAT) IVPB  100 mg Intravenous Q12H    levetiracetam IVPB  500 mg Intravenous Q12H    pantoprazole  40 mg Oral Daily    piperacillin-tazobactam (ZOSYN) IVPB  4.5 g Intravenous Q8H    vancomycin  250 mg Oral QID      Continuous Infusions:    norepinephrine bitartrate-D5W Stopped (11/11/19 0500)        PRN           Assessment     Impression:   Improving encephalopathy secondary to Pseudomonas urinary tract infection/bacteremia.  Back at his baseline and stable to transfer back to the health care facility where he resides to complete a course of antibiotics as prescribed by Infectious Diseases.    Acute Critical Illness:   Resolved.    Patient Active Problem List   Diagnosis    Functional quadriplegia    Chronic respiratory failure with hypoxia    Sepsis    Tracheostomy in place    Hematuria    HCAP (healthcare-associated pneumonia)    PEG (percutaneous endoscopic gastrostomy) status    Acquired hypothyroidism    Hemiparesis affecting dominant side as late effect of stroke    Sacral decubitus ulcer, stage II    Coronary artery disease    Anemia, chronic disease    Ventilator dependence    Acute on chronic diastolic HF (heart failure)    Pseudomonas urinary tract infection    AAA (abdominal aortic aneurysm) without rupture    Enlarging abdominal aortic aneurysm (AAA)    Essential hypertension    GINETTE (acute kidney injury)    Cholelithiases    Bilateral nephrolithiasis    Hydroureter, left    Sputum culture positive for Pseudomonas    Acute on chronic diastolic heart failure    Hypophosphatemia    Chronic pain    C. difficile colitis    History of MDR Pseudomonas aeruginosa infection    Anasarca     Encephalopathy, toxic    Bacteremia due to Pseudomonas    Pressure ulcer, stage 3     Plan     1. Neuro:  · Encephalopathy resolved.  · No recurrence of seizures.  · Continue AEDs.  · LP no longer indicated.  · Back at baseline.    2. Cardiovascular:  · Hemodynamically stable and no longer requiring vasopressors.  · Started back on home beta-blocker yesterday.    3. Pulmonary:  · Stable on chronic ventilator settings.  · Positive respiratory cultures representative of chronic respiratory tract colonization.  · Continue current ventilator settings for now.  · Will defer to transferring facility regarding future plans to liberate from mechanical ventilation..    4.  FEN/GI:  · No acute issues.  · Tolerating tube feeds to goal.    5. Renal:  · Pseudomonas urinary tract infection.  · Chronic nonobstructing nephrolithiasis.  · Chronic indwelling Rahman catheter.  · Steadily improving renal function.  · Continued attention to maintaining a functioning a urinary catheter.  · Continue antibiotics at the direction of ID.    6. Heme/ID:  · Resolved septic shock secondary to Pseudomonas UTI/bacteremia.  · Recent culture of IV catheter of unclear significance.  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  · Continue Zosyn, oral vancomycin, and fluconazole as ordered by Infectious Diseases.  · Choice and duration of antibiotics per ID.    7. Endocrine:  · Subclinical sick euthyroid syndrome  · Recheck thyroid function tests after resolution of acute illness.    Stress Ulcer PPX: Oral PPI  DVT/VTE Ppx:  Prophylactic SQ LMWH  Nutrition:  Enteral feeds at goal rate  Mobilization:  Consult PT/OT.   SAT:   Performed  SBT:   Not attempted  CODE Status: DNR (Do Not Resuscitate)  Disposition:  Back at baseline and stable for transfer back to LTAC.    Mauricio Kinsey MD  Pulmonary / Critical Care Medicine  Critical access hospital

## 2019-11-14 NOTE — PROGRESS NOTES
Progress Note  Infectious Disease    Admit Date: 11/5/2019   LOS: 9 days    Follow-up For:  Septic shock.     SUBJECTIVE:     11/7   Unable to obtain. Still on pressors. Renal function unchanged. NO bowel movements. No other events. Bld cx just back today with GNR. According to lab, appears to be a Pseudomonas     11/8 Still on pressors but trying to get off. Off sedation and minimally responsive. EEG going today. Added Ampicillin for meningitis coverage. Sputum cx with Serratia, Proteus - Sens to Meropenem, Pseudomonas Sens to Meropenem. Blood cx repeat negative, no diarrhea,   11/09/2019 I discussed with Dr. Antoine yesterday.  Given the history of seizures, we were both concerned about using any antibiotics that may lower seizure threshold.  We are trying to stay away from antibiotics like cefepime, meropenem, levofloxacin.  After discussing it together, I changed meropenem to Zosyn.  Patient is afebrile today.  Patient is very sensitive to pressors; nurse is working on weaning them off, but may not be doable today.  He continues to have yellow whitish secretions.  WBC is markedly improved  44.92-- 31.53-- 16.69- 11.48.  It makes me wonder if some of the WBC elevation could be due to stress, seizures, other than due to infection.  11/10 - still on low dose pressors. Diarrhea started and rectal tube placed   11/11/2019 afebrile.  Does not offer any complaints due to encephalopathy.  Does not have mouth movement today but this is an old finding that he does it on and off.  PEG was clogged but is okay now.   It  it is concerning that new cultures turned + from 11/06 with LNGNR.  Why with his cultures turn positive while he is on appropriate treatment.    11/12:  Interim reviewed and discussed.  Afebrile, hemodynamically stable, requiring very little support from the ventilator.  Secretions are purulent.  He does not attend.  Ultrasound of the kidneys yesterday did reveal a distended bladder.  The Rahman catheter was  advanced and 1200 cc of urine was relieved.  Today is urine is milky and nonbloody but he does have blood at the meatus.  Chest x-ray has improved aeration at the right base  11/13: picc placed, groin line removed, still bleeding from meatus. Urine is milky. No change in vent needs. No more responsive than yesterday.   11/14: more alert than yesterday.  Followed simple command. secretions less green. No worsening from a respiratory status. Urine is clear.       Antibiotics (From admission, onward)    Start     Stop Route Frequency Ordered    11/08/19 2245  piperacillin-tazobactam 4.5 g in dextrose 5 % 100 mL IVPB (ready to mix system)      -- IV Every 8 hours (non-standard times) 11/08/19 2132 11/05/19 1715  vancomycin oral suspension 250 mg      -- Oral 4 times daily 11/05/19 1607            OBJECTIVE:     Vital Signs (Most Recent)  Temp: 97.4 °F (36.3 °C) (11/14/19 0800)  Pulse: 69 (11/14/19 0800)  Resp: 20 (11/14/19 0800)  BP: (!) 168/84 (11/14/19 0800)  SpO2: 95 % (11/14/19 0800)    Temperature Range Min/Max (Last 24H):  Temp:  [96 °F (35.6 °C)-97.4 °F (36.3 °C)]     I & O (Last 24H):    Intake/Output Summary (Last 24 hours) at 11/14/2019 0850  Last data filed at 11/14/2019 0400  Gross per 24 hour   Intake 570 ml   Output 2250 ml   Net -1680 ml       Physical Exam:  General:   awake,   Attends, will answer yes/no and protrude tongue on request, moderately obese,    Pale  HENT:   Head:normocephalic, atraumatic. Nose: Nares normal. Septum midline.  No thrush. Faint bruising left neck along tracheostomy strap   Neck:   tracheostomy and ventilated   Lungs:    Loose rhonchi, secretionsless green   Cardio:   Heart: RRR no murmur, click, rub or gallop, no click . Chest Wall: no abnormalities .    Abdomen :    soft and nondistended.  PEG present, , tolerating tube feeds  Rectal:   , rectal tube in place  Genitalia:   blood at the meatus, urine is clear today  Skin:   Skin color, texture, turgor normal. Very scaly  skin on his feet .Left 5th   toenail associated with an eschar over the distal left 5th toe which I removed and his toenail came off as well revealing a pink granulating base, without purulence with pressure injury under left 5th metatarsal/lateral foot  Musculoskeletal: muscle wasting.      Extremities:  no cyanosis or edema, or clubbing. Pulses: 2+ and symmetric.  Onychomycosis  Neuro:  He is awake,  But today he does attend,  .  No tardive movements at present.  He will follow simple commands. he is still not quite at baseline     Lines/Drains:   picc in place 11/12       Laboratory:  CBC  Recent Labs   Lab 11/14/19 0418   WBC 11.28   RBC 3.35*   HGB 9.7*   HCT 31.3*      MCV 93   MCH 29.0   MCHC 31.0*     BMP  Recent Labs   Lab 11/14/19 0418      K 4.0      CO2 27   BUN 48*   CREATININE 1.9*   CALCIUM 8.9     Microbiology Results (last 7 days)     Procedure Component Value Units Date/Time    Culture, Respiratory with Gram Stain [[id number]] Collected:  11/14/19 0418    Order Status:  Sent Specimen:  Respiratory from Sputum Updated:  11/14/19 0455    Blood culture [[id number]] Collected:  11/10/19 1508    Order Status:  Completed Specimen:  Blood from Line, Femoral, Left Updated:  11/13/19 2032     Blood Culture, Routine No Growth to date      No Growth to date      No Growth to date      No Growth to date    IV catheter culture [[id number]]  (Abnormal) Collected:  11/12/19 1352    Order Status:  Completed Specimen:  Catheter Tip, NOS Updated:  11/13/19 0711     Aerobic Culture - Cath tip PRESUMPTIVE PSEUDOMONAS SPECIES  Too numerous to count  Identification and susceptibility pending      Stool culture [[id number]] Collected:  11/10/19 0420    Order Status:  Completed Specimen:  Stool Updated:  11/12/19 0757     Stool Culture No Salmonella,Shigella,Vibrio,Campylobacter,      E coli 0157:H7 isolated.    Blood culture [[id number]]  (Abnormal)  (Susceptibility) Collected:  11/06/19 1148    Order  Status:  Completed Specimen:  Blood from Line, Arterial, Right Updated:  11/12/19 0634     Blood Culture, Routine Gram stain aer bottle: Gram negative rods      Results called to and read back by:Simone Nice RN-3ICU;  11/10/2019        06:25 CJD      PSEUDOMONAS AERUGINOSA    Urine Culture High Risk [[id number]]  (Abnormal) Collected:  11/10/19 0420    Order Status:  Completed Specimen:  Urine, Catheterized Updated:  11/11/19 1031     Urine Culture, Routine PRESUMPTIVE MARCUS ALBICANS  10,000 - 49,999 cfu/ml      Narrative:       Indicated criteria for high risk culture:->Prior to urologic  procedures    Urine Culture High Risk [[id number]]  (Abnormal)  (Susceptibility) Collected:  11/05/19 1926    Order Status:  Completed Specimen:  Urine, Catheterized Updated:  11/09/19 0909     Urine Culture, Routine PSEUDOMONAS AERUGINOSA  > 100,000 cfu/ml        CANDIDA TROPICALIS  > 100,000 cfu/ml  Treatment of asymptomatic candiduria is not recommended (except for   specific populations). Candida isolated in the urine typically   represents colonization. If an indwelling urinary catheter is   present it should be removed or replaced.      Narrative:       Indicated criteria for high risk culture:->Other  Other (specify):->sepsis    Urine culture [[id number]]  (Abnormal)  (Susceptibility) Collected:  11/05/19 1510    Order Status:  Completed Specimen:  Urine Updated:  11/09/19 0906     Urine Culture, Routine PSEUDOMONAS AERUGINOSA  > 100,000 cfu/ml        CANDIDA TROPICALIS  > 100,000 cfu/ml  Treatment of asymptomatic candiduria is not recommended (except for   specific populations). Candida isolated in the urine typically   represents colonization. If an indwelling urinary catheter is   present it should be removed or replaced.      Narrative:       Preferred Collection Type->Urine, Clean Catch  Specimen Source->Urine    Blood Culture #2 **CANNOT BE ORDERED STAT** [[id number]]  (Abnormal)  (Susceptibility) Collected:   11/05/19 1404    Order Status:  Completed Specimen:  Blood from Peripheral, Antecubital, Left Updated:  11/08/19 1308     Blood Culture, Routine Gram stain aer bottle:Gram negative rods      Called Ana Saavedra RN M3 @ 2015 MS 11/06/19      Gram stain reynaldo bottle: Gram positive cocci 11/07/2019  10:47       See previous notification     Comment: Gram stain aer bottle:Gram negative rods  Called Ana Saavedra RN M3 @ 2015 MS 11/06/19, 11/06/2019 20:17           PSEUDOMONAS AERUGINOSA      COAGULASE-NEGATIVE STAPHYLOCOCCUS SPECIES  Organism is a probable contaminant      Narrative:       Gram stain aer bottle:Gram negative rods  Called Ana Saavedra RN M3 @ 2015 MS 11/06/19, 11/06/2019 20:17    Culture, Respiratory with Gram Stain [[id number]]  (Abnormal)  (Susceptibility) Collected:  11/06/19 0420    Order Status:  Completed Specimen:  Respiratory from Sputum Updated:  11/08/19 0911     Respiratory Culture Reduced normal respiratory gabriela      SERRATIA MARCESCENS  Many        PROTEUS MIRABILIS ESBL  Moderate  Results called to and read back by: Martina Murillo RN on  re: ESBL  by BREONNA 11/08/2019  09:10       Gram Stain (Respiratory) >10 epithelial cells per low power field     Gram Stain (Respiratory) Moderate WBC's     Gram Stain (Respiratory) Many Gram positive rods resembling diphtheroids     Gram Stain (Respiratory) Few Gram negative rods    Blood Culture #1 **CANNOT BE ORDERED STAT** [[id number]]  (Abnormal) Collected:  11/05/19 1340    Order Status:  Completed Specimen:  Blood from Peripheral, Upper Arm, Right Updated:  11/08/19 0745     Blood Culture, Routine Gram stain reynaldo bottle: Gram positive cocci      Results called to and read back by: Cali Bustos RN on M3 re:gram pos       cocci in blood cx 11/06/2019  09:58  by DRP      Gram stain aer bottle: Gram negative rods      Results called to and read back by: Martina Murillo RN on M3, RE: GNR in       aerobic bottle  11/07/2019  08:53 vcb      COAGULASE-NEGATIVE  STAPHYLOCOCCUS SPECIES  Multiple morphotypes - probable contaminants        PSEUDOMONAS AERUGINOSA  For susceptibility see order #[id number]            Diagnostic Results:   11/11/2019 renal ultrasound.  Same mild hydronephrosis and nonobstructing renal stones.  Bladder scan 11/11 distended and over 1200cc relieved with advancement of tay    Chest x-ray 11/11/2019Cardiomegaly with moderate elevation the right hemidiaphragm.  There is still slight increase in size of the right pleural effusion and right lower lobe atelectasis  Mild prominence of the pulmonary vasculature suggestive of congestion    CT head 11/06/2019 no acute abnormalities.    CT pelvis 11/06/2019 Stable nonobstructing bilateral renal stones.  Stable left hydronephrosis and hydroureter to the level of the ureteral vesicle junction  Cholelithiasis  Stable infrarenal abdominal aortic aneurysm  Moderate degenerative changes of the spine and hips  Stable atelectasis in lung bases       ASSESSMENT/PLAN:     Severe septic shock, Pseudomonas UTI and bacteremia, improving   Complicated urinary tract infection , bilateral nonobstructing renal stones  Inadequate bladder emptying secondary to?  Tay malposition, repositioned 11/11    Serratia and Proteus ESBL + sputum with stable CXR ,   Candida tropicalis/albicans candiduria  Coag-negative staph in blood cultures 11/05, contaminant  Recent Cdiff colitis, has   loose stools, requiring rectal tube  Left 5th toe callous/nail removed with ulceration and pressure injury left lateral foot    Chronic respiratory failure , tracheostomy, PEG, right basilar atelectasis  CVA with functional quadraplegia   ARF  Encephalopathy , persistent, but improving   seizures, new onset  This patient is high risk for life-threatening deterioration and death secondary to above comorbidities and need for IV treatment.  Poor prognosis.  Discussed with nurse.      PLAN: ok to return to NH  - Continue Zosyn ( less risk of decreasing  seizure threshold) he will need this for another 5  days and a very long taper of oral vancomycin for high risk of relapse of C. Difficile colitis.  - we are not treating sputum cultures at this point because he is not needing higher FiO2 and chest x-ray fairly stable.     -local care and pressure relief to left 5th toe an d pressure sore left lateral foot,, wound care  -continue Diflucan for milky urine with yeast for another 14 days, change tay in one week. Elevate scrotum to reduce scrotal swelling and allow for meatal care       - poor prognosis

## 2019-11-14 NOTE — PLAN OF CARE
Problem: Nutrition Impairment (Mechanical Ventilation, Invasive)  Goal: Optimal Nutrition Delivery  Outcome: Ongoing, Progressing     Problem: Skin Injury Risk Increased  Goal: Skin Health and Integrity  Outcome: Ongoing, Progressing     Recommendation/Intervention:   1.) Rec'd increase TF to 45 ml/hr to meet estimated needs. Will provide: 1944 kcal, 87 g pro, & 788 ml free h20. Continue Liquacel BID to meet estimated protein needs.    Goals: 1.) Increase TF to new goal rate 45 ml/hr.

## 2019-11-14 NOTE — PROGRESS NOTES
[facility]  Adult Nutrition   Progress Note (Follow-Up)    SUMMARY     Recommendations/Interventions:    Recommendation/Intervention: 1.) Rec'd increase TF to 45 ml/hr to meet estimated needs. Will provide: 1944 kcal, 87 g pro, & 788 ml free h20. Continue Liquacel BID to meet estimated protein needs.  Goals: 1.) Increase TF to new goal rate 45 ml/hr.  Nutrition Goal Status: progressing towards goal  Communication of RD Recs: reviewed with RN    Reason for Assessment    Reason For Assessment: RD follow-up  Diagnosis: infection/sepsis  Relevant Medical History: NH resident, PEG, trach, COPD, quad, CABG, HTN, BPH  Interdisciplinary Rounds: attended    Nutrition Risk Screen    Nutrition Risk Screen: tube feeding or parenteral nutrition    Nutrition/Diet History    Patient Reported Diet/Restrictions/Preferences: no oral intake  Food Allergies: NKFA  Factors Affecting Nutritional Intake: NPO, on mechanical ventilation  Nutrition Support Formula Prior to Admit: Other (see comments)(Isosource HN )  Nutrition Support Rate Prior to Admit: 70 (ml)  Nutrtion Support Frequency Prior to Admit: ml/hr x 22 hrs (per NH records), FWF: 60 ml/hr  Nutrition Support Provision Prior to Admit: 1848 kcal, 83 g pro, & 1244 ml free h20. + 1320 ml FWF    Anthropometrics    Temp: 97.4 °F (36.3 °C)  Height Method: Stated  Height: 6' (182.9 cm)  Height (inches): 72 in  Weight Method: Bed Scale  Weight: 132 kg (291 lb 0.1 oz)  Weight (lb): 291.01 lb  Ideal Body Weight (IBW), Male: 178 lb  % Ideal Body Weight, Male (lb): 165.47 lb  BMI (Calculated): 39.5  BMI Grade: 35 - 39.9 - obesity - grade II  Additional Documentation: Tetraplegia (Quadriplegia) Ideal Body Weight (IBW) Adjustment    Weight History:  Wt Readings from Last 10 Encounters:   11/07/19 132 kg (291 lb 0.1 oz)   10/14/19 124.8 kg (275 lb 2.2 oz)   09/12/19 (!) 140.5 kg (309 lb 11.9 oz)   09/13/19 (!) 140.2 kg (309 lb)   10/13/17 110.6 kg (243 lb 13.3 oz)   03/27/17 118.8  kg (262 lb)   03/21/17 119 kg (262 lb 5.6 oz)   05/16/14 (!) 137.8 kg (303 lb 12.7 oz)   03/06/14 126 kg (277 lb 12.8 oz)   02/27/14 126.8 kg (279 lb 8 oz)     Lab/Procedures/Meds: Pertinent Labs Reviewed  Clinical Chemistry:  Recent Labs   Lab 11/10/19  1509  11/12/19  0409 11/13/19  0348 11/14/19 0418      < > 140 140 141   K 4.1   < > 3.8 3.5 4.0      < > 107 105 105   CO2 26   < > 26 25 27      < > 113* 115* 109   BUN 58*   < > 57* 52* 48*   CREATININE 2.7*   < > 2.4* 2.1* 1.9*   CALCIUM 8.4*   < > 8.4* 8.6* 8.9   PROT 6.5  --   --   --   --    ALBUMIN 2.1*   < > 2.3* 2.4* 2.5*   BILITOT 1.0  --   --   --   --    ALKPHOS 118  --   --   --   --    AST 21  --   --   --   --    ALT 14  --   --   --   --    ANIONGAP 7*   < > 7* 10 9   ESTGFRAFRICA 24.6*   < > 28.4* 33.4* 37.7*   EGFRNONAA 21.3*   < > 24.6* 28.9* 32.6*   MG 2.0  --   --   --   --    PHOS 3.6   < > 3.8 3.7 3.5    < > = values in this interval not displayed.     CBC:   Recent Labs   Lab 11/14/19 0418   WBC 11.28   RBC 3.35*   HGB 9.7*   HCT 31.3*      MCV 93   MCH 29.0   MCHC 31.0*     Inflammatory Labs:  Recent Labs   Lab 11/10/19  0407   CRP 16.95*     Medications: Pertinent Medications reviewed  Scheduled Meds:   albuterol-ipratropium  3 mL Nebulization Q6H    balsam peru-castor oil   Topical (Top) Daily    carvedilol  3.125 mg Oral Daily    enoxparin  40 mg Subcutaneous Q24H    fluconazole 40 mg/ml  200 mg Oral Daily    lacosamide (VIMPAT) IVPB  100 mg Intravenous Q12H    levetiracetam IVPB  500 mg Intravenous Q12H    pantoprazole  40 mg Oral Daily    piperacillin-tazobactam (ZOSYN) IVPB  4.5 g Intravenous Q8H    vancomycin  250 mg Oral QID     Continuous Infusions:   norepinephrine bitartrate-D5W Stopped (11/11/19 0500)     PRN Meds:.    Estimated/Assessed Needs    Weight Used For Calorie Calculations: 81 kg (178 lb 9.2 oz)(IBW)  Energy Calorie Requirements (kcal): 3454-9435 kcals (22-25 kcal/kg) per  IBW  Energy Need Method: Kcal/kg  Protein Requirements: 121 g (1.5 g/kg) per IBW  Weight Used For Protein Calculations: 81 kg (178 lb 9.2 oz)(IBW)  Fluid Requirements (mL): 1782 or per MD  Estimated Fluid Requirement Method: RDA Method    Nutrition Prescription Ordered    Current Diet Order: NPO   Current Nutrition Support Formula Ordered: Nepro  Current Nutrition Support Rate Ordered: 35 (ml)  Current Nutrition Support Frequency Ordered: ml/hr    Evaluation of Received Nutrient/Fluid Intake    Enteral Calories (kcal): 1512  Enteral Protein (gm): 68  Enteral (Free Water) Fluid (mL): 613  Free Water Flush Fluid (mL): 180(FWF: 30 ml Q 4 hrs)    Energy Calories Required: not meeting needs  Protein Required: not meeting needs  Fluid Required: meeting needs  Tolerance: tolerating    % Meal Intake: NPO    Intake/Output Summary (Last 24 hours) at 11/14/2019 1345  Last data filed at 11/14/2019 0400  Gross per 24 hour   Intake 440 ml   Output 1100 ml   Net -660 ml      Nutrition Risk    Level of Risk/Frequency of Follow-up: high     Dietitian Rounds Brief:    Pt tolerating TF @ 35 ml/hr. No issues or residuals noted or reported by RN. Noted + rectal tube output. Renal function improving--RD reassessed needs. Recommend increasing TF rate to meet new estimated needs.    Monitor and Evaluation    Food and Nutrient Intake: enteral nutrition intake  Food and Nutrient Adminstration: enteral and parenteral nutrition administration  Anthropometric Measurements: weight change, weight  Biochemical Data, Medical Tests and Procedures: gastrointestinal profile, glucose/endocrine profile, electrolyte and renal panel  Nutrition-Focused Physical Findings: overall appearance       Nutrition Follow-Up    RD Follow-up?: Yes    Melissa Nelson RD 11/14/2019 1:45 PM

## 2019-11-14 NOTE — NURSING
Patient discharged back to [place] [facility]- report called to [facility] supervisor Carlee PHILIP at 17:00 - [facility] called at 17:06 for transportation to facility. Patient leaving [facility] with his urinary catheter, Left arm PICC line, PEG tube & tracheostomy in place- Pt to transported via Ambulance on Mechanical ventilator.

## 2019-11-14 NOTE — PROGRESS NOTES
Progress Note  Nephrology  11/14/2019    Consult Requested By: Oliver Lott MD    Reason for Consult: GINETTE    Chief Complaint   Patient presents with    Fever    Altered Mental Status       SUBJECTIVE:     History of Present Illness: 80 y/o male patient sent from NH on 11/5, admitted w/septic shock, encephalopathy.  Tay changed 11/6 reportedly with purulent urine.  Concern for obstructed uropathy with infection which also could be causing his GINETTE.  Urology consulted per primary.  Renal function not improving, nephrology consulted for co-management, GINETTE.    11/7  Pt seen and examined.  He had significant hyperkalemia on admit, this has now improved.  Scr 2.5 initially, now up to 2.8.  At last discharge in October, Scr was 1.0.  He is non-oliguric, UOP 5L--polyuria after catheter was exchanged.  Hyponatremic, was 127 yesterday, better today at 131.  CT abd/pelvis showed mild L hydronephrosis, urology is consulted as well.  11/08  Na+ improved.  Renal function stable, on IVF.  UOP only recorded for one shift--850cc.  Electrolytes improved.  NAD noted, on vent.  11/09  Na+ now wnl, Scr bumped to 2.7.  NS at 100cc/hr.  Not responding to verbal stimuli.  UOP 1.8L.  11/10  Scr 2.8, was 2.9 on second lab draw yesterday.  On IVF, CXR looks worse, he is hypotensive.  On vent, NAD noted.  UOP 975cc recorded on one shift only.  11/11 SBP , FIO2 32%, remains on levophed, UOP 1.9L  11/12 VSS, remains on ventilator, off pressors, UOP 2.8L  11/13 -160, stable vent settings, UOP 2.2L  11/14 HR dropped to 50s last night and - PM dose of coreg held, UOP 2.2L    Assessment/plan:    1. Septic shock - suspected urinary source (pseudomonas)  - resolved  - on zosyn- dose for CrCl 30  - ID note today reviewed    2. Obstructive uropathy with L hydronephrosis  - stable as of 11/1    3. GINETTE 2/2 ATN + obstruction  - renal function is improving and he is nonoliguric  - continue tay  - no nsaids or IV contrast    4. Anemia  of inflammation  - Hb is improved    5. Diastolic CHF  - vent settings and volume status are stable  - ok with IV lasix PRN    6. Hypokalemia  - resolved with KCl repletion yest    7. HTN  - changed coreg to daily    Review of Systems:  Unable to obtain 2/2 ventilator    OBJECTIVE:     Vital Signs Range (Last 24H):  Temp:  [96 °F (35.6 °C)-97.4 °F (36.3 °C)]   Pulse:  [54-88]   Resp:  [14-41]   BP: (100-168)/(58-96)   SpO2:  [91 %-100 %]     Physical Exam:  Constitutional: nad, alert, awake, cannot assess orientation  Heart: rrr, no m/r/g, wwp, + total body edema  Lungs: coarse, no w/r/r/c, no lb  Abdomen: s/nt/nd, +BS    Body mass index is 39.47 kg/m².    Laboratory:  CBC:   Recent Labs   Lab 11/14/19  0418   WBC 11.28   RBC 3.35*   HGB 9.7*   HCT 31.3*      MCV 93   MCH 29.0   MCHC 31.0*     CMP:   Recent Labs   Lab 11/10/19  1509  11/14/19  0418      < > 109   CALCIUM 8.4*   < > 8.9   ALBUMIN 2.1*   < > 2.5*   PROT 6.5  --   --       < > 141   K 4.1   < > 4.0   CO2 26   < > 27      < > 105   BUN 58*   < > 48*   CREATININE 2.7*   < > 1.9*   ALKPHOS 118  --   --    ALT 14  --   --    AST 21  --   --    BILITOT 1.0  --   --     < > = values in this interval not displayed.       Diagnostic Results:  Labs: Reviewed    Sharla Rodriguez MD MPH  [facility]  [address] [place] [address]  [phone] (p)  [phone] (f)

## 2019-11-14 NOTE — HPI
Information obtained from nurse and patient's chart due to patient being mostly unresponsive.     Patient is an 79-year-old male, nursing home resident([facility]),  with functional quadriplegia and chronic respiratory failure with trach dependence.  He was admitted last week for septic shock.  Since being in the hospital, he developed a pressure injury to the plantar lateral left foot. Patient has been   Consistently wearing heel offloading boots since admission.     Plan is for patient to be discharged back to nursing home today.

## 2019-11-14 NOTE — SUBJECTIVE & OBJECTIVE
Scheduled Meds:   albuterol-ipratropium  3 mL Nebulization Q6H    balsam peru-castor oil   Topical (Top) Daily    carvedilol  3.125 mg Oral Daily    enoxparin  40 mg Subcutaneous Q24H    fluconazole 40 mg/ml  200 mg Oral Daily    lacosamide (VIMPAT) IVPB  100 mg Intravenous Q12H    levetiracetam IVPB  500 mg Intravenous Q12H    pantoprazole  40 mg Oral Daily    piperacillin-tazobactam (ZOSYN) IVPB  4.5 g Intravenous Q8H    vancomycin  250 mg Oral QID     Continuous Infusions:   norepinephrine bitartrate-D5W Stopped (11/11/19 0500)     PRN Meds:    Review of patient's allergies indicates:   Allergen Reactions    Codeine Other (See Comments)        Past Medical History:   Diagnosis Date    AAA (abdominal aortic aneurysm)     Anemia     BPH (benign prostatic hyperplasia)     CHF (congestive heart failure)     COPD (chronic obstructive pulmonary disease)     Coronary artery disease     Dementia     Dementia     Depression     GERD (gastroesophageal reflux disease)     Hyperlipidemia     Hypertension     Hypothyroid     Renal disorder     Respiratory failure, chronic     Ventilator dependence      Past Surgical History:   Procedure Laterality Date    ABDOMINAL SURGERY      CARDIAC SURGERY  1999    GASTROSTOMY TUBE PLACEMENT      SPINE SURGERY      TRACHEOSTOMY TUBE PLACEMENT         Family History     None        Tobacco Use    Smoking status: Former Smoker    Smokeless tobacco: Never Used   Substance and Sexual Activity    Alcohol use: No    Drug use: No    Sexual activity: Never     Review of Systems  Objective:     Vital Signs (Most Recent):  Temp: 97.4 °F (36.3 °C) (11/14/19 0800)  Pulse: 64 (11/14/19 1122)  Resp: 18 (11/14/19 1122)  BP: (!) 168/84 (11/14/19 0800)  SpO2: 95 % (11/14/19 1122) Vital Signs (24h Range):  Temp:  [96 °F (35.6 °C)-97.4 °F (36.3 °C)] 97.4 °F (36.3 °C)  Pulse:  [54-84] 64  Resp:  [14-23] 18  SpO2:  [91 %-100 %] 95 %  BP: (100-168)/(58-96) 168/84      Weight: 132 kg (291 lb 0.1 oz)  Body mass index is 39.47 kg/m².    Foot Exam  Pressure injury plantar lateral left foot, with fluid filled blood blister.    Measures approximately 9 cm x 3 cm.  No depth.  Minimal surrounding erythema.  No surrounding edema.      Post nail avulsion of left 5th toe,  done by Dr. Tyson, with a pink granular base underneath.      Toenails 1st, 2nd, 3rd, 4th, 5th  bilateral are hypertrophic, dystrophic, discolored tanish brown with tan, gray crumbly subungual debris.  Tender to distal nail plate pressure, without periungual skin abnormality of each.      Laboratory:  All pertinent labs reviewed within the last 24 hours.     Diagnostic Results:  I have reviewed all pertinent imaging results/findings within the past 24 hours.

## 2019-11-14 NOTE — PROGRESS NOTES
[facility]  Pulmonary / Critical Care Medicine  Progress Note    Subjective     No acute events overnight.  More alert again today compared to the day prior.     Review of Systems:  Unable to obtain due to nonverbal patient.    I have personally reviewed the following during today's evaluation:  past medical history, ROS, family history, social history, surgical history, current inpatient medications,drug allergies, vital signs over the past 24 hours, results of relevant diagnostic studies and nursing/provider documentation from the past 24 hours.     Objective     VS Temp:  [96 °F (35.6 °C)-98.4 °F (36.9 °C)]   Pulse:  [60-88]   Resp:  [14-41]   BP: (140-169)/(68-96)   SpO2:  [91 %-100 %]   Arterial Line BP: (123-178)/(68-83)   Ideal body weight: 77.6 kg (171 lb 1.2 oz)  Adjusted ideal body weight: 99.4 kg (219 lb 0.8 oz)   I/O   Intake/Output Summary (Last 24 hours) at 11/13/2019 2022  Last data filed at 11/13/2019 1900  Gross per 24 hour   Intake 960 ml   Output 1150 ml   Net -190 ml        Vent SpO2 (!) 93%  Vent Mode: Spont  Oxygen Concentration (%):  [32-35] 35  Resp Rate Total:  [13 br/min-25 br/min] 20 br/min  Vt Set:  [450 mL] 450 mL  PEEP/CPAP:  [5 cmH20] 5 cmH20  Pressure Support:  [10 cmH20-15 cmH20] 15 cmH20  Mean Airway Pressure:  [7.7 cmH20-9.4 cmH20] 8.3 cmH20    PE Physical Exam   Constitutional: He appears well-developed and well-nourished.   Awake, alert, and following commands.  Nontoxic-appearing. He is obese.   HENT:   Head: Normocephalic.   Right Ear: External ear normal.   Left Ear: External ear normal.   Nose: Nose normal.   Mouth/Throat: Oropharynx is clear and moist. Mallampati Score: III.   Clear oropharynx, moist mucous membranes.   Neck: Normal range of motion. Neck supple. No JVD present. No thyromegaly present.   8.0 cuffed tracheostomy secure in pain.   Cardiovascular: Normal rate, regular rhythm, normal heart sounds and intact distal pulses. Exam reveals no gallop and  no friction rub.   No murmur heard.  Pulmonary/Chest: Normal expansion, symmetric chest wall expansion and breath sounds normal. He has no wheezes. He has no rhonchi. He has no rales.   Abdominal: Soft. Bowel sounds are normal. He exhibits no distension and no mass. There is no rebound.   G-tube secure and functioning.   Musculoskeletal: Normal range of motion. He exhibits no edema, tenderness or deformity.   Lymphadenopathy:     He has no cervical adenopathy.   Neurological: He is alert. He has normal strength. He displays no tremor. No cranial nerve deficit or sensory deficit. He displays no seizure activity. GCS eye subscore is 4. GCS verbal subscore is 1. GCS motor subscore is 6.   Skin: Skin is warm and dry. No rash noted. No cyanosis or erythema. Nails show no clubbing.   Psychiatric: He is slowed.   Nursing note and vitals reviewed.     Lines: Rahman, Gastrostomy, Tracheostomy, TLC, Arterial line        Labs I have personally reviewed and interpreted all labs / diagnostic studies obtained over the past 24 hours, and relevant results are as follows:  Recent Labs   Lab 11/13/19  0348   WBC 9.23   RBC 3.18*   HGB 9.2*   HCT 29.5*      MCV 93   MCH 28.9   MCHC 31.2*      K 3.5      CO2 25   BUN 52*   CREATININE 2.1*   ALBUMIN 2.4*      Imaging I have personally reviewed and interpreted the following images and reviewed the associated Radiology report.  CXR:  Appropriately placed left-sided PICC.  Otherwise unchanged chest radiograph compared to previous films.     Micro I have personally reviewed and interpreted the available culture data.  Relevant results are as follows.  Blood Culture   Lab Results   Component Value Date    LABBLOO No Growth to date 11/10/2019    LABBLOO No Growth to date 11/10/2019    LABBLOO No Growth to date 11/10/2019   , Sputum Culture   Lab Results   Component Value Date    GSRESP >10 epithelial cells per low power field 11/06/2019    GSRESP Moderate WBC's 11/06/2019     GSRESP Many Gram positive rods resembling diphtheroids 11/06/2019    GSRESP Few Gram negative rods 11/06/2019    RESPIRATORYC Reduced normal respiratory gabriela 11/06/2019    RESPIRATORYC SERRATIA MARCESCENS  Many   (A) 11/06/2019    RESPIRATORYC (A) 11/06/2019     PROTEUS MIRABILIS ESBL  Moderate  Results called to and read back by: Martina Murillo RN on M3 re: ESBL  by DRP 11/08/2019  09:10      and Urine Culture    Lab Results   Component Value Date    LABURIN (A) 11/10/2019     PRESUMPTIVE MARCUS ALBICANS  10,000 - 49,999 cfu/ml        Medications Scheduled    albuterol-ipratropium  3 mL Nebulization Q6H    balsam peru-castor oil   Topical (Top) Daily    carvedilol  3.125 mg Oral BID    enoxparin  40 mg Subcutaneous Q24H    fluconazole 40 mg/ml  200 mg Oral Daily    lacosamide (VIMPAT) IVPB  100 mg Intravenous Q12H    levetiracetam IVPB  500 mg Intravenous Q12H    pantoprazole  40 mg Oral Daily    piperacillin-tazobactam (ZOSYN) IVPB  4.5 g Intravenous Q8H    vancomycin  250 mg Oral QID      Continuous Infusions:    norepinephrine bitartrate-D5W Stopped (11/11/19 0500)        PRN           Assessment     Impression:   Steadily improving acute encephalopathy secondary to sepsis pseudomonal urinary tract with bacteremia.  Clinically improving.    Acute Critical Illness:   Acute coma/unconsciousness not secondary to hypoglycemia    Patient Active Problem List   Diagnosis    Functional quadriplegia    Chronic respiratory failure with hypoxia    Sepsis    Tracheostomy in place    Hematuria    HCAP (healthcare-associated pneumonia)    PEG (percutaneous endoscopic gastrostomy) status    Acquired hypothyroidism    Hemiparesis affecting dominant side as late effect of stroke    Sacral decubitus ulcer, stage II    Coronary artery disease    Anemia, chronic disease    Ventilator dependence    Acute on chronic diastolic HF (heart failure)    Pseudomonas urinary tract infection    AAA (abdominal aortic  aneurysm) without rupture    Enlarging abdominal aortic aneurysm (AAA)    Essential hypertension    GINETTE (acute kidney injury)    Cholelithiases    Bilateral nephrolithiasis    Hydroureter, left    Sputum culture positive for Pseudomonas    Acute on chronic diastolic heart failure    Hypophosphatemia    Chronic pain    C. difficile colitis    History of MDR Pseudomonas aeruginosa infection    Anasarca    Encephalopathy, toxic    Bacteremia due to Pseudomonas       Plan     1. Neuro:  · Improving encephalopathy.  · No recurrence of seizures.  · Continue AEDs per Neurology recommendations.  · Continue to treat acute infectious process.  · Supportive care.      2. Cardiovascular:  · Septic shock resolved  · Discontinue arterial line.    3. Pulmonary:  · At baseline level of chronic respiratory failure.  · Continue pressure support ventilation for now, decreased to pressure support of 10 today.    4.  FEN/GI:  · Tolerating enteral feeds to goal.  · Continue enteral feeds with free water boluses.    5. Renal:  · Improving nonoliguric acute kidney injury secondary to obstructed urinary catheter at presentation.  · Nephrology following.    6. Heme/ID:  · Pseudomonas bacteremia  · Pseudomonas urinary tract infection.  · Resolved septic shock  · Serratia/Proteus colonization of the respiratory tract.  · Infectious Disease is following  · Remains on oral vancomycin and Zosyn.  · Fluconazole started yesterday.    7. Endocrine:  · Adequate glycemic control over the past 24 hr.  · Frequent monitoring of blood glucose and sliding scale insulin as needed to maintain moderate glycemic control.    Stress Ulcer PPX: Oral PPI  DVT/VTE Ppx:  Prophylactic SQ LMWH  Nutrition:  Enteral feeds at goal rate  Mobilization:  Consult PT/OT.   SAT:   Performed  SBT:   Passed  CODE Status:  DNR (Do Not Resuscitate)  Disposition:  Unchanged.  Remains critically ill.    Mauricio Kinsey MD  Pulmonary / Critical Care Medicine  [place]  [facility]          Critical Care Time: Approximately >35 minutes    The patient is critically ill due to the following conditions that actively pose threat to life and bodily function:  Acute coma/unconsciousness not secondary to hypoglycemia    The patient is at high risk of death due to central nervous system failure and requiring ongoing treatment including non-invasive mechanical ventilation, frequent neurologic assessment to prevent further life-threatening deterioration.  Due to a high probability of clinically significant, life threatening deterioration, the patient required my highest level of preparedness to intervene emergently and I personally spent this critical care time directly and personally managing the patient.     Critical care was time spent personally by me on the following activities: Obtaining a history, examination of patient, reviewing pulse oximetry, providing medical care at the patients bedside, developing a treatment plan with patient or surrogate and bedside caregivers, ordering and reviewing laboratory studies, radiographic studies, and pulse oximetry, ordering and performing treatments and interventions, evaluation of patient's response to treatment, discussions with consultants while on the unit and immediately available to the patient, re-evaluation of the patient's condition, and documentation in the medical record.  This critical care time did not overlap with that of any other provider or involve time for any procedures.     Mauricio Kinsey MD

## 2019-11-14 NOTE — PLAN OF CARE
This note also relates to the following rows which could not be included:  Oxygen Concentration (%) - Cannot attach notes to unvalidated device data  Ventilation Type - Cannot attach notes to unvalidated device data  Vent Mode - Cannot attach notes to unvalidated device data  Vt Set - Cannot attach notes to unvalidated device data  PEEP/CPAP - Cannot attach notes to unvalidated device data  Pressure Support - Cannot attach notes to unvalidated device data  Waveform - Cannot attach notes to unvalidated device data  Peak Flow - Cannot attach notes to unvalidated device data  Plateau Set/Insp. Hold (sec) - Cannot attach notes to unvalidated device data  Insp Rise Time  - Cannot attach notes to unvalidated device data  Trigger Sensitivity Flow/I-Trigger - Cannot attach notes to unvalidated device data  Resp Rate Total - Cannot attach notes to unvalidated device data  Peak Airway Pressure - Cannot attach notes to unvalidated device data  Mean Airway Pressure - Cannot attach notes to unvalidated device data  Plateau Pressure - Cannot attach notes to unvalidated device data  Exhaled Vt - Cannot attach notes to unvalidated device data  Total Ve - Cannot attach notes to unvalidated device data  Spont Ve - Cannot attach notes to unvalidated device data  I:E Ratio Measured - Cannot attach notes to unvalidated device data  Resp Rate High Alarm - Cannot attach notes to unvalidated device data  Press High Alarm - Cannot attach notes to unvalidated device data  Apnea Rate - Cannot attach notes to unvalidated device data  Apnea Volume (mL) - Cannot attach notes to unvalidated device data  Apnea Oxygen Concentration  - Cannot attach notes to unvalidated device data  Apnea Flow Rate (L/min) - Cannot attach notes to unvalidated device data  T Apnea - Cannot attach notes to unvalidated device data       11/13/19 1923   Patient Assessment/Suction   Level of Consciousness (AVPU) responds to voice   Respiratory Effort  Shallow;Slight;Gasping   Expansion/Accessory Muscles/Retractions retractions minimal   All Lung Fields Breath Sounds coarse   LLL Breath Sounds diminished   RLL Breath Sounds diminished   Rhythm/Pattern, Respiratory assisted mechanically   Cough Frequency with stimulation   Cough Type assisted   Suction Method tracheal   $ Suction Charges Inline Suction Procedure Stat Charge   Secretions Amount small   Secretions Color yellow   Secretions Characteristics thick   PRE-TX-O2   O2 Device (Oxygen Therapy) ventilator   SpO2 (!) 93 %   Pulse Oximetry Type Continuous   Pulse 74   Resp 20   Aerosol Therapy   $ Aerosol Therapy Charges Aerosol Treatment   Daily Review of Necessity (SVN) completed   Respiratory Treatment Status (SVN) given   Treatment Route (SVN) in-line   Patient Position (SVN) semi-Arredondo's   Post Treatment Assessment (SVN) breath sounds unchanged   Signs of Intolerance (SVN) none   Breath Sounds Post-Respiratory Treatment   Post-treatment Heart Rate (beats/min) 75   Post-treatment Resp Rate (breaths/min) 22        Surgical Airway Portex Cuffed;Fenestrated   No Placement Date or Time found.   Present Prior to Hospital Arrival?: Yes  Inserted by: Present Prior to Hospital Arrival  Type: Tracheostomy  Brand: Portex  Airway Device Size: 8.0  Style: Cuffed;Fenestrated   Cuff Pressure   (MLT)   Cuff Inflation? Inflated   Status Secured   Site Assessment Clean   Ties Assessment Clean   Vent Select   Charged w/in last 24h YES   Preset Conventional Ventilator Settings   Vent ID 9   Vent Type    Patient Ventilator Parameters   Tubing ID (mm) 8 mm   Tube Type Trach   Conventional Ventilator Alarms   Alarms On Y

## 2019-11-14 NOTE — PLAN OF CARE
11/14/19 1534   Discharge Reassessment   Assessment Type Discharge Planning Reassessment   Anticipated Discharge Disposition detention Nu   ELIJAH contacted Dat in Admissions at [facility] and [facility] know that the pt will be discharged today. Dat stated that when the DC orders are in to let her know and she will pull them up .    1305- Elijah called Dat to let her know the dc orders are in.    1330- Dat called elijah to see who the nurse is and that the House supervisor will call for report.  Elijah gave number [phone] and Amanuel as nurse caring for pt. Elijah then called Amanuel to let him know that the House Supervisor will be calling him for report. Dat from [facility] told Elijah if she needs anything else for discharge that she will call elijah back. Elijah told Amanuel that when the report is given he can call [facility] to pick pt up. Pt is a vent dependent pt and total bed bound pt.

## 2019-11-15 NOTE — PLAN OF CARE
11/14/19 1936   Patient Assessment/Suction   Level of Consciousness (AVPU) responds to voice   Respiratory Effort Unlabored   Expansion/Accessory Muscles/Retractions no use of accessory muscles   All Lung Fields Breath Sounds coarse   Rhythm/Pattern, Respiratory assisted mechanically   Cough Frequency with stimulation   Cough Type assisted   Suction Method tracheal   $ Suction Charges Inline Suction Procedure Stat Charge   Secretions Amount moderate   Secretions Color yellow   Secretions Characteristics thick   PRE-TX-O2   O2 Device (Oxygen Therapy) ventilator   Oxygen Concentration (%) 35   SpO2 (!) 94 %   Pulse 67   Resp 19        Surgical Airway Portex Cuffed   No Placement Date or Time found.   Present Prior to Hospital Arrival?: Yes  Type: Tracheostomy  Brand: Portex  Airway Device Size: 7.0  Style: Cuffed   Cuff Inflation? Inflated   Cuff Air Leak Pressure   (MLT)   Status Secured   Site Assessment Clean   Vent Select   Conventional Vent Y   Charged w/in last 24h YES   Preset Conventional Ventilator Settings   Ventilation Type VC   Vent Mode A/C   Set Rate 18 bmp   Vt Set 450 mL   PEEP/CPAP 5 cmH20   Pressure Support 0 cmH20   Waveform RAMP   Peak Flow 60 L/min   Plateau Set/Insp. Hold (sec) 0   Trigger Sensitivity Flow/I-Trigger 2 L/min   Patient Ventilator Parameters   Resp Rate Total 19 br/min   Peak Airway Pressure 29 cmH2O   Mean Airway Pressure 12 cmH20   Plateau Pressure 27 cmH20   Exhaled Vt 541 mL   Total Ve 9.99 mL   I:E Ratio Measured 1:3.10   Conventional Ventilator Alarms   Resp Rate High Alarm 40 br/min   Press High Alarm 50 cmH2O   Apnea Rate 10   Apnea Volume (mL) 0 mL   Apnea Oxygen Concentration  100   Apnea Flow Rate (L/min) 44   T Apnea 20 sec(s)   Ready to Wean/Extubation Screen   FIO2<=50 (chart decimal) 0.35   MV<16L (chart vol.) 9.99   PEEP <=8 (chart #) 5   Ready to Wean Parameters   F/VT Ratio<105 (RSBI) (!) 35.12

## 2019-11-15 NOTE — NURSING TRANSFER
Nursing Transfer Note      11/14/2019     Transfer From: 3029    Transfer via stretcher    Transfer with portable vent to O2, cardiac monitoring    Transported by [facility]    [facility] sent: n/a    Chart send with patient: Yes    Notified: TAMERA Vital on day shift called report to [place] staff

## 2019-11-21 PROBLEM — G93.41 ENCEPHALOPATHY, METABOLIC: Status: ACTIVE | Noted: 2019-01-01

## 2019-11-21 PROBLEM — R41.82 ALTERED MENTAL STATUS: Status: ACTIVE | Noted: 2019-01-01

## 2019-11-21 PROBLEM — B37.49 CANDIDA UTI: Status: ACTIVE | Noted: 2019-01-01

## 2019-11-21 NOTE — HPI
"78 yo male , resident of [facility], recently DC from [facility] sent back to ER for "altered mental status" and I was asked to see.  He is trach and vent dependent.  He opens eyes to voice, doesn't follow commands.  He is in no distress and is not able to give any history.  Chart has been reviewed.  "

## 2019-11-21 NOTE — ASSESSMENT & PLAN NOTE
Differential includes infectious, metabolic, post ictal  I will continue vanc per peg for C. Diff as well as Fluconazole for fungal UTI.  No fever.  No leukocytosis.  Not in shock.  Just completed course of IV zosyn 11/19.  BCx and UCx are in progress.  CXR with unchanged infiltrates.  No current signs of new infection and thus I will continue current course unless a new process declares itself.  Could be due to profound hypothyroidism.  IV levothyroxine started and Endocrinology consulted.  Could be due to seizure activity.  No witnessed seizure activity, however.  Continuing his seizure medication and consulting Neurology.  EEG ordered.  No significant electrolyte abnormalities.  Not hypoglycemic though had been hypoglycemic on previous admit begging the question was this hypoglycemia induced encephalopathy.  It does not sounds like patient was back to his baseline upon last discharge but certainly improved if he was following commands.  Is this possibly a new baseline?  Will need collateral information from [place] regarding his activity and interactions over the last week.

## 2019-11-21 NOTE — ASSESSMENT & PLAN NOTE
Continuing vanc per peg. Will need a long taper.  See discharge summary and Dr. Tyson's last note for taper specifics.

## 2019-11-21 NOTE — SUBJECTIVE & OBJECTIVE
Interval History:     Review of Systems  Objective:     Vital Signs (Most Recent):  Temp: 98.5 °F (36.9 °C) (11/21/19 0650)  Pulse: (!) 54 (11/21/19 1536)  Resp: 18 (11/21/19 1536)  BP: (!) 111/56 (11/21/19 0803)  SpO2: 100 % (11/21/19 1536) Vital Signs (24h Range):  Temp:  [98.3 °F (36.8 °C)-98.5 °F (36.9 °C)] 98.5 °F (36.9 °C)  Pulse:  [50-60] 54  Resp:  [18-20] 18  SpO2:  [96 %-100 %] 100 %  BP: (105-152)/(56-80) 111/56     Weight: (!) 138.2 kg (304 lb 10.8 oz)(generalized edema noted )  Body mass index is 39.12 kg/m².    Intake/Output Summary (Last 24 hours) at 11/21/2019 1603  Last data filed at 11/21/2019 1532  Gross per 24 hour   Intake --   Output 1800 ml   Net -1800 ml      Physical Exam   HENT:   Head: Atraumatic.   Eyes: Conjunctivae are normal.   Neck: Neck supple.   Cardiovascular: Normal rate and regular rhythm.   Pulmonary/Chest: No respiratory distress.   Abdominal: He exhibits no distension. There is no tenderness.   Neurological:   Patient response to pain very well, eye lash flickering   Skin: No rash noted.       Significant Labs:   BMP:   Recent Labs   Lab 11/21/19  0045   GLU 86   *   K 5.0   CL 92*   CO2 29   BUN 39*   CREATININE 1.6*   CALCIUM 9.0   MG 1.8     CBC:   Recent Labs   Lab 11/20/19  1140 11/21/19  0045   WBC 9.42 9.64   HGB 9.4* 8.7*   HCT 30.1* 28.1*    343       Significant Imaging: I have reviewed all pertinent imaging results/findings within the past 24 hours.

## 2019-11-21 NOTE — CONSULTS
"[facility]  Neurology  Consult Note    Patient Name: Masood Messina  MRN: [id number]  Admission Date: 11/21/2019  Hospital Length of Stay: 0 days  Code Status: DNR   Attending Provider: Yamileth Stuart MD   Consulting Provider: Dr. Roy   Consulting NP: Shyla Barnhart NP  Primary Care Physician: Jeramie Lombardi MD  Principal Problem:Encephalopathy, metabolic    Inpatient consult to Neurology  Consult performed by: Shyla Barnhart NP  Consult ordered by: Yamileth Stuart MD        Subjective:     Chief Complaint:    Chief Complaint   Patient presents with    Altered Mental Status     States has had a change in mental status. Used to respond but now does not respond to voice or pain         HPI:  79 year old male with PMhx CVA, s/p trach with vent dependence, s/p peg sent from [facility] for "unresponsiveness" since yesterday.  Old records reviewed and and patient discharged 11/14 after treatment for encephalopathy that presented in a similar manner.  Per my chart review, patient was treated for Psuedomonas UTI and bacteremia/shock and was discharged on IV Zosyn which he completed 11/19. He was also discharged home on po vancomycin for recurrent c. Diff and then Fluconazole for Candida Tropicalis. During last admission, initial EEG revealed seizure activity and thus he was discharged on vimpat and keppra.  No reported seizure activity witnessed.  Also per my chart review, patient had started to open his eyes and was following simple commands such as protruding his tongue on command for Dr. Tyson but it was noted that he was not on his baseline which is more interactive.  He will open his eyes to physical stimuli but is not following commands or consistently tracking me. Labs revealing .  TSH earlier in November 9.9.  On synthroid currently.      Neurological Interval Note: Patient seen and examined with Dr. Roy. Patient is a is a ill appearing 79 year old male with PMhx CVA, " "s/p trach with vent dependence, s/p peg sent from [facility] for "unresponsiveness". Patient is  Alert, nonverbal, doesn't follow commands.  Patient open eyes but does follow commands. Unable to get history from Patient related to current condition. No family present. Physical exam Limited at this time.     Past Medical History:   Diagnosis Date    AAA (abdominal aortic aneurysm)     Anemia     BPH (benign prostatic hyperplasia)     CHF (congestive heart failure)     COPD (chronic obstructive pulmonary disease)     Coronary artery disease     Dementia     Dementia     Depression     GERD (gastroesophageal reflux disease)     Hyperlipidemia     Hypertension     Hypothyroid     Renal disorder     Respiratory failure, chronic     Ventilator dependence        Past Surgical History:   Procedure Laterality Date    ABDOMINAL SURGERY      CARDIAC SURGERY  1999    GASTROSTOMY TUBE PLACEMENT      SPINE SURGERY      TRACHEOSTOMY TUBE PLACEMENT         Review of patient's allergies indicates:   Allergen Reactions    Codeine Other (See Comments)       Current Neurological Medications:    No current facility-administered medications on file prior to encounter.      Current Outpatient Medications on File Prior to Encounter   Medication Sig    ascorbic acid, vitamin C, (VITAMIN C) 500 mg/5 mL Syrp syrup 500 mg by PEG Tube route 2 (two) times daily.     baclofen (LIORESAL) 20 MG tablet 25 mg by PEG Tube route every 6 (six) hours.     balsam peru-castor oil Oint Apply topically once daily.    banana flakes-t-galactooligos. (BANATROL PLUS) PwPk Take 1 packet by mouth 2 (two) times daily. Mix with water    busPIRone (BUSPAR) 5 MG Tab 5 mg by Per G Tube route 2 (two) times daily.    carvedilol (COREG) 3.125 MG tablet Take 1 tablet (3.125 mg total) by mouth 2 (two) times daily.    chlorhexidine (HIBICLENS) 4 % external liquid Apply 1 application topically 3 (three) times a week. On bath days    " cholestyramine-aspartame (QUESTRAN LIGHT) 4 gram PwPk 1 packet (4 g total) by Per G Tube route 2 (two) times daily. Discontinue if no bowel movement in a day    duloxetine (CYMBALTA) 30 MG capsule 30 mg by PEG Tube route once daily.     enoxaparin (LOVENOX) 40 mg/0.4 mL Syrg Inject 40 mg into the skin once daily.    ferrous sulfate 220 mg (44 mg iron)/5 mL solution 220 mg by Per G Tube route once daily.    finasteride (PROSCAR) 5 mg tablet Take 1 tablet (5 mg total) by mouth once daily.    fluconazole 40 mg/ml (DIFLUCAN) 40 mg/mL suspension Take 5 mLs (200 mg total) by mouth once daily. for 14 days    furosemide (LASIX) 20 MG tablet Take 1 tablet (20 mg total) by mouth once daily. (Patient taking differently: 40 mg by Per G Tube route once daily. )    gabapentin (NEURONTIN) 300 MG capsule 300 mg by Per G Tube route 3 (three) times daily.    hydrocodone-acetaminophen 10-325mg (NORCO)  mg Tab 1 tablet by Per G Tube route every 6 (six) hours as needed for Pain.    lacosamide (VIMPAT) 200 mg Tab tablet 1 tablet (200 mg total) by Per G Tube route every 12 (twelve) hours.    Lactoperoxi/Gluc Oxid/Pot Thio (BIOTENE DRY MOUTH MM) Swish and spit 15 mLs 4 (four) times daily. AND/OR PRN    levETIRAcetam (KEPPRA) 500 MG Tab 1 tablet (500 mg total) by Per G Tube route 2 (two) times daily.    levothyroxine (SYNTHROID) 200 MCG tablet Take 1 tablet (200 mcg total) by mouth once daily.    multivit-min-ferrous gluconate (CERTAVITE-ANTIOXID, IRON GLUC,) 9 mg iron/ 15 mL (15 mL) Liqd 15 mLs by Per G Tube route once daily.     polyethylene glycol (GLYCOLAX) 17 gram PwPk 17 g by Per G Tube route every evening.     potassium chloride 10% (KAYCIEL) 20 mEq/15 mL oral solution Take 10 mEq by mouth once daily. Per g tube     rivastigmine (EXELON) 9.5 mg/24 hr PT24 Place 1 patch onto the skin once daily.    Saccharomyces boulardii (FLORASTOR) 250 mg capsule Take 250 mg by mouth 2 (two) times daily.    saw/vit E/sod  sue/lyc/beta/pyg (PROSTATE HEALTH ORAL) Take 30 mLs by mouth 2 (two) times daily.    selenium sulfide 2.5 % Lotn Apply 1 application topically twice a week. ON Tuesday AND SATURDAY    terazosin (HYTRIN) 1 MG capsule 1 mg by PEG Tube route once daily.    traZODone (DESYREL) 100 MG tablet 100 mg by Per G Tube route every evening.     vancomycin 250mg / 10ml Susp Take 5 mLs (125 mg total) by mouth every 6 (six) hours for 7 days, THEN 5 mLs (125 mg total) 2 (two) times daily for 7 days, THEN 5 mLs (125 mg total) once daily for 7 days, THEN 5 mLs (125 mg total) Every 3 (three) days.    acetaminophen (TYLENOL) 160 mg/5 mL Elix 650 mg by Per G Tube route every 4 (four) hours as needed.    albuterol-ipratropium 2.5mg-0.5mg/3mL (DUONEB) 0.5 mg-3 mg(2.5 mg base)/3 mL nebulizer solution Take 3 mLs by nebulization every 6 (six) hours as needed for Wheezing or Shortness of Breath.     finasteride (PROSCAR) 5 mg tablet 5 mg by PEG Tube route once daily.     HYDROcodone-acetaminophen (NORCO)  mg per tablet Take 1 tablet by mouth every 12 (twelve) hours as needed for Pain.    hydrocortisone (PROCTOZONE-HC) 2.5 % rectal cream Place 1 application rectally 2 (two) times daily as needed for Hemorrhoids.    ibuprofen (ADVIL,MOTRIN) 600 MG tablet Take 600 mg by mouth every 6 (six) hours as needed for Pain.    lactose-reduced food (ISOSOURCE HN FEEDING TUBE) 70 mLs by FEEDING TUBE route once daily. 70 ml per hour x22 hrs daily. Hold 1 hr before and after giving synthroid    lidocaine (XYLOCAINE) 5 % Oint ointment Apply 1 g topically as needed (with each trach change).    menthol-zinc oxide (RISAMINE) 0.44-20.6 % Oint Apply 1 application topically once daily. AFTER EACH DIAPER CHANGE    nitroGLYCERIN (NITROSTAT) 0.4 MG SL tablet Place 0.4 mg under the tongue every 5 (five) minutes as needed for Chest pain. Seek medical help if pain is not relieved by the third dose.      Family History     None        Tobacco Use     Smoking status: Former Smoker    Smokeless tobacco: Never Used   Substance and Sexual Activity    Alcohol use: No    Drug use: No    Sexual activity: Never     Review of Systems   Unable to perform ROS: Mental status change     Objective:     Vital Signs (Most Recent):  Temp: 98.5 °F (36.9 °C) (11/21/19 0650)  Pulse: (!) 51 (11/21/19 0803)  Resp: 18 (11/21/19 0803)  BP: (!) 111/56 (11/21/19 0803)  SpO2: 97 % (11/21/19 0803) Vital Signs (24h Range):  Temp:  [98.3 °F (36.8 °C)-98.5 °F (36.9 °C)] 98.5 °F (36.9 °C)  Pulse:  [50-60] 51  Resp:  [18-20] 18  SpO2:  [96 %-99 %] 97 %  BP: (105-152)/(56-80) 111/56     Weight: (!) 138.2 kg (304 lb 10.8 oz)  Body mass index is 39.12 kg/m².    Physical Exam   Constitutional: He appears well-developed and well-nourished.   HENT:   Head: Normocephalic and atraumatic.   Eyes: Pupils are equal, round, and reactive to light. EOM are normal.   Neck: Normal range of motion. Neck supple.   Cardiovascular: Normal rate, regular rhythm, normal heart sounds and intact distal pulses.   Pulmonary/Chest: Effort normal and breath sounds normal.   Abdominal: Soft. Bowel sounds are normal.   Musculoskeletal: Normal range of motion.   Neurological: He is alert. He has normal reflexes. No cranial nerve deficit or sensory deficit. He has an abnormal Finger-Nose-Finger Test, an abnormal Heel to Shin Test and an abnormal Tandem Gait Test. GCS eye subscore is 4. GCS verbal subscore is 4. GCS motor subscore is 1.   Reflex Scores:       Tricep reflexes are 2+ on the right side and 2+ on the left side.       Bicep reflexes are 2+ on the right side and 2+ on the left side.       Brachioradialis reflexes are 2+ on the right side and 2+ on the left side.       Patellar reflexes are 2+ on the right side and 2+ on the left side.       Achilles reflexes are 2+ on the right side and 2+ on the left side.  Skin: Skin is warm and dry. Capillary refill takes less than 2 seconds.   Patient has multiple wound areas  noted    Psychiatric: His affect is labile. He is withdrawn. Cognition and memory are impaired. He is noncommunicative.   Withdrawn  He is inattentive.       NEUROLOGICAL EXAMINATION:     MENTAL STATUS   Oriented to person.   Oriented to place.   Attention: decreased. Concentration: decreased.   Level of consciousness: arousable by tactile stimuli  General knowledge: limited exam    Unable to name object. Unable to read. Unable to repeat. Unable to write. Abnormal comprehension. Praxis: abnormal     CRANIAL NERVES     CN III, IV, VI   Pupils are equal, round, and reactive to light.  Extraocular motions are normal.   Right pupil: Size: 2 mm. Shape: regular. Reactivity: brisk.   Left pupil: Size: 2 mm. Shape: regular. Reactivity: brisk. Accommodation: intact.   Diplopia: none  Ophthalmoparesis: none    CN VII   Right facial weakness: none  Left facial weakness: none       Limited exam, Patient nonverbal at this time does withdraw to pain      MOTOR EXAM   Muscle bulk: normal  Overall muscle tone: normal  Right arm tone: normal  Left arm tone: normal  Right arm pronator drift: present  Left arm pronator drift: present    REFLEXES     Reflexes   Right brachioradialis: 2+  Left brachioradialis: 2+  Right biceps: 2+  Left biceps: 2+  Right triceps: 2+  Left triceps: 2+  Right patellar: 2+  Left patellar: 2+  Right achilles: 2+  Left achilles: 2+  Right : 2+  Left : 2+    SENSORY EXAM        Unable  To assess at this time      GAIT AND COORDINATION      Coordination   Finger to nose coordination: abnormal  Heel to shin coordination: abnormal  Tandem walking coordination: abnormal       None amblutory bed bound          Significant Labs:   Lab Results   Component Value Date    CREATININE 1.6 (H) 11/21/2019     Lab Results   Component Value Date    .940 (H) 11/21/2019       Significant Imaging:   CT of Head without contrast  IMPRESSION:    No evidence of acute intracranial abnormality or mass.    There is  "evidence of chronic ischemic white matter changes changes secondary to  small vessel disease.    There is also moderate, diffuse cerebral atrophy.    There is hypoattenuation in the parasylvian regions bilaterally which may be  secondary to old infarcts.    Recommendation:    Follow up as clinically indicated.    All CT scans at this facility utilize dose modulation, iterative reconstruction,  and/or weight based dosing when appropriate to reduce radiation dose to as low    Assessment and Plan:   Patient is  a is a ill appearing 79 year old male with PMhx CVA, s/p trach with vent dependence, s/p peg sent from [facility] for "unresponsiveness". Patient is  Alert, nonverbal, doesn't follow commands.  Patient open eyes but does follow commands. Unable to get history from Patient related to current condition. No family present. Physical exam Limited at this time. Continue AED's medications.     1. Encephalopathy    Encephalopathic work up   EEG pending  CT of head without contrast negative for acute intracranial   Encephalopathy labs Ammonia, folate, B12, B1, B6   Hemoglobin A 1C     2. Hypothyroidism   -IM to manage     3. Hx of Seizure  -Continue Home AED Medications    Patient to follow up with [facility] at [phone] within 2 weeks from discharge.            Active Diagnoses:    Diagnosis Date Noted POA    PRINCIPAL PROBLEM:  Encephalopathy, metabolic [G93.41] 11/21/2019 Yes    Candida UTI [B37.49] 11/21/2019 Yes    C. difficile colitis [A04.72] 10/11/2019 Yes    Essential hypertension [I10] 09/13/2019 Yes    Coronary artery disease [I25.10] 09/12/2019 Yes    Ventilator dependence [Z99.11] 09/12/2019 Not Applicable    Anemia, chronic disease [D63.8] 09/12/2019 Yes    PEG (percutaneous endoscopic gastrostomy) status [Z93.1] 03/21/2017 Not Applicable    Acquired hypothyroidism [E03.9] 03/21/2017 Yes    Functional quadriplegia [R53.2] 12/05/2013 Yes    Chronic respiratory failure with " hypoxia [J96.11] 12/05/2013 Yes    Tracheostomy in place [Z93.0] 12/05/2013 Not Applicable      Problems Resolved During this Admission:       VTE Risk Mitigation (From admission, onward)         Ordered     enoxaparin injection 40 mg  Daily      11/21/19 0809     IP VTE HIGH RISK PATIENT  Once      11/21/19 0809     Place EYAD hose  Until discontinued      11/21/19 0809                Thank you for your consult.     Shyla Barnhart NP  Neurology  [facility]

## 2019-11-21 NOTE — ASSESSMENT & PLAN NOTE
Patient with profound hypothyroidism with  and low free T4.  Last TSH was 9 in early November.  Unable to exclude  myxedema coma since patient is unresponsive and cardioplegic .    Plan   Repeat TSH ordered . But system did not allow to do .pathologist reported .  Continue 50mcg IV daily, if TSH level is truly elevated in that level, according to clinical condition of the patient ,will treat for myxedema coma  I am having a lot of experience with TSH level went down  to more than half in previous my patient's and will repeat ..  Consulted endocrinologist already  ,

## 2019-11-21 NOTE — NURSING
79 yr old male    heel pillows        11/21/19 1555        Pressure Injury 11/21/19 0939 Left lateral Foot Suspected Unstageable   Date First Assessed/Time First Assessed: 11/21/19 0939   Pressure Injury Present on Admission: suspected hospital acquired  Side: Left  Orientation: lateral  Location: Foot  Is this injury device related?: (c)   Staging: Suspected Unstageable   Wound Image     Staging Unstageable   Dressing Appearance Clean   Drainage Amount None   Appearance Red;Maroon;Gray;Blistered   Periwound Area Intact  (discolored)   Dressing Island/border   Recommendation:  Clean with chlorhexidine/ns.  Pat dry.  Paint with betadine.Cover with mepilex      11/21/19 1600        Pressure Injury 11/21/19 0900 Right lateral Malleolus Stage 1   Date First Assessed/Time First Assessed: 11/21/19 0900   Pressure Injury Present on Admission: yes  Side: Right  Orientation: lateral  Location: Malleolus  Staging: Stage 1   Wound Image    Staging Stage 1   Dressing Appearance Open to air   Drainage Amount None   Appearance   (nonblanching)   Periwound Area Intact   Wound Length (cm) 2 cm   Wound Width (cm) 2 cm   Wound Surface Area (cm^2) 4 cm^2   Care Cleansed with:;Antimicrobial agent   Recommendation:  Clean area with chlorhexidine/ns  Pat dry  Apply venelex and cover with mepilex. Reposition Q2hr.     11/21/19 1557        Wound 11/21/19 0900 Non pressure chronic ulcer distal Toe, fifth   Date First Assessed/Time First Assessed: 11/21/19 0900   Pre-existing: Yes  Primary Wound Type: (c) Non pressure chronic ulcer  Side: Left  Orientation: distal  Location: Toe, fifth   Wound Image     Dressing Appearance Open to air;Moist drainage   Drainage Amount Small   Drainage Characteristics/Odor Clear   Appearance Red;Maroon;Gray;Blistered   Periwound Area Intact;Redness   Wound Edges Irregular   Care Cleansed with:;Antimicrobial agent   Dressing Island/border      Recommendation: Clean with chlorhexidine/ns.  Pat dry. Apply  Medihoney and cover with opal weaved between the toes.

## 2019-11-21 NOTE — ED NOTES
Bed: 02A Trauma  Expected date:   Expected time:   Means of arrival:   Comments:  EMS- vented edbbie

## 2019-11-21 NOTE — ASSESSMENT & PLAN NOTE
Patient with profound hypothyroidism with  and low free T4.  Last TSH was 9 in early November.  He is slightly bradycardic with hyponatremia but no hypothermia or hypoglycemia.  This could certainly be playing a role in his mental status.  I have started IV levothyroxine at 200mcg x1 and then 50mcg IV daily thereafter.  I do not see T3 IV available to order.  I have consulted endocrinology.  Repeating TSH and Free T4 and while I do not expect resolution in a short time period, literature search does suggest it should start improving. Tele monitoring for mild bradycardia and will replace electrolytes.  I am going to hold off on peg tube feeds initially as you can get some gut dysmotility with profound hypothyroidism but should consider starting tomorrow and watch for high residuals.

## 2019-11-21 NOTE — ASSESSMENT & PLAN NOTE
Continuing Fluconzole to complete 14 day course from 11/15 per discharge summary  Repeat UA and culture done

## 2019-11-21 NOTE — PROGRESS NOTES
[facility] Medicine  Progress Note    Patient Name: Masood Messina  MRN: [id number]  Patient Class: IP- Inpatient   Admission Date: 11/21/2019  Length of Stay: 0 days  Attending Physician: Brodie Mann MD  Primary Care Provider: Jeramie Lombardi MD        Subjective:     Principal Problem:Encephalopathy, metabolic        HPI:  No notes on file    Overview/Hospital Course:  Patient was seen and examined  He follows movement with eyes but no verbal response  The is no apparent movement in the limbs  Vitals stable, afebrile, DSA severely elevated, patient was on anti seizure medications, possible UTI noted. Cultures so far negative.  eeg with epileptiform discharges       Interval History:     Review of Systems  Objective:     Vital Signs (Most Recent):  Temp: 98.5 °F (36.9 °C) (11/21/19 0650)  Pulse: (!) 54 (11/21/19 1536)  Resp: 18 (11/21/19 1536)  BP: (!) 111/56 (11/21/19 0803)  SpO2: 100 % (11/21/19 1536) Vital Signs (24h Range):  Temp:  [98.3 °F (36.8 °C)-98.5 °F (36.9 °C)] 98.5 °F (36.9 °C)  Pulse:  [50-60] 54  Resp:  [18-20] 18  SpO2:  [96 %-100 %] 100 %  BP: (105-152)/(56-80) 111/56     Weight: (!) 138.2 kg (304 lb 10.8 oz)(generalized edema noted )  Body mass index is 39.12 kg/m².    Intake/Output Summary (Last 24 hours) at 11/21/2019 1603  Last data filed at 11/21/2019 1532  Gross per 24 hour   Intake --   Output 1800 ml   Net -1800 ml      Physical Exam   HENT:   Head: Atraumatic.   Eyes: Conjunctivae are normal.   Neck: Neck supple.   Cardiovascular: Normal rate and regular rhythm.   Pulmonary/Chest: No respiratory distress.   Abdominal: He exhibits no distension. There is no tenderness.   Neurological:   Patient response to pain very well, eye lash flickering   Skin: No rash noted.       Significant Labs:   BMP:   Recent Labs   Lab 11/21/19  0045   GLU 86   *   K 5.0   CL 92*   CO2 29   BUN 39*   CREATININE 1.6*   CALCIUM 9.0   MG 1.8     CBC:   Recent Labs   Lab  11/20/19  1140 11/21/19  0045   WBC 9.42 9.64   HGB 9.4* 8.7*   HCT 30.1* 28.1*    343       Significant Imaging: I have reviewed all pertinent imaging results/findings within the past 24 hours.      Assessment/Plan:      * Encephalopathy, metabolic  Multifactorial and difficult to determine  EEG with and good flow but thick waves and also possible seizure activities  Given severely elevated TSH in short period of time and possible myxedema coma  Also possible from UTI      Plan   Repeat TSH requested   continue vanc per peg for C. Diff as well as Fluconazole for fungal UTI. Just completed course of IV zosyn 11/19  .  BCx and UCx to follow   IV thyroxine and will start glucocorticoids if severely elevated.  Neurology follow-up ,continue antiseizure medication      Candida UTI  Continuing Fluconzole to complete 14 day course from 11/15 per discharge summary  Repeat UA and culture follow up       C. difficile colitis  Continuing vanc per peg. Will need a long taper.  Follow Dr. Tyson's last note for taper specifics.      Essential hypertension  Chronic medical condition.  Continuing home medication.  Monitoring.        Ventilator dependence  Chronic.  No acute issues.      Anemia, chronic disease  Chronic.  Monitoring.  No signs of bleeding.  AM labs ordered.       Coronary artery disease  Chronic condition.  No acute process.  Cont hold Beta blocker       Acquired hypothyroidism  Patient with profound hypothyroidism with  and low free T4.  Last TSH was 9 in early November.  Unable to exclude  myxedema coma since patient is unresponsive and cardioplegic .    Plan   Repeat TSH ordered . But system did not allow to do .pathologist reported .  Continue 50mcg IV daily, if TSH level is truly elevated in that level, according to clinical condition of the patient ,will treat for myxedema coma  I am having a lot of experience with TSH level went down  to more than half in previous my patient's and will repeat  ..  Consulted endocrinologist already  ,    PEG (percutaneous endoscopic gastrostomy) status  Restart PEG tube feeding       Tracheostomy in place  Chronic.  No acute issues.      Chronic respiratory failure with hypoxia  On vent. Follow pulmonary       Functional quadriplegia  Chronic condition        VTE Risk Mitigation (From admission, onward)         Ordered     enoxaparin injection 40 mg  Daily      11/21/19 0809     IP VTE HIGH RISK PATIENT  Once      11/21/19 0809     Place EYAD hose  Until discontinued      11/21/19 0809                      Brodie Mann MD  Department of Hospital Medicine   [facility]

## 2019-11-21 NOTE — CONSULTS
"[facility]  Pulmonology  Consult Note    Patient Name: Masood Messina  MRN: [id number]  Admission Date: 11/21/2019  Hospital Length of Stay: 0 days  Code Status: DNR  Attending Physician: Brodie Mann MD  Primary Care Provider: Jeramie Lombardi MD   Principal Problem: Encephalopathy, metabolic    Inpatient consult to Pulmonology  Consult performed by: Shai Bai MD  Consult ordered by: Yamileth Stuart MD        Subjective:     HPI:  80 yo male , resident of [facility], recently DC from [facility] sent back to ER for "altered mental status" and I was asked to see.  He is trach and vent dependent.  He opens eyes to voice, doesn't follow commands.  He is in no distress and is not able to give any history.  Chart has been reviewed.    Past Medical History:   Diagnosis Date    AAA (abdominal aortic aneurysm)     Anemia     BPH (benign prostatic hyperplasia)     CHF (congestive heart failure)     COPD (chronic obstructive pulmonary disease)     Coronary artery disease     Dementia     Dementia     Depression     GERD (gastroesophageal reflux disease)     Hyperlipidemia     Hypertension     Hypothyroid     Renal disorder     Respiratory failure, chronic     Ventilator dependence        Past Surgical History:   Procedure Laterality Date    ABDOMINAL SURGERY      CARDIAC SURGERY  1999    GASTROSTOMY TUBE PLACEMENT      SPINE SURGERY      TRACHEOSTOMY TUBE PLACEMENT         Review of patient's allergies indicates:   Allergen Reactions    Codeine Other (See Comments)       Family History     None        Tobacco Use    Smoking status: Former Smoker    Smokeless tobacco: Never Used   Substance and Sexual Activity    Alcohol use: No    Drug use: No    Sexual activity: Never         Review of Systems   Unable to perform ROS: Intubated     Objective:     Vital Signs (Most Recent):  Temp: 98.5 °F (36.9 °C) (11/21/19 0650)  Pulse: (!) 54 (11/21/19 1536)  Resp: 18 (11/21/19 1536)  BP: " (!) 111/56 (11/21/19 0803)  SpO2: 100 % (11/21/19 1536) Vital Signs (24h Range):  Temp:  [98.3 °F (36.8 °C)-98.5 °F (36.9 °C)] 98.5 °F (36.9 °C)  Pulse:  [50-60] 54  Resp:  [18-20] 18  SpO2:  [96 %-100 %] 100 %  BP: (105-152)/(56-80) 111/56     Weight: (!) 138.2 kg (304 lb 10.8 oz)(generalized edema noted )  Body mass index is 39.12 kg/m².      Intake/Output Summary (Last 24 hours) at 11/21/2019 1622  Last data filed at 11/21/2019 1532  Gross per 24 hour   Intake --   Output 1800 ml   Net -1800 ml       Physical Exam   Constitutional: He appears well-developed and well-nourished. No distress.   Obese, chronically ill male, trach  Response as above   HENT:   Head: Normocephalic and atraumatic.   Nose: Nose normal.   Mouth/Throat: Oropharynx is clear and moist.   Eyes: Pupils are equal, round, and reactive to light. EOM are normal.   Neck: Normal range of motion. Neck supple. No JVD present. No tracheal deviation present. No thyromegaly present.   Trach, site OK   Cardiovascular: Normal rate, regular rhythm and intact distal pulses. Exam reveals no gallop and no friction rub.   Murmur heard.  Pulmonary/Chest: Effort normal and breath sounds normal. No stridor. No respiratory distress. He has no wheezes. He has no rales. He exhibits no tenderness.   ventilated   Abdominal: Soft. Bowel sounds are normal. He exhibits distension. There is no tenderness. There is no rebound and no guarding.   Obese  PEG with some purulent drainage at entry site   Genitourinary:   Genitourinary Comments: Male, tay   Musculoskeletal: Normal range of motion. He exhibits edema. He exhibits no tenderness.   Lymphadenopathy:     He has no cervical adenopathy.   Neurological: He has normal reflexes. No cranial nerve deficit.   As above  chronic changes, no acute focal findings noted   Skin: He is not diaphoretic.   Nursing note and vitals reviewed.      Vents:  Vent Mode: A/C (11/21/19 1536)  Set Rate: 18 bmp (11/21/19 1536)  Vt Set: 600 mL  (11/21/19 1536)  Pressure Support: 0 cmH20 (11/21/19 1536)  PEEP/CPAP: 5 cmH20 (11/21/19 1536)  Oxygen Concentration (%): 32 (11/21/19 1536)  Peak Airway Pressure: 35 cmH2O (11/21/19 1536)  Plateau Pressure: 0 cmH20 (11/21/19 1536)  Total Ve: 11.2 mL (11/21/19 1536)  F/VT Ratio<105 (RSBI): (!) 28.85 (11/21/19 1536)    Lines/Drains/Airways     Peripherally Inserted Central Catheter Line                 PICC Double Lumen 11/12/19 0920 left cephalic 9 days          Drain                 Gastrostomy/Enterostomy -- days         Gastrostomy/Enterostomy  Gastrostomy tube w/ balloon;Gastrostomy-jejunostomy midline feeding -- days         Gastrostomy/Enterostomy 1335 Percutaneous endoscopic gastrostomy (PEG) LUQ feeding -- days         Fecal Incontinence  10/07/19 1546 45 days         Rectal Tube 11/07/19 1900 fecal management system 13 days         Urethral Catheter 11/21/19 0720 less than 1 day          Airway                 Surgical Airway Portex Cuffed -- days         Surgical Airway Portex Cuffed;Fenestrated -- days                Significant Labs:    CBC/Anemia Profile:  Recent Labs   Lab 11/20/19  1140 11/21/19  0045   WBC 9.42 9.64   HGB 9.4* 8.7*   HCT 30.1* 28.1*    343   MCV 92 91   RDW 16.1* 16.1*        Chemistries:  Recent Labs   Lab 11/21/19  0045   *   K 5.0   CL 92*   CO2 29   BUN 39*   CREATININE 1.6*   CALCIUM 9.0   ALBUMIN 2.4*   PROT 8.0   BILITOT 0.6   ALKPHOS 64   ALT 17   AST 24   MG 1.8   PHOS 2.8       ABGs:   Recent Labs   Lab 11/21/19  0103   PH 7.445   PCO2 41.4   HCO3 28.4*   POCSATURATED 97   BE 4     Urine Studies:   Recent Labs   Lab 11/21/19  0054   COLORU Yellow   APPEARANCEUA Hazy*   PHUR 8.0   SPECGRAV 1.010   PROTEINUA 1+*   GLUCUA Negative   KETONESU Negative   BILIRUBINUA Negative   OCCULTUA 1+*   NITRITE Negative   UROBILINOGEN Negative   LEUKOCYTESUR 3+*   RBCUA 5*   WBCUA >100*   BACTERIA None   SQUAMEPITHEL 1   HYALINECASTS 13*     All pertinent labs within  the past 24 hours have been reviewed.    TSH - 207.940  Troponin - < 0.030    Microbiology Results (last 7 days)     Procedure Component Value Units Date/Time    Culture, Respiratory with Gram Stain [[id number]] Collected:  11/21/19 0800    Order Status:  Completed Specimen:  Respiratory from Endotracheal Aspirate Updated:  11/21/19 1509     Gram Stain (Respiratory) <10 epithelial cells per low power field.     Gram Stain (Respiratory) Many WBC's     Gram Stain (Respiratory) Rare Gram negative rods    Urine Culture High Risk [[id number]] Collected:  11/21/19 0836    Order Status:  Sent Specimen:  Urine, Catheterized Updated:  11/21/19 0921    Aerobic culture [[id number]] Collected:  11/21/19 0743    Order Status:  Sent Specimen:  Incision site from Abdomen Updated:  11/21/19 0844    Blood culture #1 **CANNOT BE ORDERED STAT** [[id number]] Collected:  11/21/19 0045    Order Status:  Completed Specimen:  Blood from Line, PICC Left Brachial Updated:  11/21/19 0758     Blood Culture, Routine No Growth to date    Blood culture #2 **CANNOT BE ORDERED STAT** [[id number]] Collected:  11/21/19 0043    Order Status:  Completed Specimen:  Blood from Peripheral, Antecubital, Right Updated:  11/21/19 0758     Blood Culture, Routine No Growth to date    Narrative:       Please draw one of the cultures from the PICC line.    Urine culture [[id number]] Collected:  11/21/19 0054    Order Status:  No result Specimen:  Urine Updated:  11/21/19 0115          Significant Imaging:   I have reviewed and interpreted all pertinent imaging results/findings within the past 24 hours.        FINDINGS:    There are median sternotomy wires and mediastinal clips. Tracheostomy  is at T10. Interstitial opacities throughout the right lung are  unchanged compared to prior study. Right hemidiaphragm is elevated.  Cardiac mediastinal contours are stable. The heart is not enlarged.    IMPRESSION:    No change compared with prior radiography November 12.  Elevated  hemidiaphragm, and interstitial opacities of the right lung.    Electronically Signed by Aretha Gregory on 11/21/2019 7:43 AM      Exam: CT OF THE BRAIN WITHOUT CONTRAST    Clinical data: Altered mental status.    Technique: Contiguous axial images are obtained from the skull base to vertex  without intravenous contrast. Radiation dose: CTDIvol = 57.50 mGy, DLP = 1073.80  mGy x cm.    Prior studies: No prior studies submitted.    Findings:    No acute intracranial abnormality is present. No evidence of acute cortical  infarction, hemorrhage, mass or mass effect.  There is evidence of chronic  ischemic white matter changes secondary to small vessel disease.  There is also  prominence of the cortical sulci diffusely.  There is also gliosis in the  parasylvian regions bilaterally which can be seen with old infarcts.  There is  no evidence of hydrocephalus or abnormal extra-axial fluid collections are  present. The posterior fossa is unremarkable.    The skull base and calvarium are intact.  Mucosal disease in the left sphenoid  sinus and posterior ethmoid air cells.  Bilateral mild-to-moderate mastoiditis.    IMPRESSION:    No evidence of acute intracranial abnormality or mass.    There is evidence of chronic ischemic white matter changes changes secondary to  small vessel disease.    There is also moderate, diffuse cerebral atrophy.    There is hypoattenuation in the parasylvian regions bilaterally which may be  secondary to old infarcts.    Recommendation:    Follow up as clinically indicated.    All CT scans at this facility utilize dose modulation, iterative reconstruction,  and/or weight based dosing when appropriate to reduce radiation dose to as low  as reasonably achievable.      Electronically Signed by UZIEL MARTIN MD at 11/21/2019 5:16:33 AM    EKG  Vent. Rate : 053 BPM     Atrial Rate : 036 BPM     P-R Int : 000 ms          QRS Dur : 092 ms      QT Int : 468 ms       P-R-T Axes : 000 057 090  degrees     QTc Int : 439 ms    Junctional rhythm  Nonspecific ST and T wave abnormality  Abnormal ECG  When compared with ECG of 05-NOV-2019 13:27,  Junctional rhythm has replaced Sinus rhythm  QRS duration has decreased        Assessment/Plan:     Chronic respiratory failure with hypoxia  · Continue vent support, follow    Candida UTI  · Await final culture results    Essential hypertension  · Follow and treat as needed    Ventilator dependence  · Continue support    Anemia, chronic disease  · Follow transfuse as needed  · No acute blood loss reported    Coronary artery disease  · Nothing to suggest ACS    Acquired hypothyroidism  · Adjust meds  · TSH was normal 11/6 ?    PEG (percutaneous endoscopic gastrostomy) status  · Clean site and follow  · Asked nurse to do swab culture    Tracheostomy in place  · Aware, no problems with trach    Functional quadriplegia  · Aware           Thank you for your consult. I will follow-up with patient. Please contact us if you have any additional questions.     Shai Bai MD  Pulmonology  [facility]

## 2019-11-21 NOTE — H&P
"[facility]  History & Physical    Patient Name: Masood Messina  MRN: [id number]  Admission Date: 11/21/2019  Attending Physician: Yamileth Stuart MD  Primary Care Provider: Jeramie Lombardi MD         Patient information was obtained from ED MD and records.     Subjective:     Principal Problem:Encephalopathy, metabolic    Chief Complaint:   Chief Complaint   Patient presents with    Altered Mental Status     States has had a change in mental status. Used to respond but now does not respond to voice or pain        HPI: 79 year old male with PMhx CVA, s/p trach with vent dependence, s/p peg sent from [facility] for "unresponsiveness" since yesterday.  Old records reviewed and and patient discharged 11/14 after treatment for encephalopathy that presented in a similar manner.  Per my chart review, patient was treated for Psuedomonas UTI and bacteremia/shock and was discharged on IV Zosyn which he completed 11/19. He was also discharged home on po vancomycin for recurrent c. Diff and then Fluconazole for Candida Tropicalis. During last admission, initial EEG revealed seizure activity and thus he was discharged on vimpat and keppra.  No reported seizure activity witnessed.  Also per my chart review, patient had started to open his eyes and was following simple commands such as protruding his tongue on command for Dr. Tyson but it was noted that he was not on his baseline which is more interactive.  He will open his eyes to physical stimuli but is not following commands or consistently tracking me. Labs revealing .  TSH earlier in November 9.9.  On synthroid currently.    Past Medical History:   Diagnosis Date    AAA (abdominal aortic aneurysm)     Anemia     BPH (benign prostatic hyperplasia)     CHF (congestive heart failure)     COPD (chronic obstructive pulmonary disease)     Coronary artery disease     Dementia     Dementia     Depression     GERD " (gastroesophageal reflux disease)     Hyperlipidemia     Hypertension     Hypothyroid     Renal disorder     Respiratory failure, chronic     Ventilator dependence        Past Surgical History:   Procedure Laterality Date    ABDOMINAL SURGERY      CARDIAC SURGERY  1999    GASTROSTOMY TUBE PLACEMENT      SPINE SURGERY      TRACHEOSTOMY TUBE PLACEMENT         Review of patient's allergies indicates:   Allergen Reactions    Codeine Other (See Comments)       No current facility-administered medications on file prior to encounter.      Current Outpatient Medications on File Prior to Encounter   Medication Sig    ascorbic acid, vitamin C, (VITAMIN C) 500 mg/5 mL Syrp syrup 500 mg by PEG Tube route 2 (two) times daily.     baclofen (LIORESAL) 20 MG tablet 25 mg by PEG Tube route every 6 (six) hours.     balsam peru-castor oil Oint Apply topically once daily.    banana flakes-t-galactooligos. (BANATROL PLUS) PwPk Take 1 packet by mouth 2 (two) times daily. Mix with water    busPIRone (BUSPAR) 5 MG Tab 5 mg by Per G Tube route 2 (two) times daily.    carvedilol (COREG) 3.125 MG tablet Take 1 tablet (3.125 mg total) by mouth 2 (two) times daily.    chlorhexidine (HIBICLENS) 4 % external liquid Apply 1 application topically 3 (three) times a week. On bath days    cholestyramine-aspartame (QUESTRAN LIGHT) 4 gram PwPk 1 packet (4 g total) by Per G Tube route 2 (two) times daily. Discontinue if no bowel movement in a day    duloxetine (CYMBALTA) 30 MG capsule 30 mg by PEG Tube route once daily.     enoxaparin (LOVENOX) 40 mg/0.4 mL Syrg Inject 40 mg into the skin once daily.    ferrous sulfate 220 mg (44 mg iron)/5 mL solution 220 mg by Per G Tube route once daily.    finasteride (PROSCAR) 5 mg tablet Take 1 tablet (5 mg total) by mouth once daily.    fluconazole 40 mg/ml (DIFLUCAN) 40 mg/mL suspension Take 5 mLs (200 mg total) by mouth once daily. for 14 days    furosemide (LASIX) 20 MG tablet Take 1  tablet (20 mg total) by mouth once daily. (Patient taking differently: 40 mg by Per G Tube route once daily. )    gabapentin (NEURONTIN) 300 MG capsule 300 mg by Per G Tube route 3 (three) times daily.    hydrocodone-acetaminophen 10-325mg (NORCO)  mg Tab 1 tablet by Per G Tube route every 6 (six) hours as needed for Pain.    lacosamide (VIMPAT) 200 mg Tab tablet 1 tablet (200 mg total) by Per G Tube route every 12 (twelve) hours.    Lactoperoxi/Gluc Oxid/Pot Thio (BIOTENE DRY MOUTH MM) Swish and spit 15 mLs 4 (four) times daily. AND/OR PRN    levETIRAcetam (KEPPRA) 500 MG Tab 1 tablet (500 mg total) by Per G Tube route 2 (two) times daily.    levothyroxine (SYNTHROID) 200 MCG tablet Take 1 tablet (200 mcg total) by mouth once daily.    multivit-min-ferrous gluconate (CERTAVITE-ANTIOXID, IRON GLUC,) 9 mg iron/ 15 mL (15 mL) Liqd 15 mLs by Per G Tube route once daily.     polyethylene glycol (GLYCOLAX) 17 gram PwPk 17 g by Per G Tube route every evening.     potassium chloride 10% (KAYCIEL) 20 mEq/15 mL oral solution Take 10 mEq by mouth once daily. Per g tube     rivastigmine (EXELON) 9.5 mg/24 hr PT24 Place 1 patch onto the skin once daily.    Saccharomyces boulardii (FLORASTOR) 250 mg capsule Take 250 mg by mouth 2 (two) times daily.    saw/vit E/sod sue/lyc/beta/pyg (PROSTATE HEALTH ORAL) Take 30 mLs by mouth 2 (two) times daily.    selenium sulfide 2.5 % Lotn Apply 1 application topically twice a week. ON Tuesday AND SATURDAY    terazosin (HYTRIN) 1 MG capsule 1 mg by PEG Tube route once daily.    traZODone (DESYREL) 100 MG tablet 100 mg by Per G Tube route every evening.     vancomycin 250mg / 10ml Susp Take 5 mLs (125 mg total) by mouth every 6 (six) hours for 7 days, THEN 5 mLs (125 mg total) 2 (two) times daily for 7 days, THEN 5 mLs (125 mg total) once daily for 7 days, THEN 5 mLs (125 mg total) Every 3 (three) days.    acetaminophen (TYLENOL) 160 mg/5 mL Elix 650 mg by Per G Tube  route every 4 (four) hours as needed.    albuterol-ipratropium 2.5mg-0.5mg/3mL (DUONEB) 0.5 mg-3 mg(2.5 mg base)/3 mL nebulizer solution Take 3 mLs by nebulization every 6 (six) hours as needed for Wheezing or Shortness of Breath.     finasteride (PROSCAR) 5 mg tablet 5 mg by PEG Tube route once daily.     HYDROcodone-acetaminophen (NORCO)  mg per tablet Take 1 tablet by mouth every 12 (twelve) hours as needed for Pain.    hydrocortisone (PROCTOZONE-HC) 2.5 % rectal cream Place 1 application rectally 2 (two) times daily as needed for Hemorrhoids.    ibuprofen (ADVIL,MOTRIN) 600 MG tablet Take 600 mg by mouth every 6 (six) hours as needed for Pain.    lactose-reduced food (ISOSOURCE HN FEEDING TUBE) 70 mLs by FEEDING TUBE route once daily. 70 ml per hour x22 hrs daily. Hold 1 hr before and after giving synthroid    lidocaine (XYLOCAINE) 5 % Oint ointment Apply 1 g topically as needed (with each trach change).    menthol-zinc oxide (RISAMINE) 0.44-20.6 % Oint Apply 1 application topically once daily. AFTER EACH DIAPER CHANGE    nitroGLYCERIN (NITROSTAT) 0.4 MG SL tablet Place 0.4 mg under the tongue every 5 (five) minutes as needed for Chest pain. Seek medical help if pain is not relieved by the third dose.     Family History     None        Tobacco Use    Smoking status: Former Smoker    Smokeless tobacco: Never Used   Substance and Sexual Activity    Alcohol use: No    Drug use: No    Sexual activity: Never     Review of Systems   Unable to perform ROS: Mental status change     Objective:     Vital Signs (Most Recent):  Temp: 98.3 °F (36.8 °C) (11/21/19 0024)  Pulse: (!) 58 (11/21/19 0600)  Resp: 20 (11/21/19 0530)  BP: 129/62 (11/21/19 0600)  SpO2: 97 % (11/21/19 0600) Vital Signs (24h Range):  Temp:  [98.3 °F (36.8 °C)] 98.3 °F (36.8 °C)  Pulse:  [50-60] 58  Resp:  [18-20] 20  SpO2:  [96 %-99 %] 97 %  BP: (105-152)/(56-80) 129/62     Weight: (!) 138.2 kg (304 lb 10.8 oz)  Body mass index is  39.12 kg/m².    Physical Exam   Constitutional: He is oriented to person, place, and time. He appears well-developed. No distress.   HENT:   Head: Normocephalic and atraumatic.   Mouth/Throat: Oropharynx is clear and moist.   trach   Eyes: Pupils are equal, round, and reactive to light. EOM are normal.   Neck: Normal range of motion.   Cardiovascular: Regular rhythm.   No murmur heard.  Pulmonary/Chest: Effort normal and breath sounds normal. He has no wheezes.   Abdominal: Soft. He exhibits no distension. There is no tenderness. There is no rebound and no guarding.   Genitourinary:   Genitourinary Comments: Rahman in place  Rectal tube in place   Musculoskeletal: Normal range of motion.   Neurological: He is alert and oriented to person, place, and time. No cranial nerve deficit or sensory deficit.   Opens eyes to physical stimuli  Does not follow commands such as stick out your tongue  Does not consistently track me  Does fight me when I try to open his eye lids  No meaningful interaction   Skin: No rash noted.   Psychiatric: He has a normal mood and affect.   Unable to assess   Nursing note and vitals reviewed.        CRANIAL NERVES     CN III, IV, VI   Pupils are equal, round, and reactive to light.  Extraocular motions are normal.        Significant Labs:   CBC:   Recent Labs   Lab 11/20/19  1140 11/21/19  0045   WBC 9.42 9.64   HGB 9.4* 8.7*   HCT 30.1* 28.1*    343     CMP:   Recent Labs   Lab 11/21/19  0045   *   K 5.0   CL 92*   CO2 29   GLU 86   BUN 39*   CREATININE 1.6*   CALCIUM 9.0   PROT 8.0   ALBUMIN 2.4*   BILITOT 0.6   ALKPHOS 64   AST 24   ALT 17   ANIONGAP 10   EGFRNONAA 40.1*     Cardiac Markers:   Recent Labs   Lab 11/20/19  1140   BNP 80     Lactic Acid: No results for input(s): LACTATE in the last 48 hours.    Significant Imaging: CT: I have reviewed all pertinent results/findings within the past 24 hours and my personal findings are:  Head CT with no acute process  CXR: I have  reviewed all pertinent results/findings within the past 24 hours and my personal findings are:  Bilateral infiltrates unchanged from 11/12 study    Assessment/Plan:     * Encephalopathy, metabolic  Differential includes infectious, metabolic, post ictal  I will continue vanc per peg for C. Diff as well as Fluconazole for fungal UTI.  No fever.  No leukocytosis.  Not in shock.  Just completed course of IV zosyn 11/19.  BCx and UCx are in progress.  CXR with unchanged infiltrates.  No current signs of new infection and thus I will continue current course unless a new process declares itself.  Could be due to profound hypothyroidism.  IV levothyroxine started and Endocrinology consulted.  Could be due to seizure activity.  No witnessed seizure activity, however.  Continuing his seizure medication and consulting Neurology.  EEG ordered.  No significant electrolyte abnormalities.  Not hypoglycemic though had been hypoglycemic on previous admit begging the question was this hypoglycemia induced encephalopathy.  It does not sounds like patient was back to his baseline upon last discharge but certainly improved if he was following commands.  Is this possibly a new baseline?  Will need collateral information from [facility] regarding his activity and interactions over the last week.       Acquired hypothyroidism  Patient with profound hypothyroidism with  and low free T4.  Last TSH was 9 in early November.  He is slightly bradycardic with hyponatremia but no hypothermia or hypoglycemia.  This could certainly be playing a role in his mental status.  I have started IV levothyroxine at 200mcg x1 and then 50mcg IV daily thereafter.  I do not see T3 IV available to order.  I have consulted endocrinology.  Repeating TSH and Free T4 and while I do not expect resolution in a short time period, literature search does suggest it should start improving. Tele monitoring for mild bradycardia and will replace electrolytes.  I am going  to hold off on peg tube feeds initially as you can get some gut dysmotility with profound hypothyroidism but should consider starting tomorrow and watch for high residuals.       C. difficile colitis  Continuing vanc per peg. Will need a long taper.  See discharge summary and Dr. Tyson's last note for taper specifics.      Candida UTI  Continuing Fluconzole to complete 14 day course from 11/15 per discharge summary  Repeat UA and culture done      Essential hypertension  Chronic medical condition.  Continuing home medication.  Monitoring.        Ventilator dependence  Chronic.  No acute issues.      Anemia, chronic disease  Chronic.  Monitoring.  No signs of bleeding.  AM labs ordered.       Coronary artery disease  Chronic condition.  No acute process.  Holding Beta blocker given mild bradycardia.       PEG (percutaneous endoscopic gastrostomy) status  Will hold off on peg tube feeds as above with possibly starting tomorrow.      Tracheostomy in place  Chronic.  No acute issues.      Chronic respiratory failure with hypoxia  On vent.  Pulmonary consulted for vent management.      Functional quadriplegia  Chronic condition        VTE Risk Mitigation (From admission, onward)    None             Yamileth Stuart MD  Department of Hospital Medicine   [facility]

## 2019-11-21 NOTE — SUBJECTIVE & OBJECTIVE
Past Medical History:   Diagnosis Date    AAA (abdominal aortic aneurysm)     Anemia     BPH (benign prostatic hyperplasia)     CHF (congestive heart failure)     COPD (chronic obstructive pulmonary disease)     Coronary artery disease     Dementia     Dementia     Depression     GERD (gastroesophageal reflux disease)     Hyperlipidemia     Hypertension     Hypothyroid     Renal disorder     Respiratory failure, chronic     Ventilator dependence        Past Surgical History:   Procedure Laterality Date    ABDOMINAL SURGERY      CARDIAC SURGERY  1999    GASTROSTOMY TUBE PLACEMENT      SPINE SURGERY      TRACHEOSTOMY TUBE PLACEMENT         Review of patient's allergies indicates:   Allergen Reactions    Codeine Other (See Comments)       Family History     None        Tobacco Use    Smoking status: Former Smoker    Smokeless tobacco: Never Used   Substance and Sexual Activity    Alcohol use: No    Drug use: No    Sexual activity: Never         Review of Systems   Unable to perform ROS: Intubated     Objective:     Vital Signs (Most Recent):  Temp: 98.5 °F (36.9 °C) (11/21/19 0650)  Pulse: (!) 54 (11/21/19 1536)  Resp: 18 (11/21/19 1536)  BP: (!) 111/56 (11/21/19 0803)  SpO2: 100 % (11/21/19 1536) Vital Signs (24h Range):  Temp:  [98.3 °F (36.8 °C)-98.5 °F (36.9 °C)] 98.5 °F (36.9 °C)  Pulse:  [50-60] 54  Resp:  [18-20] 18  SpO2:  [96 %-100 %] 100 %  BP: (105-152)/(56-80) 111/56     Weight: (!) 138.2 kg (304 lb 10.8 oz)(generalized edema noted )  Body mass index is 39.12 kg/m².      Intake/Output Summary (Last 24 hours) at 11/21/2019 1622  Last data filed at 11/21/2019 1532  Gross per 24 hour   Intake --   Output 1800 ml   Net -1800 ml       Physical Exam   Constitutional: He appears well-developed and well-nourished. No distress.   Obese, chronically ill male, trach  Response as above   HENT:   Head: Normocephalic and atraumatic.   Nose: Nose normal.   Mouth/Throat: Oropharynx is clear and  moist.   Eyes: Pupils are equal, round, and reactive to light. EOM are normal.   Neck: Normal range of motion. Neck supple. No JVD present. No tracheal deviation present. No thyromegaly present.   Trach, site OK   Cardiovascular: Normal rate, regular rhythm and intact distal pulses. Exam reveals no gallop and no friction rub.   Murmur heard.  Pulmonary/Chest: Effort normal and breath sounds normal. No stridor. No respiratory distress. He has no wheezes. He has no rales. He exhibits no tenderness.   ventilated   Abdominal: Soft. Bowel sounds are normal. He exhibits distension. There is no tenderness. There is no rebound and no guarding.   Obese  PEG with some purulent drainage at entry site   Genitourinary:   Genitourinary Comments: Male, tay   Musculoskeletal: Normal range of motion. He exhibits edema. He exhibits no tenderness.   Lymphadenopathy:     He has no cervical adenopathy.   Neurological: He has normal reflexes. No cranial nerve deficit.   As above  chronic changes, no acute focal findings noted   Skin: He is not diaphoretic.   Nursing note and vitals reviewed.      Vents:  Vent Mode: A/C (11/21/19 1536)  Set Rate: 18 bmp (11/21/19 1536)  Vt Set: 600 mL (11/21/19 1536)  Pressure Support: 0 cmH20 (11/21/19 1536)  PEEP/CPAP: 5 cmH20 (11/21/19 1536)  Oxygen Concentration (%): 32 (11/21/19 1536)  Peak Airway Pressure: 35 cmH2O (11/21/19 1536)  Plateau Pressure: 0 cmH20 (11/21/19 1536)  Total Ve: 11.2 mL (11/21/19 1536)  F/VT Ratio<105 (RSBI): (!) 28.85 (11/21/19 1536)    Lines/Drains/Airways     Peripherally Inserted Central Catheter Line                 PICC Double Lumen 11/12/19 0920 left cephalic 9 days          Drain                 Gastrostomy/Enterostomy -- days         Gastrostomy/Enterostomy  Gastrostomy tube w/ balloon;Gastrostomy-jejunostomy midline feeding -- days         Gastrostomy/Enterostomy 1335 Percutaneous endoscopic gastrostomy (PEG) LUQ feeding -- days         Fecal Incontinence   10/07/19 1546 45 days         Rectal Tube 11/07/19 1900 fecal management system 13 days         Urethral Catheter 11/21/19 0720 less than 1 day          Airway                 Surgical Airway Portex Cuffed -- days         Surgical Airway Portex Cuffed;Fenestrated -- days                Significant Labs:    CBC/Anemia Profile:  Recent Labs   Lab 11/20/19  1140 11/21/19  0045   WBC 9.42 9.64   HGB 9.4* 8.7*   HCT 30.1* 28.1*    343   MCV 92 91   RDW 16.1* 16.1*        Chemistries:  Recent Labs   Lab 11/21/19  0045   *   K 5.0   CL 92*   CO2 29   BUN 39*   CREATININE 1.6*   CALCIUM 9.0   ALBUMIN 2.4*   PROT 8.0   BILITOT 0.6   ALKPHOS 64   ALT 17   AST 24   MG 1.8   PHOS 2.8       ABGs:   Recent Labs   Lab 11/21/19  0103   PH 7.445   PCO2 41.4   HCO3 28.4*   POCSATURATED 97   BE 4     Urine Studies:   Recent Labs   Lab 11/21/19  0054   COLORU Yellow   APPEARANCEUA Hazy*   PHUR 8.0   SPECGRAV 1.010   PROTEINUA 1+*   GLUCUA Negative   KETONESU Negative   BILIRUBINUA Negative   OCCULTUA 1+*   NITRITE Negative   UROBILINOGEN Negative   LEUKOCYTESUR 3+*   RBCUA 5*   WBCUA >100*   BACTERIA None   SQUAMEPITHEL 1   HYALINECASTS 13*     All pertinent labs within the past 24 hours have been reviewed.    TSH - 207.940  Troponin - < 0.030    Microbiology Results (last 7 days)     Procedure Component Value Units Date/Time    Culture, Respiratory with Gram Stain [[id number]] Collected:  11/21/19 0800    Order Status:  Completed Specimen:  Respiratory from Endotracheal Aspirate Updated:  11/21/19 1509     Gram Stain (Respiratory) <10 epithelial cells per low power field.     Gram Stain (Respiratory) Many WBC's     Gram Stain (Respiratory) Rare Gram negative rods    Urine Culture High Risk [[id number]] Collected:  11/21/19 0836    Order Status:  Sent Specimen:  Urine, Catheterized Updated:  11/21/19 0921    Aerobic culture [[id number]] Collected:  11/21/19 0743    Order Status:  Sent Specimen:  Incision site from Abdomen  Updated:  11/21/19 0844    Blood culture #1 **CANNOT BE ORDERED STAT** [[id number]] Collected:  11/21/19 0045    Order Status:  Completed Specimen:  Blood from Line, PICC Left Brachial Updated:  11/21/19 0758     Blood Culture, Routine No Growth to date    Blood culture #2 **CANNOT BE ORDERED STAT** [[id number]] Collected:  11/21/19 0043    Order Status:  Completed Specimen:  Blood from Peripheral, Antecubital, Right Updated:  11/21/19 0758     Blood Culture, Routine No Growth to date    Narrative:       Please draw one of the cultures from the PICC line.    Urine culture [[id number]] Collected:  11/21/19 0054    Order Status:  No result Specimen:  Urine Updated:  11/21/19 0115          Significant Imaging:   I have reviewed and interpreted all pertinent imaging results/findings within the past 24 hours.        FINDINGS:    There are median sternotomy wires and mediastinal clips. Tracheostomy  is at T10. Interstitial opacities throughout the right lung are  unchanged compared to prior study. Right hemidiaphragm is elevated.  Cardiac mediastinal contours are stable. The heart is not enlarged.    IMPRESSION:    No change compared with prior radiography November 12. Elevated  hemidiaphragm, and interstitial opacities of the right lung.    Electronically Signed by Aretha Gregory on 11/21/2019 7:43 AM      Exam: CT OF THE BRAIN WITHOUT CONTRAST    Clinical data: Altered mental status.    Technique: Contiguous axial images are obtained from the skull base to vertex  without intravenous contrast. Radiation dose: CTDIvol = 57.50 mGy, DLP = 1073.80  mGy x cm.    Prior studies: No prior studies submitted.    Findings:    No acute intracranial abnormality is present. No evidence of acute cortical  infarction, hemorrhage, mass or mass effect.  There is evidence of chronic  ischemic white matter changes secondary to small vessel disease.  There is also  prominence of the cortical sulci diffusely.  There is also gliosis in  the  parasylvian regions bilaterally which can be seen with old infarcts.  There is  no evidence of hydrocephalus or abnormal extra-axial fluid collections are  present. The posterior fossa is unremarkable.    The skull base and calvarium are intact.  Mucosal disease in the left sphenoid  sinus and posterior ethmoid air cells.  Bilateral mild-to-moderate mastoiditis.    IMPRESSION:    No evidence of acute intracranial abnormality or mass.    There is evidence of chronic ischemic white matter changes changes secondary to  small vessel disease.    There is also moderate, diffuse cerebral atrophy.    There is hypoattenuation in the parasylvian regions bilaterally which may be  secondary to old infarcts.    Recommendation:    Follow up as clinically indicated.    All CT scans at this facility utilize dose modulation, iterative reconstruction,  and/or weight based dosing when appropriate to reduce radiation dose to as low  as reasonably achievable.      Electronically Signed by UZIEL MARTIN MD at 11/21/2019 5:16:33 AM    EKG  Vent. Rate : 053 BPM     Atrial Rate : 036 BPM     P-R Int : 000 ms          QRS Dur : 092 ms      QT Int : 468 ms       P-R-T Axes : 000 057 090 degrees     QTc Int : 439 ms    Junctional rhythm  Nonspecific ST and T wave abnormality  Abnormal ECG  When compared with ECG of 05-NOV-2019 13:27,  Junctional rhythm has replaced Sinus rhythm  QRS duration has decreased

## 2019-11-21 NOTE — ED NOTES
Delayed report, dr. Stuart at bedside, awaiting medication from pharmacy, 1st attempt to call report. Nurse in report.

## 2019-11-21 NOTE — CONSULTS
[facility]  Adult Nutrition   Consult Note (Initial Assessment)     SUMMARY       Recommendations  Recommendation/Intervention:   1. Enteral nutrition to be initiated as ordered. RD ordered continuous PEG tube feeding: Nepro; initiate at 20 mL/Hour, Advance by 20 mL/hour every 4-6 hours to a goal of 45 mL/hour. Water flush of 30 ml every 4 hours. Give Liquacel BID via PEG. Give Elliot BID via PEG (to provide 2304 kcal/day, 124 g/day protein, and 965 ml/day free water at goal).   2. Blood glucose and electrolytes to be monitored and managed as appropriate.     Plan/Goals:   1. Patient to meet estimated protein and energy needs via provision of enteral nutrition.   2. RD to monitor tube feeding, rate, tolerance, labs, IVFs, and nutritional status. Will manage/adjust tube feeding orders accordingly.     Nutrition Goal Status: new  Communication of RD Recs: reviewed with RN    Reason for Assessment    Reason For Assessment: consult, new tube feeding  Relevant Medical History: Functional quadriplegia, Tracheostomy in place, Ventilator dependence, CAD, anemia, C. difficile colitis, Candida UTI,  metabolic Encephalopathy  Interdisciplinary Rounds: attended  Nutrition Discharge Planning: PEG tube feeding to meet estimated energy and protein needs with modular supplement to aid in wound healing     Nutrition Risk Screen    Nutrition Risk Screen: tube feeding or parenteral nutrition, large or nonhealing wound, burn or pressure injury(left heel unstagable per RN )       Pressure Injury 11/21/19 0939 Left lateral Foot Suspected Unstageable-Wound Image: Images linked       Wound 11/21/19 0900 Non pressure chronic ulcer distal Toe, fifth-Wound Image: Images linked       Pressure Injury 11/21/19 0900 Right lateral Malleolus Stage 1-Wound Image: Images linked          Nutrition/Diet History    Patient Reported Diet/Restrictions/Preferences: no oral intake  Food Allergies: NKFA  Factors Affecting Nutritional Intake: on  "mechanical ventilation(Trach dependent and PEG )  Nutrition Support Formula Prior to Admit: Nepro  Nutrition Support Provision Prior to Admit: Patient getting Nepro last admit, unable to assess what patient was getting prior to admit     Anthropometrics    Temp: 98.5 °F (36.9 °C)  Height Method: Estimated  Height: 6' 2" (188 cm)  Height (inches): 74 in  Weight Method: Bed Scale  Weight: (!) 138.2 kg (304 lb 10.8 oz)(generalized edema noted )  Weight (lb): (!) 304.68 lb  Ideal Body Weight (IBW), Male: 190 lb  % Ideal Body Weight, Male (lb): 160.36 lb  BMI (Calculated): 39.1  BMI Grade: 35 - 39.9 - obesity - grade II  Usual Body Weight (UBW), k kg(last encounter 19)  % Usual Body Weight: 104.92  % Weight Change From Usual Weight: 4.7 %  Additional Documentation: Tetraplegia (Quadriplegia) Ideal Body Weight (IBW) Adjustment  Tetraplegia (Quadriplegia) Ideal Body Weight (IBW) Adjustment: 170 lb 10.2 oz(86 kg - 10% (77.4 kg) )    Weight History:  Wt Readings from Last 10 Encounters:   19 (!) 138.2 kg (304 lb 10.8 oz)   19 132 kg (291 lb 0.1 oz)   10/14/19 124.8 kg (275 lb 2.2 oz)   19 (!) 140.5 kg (309 lb 11.9 oz)   19 (!) 140.2 kg (309 lb)   10/13/17 110.6 kg (243 lb 13.3 oz)   17 118.8 kg (262 lb)   17 119 kg (262 lb 5.6 oz)   14 (!) 137.8 kg (303 lb 12.7 oz)   14 126 kg (277 lb 12.8 oz)       Lab/Procedures/Meds: Pertinent Labs Reviewed  Clinical Chemistry:  Recent Labs   Lab 19  0045   *   K 5.0   CL 92*   CO2 29   GLU 86   BUN 39*   CREATININE 1.6*   CALCIUM 9.0   PROT 8.0   ALBUMIN 2.4*   BILITOT 0.6   ALKPHOS 64   AST 24   ALT 17   ANIONGAP 10   ESTGFRAFRICA 46.3*   EGFRNONAA 40.1*   MG 1.8   PHOS 2.8     CBC:   Recent Labs   Lab 19  0045   WBC 9.64   RBC 3.09*   HGB 8.7*   HCT 28.1*      MCV 91   MCH 28.2   MCHC 31.0*     Cardiac Profile:  Recent Labs   Lab 19  1140 19  0045   BNP 80  --    CPK  --  56   TROPONINI  --  " <0.030     Thyroid & Parathyroid:  Recent Labs   Lab 11/21/19  0045   .940*   FREET4 0.28*     Medications: Pertinent Medications reviewed  Scheduled Meds:   busPIRone  5 mg Per G Tube BID    chlorhexidine  15 mL Mouth/Throat BID    cholestyramine-aspartame  1 packet Per G Tube BID    DULoxetine  30 mg Oral Daily    enoxaparin  40 mg Subcutaneous Daily    ferrous sulfate  300 mg Oral Daily    finasteride  5 mg Oral Daily    fluconazole 40 mg/ml  200 mg Oral Daily    lacosamide  200 mg Per G Tube Q12H    levETIRAcetam  500 mg Per G Tube BID    [START ON 11/22/2019] levothyroxine  50 mcg Intravenous Daily    mupirocin   Nasal BID    terazosin  1 mg Oral Daily    vancomycin  125 mg Oral Q6H     Continuous Infusions:   sodium chloride 0.9% 75 mL/hr at 11/21/19 0800     PRN Meds:.acetaminophen, albuterol-ipratropium, dextrose 50%, dextrose 50%, glucagon (human recombinant), glucose, glucose, hydrocortisone, insulin aspart U-100, nitroGLYCERIN, ondansetron, polyethylene glycol, sodium chloride 0.9%    Estimated/Assessed Needs    Weight Used For Calorie Calculations: 132 kg (291 lb 0.1 oz)  Energy Calorie Requirements (kcal): 2263   Energy Need Method: [place] (modified)  Protein Requirements: 116 - 155 (1.5 - 2 g/kg adjusted IBW)   Weight Used For Protein Calculations: 77.4 kg (170 lb 10.2 oz)(adjusted IBW )     Estimated Fluid Requirement Method: RDA Method  RDA Method (mL): 2263       Nutrition Prescription Ordered    Current Diet Order: NPO     Evaluation of Received Nutrient/Fluid Intake    Energy Calories Required: not meeting needs  Protein Required: not meeting needs  Fluid Required: meeting needs    Intake/Output Summary (Last 24 hours) at 11/21/2019 1607  Last data filed at 11/21/2019 1532  Gross per 24 hour   Intake --   Output 1800 ml   Net -1800 ml      % Intake of Estimated Energy Needs: 0%  % Meal Intake: NPO    Dietitian Rounds Brief  · Patient assessed 2' consult for TF MGMT. RD  unable to interview patient, not responding to voice per progress notes. RD ordered enteral nutrition. Will monitor administration and tolerance. Will monitor electrolytes and recommend replacement as appropriate. Patient is on low does SSI.   · RD ordered Nepro with goal rate of 45 ml/hr with liquacel BID and rhonda BID with 30 ml water flush Q4H (to provide 2304 kcal/day, 124 g/day protein, and 965 ml/day free water).     Nutrition Risk    Level of Risk/Frequency of Follow-up: high     Monitor and Evaluation    Food and Nutrient Intake: enteral nutrition intake  Food and Nutrient Adminstration: enteral and parenteral nutrition administration  Physical Activity and Function: nutrition-related ADLs and IADLs  Anthropometric Measurements: weight, weight change  Biochemical Data, Medical Tests and Procedures: electrolyte and renal panel, glucose/endocrine profile, lipid profile, gastrointestinal profile, inflammatory profile  Nutrition-Focused Physical Findings: overall appearance     Nutrition Follow-Up    RD Follow-up?: Yes    Idalia Aguayo RD 11/21/2019 4:12 PM

## 2019-11-21 NOTE — ASSESSMENT & PLAN NOTE
Continuing vanc per peg. Will need a long taper.  Follow Dr. Tyson's last note for taper specifics.

## 2019-11-21 NOTE — HPI
"79 year old male with PMhx CVA, s/p trach with vent dependence, s/p peg sent from [facility] for "unresponsiveness" since yesterday.  Old records reviewed and and patient discharged 11/14 after treatment for encephalopathy that presented in a similar manner.  Per my chart review, patient was treated for Psuedomonas UTI and bacteremia/shock and was discharged on IV Zosyn which he completed 11/19. He was also discharged home on po vancomycin for recurrent c. Diff and then Fluconazole for Candida Tropicalis. During last admission, initial EEG revealed seizure activity and thus he was discharged on vimpat and keppra.  No reported seizure activity witnessed.  Also per my chart review, patient had started to open his eyes and was following simple commands such as protruding his tongue on command for Dr. Tyson but it was noted that he was not on his baseline which is more interactive.  He will open his eyes to physical stimuli but is not following commands or consistently tracking me. Labs revealing .  TSH earlier in November 9.9.  On synthroid currently.  "

## 2019-11-21 NOTE — PROCEDURES
EEG Report    Patient name:  Masood Messina     38    Date of Service: 19    Duration: 31 minutes    Requested By: Cali Roy M.D.                                                                                     Reading Physician: Cali Roy M.D.        Reason for Study: Seizure.         Clinical History: 80yo man with a history of stroke, tracheostomy, ventilator dependence, encephalopathy, UTI and recent admission for new onset seizures who presented with decreased responsiveness. EEG done to rule out seizure activity.        AED/sedation: keppra vimpat      Exam Notes:  The recording was performed with the standard 10-20 electrode placement with additional ECG electrodes.     Summary:    The awake background consists of a mixture of theta and delta rhythms, with no posterior dominant rhythm seen. Stage II sleep structures are not seen.    Photic stimulation is performed with no abnormal reactivity noted. Hyperventilation is not performed.    There are occasional generalized sharp wave discharges, frontocentrally predominant, and at times more prominent in the right central region.     No clinical or electrographic seizures are captured.        Impression:      This is an abnormal routine EEG due to:  1. Diffuse slowing of the background activity consistent with a moderate to severe encephalopathy.  2. Occasional generalized epileptiform discharges consistent with seizure potential.  No seizures are captured during this study.

## 2019-11-21 NOTE — SUBJECTIVE & OBJECTIVE
Past Medical History:   Diagnosis Date    AAA (abdominal aortic aneurysm)     Anemia     BPH (benign prostatic hyperplasia)     CHF (congestive heart failure)     COPD (chronic obstructive pulmonary disease)     Coronary artery disease     Dementia     Dementia     Depression     GERD (gastroesophageal reflux disease)     Hyperlipidemia     Hypertension     Hypothyroid     Renal disorder     Respiratory failure, chronic     Ventilator dependence        Past Surgical History:   Procedure Laterality Date    ABDOMINAL SURGERY      CARDIAC SURGERY  1999    GASTROSTOMY TUBE PLACEMENT      SPINE SURGERY      TRACHEOSTOMY TUBE PLACEMENT         Review of patient's allergies indicates:   Allergen Reactions    Codeine Other (See Comments)       No current facility-administered medications on file prior to encounter.      Current Outpatient Medications on File Prior to Encounter   Medication Sig    ascorbic acid, vitamin C, (VITAMIN C) 500 mg/5 mL Syrp syrup 500 mg by PEG Tube route 2 (two) times daily.     baclofen (LIORESAL) 20 MG tablet 25 mg by PEG Tube route every 6 (six) hours.     balsam peru-castor oil Oint Apply topically once daily.    banana flakes-t-galactooligos. (BANATROL PLUS) PwPk Take 1 packet by mouth 2 (two) times daily. Mix with water    busPIRone (BUSPAR) 5 MG Tab 5 mg by Per G Tube route 2 (two) times daily.    carvedilol (COREG) 3.125 MG tablet Take 1 tablet (3.125 mg total) by mouth 2 (two) times daily.    chlorhexidine (HIBICLENS) 4 % external liquid Apply 1 application topically 3 (three) times a week. On bath days    cholestyramine-aspartame (QUESTRAN LIGHT) 4 gram PwPk 1 packet (4 g total) by Per G Tube route 2 (two) times daily. Discontinue if no bowel movement in a day    duloxetine (CYMBALTA) 30 MG capsule 30 mg by PEG Tube route once daily.     enoxaparin (LOVENOX) 40 mg/0.4 mL Syrg Inject 40 mg into the skin once daily.    ferrous sulfate 220 mg (44 mg iron)/5  mL solution 220 mg by Per G Tube route once daily.    finasteride (PROSCAR) 5 mg tablet Take 1 tablet (5 mg total) by mouth once daily.    fluconazole 40 mg/ml (DIFLUCAN) 40 mg/mL suspension Take 5 mLs (200 mg total) by mouth once daily. for 14 days    furosemide (LASIX) 20 MG tablet Take 1 tablet (20 mg total) by mouth once daily. (Patient taking differently: 40 mg by Per G Tube route once daily. )    gabapentin (NEURONTIN) 300 MG capsule 300 mg by Per G Tube route 3 (three) times daily.    hydrocodone-acetaminophen 10-325mg (NORCO)  mg Tab 1 tablet by Per G Tube route every 6 (six) hours as needed for Pain.    lacosamide (VIMPAT) 200 mg Tab tablet 1 tablet (200 mg total) by Per G Tube route every 12 (twelve) hours.    Lactoperoxi/Gluc Oxid/Pot Thio (BIOTENE DRY MOUTH MM) Swish and spit 15 mLs 4 (four) times daily. AND/OR PRN    levETIRAcetam (KEPPRA) 500 MG Tab 1 tablet (500 mg total) by Per G Tube route 2 (two) times daily.    levothyroxine (SYNTHROID) 200 MCG tablet Take 1 tablet (200 mcg total) by mouth once daily.    multivit-min-ferrous gluconate (CERTAVITE-ANTIOXID, IRON GLUC,) 9 mg iron/ 15 mL (15 mL) Liqd 15 mLs by Per G Tube route once daily.     polyethylene glycol (GLYCOLAX) 17 gram PwPk 17 g by Per G Tube route every evening.     potassium chloride 10% (KAYCIEL) 20 mEq/15 mL oral solution Take 10 mEq by mouth once daily. Per g tube     rivastigmine (EXELON) 9.5 mg/24 hr PT24 Place 1 patch onto the skin once daily.    Saccharomyces boulardii (FLORASTOR) 250 mg capsule Take 250 mg by mouth 2 (two) times daily.    saw/vit E/sod sue/lyc/beta/pyg (PROSTATE HEALTH ORAL) Take 30 mLs by mouth 2 (two) times daily.    selenium sulfide 2.5 % Lotn Apply 1 application topically twice a week. ON Tuesday AND SATURDAY    terazosin (HYTRIN) 1 MG capsule 1 mg by PEG Tube route once daily.    traZODone (DESYREL) 100 MG tablet 100 mg by Per G Tube route every evening.     vancomycin 250mg / 10ml  Susp Take 5 mLs (125 mg total) by mouth every 6 (six) hours for 7 days, THEN 5 mLs (125 mg total) 2 (two) times daily for 7 days, THEN 5 mLs (125 mg total) once daily for 7 days, THEN 5 mLs (125 mg total) Every 3 (three) days.    acetaminophen (TYLENOL) 160 mg/5 mL Elix 650 mg by Per G Tube route every 4 (four) hours as needed.    albuterol-ipratropium 2.5mg-0.5mg/3mL (DUONEB) 0.5 mg-3 mg(2.5 mg base)/3 mL nebulizer solution Take 3 mLs by nebulization every 6 (six) hours as needed for Wheezing or Shortness of Breath.     finasteride (PROSCAR) 5 mg tablet 5 mg by PEG Tube route once daily.     HYDROcodone-acetaminophen (NORCO)  mg per tablet Take 1 tablet by mouth every 12 (twelve) hours as needed for Pain.    hydrocortisone (PROCTOZONE-HC) 2.5 % rectal cream Place 1 application rectally 2 (two) times daily as needed for Hemorrhoids.    ibuprofen (ADVIL,MOTRIN) 600 MG tablet Take 600 mg by mouth every 6 (six) hours as needed for Pain.    lactose-reduced food (ISOSOURCE HN FEEDING TUBE) 70 mLs by FEEDING TUBE route once daily. 70 ml per hour x22 hrs daily. Hold 1 hr before and after giving synthroid    lidocaine (XYLOCAINE) 5 % Oint ointment Apply 1 g topically as needed (with each trach change).    menthol-zinc oxide (RISAMINE) 0.44-20.6 % Oint Apply 1 application topically once daily. AFTER EACH DIAPER CHANGE    nitroGLYCERIN (NITROSTAT) 0.4 MG SL tablet Place 0.4 mg under the tongue every 5 (five) minutes as needed for Chest pain. Seek medical help if pain is not relieved by the third dose.     Family History     None        Tobacco Use    Smoking status: Former Smoker    Smokeless tobacco: Never Used   Substance and Sexual Activity    Alcohol use: No    Drug use: No    Sexual activity: Never     Review of Systems   Unable to perform ROS: Mental status change     Objective:     Vital Signs (Most Recent):  Temp: 98.3 °F (36.8 °C) (11/21/19 0024)  Pulse: (!) 58 (11/21/19 0600)  Resp: 20 (11/21/19  0530)  BP: 129/62 (11/21/19 0600)  SpO2: 97 % (11/21/19 0600) Vital Signs (24h Range):  Temp:  [98.3 °F (36.8 °C)] 98.3 °F (36.8 °C)  Pulse:  [50-60] 58  Resp:  [18-20] 20  SpO2:  [96 %-99 %] 97 %  BP: (105-152)/(56-80) 129/62     Weight: (!) 138.2 kg (304 lb 10.8 oz)  Body mass index is 39.12 kg/m².    Physical Exam   Constitutional: He is oriented to person, place, and time. He appears well-developed. No distress.   HENT:   Head: Normocephalic and atraumatic.   Mouth/Throat: Oropharynx is clear and moist.   trach   Eyes: Pupils are equal, round, and reactive to light. EOM are normal.   Neck: Normal range of motion.   Cardiovascular: Regular rhythm.   No murmur heard.  Pulmonary/Chest: Effort normal and breath sounds normal. He has no wheezes.   Abdominal: Soft. He exhibits no distension. There is no tenderness. There is no rebound and no guarding.   Genitourinary:   Genitourinary Comments: Rahman in place  Rectal tube in place   Musculoskeletal: Normal range of motion.   Neurological: He is alert and oriented to person, place, and time. No cranial nerve deficit or sensory deficit.   Opens eyes to physical stimuli  Does not follow commands such as stick out your tongue  Does not consistently track me  Does fight me when I try to open his eye lids  No meaningful interaction   Skin: No rash noted.   Psychiatric: He has a normal mood and affect.   Unable to assess   Nursing note and vitals reviewed.        CRANIAL NERVES     CN III, IV, VI   Pupils are equal, round, and reactive to light.  Extraocular motions are normal.        Significant Labs:   CBC:   Recent Labs   Lab 11/20/19  1140 11/21/19  0045   WBC 9.42 9.64   HGB 9.4* 8.7*   HCT 30.1* 28.1*    343     CMP:   Recent Labs   Lab 11/21/19  0045   *   K 5.0   CL 92*   CO2 29   GLU 86   BUN 39*   CREATININE 1.6*   CALCIUM 9.0   PROT 8.0   ALBUMIN 2.4*   BILITOT 0.6   ALKPHOS 64   AST 24   ALT 17   ANIONGAP 10   EGFRNONAA 40.1*     Cardiac Markers:    Recent Labs   Lab 11/20/19  1140   BNP 80     Lactic Acid: No results for input(s): LACTATE in the last 48 hours.    Significant Imaging: CT: I have reviewed all pertinent results/findings within the past 24 hours and my personal findings are:  Head CT with no acute process  CXR: I have reviewed all pertinent results/findings within the past 24 hours and my personal findings are:  Bilateral infiltrates unchanged from 11/12 study

## 2019-11-21 NOTE — HOSPITAL COURSE
Patient was admitted with acute and couple of the.  It was multifactorial.  It could be from the severe hypothyroidism, polypharmacy.  No acute stroke and no signs of infection(blood culture appeared to be contaminated) was appreciated.  IV thyroxine was given and TSH improved and patient is back to the baseline.  His medications which causes altered mental status were decreased and also thyroxine dose was increased to 220 mcg.  Instruction was given to repeat thyroid function test 2 weeks later and to follow up with   in as outpatient.

## 2019-11-21 NOTE — PLAN OF CARE
11/21/19 0919   Patient Assessment/Suction   All Lung Fields Breath Sounds diminished   PRE-TX-O2   Oxygen Concentration (%) 32   Aerosol Therapy   $ Aerosol Therapy Charges PRN treatment not required   Vent Select   Charged w/in last 24h YES   Preset Conventional Ventilator Settings   Ventilation Type VC   Vent Mode SIMV   Set Rate 18 bmp   Vt Set 600 mL   PEEP/CPAP 5 cmH20   Pressure Support 10 cmH20   Waveform RAMP   Peak Flow 60 L/min   Plateau Set/Insp. Hold (sec) 0   Insp Rise Time  50 %   Trigger Sensitivity Flow/I-Trigger 3 L/min   Patient Ventilator Parameters   Resp Rate Total 18 br/min   Peak Airway Pressure 33 cmH2O   Mean Airway Pressure 15 cmH20   Plateau Pressure 0 cmH20   Exhaled Vt 627 mL   Total Ve 11.2 mL   Spont Ve 0 L   I:E Ratio Measured 1:2.00   Conventional Ventilator Alarms   Resp Rate High Alarm 40 br/min   Press High Alarm 61 cmH2O   Apnea Rate 18   Apnea Volume (mL) 1 mL   Apnea Oxygen Concentration  100   Apnea Flow Rate (L/min) 60   T Apnea 20 sec(s)   Ready to Wean/Extubation Screen   FIO2<=50 (chart decimal) 0.32   MV<16L (chart vol.) 11.2   PEEP <=8 (chart #) 5   patient has prn available ,continue ventilation

## 2019-11-21 NOTE — ASSESSMENT & PLAN NOTE
Multifactorial and difficult to determine  EEG with and good flow but thick waves and also possible seizure activities  Given severely elevated TSH in short period of time and possible myxedema coma  Also possible from UTI      Plan   Repeat TSH requested   continue vanc per peg for C. Diff as well as Fluconazole for fungal UTI. Just completed course of IV zosyn 11/19  .  BCx and UCx to follow   IV thyroxine and will start glucocorticoids if severely elevated.  Neurology follow-up ,continue antiseizure medication

## 2019-11-21 NOTE — ED PROVIDER NOTES
Encounter Date: 11/21/2019       History     Chief Complaint   Patient presents with    Altered Mental Status     States has had a change in mental status. Used to respond but now does not respond to voice or pain     79-year-old male with a history AAA, CHF, COPD, CAD, HTN, HLD, hypothyroidism, CVA currently with trach, vent dependent, PEG tube, indwelling Rahman presents to the ER with altered mental status.  Patient was recently discharged after being hospitalized for septic shock.  He is currently at [facility] where he is receiving IV antibiotics through a PICC line.  He was sent to the ER for evaluation because of a decrease in mental status.  They note that at baseline, the patient is alert, follows commands, able to communicate.  Today, he has been completely unresponsive.  He is not opening his eyes spontaneously or responding to noxious stimuli.  They did not give any further information.        Review of patient's allergies indicates:   Allergen Reactions    Codeine Other (See Comments)     Past Medical History:   Diagnosis Date    AAA (abdominal aortic aneurysm)     Anemia     BPH (benign prostatic hyperplasia)     CHF (congestive heart failure)     COPD (chronic obstructive pulmonary disease)     Coronary artery disease     Dementia     Dementia     Depression     GERD (gastroesophageal reflux disease)     Hyperlipidemia     Hypertension     Hypothyroid     Renal disorder     Respiratory failure, chronic     Ventilator dependence      Past Surgical History:   Procedure Laterality Date    ABDOMINAL SURGERY      CARDIAC SURGERY  1999    GASTROSTOMY TUBE PLACEMENT      SPINE SURGERY      TRACHEOSTOMY TUBE PLACEMENT       No family history on file.  Social History     Tobacco Use    Smoking status: Former Smoker    Smokeless tobacco: Never Used   Substance Use Topics    Alcohol use: No    Drug use: No     Review of Systems   Unable to perform ROS: Mental status change       Physical  Exam     Initial Vitals   BP Pulse Resp Temp SpO2   11/21/19 0010 11/21/19 0010 11/21/19 0010 11/21/19 0024 11/21/19 0010   125/80 60 18 98.3 °F (36.8 °C) 99 %      MAP       --                Physical Exam    Constitutional: He appears well-developed. No distress.   HENT:   Head: Normocephalic and atraumatic.   Eyes: Conjunctivae are normal. Pupils are equal, round, and reactive to light.   Neck:   Trach in place with clear discharge. No surrounding erythema or purulence.   Cardiovascular: Normal rate, regular rhythm and normal heart sounds.   Pulmonary/Chest: Breath sounds normal. No respiratory distress. He has no wheezes. He has no rhonchi. He has no rales.   Abdominal: Soft. He exhibits no distension.   Genitourinary:   Genitourinary Comments: Rahman in place   Musculoskeletal: He exhibits edema.   Neurological: He is unresponsive. GCS eye subscore is 1. GCS verbal subscore is 1. GCS motor subscore is 1.   PERRL. When I hold open his eyelids, he appears to have some roving eye movements. He does not make eye contact or track.  He does not respond to noxious stimuli. Muscles feel rigid, almost contracted.   Skin: Skin is dry. No rash noted. There is erythema.   Bilateral lower extremity erythema.         ED Course   Procedures  Labs Reviewed   CULTURE, BLOOD   CULTURE, BLOOD   CBC W/ AUTO DIFFERENTIAL   COMPREHENSIVE METABOLIC PANEL   DRUG SCREEN PANEL, URINE EMERGENCY   MAGNESIUM   TROPONIN I   TSH   URINALYSIS, REFLEX TO URINE CULTURE   PHOSPHORUS   CK   POCT LACTATE   POCT GLUCOSE MONITORING CONTINUOUS          Imaging Results    None          Medical Decision Making:   Initial Assessment:   79-year-old male with a history AAA, CHF, COPD, CAD, HTN, HLD, hypothyroidism, CVA currently with trach, vent dependent, PEG tube, indwelling Rahman presents to the ER with altered mental status.  ED Management:  Plan:  Afebrile, vital signs stable. Lab show WBC 9.64, H&H 8.7/28.1, similar to previous.  BUN 39, creatinine  0.6, similar to previous.  Troponin less than 0.03.  CK 56.  , free T4 0.28.  Lactate 1.44.  Tox negative. UA shows RBC 5, WBC greater than 100, bacteria none, budding yeast occasional.  ABG shows pH 7.45, PO2 86, pCO2 41 on his vent settings from [place].  CT head shows no acute abnormalities.  Chest x-ray shows right lung infiltrate, consistent with previous chest x-ray.  EKG reads junctional rhythm at a rate of 53, but believe this is secondary to artifact.  Likely sinus bradycardia with no acute ST changes.  Chart shows that patient did grow fungus in his urine in the past.  He also grew out multiple species of bacteria from his lungs and blood.  He also developed a C diff infection, and has a rectal tube.  Per documentation from [facility], patient just completed Zosyn.  He is still on vanc through his PEG tube.  Mental status from [facility] discharge summary suggest that his mental status may not be much different.  They note that he was opening eyes spontaneously, but not really following commands.  Possibly this could be continued infection.  Possibly due to his hypothyroidism.  Blood cultures were drawn, 1 from the PICC line.  Discussed case with the hospitalist who will manage his antibiotics, possible antifungal, and hypothyroidism.                                 Clinical Impression:       ICD-10-CM ICD-9-CM   1. Altered mental status R41.82 780.97   2. Hypothyroidism, unspecified type E03.9 244.9                             Loy Galeano MD  11/21/19 0651

## 2019-11-21 NOTE — PLAN OF CARE
This note also relates to the following rows which could not be included:  Oxygen Concentration (%) - Cannot attach notes to unvalidated device data  SpO2 - Cannot attach notes to unvalidated device data  Pulse - Cannot attach notes to unvalidated device data  BP - Cannot attach notes to unvalidated device data  Ventilation Type - Cannot attach notes to unvalidated device data  Vent Mode - Cannot attach notes to unvalidated device data  Set Rate - Cannot attach notes to unvalidated device data  Vt Set - Cannot attach notes to unvalidated device data  PEEP/CPAP - Cannot attach notes to unvalidated device data  Pressure Support - Cannot attach notes to unvalidated device data  Waveform - Cannot attach notes to unvalidated device data  Peak Flow - Cannot attach notes to unvalidated device data  Plateau Set/Insp. Hold (sec) - Cannot attach notes to unvalidated device data  Insp Rise Time  - Cannot attach notes to unvalidated device data  Trigger Sensitivity Flow/I-Trigger - Cannot attach notes to unvalidated device data  Resp Rate Total - Cannot attach notes to unvalidated device data  Peak Airway Pressure - Cannot attach notes to unvalidated device data  Mean Airway Pressure - Cannot attach notes to unvalidated device data  Plateau Pressure - Cannot attach notes to unvalidated device data  Exhaled Vt - Cannot attach notes to unvalidated device data  Total Ve - Cannot attach notes to unvalidated device data  Spont Ve - Cannot attach notes to unvalidated device data  I:E Ratio Measured - Cannot attach notes to unvalidated device data  Resp Rate High Alarm - Cannot attach notes to unvalidated device data  Press High Alarm - Cannot attach notes to unvalidated device data  Apnea Rate - Cannot attach notes to unvalidated device data  Apnea Volume (mL) - Cannot attach notes to unvalidated device data  Apnea Oxygen Concentration  - Cannot attach notes to unvalidated device data  Apnea Flow Rate (L/min) - Cannot attach notes to  unvalidated device data  T Apnea - Cannot attach notes to unvalidated device data       11/21/19 0103   Patient Assessment/Suction   Level of Consciousness (AVPU) unresponsive   Respiratory Effort Unlabored   Expansion/Accessory Muscles/Retractions no use of accessory muscles   All Lung Fields Breath Sounds coarse   Rhythm/Pattern, Respiratory assisted mechanically   Cough Frequency with stimulation   Cough Type assisted   Suction Method tracheal   $ Suction Charges Inline Suction Procedure Stat Charge   Secretions Amount moderate   Secretions Color yellow   Secretions Characteristics thick   PRE-TX-O2   O2 Device (Oxygen Therapy) ventilator   $ Is the patient on Low Flow Oxygen? Yes   Resp 18   Respiratory Interventions   Airway/Ventilation Management airway patency maintained   Vent Select   Conventional Vent Y   $ Ventilator Initial 1   Charged w/in last 24h YES   Preset Conventional Ventilator Settings   Vent ID [id number]   Vent Type    Conventional Ventilator Alarms   Alarms On Y

## 2019-11-21 NOTE — ASSESSMENT & PLAN NOTE
Continuing Fluconzole to complete 14 day course from 11/15 per discharge summary  Repeat UA and culture follow up

## 2019-11-21 NOTE — PLAN OF CARE
Problem: Nutrition Impairment (Mechanical Ventilation, Invasive)  Goal: Optimal Nutrition Delivery  Outcome: Ongoing, Progressing  Intervention: Optimize Nutrition Delivery  Flowsheets (Taken 11/21/2019 1608)  Nutrition Support Management: tube feeding initiated     Problem: Feeding Intolerance (Enteral Nutrition)  Goal: Feeding Tolerance  11/21/2019 1608 by Idalia Aguayo RD  Outcome: Ongoing, Progressing  11/21/2019 1607 by Idalia Aguayo RD  Outcome: Ongoing, Progressing  Intervention: Prevent and Manage Feeding Intolerance  11/21/2019 1608 by Idalia Aguayo RD  Flowsheets (Taken 11/21/2019 1608)  Nutrition Support Management: tube feeding initiated; weight trending reviewed  11/21/2019 1607 by Idalia Aguayo RD  Flowsheets (Taken 11/21/2019 1607)  Nutrition Support Management: tube feeding initiated;weight trending reviewed     Problem: Wound  Goal: Optimal Wound Healing  Outcome: Ongoing, Progressing  Intervention: Promote Effective Wound Healing  Flowsheets (Taken 11/21/2019 1608)  Oral Nutrition Promotion: -- (Enteral nutrition with modulars to promote wound healing ordered)     Enteral nutrition to be initiated as ordered. RD ordered continuous PEG tube feeding: Nepro; initiate at 20 mL/Hour, Advance by 20 mL/hour every 4-6 hours to a goal of 45 mL/hour. Water flush of 30 ml every 4 hours. Give Liquacel BID via PEG. Give Elliot BID via PEG.

## 2019-11-22 NOTE — SUBJECTIVE & OBJECTIVE
Interval HPI:   Overnight events: pt admitted today          TPN and/or TF: YES   If yes, type of TF/TPN and rate: tube feeding - 20cc/hr nepro    PMH, PSH, FH, SH updated and reviewed     ROS:  Unable to obtain due to:     Review of Systems   Unable to perform ROS: Patient unresponsive            PHYSICAL EXAMINATION:  Vitals:    11/21/19 1815   BP:    Pulse: (!) 57   Resp: 18   Temp:      Body mass index is 39.12 kg/m².    Physical Exam   Cardiovascular:   Bradycardic and regular   Pulmonary/Chest: Breath sounds normal.   Abdominal: Soft.   Musculoskeletal:   Padded boots on feet bilat   Neurological:   unresponsive

## 2019-11-22 NOTE — PLAN OF CARE
11/22/19 1526   Discharge Reassessment   Assessment Type Discharge Planning Reassessment   Anticipated Discharge Disposition skilled nursing Nu   Pt to be discharged back to [facility] in group home care via ambulance.

## 2019-11-22 NOTE — PROGRESS NOTES
[facility]  Adult Nutrition  Progress Note    SUMMARY       Recommendations    1. Pt tolerating TF at goal of 45 mL/hour. Pt getting Liquacel BID via PEG.   2. Give Elliot BID via PEG (to provide 2304 kcal/day, 124 g/day protein, and 965 ml/day free water at goal).   2. Blood glucose and electrolytes to be monitored and managed as appropriate.     Goals: 1. Patient to meet estimated protein and energy needs via provision of enteral nutrition. 2. RD to monitor tube feeding, rate, tolerance, labs, IVFs, and nutritional status. Will manage/adjust tube feeding orders accordingly.   Nutrition Goal Status:ongoing  Communication of RD Recs: reviewed with RN    RD rounds brief:   Pt tolerating TF and is getting liquacel. RN said hasn't given elliot yet, will be getting today though. Pt to be d/t today.    Reason for Assessment    Reason For Assessment: consult, new tube feeding  Relevant Medical History: Functional quadriplegia, Tracheostomy in place, Ventilator dependence, CAD, anemia, C. difficile colitis, Candida UTI,  metabolic Encephalopathy  Interdisciplinary Rounds: attended  Nutrition Discharge Planning: PEG tube feeding to meet estimated energy and protein needs with modular supplement to aid in wound healing     Nutrition Risk Screen    Nutrition Risk Screen: tube feeding or parenteral nutrition    Nutrition/Diet History    Patient Reported Diet/Restrictions/Preferences: no oral intake  Food Allergies: NKFA  Factors Affecting Nutritional Intake: on mechanical ventilation(Trach dependent and PEG )  Nutrition Support Formula Prior to Admit: Nepro  Nutrition Support Provision Prior to Admit: Patient getting Nepro last admit, unable to assess what patient was getting prior to admit       Lab/Procedures/Meds    Pertinent Labs Reviewed: reviewed  Pertinent Medications Reviewed: reviewed      Estimated/Assessed Needs    Weight Used For Calorie Calculations: 132 kg (291 lb 0.1 oz)  Energy Calorie Requirements  (kcal): 2263   Energy Need Method: [place] (modified)  Protein Requirements: 116 - 155 (1.5 - 2 g/kg adjusted IBW)   Weight Used For Protein Calculations: 77.4 kg (170 lb 10.2 oz)(adjusted IBW )     Estimated Fluid Requirement Method: RDA Method  RDA Method (mL): 2263         Nutrition Prescription Ordered    Current Diet Order: NPO       Nutrition Risk    Level of Risk/Frequency of Follow-up: high       Nutrition Follow-Up    RD Follow-up?: Yes     Liza Lemon RD 11/22/2019 1:35 PM

## 2019-11-22 NOTE — PLAN OF CARE
Problem: Nutrition Impairment (Mechanical Ventilation, Invasive)  Goal: Optimal Nutrition Delivery  Outcome: Ongoing, Progressing   Pt tolerating TF at goal rate of 45 ml/hr

## 2019-11-22 NOTE — CONSULTS
[facility]  Endocrinology  Consult Note    Consult Requested by: Brodie Mann MD   Reason for admit: Encephalopathy, metabolic    HISTORY OF PRESENT ILLNESS:  78 yo male with hx of hypothyroidism admitted with change in mental status and found to have markedly elevated TSH > 200. He has been hospitalized recently and had a TSH earlier this month of 9. He is not responsive to me and old history is obtained from the EHR. He resides at [facility] and has multiple other medical problems as outlined in the record.     Medications and/or Treatments Impacting Glycemic Control:  Immunotherapy:    Immunosuppressants     None        Steroids:   Hormones (From admission, onward)    None        Pressors:    Autonomic Drugs (From admission, onward)    None          Medications Prior to Admission   Medication Sig Dispense Refill Last Dose    ascorbic acid, vitamin C, (VITAMIN C) 500 mg/5 mL Syrp syrup 500 mg by PEG Tube route 2 (two) times daily.    11/20/2019    baclofen (LIORESAL) 20 MG tablet 25 mg by PEG Tube route every 6 (six) hours.    11/20/2019    balsam peru-castor oil Oint Apply topically once daily.   11/20/2019    banana flakes-t-galactooligos. (BANATROL PLUS) PwPk Take 1 packet by mouth 2 (two) times daily. Mix with water   11/20/2019    busPIRone (BUSPAR) 5 MG Tab 5 mg by Per G Tube route 2 (two) times daily.   11/20/2019    carvedilol (COREG) 3.125 MG tablet Take 1 tablet (3.125 mg total) by mouth 2 (two) times daily. 60 tablet 11 11/20/2019    chlorhexidine (HIBICLENS) 4 % external liquid Apply 1 application topically 3 (three) times a week. On bath days   Past Week    cholestyramine-aspartame (QUESTRAN LIGHT) 4 gram PwPk 1 packet (4 g total) by Per G Tube route 2 (two) times daily. Discontinue if no bowel movement in a day 180 packet 3 11/20/2019    duloxetine (CYMBALTA) 30 MG capsule 30 mg by PEG Tube route once daily.    11/20/2019    enoxaparin (LOVENOX) 40 mg/0.4 mL Syrg Inject 40 mg  into the skin once daily.   11/20/2019    ferrous sulfate 220 mg (44 mg iron)/5 mL solution 220 mg by Per G Tube route once daily.   11/20/2019    finasteride (PROSCAR) 5 mg tablet Take 1 tablet (5 mg total) by mouth once daily. 30 tablet 11 11/20/2019    fluconazole 40 mg/ml (DIFLUCAN) 40 mg/mL suspension Take 5 mLs (200 mg total) by mouth once daily. for 14 days  0 11/20/2019    furosemide (LASIX) 20 MG tablet Take 1 tablet (20 mg total) by mouth once daily. (Patient taking differently: 40 mg by Per G Tube route once daily. ) 30 tablet 11 11/20/2019    gabapentin (NEURONTIN) 300 MG capsule 300 mg by Per G Tube route 3 (three) times daily.   11/20/2019    hydrocodone-acetaminophen 10-325mg (NORCO)  mg Tab 1 tablet by Per G Tube route every 6 (six) hours as needed for Pain. 5 tablet 0 Past Week    lacosamide (VIMPAT) 200 mg Tab tablet 1 tablet (200 mg total) by Per G Tube route every 12 (twelve) hours. 60 tablet 11 11/20/2019    Lactoperoxi/Gluc Oxid/Pot Thio (BIOTENE DRY MOUTH MM) Swish and spit 15 mLs 4 (four) times daily. AND/OR PRN   11/20/2019    levETIRAcetam (KEPPRA) 500 MG Tab 1 tablet (500 mg total) by Per G Tube route 2 (two) times daily. 60 tablet 11 11/20/2019    levothyroxine (SYNTHROID) 200 MCG tablet Take 1 tablet (200 mcg total) by mouth once daily. 30 tablet 11 11/20/2019    multivit-min-ferrous gluconate (CERTAVITE-ANTIOXID, IRON GLUC,) 9 mg iron/ 15 mL (15 mL) Liqd 15 mLs by Per G Tube route once daily.    11/20/2019    polyethylene glycol (GLYCOLAX) 17 gram PwPk 17 g by Per G Tube route every evening.    11/20/2019    potassium chloride 10% (KAYCIEL) 20 mEq/15 mL oral solution Take 10 mEq by mouth once daily. Per g tube    11/20/2019    rivastigmine (EXELON) 9.5 mg/24 hr PT24 Place 1 patch onto the skin once daily.   11/20/2019    Saccharomyces boulardii (FLORASTOR) 250 mg capsule Take 250 mg by mouth 2 (two) times daily.   11/20/2019    saw/vit E/sod sue/lyc/beta/pyg  (PROSTATE HEALTH ORAL) Take 30 mLs by mouth 2 (two) times daily.   11/20/2019    selenium sulfide 2.5 % Lotn Apply 1 application topically twice a week. ON Tuesday AND SATURDAY 11/20/2019    terazosin (HYTRIN) 1 MG capsule 1 mg by PEG Tube route once daily.   11/20/2019    traZODone (DESYREL) 100 MG tablet 100 mg by Per G Tube route every evening.    11/20/2019    vancomycin 250mg / 10ml Susp Take 5 mLs (125 mg total) by mouth every 6 (six) hours for 7 days, THEN 5 mLs (125 mg total) 2 (two) times daily for 7 days, THEN 5 mLs (125 mg total) once daily for 7 days, THEN 5 mLs (125 mg total) Every 3 (three) days.   Past Week    acetaminophen (TYLENOL) 160 mg/5 mL Elix 650 mg by Per G Tube route every 4 (four) hours as needed.   Unknown    albuterol-ipratropium 2.5mg-0.5mg/3mL (DUONEB) 0.5 mg-3 mg(2.5 mg base)/3 mL nebulizer solution Take 3 mLs by nebulization every 6 (six) hours as needed for Wheezing or Shortness of Breath.    Unknown    finasteride (PROSCAR) 5 mg tablet 5 mg by PEG Tube route once daily.    9/12/2019 at 09:00    HYDROcodone-acetaminophen (NORCO)  mg per tablet Take 1 tablet by mouth every 12 (twelve) hours as needed for Pain.   11/4/2019    hydrocortisone (PROCTOZONE-HC) 2.5 % rectal cream Place 1 application rectally 2 (two) times daily as needed for Hemorrhoids.   Unknown at Unknown time    ibuprofen (ADVIL,MOTRIN) 600 MG tablet Take 600 mg by mouth every 6 (six) hours as needed for Pain.   Unknown    lactose-reduced food (ISOSOURCE HN FEEDING TUBE) 70 mLs by FEEDING TUBE route once daily. 70 ml per hour x22 hrs daily. Hold 1 hr before and after giving synthroid   11/5/2019 at 07:00    lidocaine (XYLOCAINE) 5 % Oint ointment Apply 1 g topically as needed (with each trach change).   Unknown    menthol-zinc oxide (RISAMINE) 0.44-20.6 % Oint Apply 1 application topically once daily. AFTER EACH DIAPER CHANGE   11/5/2019 at 09:00    nitroGLYCERIN (NITROSTAT) 0.4 MG SL tablet Place  0.4 mg under the tongue every 5 (five) minutes as needed for Chest pain. Seek medical help if pain is not relieved by the third dose.   Unknown       Current Facility-Administered Medications   Medication Dose Route Frequency Provider Last Rate Last Dose    0.9%  NaCl infusion   Intravenous Continuous Yamileth Stuart MD 75 mL/hr at 11/21/19 0800      acetaminophen tablet 650 mg  650 mg Oral Q4H PRN Yamileth Stuart MD        albuterol-ipratropium 2.5 mg-0.5 mg/3 mL nebulizer solution 3 mL  3 mL Nebulization Q6H PRN Yamileth Stuart MD        busPIRone tablet 5 mg  5 mg Per G Tube BID Yamileth Stuart MD   5 mg at 11/21/19 1031    chlorhexidine 0.12 % solution 15 mL  15 mL Mouth/Throat BID Isela Razo, PharmD   15 mL at 11/21/19 1031    cholestyramine-aspartame 4 gram packet 4 g  1 packet Per G Tube BID Yamileth Stuart MD   4 g at 11/21/19 1617    dextrose 50% injection 12.5 g  12.5 g Intravenous PRN Yamileth Stuart MD        dextrose 50% injection 25 g  25 g Intravenous PRN Yamileth Stuart MD        DULoxetine DR capsule 30 mg  30 mg Oral Daily Yamileth Stuart MD   30 mg at 11/21/19 1031    enoxaparin injection 40 mg  40 mg Subcutaneous Daily Yamileth Stuart MD   40 mg at 11/21/19 1623    ferrous sulfate 300 mg (60 mg iron)/5 mL syrup 300 mg  300 mg Oral Daily Yamileth Stuart MD   300 mg at 11/21/19 1218    finasteride tablet 5 mg  5 mg Oral Daily Yamileth Stuart MD   5 mg at 11/21/19 1031    fluconazole 40 mg/ml suspension 200 mg  200 mg Oral Daily Yamileth Stuart MD   200 mg at 11/21/19 1218    glucagon (human recombinant) injection 1 mg  1 mg Intramuscular PRN Yamileth Stuart MD        glucose chewable tablet 16 g  16 g Oral PRN Yamileth Stuart MD        glucose chewable tablet 24 g  24 g Oral PRN Yamileth Stuart MD        hydrocortisone 2.5 % rectal cream 1 application  1 application Rectal BID PRN Yamileth Stuart MD        insulin aspart U-100 pen 0-5 Units  0-5 Units  Subcutaneous QID (AC + HS) PRN Yamileth Stuart MD        lacosamide tablet 200 mg  200 mg Per G Tube Q12H Brodie Mann MD   200 mg at 11/21/19 1719    levETIRAcetam tablet 500 mg  500 mg Per G Tube BID Yamileth Stuart MD   500 mg at 11/21/19 1031    [START ON 11/22/2019] levothyroxine injection 50 mcg  50 mcg Intravenous Daily Yamileth Stuart MD        mupirocin 2 % ointment   Nasal BID Isela Razo PharmD        nitroGLYCERIN SL tablet 0.4 mg  0.4 mg Sublingual Q5 Min PRN Yamileth Stuart MD        ondansetron injection 4 mg  4 mg Intravenous Q6H PRN Yamileth Stuart MD        polyethylene glycol packet 17 g  17 g Oral BID PRN Yamileth Stuart MD        sodium chloride 0.9% flush 10 mL  10 mL Intravenous PRN Yamileth Stuart MD        terazosin capsule 1 mg  1 mg Oral Daily Yamileth Stuart MD   1 mg at 11/21/19 1218    vancomycin oral suspension 125 mg  125 mg Oral Q6H Yamileth Stuart MD   125 mg at 11/21/19 1719       Interval HPI:   Overnight events: pt admitted today          TPN and/or TF: YES   If yes, type of TF/TPN and rate: tube feeding - 20cc/hr nepro    PMH, PSH, FH, SH updated and reviewed     ROS:  Unable to obtain due to:     Review of Systems   Unable to perform ROS: Patient unresponsive            PHYSICAL EXAMINATION:  Vitals:    11/21/19 1815   BP:    Pulse: (!) 57   Resp: 18   Temp:      Body mass index is 39.12 kg/m².    Physical Exam   Cardiovascular:   Bradycardic and regular   Pulmonary/Chest: Breath sounds normal.   Abdominal: Soft.   Musculoskeletal:   Padded boots on feet bilat   Neurological:   unresponsive     .     ASSESSMENT and PLAN:    Acquired hypothyroidism  TSH markedly elevated - Pt receiving cholestyramine and levothyroxine. Cholestyramine with interfere with absorption and should be given 4 hours after levothyroxine. Iron also with interfere, although to a lesser degree and should also be  from levothyroxine by 4 hours. Rec continue IV replacement  now - rec 100 mcg IV daily until resuming PO and then assure it is given separately from other meds           DISCHARGE NEEDS: will assess daily    Nohelia Medina MD  Endocrinology  [facility]

## 2019-11-22 NOTE — ASSESSMENT & PLAN NOTE
TSH markedly elevated - Pt receiving cholestyramine and levothyroxine. Cholestyramine with interfere with absorption and should be given 4 hours after levothyroxine. Iron also with interfere, although to a lesser degree and should also be  from levothyroxine by 4 hours. Rec continue IV replacement now - rec 100 mcg IV daily until resuming PO and then assure it is given separately from other meds

## 2019-11-22 NOTE — PROGRESS NOTES
"Progress Note  Pulmonary/Critical Care      Admit Date: 11/21/2019      SUBJECTIVE:      Patient ID: Masood Messina is a 79 y.o. male.    HPI/Interval history (See H&P for complete P,F,SHx) :   The patient opens his eyes and looks at me when his name is called.  I think this is his baseline.      ROS  Unable to assess.    OBJECTIVE:     Vital Signs Range (Last 24H):  Temp:  [97.4 °F (36.3 °C)-97.9 °F (36.6 °C)]   Pulse:  [47-61]   Resp:  [0-18]   BP: ()/(45-67)   SpO2:  [95 %-100 %]     I & O (Last 24H):    Intake/Output Summary (Last 24 hours) at 11/22/2019 0845  Last data filed at 11/22/2019 0600  Gross per 24 hour   Intake 1250 ml   Output 2280 ml   Net -1030 ml        Estimated body mass index is 39.83 kg/m² as calculated from the following:    Height as of this encounter: 6' 2" (1.88 m).    Weight as of this encounter: 140.7 kg (310 lb 3 oz).    Physical Exam  GENERAL: Older patient in no distress, quadriplegic on the ventilator.  HEENT: Pupils equal and reactive. Extraocular movements intact. Nose intact.      Pharynx moist.  NECK: Supple.  Tracheostomy in place.  HEART: Regular rate and rhythm. No murmur or gallop auscultated.  LUNGS: Clear to auscultation and percussion. Lung excursion symmetrical. No change in fremitus. No adventitial noises.  ABDOMEN: Bowel sounds present.  Peg tube in place. Non-tender, no masses palpated.  Rectal tube in place with dark diarrhea.  : Normal anatomy.  Rahman with yellow urine.  EXTREMITIES: Normal muscle tone and joint movement, no cyanosis or clubbing.  Contracted.  Bilateral foot drop.  LYMPHATICS: No adenopathy palpated, no edema.  SKIN: Dry, intact, no lesions.  Multiple decubiti to the feet.  NEURO:  Quadriplegic.  PSYCH:  Unable to assess.    Laboratory/Diagnostic Data:    Vent Settings- Vent Mode: A/C  Oxygen Concentration (%):  [32-50] 32  Resp Rate Total:  [18 br/min] 18 br/min  Vt Set:  [600 mL] 600 mL  PEEP/CPAP:  [5 cmH20] 5 cmH20  Pressure " Patient presents with white vaginal discharge, irritated since Monday. Support:  [0 cmH20-10 cmH20] 0 cmH20  Mean Airway Pressure:  [15 crY68-94 cmH20] 17 cmH20        Recent Labs   Lab 11/22/19  0430   WBC 8.23   HGB 9.1*   HCT 28.1*      *   K 4.1   CL 95   CO2 26   BUN 36*   CREATININE 1.6*   AST 23   ALT 16   PROT 7.3   ALBUMIN 2.3*   BILITOT 0.7   PHOS 2.5*          Microbiology:    Microbiology Results (last 7 days)     Procedure Component Value Units Date/Time    Urine Culture High Risk [[id number]] Collected:  11/21/19 0836    Order Status:  Completed Specimen:  Urine, Catheterized Updated:  11/22/19 0838     Urine Culture, Routine No growth to date    Narrative:       Indicated criteria for high risk culture:->Other  Other (specify):->hi-risk [facility] patient - chronic  tay use- cloudy urine    Blood culture #1 **CANNOT BE ORDERED STAT** [[id number]] Collected:  11/21/19 0045    Order Status:  Completed Specimen:  Blood from Line, PICC Left Brachial Updated:  11/22/19 0232     Blood Culture, Routine No Growth to date      No Growth to date    Blood culture #2 **CANNOT BE ORDERED STAT** [[id number]] Collected:  11/21/19 0043    Order Status:  Completed Specimen:  Blood from Peripheral, Antecubital, Right Updated:  11/22/19 0232     Blood Culture, Routine No Growth to date      No Growth to date    Narrative:       Please draw one of the cultures from the PICC line.    Culture, Respiratory with Gram Stain [[id number]] Collected:  11/21/19 0800    Order Status:  Completed Specimen:  Respiratory from Endotracheal Aspirate Updated:  11/21/19 1509     Gram Stain (Respiratory) <10 epithelial cells per low power field.     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  Chronic Clostridium difficile enterocolitis with ongoing diarrhea requiring rectal tube  Chronic anemia  CAD  PEG  Functional quadriplegia  Severe hypothyroidism  Hyponatremia, TSH responding to IV Synthroid  Moderate malnutrition, morbid obesity  Multiple decubitus    Stable on ventilator  No objection to return to [facility] when other MDs are ready  Mental status appears to be at baseline  IV Synthroid for the foreseeable future versus holding Questran    Critical care time spent reviewing the chart, examining the patient, reviewing the labs, reviewing the radiological findings, discussing care with nursing, physicians, and respiratory and creating the note and  has been >35 minutes.

## 2019-11-22 NOTE — PLAN OF CARE
Problem: Adult Inpatient Plan of Care  Goal: Plan of Care Review  Outcome: Ongoing (interventions implemented as appropriate)  Plan of care reviewed with patient.

## 2019-11-22 NOTE — PROGRESS NOTES
"[facility]  Neurology  Progress Note    Patient Name: Masood Messina  MRN: [id number]  Admission Date: 11/21/2019  Hospital Length of Stay: 1 days  Code Status: DNR   Attending Provider: Brodie Mann MD   Consulting Provider: Dr. Roy   Consulting NP: Naomi Reis NP  Primary Care Physician: Jeramie Lombardi MD  Principal Problem:Encephalopathy, metabolic    Subjective:     Chief Complaint:    Chief Complaint   Patient presents with    Altered Mental Status     States has had a change in mental status. Used to respond but now does not respond to voice or pain         HPI:  79 year old male with PMhx CVA, s/p trach with vent dependence, s/p peg sent from [facility] for "unresponsiveness" since yesterday.  Old records reviewed and and patient discharged 11/14 after treatment for encephalopathy that presented in a similar manner.  Per my chart review, patient was treated for Psuedomonas UTI and bacteremia/shock and was discharged on IV Zosyn which he completed 11/19. He was also discharged home on po vancomycin for recurrent c. Diff and then Fluconazole for Candida Tropicalis. During last admission, initial EEG revealed seizure activity and thus he was discharged on vimpat and keppra.  No reported seizure activity witnessed.  Also per my chart review, patient had started to open his eyes and was following simple commands such as protruding his tongue on command for Dr. Tyson but it was noted that he was not on his baseline which is more interactive.  He will open his eyes to physical stimuli but is not following commands or consistently tracking me. Labs revealing .  TSH earlier in November 9.9.  On synthroid currently.      Neurological Interval Note: Patient seen and examined with Dr. Roy. Patient appears more alert today. No s/sx of distress. He can follow commands including closing his eyes. EEG showed on active seizures. Following for encephalopathy labs.     Past " Medical History:   Diagnosis Date    AAA (abdominal aortic aneurysm)     Anemia     BPH (benign prostatic hyperplasia)     CHF (congestive heart failure)     COPD (chronic obstructive pulmonary disease)     Coronary artery disease     Dementia     Dementia     Depression     GERD (gastroesophageal reflux disease)     Hyperlipidemia     Hypertension     Hypothyroid     Renal disorder     Respiratory failure, chronic     Ventilator dependence        Past Surgical History:   Procedure Laterality Date    ABDOMINAL SURGERY      CARDIAC SURGERY  1999    GASTROSTOMY TUBE PLACEMENT      SPINE SURGERY      TRACHEOSTOMY TUBE PLACEMENT         Review of patient's allergies indicates:   Allergen Reactions    Codeine Other (See Comments)       Current Neurological Medications:    No current facility-administered medications on file prior to encounter.      Current Outpatient Medications on File Prior to Encounter   Medication Sig    ascorbic acid, vitamin C, (VITAMIN C) 500 mg/5 mL Syrp syrup 500 mg by PEG Tube route 2 (two) times daily.     baclofen (LIORESAL) 20 MG tablet 25 mg by PEG Tube route every 6 (six) hours.     balsam peru-castor oil Oint Apply topically once daily.    banana flakes-t-galactooligos. (BANATROL PLUS) PwPk Take 1 packet by mouth 2 (two) times daily. Mix with water    busPIRone (BUSPAR) 5 MG Tab 5 mg by Per G Tube route 2 (two) times daily.    carvedilol (COREG) 3.125 MG tablet Take 1 tablet (3.125 mg total) by mouth 2 (two) times daily.    chlorhexidine (HIBICLENS) 4 % external liquid Apply 1 application topically 3 (three) times a week. On bath days    duloxetine (CYMBALTA) 30 MG capsule 30 mg by PEG Tube route once daily.     enoxaparin (LOVENOX) 40 mg/0.4 mL Syrg Inject 40 mg into the skin once daily.    ferrous sulfate 220 mg (44 mg iron)/5 mL solution 220 mg by Per G Tube route once daily.    finasteride (PROSCAR) 5 mg tablet Take 1 tablet (5 mg total) by mouth once  daily.    fluconazole 40 mg/ml (DIFLUCAN) 40 mg/mL suspension Take 5 mLs (200 mg total) by mouth once daily. for 14 days    furosemide (LASIX) 20 MG tablet Take 1 tablet (20 mg total) by mouth once daily. (Patient taking differently: 40 mg by Per G Tube route once daily. )    lacosamide (VIMPAT) 200 mg Tab tablet 1 tablet (200 mg total) by Per G Tube route every 12 (twelve) hours.    Lactoperoxi/Gluc Oxid/Pot Thio (BIOTENE DRY MOUTH MM) Swish and spit 15 mLs 4 (four) times daily. AND/OR PRN    levETIRAcetam (KEPPRA) 500 MG Tab 1 tablet (500 mg total) by Per G Tube route 2 (two) times daily.    multivit-min-ferrous gluconate (CERTAVITE-ANTIOXID, IRON GLUC,) 9 mg iron/ 15 mL (15 mL) Liqd 15 mLs by Per G Tube route once daily.     polyethylene glycol (GLYCOLAX) 17 gram PwPk 17 g by Per G Tube route every evening.     potassium chloride 10% (KAYCIEL) 20 mEq/15 mL oral solution Take 10 mEq by mouth once daily. Per g tube     rivastigmine (EXELON) 9.5 mg/24 hr PT24 Place 1 patch onto the skin once daily.    Saccharomyces boulardii (FLORASTOR) 250 mg capsule Take 250 mg by mouth 2 (two) times daily.    saw/vit E/sod sue/lyc/beta/pyg (PROSTATE HEALTH ORAL) Take 30 mLs by mouth 2 (two) times daily.    selenium sulfide 2.5 % Lotn Apply 1 application topically twice a week. ON Tuesday AND SATURDAY    terazosin (HYTRIN) 1 MG capsule 1 mg by PEG Tube route once daily.    vancomycin 250mg / 10ml Susp Take 5 mLs (125 mg total) by mouth every 6 (six) hours for 7 days, THEN 5 mLs (125 mg total) 2 (two) times daily for 7 days, THEN 5 mLs (125 mg total) once daily for 7 days, THEN 5 mLs (125 mg total) Every 3 (three) days.    [DISCONTINUED] cholestyramine-aspartame (QUESTRAN LIGHT) 4 gram PwPk 1 packet (4 g total) by Per G Tube route 2 (two) times daily. Discontinue if no bowel movement in a day    [DISCONTINUED] gabapentin (NEURONTIN) 300 MG capsule 300 mg by Per G Tube route 3 (three) times daily.    [DISCONTINUED]  hydrocodone-acetaminophen 10-325mg (NORCO)  mg Tab 1 tablet by Per G Tube route every 6 (six) hours as needed for Pain.    [DISCONTINUED] levothyroxine (SYNTHROID) 200 MCG tablet Take 1 tablet (200 mcg total) by mouth once daily.    [DISCONTINUED] traZODone (DESYREL) 100 MG tablet 100 mg by Per G Tube route every evening.     acetaminophen (TYLENOL) 160 mg/5 mL Elix 650 mg by Per G Tube route every 4 (four) hours as needed.    albuterol-ipratropium 2.5mg-0.5mg/3mL (DUONEB) 0.5 mg-3 mg(2.5 mg base)/3 mL nebulizer solution Take 3 mLs by nebulization every 6 (six) hours as needed for Wheezing or Shortness of Breath.     finasteride (PROSCAR) 5 mg tablet 5 mg by PEG Tube route once daily.     HYDROcodone-acetaminophen (NORCO)  mg per tablet Take 1 tablet by mouth every 12 (twelve) hours as needed for Pain.    hydrocortisone (PROCTOZONE-HC) 2.5 % rectal cream Place 1 application rectally 2 (two) times daily as needed for Hemorrhoids.    ibuprofen (ADVIL,MOTRIN) 600 MG tablet Take 600 mg by mouth every 6 (six) hours as needed for Pain.    lactose-reduced food (ISOSOURCE HN FEEDING TUBE) 70 mLs by FEEDING TUBE route once daily. 70 ml per hour x22 hrs daily. Hold 1 hr before and after giving synthroid    lidocaine (XYLOCAINE) 5 % Oint ointment Apply 1 g topically as needed (with each trach change).    menthol-zinc oxide (RISAMINE) 0.44-20.6 % Oint Apply 1 application topically once daily. AFTER EACH DIAPER CHANGE    nitroGLYCERIN (NITROSTAT) 0.4 MG SL tablet Place 0.4 mg under the tongue every 5 (five) minutes as needed for Chest pain. Seek medical help if pain is not relieved by the third dose.      Family History     None        Tobacco Use    Smoking status: Former Smoker    Smokeless tobacco: Never Used   Substance and Sexual Activity    Alcohol use: No    Drug use: No    Sexual activity: Never     Review of Systems   Unable to perform ROS: Mental status change     Objective:     Vital Signs  (Most Recent):  Temp: 98.1 °F (36.7 °C) (11/22/19 0900)  Pulse: 63 (11/22/19 0900)  Resp: 18 (11/22/19 0900)  BP: (!) 107/53 (11/22/19 0900)  SpO2: 100 % (11/22/19 0900) Vital Signs (24h Range):  Temp:  [97.4 °F (36.3 °C)-98.1 °F (36.7 °C)] 98.1 °F (36.7 °C)  Pulse:  [54-63] 63  Resp:  [0-18] 18  SpO2:  [96 %-100 %] 100 %  BP: (102-138)/(51-65) 107/53     Weight: (!) 140.7 kg (310 lb 3 oz)  Body mass index is 39.83 kg/m².    Physical Exam   Constitutional: He appears well-developed and well-nourished.   HENT:   Head: Normocephalic and atraumatic.   Eyes: Pupils are equal, round, and reactive to light. EOM are normal.   Neck: Normal range of motion. Neck supple.   Cardiovascular: Normal rate.   Pulmonary/Chest: Effort normal.   Chronic vent dependent   Abdominal: Soft. Bowel sounds are normal.   Musculoskeletal: Normal range of motion.   Passive ROM normal   Neurological: He is alert. He has normal reflexes. No cranial nerve deficit or sensory deficit. He exhibits normal muscle tone. Abnormal coordination: RAMON. GCS eye subscore is 4. GCS verbal subscore is 4. GCS motor subscore is 1.   Reflex Scores:       Tricep reflexes are 2+ on the right side and 2+ on the left side.       Bicep reflexes are 2+ on the right side and 2+ on the left side.       Brachioradialis reflexes are 2+ on the right side and 2+ on the left side.       Patellar reflexes are 2+ on the right side and 2+ on the left side.       Achilles reflexes are 2+ on the right side and 2+ on the left side.  Skin: Skin is warm and dry. Capillary refill takes less than 2 seconds.   Psychiatric: Cognition and memory are impaired. He is noncommunicative.   Withdrawn       NEUROLOGICAL EXAMINATION:     MENTAL STATUS   Level of consciousness: arousable by verbal stimuli  General knowledge: limited exam      CRANIAL NERVES   Cranial nerves II through XII intact.     CN III, IV, VI   Pupils are equal, round, and reactive to light.  Extraocular motions are normal.    Left pupil: Accommodation: intact.        Limited exam, Patient nonverbal at this time does withdraw to pain      MOTOR EXAM   Muscle bulk: normal  Overall muscle tone: normal    REFLEXES     Reflexes   Right brachioradialis: 2+  Left brachioradialis: 2+  Right biceps: 2+  Left biceps: 2+  Right triceps: 2+  Left triceps: 2+  Right patellar: 2+  Left patellar: 2+  Right achilles: 2+  Left achilles: 2+  Right : 2+  Left : 2+    SENSORY EXAM        Responds to tactile stimuli     GAIT AND COORDINATION        Not amblutory bed bound          Significant Labs:   Lab Results   Component Value Date    CREATININE 1.6 (H) 11/22/2019     Lab Results   Component Value Date    .660 (H) 11/22/2019    .660 (H) 11/22/2019     No results found for: LABA1C, HGBA1C    Significant Imaging:   CT of Head without contrast  IMPRESSION:    No evidence of acute intracranial abnormality or mass.    There is evidence of chronic ischemic white matter changes changes secondary to  small vessel disease.    There is also moderate, diffuse cerebral atrophy.    There is hypoattenuation in the parasylvian regions bilaterally which may be  secondary to old infarcts.    EEG:  Impression:    This is an abnormal routine EEG due to:  1. Diffuse slowing of the background activity consistent with a   moderate to severe encephalopathy.  2. Occasional generalized epileptiform discharges consistent with   seizure potential.  No seizures are captured during this study.    Assessment and Plan:      1. Encephalopathy  -f/u encephalopathy labs; suspect encephalopathy d/t hypothyroidism  EEG negative seizure activity  CT of head without contrast negative for acute intracranial       2. Hypothyroidism   -IM to manage     3. Hx of Seizure  -Continue Home AED Medications    Patient to follow up with [facility] at [phone] within 2 weeks from discharge.            Active Diagnoses:    Diagnosis Date Noted POA    PRINCIPAL  PROBLEM:  Encephalopathy, metabolic [G93.41] 11/21/2019 Yes    Candida UTI [B37.49] 11/21/2019 Yes    C. difficile colitis [A04.72] 10/11/2019 Yes    Essential hypertension [I10] 09/13/2019 Yes    Coronary artery disease [I25.10] 09/12/2019 Yes    Ventilator dependence [Z99.11] 09/12/2019 Not Applicable    Anemia, chronic disease [D63.8] 09/12/2019 Yes    PEG (percutaneous endoscopic gastrostomy) status [Z93.1] 03/21/2017 Not Applicable    Acquired hypothyroidism [E03.9] 03/21/2017 Yes    Functional quadriplegia [R53.2] 12/05/2013 Yes    Chronic respiratory failure with hypoxia [J96.11] 12/05/2013 Yes    Tracheostomy in place [Z93.0] 12/05/2013 Not Applicable      Problems Resolved During this Admission:       VTE Risk Mitigation (From admission, onward)         Ordered     enoxaparin injection 40 mg  Daily      11/21/19 0809     IP VTE HIGH RISK PATIENT  Once      11/21/19 0809     Place EYAD hose  Until discontinued      11/21/19 0809            Patient was seen and examined by Dr. Roy        Thank you for your consult.     Naoim Reis NP  Neurology  Formerly [facility], [facility]

## 2019-11-22 NOTE — HPI
78 yo male with hx of hypothyroidism admitted with change in mental status and found to have markedly elevated TSH > 200. He has been hospitalized recently and had a TSH earlier this month of 9. He is not responsive to me and old history is obtained from the EHR. He resides at [place] and has multiple other medical problems as outlined in the record.

## 2019-11-22 NOTE — NURSING
Patient residuals after 4 hours at 30 ml an hour are zero.  Increased feed to goal rate of 45 ml/hr.

## 2019-11-22 NOTE — PLAN OF CARE
11/22/19 1526   Discharge Reassessment   Assessment Type Discharge Planning Reassessment   Anticipated Discharge Disposition residential Nu   Cm received DC orders for pt to go back to [facility] assisted [facility]. The orders were not complete so Dr Mann was called to put in correctly. Dat from [facility] stated that she cannot take pt back without correct DC orders.    1430- Dc order done. Dat called and stated that the House Supervisor will call the RN Amanuel who is  taking care of pt. Waiting for Татьяна to contact Amanuel for report and then Amanuel Rn will call [facility]. Pt is trached with vent and is total bedbound and total care.

## 2019-11-22 NOTE — PLAN OF CARE
Pt has met initial goals of improved TSH, T4 and is more awake and alert which addresses/resolves his reason for admission which was a change in Mental status

## 2019-11-22 NOTE — NURSING
Checked feeding residuals and found there to none.  Increased feeding from 20 to 30 ml/hr, with an end goal of 45 ml/hr.

## 2019-11-22 NOTE — PROGRESS NOTES
VANCOMYCIN PHARMACOKINETIC NOTE:  Vancomycin Day # 1    Objective/Assessment:    Diagnosis/Indication for Vancomycin:  Pneumonia    79 y.o., male; Actual Body Weight = (!) 140.7 kg (310 lb 3 oz).    The patient has the following labs:     11/22/2019 Estimated Creatinine Clearance: 55 mL/min (A) (based on SCr of 1.6 mg/dL (H)). Lab Results   Component Value Date    BUN 36 (H) 11/22/2019       Lab Results   Component Value Date    WBC 8.23 11/22/2019            Plan:  Adjust vancomycin dose and/or frequency based on the patient's actual weight and renal function:  Initiate Vancomycin 2000mg IV every 24 hours.  Orders have been entered into patient's chart.      Vancomycin trough level has been ordered for 11/25 10:30 AM    Pharmacy will manage vancomycin therapy, monitor serum vancomycin levels, monitor renal function and adjust regimen as necessary.    Thank you for allowing us to participate in this patient's care.     Candelaria Waddell 11/22/2019 10:32 AM  Department of Pharmacy  Ext [phone]

## 2019-11-23 NOTE — PLAN OF CARE
11/22/19 2024   Patient Assessment/Suction   Level of Consciousness (AVPU) alert   Respiratory Effort Normal;Unlabored   Expansion/Accessory Muscles/Retractions no use of accessory muscles   All Lung Fields Breath Sounds coarse   Rhythm/Pattern, Respiratory assisted mechanically   Cough Frequency with stimulation   Cough Type assisted   Suction Method tracheal;oral   $ Suction Charges Inline Suction Procedure Stat Charge   Secretions Amount moderate   Secretions Color yellow   $ Swab or suction? Suction   PRE-TX-O2   O2 Device (Oxygen Therapy) ventilator   Oxygen Concentration (%) 32   SpO2 100 %   Pulse Oximetry Type Continuous   $ Pulse Oximetry - Multiple Charge Pulse Oximetry - Multiple   Pulse (!) 59   Resp 18   Aerosol Therapy   $ Aerosol Therapy Charges PRN treatment not required   Vent Select   Conventional Vent Y   Charged w/in last 24h NO   Preset Conventional Ventilator Settings   Vent ID [id number]   Vent Type    Ventilation Type VC   Vent Mode A/C   Humidity HME   Set Rate 18 bmp   Vt Set 600 mL   PEEP/CPAP 5 cmH20   Pressure Support 0 cmH20   Waveform RAMP   Peak Flow 60 L/min   Plateau Set/Insp. Hold (sec) 0   Trigger Sensitivity Flow/I-Trigger 2.8 L/min   Patient Ventilator Parameters   Resp Rate Total 18 br/min   Peak Airway Pressure 34 cmH2O   Mean Airway Pressure 15 cmH20   Plateau Pressure 0 cmH20   Exhaled Vt 629 mL   Total Ve 11.3 mL   I:E Ratio Measured 1:2.00   Conventional Ventilator Alarms   Alarms On Y   Resp Rate High Alarm 40 br/min   Press High Alarm 61 cmH2O   Apnea Rate 18   Apnea Volume (mL) 700 mL   Apnea Oxygen Concentration  100   Apnea Flow Rate (L/min) 60   T Apnea 20 sec(s)   Ready to Wean/Extubation Screen   FIO2<=50 (chart decimal) 0.32   MV<16L (chart vol.) 11.3   PEEP <=8 (chart #) 5   Ready to Wean Parameters   F/VT Ratio<105 (RSBI) (!) 28.62   maintain adequate oxygenation/ventilation

## 2019-11-23 NOTE — NURSING
We received a call from Bryon at [facility] stating that they needed the authorization number from the insurance company for patient transport. I was unable to find documentation of that number.  Bryon stated that without the authorization number, the patient will be billed for the services.

## 2019-11-23 NOTE — DISCHARGE SUMMARY
"[facility] Medicine  Discharge Summary      Patient Name: Masood Messina  MRN: [id number]  Admission Date: 11/21/2019  Hospital Length of Stay: 1 days  Discharge Date and Time:  11/22/2019 6:18 PM  Attending Physician: Brodie Mann MD   Discharging Provider: Brodie Mann MD  Primary Care Provider: Jeramie Lombardi MD      HPI:   79 year old male with PMhx CVA, s/p trach with vent dependence, s/p peg sent from [facility] for "unresponsiveness" since yesterday.  Old records reviewed and and patient discharged 11/14 after treatment for encephalopathy that presented in a similar manner.  Per my chart review, patient was treated for Psuedomonas UTI and bacteremia/shock and was discharged on IV Zosyn which he completed 11/19. He was also discharged home on po vancomycin for recurrent c. Diff and then Fluconazole for Candida Tropicalis. During last admission, initial EEG revealed seizure activity and thus he was discharged on vimpat and keppra.  No reported seizure activity witnessed.  Also per my chart review, patient had started to open his eyes and was following simple commands such as protruding his tongue on command for Dr. Tyson but it was noted that he was not on his baseline which is more interactive.  He will open his eyes to physical stimuli but is not following commands or consistently tracking me. Labs revealing .  TSH earlier in November 9.9.  On synthroid currently.    * No surgery found *      Hospital Course:   Patient was admitted with acute and couple of the.  It was multifactorial.  It could be from the severe hypothyroidism, polypharmacy.  No acute stroke and no signs of infection(blood culture appeared to be contaminated) was appreciated.  IV thyroxine was given and TSH improved and patient is back to the baseline.  His medications which causes altered mental status were decreased and also thyroxine dose was increased to 220 mcg.  Instruction was given to " repeat thyroid function test 2 weeks later and to follow up with   in as outpatient.       Consults:   Consults (From admission, onward)        Status Ordering Provider     Inpatient consult to Endocrinology  Once     Provider:  VICENTE Medina MD    Acknowledged JOSE ARMANDO SCOTT     Inpatient consult to Neurology  Once     Provider:  Cali Roy MD    Completed JOSE ARMANDO SCOTT.     Inpatient consult to Pulmonology  Once     Provider:  Shai Bai MD    Completed JOSE ARMANDO SCOTT.     Inpatient consult to Registered Dietitian/Nutritionist  Once     Provider:  (Not yet assigned)    Completed MAIRA SANCHEZ     Inpatient consult to Registered Dietitian/Nutritionist  Once     Provider:  (Not yet assigned)    Acknowledged MAIRA SANCHEZ     Pharmacy to dose Vancomycin consult  Once     Provider:  (Not yet assigned)    Acknowledged MAIRA SANCHEZ          No new Assessment & Plan notes have been filed under this hospital service since the last note was generated.  Service: Hospital Medicine    Final Active Diagnoses:    Diagnosis Date Noted POA    PRINCIPAL PROBLEM:  Encephalopathy, metabolic [G93.41] 11/21/2019 Yes    Candida UTI [B37.49] 11/21/2019 Yes    C. difficile colitis [A04.72] 10/11/2019 Yes    Essential hypertension [I10] 09/13/2019 Yes    Coronary artery disease [I25.10] 09/12/2019 Yes    Ventilator dependence [Z99.11] 09/12/2019 Not Applicable    Anemia, chronic disease [D63.8] 09/12/2019 Yes    PEG (percutaneous endoscopic gastrostomy) status [Z93.1] 03/21/2017 Not Applicable    Acquired hypothyroidism [E03.9] 03/21/2017 Yes    Functional quadriplegia [R53.2] 12/05/2013 Yes    Chronic respiratory failure with hypoxia [J96.11] 12/05/2013 Yes    Tracheostomy in place [Z93.0] 12/05/2013 Not Applicable      Problems Resolved During this Admission:       Discharged Condition: good    Disposition: Long Term Care    Follow Up:  Follow-up Information     Jeramie Lombardi MD In 2  weeks.    Specialty:  Family Medicine  Why:  repeat TFT next 2 weeks   Contact information:  [address]  [place] [address]  [phone]             Nohelia Medina MD In 4 weeks.    Specialty:  Endocrinology  Contact information:  [address] [facility] [address]  [address]  [place] [address]  [phone]                 Patient Instructions:      Diet Adult Regular     Diet Cardiac     Notify your health care provider if you experience any of the following:  severe uncontrolled pain     Notify your health care provider if you experience any of the following:  severe uncontrolled pain     Notify your health care provider if you experience any of the following:  severe uncontrolled pain     Activity as tolerated     Activity as tolerated     Activity as tolerated       Significant Diagnostic Studies: Labs:   CMP   Recent Labs   Lab 11/21/19 0045 11/22/19 0430   * 129*   K 5.0 4.1   CL 92* 95   CO2 29 26   GLU 86 75   BUN 39* 36*   CREATININE 1.6* 1.6*   CALCIUM 9.0 8.9   PROT 8.0 7.3   ALBUMIN 2.4* 2.3*   BILITOT 0.6 0.7   ALKPHOS 64 68   AST 24 23   ALT 17 16   ANIONGAP 10 8   ESTGFRAFRICA 46.3* 46.3*   EGFRNONAA 40.1* 40.1*    and CBC   Recent Labs   Lab 11/21/19 0045 11/22/19 0430   WBC 9.64 8.23   HGB 8.7* 9.1*   HCT 28.1* 28.1*    318       Pending Diagnostic Studies:     Procedure Component Value Units Date/Time    Vitamin B1 [[id number]] Collected:  11/22/19 0430    Order Status:  Sent Lab Status:  In process Updated:  11/22/19 0442    Specimen:  Blood     Narrative:       Collection has been rescheduled by QUENTIN at 11/22/2019 04:33 Reason:   Linw    Vitamin B6 [[id number]] Collected:  11/22/19 0430    Order Status:  Sent Lab Status:  In process Updated:  11/22/19 0439    Specimen:  Blood     Narrative:       Collection has been rescheduled by QUENTIN at 11/22/2019 04:33 Reason:   Linw         Medications:  Reconciled Home Medications:      Medication List      CHANGE how you take these medications     furosemide 20 MG tablet  Commonly known as:  LASIX  Take 1 tablet (20 mg total) by mouth once daily.  What changed:    · how much to take  · how to take this     gabapentin 300 MG capsule  Commonly known as:  NEURONTIN  1 capsule (300 mg total) by Per G Tube route 2 (two) times daily.  What changed:  when to take this     HYDROcodone-acetaminophen  mg per tablet  Commonly known as:  NORCO  Take 1 tablet by mouth every 12 (twelve) hours as needed for Pain.  What changed:  Another medication with the same name was removed. Continue taking this medication, and follow the directions you see here.     traZODone 100 MG tablet  Commonly known as:  DESYREL  0.5 tablets (50 mg total) by Per G Tube route every evening.  What changed:  how much to take        CONTINUE taking these medications    acetaminophen 160 mg/5 mL Elix  Commonly known as:  TYLENOL  650 mg by Per G Tube route every 4 (four) hours as needed.     baclofen 20 MG tablet  Commonly known as:  LIORESAL  25 mg by PEG Tube route every 6 (six) hours.     balsam peru-castor oil Oint  Apply topically once daily.     Banatrol Plus Pwpk  Generic drug:  banana flakes-t-galactooligos.  Take 1 packet by mouth 2 (two) times daily. Mix with water     BIOTENE DRY MOUTH MM  Swish and spit 15 mLs 4 (four) times daily. AND/OR PRN     busPIRone 5 MG Tab  Commonly known as:  BUSPAR  5 mg by Per G Tube route 2 (two) times daily.     carvedilol 3.125 MG tablet  Commonly known as:  COREG  Take 1 tablet (3.125 mg total) by mouth 2 (two) times daily.     CertaVite-Antioxid (iron gluc) 9 mg iron/ 15 mL (15 mL) Liqd  Generic drug:  multivit-min-ferrous gluconate  15 mLs by Per G Tube route once daily.     chlorhexidine 4 % external liquid  Commonly known as:  HIBICLENS  Apply 1 application topically 3 (three) times a week. On bath days     DULoxetine 30 MG capsule  Commonly known as:  CYMBALTA  30 mg by PEG Tube route once daily.     DuoNeb 2.5 mg-0.5 mg/3 mL nebulizer  solution  Generic drug:  albuterol-ipratropium  Take 3 mLs by nebulization every 6 (six) hours as needed for Wheezing or Shortness of Breath.     enoxaparin 40 mg/0.4 mL Syrg  Commonly known as:  LOVENOX  Inject 40 mg into the skin once daily.     ferrous sulfate 220 mg (44 mg iron)/5 mL solution  220 mg by Per G Tube route once daily.     * finasteride 5 mg tablet  Commonly known as:  PROSCAR  5 mg by PEG Tube route once daily.     * finasteride 5 mg tablet  Commonly known as:  PROSCAR  Take 1 tablet (5 mg total) by mouth once daily.     fluconazole 40 mg/ml 40 mg/mL suspension  Commonly known as:  DIFLUCAN  Take 5 mLs (200 mg total) by mouth once daily. for 14 days     ibuprofen 600 MG tablet  Commonly known as:  ADVIL,MOTRIN  Take 600 mg by mouth every 6 (six) hours as needed for Pain.     ISOSOURCE HN FEEDING TUBE  70 mLs by FEEDING TUBE route once daily. 70 ml per hour x22 hrs daily. Hold 1 hr before and after giving synthroid     lacosamide 200 mg Tab tablet  Commonly known as:  VIMPAT  1 tablet (200 mg total) by Per G Tube route every 12 (twelve) hours.     levETIRAcetam 500 MG Tab  Commonly known as:  KEPPRA  1 tablet (500 mg total) by Per G Tube route 2 (two) times daily.     levothyroxine 200 MCG tablet  Commonly known as:  SYNTHROID  Take 1 tablet (200 mcg total) by mouth once daily.     lidocaine 5 % Oint ointment  Commonly known as:  XYLOCAINE  Apply 1 g topically as needed (with each trach change).     nitroGLYCERIN 0.4 MG SL tablet  Commonly known as:  NITROSTAT  Place 0.4 mg under the tongue every 5 (five) minutes as needed for Chest pain. Seek medical help if pain is not relieved by the third dose.     polyethylene glycol 17 gram Pwpk  Commonly known as:  GLYCOLAX  17 g by Per G Tube route every evening.     potassium chloride 10% 20 mEq/15 mL oral solution  Commonly known as:  KAYCIEL  Take 10 mEq by mouth once daily. Per g tube     Proctozone-HC 2.5 % rectal cream  Generic drug:   hydrocortisone  Place 1 application rectally 2 (two) times daily as needed for Hemorrhoids.     PROSTATE HEALTH ORAL  Take 30 mLs by mouth 2 (two) times daily.     Risamine 0.44-20.6 % Oint  Generic drug:  menthol-zinc oxide  Apply 1 application topically once daily. AFTER EACH DIAPER CHANGE     rivastigmine 9.5 mg/24 hr Pt24  Commonly known as:  EXELON  Place 1 patch onto the skin once daily.     Saccharomyces boulardii 250 mg capsule  Commonly known as:  FLORASTOR  Take 250 mg by mouth 2 (two) times daily.     selenium sulfide 2.5 % Lotn  Apply 1 application topically twice a week. ON Tuesday AND SATURDAY     terazosin 1 MG capsule  Commonly known as:  HYTRIN  1 mg by PEG Tube route once daily.     vancomycin 250mg / 10ml Susp  Take 5 mLs (125 mg total) by mouth every 6 (six) hours for 7 days, THEN 5 mLs (125 mg total) 2 (two) times daily for 7 days, THEN 5 mLs (125 mg total) once daily for 7 days, THEN 5 mLs (125 mg total) Every 3 (three) days.  Start taking on:  October 14, 2019     Vitamin C 500 mg/5 mL Syrp syrup  Generic drug:  ascorbic acid (vitamin C)  500 mg by PEG Tube route 2 (two) times daily.         * This list has 2 medication(s) that are the same as other medications prescribed for you. Read the directions carefully, and ask your doctor or other care provider to review them with you.            STOP taking these medications    cholestyramine-aspartame 4 gram Pwpk  Commonly known as:  QUESTRAN LIGHT            Indwelling Lines/Drains at time of discharge:   Lines/Drains/Airways     Peripherally Inserted Central Catheter Line                 PICC Double Lumen 11/12/19 0920 left cephalic 10 days          Drain                 Gastrostomy/Enterostomy -- days         Gastrostomy/Enterostomy  Gastrostomy tube w/ balloon;Gastrostomy-jejunostomy midline feeding -- days         Gastrostomy/Enterostomy 1335 Percutaneous endoscopic gastrostomy (PEG) LUQ feeding -- days         Fecal Incontinence   10/07/19 1546 46 days         Rectal Tube 11/07/19 1900 fecal management system 14 days         Urethral Catheter 11/21/19 0720 1 day          Airway                 Surgical Airway Portex Cuffed -- days         Surgical Airway Portex Cuffed;Fenestrated -- days          Pressure Ulcer                 Pressure Injury 11/06/19 1430  Buttocks  16 days         Pressure Injury 11/21/19 0900 Right lateral Malleolus Stage 1 1 day         Pressure Injury 11/21/19 0939 Left lateral Foot Suspected Unstageable 1 day              Physical Exam:  General- Patient alert and oriented x3 in NAD  HEENT- PERRLA, EOMI, OP clear, MMM  Neck- No JVD, Lymphadenopathy, Thyromegaly  CV- Regular rate and rhythm, No Murmur/tammy/rubs  Resp- Lungs CTA Bilaterally, No increased WOB  GI- Non tender/non-distended, BS normoactive x4 quads, no HSM  Extrem- No cyanosis, clubbing, edema. Pulses 2+ and symmetric  Neuro- Strength 5/5 flexors/extensors, DTRs 2+ and symmetric, Intact sensation to light touch grossly  Skin-  No masses, rashes or lesions noted on cursory skin exam.  Time spent on the discharge of patient: 34 minutes  Patient was seen and examined on the date of discharge and determined to be suitable for discharge.         Brodie Mann MD  Department of Hospital Medicine  [facility]

## 2019-12-04 PROBLEM — R57.9 SHOCK: Status: ACTIVE | Noted: 2019-01-01

## 2019-12-04 PROBLEM — R00.1 BRADYCARDIA: Status: ACTIVE | Noted: 2019-01-01

## 2019-12-04 NOTE — ANESTHESIA PROCEDURE NOTES
Central Line    Diagnosis: Severe bradycardia and hypotension.  Possible sepsis.  Need for vasopressor support.  Patient location during procedure: ICU  Procedure start time: 12/4/2019 10:40 AM  Timeout: 12/4/2019 10:36 AM  Procedure end time: 12/4/2019 10:56 AM    Staffing  Authorizing Provider: Raudel Guerrero MD  Performing Provider: Raudel Guerrero MD    Staffing  Anesthesiologist: Raudel Guerrero MD  Performed: anesthesiologist   Anesthesiologist was present at the time of the procedure.  Preanesthetic Checklist  Completed: patient identified, site marked, surgical consent, pre-op evaluation, timeout performed, IV checked, risks and benefits discussed, monitors and equipment checked and anesthesia consent given  Indication   Indication: hemodynamic monitoring, vascular access, med administration     Anesthesia   local infiltration    Central Line   Skin Prep: skin prepped with ChloraPrep, skin prep agent completely dried prior to procedure  maximum sterile barriers used during central venous catheter insertion  hand hygiene performed prior to central venous catheter insertion  Location: right, internal jugular.   Catheter type: triple lumen  Catheter Size: 7.5 Fr  Inserted Catheter Length: 20 cm  Ultrasound: vascular probe with ultrasound  Vessel Caliber: medium, patent  Vascular Doppler:  not done, compressibility normal  Needle advanced into vessel with real time Ultrasound guidance.  Guidewire confirmed in vessel.  Manometry: none  Insertion Attempts: 1   Securement:line sutured, chlorhexidine patch, sterile dressing applied and blood return through all ports    Post-Procedure   X-Ray: no pneumothorax on x-ray, tip termination, successful placement and placement verified by x-ray  Tip termination comments: overlying the superior vena cava   Adverse Events:none    Guidewire Guidewire removed intact. Guidewire removed intact, verified with nurse.  Additional Notes  The chest x-ray was obtained after the procedure in the  ICU. The central line is well placed with its tip overlying the superior vena cava and no pneumothorax.

## 2019-12-04 NOTE — CONSULTS
[facility]  Pulmonology   Consult Note    Inpatient consult to Pulmonology  Consult performed by: Mauricio Kinsey MD  Consult ordered by: Yamileth Stuart MD  Reason for consult: vent dependence  Assessment/Recommendations: Recurrent cystitis with septic shock.  -  Continue antibiotics and supportive care.  -  LPV in the meantime.         Subjective     Chief Complaint   Patient presents with    Bradycardia     HR 30 BP 78/35      History of Present Illness:  Mr. Masood Messina is a 79 year old gentleman well known to [facility] with a history of a remote CVA, functional quadraplegia, OHS and chronic ventilator dependence, who has been admitted to [facility] every week for the past month with septic shock.  He was transferred from the [facility] where he resides this afternoon after he was noted to be hypotensive and unresponsive.  He is unable to provide any history.     Past Medical History:   Diagnosis Date    AAA (abdominal aortic aneurysm)     Anemia     BPH (benign prostatic hyperplasia)     CHF (congestive heart failure)     COPD (chronic obstructive pulmonary disease)     Coronary artery disease     Dementia     Dementia     Depression     GERD (gastroesophageal reflux disease)     Hyperlipidemia     Hypertension     Hypothyroid     Renal disorder     Respiratory failure, chronic     Ventilator dependence      Past Surgical History:   Procedure Laterality Date    ABDOMINAL SURGERY      CARDIAC SURGERY  1999    GASTROSTOMY TUBE PLACEMENT      SPINE SURGERY      TRACHEOSTOMY TUBE PLACEMENT       Family History:  History reviewed. No pertinent family history.      Social History     Tobacco Use    Smoking status: Former Smoker    Smokeless tobacco: Never Used   Substance and Sexual Activity    Alcohol use: No    Drug use: No    Sexual activity: Never      Allergies:  Codeine     Facility-Administered Medications as of 12/4/2019   Medication Route Frequency    [COMPLETED]  albuterol-ipratropium 2.5 mg-0.5 mg/3 mL nebulizer solution 3 mL Nebulization ED 1 Time    albuterol-ipratropium 2.5 mg-0.5 mg/3 mL nebulizer solution 3 mL Nebulization Q6H PRN    [COMPLETED] atropine injection 0.3 mg Intravenous ED 1 Time    [COMPLETED] atropine injection 1 mg Intravenous Once    busPIRone tablet 5 mg Per G Tube BID    DOPamine 400 mg in dextrose 5 % 250 mL infusion (premix) Intravenous Continuous    DULoxetine DR capsule 30 mg Per G Tube Daily    [COMPLETED] EPINEPHrine 0.1 mg/mL injection 0.5 mg Intravenous Once    [COMPLETED] EPINEPHrine 0.1 mg/mL injection 1 mg Intravenous Once    ferrous sulfate tablet 325 mg Per G Tube Daily    finasteride tablet 5 mg Per G Tube Daily    fluconazole 40 mg/mL oral suspension 200 mg Per G Tube Daily    lacosamide tablet 200 mg Per G Tube Daily    Lactobacillus acidoph-L.bulgar 1 million cell tablet 1 tablet Per G Tube TID WM    levETIRAcetam tablet 500 mg Per G Tube BID    levothyroxine injection 100 mcg Intravenous Daily    [COMPLETED] methylPREDNISolone sodium succinate injection 125 mg Intravenous ED 1 Time    [COMPLETED] metronidazole IVPB 500 mg Intravenous ED 1 Time    norepinephrine 4 mg in dextrose 5 % 250 mL infusion Intravenous Continuous    [COMPLETED] piperacillin-tazobactam 4.5 g in dextrose 5 % 100 mL IVPB (ready to mix system) Intravenous ED 1 Time    piperacillin-tazobactam 4.5 g in dextrose 5 % 100 mL IVPB (ready to mix system) Intravenous Q8H    [COMPLETED] sodium chloride 0.9% bolus 4,218 mL Intravenous ED 1 Time    [COMPLETED] sodium chloride 0.9% bolus 4,218 mL Intravenous ED 1 Time    vancomycin (VANCOCIN) 2,000 mg in dextrose 5 % 500 mL IVPB Intravenous Q12H    vancomycin 250mg / 10ml oral suspension 125 mg Oral Q6H    [COMPLETED] vancomycin in dextrose 5 % 1 gram/250 mL IVPB 1,000 mg Intravenous ED 1 Time    And    [COMPLETED] vancomycin in dextrose 5 % 1 gram/250 mL IVPB 1,000 mg Intravenous ED 1 Time      Outpatient Medications as of 12/4/2019   Medication    ascorbic acid, vitamin C, (VITAMIN C) 500 mg/5 mL Syrp syrup    baclofen (LIORESAL) 10 MG tablet    banana flakes-t-galactooligos. (BANATROL PLUS) PwPk    busPIRone (BUSPAR) 5 MG Tab    carvedilol (COREG) 3.125 MG tablet    duloxetine (CYMBALTA) 30 MG capsule    enoxaparin (LOVENOX) 40 mg/0.4 mL Syrg    ferrous sulfate 220 mg (44 mg iron)/5 mL solution    finasteride (PROSCAR) 5 mg tablet    fluconazole 40 mg/ml (DIFLUCAN) 40 mg/mL suspension    furosemide (LASIX) 20 MG tablet    gabapentin (NEURONTIN) 300 MG capsule    levETIRAcetam (KEPPRA) 500 MG Tab    levothyroxine (SYNTHROID) 200 MCG tablet    multivit-min-ferrous gluconate (CERTAVITE-ANTIOXID, IRON GLUC,) 9 mg iron/ 15 mL (15 mL) Liqd    polyethylene glycol (GLYCOLAX) 17 gram PwPk    potassium chloride 10% (KAYCIEL) 20 mEq/15 mL oral solution    rivastigmine (EXELON) 9.5 mg/24 hr PT24    Saccharomyces boulardii (FLORASTOR) 250 mg capsule    selenium sulfide 2.5 % Lotn    terazosin (HYTRIN) 1 MG capsule    traZODone (DESYREL) 100 MG tablet    acetaminophen (TYLENOL) 160 mg/5 mL Elix    albuterol-ipratropium 2.5mg-0.5mg/3mL (DUONEB) 0.5 mg-3 mg(2.5 mg base)/3 mL nebulizer solution    balsam peru-castor oil Oint    HYDROcodone-acetaminophen (NORCO)  mg per tablet    hydrocortisone (PROCTOZONE-HC) 2.5 % rectal cream    ibuprofen (ADVIL,MOTRIN) 600 MG tablet    Lactoperoxi/Gluc Oxid/Pot Thio (BIOTENE DRY MOUTH MM)    lactose-reduced food (ISOSOURCE HN FEEDING TUBE)    lidocaine (XYLOCAINE) 5 % Oint ointment    nitroGLYCERIN (NITROSTAT) 0.4 MG SL tablet    vancomycin 250mg / 10ml Susp      Review of Systems   Unable to perform ROS: Mental status change      I have personally reviewed the following: active problem list, medication list, allergies, family history, social history, health maintenance, notes from last encounter, lab results, imaging and nursing/provider  "documentation .    Objective     VS: Temp:  [96.7 °F (35.9 °C)-98.6 °F (37 °C)]   Pulse:  []   Resp:  [13-22]   BP: ()/()   SpO2:  [81 %-100 %]   Arterial Line BP: ()/(31-90)    Estimated body mass index is 36.6 kg/m² as calculated from the following:    Height as of this encounter: 6' 2" (1.88 m).    Weight as of this encounter: 129.3 kg (285 lb 1.6 oz).   Ideal body weight: 82.2 kg (181 lb 3.5 oz)  Adjusted ideal body weight: 101 kg (222 lb 12.3 oz)   I/O:   Intake/Output Summary (Last 24 hours) at 12/4/2019 1807  Last data filed at 12/4/2019 1651  Gross per 24 hour   Intake --   Output 1250 ml   Net -1250 ml        Vent: SpO2 98% on 40% FiO2  Vent Mode: A/C  Oxygen Concentration (%):  [] 40  Resp Rate Total:  [14 br/min-18 br/min] 18 br/min  Vt Set:  [550 mL] 550 mL  PEEP/CPAP:  [5 cmH20] 5 cmH20  Pressure Support:  [0 cmH20] 0 cmH20  Mean Airway Pressure:  [11 dcG84-27 cmH20] 12 cmH20     PE Physical Exam   Constitutional: He appears well-developed and well-nourished. He is obese.   HENT:   Head: Normocephalic.   Right Ear: External ear normal.   Left Ear: External ear normal.   Nose: Nose normal.   Mouth/Throat: Oropharynx is clear and moist. Mallampati Score: III.   Neck: Normal range of motion. Neck supple. No JVD present. No tracheal deviation present. No thyromegaly present.   8.0 cuffed tracheostomy secure in pain.   Cardiovascular: Normal rate, regular rhythm, normal heart sounds and intact distal pulses. Exam reveals no gallop and no friction rub.   No murmur heard.  Pulmonary/Chest: Normal expansion and symmetric chest wall expansion. He has no wheezes. He has rhonchi. He has no rales.   Abdominal: Soft. Bowel sounds are normal. He exhibits no distension and no mass. There is no rebound.   Musculoskeletal: Normal range of motion. He exhibits no edema, tenderness or deformity.   Lymphadenopathy:     He has no cervical adenopathy.   Neurological: He is unresponsive. He " displays no atrophy and no tremor. No cranial nerve deficit. He displays no seizure activity. GCS eye subscore is 1. GCS verbal subscore is 1. GCS motor subscore is 1.   Skin: Skin is warm and dry. No rash noted. No cyanosis or erythema. Nails show no clubbing.   Nursing note and vitals reviewed.       Recent Diagnostic Studies:  Labs I have personally reviewed and interpreted all recent labs / diagnostic studies, and noted the following relevant results:  Lactate:  2.5  BNP:  150  Trop:  0.032  TSH:  186  U/A:  2+ occult blood; 3+ LE; >100 WBC/hpf  AB.328 / 65.8 / 65 / 34.5    Recent Labs   Lab 19  1131  19  1358   WBC 14.75*  --   --    RBC 3.55*  --   --    HGB 10.5*  --   --    HCT 33.7*  --   --      --   --    MCV 95  --   --    MCH 29.6  --   --    MCHC 31.2*  --   --    *  --   --    K 5.3*  --   --    CL 87*  --   --    CO2 34*  --   --    BUN 53*  --   --    CREATININE 2.2*  --   --    MG 2.4  --   --    ALT 17  --   --    AST 30  --   --    ALKPHOS 78  --   --    BILITOT 0.7  --   --    PROT 8.7*  --   --    ALBUMIN 3.0*  --   --    PH  --    < > 7.328*   PCO2  --    < > 65.8*   PO2  --    < > 65*   HCO3  --    < > 34.5*   POCSATURATED  --    < > 90*   BE  --    < > 9   INR 1.2  --   --    TROPONINI 0.032  --   --     < > = values in this interval not displayed.      Imaging I have personally reviewed and interpreted all available images and reviewed the associated Radiology reports.  My personal impression of the relevant studies is as follows:  CT Brain:   1.  No acute intracranial process.  2.  Chronic and involutional findings as noted above.  3.  Unchanged mucoperiosteal disease in the paranasal sinuses as above.    CXR:  Unchanged radiograph of the chest when accounting for differences in imaging technique.     Micro I have personally reviewed and interpreted the available culture data.  Relevant results are as follows.  Blood Culture   Lab Results   Component Value  Date    LABBLOO No growth after 5 days. 11/21/2019   , Sputum Culture   Lab Results   Component Value Date    GSRESP <10 epithelial cells per low power field. 11/21/2019    GSRESP Many WBC's 11/21/2019    GSRESP Rare Gram negative rods 11/21/2019    RESPIRATORYC No normal respiratory gabriela 11/21/2019    RESPIRATORYC PSEUDOMONAS AERUGINOSA  Many   (A) 11/21/2019    RESPIRATORYC SERRATIA MARCESCENS  Many   (A) 11/21/2019    and Urine Culture    Lab Results   Component Value Date    LABURIN No growth 11/21/2019        Assessment       Active Hospital Problems    Diagnosis    *Shock    Functional quadriplegia    Essential hypertension    Coronary artery disease    Ventilator dependence    Chronic respiratory failure with hypoxia    Tracheostomy in place    PEG (percutaneous endoscopic gastrostomy) status    Sacral decubitus ulcer, stage II    Bradycardia    Encephalopathy, metabolic    Candida UTI    C. difficile colitis    Hemiparesis affecting dominant side as late effect of stroke    Acquired hypothyroidism      My Impression:  Recurrent septic shock.  Nearing the end of his life.    Plan     Neuro   Encephalopathy secondary to metabolic derangements   No evidence of occult unaddressed pathology..   No additional investigation needed.  Recommend observation for now..   Continue to treat underlying pathology.   Continued supportive care for now with close observation and frequent neurologic assessment.   Avoid sedating/derliogenic medications..    ENT  · Local trach care.    Pulmonary  · Continue LPV.  · No evidence of acute pathology.  · Chronic radiographic abnormalities noted.    Cardiac/Vascular  · Titrate vasopressors to maintain a MAP > 65 mmHg.    Renal/  · ABx for UTI.    ID  · Continue Abx for sepsis.    Hematology  · No indication for blood products.  · Monitor for now.    Endocrine  · IV synthroid.    GI  · Restart TFs.    Palliative Care  · His advanced directive was established as  DNR during previous hospitalizations.  · This needs to be clarified.  · I attempted to contract his next of kin, but there was no answer at the number provided.  · Clearly, he is at the end of his life, and our interventions are not providing any meaningful benefit.  · A pacemaker would be futile.       Thank you for your consult. I will follow-up with patient. Please contact us if you have any additional questions.    Mauricio Kinsey MD  Pulmonary / Critical Care Medicine  [facility]            Critical Care Time: Approximately > 35 minutes    The patient is critically ill due to the following conditions that actively pose threat to life and bodily function:  Shock     The patient is at high risk of death due to shock and requiring ongoing treatment including invasive mechanical ventilation, titration of vasoactive medications to prevent further life-threatening deterioration.  Due to a high probability of clinically significant, life threatening deterioration, the patient required my highest level of preparedness to intervene emergently and I personally spent this critical care time directly and personally managing the patient.     Critical care was time spent personally by me on the following activities:    Obtaining a history   Examination of patient.   Reviewing pulse oximetry.   Providing medical care at the patients bedside.   Developing a treatment plan with patient or surrogate and bedside caregivers   Ordering and reviewing laboratory studies, radiographic studies, pulse oximetry.   Ordering and performing treatments and interventions.   Evaluation of patient's response to treatment.   Discussions with consultants while on the unit and immediately available to the patient.   Re-evaluation of the patient's condition.   Documentation in the medical record.    This critical care time did not overlap with that of any other provider or involve time for any procedures.     Mauricio Kinsey  MD  Pulmonary / Critical Care Medicine  Formerly [facility], [facility]  12/04/2019  6:09 PM

## 2019-12-04 NOTE — CONSULTS
Consult Note  Infectious Disease    Reason for Consult:  shock    HPI: Masood Messina is an  79 y.o. male with whom I am familiar from prior consultations who has been vent dependent for several years, is colonized in sputum and urine with a variety of gram negative rods and has recently been treated for pseudomonas urosepsis on 2 occasions, initially associated with hematuria/clots in bladder and with bacteremia, followed by the development of C. Difficile colitis. He was discharged about 3 weeks ago on a few days of zosyn and a taper of oral vanc. During that hospitalization he had worse than usual encephalopathy and seizures.   He was sent to the ED today for severe bradycardia and hypotension. His CXR looks like pulmonary edema, he had a WBC of 14k and required pressors, removal of indwelling picc, placement of TLC and admission to ICU. He is unresponsive, pulse is now up to 100. UA has pyuria and last few urine cultures have had Candida. His tay on admission was replaced immediately draining about a liter of urine. He was placed on merrem, IV and oral vanc.     Review of patient's allergies indicates:   Allergen Reactions    Codeine Other (See Comments)     Past Medical History:   Diagnosis Date    AAA (abdominal aortic aneurysm)     Anemia     BPH (benign prostatic hyperplasia)     CHF (congestive heart failure)     COPD (chronic obstructive pulmonary disease)     Coronary artery disease     Dementia     Dementia     Depression     GERD (gastroesophageal reflux disease)     Hyperlipidemia     Hypertension     Hypothyroid     Renal disorder     Respiratory failure, chronic     Ventilator dependence      Past Surgical History:   Procedure Laterality Date    ABDOMINAL SURGERY      CARDIAC SURGERY  1999    GASTROSTOMY TUBE PLACEMENT      SPINE SURGERY      TRACHEOSTOMY TUBE PLACEMENT       Social History     Socioeconomic History    Marital status:      Spouse name: Not on  file    Number of children: Not on file    Years of education: Not on file    Highest education level: Not on file   Occupational History    Not on file   Social Needs    Financial resource strain: Not on file    Food insecurity:     Worry: Not on file     Inability: Not on file    Transportation needs:     Medical: Not on file     Non-medical: Not on file   Tobacco Use    Smoking status: Former Smoker    Smokeless tobacco: Never Used   Substance and Sexual Activity    Alcohol use: No    Drug use: No    Sexual activity: Never   Lifestyle    Physical activity:     Days per week: Not on file     Minutes per session: Not on file    Stress: Not on file   Relationships    Social connections:     Talks on phone: Not on file     Gets together: Not on file     Attends Denominational service: Not on file     Active member of club or organization: Not on file     Attends meetings of clubs or organizations: Not on file     Relationship status: Not on file   Other Topics Concern    Not on file   Social History Narrative    Not on file     History reviewed. No pertinent family history.    Pertinent medications noted:     Review of Systems: unobtainable.    EXAM & DIAGNOSTICS REVIEWED:   Vitals:     Temp:  [96.7 °F (35.9 °C)-98.6 °F (37 °C)]   Temp: 96.7 °F (35.9 °C) (12/04/19 1550)  Pulse: (!) 112 (12/04/19 1715)  Resp: 18 (12/04/19 1715)  BP: (!) 155/80 (12/04/19 1550)  SpO2: 98 % (12/04/19 1715)    Intake/Output Summary (Last 24 hours) at 12/4/2019 1737  Last data filed at 12/4/2019 1651  Gross per 24 hour   Intake --   Output 1250 ml   Net -1250 ml       General:  Unresponsive.  Eyes:  Anicteric, PERRL,    ENT:  Very limited oral exam  Neck:  supple,tracheostomy  Lungs: Coarse with wheezing  Heart:  RRR, no gallop/murmur/rub noted  Abd:  Soft, NT, ND, normal BS, no masses or organomegaly appreciated. PEG  :   Rahman, urine clear, no flank tenderness  Musc:  Joints without effusion, swelling, erythema, synovitis,  quadriplegic   Skin:  No rashes. No palmar or plantar lesions. No subungual petechiae  Wound: Photos reviewed  Neuro: Unresponsive. quadriplegic  Psych:        Extrem: Chronic woody edema, no erythema, phlebitis, cellulitis,    VAD:  Left IJ TLC     Isolation:  contact    Lines/Tubes/Drains:    General Labs reviewed:  Recent Labs   Lab 12/04/19  1108 12/04/19  1131   WBC  --  14.75*   HGB  --  10.5*   HCT 34* 33.7*   PLT  --  152       Recent Labs   Lab 12/04/19  1131   *   K 5.3*   CL 87*   CO2 34*   BUN 53*   CREATININE 2.2*   CALCIUM 9.8   PROT 8.7*   BILITOT 0.7   ALKPHOS 78   ALT 17   AST 30           Micro:  Microbiology Results (last 7 days)     Procedure Component Value Units Date/Time    Clostridium difficile EIA [[id number]] Collected:  12/04/19 1225    Order Status:  Completed Specimen:  Stool Updated:  12/04/19 1626     C. diff Antigen Negative     C difficile Toxins A+B, EIA Negative     Comment: Testing not recommended for children <24 months old.       Blood culture [[id number]]     Order Status:  Sent Specimen:  Blood     Urine culture [[id number]]     Order Status:  No result Specimen:  Urine, Catheterized     Blood culture x two cultures. Draw prior to antibiotics. [[id number]] Collected:  12/04/19 1217    Order Status:  Sent Specimen:  Blood from Line, Jugular, External Left Updated:  12/04/19 1245    Stool culture [[id number]] Collected:  12/04/19 1225    Order Status:  Sent Specimen:  Stool Updated:  12/04/19 1232    Blood culture x two cultures. Draw prior to antibiotics. [[id number]] Collected:  12/04/19 1130    Order Status:  Sent Specimen:  Blood from Line, Jugular, External Left Updated:  12/04/19 1136    Culture, Respiratory with Gram Stain [[id number]] Collected:  12/04/19 1024    Order Status:  Sent Specimen:  Respiratory from Sputum Updated:  12/04/19 1113        Imaging Reviewed:   CXR   CT head  Cardiology:    IMPRESSION & PLAN   1. Bradycardia, resolved  2. Hypotension,  multifactorial  3. Chronic vent dependence, quadriplegic  4. Extensive antibiotic exposure for recurrent GNR infections      Recommendations:  Continue oral vancomycin  No need for more flagyl  Continue diflucan  Will continue zosyn for another 24 hours  Consider withdraw to comfort care  I agree that he is inappropriate for a pacemaker

## 2019-12-04 NOTE — ED PROVIDER NOTES
Encounter Date: 12/4/2019       History     Chief Complaint   Patient presents with    Bradycardia     HR 30 BP 78/35     79-year-old male presented emergency department brought in by EMS.  Patient is bed-bound and in a functional quadriplegic state in the nursing home.  Patient was recently treated for urinary tract infection.  Patient has sacral decubitus wound.  Patient has a PEG tube and is dependent on a ventilator with tracheostomy.  Sent here as patient was hypotensive and bradycardic and unresponsive.  Patient nonverbal and unresponsive upon presentation.  Patient has a rectal tube which is used as he has C diff and currently is being treated.  Patient also has a indwelling urinary catheter.  Patient had a previous stroke with left-sided weakness however since the last hospitalization was not moving any of his extremities. Patient on Lovenox.  Patient bradycardic and hypotensive        Review of patient's allergies indicates:   Allergen Reactions    Codeine Other (See Comments)     Past Medical History:   Diagnosis Date    AAA (abdominal aortic aneurysm)     Anemia     BPH (benign prostatic hyperplasia)     CHF (congestive heart failure)     COPD (chronic obstructive pulmonary disease)     Coronary artery disease     Dementia     Dementia     Depression     GERD (gastroesophageal reflux disease)     Hyperlipidemia     Hypertension     Hypothyroid     Renal disorder     Respiratory failure, chronic     Ventilator dependence      Past Surgical History:   Procedure Laterality Date    ABDOMINAL SURGERY      CARDIAC SURGERY  1999    GASTROSTOMY TUBE PLACEMENT      SPINE SURGERY      TRACHEOSTOMY TUBE PLACEMENT       No family history on file.  Social History     Tobacco Use    Smoking status: Former Smoker    Smokeless tobacco: Never Used   Substance Use Topics    Alcohol use: No    Drug use: No     Review of Systems   Unable to perform ROS: Patient unresponsive       Physical Exam      Initial Vitals [12/04/19 1010]   BP Pulse Resp Temp SpO2   (!) 86/51 84 16 98.6 °F (37 °C) 98 %      MAP       --         Physical Exam    Nursing note and vitals reviewed.  Constitutional: He appears well-developed and well-nourished.   Unresponsive   HENT:   Head: Normocephalic.   Eyes: Conjunctivae are normal. Right eye exhibits no discharge. Left eye exhibits no discharge.   Neck:   Tracheostomy in place in the neck without any signs of local infection.   Cardiovascular: Normal heart sounds. Exam reveals no friction rub.    No murmur heard.  Bradycardic with regular rhythm and hypotensive and once heart rate improved with medication has palpable pulse is but otherwise very weak pulses when patient becomes bradycardic   Pulmonary/Chest: Breath sounds normal. He has no wheezes. He exhibits no tenderness.   Abdominal: Soft. There is no tenderness.   Abdomen is soft with gastric tube in place   Musculoskeletal:   Patient not moving any extremities and has a small area of swelling on left arm where he had recent PICC line   Neurological:   Unresponsive and not moving any extremities.  Nonverbal.  Patient has tracheostomy in place.  Patient opens the eyes and looks at you when the blood pressure and the heart rate or high and stabilized and when the blood pressure is low his does not respond.   Skin: Skin is dry. Capillary refill takes less than 2 seconds. No erythema.   Stage II sacral decubitus ulcer.  Patient has a rectal tube in place which is draining         ED Course   Critical Care  Date/Time: 12/4/2019 12:26 PM  Performed by: Kin Edmonds MD  Authorized by: Kin Edmonds MD   Direct patient critical care time: 50 minutes  Documentation critical care time: 15 minutes  Consulting other physicians critical care time: 10 minutes  Total critical care time (exclusive of procedural time) : 75 minutes        Labs Reviewed   CBC W/ AUTO DIFFERENTIAL - Abnormal; Notable for the following components:       Result Value     WBC 14.75 (*)     RBC 3.55 (*)     Hemoglobin 10.5 (*)     Hematocrit 33.7 (*)     Mean Corpuscular Hemoglobin Conc 31.2 (*)     RDW 18.4 (*)     Gran # (ANC) 8.3 (*)     Immature Grans (Abs) 0.07 (*)     Mono # 1.1 (*)     Eos # 0.8 (*)     All other components within normal limits   ISTAT PROCEDURE - Abnormal; Notable for the following components:    POC BUN 49 (*)     POC Creatinine 2.3 (*)     POC Sodium 131 (*)     POC Potassium 5.2 (*)     POC Chloride 90 (*)     POC TCO2 (MEASURED) 36 (*)     POC Hematocrit 34 (*)     All other components within normal limits   CULTURE, BLOOD   CULTURE, BLOOD   CULTURE, RESPIRATORY   CULTURE, STOOL   CLOSTRIDIUM DIFFICILE   PROTIME-INR   COMPREHENSIVE METABOLIC PANEL   LACTIC ACID, PLASMA   URINALYSIS, REFLEX TO URINE CULTURE   TROPONIN I   B-TYPE NATRIURETIC PEPTIDE   MAGNESIUM   WBC, STOOL   URINALYSIS   POCT LACTATE   ISTAT CHEM8     EKG Readings: (Independently Interpreted)   Initial Reading: No STEMI. Rhythm: Sinus Bradycardia. Ectopy: No Ectopy. Conduction: RBBB.     ECG Results          EKG 12-lead (In process)  Result time 12/04/19 10:51:37    In process by Interface, Lab In [facility] (12/04/19 10:51:37)                 Narrative:    Test Reason : A41.9,    Vent. Rate : 045 BPM     Atrial Rate : 043 BPM     P-R Int : 000 ms          QRS Dur : 120 ms      QT Int : 438 ms       P-R-T Axes : 000 029 131 degrees     QTc Int : 378 ms    Wide QRS rhythm  Nonspecific intraventricular conduction delay  ST and T wave abnormality, consider lateral ischemia  Abnormal ECG  When compared with ECG of 21-NOV-2019 00:17,  Previous ECG has undetermined rhythm, needs review    Referred By: [email or web address]   SELF           Confirmed By:                             Imaging Results          X-Ray Chest AP Portable (Final result)  Result time 12/04/19 11:13:59    Final result by Markos Renteria MD (12/04/19 11:13:59)                 Impression:      Unchanged radiograph of the chest when  accounting for differences in imaging technique.      Electronically signed by: Markos Renteria MD  Date:    12/04/2019  Time:    11:13             Narrative:    CLINICAL HISTORY:  ([id number] y/o  (1940) M    CHF;    TECHNIQUE:  (A#[id number], exam time 12/4/2019 11:11)    XR CHEST AP PORTABLE [id number]    COMPARISON:  Radiograph most recently from 11/21/2019    FINDINGS:  Perihilar pulmonary vascular prominence with increased central hilar interstitial lung markings bilaterally no elevation of the right hemidiaphragm similar to the previous exam.  No pneumothorax is seen.  The heart is mildly enlarged with prominence of the perihilar pulmonary vascular pedicle.  There is a tracheostomy tube, unchanged with tip at the level of the thoracic inlet.  Median sternotomy wires are seen.  Osseous structures are unchanged surgical changes in the lower cervical spine.                              X-Rays:   Independently Interpreted Readings:   Other Readings:  Chest x-ray with right lower lobe infiltrate    Medical Decision Making:   Differential Diagnosis:   79-year-old male presented emergency department with decreased level of consciousness and hypotension and bradycardia.  Patient has evidence of acute kidney injury. Patient unresponsive and responded to eye no trophic treatment with atropine and epinephrine and Levophed.  Patient's blood pressure and heart rate improved with treatment.  Patient still remained to be unresponsive.  Broad-spectrum antibiotics started.  IV fluids given and steroids given.  Anesthesia consulted for central line placement and arterial line placement.  Once patient's hemodynamics stability improved Hospital Medicine consulted for evaluation for admission and further management.  Patient treated for C diff colitis and also for pulmonary and urinary tract infection.  Patient severely ill and presentation suggests poor prognosis.  Case discussed with cardiologist as well and given the  presentation and response to eye no trophic agents will continue treating with inotropes.  Clinical Tests:   Lab Tests: Reviewed  Radiological Study: Reviewed  Medical Tests: Reviewed                                 Clinical Impression:       ICD-10-CM ICD-9-CM   1. Bradycardia R00.1 427.89   2. Sepsis A41.9 038.9     995.91   3. Shortness of breath R06.02 786.05   4. Septic shock A41.9 038.9    R65.21 785.52     995.92   5. Pneumonia due to infectious organism, unspecified laterality, unspecified part of lung J18.9 136.9     484.8   6. GINETTE (acute kidney injury) N17.9 584.9   7. Altered level of consciousness R40.4 780.09                             Kin Edmonds MD  12/04/19 2432

## 2019-12-04 NOTE — CONSULTS
[facility]  Cardiology  Consult Note    Patient Name: Masood Messina  MRN: [id number]  Admission Date: 12/4/2019  Hospital Length of Stay: 0 days  Code Status: Prior   Attending Provider: Yamileth Stuart MD   Consulting Provider: Mary Carmen Connors MD  Primary Care Physician: Jeramie Lombardi MD  Principal Problem:Shock    Patient information was obtained from past medical records and ER records.     Consults  Subjective:     Chief Complaint:  Hypotension and bradycardia     HPI:  This 79-year-old patient was brought from [address] with history of low heart rate and low blood pressure.  Initially the heart rate was 45 beats per minute.  Intravenous atropine and epinephrine increase the heart rate but it would come down after several minutes.  Since being placed on Levophed drip his heart rate has stayed around  beats per minute with fair blood pressure.  He has had multiple ICU admissions in the past 4 months.  Reportedly functional quadriplegic.  Past medical history of CABG, CVA, C diff infection.    Past Medical History:   Diagnosis Date    AAA (abdominal aortic aneurysm)     Anemia     BPH (benign prostatic hyperplasia)     CHF (congestive heart failure)     COPD (chronic obstructive pulmonary disease)     Coronary artery disease     Dementia     Dementia     Depression     GERD (gastroesophageal reflux disease)     Hyperlipidemia     Hypertension     Hypothyroid     Renal disorder     Respiratory failure, chronic     Ventilator dependence        Past Surgical History:   Procedure Laterality Date    ABDOMINAL SURGERY      CARDIAC SURGERY  1999    GASTROSTOMY TUBE PLACEMENT      SPINE SURGERY      TRACHEOSTOMY TUBE PLACEMENT         Review of patient's allergies indicates:   Allergen Reactions    Codeine Other (See Comments)       No current facility-administered medications on file prior to encounter.      Current Outpatient Medications on File Prior  to Encounter   Medication Sig    ascorbic acid, vitamin C, (VITAMIN C) 500 mg/5 mL Syrp syrup 500 mg by PEG Tube route 2 (two) times daily.     baclofen (LIORESAL) 10 MG tablet 25 mg by PEG Tube route every 6 (six) hours.     banana flakes-t-galactooligos. (BANATROL PLUS) PwPk Take 1 packet by mouth 2 (two) times daily. Mix with water    busPIRone (BUSPAR) 5 MG Tab 5 mg by Per G Tube route 2 (two) times daily.    carvedilol (COREG) 3.125 MG tablet Take 1 tablet (3.125 mg total) by mouth 2 (two) times daily. (Patient taking differently: 3.125 mg by Per G Tube route 2 (two) times daily. )    duloxetine (CYMBALTA) 30 MG capsule 30 mg by PEG Tube route once daily.     enoxaparin (LOVENOX) 40 mg/0.4 mL Syrg Inject 40 mg into the skin once daily.    ferrous sulfate 220 mg (44 mg iron)/5 mL solution 220 mg by Per G Tube route every evening.     finasteride (PROSCAR) 5 mg tablet 5 mg by PEG Tube route once daily.     fluconazole 40 mg/ml (DIFLUCAN) 40 mg/mL suspension 200 mg by Per G Tube route once daily.    furosemide (LASIX) 20 MG tablet Take 1 tablet (20 mg total) by mouth once daily. (Patient taking differently: 20 mg by Per G Tube route once daily. )    gabapentin (NEURONTIN) 300 MG capsule 1 capsule (300 mg total) by Per G Tube route 2 (two) times daily.    lacosamide (VIMPAT) 10 mg/mL Soln oral solution 200 mg by Per G Tube route once daily.    levETIRAcetam (KEPPRA) 500 MG Tab 1 tablet (500 mg total) by Per G Tube route 2 (two) times daily.    levothyroxine (SYNTHROID) 200 MCG tablet Take 1 tablet (200 mcg total) by mouth once daily. (Patient taking differently: 200 mcg by Per G Tube route once daily. )    menthol-zinc oxide (CALMOSEPTINE) 0.44-20.6 % Oint Apply 1 application topically once daily.    multivit-min-ferrous gluconate (CERTAVITE-ANTIOXID, IRON GLUC,) 9 mg iron/ 15 mL (15 mL) Liqd 15 mLs by Per G Tube route every evening.     polyethylene glycol (GLYCOLAX) 17 gram PwPk 17 g by Per G  Tube route every evening. Hold for loose stool    potassium chloride 10% (KAYCIEL) 20 mEq/15 mL oral solution Take 10 mEq by mouth once daily. Per g tube     rivastigmine (EXELON) 9.5 mg/24 hr PT24 Place 1 patch onto the skin once daily.    Saccharomyces boulardii (FLORASTOR) 250 mg capsule 250 mg by Per G Tube route 2 (two) times daily.     selenium sulfide 2.5 % Lotn Apply 1 application topically twice a week. ON Tuesday AND SATURDAY    terazosin (HYTRIN) 1 MG capsule 1 mg by PEG Tube route once daily.    traZODone (DESYREL) 100 MG tablet 0.5 tablets (50 mg total) by Per G Tube route every evening.    acetaminophen (TYLENOL) 160 mg/5 mL Elix 650 mg by Per G Tube route every 4 (four) hours as needed.    albuterol-ipratropium 2.5mg-0.5mg/3mL (DUONEB) 0.5 mg-3 mg(2.5 mg base)/3 mL nebulizer solution Take 3 mLs by nebulization every 6 (six) hours as needed for Wheezing or Shortness of Breath.     balsam peru-castor oil Oint Apply topically once daily. (Patient taking differently: Apply 1 application topically once daily. )    HYDROcodone-acetaminophen (NORCO)  mg per tablet 1 tablet by Per G Tube route every 12 (twelve) hours as needed for Pain.     hydrocortisone (PROCTOZONE-HC) 2.5 % rectal cream Place 1 application rectally 2 (two) times daily as needed for Hemorrhoids.    ibuprofen (ADVIL,MOTRIN) 600 MG tablet Take 600 mg by mouth every 6 (six) hours as needed for Pain.    Lactoperoxi/Gluc Oxid/Pot Thio (BIOTENE DRY MOUTH MM) Swish and spit 15 mLs 4 (four) times daily. AND/OR PRN    lactose-reduced food (ISOSOURCE HN FEEDING TUBE) 70 mLs by FEEDING TUBE route once daily. 70 ml per hour x22 hrs daily. Hold 1 hr before and after giving synthroid    lidocaine (XYLOCAINE) 5 % Oint ointment Apply 1 g topically as needed (with each trach change).    nitroGLYCERIN (NITROSTAT) 0.4 MG SL tablet Place 0.4 mg under the tongue every 5 (five) minutes as needed for Chest pain. Seek medical help if pain is  not relieved by the third dose.    vancomycin 250mg / 10ml Susp Take 5 mLs (125 mg total) by mouth every 6 (six) hours for 7 days, THEN 5 mLs (125 mg total) 2 (two) times daily for 7 days, THEN 5 mLs (125 mg total) once daily for 7 days, THEN 5 mLs (125 mg total) Every 3 (three) days.    [DISCONTINUED] chlorhexidine (HIBICLENS) 4 % external liquid Apply 1 application topically 3 (three) times a week. On bath days    [DISCONTINUED] finasteride (PROSCAR) 5 mg tablet Take 1 tablet (5 mg total) by mouth once daily.    [DISCONTINUED] lacosamide (VIMPAT) 200 mg Tab tablet 1 tablet (200 mg total) by Per G Tube route every 12 (twelve) hours.    [DISCONTINUED] menthol-zinc oxide (RISAMINE) 0.44-20.6 % Oint Apply 1 application topically once daily. AFTER EACH DIAPER CHANGE    [DISCONTINUED] saw/vit E/sod sue/lyc/beta/pyg (PROSTATE HEALTH ORAL) Take 30 mLs by mouth 2 (two) times daily.     Family History     None        Tobacco Use    Smoking status: Former Smoker    Smokeless tobacco: Never Used   Substance and Sexual Activity    Alcohol use: No    Drug use: No    Sexual activity: Never     ROS  Objective:     Vital Signs (Most Recent):  Temp: 96.7 °F (35.9 °C) (12/04/19 1550)  Pulse: (!) 115 (12/04/19 1550)  Resp: 13 (12/04/19 1550)  BP: (!) 155/80 (12/04/19 1550)  SpO2: 99 % (12/04/19 1550) Vital Signs (24h Range):  Temp:  [96.7 °F (35.9 °C)-98.6 °F (37 °C)] 96.7 °F (35.9 °C)  Pulse:  [] 115  Resp:  [13-22] 13  SpO2:  [81 %-100 %] 99 %  BP: ()/() 155/80  Arterial Line BP: ()/(31-90) 155/80     Weight: 129.3 kg (285 lb 1.6 oz)  Body mass index is 36.6 kg/m².    SpO2: 99 %  O2 Device (Oxygen Therapy): ventilator      Intake/Output Summary (Last 24 hours) at 12/4/2019 1651  Last data filed at 12/4/2019 1651  Gross per 24 hour   Intake --   Output 1250 ml   Net -1250 ml       Lines/Drains/Airways     Central Venous Catheter Line                 Percutaneous Central Line Insertion/Assessment -  triple lumen  12/04/19 1040 less than 1 day          Drain                 Gastrostomy/Enterostomy -- days         Gastrostomy/Enterostomy  Gastrostomy tube w/ balloon;Gastrostomy-jejunostomy midline feeding -- days         Gastrostomy/Enterostomy 1335 Percutaneous endoscopic gastrostomy (PEG) LUQ feeding -- days         Fecal Incontinence  10/07/19 1546 58 days         Rectal Tube 11/07/19 1900 fecal management system 26 days         Urethral Catheter 12/04/19 less than 1 day          Airway                 Surgical Airway Portex Cuffed -- days         Surgical Airway Portex Cuffed;Fenestrated -- days          Arterial Line                 Arterial Line 12/04/19 1059 Right Radial less than 1 day          Peripheral Intravenous Line                 Peripheral IV - Single Lumen 12/04/19 20 G Left Hand less than 1 day                Physical Exam most current vital signs heart rate 112 blood pressure 150/80 oxygen saturation 99%.  He is attached to ventilator via his tracheostomy tube.  Heart sounds are normal I was unable to appreciate any murmur or gallop.  Patient's eyes are closed pupils are equal in size I do not see any spontaneous movement.    Significant Labs:   CMP   Recent Labs   Lab 12/04/19  1131   *   K 5.3*   CL 87*   CO2 34*      BUN 53*   CREATININE 2.2*   CALCIUM 9.8   PROT 8.7*   ALBUMIN 3.0*   BILITOT 0.7   ALKPHOS 78   AST 30   ALT 17   ANIONGAP 11   ESTGFRAFRICA 31.5*   EGFRNONAA 27.3*   , CBC   Recent Labs   Lab 12/04/19  1131   WBC 14.75*   HGB 10.5*   HCT 33.7*       and Troponin   Recent Labs   Lab 12/04/19  1131   TROPONINI 0.032     BNP is 150    Significant Imaging:  Vent. Rate : 045 BPM     Atrial Rate : 043 BPM     P-R Int : 000 ms          QRS Dur : 120 ms      QT Int : 438 ms       P-R-T Axes : 000 029 131 degrees     QTc Int : 378 ms    Wide QRS rhythm  Nonspecific intraventricular conduction delay  ST and T wave abnormality, consider lateral  ischemia  Abnormal ECG  When compared with ECG of 21-NOV-2019 00:17,  Previous ECG has undetermined rhythm, needs review.    Previous echocardiogram shows a ejection fraction of 50% and diastolic dysfunction.  Assessment and Plan:  Probable sepsis  EKG appears to show a junctional rhythm with a rate of 45 beats per minute.  He has responded well to Levophed infusion and initial resuscitative measures.  Previous ECG shows showing sinus bradycardia.  The junctional rhythm appears to be secondary to sepsis syndrome.  I was unable to locate any family members to have a discussion regarding need for pacemaker therapy(Temporary initially).  If necessary initially attempt should be made to pace him transcutaneous.  Continue inotrope support.     Active Diagnoses:    Diagnosis Date Noted POA    PRINCIPAL PROBLEM:  Shock [R57.9] 12/04/2019 Yes    Bradycardia [R00.1] 12/04/2019 Yes    Encephalopathy, metabolic [G93.41] 11/21/2019 Yes    Candida UTI [B37.49] 11/21/2019 Yes    C. difficile colitis [A04.72] 10/11/2019 Yes    Essential hypertension [I10] 09/13/2019 Yes    Coronary artery disease [I25.10] 09/12/2019 Yes    Ventilator dependence [Z99.11] 09/12/2019 Not Applicable    Hemiparesis affecting dominant side as late effect of stroke [I69.359] 10/14/2017 Not Applicable    Sacral decubitus ulcer, stage II [L89.152] 10/14/2017 Yes    Acquired hypothyroidism [E03.9] 03/21/2017 Yes    PEG (percutaneous endoscopic gastrostomy) status [Z93.1] 03/21/2017 Not Applicable    Functional quadriplegia [R53.2] 12/05/2013 Yes    Chronic respiratory failure with hypoxia [J96.11] 12/05/2013 Yes    Tracheostomy in place [Z93.0] 12/05/2013 Not Applicable      Problems Resolved During this Admission:       VTE Risk Mitigation (From admission, onward)    None          Thank you for your consult. I will follow-up with patient. Please contact us if you have any additional questions.    Mary Carmen Connors MD  Cardiology   [place]  [facility]

## 2019-12-04 NOTE — CARE UPDATE
12/04/19 1008   Vent Select   Charged w/in last 24h NO   Preset Conventional Ventilator Settings   Vent ID [id number]   Vent Type    Ventilation Type VC   Vent Mode A/C   Set Rate 14 bmp   Vt Set 550 mL   PEEP/CPAP 5 cmH20   Pressure Support 0 cmH20   Waveform RAMP   Peak Flow 60 L/min   Plateau Set/Insp. Hold (sec) 0   Trigger Sensitivity Flow/I-Trigger 3 L/min   Patient Ventilator Parameters   Resp Rate Total 14 br/min   Peak Airway Pressure 34 cmH2O   Mean Airway Pressure 12 cmH20   Plateau Pressure 0 cmH20   Exhaled Vt 515 mL   Total Ve 6.85 mL   I:E Ratio Measured 1:3.30   Conventional Ventilator Alarms   Alarms On Y   Ve High Alarm 30 L/min   Ve Low Alarm 3 L/min   Vt High Alarm 1000 mL   Vt Low Alarm 300 mL   Resp Rate High Alarm 45 br/min   Press High Alarm 50 cmH2O   Apnea Rate 14   Apnea Volume (mL) 550 mL   Apnea Oxygen Concentration  100   Apnea Flow Rate (L/min) 60   T Apnea 20 sec(s)   settings from [place]

## 2019-12-04 NOTE — ED NOTES
Bed: 01A Trauma  Expected date:   Expected time:   Means of arrival:   Comments:  Jennifer Vent Dependent Bradycardia

## 2019-12-04 NOTE — HPI
Mr. Masood Messina is a 79 year old gentleman well known to [facility] with a history of a remote CVA, functional quadraplegia, OHS and chronic ventilator dependence, who has been admitted to [facility] every week for the past month with septic shock.  He was transferred from the [facility] where he resides this afternoon after he was noted to be hypotensive and unresponsive.  He is unable to provide any history.

## 2019-12-04 NOTE — ED TRIAGE NOTES
Admit per [facility] from [facility]. Sent for eval of HR 30 and BP 78/35 and change in mental status. Usually opens eyes and looks at you. Not doing that today. EMS tx with Atropine 1 mg IVP. Repeat BP 98/48 P 80. Recent hospitalization at [facility] for Pneumonia and C Diff. Still on antibiotics at [place]

## 2019-12-04 NOTE — ANESTHESIA PROCEDURE NOTES
Arterial    Diagnosis: Severe bradycardia and hypotension.  Possible sepsis.  Need for hemodynamic monitoring while getting vasopressors Levophed and epinephrine.    Patient location during procedure: done in OR  Procedure start time: 12/4/2019 10:59 AM  Timeout: 12/4/2019 10:57 AM  Procedure end time: 12/4/2019 10:13 AM    Staffing  Authorizing Provider: Raudel Guerrero MD  Performing Provider: Raudel Guerrero MD    Anesthesiologist was present at the time of the procedure.    Preanesthetic Checklist  Completed: patient identified, site marked, surgical consent, pre-op evaluation, timeout performed, IV checked, risks and benefits discussed, monitors and equipment checked and anesthesia consent givenArterial  Skin Prep: chlorhexidine gluconate  Local Infiltration: lidocaine  Orientation: right  Location: radial  Catheter Size: 20 G  Catheter placement by Ultrasound guidance. Heme positive aspiration all ports.  Vessel Caliber: medium, patent, compressibility normal  Vascular Doppler:  not done  Needle advanced into vessel with real time Ultrasound guidance.  Sterile sheath used.Insertion Attempts: 1  Assessment  Dressing: sutured in place and taped, tegaderm and steri-strips  Patient: Tolerated well  Additional Notes  No apparent complications.

## 2019-12-05 PROBLEM — E87.1 HYPONATREMIA: Status: ACTIVE | Noted: 2019-01-01

## 2019-12-05 PROBLEM — J96.11 CHRONIC RESPIRATORY FAILURE WITH HYPOXIA AND HYPERCAPNIA: Status: ACTIVE | Noted: 2019-01-01

## 2019-12-05 PROBLEM — N18.30 CKD (CHRONIC KIDNEY DISEASE), STAGE III: Chronic | Status: ACTIVE | Noted: 2019-01-01

## 2019-12-05 PROBLEM — J96.12 CHRONIC RESPIRATORY FAILURE WITH HYPOXIA AND HYPERCAPNIA: Status: ACTIVE | Noted: 2019-01-01

## 2019-12-05 PROBLEM — Z71.89 GOALS OF CARE, COUNSELING/DISCUSSION: Status: ACTIVE | Noted: 2019-01-01

## 2019-12-05 PROBLEM — R65.21 SEPSIS DUE TO GRAM-NEGATIVE ORGANISM WITH SEPTIC SHOCK: Status: ACTIVE | Noted: 2019-01-01

## 2019-12-05 PROBLEM — A41.50 SEPSIS DUE TO GRAM-NEGATIVE ORGANISM WITH SEPTIC SHOCK: Status: ACTIVE | Noted: 2019-01-01

## 2019-12-05 PROBLEM — L97.509 FOOT ULCER: Chronic | Status: ACTIVE | Noted: 2019-01-01

## 2019-12-05 PROBLEM — R00.1 BRADYCARDIA: Status: RESOLVED | Noted: 2019-01-01 | Resolved: 2019-01-01

## 2019-12-05 PROBLEM — E87.1 HYPONATREMIA: Status: RESOLVED | Noted: 2019-01-01 | Resolved: 2019-01-01

## 2019-12-05 PROBLEM — R78.81 BACTEREMIA DUE TO GRAM-NEGATIVE BACTERIA: Status: ACTIVE | Noted: 2019-01-01

## 2019-12-05 NOTE — ASSESSMENT & PLAN NOTE
As per report at baseline he is a functional quadriplegic, does protrude his tongue to command.  He is not doing same today.  Possibly secondary to sepsis and infection, history of seizures and possibly post ictal do no evidence to support same  Continue to treat acute on chronic medical issues.  Follow clinically.

## 2019-12-05 NOTE — ASSESSMENT & PLAN NOTE
 Encephalopathy secondary to metabolic derangements   No evidence of occult unaddressed pathology..   No additional investigation needed.  Recommend observation for now..   Continue to treat underlying pathology.   Continued supportive care for now with close observation and frequent neurologic assessment.   Avoid sedating/derliogenic medications..

## 2019-12-05 NOTE — ASSESSMENT & PLAN NOTE
As per admission note-He came with paperwork from [facility] that states he is a full code and thus I have made him a full code at this time.  During a previous hospital stay, I spoke to Malgorzata Fan at [phone], who had been making his medical decisions as she reports he had no family that was not estranged. Will need to make attempts to re contact her for any directions in changing code status and perhaps moving to comfort care.    Unable to locate Advanced directive at this time.  Will contract [facility] to determine if 1 on file.  Would abide by 1 if present.  Continued to attempt to contact family member to readdress goals of care as ideally patient is a hospice candidate.

## 2019-12-05 NOTE — CONSULTS
"On license of [facility]  Adult Nutrition   Consult Note    SUMMARY     Recommendations  Recommendation/Intervention: 1.) Recommend resume TFs as medically appropriate, rec. Nepro @ 45 ml/hr to provide: 1944 kcal, 87 g pro, & 788 ml free h20. FWF: 30 ml Q 4 hrs. 2.) Recommend Elliot BID to aid healing   Goals: 1.) TF resumed w/in 24-48 hrs with good tolerance  Nutrition Goal Status: progressing towards goal  Communication of RD Recs: reviewed with RN    Reason for Assessment    Reason For Assessment: consult  Diagnosis: infection/sepsis  Relevant Medical History: NH resident, PEG, CVA, quadraplegia, trach    Nutrition Risk Screen    Nutrition Risk Screen: tube feeding or parenteral nutrition     MST Score: 2  Have you recently lost weight without trying?: Unsure  Weight loss score: 2  Have you been eating poorly because of a decreased appetite?: No  Appetite score: 0       Nutrition/Diet History    Patient Reported Diet/Restrictions/Preferences: no oral intake  Spiritual, Cultural Beliefs, Mandaeism Practices, Values that Affect Care: other (see comments)  Food Allergies: NKFA  Factors Affecting Nutritional Intake: NPO, on mechanical ventilation    Anthropometrics    Temp: 99.2 °F (37.3 °C)  Height Method: Estimated  Height: 6' 2" (188 cm)  Height (inches): 74 in  Weight Method: Bed Scale  Weight: 129.3 kg (285 lb 1.6 oz)  Weight (lb): 285.1 lb  Ideal Body Weight (IBW), Male: 190 lb  % Ideal Body Weight, Male (lb): 150.05 lb  BMI (Calculated): 36.6  BMI Grade: 35 - 39.9 - obesity - grade II       Weight History:  Wt Readings from Last 10 Encounters:   12/05/19 129.3 kg (285 lb 1.6 oz)   11/22/19 (!) 140.7 kg (310 lb 3 oz)   11/07/19 132 kg (291 lb 0.1 oz)   10/14/19 124.8 kg (275 lb 2.2 oz)   09/12/19 (!) 140.5 kg (309 lb 11.9 oz)   09/13/19 (!) 140.2 kg (309 lb)   10/13/17 110.6 kg (243 lb 13.3 oz)   03/27/17 118.8 kg (262 lb)   03/21/17 119 kg (262 lb 5.6 oz)   05/16/14 (!) 137.8 kg (303 lb 12.7 oz) "       Lab/Procedures/Meds: Pertinent Labs Reviewed  Clinical Chemistry:  Recent Labs   Lab 12/05/19  0426      K 4.7   CL 97   CO2 31*   GLU 97   BUN 46*   CREATININE 2.2*   CALCIUM 9.5   PROT 8.2   ALBUMIN 2.7*   BILITOT 0.9   ALKPHOS 75   AST 34   ALT 18   ANIONGAP 10   ESTGFRAFRICA 31.5*   EGFRNONAA 27.3*   MG 2.3   PHOS 3.2     CBC:   Recent Labs   Lab 12/05/19  0426 12/05/19  0500   WBC 9.12  --    RBC 3.61*  --    HGB 10.7*  --    HCT 33.4* 34*     --    MCV 93  --    MCH 29.6  --    MCHC 32.0  --      Cardiac Profile:  Recent Labs   Lab 12/04/19  1131 12/04/19  1743 12/05/19  0426   *  --   --    TROPONINI 0.032 0.084* 0.094*     Thyroid & Parathyroid:  Recent Labs   Lab 12/04/19  1131   .830*   FREET4 0.76       Medications: Pertinent Medications reviewed  Scheduled Meds:   busPIRone  5 mg Per G Tube BID    chlorhexidine  15 mL Mouth/Throat BID    DULoxetine  30 mg Per G Tube Daily    ferrous sulfate  325 mg Per G Tube Daily    finasteride  5 mg Per G Tube Daily    fluconazole  200 mg Per G Tube Daily    lacosamide  200 mg Per G Tube Daily    Lactobacillus acidoph-L.bulgar  1 tablet Per G Tube TID WM    levetiracetam oral soln  500 mg Per G Tube BID    levothyroxine  100 mcg Intravenous Daily    mupirocin   Nasal BID    piperacillin-tazobactam (ZOSYN) IVPB  4.5 g Intravenous Q8H    vancomycin  125 mg Oral Q6H     Continuous Infusions:   DOPamine      norepinephrine bitartrate-D5W 0.015 mcg/kg/min (12/05/19 0700)     PRN Meds:.albuterol-ipratropium    Estimated/Assessed Needs    Weight Used For Calorie Calculations: 129.3 kg (285 lb 0.9 oz)  Energy Calorie Requirements (kcal): 8611-1536 kcal (11-14 kcal/kg)  Energy Need Method: Kcal/kg  Protein Requirements:  g (0.6-0.8 g/kg)  Weight Used For Protein Calculations: 129.3 kg (285 lb 0.9 oz)     Estimated Fluid Requirement Method: RDA Method  RDA Method (mL): 1422       Nutrition Prescription Ordered    Current  Diet Order: NPO    Evaluation of Received Nutrient/Fluid Intake    Energy Calories Required: not meeting needs  Protein Required: not meeting needs     Intake/Output Summary (Last 24 hours) at 12/5/2019 1108  Last data filed at 12/5/2019 0700  Gross per 24 hour   Intake 200 ml   Output 3575 ml   Net -3375 ml        % Meal Intake: NPO    Dietitian Rounds Brief    Consult noted for PEG feedings. Pt currently on vent support, requiring pressors per RN. Noted septic shock. ID following. Rectal tube output noted; hx of C diff. Stage II PU to sacrum. Discussed TF recs with RN. She will discuss resuming enteral nutrition with MD at rounds.    Nutrition Risk    Level of Risk/Frequency of Follow-up: high       Monitor and Evaluation    Food and Nutrient Intake: enteral nutrition intake  Food and Nutrient Adminstration: enteral and parenteral nutrition administration  Anthropometric Measurements: weight change  Biochemical Data, Medical Tests and Procedures: glucose/endocrine profile, gastrointestinal profile, electrolyte and renal panel  Nutrition-Focused Physical Findings: overall appearance     Nutrition Follow-Up    RD Follow-up?: Yes    Melissa Nelson RD 12/05/2019 11:08 AM

## 2019-12-05 NOTE — PROGRESS NOTES
Consult Note  Infectious Disease    Reason for Consult:  shock    HPI: Masood Messina is an  79 y.o. male with whom I am familiar from prior consultations who has been vent dependent for several years, is colonized in sputum and urine with a variety of gram negative rods and has recently been treated for pseudomonas urosepsis on 2 occasions, initially associated with hematuria/clots in bladder and with bacteremia, followed by the development of C. Difficile colitis. He was discharged about 3 weeks ago on a few days of zosyn and a taper of oral vanc. During that hospitalization he had worse than usual encephalopathy and seizures.   He was sent to the ED today for severe bradycardia and hypotension. His CXR looks like pulmonary edema, he had a WBC of 14k and required pressors, removal of indwelling picc, placement of TLC and admission to ICU. He is unresponsive, pulse is now up to 100. UA has pyuria and last few urine cultures have had Candida. His tay on admission was replaced immediately draining about a liter of urine. He was placed on merrem, IV and oral vanc.     12/5:  Temperature is less than 100 after 1 large dose of Solu-Medrol yesterday.  Vasopressor requirement has decreased.  Ventilatory needs are at or near baseline.  He is no longer bradycardic.  Blood cultures have gram-negative rods.  The urine culture was collected yesterday by nursing but not confirmed as collected in epic therefore the urine culture was not set up.  Chest x-ray is improved on the left and the same on the right.  Secretions are minimal and tan.  He will open his eyes when I call his name but he will not attend or protrude his tongue on command.    EXAM & DIAGNOSTICS REVIEWED:   Vitals:     Temp:  [96.7 °F (35.9 °C)-99.2 °F (37.3 °C)]   Temp: 99.2 °F (37.3 °C) (12/05/19 0730)  Pulse: 99 (12/05/19 0730)  Resp: 18 (12/05/19 0730)  BP: 120/65 (12/05/19 0400)  SpO2: 99 % (12/05/19 0730)    Intake/Output Summary (Last 24 hours) at  12/5/2019 0806  Last data filed at 12/5/2019 0700  Gross per 24 hour   Intake 200 ml   Output 3575 ml   Net -3375 ml       General:  Eyes will open when addressed but he will not attend or follow any commands  Eyes:  Anicteric, PERRL,    ENT:  Very limited oral exam  Neck:  supple,tracheostomy  Lungs: Coarse without consolidation.  Tidal volume since very low  Heart:  iRRR, no gallop/murmur/rub noted  Abd:  Soft, NT, ND, normal BS, no masses or organomegaly appreciated. PEG, rectal tube with liquid stool  :   Rahman, urine clear, no flank tenderness  Musc:  Joints without effusion, swelling, erythema, synovitis, quadriplegic   Skin:  No rashes. No palmar or plantar lesions. No subungual petechiae  Wound: Photos reviewed with nursing  Neuro: Eyes open to voice, will not attend.. Quadriplegic, very high tone throughout  Psych:   eyes open, will not attend     Extrem: Chronic woody edema, no erythema, phlebitis, cellulitis,    VAD:  Left IJ TLC     Isolation:  contact    Lines/Tubes/Drains:    General Labs reviewed:  Recent Labs   Lab 12/04/19  1131 12/05/19  0426 12/05/19  0500   WBC 14.75* 9.12  --    HGB 10.5* 10.7*  --    HCT 33.7* 33.4* 34*    151  --        Recent Labs   Lab 12/04/19  1131 12/05/19  0426   * 138   K 5.3* 4.7   CL 87* 97   CO2 34* 31*   BUN 53* 46*   CREATININE 2.2* 2.2*   CALCIUM 9.8 9.5   PROT 8.7* 8.2   BILITOT 0.7 0.9   ALKPHOS 78 75   ALT 17 18   AST 30 34           Micro:  Microbiology Results (last 7 days)     Procedure Component Value Units Date/Time    Blood culture [[id number]]     Order Status:  No result Specimen:  Blood     Urine culture [[id number]] Collected:  12/04/19 1500    Order Status:  Sent Specimen:  Urine, Catheterized Updated:  12/05/19 0755    Blood culture [[id number]] Collected:  12/04/19 2050    Order Status:  Completed Specimen:  Blood Updated:  12/05/19 0517     Blood Culture, Routine No Growth to date    Narrative:       Aerobic and anaerobic  Can we  please draw one set from the periphery?  It looks like  the two sets done in the ED were drawn from the central  line?  Collection has been rescheduled by [facility] at 12/04/2019 16:06 Reason:   Nurse Draw  Collection has been rescheduled by [facility] at 12/04/2019 16:06 Reason:   Nurse Draw    Blood culture x two cultures. Draw prior to antibiotics. [[id number]] Collected:  12/04/19 1130    Order Status:  Completed Specimen:  Blood from Line, Jugular, External Left Updated:  12/05/19 0453     Blood Culture, Routine No Growth to date      Gram stain reynaldo bottle: Gram negative rods      Critical result called and read back Olivia Ortega RN M3 @ 0448    Narrative:       Aerobic and anaerobic    Blood culture x two cultures. Draw prior to antibiotics. [[id number]] Collected:  12/04/19 1217    Order Status:  Completed Specimen:  Blood from Line, Jugular, External Left Updated:  12/04/19 2158     Blood Culture, Routine No Growth to date    Narrative:       Aerobic and anaerobic    Clostridium difficile EIA [[id number]] Collected:  12/04/19 1225    Order Status:  Completed Specimen:  Stool Updated:  12/04/19 1626     C. diff Antigen Negative     C difficile Toxins A+B, EIA Negative     Comment: Testing not recommended for children <24 months old.       Stool culture [[id number]] Collected:  12/04/19 1225    Order Status:  Sent Specimen:  Stool Updated:  12/04/19 1232    Culture, Respiratory with Gram Stain [[id number]] Collected:  12/04/19 1024    Order Status:  Sent Specimen:  Respiratory from Sputum Updated:  12/04/19 1113        Imaging Reviewed:   CXRs   CT head  Cardiology:    IMPRESSION & PLAN   1. Gram negative bacteremia. Presumed to be from urine at this time. Urine culture not yet set up   His Rahman catheter was not draining well at the time of admission and when replaced in the ER, almost a L was relieved.  2. Hypotension, multifactorial, Bradycardia, resolved  3. Chronic vent dependence, quadriplegic  4. 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

## 2019-12-05 NOTE — ASSESSMENT & PLAN NOTE
Still profoundly hypothyroid with . Contributing? Seen by Dr. Medina during last hospitalization. Will continue with IV synthroid at 100mcg.

## 2019-12-05 NOTE — NURSING
Eschar noted to L lateral foot. Approx 10cm x 3cm. This wound appears unstagable. Mepilex and medi-honey applied to wound.  Patient heels elevated and heel protectors placed bilaterally.     Eschar noted to distal L second toe; proximal to nail bed approx 2cm x 1cm.    Stage II pressure ulcer noted to R Buttock 3cm x 2cm. Mepilex and medi-honey applied to ulcer. Patient turned q2 hours,

## 2019-12-05 NOTE — HOSPITAL COURSE
Patient was admitted to the ICU.  He was briefly on dobutamine and heart rate improved and same was discontinued.  Needing continued Levophed for blood pressure support.  Started on Zosyn for gram-negative bacteremia likely of urinary source.  Continued on oral vancomycin for C diff.  Not following commands.  Continues with chronic vent dependence.  Appreciate consultant's input.  Sputum culture now growing ESBL Proteus.  Seems preliminary blood cultures with Proteus as well.  Changing Zosyn to meropenem and watching closely for seizure activity.  Still trying to contact listed contacts to identify next of kin, power of . On 12/07 urine with carbapenems resistant Pseudomonas, currently on meropenem, no evidence of seizure activity, apparently patient has no power of , only living relatives estranged from patient for the last 30-40 years, will need ethics/legal involvement for goals of care dressing. On 12/08 now off levaphed and blood pressure actually elevated, remains afebrile, oozing from sacral wounds, slight decrease in urine output, no overall change clinically.

## 2019-12-05 NOTE — ASSESSMENT & PLAN NOTE
Etiology?  Could be infectious and thus on broad IV abx including IV vanc and IV meropenem.  Continuing vanc per peg as this is the best coverage for C. Diff.  ID consulted.  Could be related to bradycardia and poor cerebral perfusion  CT head with no acute process  Medication induced?  Holding sedating medication including Gabapentin, Norco, Baclofen, Trazadone.  I have not seen this man at what was his previously described baseline which is able to mouth words, consistently follow commands, etc, throughout his last 2 hospital stays.  I do think he has continued to decline and has never reached this baseline.    He came with paperwork from [facility] that states he is a full code and thus I have made him a full code at this time.  During a previous hospital stay, I spoke to Malgorzata Fan at [phone], who had been making his medical decisions as she reports he had no family that was not estranged. Will need to make attempts to re contact her for any directions in changing code status and perhaps moving to comfort care.

## 2019-12-05 NOTE — ASSESSMENT & PLAN NOTE
Preliminary blood cultures sent on admission growing GNR.  History of Pseudomonas colonization.  Discussed with ID suspect secondary to urinary source but await speciation.  Continue empiric IV Zosyn.  Repeat blood culture to document clearance.  Renal ultrasound being repeated as history of left-sided nephrolithiasis with hydronephrosis.

## 2019-12-05 NOTE — CONSULTS
Please see my consult note dated today.    Mauricio Kinsey MD  Pulmonary / Critical Care Medicine  [facility]  12/04/2019  6:15 PM

## 2019-12-05 NOTE — PLAN OF CARE
Unable to wean presser (levo). Pt trach to vent at 30% FiO2. Wound care performed. Patient placed on turning bed.

## 2019-12-05 NOTE — PROGRESS NOTES
[facility]  Progress Note    Patient Name: Masood Messina  MRN: [id number]  Patient Class: IP- Inpatient   Admission Date: 12/4/2019  Length of Stay: 1 days  Attending Physician: Kiah Le MD  Primary Care Provider: Jeramie Lombardi MD        Subjective:     Principal Problem:Sepsis due to Gram-negative organism with septic shock        HPI:  79 year old male with PMHx CVA, functional quadriplegia, trach, peg sent from [facility] secondary to hypotension, bradycardia, unresponsiveness.  He has a rectal tube in place for C. Diff colitis and is on vanc per peg tube.  He has a chronic indwelling tay that was changed and he is on fluconazole for fungal UTI.  Per my chart review, he's had several admissions within the past few months for septic shock, unresponsiveness. He does not currently open his eyes to voice or stimuli though he does fight me when I try to open his eyelids.  Initial HR was reported to be 30.  Initial BP was reported to be 78/35.  He was given atropine x 2, epi x 2 and started on levophed.  In the ED he was also given solumedrol, IV flagyl, IV zosyn and I see Vanc and Meropenem ordered. EKG reveals bradycardia with wide QRS which appears new.     Overview/Hospital Course:  Patient was admitted to the ICU.  He was briefly on dobutamine and heart rate improved and same was discontinued.  Needing continued Levophed for blood pressure support.  Started on Zosyn for gram-negative bacteremia likely of urinary source.  Continued on oral vancomycin for C diff.  Not following commands.  Continues with chronic vent dependence.  Appreciate consultant's input.    Interval History:  Patient is nonverbal, not following commands, functional quadriplegic.  Now off dopamine and heart rate much improved actually in the 80s to 90s, continues to require Levophed for blood pressure support.  Did try to wean however became hypotensive.  Blood cultures sent on admission  growing GNR in 2/2 bottles.  Repeat cultures sent this morning.  Currently on the vent, settings FiO2 30% with peep of 5.  As per chart review did have advanced directive sent in from [facility] however unable to locate.  Unable to reach family member.  Scant amount of white respiratory secretions being suctioned.  Chronic wound to the sacrum and right foot.  Discussed with nursing.  Discussed with Infectious Disease, colonized with Pseudomonas, await speciation of GNR from blood, multiple readmissions over the last 5 years.      Review of Systems   Unable to perform ROS: Patient nonverbal     Objective:     Vital Signs (Most Recent):  Temp: 98.7 °F (37.1 °C) (12/05/19 1300)  Pulse: 99 (12/05/19 1543)  Resp: (!) 2 (12/05/19 1543)  BP: 112/63 (12/05/19 1400)  SpO2: 100 % (12/05/19 1543) Vital Signs (24h Range):  Temp:  [97.1 °F (36.2 °C)-99.2 °F (37.3 °C)] 98.7 °F (37.1 °C)  Pulse:  [] 99  Resp:  [0-18] 2  SpO2:  [97 %-100 %] 100 %  BP: ()/(52-69) 112/63  Arterial Line BP: ()/() 123/73     Weight: 129.3 kg (285 lb 1.6 oz)  Body mass index is 36.6 kg/m².    Intake/Output Summary (Last 24 hours) at 12/5/2019 1639  Last data filed at 12/5/2019 1300  Gross per 24 hour   Intake 340 ml   Output 4260 ml   Net -3920 ml      Physical Exam   Constitutional: He appears well-developed and well-nourished. No distress.   Obese male, chronically ill-appearing, spontaneously opens eyes   HENT:   Head: Normocephalic and atraumatic.   Mouth/Throat: Oropharynx is clear and moist.   Eyes: Pupils are equal, round, and reactive to light. Right eye exhibits no discharge. Left eye exhibits no discharge.   Neck:   Trach with dressing underlying   Cardiovascular: Normal rate and regular rhythm.   No murmur heard.  Pulmonary/Chest: No stridor. He has no wheezes.   Tracheostomy in place, mechanically ventilated with current settings FiO2 30, peep of 5, mechanical breath sounds anteriorly   Abdominal: There is no tenderness.  There is no rebound and no guarding.   Protuberant, PEG in place, rectal tube in place with dark brown liquid stool   Genitourinary:   Genitourinary Comments: Rahman in place with yellow urine draining   Musculoskeletal:   Functional quadriplegia   Neurological:   Spontaneously opening eyes.  Not to voice only.  Not protruding tongue to command.  Functional quadriplegic.   Skin: No rash noted. He is not diaphoretic.   Has known sacral Decub as well as pressure skin breakdown to BLE, heels, feet, toes POA   Psychiatric:   Unable to assess   Nursing note and vitals reviewed.      Significant Labs:   ABGs:   Recent Labs   Lab 12/05/19  0500   PH 7.480*   PCO2 46.8*   HCO3 34.8*   POCSATURATED 99   BE 11     Bilirubin:   Recent Labs   Lab 11/10/19  1509 11/21/19  0045 11/22/19  0430 12/04/19  1131 12/05/19  0426   BILITOT 1.0 0.6 0.7 0.7 0.9     Blood Culture:   Recent Labs   Lab 12/04/19  1217 12/04/19  2050 12/05/19  0805   LABBLOO No Growth to date  No Growth to date No Growth to date No Growth to date     BMP:   Recent Labs   Lab 12/05/19  0426   GLU 97      K 4.7   CL 97   CO2 31*   BUN 46*   CREATININE 2.2*   CALCIUM 9.5   MG 2.3     CBC:   Recent Labs   Lab 12/04/19  1131 12/05/19  0426 12/05/19  0500   WBC 14.75* 9.12  --    HGB 10.5* 10.7*  --    HCT 33.7* 33.4* 34*    151  --      CMP:   Recent Labs   Lab 12/04/19  1131 12/05/19  0426   * 138   K 5.3* 4.7   CL 87* 97   CO2 34* 31*    97   BUN 53* 46*   CREATININE 2.2* 2.2*   CALCIUM 9.8 9.5   PROT 8.7* 8.2   ALBUMIN 3.0* 2.7*   BILITOT 0.7 0.9   ALKPHOS 78 75   AST 30 34   ALT 17 18   ANIONGAP 11 10   EGFRNONAA 27.3* 27.3*     Cardiac Markers:   Recent Labs   Lab 12/04/19  1131   *     Coagulation:   Recent Labs   Lab 12/05/19  0426   INR 1.3     Lactic Acid:   Recent Labs   Lab 12/04/19  1608 12/04/19  1959 12/05/19 0428   LACTATE 2.5* 2.2* 2.7*     Magnesium:   Recent Labs   Lab 12/04/19  1131 12/05/19  0426   MG 2.4 2.3      Pathology Results  (Last 10 years)    None        Troponin:   Recent Labs   Lab 12/04/19  1131 12/04/19  1743 12/05/19  0426   TROPONINI 0.032 0.084* 0.094*     TSH:   Recent Labs   Lab 12/04/19  1131   .830*     Urine Culture: No results for input(s): LABURIN in the last 48 hours.  Urine Studies:   Recent Labs   Lab 12/04/19  1217   COLORU Yellow   APPEARANCEUA Hazy*   PHUR 7.0   SPECGRAV 1.010   PROTEINUA Trace*   GLUCUA Negative   KETONESU Negative   BILIRUBINUA Negative   OCCULTUA 2+*   NITRITE Negative   UROBILINOGEN Negative   LEUKOCYTESUR 3+*   RBCUA 4   WBCUA >100*   BACTERIA Rare   SQUAMEPITHEL 1   HYALINECASTS 4*     All pertinent labs within the past 24 hours have been reviewed.    Significant Imaging: I have reviewed all pertinent imaging results/findings within the past 24 hours.   Ct Head Without Contrast    Result Date: 12/4/2019  CLINICAL HISTORY: ([id number])80 y/o  (1940) M Confusion/delirium, altered LOC, unexplained; TECHNIQUE: (A#[id number], exam time 12/4/2019 15:21) CT HEAD WITHOUT CONTRAST [id number] Axial CT of the brain without contrast using soft tissue and bone algorithm. Please note in the acute setting if there is a clinical concern for an acute stroke MRI would be more sensitive/specific for evaluation of ischemia. CMS MANDATED QUALITY DATA - CT RADIATION - 436 All CT scans at this facility utilize dose modulation, iterative reconstruction, and/or weight based dosing when appropriate to reduce radiation dose to as low as reasonably achievable. COMPARISON: Most recent CT from 11/21/2019 FINDINGS: No acute intracranial hemorrhage, edema or mass effect. There is no hydrocephalus, herniation or midline shift, and the basal and suprasellar cisterns are within normal limits. The osseous structures show no acute skull fracture. Moderate diffuse cerebral and cerebellar atrophy for age with moderate periventricular deep cerebral white matter low attenuation nonspecific findings which  can be seen in any diffuse white matter process but which are most commonly associated with chronic microvascular ischemic disease (noting that normal pressure hydrocephalus in sub appended pole flow is CSF can present a similar manner).  There are scattered atheromatous calcifications in the intracranial internal carotid arteries. Orbital contents appear within normal limits. External auditory canals are unremarkable.  There is opacification left sphenoid and mucoperiosteal thickening in the ethmoid air cells.  There is trace fluid in the inferior margin of the left-sided mastoid air cells.     1.  No acute intracranial process. 2.  Chronic and involutional findings as noted above. 3.  Unchanged mucoperiosteal disease in the paranasal sinuses as above. Electronically signed by: Markos Renteria MD Date:    12/04/2019 Time:    15:26    Ct Head Without Contrast    Result Date: 11/21/2019  Exam: CT OF THE BRAIN WITHOUT CONTRAST Clinical data: Altered mental status. Technique: Contiguous axial images are obtained from the skull base to vertex without intravenous contrast. Radiation dose: CTDIvol = 57.50 mGy, DLP = 1073.80 mGy x cm. Prior studies: No prior studies submitted. Findings: No acute intracranial abnormality is present. No evidence of acute cortical infarction, hemorrhage, mass or mass effect.  There is evidence of chronic ischemic white matter changes secondary to small vessel disease.  There is also prominence of the cortical sulci diffusely.  There is also gliosis in the parasylvian regions bilaterally which can be seen with old infarcts.  There is no evidence of hydrocephalus or abnormal extra-axial fluid collections are present. The posterior fossa is unremarkable. The skull base and calvarium are intact.  Mucosal disease in the left sphenoid sinus and posterior ethmoid air cells.  Bilateral mild-to-moderate mastoiditis. IMPRESSION: No evidence of acute intracranial abnormality or mass. There is evidence  of chronic ischemic white matter changes changes secondary to small vessel disease. There is also moderate, diffuse cerebral atrophy. There is hypoattenuation in the parasylvian regions bilaterally which may be secondary to old infarcts. Recommendation: Follow up as clinically indicated. All CT scans at this facility utilize dose modulation, iterative reconstruction, and/or weight based dosing when appropriate to reduce radiation dose to as low as reasonably achievable. Electronically Signed by UZIEL MARTIN MD at 11/21/2019 5:16:33 AM    Ct Head Without Contrast    Result Date: 11/6/2019  CMS MANDATED QUALITY DATA - CT RADIATION - 436 All CT scans at this facility utilize dose modulation, iterative reconstruction, and/or weight based dosing when appropriate to reduce radiation dose to as low as reasonably achievable. EXAMINATION: CT HEAD WITHOUT CONTRAST CLINICAL HISTORY: Abnormal reflex;Pt having jerking motions full body; TECHNIQUE: Head CT without IV contrast. All CT scans at this facility use dose modulation, iterative reconstruction, and/or weight based dosing when appropriate to reduce radiation dose to as low as reasonably achievable. COMPARISON: 10/02/2019 FINDINGS: No acute intracranial hemorrhage, midline shift, or mass effect. Diffuse cerebral and cerebellar atrophy is evident. Moderate periventricular and subcortical white matter low-attenuation suggests chronic small vessel ischemic changes.  There are bilateral lacunar infarcts within the basal ganglia. The ventricles and cisterns are maintained. Calvarium is intact, and visualized sinuses are clear.     1. No acute intracranial abnormality. 2. Chronic small vessel ischemic changes. Electronically signed by: Silvia Velásquez MD Date:    11/06/2019 Time:    10:35    Us Retroperitoneal Complete    Result Date: 12/5/2019  EXAMINATION: US RETROPERITONEAL COMPLETE CLINICAL HISTORY: UTI, septic shock, left ureteral stone, compare to previous ultrasound  and CT; COMPARISON: None. FINDINGS: The examination is limited by patient's body habitus and inability of the patient to cooperate with the performance of the examination. The right kidney measures 10.5 cm sagittally.  The left kidney measures 9.1 cm.  Previously described left-sided hydronephrosis appears similar to the prior examination.  Nonobstructing calculi are observed in the lower left kidney.  There are areas of apparent cortical thinning on the left.  There is no right-sided hydronephrosis.  There are no suspicious masses.  The bladder is nondistended and not adequately assessed.     Limited examination as above. Unchanged left-sided hydronephrosis. Left-sided nephrolithiasis. Electronically signed by: Stuart Talavera IV., MD Date:    12/05/2019 Time:    10:27    X - Ray Chest Ap Portable    Result Date: 12/5/2019  EXAMINATION: XR CHEST AP PORTABLE CLINICAL HISTORY: Sepsis; COMPARISON: 12/04/2019. FINDINGS: A tracheostomy cannula and left IJ central venous catheter remain in place. There is persistent elevation of the right hemidiaphragm.  Patchy pulmonary opacities in the right mid and lower lung zone remain unchanged compared to the prior study.  There also appears to be mild airspace disease or volume loss at the left lung base which appears slightly more pronounced on the current exam.  There are no significant effusions. The cardiomediastinal silhouette and pulmonary vasculature are stable.     Patchy airspace opacities in the right mid lung zone and bilateral lung bases.  Opacities in the left lung base appear slightly more prominent on the current study. Unchanged elevation of the right hemidiaphragm. Stable positioning of tracheostomy cannula and left IJ central catheter. Electronically signed by: Stuart Talavera IV., MD Date:    12/05/2019 Time:    06:13    X-ray Chest Ap Portable    Result Date: 12/4/2019  CLINICAL HISTORY: ([id number])78 y/o  (1940) M CHF; TECHNIQUE: (A#[id number], exam time  12/4/2019 11:11) XR CHEST AP PORTABLE [id number] COMPARISON: Radiograph most recently from 11/21/2019 FINDINGS: Perihilar pulmonary vascular prominence with increased central hilar interstitial lung markings bilaterally no elevation of the right hemidiaphragm similar to the previous exam.  No pneumothorax is seen.  The heart is mildly enlarged with prominence of the perihilar pulmonary vascular pedicle.  There is a tracheostomy tube, unchanged with tip at the level of the thoracic inlet.  Median sternotomy wires are seen.  Osseous structures are unchanged surgical changes in the lower cervical spine.     Unchanged radiograph of the chest when accounting for differences in imaging technique. Electronically signed by: Markos Renteria MD Date:    12/04/2019 Time:    11:13    X-ray Chest Ap Portable    Result Date: 11/21/2019  PROCEDURE:   XR CHEST AP PORTABLE  dated  11/21/2019 12:37 AM CLINICAL HISTORY:   Male 79 years of age.   Altered mental status TECHNIQUE: AP view of the chest obtained portably at 12:39 AM. PREVIOUS STUDIES:  November 12, 2019 FINDINGS: There are median sternotomy wires and mediastinal clips. Tracheostomy is at T10. Interstitial opacities throughout the right lung are unchanged compared to prior study. Right hemidiaphragm is elevated. Cardiac mediastinal contours are stable. The heart is not enlarged. IMPRESSION: No change compared with prior radiography November 12. Elevated hemidiaphragm, and interstitial opacities of the right lung. Electronically Signed by Aretha Gregory on 11/21/2019 7:43 AM    X-ray Chest Ap Portable    Result Date: 11/12/2019  CLINICAL HISTORY: ([id number])80 y/o  (1940) M PICC line; TECHNIQUE: (A#[id number], exam time 11/12/2019 10:25) XR CHEST AP PORTABLE [id number] COMPARISON: Radiograph most recently from 11/12/2019 at 05:13 FINDINGS: Scattered interstitial opacities in the right greater than left lung.  There is blunting of both costophrenic angles consistent with trace  pleural effusions and adjacent atelectasis. No pneumothorax is identified. The heart is top normal in size. Median sternotomy wires are unchanged. Osseous structures appear unchanged. The visualized upper abdomen is unremarkable Lines and tubes: Interval placement of a left-sided PICC with tip at the level of the SVC.  There is a tracheostomy tube with tip at the level of the thoracic inlet.     Interval placement of a left-sided PICC with tip at the level of the SVC, otherwise unchanged radiograph the chest Electronically signed by: Markos Renteria MD Date:    11/12/2019 Time:    10:31    X-ray Chest Ap Portable    Result Date: 11/12/2019  PROCEDURE:   XR CHEST AP PORTABLE  dated  11/12/2019 5:12 AM CLINICAL HISTORY:   Male 79 years of age.   fever, ams / hx of htn, aaa, chf, copd, cad, vent dependence TECHNIQUE: AP view of the chest obtained portably at 5:13 AM. PREVIOUS STUDIES: November 11, November 10 and October 10, and September 13 2019. FINDINGS: There are median sternotomy wires and tracheostomy T2. Cardiac mediastinal contours are stable. Triangular density of atelectasis the medial right upper lung has increased. Diffuse interstitial prominence of the right lung is unchanged compared with the prior studies including those in September and October. The left interstitium is mildly. On review of prior exams, this has varied, for example was increased and similar to the right side on radiography September 13. On all of the subsequent studies, the prominence of the interstitium in the left lung has been very mild. The right hemidiaphragm is elevated. There is no convincing pleural effusion. There is no effusion on the recent CT abdomen November 6, 2019. IMPRESSION: 1. Increased atelectasis of the medial right upper lung bordering the mediastinum. 2. Diffuse interstitial prominence of the right lung has been ongoing (including on studies in September of October). The mildly prominent left interstitium is  similar to the recent studies and less than that in September. Assessment of the right lung base is limited given the elevated hemidiaphragm. There is no convincing pleural effusion. (There is no pleural effusion on recent CT abdomen which included the lower chest.) Electronically Signed by Aretha Gregory on 11/12/2019 7:24 AM    X-ray Chest Ap Portable    Result Date: 11/11/2019  EXAMINATION: XR CHEST AP PORTABLE CLINICAL HISTORY: pneumonia or not?; FINDINGS: Portable chest at 05:43 is compared to 11/10/2019 shows the heart is enlarged.  The patient has had a prior CABG.  Tracheostomy tube is in stable position. There is moderate elevation of the right hemidiaphragm.  There is small right pleural effusion with right lower lobe atelectasis. There is prominence of the pulmonary vasculature suggestive of congestion.  There are degenerative changes of the shoulders.     Cardiomegaly with moderate elevation the right hemidiaphragm.  There is still slight increase in size of the right pleural effusion and right lower lobe atelectasis Mild prominence of the pulmonary vasculature suggestive of congestion Electronically signed by: Silvia Velásquez MD Date:    11/11/2019 Time:    06:10    X-ray Chest Ap Portable    Result Date: 11/10/2019  PROCEDURE:   XR CHEST AP PORTABLE  dated  11/10/2019 5:28 AM CLINICAL HISTORY:   Male 79 years of age.   pneumonia or not? TECHNIQUE: AP view of the chest obtained portably at 5:28 AM. PREVIOUS STUDIES:  November 7, 2019. October 10, 2019. FINDINGS: Tracheostomy is at T2. There are median sternotomy wires and mediastinal surgical clips. Cardiac and mediastinal contours are stable. Diffuse right lung interstitial opacities and atelectasis over the elevated right hemidiaphragm are unchanged compared with the prior studies, including the study in October. There is mild left basilar atelectasis. (Note that there our 3 images, with varying degree of inflation.) There is no pleural effusion or  pneumothorax. IMPRESSION: No acute findings. No change in right lung diffuse interstitial opacities and atelectasis of the lung over the right hemidiaphragm. Electronically Signed by Aretha Gregory on 11/10/2019 9:53 AM    X-ray Chest Ap Portable    Result Date: 11/7/2019  EXAMINATION: XR CHEST AP PORTABLE CLINICAL HISTORY: Evaluate PICC line placement; FINDINGS: Portable chest with comparison dated 11/05/2019.  Normal cardiomediastinal silhouette.  Sternotomy wires and mediastinal surgical clips are again identified.  Tracheostomy tube is unchanged in position.  There is redemonstration of decreased bilateral lung volumes, with elevation of the right hemidiaphragm.  No significant change of bilateral patchy ground-glass opacities, right greater than left.  No acute osseous abnormality.     1. Unchanged decreased lung volumes with elevation of the right hemidiaphragm. 2. No significant change in bilateral patchy ground-glass opacities, right greater than left. Electronically signed by: Gio Little MD Date:    11/07/2019 Time:    09:03    Us Kidney    Result Date: 11/11/2019  EXAMINATION: US KIDNEY CLINICAL HISTORY: renal US to evaluate hydronephrosis; COMPARISON: CT dated 11/06/2019 FINDINGS: The right kidney measures 12.0 x 6.3 x 7.2 cm. The left kidney measures 10.8 x 5.5 by 5.5 cm.  There is a 10 mm nonobstructing stone in the upper pole the left kidney.  There is mild left hydronephrosis which is stable.  And There is limited visualization of the aorta and IVC.  The bladder is distended with echogenic debris.  A left ureteral jet is identified.     Stable left hydronephrosis with nonobstructing left renal stone Distended bladder with echogenic debris.  A left ureteral jet is identified. Electronically signed by: Silvia Velásquez MD Date:    11/11/2019 Time:    12:56    Ct Abdomen Pelvis  Without Contrast    Result Date: 11/6/2019  CMS MANDATED QUALITY DATA - CT RADIATION - 436 All CT scans at this facility  utilize dose modulation, iterative reconstruction, and/or weight based dosing when appropriate to reduce radiation dose to as low as reasonably achievable. EXAMINATION: CT ABDOMEN PELVIS WITHOUT CONTRAST CLINICAL HISTORY: Sepsis; TECHNIQUE: CT abdomen and pelvis without IV or oral contrast. Study is tailored for detection of urinary tract calculi and evaluation of solid organs, hollow viscera, and vascular structures is limited. COMPARISON: 09/12/2019 FINDINGS: CT Abdomen: There is bibasilar atelectasis.  The patient has had a prior CABG. The liver, spleen, pancreas and adrenal glands are normal. There are multiple gallstones.  There is no biliary duct dilatation. The left kidney is slightly smaller than the right.  There are nonobstructing right renal stones the largest measuring 3 mm.  Calculi within the left kidney and proximal left ureter. There is stable moderate left hydroureter with mild left hydronephrosis to the level of the ureterovesical junction.  There is no right hydronephrosis. There are no thick-walled or dilated bowel loops. There is a stable 5.2 x 5.4 cm infrarenal abdominal aortic aneurysm.  There is diffuse vascular calcification.  There is no adenopathy or free fluid.  There are moderate degenerative changes of the spine. CT pelvis There is a Rahman catheter within the bladder.  The bladder is collapsed with wall thickening.  The prostate gland is normal.  There are no thick-walled or dilated bowel loops.  There is diffuse osteopenia with degenerative changes of the spine and both hips.  There are bilateral fat containing inguinal hernias.     Stable nonobstructing bilateral renal stones Stable left hydronephrosis and hydroureter to the level of the ureteral vesicle junction Cholelithiasis Stable infrarenal abdominal aortic aneurysm Moderate degenerative changes of the spine and hips Stable atelectasis in lung bases Electronically signed by: Silvia Velásquez MD Date:    11/06/2019  Time:    10:42        Assessment/Plan:      * Sepsis due to Gram-negative organism with septic shock  Ongoing septic shock requiring Levophed for blood pressure support.  Blood cultures growing GNR.  Lactic acid 2.7.  Continue Levophed, wean as tolerated.  Trend lactate until less than 2.  Close monitoring of hemodynamics.  Daily labs.    Bacteremia due to Gram-negative bacteria  Preliminary blood cultures sent on admission growing GNR.  History of Pseudomonas colonization.  Discussed with ID suspect secondary to urinary source but await speciation.  Continue empiric IV Zosyn.  Repeat blood culture to document clearance.  Renal ultrasound being repeated as history of left-sided nephrolithiasis with hydronephrosis.      Candida UTI  Continuing previously prescribed fluconazole.       C. difficile colitis  Rectal tube in place.  Continue oral vancomycin.    Encephalopathy, metabolic  As per report at baseline he is a functional quadriplegic, does protrude his tongue to command.  He is not doing same today.  Possibly secondary to sepsis and infection, history of seizures and possibly post ictal do no evidence to support same  Continue to treat acute on chronic medical issues.  Follow clinically.      Acquired hypothyroidism  Still profoundly hypothyroid with .  Possibly contributing to encephalopathy.  Continue IV levothyroxine 100 mcg.    CKD (chronic kidney disease), stage III  Serum creatinine slightly higher than baseline.  Renally dose all medications and avoid nephrotoxin drugs.  Trend BMP.      Foot ulcer-unstageable, POA  Pressure off loading and wound care following      Goals of care, counseling/discussion  As per admission note-He came with paperwork from [employer] that states he is a full code and thus I have made him a full code at this time.  During a previous hospital stay, I spoke to Malgorzata Fan at [phone], who had been making his medical decisions as she reports he had no family that was  not estranged. Will need to make attempts to re contact her for any directions in changing code status and perhaps moving to comfort care.    Unable to locate Advanced directive at this time.  Will contract [facility] to determine if 1 on file.  Would abide by 1 if present.  Continued to attempt to contact family member to readdress goals of care as ideally patient is a hospice candidate.      Essential hypertension now with hypotension on Levophed  Currently in shock.  Holding coreg, hytrin, lasix      Chronic respiratory failure with hypoxia and hypercapnia secondary to JUNAID/OHS with chronic vent dependence  Trach with mechanical ventilation.  Ventilator precautions.  Appreciate Pulmonary input.    Coronary artery disease  Chronic condition.  Will continue to hold Coreg for today.      Sacral decubitus ulcer, stage II  Present on arrival.  Wound Care following.      Hemiparesis affecting dominant side as late effect of stroke        PEG (percutaneous endoscopic gastrostomy) status  Resume tube feedings as recommended by dietary.      Functional quadriplegia  Baseline functional quadriplegic.        VTE Risk Mitigation (From admission, onward)    None                Kiah Le MD  Department of Hospital Medicine   [facility]

## 2019-12-05 NOTE — ASSESSMENT & PLAN NOTE
Ongoing septic shock requiring Levophed for blood pressure support.  Blood cultures growing GNR.  Lactic acid 2.7.  Continue Levophed, wean as tolerated.  Trend lactate until less than 2.  Close monitoring of hemodynamics.  Daily labs.

## 2019-12-05 NOTE — PROGRESS NOTES
VANCOMYCIN PHARMACOKINETIC NOTE:  Vancomycin Day # 1    Objective/Assessment:    Diagnosis/Indication for Vancomycin: Bacteremia,    79 y.o., male; Actual Body Weight = 129.3 kg (285 lb 1.6 oz).    The patient has the following labs:     12/4/2019 Estimated Creatinine Clearance: 38.3 mL/min (A) (based on SCr of 2.2 mg/dL (H)). Lab Results   Component Value Date    BUN 53 (H) 12/04/2019       Lab Results   Component Value Date    WBC 14.75 (H) 12/04/2019            Plan:  Adjust vancomycin dose and/or frequency based on the patient's actual weight and renal function:  Initiate Vancomycin 2000 mg IV every 24 hours.  Orders have been entered into patient's chart.    Vancomycin dose = 15 mg/kg actual body weight    Vancomycin trough level has been ordered for prior to second dose.    Pharmacy will manage vancomycin therapy, monitor serum vancomycin levels, monitor renal function and adjust regimen as necessary.      Will follow random vancomycin levels and redose when serum vancomycin level decreases to 10-15      Thank you for allowing us to participate in this patient's care.     Brayan Mitchell 12/4/2019 7:01 PM  Department of Pharmacy  Ext [phone]

## 2019-12-05 NOTE — HPI
79 year old male with PMHx CVA, functional quadriplegia, trach, peg sent from [facility] secondary to hypotension, bradycardia, unresponsiveness.  He has a rectal tube in place for C. Diff colitis and is on vanc per peg tube.  He has a chronic indwelling tay that was changed and he is on fluconazole for fungal UTI.  Per my chart review, he's had several admissions within the past few months for septic shock, unresponsiveness. He does not currently open his eyes to voice or stimuli though he does fight me when I try to open his eyelids.  Initial HR was reported to be 30.  Initial BP was reported to be 78/35.  He was given atropine x 2, epi x 2 and started on levophed.  In the ED he was also given solumedrol, IV flagyl, IV zosyn and I see Vanc and Meropenem ordered. EKG reveals bradycardia with wide QRS which appears new.

## 2019-12-05 NOTE — ASSESSMENT & PLAN NOTE
Nicholas.  Dr. Connors consulted.  Patient started on Dopamine gtt and levophed gtt in the ED .  Dopamine gtt was off at the time of my exam and HR was up to 100. Still on levophed.    Holding Coreg.    Tele monitoring.   Trending serial Zoltan. Echo done 9/13/19 and EF 50% with grade III diastolic dysfunction.

## 2019-12-05 NOTE — ASSESSMENT & PLAN NOTE
Still profoundly hypothyroid with .  Possibly contributing to encephalopathy.  Continue IV levothyroxine 100 mcg.

## 2019-12-05 NOTE — ASSESSMENT & PLAN NOTE
Covering for both cardiogenic and septic shock  Dopamine gtt stopped as bradycardia better.  Cardiology consulted.  Coreg held.  Broad IV abx.  Cultures in progress.  ID consulted.

## 2019-12-05 NOTE — SUBJECTIVE & OBJECTIVE
Interval History:  Patient is nonverbal, not following commands, functional quadriplegic.  Now off dopamine and heart rate much improved actually in the 80s to 90s, continues to require Levophed for blood pressure support.  Did try to wean however became hypotensive.  Blood cultures sent on admission growing GNR in 2/2 bottles.  Repeat cultures sent this morning.  Currently on the vent, settings FiO2 30% with peep of 5.  As per chart review did have advanced directive sent in from [facility] however unable to locate.  Unable to reach family member.  Scant amount of white respiratory secretions being suctioned.  Chronic wound to the sacrum and right foot.  Discussed with nursing.  Discussed with Infectious Disease, colonized with Pseudomonas, await speciation of GNR from blood, multiple readmissions over the last 5 years.      Review of Systems   Unable to perform ROS: Patient nonverbal     Objective:     Vital Signs (Most Recent):  Temp: 98.7 °F (37.1 °C) (12/05/19 1300)  Pulse: 99 (12/05/19 1543)  Resp: (!) 2 (12/05/19 1543)  BP: 112/63 (12/05/19 1400)  SpO2: 100 % (12/05/19 1543) Vital Signs (24h Range):  Temp:  [97.1 °F (36.2 °C)-99.2 °F (37.3 °C)] 98.7 °F (37.1 °C)  Pulse:  [] 99  Resp:  [0-18] 2  SpO2:  [97 %-100 %] 100 %  BP: ()/(52-69) 112/63  Arterial Line BP: ()/() 123/73     Weight: 129.3 kg (285 lb 1.6 oz)  Body mass index is 36.6 kg/m².    Intake/Output Summary (Last 24 hours) at 12/5/2019 1639  Last data filed at 12/5/2019 1300  Gross per 24 hour   Intake 340 ml   Output 4260 ml   Net -3920 ml      Physical Exam   Constitutional: He appears well-developed and well-nourished. No distress.   Obese male, chronically ill-appearing, spontaneously opens eyes   HENT:   Head: Normocephalic and atraumatic.   Mouth/Throat: Oropharynx is clear and moist.   Eyes: Pupils are equal, round, and reactive to light. Right eye exhibits no discharge. Left eye exhibits no discharge.   Neck:   Trach with  dressing underlying   Cardiovascular: Normal rate and regular rhythm.   No murmur heard.  Pulmonary/Chest: No stridor. He has no wheezes.   Tracheostomy in place, mechanically ventilated with current settings FiO2 30, peep of 5, mechanical breath sounds anteriorly   Abdominal: There is no tenderness. There is no rebound and no guarding.   Protuberant, PEG in place, rectal tube in place with dark brown liquid stool   Genitourinary:   Genitourinary Comments: Rahman in place with yellow urine draining   Musculoskeletal:   Functional quadriplegia   Neurological:   Spontaneously opening eyes.  Not to voice only.  Not protruding tongue to command.  Functional quadriplegic.   Skin: No rash noted. He is not diaphoretic.   Has known sacral Decub as well as pressure skin breakdown to BLE, heels, feet, toes POA   Psychiatric:   Unable to assess   Nursing note and vitals reviewed.      Significant Labs:   ABGs:   Recent Labs   Lab 12/05/19  0500   PH 7.480*   PCO2 46.8*   HCO3 34.8*   POCSATURATED 99   BE 11     Bilirubin:   Recent Labs   Lab 11/10/19  1509 11/21/19  0045 11/22/19  0430 12/04/19  1131 12/05/19  0426   BILITOT 1.0 0.6 0.7 0.7 0.9     Blood Culture:   Recent Labs   Lab 12/04/19  1217 12/04/19  2050 12/05/19  0805   LABBLOO No Growth to date  No Growth to date No Growth to date No Growth to date     BMP:   Recent Labs   Lab 12/05/19  0426   GLU 97      K 4.7   CL 97   CO2 31*   BUN 46*   CREATININE 2.2*   CALCIUM 9.5   MG 2.3     CBC:   Recent Labs   Lab 12/04/19  1131 12/05/19  0426 12/05/19  0500   WBC 14.75* 9.12  --    HGB 10.5* 10.7*  --    HCT 33.7* 33.4* 34*    151  --      CMP:   Recent Labs   Lab 12/04/19  1131 12/05/19  0426   * 138   K 5.3* 4.7   CL 87* 97   CO2 34* 31*    97   BUN 53* 46*   CREATININE 2.2* 2.2*   CALCIUM 9.8 9.5   PROT 8.7* 8.2   ALBUMIN 3.0* 2.7*   BILITOT 0.7 0.9   ALKPHOS 78 75   AST 30 34   ALT 17 18   ANIONGAP 11 10   EGFRNONAA 27.3* 27.3*     Cardiac  Markers:   Recent Labs   Lab 12/04/19  1131   *     Coagulation:   Recent Labs   Lab 12/05/19  0426   INR 1.3     Lactic Acid:   Recent Labs   Lab 12/04/19  1608 12/04/19  1959 12/05/19  0428   LACTATE 2.5* 2.2* 2.7*     Magnesium:   Recent Labs   Lab 12/04/19  1131 12/05/19  0426   MG 2.4 2.3     Pathology Results  (Last 10 years)    None        Troponin:   Recent Labs   Lab 12/04/19  1131 12/04/19  1743 12/05/19  0426   TROPONINI 0.032 0.084* 0.094*     TSH:   Recent Labs   Lab 12/04/19  1131   .830*     Urine Culture: No results for input(s): LABURIN in the last 48 hours.  Urine Studies:   Recent Labs   Lab 12/04/19  1217   COLORU Yellow   APPEARANCEUA Hazy*   PHUR 7.0   SPECGRAV 1.010   PROTEINUA Trace*   GLUCUA Negative   KETONESU Negative   BILIRUBINUA Negative   OCCULTUA 2+*   NITRITE Negative   UROBILINOGEN Negative   LEUKOCYTESUR 3+*   RBCUA 4   WBCUA >100*   BACTERIA Rare   SQUAMEPITHEL 1   HYALINECASTS 4*     All pertinent labs within the past 24 hours have been reviewed.    Significant Imaging: I have reviewed all pertinent imaging results/findings within the past 24 hours.   Ct Head Without Contrast    Result Date: 12/4/2019  CLINICAL HISTORY: ([id number])80 y/o  (1940) M Confusion/delirium, altered LOC, unexplained; TECHNIQUE: (A#[id number], exam time 12/4/2019 15:21) CT HEAD WITHOUT CONTRAST [id number] Axial CT of the brain without contrast using soft tissue and bone algorithm. Please note in the acute setting if there is a clinical concern for an acute stroke MRI would be more sensitive/specific for evaluation of ischemia. CMS MANDATED QUALITY DATA - CT RADIATION - 436 All CT scans at this facility utilize dose modulation, iterative reconstruction, and/or weight based dosing when appropriate to reduce radiation dose to as low as reasonably achievable. COMPARISON: Most recent CT from 11/21/2019 FINDINGS: No acute intracranial hemorrhage, edema or mass effect. There is no hydrocephalus,  herniation or midline shift, and the basal and suprasellar cisterns are within normal limits. The osseous structures show no acute skull fracture. Moderate diffuse cerebral and cerebellar atrophy for age with moderate periventricular deep cerebral white matter low attenuation nonspecific findings which can be seen in any diffuse white matter process but which are most commonly associated with chronic microvascular ischemic disease (noting that normal pressure hydrocephalus in sub appended pole flow is CSF can present a similar manner).  There are scattered atheromatous calcifications in the intracranial internal carotid arteries. Orbital contents appear within normal limits. External auditory canals are unremarkable.  There is opacification left sphenoid and mucoperiosteal thickening in the ethmoid air cells.  There is trace fluid in the inferior margin of the left-sided mastoid air cells.     1.  No acute intracranial process. 2.  Chronic and involutional findings as noted above. 3.  Unchanged mucoperiosteal disease in the paranasal sinuses as above. Electronically signed by: Markos Renteria MD Date:    12/04/2019 Time:    15:26    Ct Head Without Contrast    Result Date: 11/21/2019  Exam: CT OF THE BRAIN WITHOUT CONTRAST Clinical data: Altered mental status. Technique: Contiguous axial images are obtained from the skull base to vertex without intravenous contrast. Radiation dose: CTDIvol = 57.50 mGy, DLP = 1073.80 mGy x cm. Prior studies: No prior studies submitted. Findings: No acute intracranial abnormality is present. No evidence of acute cortical infarction, hemorrhage, mass or mass effect.  There is evidence of chronic ischemic white matter changes secondary to small vessel disease.  There is also prominence of the cortical sulci diffusely.  There is also gliosis in the parasylvian regions bilaterally which can be seen with old infarcts.  There is no evidence of hydrocephalus or abnormal extra-axial fluid  collections are present. The posterior fossa is unremarkable. The skull base and calvarium are intact.  Mucosal disease in the left sphenoid sinus and posterior ethmoid air cells.  Bilateral mild-to-moderate mastoiditis. IMPRESSION: No evidence of acute intracranial abnormality or mass. There is evidence of chronic ischemic white matter changes changes secondary to small vessel disease. There is also moderate, diffuse cerebral atrophy. There is hypoattenuation in the parasylvian regions bilaterally which may be secondary to old infarcts. Recommendation: Follow up as clinically indicated. All CT scans at this facility utilize dose modulation, iterative reconstruction, and/or weight based dosing when appropriate to reduce radiation dose to as low as reasonably achievable. Electronically Signed by UZIEL MARTIN MD at 11/21/2019 5:16:33 AM    Ct Head Without Contrast    Result Date: 11/6/2019  CMS MANDATED QUALITY DATA - CT RADIATION - 436 All CT scans at this facility utilize dose modulation, iterative reconstruction, and/or weight based dosing when appropriate to reduce radiation dose to as low as reasonably achievable. EXAMINATION: CT HEAD WITHOUT CONTRAST CLINICAL HISTORY: Abnormal reflex;Pt having jerking motions full body; TECHNIQUE: Head CT without IV contrast. All CT scans at this facility use dose modulation, iterative reconstruction, and/or weight based dosing when appropriate to reduce radiation dose to as low as reasonably achievable. COMPARISON: 10/02/2019 FINDINGS: No acute intracranial hemorrhage, midline shift, or mass effect. Diffuse cerebral and cerebellar atrophy is evident. Moderate periventricular and subcortical white matter low-attenuation suggests chronic small vessel ischemic changes.  There are bilateral lacunar infarcts within the basal ganglia. The ventricles and cisterns are maintained. Calvarium is intact, and visualized sinuses are clear.     1. No acute intracranial abnormality. 2.  Chronic small vessel ischemic changes. Electronically signed by: Silvia Velásquez MD Date:    11/06/2019 Time:    10:35    Us Retroperitoneal Complete    Result Date: 12/5/2019  EXAMINATION: US RETROPERITONEAL COMPLETE CLINICAL HISTORY: UTI, septic shock, left ureteral stone, compare to previous ultrasound and CT; COMPARISON: None. FINDINGS: The examination is limited by patient's body habitus and inability of the patient to cooperate with the performance of the examination. The right kidney measures 10.5 cm sagittally.  The left kidney measures 9.1 cm.  Previously described left-sided hydronephrosis appears similar to the prior examination.  Nonobstructing calculi are observed in the lower left kidney.  There are areas of apparent cortical thinning on the left.  There is no right-sided hydronephrosis.  There are no suspicious masses.  The bladder is nondistended and not adequately assessed.     Limited examination as above. Unchanged left-sided hydronephrosis. Left-sided nephrolithiasis. Electronically signed by: Stuart Talavera IV., MD Date:    12/05/2019 Time:    10:27    X - Ray Chest Ap Portable    Result Date: 12/5/2019  EXAMINATION: XR CHEST AP PORTABLE CLINICAL HISTORY: Sepsis; COMPARISON: 12/04/2019. FINDINGS: A tracheostomy cannula and left IJ central venous catheter remain in place. There is persistent elevation of the right hemidiaphragm.  Patchy pulmonary opacities in the right mid and lower lung zone remain unchanged compared to the prior study.  There also appears to be mild airspace disease or volume loss at the left lung base which appears slightly more pronounced on the current exam.  There are no significant effusions. The cardiomediastinal silhouette and pulmonary vasculature are stable.     Patchy airspace opacities in the right mid lung zone and bilateral lung bases.  Opacities in the left lung base appear slightly more prominent on the current study. Unchanged elevation of the right hemidiaphragm.  Stable positioning of tracheostomy cannula and left IJ central catheter. Electronically signed by: Stuart Talavera IV., MD Date:    12/05/2019 Time:    06:13    X-ray Chest Ap Portable    Result Date: 12/4/2019  CLINICAL HISTORY: ([id number])78 y/o  (1940) M CHF; TECHNIQUE: (A#[id number], exam time 12/4/2019 11:11) XR CHEST AP PORTABLE [id number] COMPARISON: Radiograph most recently from 11/21/2019 FINDINGS: Perihilar pulmonary vascular prominence with increased central hilar interstitial lung markings bilaterally no elevation of the right hemidiaphragm similar to the previous exam.  No pneumothorax is seen.  The heart is mildly enlarged with prominence of the perihilar pulmonary vascular pedicle.  There is a tracheostomy tube, unchanged with tip at the level of the thoracic inlet.  Median sternotomy wires are seen.  Osseous structures are unchanged surgical changes in the lower cervical spine.     Unchanged radiograph of the chest when accounting for differences in imaging technique. Electronically signed by: Markos Renteria MD Date:    12/04/2019 Time:    11:13    X-ray Chest Ap Portable    Result Date: 11/21/2019  PROCEDURE:   XR CHEST AP PORTABLE  dated  11/21/2019 12:37 AM CLINICAL HISTORY:   Male 79 years of age.   Altered mental status TECHNIQUE: AP view of the chest obtained portably at 12:39 AM. PREVIOUS STUDIES:  November 12, 2019 FINDINGS: There are median sternotomy wires and mediastinal clips. Tracheostomy is at T10. Interstitial opacities throughout the right lung are unchanged compared to prior study. Right hemidiaphragm is elevated. Cardiac mediastinal contours are stable. The heart is not enlarged. IMPRESSION: No change compared with prior radiography November 12. Elevated hemidiaphragm, and interstitial opacities of the right lung. Electronically Signed by Aretha Gregory on 11/21/2019 7:43 AM    X-ray Chest Ap Portable    Result Date: 11/12/2019  CLINICAL HISTORY: ([id number])78 y/o  (1940) M PICC  line; TECHNIQUE: (A#[id number], exam time 11/12/2019 10:25) XR CHEST AP PORTABLE [id number] COMPARISON: Radiograph most recently from 11/12/2019 at 05:13 FINDINGS: Scattered interstitial opacities in the right greater than left lung.  There is blunting of both costophrenic angles consistent with trace pleural effusions and adjacent atelectasis. No pneumothorax is identified. The heart is top normal in size. Median sternotomy wires are unchanged. Osseous structures appear unchanged. The visualized upper abdomen is unremarkable Lines and tubes: Interval placement of a left-sided PICC with tip at the level of the SVC.  There is a tracheostomy tube with tip at the level of the thoracic inlet.     Interval placement of a left-sided PICC with tip at the level of the SVC, otherwise unchanged radiograph the chest Electronically signed by: Markos Renteria MD Date:    11/12/2019 Time:    10:31    X-ray Chest Ap Portable    Result Date: 11/12/2019  PROCEDURE:   XR CHEST AP PORTABLE  dated  11/12/2019 5:12 AM CLINICAL HISTORY:   Male 79 years of age.   fever, ams / hx of htn, aaa, chf, copd, cad, vent dependence TECHNIQUE: AP view of the chest obtained portably at 5:13 AM. PREVIOUS STUDIES: November 11, November 10 and October 10, and September 13 2019. FINDINGS: There are median sternotomy wires and tracheostomy T2. Cardiac mediastinal contours are stable. Triangular density of atelectasis the medial right upper lung has increased. Diffuse interstitial prominence of the right lung is unchanged compared with the prior studies including those in September and October. The left interstitium is mildly. On review of prior exams, this has varied, for example was increased and similar to the right side on radiography September 13. On all of the subsequent studies, the prominence of the interstitium in the left lung has been very mild. The right hemidiaphragm is elevated. There is no convincing pleural effusion. There is no effusion on the  recent CT abdomen November 6, 2019. IMPRESSION: 1. Increased atelectasis of the medial right upper lung bordering the mediastinum. 2. Diffuse interstitial prominence of the right lung has been ongoing (including on studies in September of October). The mildly prominent left interstitium is similar to the recent studies and less than that in September. Assessment of the right lung base is limited given the elevated hemidiaphragm. There is no convincing pleural effusion. (There is no pleural effusion on recent CT abdomen which included the lower chest.) Electronically Signed by Aretha Gregory on 11/12/2019 7:24 AM    X-ray Chest Ap Portable    Result Date: 11/11/2019  EXAMINATION: XR CHEST AP PORTABLE CLINICAL HISTORY: pneumonia or not?; FINDINGS: Portable chest at 05:43 is compared to 11/10/2019 shows the heart is enlarged.  The patient has had a prior CABG.  Tracheostomy tube is in stable position. There is moderate elevation of the right hemidiaphragm.  There is small right pleural effusion with right lower lobe atelectasis. There is prominence of the pulmonary vasculature suggestive of congestion.  There are degenerative changes of the shoulders.     Cardiomegaly with moderate elevation the right hemidiaphragm.  There is still slight increase in size of the right pleural effusion and right lower lobe atelectasis Mild prominence of the pulmonary vasculature suggestive of congestion Electronically signed by: Silvia Velásquez MD Date:    11/11/2019 Time:    06:10    X-ray Chest Ap Portable    Result Date: 11/10/2019  PROCEDURE:   XR CHEST AP PORTABLE  dated  11/10/2019 5:28 AM CLINICAL HISTORY:   Male 79 years of age.   pneumonia or not? TECHNIQUE: AP view of the chest obtained portably at 5:28 AM. PREVIOUS STUDIES:  November 7, 2019. October 10, 2019. FINDINGS: Tracheostomy is at T2. There are median sternotomy wires and mediastinal surgical clips. Cardiac and mediastinal contours are stable. Diffuse right lung  interstitial opacities and atelectasis over the elevated right hemidiaphragm are unchanged compared with the prior studies, including the study in October. There is mild left basilar atelectasis. (Note that there our 3 images, with varying degree of inflation.) There is no pleural effusion or pneumothorax. IMPRESSION: No acute findings. No change in right lung diffuse interstitial opacities and atelectasis of the lung over the right hemidiaphragm. Electronically Signed by Aretha Gregory on 11/10/2019 9:53 AM    X-ray Chest Ap Portable    Result Date: 11/7/2019  EXAMINATION: XR CHEST AP PORTABLE CLINICAL HISTORY: Evaluate PICC line placement; FINDINGS: Portable chest with comparison dated 11/05/2019.  Normal cardiomediastinal silhouette.  Sternotomy wires and mediastinal surgical clips are again identified.  Tracheostomy tube is unchanged in position.  There is redemonstration of decreased bilateral lung volumes, with elevation of the right hemidiaphragm.  No significant change of bilateral patchy ground-glass opacities, right greater than left.  No acute osseous abnormality.     1. Unchanged decreased lung volumes with elevation of the right hemidiaphragm. 2. No significant change in bilateral patchy ground-glass opacities, right greater than left. Electronically signed by: Gio Little MD Date:    11/07/2019 Time:    09:03    Us Kidney    Result Date: 11/11/2019  EXAMINATION: US KIDNEY CLINICAL HISTORY: renal US to evaluate hydronephrosis; COMPARISON: CT dated 11/06/2019 FINDINGS: The right kidney measures 12.0 x 6.3 x 7.2 cm. The left kidney measures 10.8 x 5.5 by 5.5 cm.  There is a 10 mm nonobstructing stone in the upper pole the left kidney.  There is mild left hydronephrosis which is stable.  And There is limited visualization of the aorta and IVC.  The bladder is distended with echogenic debris.  A left ureteral jet is identified.     Stable left hydronephrosis with nonobstructing left renal stone Distended  bladder with echogenic debris.  A left ureteral jet is identified. Electronically signed by: Silvia Velásquez MD Date:    11/11/2019 Time:    12:56    Ct Abdomen Pelvis  Without Contrast    Result Date: 11/6/2019  CMS MANDATED QUALITY DATA - CT RADIATION - 436 All CT scans at this facility utilize dose modulation, iterative reconstruction, and/or weight based dosing when appropriate to reduce radiation dose to as low as reasonably achievable. EXAMINATION: CT ABDOMEN PELVIS WITHOUT CONTRAST CLINICAL HISTORY: Sepsis; TECHNIQUE: CT abdomen and pelvis without IV or oral contrast. Study is tailored for detection of urinary tract calculi and evaluation of solid organs, hollow viscera, and vascular structures is limited. COMPARISON: 09/12/2019 FINDINGS: CT Abdomen: There is bibasilar atelectasis.  The patient has had a prior CABG. The liver, spleen, pancreas and adrenal glands are normal. There are multiple gallstones.  There is no biliary duct dilatation. The left kidney is slightly smaller than the right.  There are nonobstructing right renal stones the largest measuring 3 mm.  Calculi within the left kidney and proximal left ureter. There is stable moderate left hydroureter with mild left hydronephrosis to the level of the ureterovesical junction.  There is no right hydronephrosis. There are no thick-walled or dilated bowel loops. There is a stable 5.2 x 5.4 cm infrarenal abdominal aortic aneurysm.  There is diffuse vascular calcification.  There is no adenopathy or free fluid.  There are moderate degenerative changes of the spine. CT pelvis There is a Rahman catheter within the bladder.  The bladder is collapsed with wall thickening.  The prostate gland is normal.  There are no thick-walled or dilated bowel loops.  There is diffuse osteopenia with degenerative changes of the spine and both hips.  There are bilateral fat containing inguinal hernias.     Stable nonobstructing bilateral renal stones Stable left  hydronephrosis and hydroureter to the level of the ureteral vesicle junction Cholelithiasis Stable infrarenal abdominal aortic aneurysm Moderate degenerative changes of the spine and hips Stable atelectasis in lung bases Electronically signed by: Silvia Velásquez MD Date:    11/06/2019 Time:    10:42

## 2019-12-05 NOTE — ASSESSMENT & PLAN NOTE
· His advanced directive was established as DNR during previous hospitalizations.  · This needs to be clarified.  · I attempted to contract his next of kin, but there was no answer at the number provided.  · Clearly, he is at the endo of his life, and our interventions are not providing any meaningful benefit.  · A pacemaker would be futile.

## 2019-12-05 NOTE — SUBJECTIVE & OBJECTIVE
Past Medical History:   Diagnosis Date    AAA (abdominal aortic aneurysm)     Anemia     BPH (benign prostatic hyperplasia)     CHF (congestive heart failure)     COPD (chronic obstructive pulmonary disease)     Coronary artery disease     Dementia     Dementia     Depression     GERD (gastroesophageal reflux disease)     Hyperlipidemia     Hypertension     Hypothyroid     Renal disorder     Respiratory failure, chronic     Ventilator dependence        Past Surgical History:   Procedure Laterality Date    ABDOMINAL SURGERY      CARDIAC SURGERY  1999    GASTROSTOMY TUBE PLACEMENT      SPINE SURGERY      TRACHEOSTOMY TUBE PLACEMENT         Review of patient's allergies indicates:   Allergen Reactions    Codeine Other (See Comments)       No current facility-administered medications on file prior to encounter.      Current Outpatient Medications on File Prior to Encounter   Medication Sig    ascorbic acid, vitamin C, (VITAMIN C) 500 mg/5 mL Syrp syrup 500 mg by PEG Tube route 2 (two) times daily.     baclofen (LIORESAL) 10 MG tablet 25 mg by PEG Tube route every 6 (six) hours.     banana flakes-t-galactooligos. (BANATROL PLUS) PwPk Take 1 packet by mouth 2 (two) times daily. Mix with water    busPIRone (BUSPAR) 5 MG Tab 5 mg by Per G Tube route 2 (two) times daily.    carvedilol (COREG) 3.125 MG tablet Take 1 tablet (3.125 mg total) by mouth 2 (two) times daily. (Patient taking differently: 3.125 mg by Per G Tube route 2 (two) times daily. )    duloxetine (CYMBALTA) 30 MG capsule 30 mg by PEG Tube route once daily.     enoxaparin (LOVENOX) 40 mg/0.4 mL Syrg Inject 40 mg into the skin once daily.    ferrous sulfate 220 mg (44 mg iron)/5 mL solution 220 mg by Per G Tube route every evening.     finasteride (PROSCAR) 5 mg tablet 5 mg by PEG Tube route once daily.     fluconazole 40 mg/ml (DIFLUCAN) 40 mg/mL suspension 200 mg by Per G Tube route once daily.    furosemide (LASIX) 20 MG  tablet Take 1 tablet (20 mg total) by mouth once daily. (Patient taking differently: 20 mg by Per G Tube route once daily. )    gabapentin (NEURONTIN) 300 MG capsule 1 capsule (300 mg total) by Per G Tube route 2 (two) times daily.    lacosamide (VIMPAT) 10 mg/mL Soln oral solution 200 mg by Per G Tube route once daily.    levETIRAcetam (KEPPRA) 500 MG Tab 1 tablet (500 mg total) by Per G Tube route 2 (two) times daily.    levothyroxine (SYNTHROID) 200 MCG tablet Take 1 tablet (200 mcg total) by mouth once daily. (Patient taking differently: 200 mcg by Per G Tube route once daily. )    menthol-zinc oxide (CALMOSEPTINE) 0.44-20.6 % Oint Apply 1 application topically once daily.    multivit-min-ferrous gluconate (CERTAVITE-ANTIOXID, IRON GLUC,) 9 mg iron/ 15 mL (15 mL) Liqd 15 mLs by Per G Tube route every evening.     polyethylene glycol (GLYCOLAX) 17 gram PwPk 17 g by Per G Tube route every evening. Hold for loose stool    potassium chloride 10% (KAYCIEL) 20 mEq/15 mL oral solution Take 10 mEq by mouth once daily. Per g tube     rivastigmine (EXELON) 9.5 mg/24 hr PT24 Place 1 patch onto the skin once daily.    Saccharomyces boulardii (FLORASTOR) 250 mg capsule 250 mg by Per G Tube route 2 (two) times daily.     selenium sulfide 2.5 % Lotn Apply 1 application topically twice a week. ON Tuesday AND SATURDAY    terazosin (HYTRIN) 1 MG capsule 1 mg by PEG Tube route once daily.    traZODone (DESYREL) 100 MG tablet 0.5 tablets (50 mg total) by Per G Tube route every evening.    acetaminophen (TYLENOL) 160 mg/5 mL Elix 650 mg by Per G Tube route every 4 (four) hours as needed.    albuterol-ipratropium 2.5mg-0.5mg/3mL (DUONEB) 0.5 mg-3 mg(2.5 mg base)/3 mL nebulizer solution Take 3 mLs by nebulization every 6 (six) hours as needed for Wheezing or Shortness of Breath.     balsam peru-castor oil Oint Apply topically once daily. (Patient taking differently: Apply 1 application topically once daily. )     HYDROcodone-acetaminophen (NORCO)  mg per tablet 1 tablet by Per G Tube route every 12 (twelve) hours as needed for Pain.     hydrocortisone (PROCTOZONE-HC) 2.5 % rectal cream Place 1 application rectally 2 (two) times daily as needed for Hemorrhoids.    ibuprofen (ADVIL,MOTRIN) 600 MG tablet Take 600 mg by mouth every 6 (six) hours as needed for Pain.    Lactoperoxi/Gluc Oxid/Pot Thio (BIOTENE DRY MOUTH MM) Swish and spit 15 mLs 4 (four) times daily. AND/OR PRN    lactose-reduced food (ISOSOURCE HN FEEDING TUBE) 70 mLs by FEEDING TUBE route once daily. 70 ml per hour x22 hrs daily. Hold 1 hr before and after giving synthroid    lidocaine (XYLOCAINE) 5 % Oint ointment Apply 1 g topically as needed (with each trach change).    nitroGLYCERIN (NITROSTAT) 0.4 MG SL tablet Place 0.4 mg under the tongue every 5 (five) minutes as needed for Chest pain. Seek medical help if pain is not relieved by the third dose.    [] vancomycin 250mg / 10ml Susp Take 5 mLs (125 mg total) by mouth every 6 (six) hours for 7 days, THEN 5 mLs (125 mg total) 2 (two) times daily for 7 days, THEN 5 mLs (125 mg total) once daily for 7 days, THEN 5 mLs (125 mg total) Every 3 (three) days.     Family History     Problem Relation (Age of Onset)    No Known Problems Mother, Father        Tobacco Use    Smoking status: Former Smoker    Smokeless tobacco: Never Used   Substance and Sexual Activity    Alcohol use: No    Drug use: No    Sexual activity: Never     Review of Systems   Unable to perform ROS: Mental status change     Objective:     Vital Signs (Most Recent):  Temp: 96.7 °F (35.9 °C) (190)  Pulse: 92 (19)  Resp: 18 (19)  BP: (!) 155/80 (19 1550)  SpO2: 100 % (19) Vital Signs (24h Range):  Temp:  [96.7 °F (35.9 °C)-98.6 °F (37 °C)] 96.7 °F (35.9 °C)  Pulse:  [] 92  Resp:  [0-22] 18  SpO2:  [81 %-100 %] 100 %  BP: ()/() 155/80  Arterial Line BP:  ()/(31-90) 159/81     Weight: 129.3 kg (285 lb 1.6 oz)  Body mass index is 36.6 kg/m².    Physical Exam   Constitutional: He appears well-developed. No distress.   Appears chronically ill  He is red in the face   HENT:   Head: Normocephalic and atraumatic.   Mouth/Throat: Oropharynx is clear and moist.   Eyes: Pupils are equal, round, and reactive to light. EOM are normal.   Neck: Normal range of motion.   Trach   Cardiovascular: Regular rhythm.   No murmur heard.  Pulmonary/Chest: Effort normal.   Coarse anteriorly    Abdominal: There is no tenderness. There is no rebound and no guarding.   Abd is full   Genitourinary:   Genitourinary Comments: Rahman cath placed 12/4  Rectal tube in place   Musculoskeletal: Normal range of motion.   Neurological:   Does not open eyes to voice or commands  Fights me when I try to open his eye lids  Does not follow commands  Known to have functional quadriplegia at baseline   Skin: No rash noted.   Has known sacral Decub as well as pressure skin breakdown to BLE, heels, feet, toes POA   Psychiatric:   Unable to assess   Nursing note and vitals reviewed.        CRANIAL NERVES     CN III, IV, VI   Pupils are equal, round, and reactive to light.  Extraocular motions are normal.        Significant Labs:   ABGs:   Recent Labs   Lab 12/04/19  1358   PH 7.328*   PCO2 65.8*   HCO3 34.5*   POCSATURATED 90*   BE 9     CBC:   Recent Labs   Lab 12/04/19  1108 12/04/19  1131   WBC  --  14.75*   HGB  --  10.5*   HCT 34* 33.7*   PLT  --  152     CMP:   Recent Labs   Lab 12/04/19  1131   *   K 5.3*   CL 87*   CO2 34*      BUN 53*   CREATININE 2.2*   CALCIUM 9.8   PROT 8.7*   ALBUMIN 3.0*   BILITOT 0.7   ALKPHOS 78   AST 30   ALT 17   ANIONGAP 11   EGFRNONAA 27.3*     Cardiac Markers:   Recent Labs   Lab 12/04/19  1131   *     Coagulation:   Recent Labs   Lab 12/04/19  1131   INR 1.2     Lactic Acid:   Recent Labs   Lab 12/04/19  1218 12/04/19  1608 12/04/19  1959   LACTATE  2.3* 2.5* 2.2*     TSH:   Recent Labs   Lab 12/04/19  1131   .830*       Significant Imaging: CXR: I have reviewed all pertinent results/findings within the past 24 hours and my personal findings are:  Increased lung markings bilaterally compared to last CXR  EKG: I have reviewed all pertinent results/findings within the past 24 hours and my personal findings are: Bradycardia with wide complex QRS.  No ST elevation.

## 2019-12-05 NOTE — PROGRESS NOTES
[facility]  Pulmonology  Progress Note    Subjective     More alert this morning but still not following commands.  Remains on low-dose vasopressors.    Review of Systems   Unable to perform ROS: Mental status change      I have personally reviewed the following during today's evaluation:  past medical history, ROS, family history, social history, surgical history, current inpatient medications,drug allergies, vital signs over the past 24 hours, results of relevant diagnostic studies and nursing/provider documentation from the past 24 hours.     Objective     VS Temp:  [96.7 °F (35.9 °C)-99.2 °F (37.3 °C)]   Pulse:  []   Resp:  [0-21]   BP: ()/(52-80)   SpO2:  [96 %-100 %]   Arterial Line BP: ()/()   Ideal body weight: 82.2 kg (181 lb 3.5 oz)  Adjusted ideal body weight: 101 kg (222 lb 12.3 oz)   I/O   Gross per 24 hour   Intake 340 ml   Output 4260 ml   Net -3920 ml        Vent SpO2 100% on 30% FiO2  Vent Mode: A/C  Oxygen Concentration (%):  [30-40] 30  Resp Rate Total:  [18 br/min-28 br/min] 18 br/min  Vt Set:  [550 mL] 550 mL  PEEP/CPAP:  [5 cmH20] 5 cmH20  Pressure Support:  [0 cmH20] 0 cmH20  Mean Airway Pressure:  [12 acR74-77 cmH20] 14 cmH20     PE Physical Exam   Constitutional: He appears well-developed and well-nourished. He is obese.   HENT:   Head: Normocephalic.   Right Ear: External ear normal.   Left Ear: External ear normal.   Nose: Nose normal.   Mouth/Throat: Oropharynx is clear and moist. Mallampati Score: III.   Neck: Normal range of motion. Neck supple. No JVD present. No tracheal deviation present. No thyromegaly present.   8.0 cuffed tracheostomy secure in pain.   Cardiovascular: Normal rate, regular rhythm, normal heart sounds and intact distal pulses. Exam reveals no gallop and no friction rub.   No murmur heard.  Pulmonary/Chest: Normal expansion and symmetric chest wall expansion. He has no wheezes. He has rhonchi. He has no rales.   Abdominal: Soft.  Bowel sounds are normal. He exhibits no distension and no mass. There is no rebound.   Musculoskeletal: Normal range of motion. He exhibits no edema, tenderness or deformity.   Lymphadenopathy:     He has no cervical adenopathy.   Neurological: He is unresponsive. He displays no atrophy and no tremor. No cranial nerve deficit. He displays no seizure activity. GCS eye subscore is 1. GCS verbal subscore is 1. GCS motor subscore is 1.   Skin: Skin is warm and dry. No rash noted. No cyanosis or erythema. Nails show no clubbing.   Nursing note and vitals reviewed.      Labs I have personally reviewed and interpreted all labs / diagnostic studies obtained over the past 24 hours, and relevant results are as follows:  Recent Labs   Lab 12/05/19  0426 12/05/19  0500   WBC 9.12  --    RBC 3.61*  --    HGB 10.7*  --    HCT 33.4* 34*     --    MCV 93  --    MCH 29.6  --    MCHC 32.0  --      --    K 4.7  --    CL 97  --    CO2 31*  --    BUN 46*  --    CREATININE 2.2*  --    MG 2.3  --    ALT 18  --    AST 34  --    ALKPHOS 75  --    BILITOT 0.9  --    PROT 8.2  --    ALBUMIN 2.7*  --    PH  --  7.480*   PCO2  --  46.8*   PO2  --  126*   HCO3  --  34.8*   POCSATURATED  --  99   BE  --  11   INR 1.3  --    TROPONINI 0.094*  --       Lactate:  2.7  Troponin:  0.094   Imaging I have personally reviewed and interpreted the following images and reviewed the associated Radiology report.  I have reviewed and interpreted all pertinent imaging results/findings within the past 24 hours.  CXR:  Patchy airspace opacities in the right mid lung zone and bilateral lung bases.  Opacities in the left lung base appear slightly more prominent on the current study.  Unchanged elevation of the right hemidiaphragm.  Stable positioning of tracheostomy cannula and left IJ central catheter.    Renal ultrasound:  Limited examination.  Unchanged left-sided hydronephrosis.  Left-sided nephrolithiasis.     Micro I have personally reviewed and  interpreted the available culture data.  Relevant results are as follows.  Blood Culture   Lab Results   Component Value Date    LABBLOO No Growth to date 12/04/2019   , Sputum Culture   Lab Results   Component Value Date    GSRESP <10 epithelial cells per low power field. 12/04/2019    GSRESP Moderate WBC's 12/04/2019    GSRESP Few  Gram positive cocci 12/04/2019    GSRESP Few Gram positive rods 12/04/2019    GSRESP Few Gram negative rods 12/04/2019    RESPIRATORYC Reduced Normal Respiratory gabriela 12/04/2019    RESPIRATORYC (A) 12/04/2019     PRESUMPTIVE PROTEUS SPECIES  Moderate  Identification and susceptibility pending      and Urine Culture    Lab Results   Component Value Date    LABURIN No growth 11/21/2019      Medications Scheduled    busPIRone  5 mg Per G Tube BID    chlorhexidine  15 mL Mouth/Throat BID    DULoxetine  30 mg Per G Tube Daily    ferrous sulfate  325 mg Per G Tube Daily    finasteride  5 mg Per G Tube Daily    fluconazole  200 mg Per G Tube Daily    lacosamide  200 mg Per G Tube Daily    Lactobacillus acidoph-L.bulgar  1 tablet Per G Tube TID WM    levetiracetam oral soln  500 mg Per G Tube BID    levothyroxine  100 mcg Intravenous Daily    mupirocin   Nasal BID    piperacillin-tazobactam (ZOSYN) IVPB  4.5 g Intravenous Q8H    vancomycin  125 mg Oral Q6H      Continuous Infusions:    DOPamine      norepinephrine bitartrate-D5W 0.02 mcg/kg/min (12/05/19 0900)      PRN   albuterol-ipratropium        Assessment       Active Hospital Problems    Diagnosis    *Shock    Functional quadriplegia    Essential hypertension    Coronary artery disease    Ventilator dependence    Chronic respiratory failure with hypoxia    Tracheostomy in place    PEG (percutaneous endoscopic gastrostomy) status    Sacral decubitus ulcer, stage II    Bradycardia    Encephalopathy, metabolic    Candida UTI    C. difficile colitis    Hemiparesis affecting dominant side as late effect of stroke     Acquired hypothyroidism      My Impression:  Functional quadriplegia and ventilator dependence secondary chronic hypercapnic respiratory failure as a consequence of obesity hypoventilation/JUNAID.  Recurrent episodes of septic shock and currently requiring vasopressors.    Plan     Neuro   Encephalopathy secondary to metabolic derangements   No evidence of occult unaddressed pathology..   No additional investigation needed.  Recommend observation for now..   Continue to treat underlying pathology.   Continued supportive care for now with close observation and frequent neurologic assessment.   Avoid sedating/derliogenic medications..    ENT  · Local trach care.    Pulmonary  · Continue LPV.  · No evidence of acute pathology.  · Chronic radiographic abnormalities noted.    Cardiac/Vascular  · Titrate vasopressors to maintain a MAP > 65 mmHg.    Renal/  · ABx for UTI.    ID  · Continue Abx for sepsis.    Hematology  · No indication for blood products.  · Monitor for now.    Endocrine  · IV synthroid.    GI  · Restart TFs.    Palliative Care  · His advanced directive was established as DNR during previous hospitalizations.  · This needs to be clarified.  · I attempted to contract his next of kin, but there was no answer at the number provided.  · Clearly, he is at the endo of his life, and our interventions are not providing any meaningful benefit.  · A pacemaker would be futile.     Mauricio Kinsey MD  Pulmonary / Critical Care Medicine  [facility]      Critical Care Time: Approximately >35 minutes    The patient is critically ill due to the following conditions that actively pose threat to life and bodily function:  Shock     The patient is at high risk of death due to central nervous system failure, circulatory failure, respiratory failure and requiring ongoing treatment including invasive mechanical ventilation, titration of vasoactive medications to prevent further life-threatening  deterioration.  Due to a high probability of clinically significant, life threatening deterioration, the patient required my highest level of preparedness to intervene emergently and I personally spent this critical care time directly and personally managing the patient.     Critical care was time spent personally by me on the following activities:    Obtaining a history   Examination of patient.   Reviewing pulse oximetry.   Providing medical care at the patients bedside.   Developing a treatment plan with patient or surrogate and bedside caregivers   Ordering and reviewing laboratory studies, radiographic studies, pulse oximetry.   Ordering and performing treatments and interventions.   Evaluation of patient's response to treatment.   Discussions with consultants while on the unit and immediately available to the patient.   Re-evaluation of the patient's condition.   Documentation in the medical record.    This critical care time did not overlap with that of any other provider or involve time for any procedures.     Mauricio Kinsey MD  Pulmonary / Critical Care Medicine  [facility]  12/05/2019  2:08 PM

## 2019-12-05 NOTE — ASSESSMENT & PLAN NOTE
Serum creatinine slightly higher than baseline.  Renally dose all medications and avoid nephrotoxin drugs.  Trend BMP.

## 2019-12-05 NOTE — H&P
[facility] Medicine  History & Physical    Patient Name: Masood Messina  MRN: [id number]  Admission Date: 12/4/2019  Attending Physician: Yamileth Stuart MD  Primary Care Provider: Jeramie Lombardi MD         Patient information was obtained from ER records.     Subjective:     Principal Problem:Shock    Chief Complaint:   Chief Complaint   Patient presents with    Bradycardia     HR 30 BP 78/35        HPI: 79 year old male with PMHx CVA, functional quadriplegia, trach, peg [facility] secondary to hypotension, bradycardia, unresponsiveness.  He has a rectal tube in place for C. Diff colitis and is on vanc per peg tube.  He has a chronic indwelling tay that was changed and he is on fluconazole for fungal UTI.  Per my chart review, he's had several admissions within the past few months for septic shock, unresponsiveness. He does not currently open his eyes to voice or stimuli though he does fight me when I try to open his eyelids.  Initial HR was reported to be 30.  Initial BP was reported to be 78/35.  He was given atropine x 2, epi x 2 and started on levophed.  In the ED he was also given solumedrol, IV flagyl, IV zosyn and I see Vanc and Meropenem ordered. EKG reveals bradycardia with wide QRS which appears new.     Past Medical History:   Diagnosis Date    AAA (abdominal aortic aneurysm)     Anemia     BPH (benign prostatic hyperplasia)     CHF (congestive heart failure)     COPD (chronic obstructive pulmonary disease)     Coronary artery disease     Dementia     Dementia     Depression     GERD (gastroesophageal reflux disease)     Hyperlipidemia     Hypertension     Hypothyroid     Renal disorder     Respiratory failure, chronic     Ventilator dependence        Past Surgical History:   Procedure Laterality Date    ABDOMINAL SURGERY      CARDIAC SURGERY  1999    GASTROSTOMY TUBE PLACEMENT      SPINE SURGERY      TRACHEOSTOMY TUBE PLACEMENT          Review of patient's allergies indicates:   Allergen Reactions    Codeine Other (See Comments)       No current facility-administered medications on file prior to encounter.      Current Outpatient Medications on File Prior to Encounter   Medication Sig    ascorbic acid, vitamin C, (VITAMIN C) 500 mg/5 mL Syrp syrup 500 mg by PEG Tube route 2 (two) times daily.     baclofen (LIORESAL) 10 MG tablet 25 mg by PEG Tube route every 6 (six) hours.     banana flakes-t-galactooligos. (BANATROL PLUS) PwPk Take 1 packet by mouth 2 (two) times daily. Mix with water    busPIRone (BUSPAR) 5 MG Tab 5 mg by Per G Tube route 2 (two) times daily.    carvedilol (COREG) 3.125 MG tablet Take 1 tablet (3.125 mg total) by mouth 2 (two) times daily. (Patient taking differently: 3.125 mg by Per G Tube route 2 (two) times daily. )    duloxetine (CYMBALTA) 30 MG capsule 30 mg by PEG Tube route once daily.     enoxaparin (LOVENOX) 40 mg/0.4 mL Syrg Inject 40 mg into the skin once daily.    ferrous sulfate 220 mg (44 mg iron)/5 mL solution 220 mg by Per G Tube route every evening.     finasteride (PROSCAR) 5 mg tablet 5 mg by PEG Tube route once daily.     fluconazole 40 mg/ml (DIFLUCAN) 40 mg/mL suspension 200 mg by Per G Tube route once daily.    furosemide (LASIX) 20 MG tablet Take 1 tablet (20 mg total) by mouth once daily. (Patient taking differently: 20 mg by Per G Tube route once daily. )    gabapentin (NEURONTIN) 300 MG capsule 1 capsule (300 mg total) by Per G Tube route 2 (two) times daily.    lacosamide (VIMPAT) 10 mg/mL Soln oral solution 200 mg by Per G Tube route once daily.    levETIRAcetam (KEPPRA) 500 MG Tab 1 tablet (500 mg total) by Per G Tube route 2 (two) times daily.    levothyroxine (SYNTHROID) 200 MCG tablet Take 1 tablet (200 mcg total) by mouth once daily. (Patient taking differently: 200 mcg by Per G Tube route once daily. )    menthol-zinc oxide (CALMOSEPTINE) 0.44-20.6 % Oint Apply 1  application topically once daily.    multivit-min-ferrous gluconate (CERTAVITE-ANTIOXID, IRON GLUC,) 9 mg iron/ 15 mL (15 mL) Liqd 15 mLs by Per G Tube route every evening.     polyethylene glycol (GLYCOLAX) 17 gram PwPk 17 g by Per G Tube route every evening. Hold for loose stool    potassium chloride 10% (KAYCIEL) 20 mEq/15 mL oral solution Take 10 mEq by mouth once daily. Per g tube     rivastigmine (EXELON) 9.5 mg/24 hr PT24 Place 1 patch onto the skin once daily.    Saccharomyces boulardii (FLORASTOR) 250 mg capsule 250 mg by Per G Tube route 2 (two) times daily.     selenium sulfide 2.5 % Lotn Apply 1 application topically twice a week. ON Tuesday AND SATURDAY    terazosin (HYTRIN) 1 MG capsule 1 mg by PEG Tube route once daily.    traZODone (DESYREL) 100 MG tablet 0.5 tablets (50 mg total) by Per G Tube route every evening.    acetaminophen (TYLENOL) 160 mg/5 mL Elix 650 mg by Per G Tube route every 4 (four) hours as needed.    albuterol-ipratropium 2.5mg-0.5mg/3mL (DUONEB) 0.5 mg-3 mg(2.5 mg base)/3 mL nebulizer solution Take 3 mLs by nebulization every 6 (six) hours as needed for Wheezing or Shortness of Breath.     balsam peru-castor oil Oint Apply topically once daily. (Patient taking differently: Apply 1 application topically once daily. )    HYDROcodone-acetaminophen (NORCO)  mg per tablet 1 tablet by Per G Tube route every 12 (twelve) hours as needed for Pain.     hydrocortisone (PROCTOZONE-HC) 2.5 % rectal cream Place 1 application rectally 2 (two) times daily as needed for Hemorrhoids.    ibuprofen (ADVIL,MOTRIN) 600 MG tablet Take 600 mg by mouth every 6 (six) hours as needed for Pain.    Lactoperoxi/Gluc Oxid/Pot Thio (BIOTENE DRY MOUTH MM) Swish and spit 15 mLs 4 (four) times daily. AND/OR PRN    lactose-reduced food (ISOSOURCE HN FEEDING TUBE) 70 mLs by FEEDING TUBE route once daily. 70 ml per hour x22 hrs daily. Hold 1 hr before and after giving synthroid    lidocaine  (XYLOCAINE) 5 % Oint ointment Apply 1 g topically as needed (with each trach change).    nitroGLYCERIN (NITROSTAT) 0.4 MG SL tablet Place 0.4 mg under the tongue every 5 (five) minutes as needed for Chest pain. Seek medical help if pain is not relieved by the third dose.    [] vancomycin 250mg / 10ml Susp Take 5 mLs (125 mg total) by mouth every 6 (six) hours for 7 days, THEN 5 mLs (125 mg total) 2 (two) times daily for 7 days, THEN 5 mLs (125 mg total) once daily for 7 days, THEN 5 mLs (125 mg total) Every 3 (three) days.     Family History     Problem Relation (Age of Onset)    No Known Problems Mother, Father        Tobacco Use    Smoking status: Former Smoker    Smokeless tobacco: Never Used   Substance and Sexual Activity    Alcohol use: No    Drug use: No    Sexual activity: Never     Review of Systems   Unable to perform ROS: Mental status change     Objective:     Vital Signs (Most Recent):  Temp: 96.7 °F (35.9 °C) (19 1550)  Pulse: 92 (19)  Resp: 18 (19)  BP: (!) 155/80 (190)  SpO2: 100 % (19) Vital Signs (24h Range):  Temp:  [96.7 °F (35.9 °C)-98.6 °F (37 °C)] 96.7 °F (35.9 °C)  Pulse:  [] 92  Resp:  [0-22] 18  SpO2:  [81 %-100 %] 100 %  BP: ()/() 155/80  Arterial Line BP: ()/(31-90) 159/81     Weight: 129.3 kg (285 lb 1.6 oz)  Body mass index is 36.6 kg/m².    Physical Exam   Constitutional: He appears well-developed. No distress.   Appears chronically ill  He is red in the face   HENT:   Head: Normocephalic and atraumatic.   Mouth/Throat: Oropharynx is clear and moist.   Eyes: Pupils are equal, round, and reactive to light. EOM are normal.   Neck: Normal range of motion.   Trach   Cardiovascular: Regular rhythm.   No murmur heard.  Pulmonary/Chest: Effort normal.   Coarse anteriorly    Abdominal: There is no tenderness. There is no rebound and no guarding.   Abd is full   Genitourinary:   Genitourinary Comments: Rahman  cath placed 12/4  Rectal tube in place   Musculoskeletal: Normal range of motion.   Neurological:   Does not open eyes to voice or commands  Fights me when I try to open his eye lids  Does not follow commands  Known to have functional quadriplegia at baseline   Skin: No rash noted.   Has known sacral Decub as well as pressure skin breakdown to BLE, heels, feet, toes POA   Psychiatric:   Unable to assess   Nursing note and vitals reviewed.        CRANIAL NERVES     CN III, IV, VI   Pupils are equal, round, and reactive to light.  Extraocular motions are normal.        Significant Labs:   ABGs:   Recent Labs   Lab 12/04/19  1358   PH 7.328*   PCO2 65.8*   HCO3 34.5*   POCSATURATED 90*   BE 9     CBC:   Recent Labs   Lab 12/04/19  1108 12/04/19  1131   WBC  --  14.75*   HGB  --  10.5*   HCT 34* 33.7*   PLT  --  152     CMP:   Recent Labs   Lab 12/04/19  1131   *   K 5.3*   CL 87*   CO2 34*      BUN 53*   CREATININE 2.2*   CALCIUM 9.8   PROT 8.7*   ALBUMIN 3.0*   BILITOT 0.7   ALKPHOS 78   AST 30   ALT 17   ANIONGAP 11   EGFRNONAA 27.3*     Cardiac Markers:   Recent Labs   Lab 12/04/19  1131   *     Coagulation:   Recent Labs   Lab 12/04/19  1131   INR 1.2     Lactic Acid:   Recent Labs   Lab 12/04/19  1218 12/04/19  1608 12/04/19  1959   LACTATE 2.3* 2.5* 2.2*     TSH:   Recent Labs   Lab 12/04/19  1131   .830*       Significant Imaging: CXR: I have reviewed all pertinent results/findings within the past 24 hours and my personal findings are:  Increased lung markings bilaterally compared to last CXR  EKG: I have reviewed all pertinent results/findings within the past 24 hours and my personal findings are: Bradycardia with wide complex QRS.  No ST elevation.    Assessment/Plan:     * Shock  Covering for both cardiogenic and septic shock  Dopamine gtt stopped as bradycardia better.  Cardiology consulted.  Coreg held.  Broad IV abx.  Cultures in progress.  ID consulted.       Encephalopathy,  metabolic  Etiology?  Could be infectious and thus on broad IV abx including IV vanc and IV meropenem.  Continuing vanc per peg as this is the best coverage for C. Diff.  ID consulted.  Could be related to bradycardia and poor cerebral perfusion  CT head with no acute process  Medication induced?  Holding sedating medication including Gabapentin, Norco, Baclofen, Trazadone.  I have not seen this man at what was his previously described baseline which is able to mouth words, consistently follow commands, etc, throughout his last 2 hospital stays.  I do think he has continued to decline and has never reached this baseline.    He came with paperwork from [employer] that states he is a full code and thus I have made him a full code at this time.  During a previous hospital stay, I spoke to Malgorzata Fan at [phone], who had been making his medical decisions as she reports he had no family that was not estranged. Will need to make attempts to re contact her for any directions in changing code status and perhaps moving to comfort care.    Bradycardia  New.  Dr. Connors consulted.  Patient started on Dopamine gtt and levophed gtt in the ED .  Dopamine gtt was off at the time of my exam and HR was up to 100. Still on levophed.    Holding Coreg.    Tele monitoring.   Trending serial Zoltan. Echo done 9/13/19 and EF 50% with grade III diastolic dysfunction.      Acquired hypothyroidism  Still profoundly hypothyroid with . Contributing? Seen by Dr. Medina during last hospitalization. Will continue with IV synthroid at 100mcg.    C. difficile colitis  Has rectal tube in place  It appears that perhaps he was set to complete vanc per peg yesterday?  Consulting ID given shock.  Will continue for now and defer to them if he needs longer course.    Candida UTI  Continuing previously prescribed fluconazole.       Essential hypertension  Currently in shock.  Holding coreg, hytrin, lasix      Ventilator dependence  Pulmonary  consulted for vent management      Coronary artery disease  Chronic condition.  Holding coreg given bradycardia.  Trending Zoltan as above.  Tele monitoring.       Sacral decubitus ulcer, stage II  Wound care consult      Chronic respiratory failure with hypoxia    Has trach and is on vent.  Pulmonary consulted for vent management.      Functional quadriplegia          VTE Risk Mitigation (From admission, onward)    None             Yamileth Stuart MD  Department of Hospital Medicine   [facility]

## 2019-12-05 NOTE — ASSESSMENT & PLAN NOTE
Has rectal tube in place  It appears that perhaps he was set to complete vanc per peg yesterday?  Consulting ID given shock.  Will continue for now and defer to them if he needs longer course.

## 2019-12-05 NOTE — ASSESSMENT & PLAN NOTE
Chronic condition.  Holding coreg given bradycardia.  Trending Zoltan as above.  Tele monitoring.

## 2019-12-06 PROBLEM — R56.9 SEIZURE: Chronic | Status: ACTIVE | Noted: 2019-01-01

## 2019-12-06 PROBLEM — J15.69: Status: ACTIVE | Noted: 2019-01-01

## 2019-12-06 NOTE — SUBJECTIVE & OBJECTIVE
Interval History:  Patient does spontaneously open eyes and having involuntary movements of his jaw/biting activity.  He does not protrude his tongue to command.  Levophed was tried to be weaned this morning however unsuccessful.  Continues on the vent settings FiO2 30 with peep of 5.  Now tracheal aspirate growing Proteus mirabilis, ESBL, discussed with micro lab seems preliminary though not confirmed on blood cultures also Proteus species.  Urine culture with GNR.  Urine output past 24 hr 2010mls.  T-max last 24 hr 99.8.  Communicated about antibiotic and culture with Infectious Disease. Case discussed with nursing at bedside.    Obtained faxed advance directive from [employer], full code.  No power of  listed.  Attempted to contact listed contacts on face sheet:  Malgorzata Fan (friend)- [phone]- unsuccessful, went to voicemail on multiple at times.  Naomi Grewal (niece)- [phone]- she states her father who is now  was power of , patient has been estranged from these family members for 30-40 years, she states when she last knew him he was living in low income housing with a van.  She states he has no children and is .  She is unsure how Malgorzata is related/known to him.    Review of Systems   Unable to perform ROS: Patient unresponsive     Objective:     Vital Signs (Most Recent):  Temp: 99 °F (37.2 °C) (19 1115)  Pulse: (!) 118 (19 1337)  Resp: 20 (19 1337)  BP: (!) 105/56 (19 1200)  SpO2: (!) 94 % (19 1337) Vital Signs (24h Range):  Temp:  [98.2 °F (36.8 °C)-99.8 °F (37.7 °C)] 99 °F (37.2 °C)  Pulse:  [] 118  Resp:  [2-21] 20  SpO2:  [87 %-100 %] 94 %  BP: ()/(56-75) 105/56  Arterial Line BP: ()/(58-75) 132/72     Weight: 129.3 kg (285 lb 1.6 oz)  Body mass index is 36.6 kg/m².    Intake/Output Summary (Last 24 hours) at 2019 1443  Last data filed at 2019 1200  Gross per 24 hour   Intake 551.3 ml   Output 1200 ml   Net  -648.7 ml      Physical Exam   Constitutional: He appears well-developed and well-nourished. No distress.   Obese male, chronically ill-appearing, spontaneously opens eyes, involuntary biting movement of jaw   HENT:   Head: Normocephalic and atraumatic.   Eyes: Pupils are equal, round, and reactive to light. Right eye exhibits no discharge. Left eye exhibits no discharge.   Neck:   Trach with dressing underlying   Cardiovascular:   No murmur heard.  Regular tachycardia   Pulmonary/Chest: No stridor. He has no wheezes.   Tracheostomy in place, mechanically ventilated with current settings FiO2 30, peep of 5, mechanical breath sounds anteriorly   Abdominal: There is no tenderness. There is no rebound and no guarding.   Protuberant, PEG in place, rectal tube in place with dark brown liquid stool   Genitourinary:   Genitourinary Comments: Rahman in place with yellow urine draining   Musculoskeletal:   Functional quadriplegia   Neurological:   Spontaneously opening eyes.  Not to voice only.  Not protruding tongue to command.  Functional quadriplegic.   Skin: No rash noted. He is not diaphoretic.   Has known sacral Decub as well as pressure skin breakdown to BLE, heels, feet, toes POA   Psychiatric:   Unable to assess   Nursing note and vitals reviewed.      Significant Labs:   Blood Culture:   Recent Labs   Lab 12/04/19 2050 12/05/19  0805 12/06/19  0401   LABBLOO No Growth to date  No Growth to date No Growth to date  No Growth to date No Growth to date     BMP:   Recent Labs   Lab 12/06/19  0401   GLU 92      K 3.8   CL 99   CO2 30*   BUN 42*   CREATININE 2.4*   CALCIUM 9.6   MG 2.2     CBC:   Recent Labs   Lab 12/05/19  0426 12/05/19  0500 12/06/19  0401   WBC 9.12  --  9.66   HGB 10.7*  --  10.0*   HCT 33.4* 34* 32.2*     --  146*     CMP:   Recent Labs   Lab 12/05/19  0426 12/06/19  0401    140   K 4.7 3.8   CL 97 99   CO2 31* 30*   GLU 97 92   BUN 46* 42*   CREATININE 2.2* 2.4*   CALCIUM 9.5  9.6   PROT 8.2  --    ALBUMIN 2.7*  --    BILITOT 0.9  --    ALKPHOS 75  --    AST 34  --    ALT 18  --    ANIONGAP 10 11   EGFRNONAA 27.3* 24.6*     Cardiac Markers: No results for input(s): CKMB, MYOGLOBIN, BNP, TROPISTAT in the last 48 hours.  Coagulation:   Recent Labs   Lab 12/05/19  0426   INR 1.3     Lactic Acid:   Recent Labs   Lab 12/04/19  1608 12/04/19  1959 12/05/19  0428   LACTATE 2.5* 2.2* 2.7*     Magnesium:   Recent Labs   Lab 12/05/19  0426 12/06/19  0401   MG 2.3 2.2     Respiratory Culture: No results for input(s): GSRESP, RESPIRATORYC in the last 48 hours.  Troponin:   Recent Labs   Lab 12/04/19  1743 12/05/19  0426   TROPONINI 0.084* 0.094*     TSH:   Recent Labs   Lab 12/04/19  1131   .830*     Urine Culture:   Recent Labs   Lab 12/04/19  1500   LABURIN GRAM NEGATIVE TOM, LACTOSE   >100,000 cfu/ml  Identification and susceptibility pending  *  PRESUMPTIVE PROTEUS SPECIES  50,000 - 99,999 cfu/ml  Identification and susceptibility pending  *     Urine Studies: No results for input(s): COLORU, APPEARANCEUA, PHUR, SPECGRAV, PROTEINUA, GLUCUA, KETONESU, BILIRUBINUA, OCCULTUA, NITRITE, UROBILINOGEN, LEUKOCYTESUR, RBCUA, WBCUA, BACTERIA, SQUAMEPITHEL, HYALINECASTS in the last 48 hours.    Invalid input(s): GIDEONR  All pertinent labs within the past 24 hours have been reviewed.    Significant Imaging: I have reviewed all pertinent imaging results/findings within the past 24 hours.     Retroperitoneal ultrasound  Unchanged left-sided hydronephrosis.    Left-sided nephrolithiasis.

## 2019-12-06 NOTE — PROGRESS NOTES
[facility]  Progress Note    Patient Name: Masood Messina  MRN: [id number]  Patient Class: IP- Inpatient   Admission Date: 12/4/2019  Length of Stay: 2 days  Attending Physician: Kiah Le MD  Primary Care Provider: Jeramie Lombardi MD        Subjective:     Principal Problem:Sepsis due to Gram-negative organism with septic shock        HPI:  79 year old male with PMHx CVA, functional quadriplegia, trach, peg [facility] secondary to hypotension, bradycardia, unresponsiveness.  He has a rectal tube in place for C. Diff colitis and is on vanc per peg tube.  He has a chronic indwelling tay that was changed and he is on fluconazole for fungal UTI.  Per my chart review, he's had several admissions within the past few months for septic shock, unresponsiveness. He does not currently open his eyes to voice or stimuli though he does fight me when I try to open his eyelids.  Initial HR was reported to be 30.  Initial BP was reported to be 78/35.  He was given atropine x 2, epi x 2 and started on levophed.  In the ED he was also given solumedrol, IV flagyl, IV zosyn and I see Vanc and Meropenem ordered. EKG reveals bradycardia with wide QRS which appears new.     Overview/Hospital Course:  Patient was admitted to the ICU.  He was briefly on dobutamine and heart rate improved and same was discontinued.  Needing continued Levophed for blood pressure support.  Started on Zosyn for gram-negative bacteremia likely of urinary source.  Continued on oral vancomycin for C diff.  Not following commands.  Continues with chronic vent dependence.  Appreciate consultant's input.  Sputum culture now growing ESBL Proteus.  Seems preliminary blood cultures with Proteus as well.  Changing Zosyn to meropenem and watching closely for seizure activity.  Still trying to contact listed contacts to identify next of kin, power of .    Interval History:  Patient does spontaneously  open eyes and having involuntary movements of his jaw/biting activity.  He does not protrude his tongue to command.  Levophed was tried to be weaned this morning however unsuccessful.  Continues on the vent settings FiO2 30 with peep of 5.  Now tracheal aspirate growing Proteus mirabilis, ESBL, discussed with micro lab seems preliminary though not confirmed on blood cultures also Proteus species.  Urine culture with GNR.  Urine output past 24 hr 2010mls.  T-max last 24 hr 99.8.  Communicated about antibiotic and culture with Infectious Disease. Case discussed with nursing at bedside.    Obtained faxed advance directive from Jennifer, full code.  No power of  listed.  Attempted to contact listed contacts on face sheet:  Malgorzata Fan (friend)- [phone]- unsuccessful, went to voicemail on multiple at times.  Naomi Grewal (niece)- [phone]- she states her father who is now  was power of , patient has been estranged from these family members for 30-40 years, she states when she last knew him he was living in low income housing with a van.  She states he has no children and is .  She is unsure how Malgozrata is related/known to him.    Review of Systems   Unable to perform ROS: Patient unresponsive     Objective:     Vital Signs (Most Recent):  Temp: 99 °F (37.2 °C) (19 1115)  Pulse: (!) 118 (19 1337)  Resp: 20 (19 1337)  BP: (!) 105/56 (19 1200)  SpO2: (!) 94 % (19 1337) Vital Signs (24h Range):  Temp:  [98.2 °F (36.8 °C)-99.8 °F (37.7 °C)] 99 °F (37.2 °C)  Pulse:  [] 118  Resp:  [2-21] 20  SpO2:  [87 %-100 %] 94 %  BP: ()/(56-75) 105/56  Arterial Line BP: ()/(58-75) 132/72     Weight: 129.3 kg (285 lb 1.6 oz)  Body mass index is 36.6 kg/m².    Intake/Output Summary (Last 24 hours) at 2019 1443  Last data filed at 2019 1200  Gross per 24 hour   Intake 551.3 ml   Output 1200 ml   Net -648.7 ml      Physical Exam   Constitutional: He  appears well-developed and well-nourished. No distress.   Obese male, chronically ill-appearing, spontaneously opens eyes, involuntary biting movement of jaw   HENT:   Head: Normocephalic and atraumatic.   Eyes: Pupils are equal, round, and reactive to light. Right eye exhibits no discharge. Left eye exhibits no discharge.   Neck:   Trach with dressing underlying   Cardiovascular:   No murmur heard.  Regular tachycardia   Pulmonary/Chest: No stridor. He has no wheezes.   Tracheostomy in place, mechanically ventilated with current settings FiO2 30, peep of 5, mechanical breath sounds anteriorly   Abdominal: There is no tenderness. There is no rebound and no guarding.   Protuberant, PEG in place, rectal tube in place with dark brown liquid stool   Genitourinary:   Genitourinary Comments: Rahman in place with yellow urine draining   Musculoskeletal:   Functional quadriplegia   Neurological:   Spontaneously opening eyes.  Not to voice only.  Not protruding tongue to command.  Functional quadriplegic.   Skin: No rash noted. He is not diaphoretic.   Has known sacral Decub as well as pressure skin breakdown to BLE, heels, feet, toes POA   Psychiatric:   Unable to assess   Nursing note and vitals reviewed.      Significant Labs:   Blood Culture:   Recent Labs   Lab 12/04/19 2050 12/05/19  0805 12/06/19  0401   LABBLOO No Growth to date  No Growth to date No Growth to date  No Growth to date No Growth to date     BMP:   Recent Labs   Lab 12/06/19  0401   GLU 92      K 3.8   CL 99   CO2 30*   BUN 42*   CREATININE 2.4*   CALCIUM 9.6   MG 2.2     CBC:   Recent Labs   Lab 12/05/19  0426 12/05/19  0500 12/06/19  0401   WBC 9.12  --  9.66   HGB 10.7*  --  10.0*   HCT 33.4* 34* 32.2*     --  146*     CMP:   Recent Labs   Lab 12/05/19  0426 12/06/19  0401    140   K 4.7 3.8   CL 97 99   CO2 31* 30*   GLU 97 92   BUN 46* 42*   CREATININE 2.2* 2.4*   CALCIUM 9.5 9.6   PROT 8.2  --    ALBUMIN 2.7*  --    BILITOT  0.9  --    ALKPHOS 75  --    AST 34  --    ALT 18  --    ANIONGAP 10 11   EGFRNONAA 27.3* 24.6*     Cardiac Markers: No results for input(s): CKMB, MYOGLOBIN, BNP, TROPISTAT in the last 48 hours.  Coagulation:   Recent Labs   Lab 12/05/19  0426   INR 1.3     Lactic Acid:   Recent Labs   Lab 12/04/19  1608 12/04/19  1959 12/05/19  0428   LACTATE 2.5* 2.2* 2.7*     Magnesium:   Recent Labs   Lab 12/05/19  0426 12/06/19  0401   MG 2.3 2.2     Respiratory Culture: No results for input(s): GSRESP, RESPIRATORYC in the last 48 hours.  Troponin:   Recent Labs   Lab 12/04/19  1743 12/05/19  0426   TROPONINI 0.084* 0.094*     TSH:   Recent Labs   Lab 12/04/19  1131   .830*     Urine Culture:   Recent Labs   Lab 12/04/19  1500   LABURIN GRAM NEGATIVE TOM, LACTOSE   >100,000 cfu/ml  Identification and susceptibility pending  *  PRESUMPTIVE PROTEUS SPECIES  50,000 - 99,999 cfu/ml  Identification and susceptibility pending  *     Urine Studies: No results for input(s): COLORU, APPEARANCEUA, PHUR, SPECGRAV, PROTEINUA, GLUCUA, KETONESU, BILIRUBINUA, OCCULTUA, NITRITE, UROBILINOGEN, LEUKOCYTESUR, RBCUA, WBCUA, BACTERIA, SQUAMEPITHEL, HYALINECASTS in the last 48 hours.    Invalid input(s): WRIGHTSUR  All pertinent labs within the past 24 hours have been reviewed.    Significant Imaging: I have reviewed all pertinent imaging results/findings within the past 24 hours.     Retroperitoneal ultrasound  Unchanged left-sided hydronephrosis.    Left-sided nephrolithiasis.      Assessment/Plan:      * Sepsis due to Gram-negative organism with septic shock  Ongoing septic shock requiring Levophed for blood pressure support.  Blood cultures growing GNR (likely proteus).  Lactic acid 2.7.  Possibly secondary to urinary source (prel GNR) vs respiratory with ESBL proteus mirabilus.  Continue Levophed, wean as tolerated.  Changes Zosyn to meropenem 1 g q.12 hours and monitor closely for seizure activity  Close monitoring of  hemodynamics.  Daily labs.    Bacteremia due to Gram-negative bacteria  Preliminary blood cultures sent on admission growing GNR- possibly Proteus as per discussions today.  Changing to meropenem.  Repeat blood culture to document clearance.        Proteus mirabilis in respiratory culture  Respiratory culture growing Proteus mirabilis, ESBL.  Unclear this is an infection versus colonization.  Will empirically adjust antibiotics for coverage.      Candida UTI  Continuing previously prescribed fluconazole.       Goals of care, counseling/discussion  Received faxed over Advanced directive from Jennifer, confirmed full code status.  From attempts to contact listed contacts on face sheet, Malgorzata Fan was apparently making medical decisions for patient in the past but unclear if he is actually POA. Estranged niece did return call, however not involved in patient's care. Will keep full code for now and consult case management to determine best way for addressing goals of care.  He may need court-appointed guardian.      C. difficile colitis  Rectal tube in place.  Continue oral vancomycin.    Encephalopathy, metabolic  As per report at baseline he is a functional quadriplegic, does protrude his tongue to command.  He is not doing same today.  Possibly secondary to sepsis and infection, history of seizures and possibly post ictal do no evidence to support same  Continue to treat acute on chronic medical issues.  Follow clinically.      Acquired hypothyroidism  Still profoundly hypothyroid with  with normal free T4.  Possibly contributing to encephalopathy.  Continue IV levothyroxine 100 mcg.    Seizure  Continue home seizure medication.  Monitor closely especially with starting meropenem which can lower seizure threshold.      CKD (chronic kidney disease), stage III  Serum creatinine slightly higher than baseline.  Renally dose all medications and avoid nephrotoxin drugs.  Trend BMP.      Foot ulcer-unstageable,  POA  Pressure off loading and wound care following      Essential hypertension now with hypotension on Levophed  Currently in shock.  Holding coreg, hytrin, lasix      Chronic respiratory failure with hypoxia and hypercapnia secondary to JUNAID/OHS with chronic vent dependence  Trach with mechanical ventilation.  Ventilator precautions.  Appreciate Pulmonary input.    Coronary artery disease  Chronic condition.  Resume Coreg for now given tachycardia.    Sacral decubitus ulcer, stage II  Present on arrival.  Wound Care following.      Hemiparesis affecting dominant side as late effect of stroke        PEG (percutaneous endoscopic gastrostomy) status  Tolerating tube feeds well.      Functional quadriplegia  Baseline functional quadriplegic.        VTE Risk Mitigation (From admission, onward)    None                Kiah Le MD  Department of Hospital Medicine   [facility]

## 2019-12-06 NOTE — NURSING
Spoke with Henry from [facility],. States he will send someone out in the morning to service bed.

## 2019-12-06 NOTE — PLAN OF CARE
19 1540   Discharge Reassessment   Assessment Type Discharge Planning Reassessment   Cm attempted to try to contact Relative Naomi Valencia at [phone]. Left message to contact cm back as soon as she can.     1530-Received call from Mansoor Lindquist stating that Dr. Le was able to contact Naomi Grewal who stated that she is pt's niece and her dad was the Medical POA but he . Naomi Grewal stated that she has not seen this pt in many years and there are no other relatives closer than her. Pt has no children, sisters or brothers living. Cm attempted many times to contact legal dept and left message to call back to see what legally [facility] can do because pt is unable to make any medical decisions, is on vent and has been to hospital many times. Cm called [facility] and they stated that they do not have medical POA over pt and when he was still able to make decisions he chose then to be a full code. Will need to get the [facility] legal dept to let Doctors know what they can do legally and if the niece who is the only blood relative if she would make medical decisions for this pt. Cm discussed this with Dr Le.

## 2019-12-06 NOTE — NURSING
Spoke with Huma from [employer] regarding bed malfunctioning. States that a rep from [employer] will call and make a service call. Reference number placed on chart. Awaiting call.

## 2019-12-06 NOTE — ASSESSMENT & PLAN NOTE
Continue home seizure medication.  Monitor closely especially with starting meropenem which can lower seizure threshold.

## 2019-12-06 NOTE — ASSESSMENT & PLAN NOTE
Respiratory culture growing Proteus mirabilis, ESBL.  Unclear this is an infection versus colonization.  Will empirically adjust antibiotics for coverage.

## 2019-12-06 NOTE — PLAN OF CARE
12/06/19 0745   Patient Assessment/Suction   Level of Consciousness (AVPU) responds to voice   All Lung Fields Breath Sounds coarse   Suction Method tracheal   $ Suction Charges Inline Suction Procedure Stat Charge   Secretions Amount scant   Secretions Color white   Secretions Characteristics thin   PRE-TX-O2   O2 Device (Oxygen Therapy) ventilator   $ Is the patient on Low Flow Oxygen? Yes   Oxygen Concentration (%) 30   SpO2 (!) 94 %   Pulse Oximetry Type Continuous   $ Pulse Oximetry - Multiple Charge Pulse Oximetry - Multiple   Pulse (!) 121   Resp 18        Surgical Airway Portex Cuffed;Fenestrated   No Placement Date or Time found.   Present Prior to Hospital Arrival?: Yes  Inserted by: Present Prior to Hospital Arrival  Type: Tracheostomy  Brand: Portex  Airway Device Size: 8.0  Style: Cuffed;Fenestrated   Status Secured   Site Assessment Clean;Dry   Ties Assessment Clean;Dry   Vent Select   Conventional Vent Y   $ Ventilator Subsequent 1   Charged w/in last 24h YES   Preset Conventional Ventilator Settings   Vent Type    Ventilation Type VC   Vent Mode A/C   Humidity Heated wire   Humidifier Temp Setting 37 degC   Humidifier Temp Actual 37 degC   Set Rate 18 bmp   Vt Set 550 mL   PEEP/CPAP 5 cmH20   Pressure Support 0 cmH20   Waveform RAMP   Peak Flow 60 L/min   Plateau Set/Insp. Hold (sec) 0   Trigger Sensitivity Flow/I-Trigger 3 L/min   Patient Ventilator Parameters   Resp Rate Total 18 br/min   Peak Airway Pressure 28 cmH2O   Mean Airway Pressure 13 cmH20   Plateau Pressure 0 cmH20   Exhaled Vt 555 mL   Total Ve 9.98 mL   I:E Ratio Measured 1:2.30   Conventional Ventilator Alarms   Alarms On Y   Resp Rate High Alarm 40 br/min   Press High Alarm 50 cmH2O   Apnea Rate 10   Apnea Volume (mL) 1 mL   Apnea Oxygen Concentration  100   Apnea Flow Rate (L/min) 74   T Apnea 20 sec(s)   Ready to Wean/Extubation Screen   FIO2<=50 (chart decimal) 0.3   MV<16L (chart vol.) 9.98   PEEP <=8 (chart #) 5   Ready  to Wean Parameters   F/VT Ratio<105 (RSBI) (!) 32.43

## 2019-12-06 NOTE — ASSESSMENT & PLAN NOTE
Preliminary blood cultures sent on admission growing GNR- possibly Proteus as per discussions today.  Changing to meropenem.  Repeat blood culture to document clearance.

## 2019-12-06 NOTE — PLAN OF CARE
This note also relates to the following rows which could not be included:  Oxygen Concentration (%) - Cannot attach notes to unvalidated device data  SpO2 - Cannot attach notes to unvalidated device data  Pulse - Cannot attach notes to unvalidated device data  Resp - Cannot attach notes to unvalidated device data  Ventilation Type - Cannot attach notes to unvalidated device data  Vent Mode - Cannot attach notes to unvalidated device data  Set Rate - Cannot attach notes to unvalidated device data  Vt Set - Cannot attach notes to unvalidated device data  PEEP/CPAP - Cannot attach notes to unvalidated device data  Pressure Support - Cannot attach notes to unvalidated device data  Waveform - Cannot attach notes to unvalidated device data  Peak Flow - Cannot attach notes to unvalidated device data  Plateau Set/Insp. Hold (sec) - Cannot attach notes to unvalidated device data  Trigger Sensitivity Flow/I-Trigger - Cannot attach notes to unvalidated device data  Resp Rate Total - Cannot attach notes to unvalidated device data  Peak Airway Pressure - Cannot attach notes to unvalidated device data  Mean Airway Pressure - Cannot attach notes to unvalidated device data  Plateau Pressure - Cannot attach notes to unvalidated device data  Exhaled Vt - Cannot attach notes to unvalidated device data  Total Ve - Cannot attach notes to unvalidated device data  I:E Ratio Measured - Cannot attach notes to unvalidated device data  Resp Rate High Alarm - Cannot attach notes to unvalidated device data  Press High Alarm - Cannot attach notes to unvalidated device data  Apnea Rate - Cannot attach notes to unvalidated device data  Apnea Volume (mL) - Cannot attach notes to unvalidated device data  Apnea Oxygen Concentration  - Cannot attach notes to unvalidated device data  Apnea Flow Rate (L/min) - Cannot attach notes to unvalidated device data  T Apnea - Cannot attach notes to unvalidated device data       12/05/19 2056   Patient  Assessment/Suction   Level of Consciousness (AVPU) responds to voice   Respiratory Effort Normal;Unlabored   Expansion/Accessory Muscles/Retractions no use of accessory muscles   All Lung Fields Breath Sounds coarse   Rhythm/Pattern, Respiratory unlabored   Cough Frequency with stimulation   Cough Type assisted   Suction Method tracheal   $ Suction Charges Inline Suction Procedure Stat Charge   Secretions Amount small   Secretions Color yellow   Secretions Characteristics thick   PRE-TX-O2   O2 Device (Oxygen Therapy) ventilator   Pulse Oximetry Type Continuous   $ Pulse Oximetry - Multiple Charge Pulse Oximetry - Multiple   Wound Care   $ Wound Care Tech Time 15 min   Area of Concern Neck under tracheostomy   Skin Color/Characteristics without discoloration   Skin Temperature warm   Barrier used? Other (see comments)   Was wound care notified? No   Barrier Changed? Yes        Surgical Airway Portex Cuffed;Fenestrated   No Placement Date or Time found.   Present Prior to Hospital Arrival?: Yes  Inserted by: Present Prior to Hospital Arrival  Type: Tracheostomy  Brand: Portex  Airway Device Size: 8.0  Style: Cuffed;Fenestrated   Cuff Inflation? Inflated   Status Secured   Site Assessment Clean   Site Care Cleansed   Ties Assessment Clean   Vent Select   Conventional Vent Y   Charged w/in last 24h YES   Preset Conventional Ventilator Settings   Vent ID [id number]   Vent Type    Humidity Heated wire   Conventional Ventilator Alarms   Alarms On Y   Respiratory Evaluation   $ Care Plan Tech Time 15 min   Evaluation For Transfer   Admitting Diagnosis Bradycardia   Pulmonary Diagnosis Respiratorty Failure

## 2019-12-06 NOTE — ASSESSMENT & PLAN NOTE
Still profoundly hypothyroid with  with normal free T4.  Possibly contributing to encephalopathy.  Continue IV levothyroxine 100 mcg.

## 2019-12-06 NOTE — PROGRESS NOTES
Consult Note  Infectious Disease    Reason for Consult:  shock    HPI: Masood Messina is an  79 y.o. male with whom I am familiar from prior consultations who has been vent dependent for several years, is colonized in sputum and urine with a variety of gram negative rods and has recently been treated for pseudomonas urosepsis on 2 occasions, initially associated with hematuria/clots in bladder and with bacteremia, followed by the development of C. Difficile colitis. He was discharged about 3 weeks ago on a few days of zosyn and a taper of oral vanc. During that hospitalization he had worse than usual encephalopathy and seizures.   He was sent to the ED today for severe bradycardia and hypotension. His CXR looks like pulmonary edema, he had a WBC of 14k and required pressors, removal of indwelling picc, placement of TLC and admission to ICU. He is unresponsive, pulse is now up to 100. UA has pyuria and last few urine cultures have had Candida. His tay on admission was replaced immediately draining about a liter of urine. He was placed on merrem, IV and oral vanc.     12/5:  Temperature is less than 100 after 1 large dose of Solu-Medrol yesterday.  Vasopressor requirement has decreased.  Ventilatory needs are at or near baseline.  He is no longer bradycardic.  Blood cultures have gram-negative rods.  The urine culture was collected yesterday by nursing but not confirmed as collected in epic therefore the urine culture was not set up.  Chest x-ray is improved on the left and the same on the right.  Secretions are minimal and tan.  He will open his eyes when I call his name but he will not attend or protrude his tongue on command.    EXAM & DIAGNOSTICS REVIEWED:   Vitals:     Temp:  [98.2 °F (36.8 °C)-99.8 °F (37.7 °C)]   Temp: 99 °F (37.2 °C) (12/06/19 1115)  Pulse: (!) 118 (12/06/19 1337)  Resp: 20 (12/06/19 1337)  BP: (!) 105/56 (12/06/19 1200)  SpO2: (!) 94 % (12/06/19 1337)    Intake/Output Summary (Last 24  hours) at 12/6/2019 1346  Last data filed at 12/6/2019 1200  Gross per 24 hour   Intake 551.3 ml   Output 1325 ml   Net -773.7 ml       General:  Eyes will open when addressed but he will not attend or follow any commands  Eyes:  Anicteric, PERRL,  EOMI are grossly intact and he has some yellow exudate from conjunctiva  ENT:  Constant/repetitive clenching of teeth  Neck:  supple,tracheostomy  Lungs: Coarse without consolidation.     Heart:  iRRR, no gallop/murmur/rub noted  Abd:  Soft, NT, ND, normal BS, no masses or organomegaly appreciated. PEG, rectal tube with liquid stool. Receiving tube feeds  :   Rahman, urine clear, no flank tenderness  Musc:  Joints without effusion, swelling, erythema, synovitis, quadriplegic   Skin:  No rashes. No palmar or plantar lesions. No subungual petechiae  Wound: Photos reviewed with nursing, worst wound is left lateral foot.   Neuro: Eyes open to voice, will not attend.. Quadriplegic, very high tone throughout  Psych:   eyes open, will not attend     Extrem: Chronic woody edema, no erythema, phlebitis, cellulitis,    VAD:  Left IJ TLC     Isolation:  contact    Lines/Tubes/Drains:    General Labs reviewed:  Recent Labs   Lab 12/04/19  1131 12/05/19  0426 12/05/19  0500 12/06/19  0401   WBC 14.75* 9.12  --  9.66   HGB 10.5* 10.7*  --  10.0*   HCT 33.7* 33.4* 34* 32.2*    151  --  146*       Recent Labs   Lab 12/04/19  1131 12/05/19  0426 12/06/19  0401   * 138 140   K 5.3* 4.7 3.8   CL 87* 97 99   CO2 34* 31* 30*   BUN 53* 46* 42*   CREATININE 2.2* 2.2* 2.4*   CALCIUM 9.8 9.5 9.6   PROT 8.7* 8.2  --    BILITOT 0.7 0.9  --    ALKPHOS 78 75  --    ALT 17 18  --    AST 30 34  --            Micro:  Microbiology Results (last 7 days)     Procedure Component Value Units Date/Time    Blood culture [[id number]] Collected:  12/06/19 0401    Order Status:  Completed Specimen:  Blood Updated:  12/06/19 1158     Blood Culture, Routine No Growth to date    Stool culture  [[id number]] Collected:  12/04/19 1225    Order Status:  Completed Specimen:  Stool Updated:  12/06/19 1119     Stool Culture No Salmonella,Shigella,Vibrio,Campylobacter.      No E coli 0157:H7 isolated.    Blood culture [[id number]] Collected:  12/05/19 0805    Order Status:  Completed Specimen:  Blood from Line, Arterial, Right Updated:  12/06/19 1032     Blood Culture, Routine No Growth to date      No Growth to date    Narrative:       Draw from line    Culture, Respiratory with Gram Stain [[id number]]  (Abnormal)  (Susceptibility) Collected:  12/04/19 1024    Order Status:  Completed Specimen:  Respiratory from Sputum Updated:  12/06/19 0937     Respiratory Culture Reduced Normal Respiratory gabriela      PROTEUS MIRABILIS ESBL  Moderate  Results called to and read back by : Ameena López RN on M3  12/06/2019  09:36 by DRP        GRAM NEGATIVE TOM, NON-LACTOSE   Moderate  Identification and susceptibility pending       Gram Stain (Respiratory) <10 epithelial cells per low power field.     Gram Stain (Respiratory) Moderate WBC's     Gram Stain (Respiratory) Few  Gram positive cocci     Gram Stain (Respiratory) Few Gram positive rods     Gram Stain (Respiratory) Few Gram negative rods    Narrative:       PER PROTOCOL    Urine culture [[id number]]  (Abnormal) Collected:  12/04/19 1500    Order Status:  Completed Specimen:  Urine, Catheterized Updated:  12/06/19 0840     Urine Culture, Routine GRAM NEGATIVE TOM, LACTOSE   >100,000 cfu/ml  Identification and susceptibility pending        PRESUMPTIVE PROTEUS SPECIES  50,000 - 99,999 cfu/ml  Identification and susceptibility pending      Narrative:       Indicated criteria for high risk culture:->Other  Other (specify):->chronic indwelling tay    Blood culture x two cultures. Draw prior to antibiotics. [[id number]]  (Abnormal) Collected:  12/04/19 1130    Order Status:  Completed Specimen:  Blood from Line, Jugular, External Left Updated:  12/06/19 0737      Blood Culture, Routine Gram stain reynaldo bottle: Gram negative rods      Critical result called and read back Olivia Ortega RN M3 @ [id number]      Gram stain aer bottle: Gram positive cocci      Results called to and read back by: Ameena López RN on M3 12/05/2019        16:23      GRAM NEGATIVE TOM, NON-LACTOSE   Identification and susceptibility pending        COAGULASE-NEGATIVE STAPHYLOCOCCUS SPECIES  Organism is a probable contaminant      Narrative:       Aerobic and anaerobic    Blood culture [[id number]] Collected:  12/04/19 2050    Order Status:  Completed Specimen:  Blood from Line, Arterial, Right Updated:  12/05/19 2232     Blood Culture, Routine No Growth to date      No Growth to date    Narrative:       Aerobic and anaerobic  Can we please draw one set from the periphery?  It looks like  the two sets done in the ED were drawn from the central  line?  Collection has been rescheduled by [facility] at 12/04/2019 16:06 Reason:   Nurse Draw  Collection has been rescheduled by C at 12/04/2019 16:06 Reason:   Nurse Draw    Blood culture x two cultures. Draw prior to antibiotics. [[id number]] Collected:  12/04/19 1217    Order Status:  Completed Specimen:  Blood from Line, Jugular, External Left Updated:  12/05/19 1632     Blood Culture, Routine No Growth to date      No Growth to date    Narrative:       Aerobic and anaerobic    Clostridium difficile EIA [[id number]] Collected:  12/04/19 1225    Order Status:  Completed Specimen:  Stool Updated:  12/04/19 1626     C. diff Antigen Negative     C difficile Toxins A+B, EIA Negative     Comment: Testing not recommended for children <24 months old.           Imaging Reviewed:   CXRs   CT head   Ultrasound kidneys 12/5, no change in hydro or non obstructing stones  Cardiology:    IMPRESSION & PLAN   1. Gram negative bacteremia. Presumed to be from urine at this time appears to be a Proteus. Given that he has Proteus ESBL in his urine, will presume blood to be the same. His  Rahman catheter was not draining well at the time of admission and when replaced in the ER, almost a Liter was relieved.  2. Hypotension, multifactorial, Bradycardia, resolved  3. Chronic vent dependence, quadriplegic  4. Extensive antibiotic exposure for recurrent GNR infections  5. conjunctivitis    Recommendations:  Continue oral vancomycin   Will change to merrem, and monitor for seizures  Continue diflucan   Gentamicin eye drops     Consider withdraw to comfort care  I feel that he is inappropriate for a pacemaker and extremely high risk for pacemaker infection should it be approved and placed.

## 2019-12-06 NOTE — ASSESSMENT & PLAN NOTE
Received faxed over Advanced directive from [place], confirmed full code status.  From attempts to contact listed contacts on face sheet, Malgorzata Fan was apparently making medical decisions for patient in the past but unclear if he is actually POA. Estranged niece did return call, however not involved in patient's care. Will keep full code for now and consult case management to determine best way for addressing goals of care.  He may need court-appointed guardian.

## 2019-12-06 NOTE — PROGRESS NOTES
"[facility]  Adult Nutrition  Progress Note    SUMMARY         Recommendations     1.) Pt currently tolerating Nepro @ 45ml/hr. RD to monitor to make changes as necessary.      Goals: 1.) Pt to tolerate TF at goal rate while intubated. Advance to po diet when extubated.  Nutrition Goal Status: goal met, progressing toward goal.  Communication of RD Recs: reviewed with RN    Reason for Assessment    Reason For Assessment: consult  Diagnosis: infection/sepsis  Relevant Medical History: NH resident, PEG, CVA, quadraplegia, trach    Nutrition Risk Screen    Nutrition Risk Screen: no indicators present    Nutrition/Diet History    Patient Reported Diet/Restrictions/Preferences: no oral intake  Spiritual, Cultural Beliefs, Restoration Practices, Values that Affect Care: other (see comments)  Food Allergies: NKFA  Factors Affecting Nutritional Intake: NPO, on mechanical ventilation    Anthropometrics    Temp: 99 °F (37.2 °C)  Height Method: Estimated  Height: 6' 2" (188 cm)  Height (inches): 74 in  Weight Method: Bed Scale  Weight: 129.3 kg (285 lb 1.6 oz)  Weight (lb): 285.1 lb  Ideal Body Weight (IBW), Male: 190 lb  % Ideal Body Weight, Male (lb): 150.05 lb  BMI (Calculated): 36.6  BMI Grade: 35 - 39.9 - obesity - grade II       Lab/Procedures/Meds    Pertinent Labs Reviewed: reviewed  Pertinent Medications Reviewed: reviewed      Estimated/Assessed Needs    Weight Used For Calorie Calculations: 129.3 kg (285 lb 0.9 oz)  Energy Calorie Requirements (kcal): 7791-3724 kcal (11-14 kcal/kg)  Energy Need Method: Kcal/kg  Protein Requirements:  g (0.6-0.8 g/kg)  Weight Used For Protein Calculations: 129.3 kg (285 lb 0.9 oz)     Estimated Fluid Requirement Method: RDA Method  RDA Method (mL): 1422         Nutrition Prescription Ordered    Current Diet Order: NPO    Evaluation of Received Nutrient/Fluid Intake    Enteral Calories (kcal): 1944  Enteral Protein (gm): 87  Enteral (Free Water) Fluid (mL): 785  Free " Water Flush Fluid (mL): 180  Total Calories (kcal/kg): 1944  % Kcal Needs: 100  Energy Calories Required: meeting needs  Protein Required: meeting needs  Fluid Required: meeting needs  Tolerance: tolerating      Nutrition Risk    Level of Risk/Frequency of Follow-up: high     Assessment and Plan    No new Assessment & Plan notes have been filed under this hospital service since the last note was generated.  Service: Nutrition       Monitor and Evaluation    Food and Nutrient Intake: enteral nutrition intake  Food and Nutrient Adminstration: enteral and parenteral nutrition administration  Anthropometric Measurements: weight change  Biochemical Data, Medical Tests and Procedures: glucose/endocrine profile, gastrointestinal profile, electrolyte and renal panel  Nutrition-Focused Physical Findings: overall appearance       Nutrition Follow-Up    RD Follow-up?: Yes  Liza Lemon RD 12/06/2019 1:56 PM

## 2019-12-06 NOTE — PROGRESS NOTES
[facility]  Pulmonology  Progress Note    Patient Name: Masood Messina  MRN: [id number]  Admission Date: 12/4/2019  Hospital Length of Stay: 2 days  Code Status: Full Code  Attending Provider: Kiah Le MD  Primary Care Provider: Jeramie Lombardi MD   Principal Problem: Sepsis due to Gram-negative organism with septic shock    Subjective:     Interval History:     12/6/2019 - STable overnight, no response to me.  Stable on ventilator.  Nursing reports no new issues.  Trying to contact family to clarify code status and direction of care (has had numerous admission recently).    Review of Systems   Unable to perform ROS: Mental acuity         Objective:     Vital Signs (Most Recent):  Temp: 98.2 °F (36.8 °C) (12/06/19 0701)  Pulse: (!) 116 (12/06/19 1000)  Resp: 18 (12/06/19 1000)  BP: 113/65 (12/06/19 0800)  SpO2: (!) 94 % (12/06/19 1000) Vital Signs (24h Range):  Temp:  [98.2 °F (36.8 °C)-99.8 °F (37.7 °C)] 98.2 °F (36.8 °C)  Pulse:  [] 116  Resp:  [2-21] 18  SpO2:  [87 %-100 %] 94 %  BP: ()/(58-75) 113/65  Arterial Line BP: ()/(58-75) 114/66     Weight: 129.3 kg (285 lb 1.6 oz)  Body mass index is 36.6 kg/m².      Intake/Output Summary (Last 24 hours) at 12/6/2019 1113  Last data filed at 12/6/2019 0900  Gross per 24 hour   Intake 416.3 ml   Output 1485 ml   Net -1068.7 ml       Physical Exam   Constitutional: No distress.   Obese, chronically ill male  No response to me  Trach, vent   HENT:   Head: Normocephalic and atraumatic.   Nose: Nose normal.   Mouth/Throat: Oropharynx is clear and moist.   Eyes: Pupils are equal, round, and reactive to light. EOM are normal.   Neck: Normal range of motion. Neck supple. No JVD present. No tracheal deviation present. No thyromegaly present.   trach   Cardiovascular: Regular rhythm, normal heart sounds and intact distal pulses. Exam reveals no gallop and no friction rub.   No murmur heard.  tachycardic   Pulmonary/Chest: Effort  normal and breath sounds normal. No stridor. No respiratory distress. He has no wheezes. He has no rales. He exhibits no tenderness.   CTA anteriorly  Ventilated     Abdominal: Soft. He exhibits distension. There is no tenderness. There is no rebound and no guarding.   Obese  PEG  Hypoactive    Musculoskeletal: Normal range of motion. He exhibits edema. He exhibits no tenderness.   Lymphadenopathy:     He has no cervical adenopathy.   Neurological: He has normal reflexes. No cranial nerve deficit.   Chronic changes  No response to me   Skin: He is not diaphoretic.   Psychiatric:   Not able to assess   Nursing note and vitals reviewed.      Vents:  Vent Mode: A/C (12/06/19 0951)  Set Rate: 18 bmp (12/06/19 0951)  Vt Set: 550 mL (12/06/19 0951)  Pressure Support: 0 cmH20 (12/06/19 0951)  PEEP/CPAP: 5 cmH20 (12/06/19 0951)  Oxygen Concentration (%): 30 (12/06/19 1000)  Peak Airway Pressure: 27 cmH2O (12/06/19 0951)  Plateau Pressure: 0 cmH20 (12/06/19 0951)  Total Ve: 10 mL (12/06/19 0951)  F/VT Ratio<105 (RSBI): (!) 32.32 (12/06/19 0951)    Lines/Drains/Airways     Central Venous Catheter Line                 Percutaneous Central Line Insertion/Assessment - triple lumen  12/04/19 1040 2 days          Drain                 Gastrostomy/Enterostomy  Gastrostomy tube w/ balloon;Gastrostomy-jejunostomy midline feeding -- days         Gastrostomy/Enterostomy 1335 Percutaneous endoscopic gastrostomy (PEG) LUQ feeding -- days         Fecal Incontinence  10/07/19 1546 59 days         Rectal Tube 11/07/19 1900 fecal management system 28 days         Urethral Catheter 12/04/19 2 days          Airway                 Surgical Airway Portex Cuffed -- days         Surgical Airway Portex Cuffed;Fenestrated -- days          Arterial Line                 Arterial Line 12/04/19 1059 Right Radial 2 days          Peripheral Intravenous Line                 Peripheral IV - Single Lumen 12/04/19 20 G Left Hand 2 days                 Significant Labs:    CBC/Anemia Profile:  Recent Labs   Lab 12/04/19  1131 12/05/19  0426 12/05/19  0500 12/06/19  0401   WBC 14.75* 9.12  --  9.66   HGB 10.5* 10.7*  --  10.0*   HCT 33.7* 33.4* 34* 32.2*    151  --  146*   MCV 95 93  --  93   RDW 18.4* 18.4*  --  18.5*        Chemistries:  Recent Labs   Lab 12/04/19  1131 12/05/19  0426 12/06/19  0401   * 138 140   K 5.3* 4.7 3.8   CL 87* 97 99   CO2 34* 31* 30*   BUN 53* 46* 42*   CREATININE 2.2* 2.2* 2.4*   CALCIUM 9.8 9.5 9.6   ALBUMIN 3.0* 2.7*  --    PROT 8.7* 8.2  --    BILITOT 0.7 0.9  --    ALKPHOS 78 75  --    ALT 17 18  --    AST 30 34  --    MG 2.4 2.3 2.2   PHOS  --  3.2 2.8       All pertinent labs within the past 24 hours have been reviewed.    Microbiology Results (last 7 days)     Procedure Component Value Units Date/Time    Blood culture [[id number]] Collected:  12/05/19 0805    Order Status:  Completed Specimen:  Blood from Line, Arterial, Right Updated:  12/06/19 1032     Blood Culture, Routine No Growth to date      No Growth to date    Narrative:       Draw from line    Culture, Respiratory with Gram Stain [[id number]]  (Abnormal)  (Susceptibility) Collected:  12/04/19 1024    Order Status:  Completed Specimen:  Respiratory from Sputum Updated:  12/06/19 0937     Respiratory Culture Reduced Normal Respiratory gabriela      PROTEUS MIRABILIS ESBL  Moderate  Results called to and read back by : Ameena López RN on M3  12/06/2019  09:36 by BREONNA        GRAM NEGATIVE TOM, NON-LACTOSE   Moderate  Identification and susceptibility pending       Gram Stain (Respiratory) <10 epithelial cells per low power field.     Gram Stain (Respiratory) Moderate WBC's     Gram Stain (Respiratory) Few  Gram positive cocci     Gram Stain (Respiratory) Few Gram positive rods     Gram Stain (Respiratory) Few Gram negative rods    Narrative:       PER PROTOCOL    Urine culture [[id number]]  (Abnormal) Collected:  12/04/19 1500    Order Status:   Completed Specimen:  Urine, Catheterized Updated:  12/06/19 0840     Urine Culture, Routine GRAM NEGATIVE TOM, LACTOSE   >100,000 cfu/ml  Identification and susceptibility pending        PRESUMPTIVE PROTEUS SPECIES  50,000 - 99,999 cfu/ml  Identification and susceptibility pending      Narrative:       Indicated criteria for high risk culture:->Other  Other (specify):->chronic indwelling tay    Blood culture x two cultures. Draw prior to antibiotics. [[id number]]  (Abnormal) Collected:  12/04/19 1130    Order Status:  Completed Specimen:  Blood from Line, Jugular, External Left Updated:  12/06/19 0737     Blood Culture, Routine Gram stain reynaldo bottle: Gram negative rods      Critical result called and read back Olivia Ortega RN M3 @ 0448      Gram stain aer bottle: Gram positive cocci      Results called to and read back by: Ameena López RN on M3 12/05/2019        16:23      GRAM NEGATIVE TOM, NON-LACTOSE   Identification and susceptibility pending        COAGULASE-NEGATIVE STAPHYLOCOCCUS SPECIES  Organism is a probable contaminant      Narrative:       Aerobic and anaerobic    Blood culture [[id number]] Collected:  12/06/19 0401    Order Status:  Sent Specimen:  Blood Updated:  12/06/19 0427    Blood culture [[id number]] Collected:  12/04/19 2050    Order Status:  Completed Specimen:  Blood from Line, Arterial, Right Updated:  12/05/19 2232     Blood Culture, Routine No Growth to date      No Growth to date    Narrative:       Aerobic and anaerobic  Can we please draw one set from the periphery?  It looks like  the two sets done in the ED were drawn from the central  line?  Collection has been rescheduled by [facility] at 12/04/2019 16:06 Reason:   Nurse Draw  Collection has been rescheduled by [facility] at 12/04/2019 16:06 Reason:   Nurse Draw    Blood culture x two cultures. Draw prior to antibiotics. [[id number]] Collected:  12/04/19 1217    Order Status:  Completed Specimen:  Blood from Line, Jugular, External  Left Updated:  12/05/19 1632     Blood Culture, Routine No Growth to date      No Growth to date    Narrative:       Aerobic and anaerobic    Stool culture [[id number]] Collected:  12/04/19 1225    Order Status:  Completed Specimen:  Stool Updated:  12/05/19 1124     Stool Culture Culture in progress    Clostridium difficile EIA [[id number]] Collected:  12/04/19 1225    Order Status:  Completed Specimen:  Stool Updated:  12/04/19 1626     C. diff Antigen Negative     C difficile Toxins A+B, EIA Negative     Comment: Testing not recommended for children <24 months old.               Significant Imaging:  I have reviewed and interpreted all pertinent imaging results/findings within the past 24 hours.    Assessment/Plan:     * Sepsis due to Gram-negative organism with septic shock  · antibiotics per ID  · Awaiting final culture results    Chronic respiratory failure with hypoxia and hypercapnia secondary to JUNAID/OHS with chronic vent dependence  · Continue support  · Is ventilator dependent and no attempts will be made at weaning    CKD (chronic kidney disease), stage III  · Aware, follow     Encephalopathy, metabolic  · Not responsive  · By report he is not at his baseline    C. difficile colitis  · Being treated  · On isolation    Essential hypertension now with hypotension on Levophed  · Wean pressor as able    Sacral decubitus ulcer, stage II  · Continue care    Hemiparesis affecting dominant side as late effect of stroke  · Aware, no acute changes    Functional quadriplegia  · Aware     Neuro   Encephalopathy secondary to metabolic derangements   No evidence of occult unaddressed pathology..   No additional investigation needed.  Recommend observation for now..   Continue to treat underlying pathology.   Continued supportive care for now with close observation and frequent neurologic assessment.   Avoid sedating/derliogenic medications..    ENT  · Local trach care.    Pulmonary  · Continue LPV.  · No evidence  of acute pathology.  · Chronic radiographic abnormalities noted.    Cardiac/Vascular  · Titrate vasopressors to maintain a MAP > 65 mmHg.    Renal/  · ABx for UTI.    ID  · Continue Abx for sepsis.    Hematology  · No indication for blood products.  · Monitor for now.    Endocrine  · IV synthroid.    GI  · Continue TFs.    Palliative Care  · His advanced directive was established as DNR during previous hospitalizations.  · This needs to be clarified.  · I attempted to contract his next of kin, but there was no answer at the number provided.  · Clearly, he is at the endo of his life, and our interventions are not providing any meaningful benefit.  · A pacemaker would be futile.    From Neuro down was auto - loaded into this note from Dr Kinsey's last note and I couldn't delete it.  I agree with Dr Kinsey's assessment.  At this point we are doing nothing to make him better but are only prolonging his current existence.    Critical Care Time    I have spent > 35 minutes providing critical care services for this pt for the above diagnoses.  These services have included pt evaluation, pt exam, ventilator assessment, discussions with staff, chart review, data review, note preparation and .  The patient has life threatening illness with a high risk of decompensation and/or death.         Shai Bai MD  Pulmonology  [facility]

## 2019-12-06 NOTE — SUBJECTIVE & OBJECTIVE
Interval History:     12/6/2019 - STable overnight, no response to me.  Stable on ventilator.  Nursing reports no new issues.  Trying to contact family to clarify code status and direction of care (has had numerous admission recently).    Review of Systems   Unable to perform ROS: Mental acuity         Objective:     Vital Signs (Most Recent):  Temp: 98.2 °F (36.8 °C) (12/06/19 0701)  Pulse: (!) 116 (12/06/19 1000)  Resp: 18 (12/06/19 1000)  BP: 113/65 (12/06/19 0800)  SpO2: (!) 94 % (12/06/19 1000) Vital Signs (24h Range):  Temp:  [98.2 °F (36.8 °C)-99.8 °F (37.7 °C)] 98.2 °F (36.8 °C)  Pulse:  [] 116  Resp:  [2-21] 18  SpO2:  [87 %-100 %] 94 %  BP: ()/(58-75) 113/65  Arterial Line BP: ()/(58-75) 114/66     Weight: 129.3 kg (285 lb 1.6 oz)  Body mass index is 36.6 kg/m².      Intake/Output Summary (Last 24 hours) at 12/6/2019 1113  Last data filed at 12/6/2019 0900  Gross per 24 hour   Intake 416.3 ml   Output 1485 ml   Net -1068.7 ml       Physical Exam   Constitutional: No distress.   Obese, chronically ill male  No response to me  Trach, vent   HENT:   Head: Normocephalic and atraumatic.   Nose: Nose normal.   Mouth/Throat: Oropharynx is clear and moist.   Eyes: Pupils are equal, round, and reactive to light. EOM are normal.   Neck: Normal range of motion. Neck supple. No JVD present. No tracheal deviation present. No thyromegaly present.   trach   Cardiovascular: Regular rhythm, normal heart sounds and intact distal pulses. Exam reveals no gallop and no friction rub.   No murmur heard.  tachycardic   Pulmonary/Chest: Effort normal and breath sounds normal. No stridor. No respiratory distress. He has no wheezes. He has no rales. He exhibits no tenderness.   CTA anteriorly  Ventilated     Abdominal: Soft. He exhibits distension. There is no tenderness. There is no rebound and no guarding.   Obese  PEG  Hypoactive    Musculoskeletal: Normal range of motion. He exhibits edema. He exhibits no  tenderness.   Lymphadenopathy:     He has no cervical adenopathy.   Neurological: He has normal reflexes. No cranial nerve deficit.   Chronic changes  No response to me   Skin: He is not diaphoretic.   Psychiatric:   Not able to assess   Nursing note and vitals reviewed.      Vents:  Vent Mode: A/C (12/06/19 0951)  Set Rate: 18 bmp (12/06/19 0951)  Vt Set: 550 mL (12/06/19 0951)  Pressure Support: 0 cmH20 (12/06/19 0951)  PEEP/CPAP: 5 cmH20 (12/06/19 0951)  Oxygen Concentration (%): 30 (12/06/19 1000)  Peak Airway Pressure: 27 cmH2O (12/06/19 0951)  Plateau Pressure: 0 cmH20 (12/06/19 0951)  Total Ve: 10 mL (12/06/19 0951)  F/VT Ratio<105 (RSBI): (!) 32.32 (12/06/19 0951)    Lines/Drains/Airways     Central Venous Catheter Line                 Percutaneous Central Line Insertion/Assessment - triple lumen  12/04/19 1040 2 days          Drain                 Gastrostomy/Enterostomy  Gastrostomy tube w/ balloon;Gastrostomy-jejunostomy midline feeding -- days         Gastrostomy/Enterostomy 1335 Percutaneous endoscopic gastrostomy (PEG) LUQ feeding -- days         Fecal Incontinence  10/07/19 1546 59 days         Rectal Tube 11/07/19 1900 fecal management system 28 days         Urethral Catheter 12/04/19 2 days          Airway                 Surgical Airway Portex Cuffed -- days         Surgical Airway Portex Cuffed;Fenestrated -- days          Arterial Line                 Arterial Line 12/04/19 1059 Right Radial 2 days          Peripheral Intravenous Line                 Peripheral IV - Single Lumen 12/04/19 20 G Left Hand 2 days                Significant Labs:    CBC/Anemia Profile:  Recent Labs   Lab 12/04/19  1131 12/05/19  0426 12/05/19  0500 12/06/19  0401   WBC 14.75* 9.12  --  9.66   HGB 10.5* 10.7*  --  10.0*   HCT 33.7* 33.4* 34* 32.2*    151  --  146*   MCV 95 93  --  93   RDW 18.4* 18.4*  --  18.5*        Chemistries:  Recent Labs   Lab 12/04/19  1131 12/05/19  0426 12/06/19  0401   NA  132* 138 140   K 5.3* 4.7 3.8   CL 87* 97 99   CO2 34* 31* 30*   BUN 53* 46* 42*   CREATININE 2.2* 2.2* 2.4*   CALCIUM 9.8 9.5 9.6   ALBUMIN 3.0* 2.7*  --    PROT 8.7* 8.2  --    BILITOT 0.7 0.9  --    ALKPHOS 78 75  --    ALT 17 18  --    AST 30 34  --    MG 2.4 2.3 2.2   PHOS  --  3.2 2.8       All pertinent labs within the past 24 hours have been reviewed.    Microbiology Results (last 7 days)     Procedure Component Value Units Date/Time    Blood culture [[id number]] Collected:  12/05/19 0805    Order Status:  Completed Specimen:  Blood from Line, Arterial, Right Updated:  12/06/19 1032     Blood Culture, Routine No Growth to date      No Growth to date    Narrative:       Draw from line    Culture, Respiratory with Gram Stain [[id number]]  (Abnormal)  (Susceptibility) Collected:  12/04/19 1024    Order Status:  Completed Specimen:  Respiratory from Sputum Updated:  12/06/19 0937     Respiratory Culture Reduced Normal Respiratory gabriela      PROTEUS MIRABILIS ESBL  Moderate  Results called to and read back by : Ameena López RN on M3  12/06/2019  09:36 by BREONNA        GRAM NEGATIVE TOM, NON-LACTOSE   Moderate  Identification and susceptibility pending       Gram Stain (Respiratory) <10 epithelial cells per low power field.     Gram Stain (Respiratory) Moderate WBC's     Gram Stain (Respiratory) Few  Gram positive cocci     Gram Stain (Respiratory) Few Gram positive rods     Gram Stain (Respiratory) Few Gram negative rods    Narrative:       PER PROTOCOL    Urine culture [[id number]]  (Abnormal) Collected:  12/04/19 1500    Order Status:  Completed Specimen:  Urine, Catheterized Updated:  12/06/19 0840     Urine Culture, Routine GRAM NEGATIVE TOM, LACTOSE   >100,000 cfu/ml  Identification and susceptibility pending        PRESUMPTIVE PROTEUS SPECIES  50,000 - 99,999 cfu/ml  Identification and susceptibility pending      Narrative:       Indicated criteria for high risk culture:->Other  Other  (specify):->chronic indwelling tay    Blood culture x two cultures. Draw prior to antibiotics. [[id number]]  (Abnormal) Collected:  12/04/19 1130    Order Status:  Completed Specimen:  Blood from Line, Jugular, External Left Updated:  12/06/19 0737     Blood Culture, Routine Gram stain reynaldo bottle: Gram negative rods      Critical result called and read back Olivia Ortega RN M3 @ 0448      Gram stain aer bottle: Gram positive cocci      Results called to and read back by: Ameena López RN on M3 12/05/2019        16:23      GRAM NEGATIVE TOM, NON-LACTOSE   Identification and susceptibility pending        COAGULASE-NEGATIVE STAPHYLOCOCCUS SPECIES  Organism is a probable contaminant      Narrative:       Aerobic and anaerobic    Blood culture [[id number]] Collected:  12/06/19 0401    Order Status:  Sent Specimen:  Blood Updated:  12/06/19 0427    Blood culture [[id number]] Collected:  12/04/19 2050    Order Status:  Completed Specimen:  Blood from Line, Arterial, Right Updated:  12/05/19 2232     Blood Culture, Routine No Growth to date      No Growth to date    Narrative:       Aerobic and anaerobic  Can we please draw one set from the periphery?  It looks like  the two sets done in the ED were drawn from the central  line?  Collection has been rescheduled by [facility] at 12/04/2019 16:06 Reason:   Nurse Draw  Collection has been rescheduled by [facility] at 12/04/2019 16:06 Reason:   Nurse Draw    Blood culture x two cultures. Draw prior to antibiotics. [[id number]] Collected:  12/04/19 1217    Order Status:  Completed Specimen:  Blood from Line, Jugular, External Left Updated:  12/05/19 1632     Blood Culture, Routine No Growth to date      No Growth to date    Narrative:       Aerobic and anaerobic    Stool culture [[id number]] Collected:  12/04/19 1225    Order Status:  Completed Specimen:  Stool Updated:  12/05/19 1124     Stool Culture Culture in progress    Clostridium difficile EIA [[id number]] Collected:  12/04/19 1225     Order Status:  Completed Specimen:  Stool Updated:  12/04/19 6113     C. diff Antigen Negative     C difficile Toxins A+B, EIA Negative     Comment: Testing not recommended for children <24 months old.               Significant Imaging:  I have reviewed and interpreted all pertinent imaging results/findings within the past 24 hours.

## 2019-12-07 PROBLEM — E87.6 HYPOKALEMIA: Status: ACTIVE | Noted: 2019-01-01

## 2019-12-07 PROBLEM — B96.89 UTI DUE TO KLEBSIELLA SPECIES: Status: ACTIVE | Noted: 2019-01-01

## 2019-12-07 PROBLEM — N39.0 UTI DUE TO KLEBSIELLA SPECIES: Status: ACTIVE | Noted: 2019-01-01

## 2019-12-07 NOTE — SUBJECTIVE & OBJECTIVE
Interval History:  No change in neurological status.  Does spontaneously open and close eyes, not to command.  Involuntary movement of the jaw, not protruding tongue.  T-max last 24 hr 99.  Heart rate better.  Still remains on Levophed, blood pressure drops to the 80s when discontinued.  I/O last 24 hr 1460/1900=-440, since admission - 5458ml.  Microbiology returned with urine Klebsiella, carbapenem resistant.  Case discussed personally with Dr. Tyson , ok to discontinue proscar, will re-send urine culture today to see if clearing infection, placing on enhanced precaution. Given no POA, unable to make his own decisions, only living relatives are estranged nieces/nephew, will need legal/medical consultation.  Case discussed with Nursing.      Review of Systems   Unable to perform ROS: Patient unresponsive     Objective:     Vital Signs (Most Recent):  Temp: 98.6 °F (37 °C) (12/07/19 0315)  Pulse: 83 (12/07/19 0916)  Resp: 19 (12/07/19 0916)  BP: 107/65 (12/07/19 0800)  SpO2: (!) 92 % (12/07/19 0916) Vital Signs (24h Range):  Temp:  [98.6 °F (37 °C)-99 °F (37.2 °C)] 98.6 °F (37 °C)  Pulse:  [] 83  Resp:  [15-44] 19  SpO2:  [92 %-98 %] 92 %  BP: (101-136)/(55-74) 107/65  Arterial Line BP: (105-149)/(55-76) 121/57     Weight: 129.3 kg (285 lb 1.6 oz)  Body mass index is 36.6 kg/m².    Intake/Output Summary (Last 24 hours) at 12/7/2019 0943  Last data filed at 12/7/2019 0800  Gross per 24 hour   Intake 1370 ml   Output 2000 ml   Net -630 ml      Physical Exam   Constitutional: He appears well-developed and well-nourished. No distress.   Obese male, chronically ill-appearing, spontaneously opens eyes, involuntary biting movement of jaw   HENT:   Head: Normocephalic and atraumatic.   Eyes: Pupils are equal, round, and reactive to light. Right eye exhibits no discharge. Left eye exhibits no discharge.   Neck:   Trach with dressing underlying   Cardiovascular: Normal rate and regular rhythm.   No murmur  heard.  Pulmonary/Chest: No stridor. He has no wheezes.   Tracheostomy in place, mechanically ventilated with current settings FiO2 30, peep of 5, mechanical breath sounds anteriorly   Abdominal: There is no tenderness. There is no rebound and no guarding.   Protuberant, PEG in place, rectal tube in place with dark brown liquid stool   Genitourinary:   Genitourinary Comments: Rahman in place with yellow urine draining   Musculoskeletal:   Functional quadriplegia   Neurological:   Spontaneously opening eyes.  Not to voice only.  Not protruding tongue to command.  Functional quadriplegic.   Skin: No rash noted. He is not diaphoretic.   Has known sacral Decub as well as pressure skin breakdown to BLE, heels, feet, toes POA   Psychiatric:   Unable to assess   Nursing note and vitals reviewed.      Significant Labs:   ABGs: No results for input(s): PH, PCO2, HCO3, POCSATURATED, BE, TOTALHB, COHB, METHB, O2HB, POCFIO2 in the last 48 hours.  Blood Culture:   Recent Labs   Lab 12/06/19 0401   LABBLOO No Growth to date  No Growth to date     BMP:   Recent Labs   Lab 12/07/19 0347         K 3.0*      CO2 29   BUN 40*   CREATININE 2.3*   CALCIUM 9.5   MG 2.2     CBC:   Recent Labs   Lab 12/06/19 0401 12/07/19 0347   WBC 9.66 9.01   HGB 10.0* 9.7*   HCT 32.2* 30.7*   * 130*     CMP:   Recent Labs   Lab 12/06/19 0401 12/07/19 0347    140   K 3.8 3.0*   CL 99 100   CO2 30* 29   GLU 92 109   BUN 42* 40*   CREATININE 2.4* 2.3*   CALCIUM 9.6 9.5   ANIONGAP 11 11   EGFRNONAA 24.6* 25.9*     Cardiac Markers: No results for input(s): CKMB, MYOGLOBIN, BNP, TROPISTAT in the last 48 hours.  Lactic Acid: No results for input(s): LACTATE in the last 48 hours.  Magnesium:   Recent Labs   Lab 12/06/19 0401 12/07/19 0347   MG 2.2 2.2     POCT Glucose: No results for input(s): POCTGLUCOSE in the last 48 hours.  Troponin: No results for input(s): TROPONINI in the last 48 hours.  TSH:   Recent Labs   Lab  12/04/19  1131   .830*     Urine Culture: No results for input(s): LABURIN in the last 48 hours.  Urine Studies: No results for input(s): COLORU, APPEARANCEUA, PHUR, SPECGRAV, PROTEINUA, GLUCUA, KETONESU, BILIRUBINUA, OCCULTUA, NITRITE, UROBILINOGEN, LEUKOCYTESUR, RBCUA, WBCUA, BACTERIA, SQUAMEPITHEL, HYALINECASTS in the last 48 hours.    Invalid input(s): SAJI  All pertinent labs within the past 24 hours have been reviewed.    Significant Imaging: I have reviewed all pertinent imaging results/findings within the past 24 hours.

## 2019-12-07 NOTE — ASSESSMENT & PLAN NOTE
Received faxed over Advanced directive from [place], confirmed full code status.  From attempts to contact listed contacts on face sheet, Malgorzata Fan was apparently making medical decisions for patient in the past but not actual POA. Estranged niece (Naomi Grewal) however not involved in patient's care. Case management involved, [place] does not have a power of .Patient not able to make his medical decisions.  Declining status with multiple providers recommendations for comfort directed care.  Will need legal/ethical committee involvement to further assist.  Remains full code for now.

## 2019-12-07 NOTE — PROGRESS NOTES
[facility]  Progress Note    Patient Name: Masood Messina  MRN: [id number]  Patient Class: IP- Inpatient   Admission Date: 12/4/2019  Length of Stay: 3 days  Attending Physician: Kiah Le MD  Primary Care Provider: Jeramie Lombardi MD        Subjective:     Principal Problem:Sepsis due to Gram-negative organism with septic shock        HPI:  79 year old male with PMHx CVA, functional quadriplegia, trach, peg sent from [facility] secondary to hypotension, bradycardia, unresponsiveness.  He has a rectal tube in place for C. Diff colitis and is on vanc per peg tube.  He has a chronic indwelling tay that was changed and he is on fluconazole for fungal UTI.  Per my chart review, he's had several admissions within the past few months for septic shock, unresponsiveness. He does not currently open his eyes to voice or stimuli though he does fight me when I try to open his eyelids.  Initial HR was reported to be 30.  Initial BP was reported to be 78/35.  He was given atropine x 2, epi x 2 and started on levophed.  In the ED he was also given solumedrol, IV flagyl, IV zosyn and I see Vanc and Meropenem ordered. EKG reveals bradycardia with wide QRS which appears new.     Overview/Hospital Course:  Patient was admitted to the ICU.  He was briefly on dobutamine and heart rate improved and same was discontinued.  Needing continued Levophed for blood pressure support.  Started on Zosyn for gram-negative bacteremia likely of urinary source.  Continued on oral vancomycin for C diff.  Not following commands.  Continues with chronic vent dependence.  Appreciate consultant's input.  Sputum culture now growing ESBL Proteus.  Seems preliminary blood cultures with Proteus as well.  Changing Zosyn to meropenem and watching closely for seizure activity.  Still trying to contact listed contacts to identify next of kin, power of . On 12/07 urine with carbapenems resistant  Pseudomonas, currently on meropenem, no evidence of seizure activity, apparently patient has no power of , only living relatives estranged from patient for the last 30-40 years, will need ethics/legal involvement for goals of care dressing.    Interval History:  No change in neurological status.  Does spontaneously open and close eyes, not to command.  Involuntary movement of the jaw, not protruding tongue.  T-max last 24 hr 99.  Heart rate better.  Still remains on Levophed, blood pressure drops to the 80s when discontinued.  I/O last 24 hr 1460/1900=-440, since admission - 5458ml.  Microbiology returned with urine Klebsiella, carbapenem resistant.  Case discussed personally with Dr. Tyson , ok to discontinue proscar, will re-send urine culture today to see if clearing infection, placing on enhanced precaution. Given no POA, unable to make his own decisions, only living relatives are estranged nieces/nephew, will need legal/medical consultation.  Case discussed with Nursing.      Review of Systems   Unable to perform ROS: Patient unresponsive     Objective:     Vital Signs (Most Recent):  Temp: 98.6 °F (37 °C) (12/07/19 0315)  Pulse: 83 (12/07/19 0916)  Resp: 19 (12/07/19 0916)  BP: 107/65 (12/07/19 0800)  SpO2: (!) 92 % (12/07/19 0916) Vital Signs (24h Range):  Temp:  [98.6 °F (37 °C)-99 °F (37.2 °C)] 98.6 °F (37 °C)  Pulse:  [] 83  Resp:  [15-44] 19  SpO2:  [92 %-98 %] 92 %  BP: (101-136)/(55-74) 107/65  Arterial Line BP: (105-149)/(55-76) 121/57     Weight: 129.3 kg (285 lb 1.6 oz)  Body mass index is 36.6 kg/m².    Intake/Output Summary (Last 24 hours) at 12/7/2019 0943  Last data filed at 12/7/2019 0800  Gross per 24 hour   Intake 1370 ml   Output 2000 ml   Net -630 ml      Physical Exam   Constitutional: He appears well-developed and well-nourished. No distress.   Obese male, chronically ill-appearing, spontaneously opens eyes, involuntary biting movement of jaw   HENT:   Head: Normocephalic  and atraumatic.   Eyes: Pupils are equal, round, and reactive to light. Right eye exhibits no discharge. Left eye exhibits no discharge.   Neck:   Trach with dressing underlying   Cardiovascular: Normal rate and regular rhythm.   No murmur heard.  Pulmonary/Chest: No stridor. He has no wheezes.   Tracheostomy in place, mechanically ventilated with current settings FiO2 30, peep of 5, mechanical breath sounds anteriorly   Abdominal: There is no tenderness. There is no rebound and no guarding.   Protuberant, PEG in place, rectal tube in place with dark brown liquid stool   Genitourinary:   Genitourinary Comments: Rahman in place with yellow urine draining   Musculoskeletal:   Functional quadriplegia   Neurological:   Spontaneously opening eyes.  Not to voice only.  Not protruding tongue to command.  Functional quadriplegic.   Skin: No rash noted. He is not diaphoretic.   Has known sacral Decub as well as pressure skin breakdown to BLE, heels, feet, toes POA   Psychiatric:   Unable to assess   Nursing note and vitals reviewed.      Significant Labs:   ABGs: No results for input(s): PH, PCO2, HCO3, POCSATURATED, BE, TOTALHB, COHB, METHB, O2HB, POCFIO2 in the last 48 hours.  Blood Culture:   Recent Labs   Lab 12/06/19  0401   LABBLOO No Growth to date  No Growth to date     BMP:   Recent Labs   Lab 12/07/19  0347         K 3.0*      CO2 29   BUN 40*   CREATININE 2.3*   CALCIUM 9.5   MG 2.2     CBC:   Recent Labs   Lab 12/06/19  0401 12/07/19  0347   WBC 9.66 9.01   HGB 10.0* 9.7*   HCT 32.2* 30.7*   * 130*     CMP:   Recent Labs   Lab 12/06/19  0401 12/07/19  0347    140   K 3.8 3.0*   CL 99 100   CO2 30* 29   GLU 92 109   BUN 42* 40*   CREATININE 2.4* 2.3*   CALCIUM 9.6 9.5   ANIONGAP 11 11   EGFRNONAA 24.6* 25.9*     Cardiac Markers: No results for input(s): CKMB, MYOGLOBIN, BNP, TROPISTAT in the last 48 hours.  Lactic Acid: No results for input(s): LACTATE in the last 48  hours.  Magnesium:   Recent Labs   Lab 12/06/19  0401 12/07/19  0347   MG 2.2 2.2     POCT Glucose: No results for input(s): POCTGLUCOSE in the last 48 hours.  Troponin: No results for input(s): TROPONINI in the last 48 hours.  TSH:   Recent Labs   Lab 12/04/19  1131   .830*     Urine Culture: No results for input(s): LABURIN in the last 48 hours.  Urine Studies: No results for input(s): COLORU, APPEARANCEUA, PHUR, SPECGRAV, PROTEINUA, GLUCUA, KETONESU, BILIRUBINUA, OCCULTUA, NITRITE, UROBILINOGEN, LEUKOCYTESUR, RBCUA, WBCUA, BACTERIA, SQUAMEPITHEL, HYALINECASTS in the last 48 hours.    Invalid input(s): WRIGHTSUR  All pertinent labs within the past 24 hours have been reviewed.    Significant Imaging: I have reviewed all pertinent imaging results/findings within the past 24 hours.      Assessment/Plan:      * Sepsis due to Gram-negative organism with septic shock  Ongoing septic shock requiring Levophed for blood pressure support.  Blood cultures growing Providencia.  Lactic acid 2.7.  Unclear source- urine culture with klebsiella vs respiratory with ESBL proteus mirabilus.  Continue Levophed, wean as tolerated (still requiring today though dose lower)  Changes Zosyn to meropenem 1 g q.12 hours (12/06) and monitor closely for seizure activity  Close monitoring of hemodynamics.  Daily labs.    Bacteremia -Providencia  Continue meropenem.  Repeat blood cultures with no growth today.  Unclear source.  Appreciate Infectious Disease input.      UTI due to Klebsiella species  Urine culture now growing Klebsiella, carbapenem resistant as per ID.  Noted on admission Rahman replaced with 1 L draining.    Chronic nephrolithiasis possibly nidus for infection versus other.  Multiple urinary tract issues.  Discontinue finasteride, discussed with Infectious Disease.  Repeat urine culture today.  Continue meropenem.      Proteus mirabilis in respiratory culture  Respiratory culture growing Proteus mirabilis, ESBL.  Unclear  this is an infection versus colonization.  Will empirically adjust antibiotics for coverage.      Candida UTI  Continuing previously prescribed fluconazole.       Hypokalemia  Potassium 3.0 today. 40mEq oral replacement via PEG ordered.  Repeat levels tomorrow.      Goals of care, counseling/discussion  Received faxed over Advanced directive from [place], confirmed full code status.  From attempts to contact listed contacts on face sheet, Malgorzata Fan was apparently making medical decisions for patient in the past but not actual POA. Estranged niece (Naomi Grewal) however not involved in patient's care. Case management involved, [place] does not have a power of .Patient not able to make his medical decisions.  Declining status with multiple providers recommendations for comfort directed care.  Will need legal/ethical committee involvement to further assist.  Remains full code for now.    C. difficile colitis  Rectal tube in place.  Continue oral vancomycin.    Essential hypertension now with hypotension on Levophed  Currently in shock.  Coreg resumed, holding hytrix and Lasix.    Encephalopathy, metabolic  As per report at baseline he is a functional quadriplegic, does protrude his tongue to command.  He is not doing same today.  Possibly secondary to sepsis and infection, history of seizures and possibly post ictal do no evidence to support same  Continue to treat acute on chronic medical issues.  Follow clinically.      Acquired hypothyroidism  Still profoundly hypothyroid with  with normal free T4.  Possibly contributing to encephalopathy.  Continue IV levothyroxine 100 mcg.    Seizure  Continue home seizure medication.  Monitor closely especially with starting meropenem which can lower seizure threshold.      CKD (chronic kidney disease), stage III  Serum creatinine slightly higher than baseline.  Renally dose all medications and avoid nephrotoxin drugs.  Trend BMP.      Foot ulcer-unstageable,  POA  Pressure off loading and wound care following      Chronic respiratory failure with hypoxia and hypercapnia secondary to JUNAID/OHS with chronic vent dependence  Trach with mechanical ventilation.  Ventilator precautions.  Appreciate Pulmonary input.    Coronary artery disease  Chronic condition.  Resume Coreg for now given tachycardia.    Sacral decubitus ulcer, stage II  Present on arrival.  Wound Care following.      Hemiparesis affecting dominant side as late effect of stroke        PEG (percutaneous endoscopic gastrostomy) status  Tolerating tube feeds well.      Functional quadriplegia  Baseline functional quadriplegic.        VTE Risk Mitigation (From admission, onward)    None        Critical care time spent including chart review, labs and radiological review, microbiology review, patient examination, discussion with consultant and nursing, note preparation and  was 35 minutes.         Kiah Le MD  Department of Hospital Medicine   [facility]

## 2019-12-07 NOTE — NURSING
"Pt is ordered to be given 5mg of finasteride per PEG, there is a hard "Do not crush/chew" warning on the mar/pyxis. I spoke to pharmacy and they said that some other medication would have to be ordered instead. I spoke to Dr. Le and she said that he has been getting it for years and that I need to give it.   Oma Carranza RN    "

## 2019-12-07 NOTE — ASSESSMENT & PLAN NOTE
Ongoing septic shock requiring Levophed for blood pressure support.  Blood cultures growing Providencia.  Lactic acid 2.7.  Unclear source- urine culture with klebsiella vs respiratory with ESBL proteus mirabilus.  Continue Levophed, wean as tolerated (still requiring today though dose lower)  Changes Zosyn to meropenem 1 g q.12 hours (12/06) and monitor closely for seizure activity  Close monitoring of hemodynamics.  Daily labs.

## 2019-12-07 NOTE — PROGRESS NOTES
Consult Note  Infectious Disease    Reason for Consult:  shock    HPI: Masood Messina is an  79 y.o. male with whom I am familiar from prior consultations who has been vent dependent for several years, is colonized in sputum and urine with a variety of gram negative rods and has recently been treated for pseudomonas urosepsis on 2 occasions, initially associated with hematuria/clots in bladder and with bacteremia, followed by the development of C. Difficile colitis. He was discharged about 3 weeks ago on a few days of zosyn and a taper of oral vanc. During that hospitalization he had worse than usual encephalopathy and seizures.   He was sent to the ED today for severe bradycardia and hypotension. His CXR looks like pulmonary edema, he had a WBC of 14k and required pressors, removal of indwelling picc, placement of TLC and admission to ICU. He is unresponsive, pulse is now up to 100. UA has pyuria and last few urine cultures have had Candida. His tay on admission was replaced immediately draining about a liter of urine. He was placed on merrem, IV and oral vanc.     12/5:  Temperature is less than 100 after 1 large dose of Solu-Medrol yesterday.  Vasopressor requirement has decreased.  Ventilatory needs are at or near baseline.  He is no longer bradycardic.  Blood cultures have gram-negative rods.  The urine culture was collected yesterday by nursing but not confirmed as collected in epic therefore the urine culture was not set up.  Chest x-ray is improved on the left and the same on the right.  Secretions are minimal and tan.  He will open his eyes when I call his name but he will not attend or protrude his tongue on command.  12/6: still waiting on finals of cultures. He is more alert. No success in reaching family per hospital medicine or pulmonary. Because of GNR in blood and ESBL in sputum and urine will resume meropenem.   12/7:Blood culture from 12/04 has Providencia, sensitive to ceftriaxone, Zosyn  and meropenem  Sputum has Proteus ESBL and Serratia which he has had in the past  Urine has Klebsiella and Pseudomonas.  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   Per hospital medicine, his next of kin is a niece who has been estranged and has not seen him in decades. Still on levophed.     EXAM & DIAGNOSTICS REVIEWED:   Vitals:     Temp:  [98.6 °F (37 °C)-99 °F (37.2 °C)]   Temp: 98.6 °F (37 °C) (12/07/19 0315)  Pulse: 83 (12/07/19 0916)  Resp: 19 (12/07/19 0916)  BP: 107/65 (12/07/19 0800)  SpO2: (!) 92 % (12/07/19 0916)    Intake/Output Summary (Last 24 hours) at 12/7/2019 0928  Last data filed at 12/7/2019 0800  Gross per 24 hour   Intake 1370 ml   Output 2000 ml   Net -630 ml       General:  Eyes will open when addressed but he will not attend or follow any commands  Eyes:  Anicteric, PERRL,  EOMI are grossly intact and he has some yellow exudate from conjunctiva  ENT:  Constant/repetitive clenching of teeth  Neck:  supple,tracheostomy  Lungs: Coarse without consolidation.   Minimal ventilator needs  Heart:  iRRR, no gallop/murmur/rub noted  Abd:  Soft, NT, ND, normal BS, no masses or organomegaly appreciated. PEG, rectal tube with liquid stool. Receiving tube feeds  :   Rahman, urine clear, no flank tenderness, minimal urethral discharge  Musc:  Joints without effusion, swelling, erythema, synovitis, quadriplegic   Skin:  No rashes.   Wound: Photos reviewed with nursing, worst wound is left lateral foot.   Neuro: Eyes open to voice, will not attend.. Quadriplegic, very high tone throughout  Psych:   eyes open, will not attend     Extrem: Chronic woody edema, no erythema, phlebitis, cellulitis,    VAD:  Left IJ TLC     Isolation:  Enhanced contact    Lines/Tubes/Drains:    General Labs reviewed:  Recent Labs   Lab 12/05/19  0426 12/05/19  0500 12/06/19  0401 12/07/19  0347   WBC 9.12  --  9.66 9.01   HGB 10.7*  --  10.0* 9.7*   HCT 33.4* 34* 32.2* 30.7*     --  146* 130*       Recent Labs   Lab  12/04/19  1131 12/05/19  0426 12/06/19  0401 12/07/19  0347   * 138 140 140   K 5.3* 4.7 3.8 3.0*   CL 87* 97 99 100   CO2 34* 31* 30* 29   BUN 53* 46* 42* 40*   CREATININE 2.2* 2.2* 2.4* 2.3*   CALCIUM 9.8 9.5 9.6 9.5   PROT 8.7* 8.2  --   --    BILITOT 0.7 0.9  --   --    ALKPHOS 78 75  --   --    ALT 17 18  --   --    AST 30 34  --   --            Micro:  Microbiology Results (last 7 days)     Procedure Component Value Units Date/Time    Urine Culture High Risk [[id number]]     Order Status:  No result Specimen:  Urine, Catheterized     Culture, Respiratory with Gram Stain [[id number]]  (Abnormal)  (Susceptibility) Collected:  12/04/19 1024    Order Status:  Completed Specimen:  Respiratory from Sputum Updated:  12/07/19 0858     Respiratory Culture Reduced Normal Respiratory gabriela      PROTEUS MIRABILIS ESBL  Moderate  Results called to and read back by : Ameena López RN on M3  12/06/2019  09:36 by DRP        SERRATIA MARCESCENS  Moderate       Gram Stain (Respiratory) <10 epithelial cells per low power field.     Gram Stain (Respiratory) Moderate WBC's     Gram Stain (Respiratory) Few  Gram positive cocci     Gram Stain (Respiratory) Few Gram positive rods     Gram Stain (Respiratory) Few Gram negative rods    Narrative:       PER PROTOCOL    Urine culture [[id number]]  (Abnormal)  (Susceptibility) Collected:  12/04/19 1500    Order Status:  Completed Specimen:  Urine, Catheterized Updated:  12/07/19 0742     Urine Culture, Routine KLEBSIELLA PNEUMONIAE  >100,000 cfu/ml        PSEUDOMONAS AERUGINOSA  50,000 - 99,999 cfu/ml      Narrative:       Indicated criteria for high risk culture:->Other  Other (specify):->chronic indwelling tay    Blood culture x two cultures. Draw prior to antibiotics. [[id number]]  (Abnormal)  (Susceptibility) Collected:  12/04/19 1130    Order Status:  Completed Specimen:  Blood from Line, Jugular, External Left Updated:  12/07/19 0721     Blood Culture, Routine Gram stain reynaldo  bottle: Gram negative rods      Critical result called and read back Olivia Ortega RN M3 @ 4162      Gram stain aer bottle: Gram positive cocci      Results called to and read back by: Ameena López RN on M3 12/05/2019        16:23      PROVIDENCIA STUARTII      COAGULASE-NEGATIVE STAPHYLOCOCCUS SPECIES  Organism is a probable contaminant      Narrative:       Aerobic and anaerobic    Blood culture [[id number]] Collected:  12/06/19 0401    Order Status:  Completed Specimen:  Blood Updated:  12/07/19 0632     Blood Culture, Routine No Growth to date      No Growth to date    Blood culture [[id number]] Collected:  12/04/19 2050    Order Status:  Completed Specimen:  Blood from Line, Arterial, Right Updated:  12/06/19 2232     Blood Culture, Routine No Growth to date      No Growth to date      No Growth to date    Narrative:       Aerobic and anaerobic  Can we please draw one set from the periphery?  It looks like  the two sets done in the ED were drawn from the central  line?  Collection has been rescheduled by [facility] at 12/04/2019 16:06 Reason:   Nurse Draw  Collection has been rescheduled by [facility] at 12/04/2019 16:06 Reason:   Nurse Draw    Blood culture x two cultures. Draw prior to antibiotics. [[id number]] Collected:  12/04/19 1217    Order Status:  Completed Specimen:  Blood from Line, Jugular, External Left Updated:  12/06/19 1632     Blood Culture, Routine No Growth to date      No Growth to date      No Growth to date    Narrative:       Aerobic and anaerobic    Stool culture [[id number]] Collected:  12/04/19 1225    Order Status:  Completed Specimen:  Stool Updated:  12/06/19 1119     Stool Culture No Salmonella,Shigella,Vibrio,Campylobacter.      No E coli 0157:H7 isolated.    Blood culture [[id number]] Collected:  12/05/19 0805    Order Status:  Completed Specimen:  Blood from Line, Arterial, Right Updated:  12/06/19 1032     Blood Culture, Routine No Growth to date      No Growth to date    Narrative:       Draw  from line    Clostridium difficile EIA [[id number]] Collected:  12/04/19 1225    Order Status:  Completed Specimen:  Stool Updated:  12/04/19 1627     C. diff Antigen Negative     C difficile Toxins A+B, EIA Negative     Comment: Testing not recommended for children <24 months old.           Imaging Reviewed:   CXRs   CT head   Ultrasound kidneys 12/5, no change in hydro or non obstructing stones  Cardiology:    IMPRESSION & PLAN   1. Providencia bacteremia.  His Rahman catheter was not draining well at the time of admission and when replaced in the ER, almost a Liter was relieved.  This does not match any of the other organisms isolated from urine or sputum  2. Hypotension, multifactorial, Bradycardia, resolved  3. Chronic vent dependence, quadriplegic  4. 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 Conjunctivitis  6. No quality of life    Recommendations:  Continue oral vancomycin   Continue merrem,   Continue diflucan   Gentamicin eye drops     Consider withdraw to comfort care  We will need to involve legal or ethics committee due to next of kin is a niece who has been estranged and not seen him in decades. .

## 2019-12-07 NOTE — ASSESSMENT & PLAN NOTE
Urine culture now growing Klebsiella, carbapenem resistant as per ID.  Noted on admission Rahman replaced with 1 L draining.    Chronic nephrolithiasis possibly nidus for infection versus other.  Multiple urinary tract issues.  Discontinue finasteride, discussed with Infectious Disease.  Repeat urine culture today.  Continue meropenem.

## 2019-12-07 NOTE — PLAN OF CARE
This note also relates to the following rows which could not be included:  Oxygen Concentration (%) - Cannot attach notes to unvalidated device data  SpO2 - Cannot attach notes to unvalidated device data  Pulse - Cannot attach notes to unvalidated device data  Resp - Cannot attach notes to unvalidated device data  Ventilation Type - Cannot attach notes to unvalidated device data  Vent Mode - Cannot attach notes to unvalidated device data  Set Rate - Cannot attach notes to unvalidated device data  Vt Set - Cannot attach notes to unvalidated device data  PEEP/CPAP - Cannot attach notes to unvalidated device data  Pressure Support - Cannot attach notes to unvalidated device data  Waveform - Cannot attach notes to unvalidated device data  Peak Flow - Cannot attach notes to unvalidated device data  Plateau Set/Insp. Hold (sec) - Cannot attach notes to unvalidated device data  Trigger Sensitivity Flow/I-Trigger - Cannot attach notes to unvalidated device data  Resp Rate Total - Cannot attach notes to unvalidated device data  Peak Airway Pressure - Cannot attach notes to unvalidated device data  Mean Airway Pressure - Cannot attach notes to unvalidated device data  Plateau Pressure - Cannot attach notes to unvalidated device data  Exhaled Vt - Cannot attach notes to unvalidated device data  Total Ve - Cannot attach notes to unvalidated device data  I:E Ratio Measured - Cannot attach notes to unvalidated device data  Resp Rate High Alarm - Cannot attach notes to unvalidated device data  Press High Alarm - Cannot attach notes to unvalidated device data  Apnea Rate - Cannot attach notes to unvalidated device data  Apnea Volume (mL) - Cannot attach notes to unvalidated device data  Apnea Oxygen Concentration  - Cannot attach notes to unvalidated device data  Apnea Flow Rate (L/min) - Cannot attach notes to unvalidated device data  T Apnea - Cannot attach notes to unvalidated device data       12/06/19 2037   Patient  Assessment/Suction   Level of Consciousness (AVPU) responds to voice   Respiratory Effort Unlabored;Normal   Expansion/Accessory Muscles/Retractions no use of accessory muscles   All Lung Fields Breath Sounds coarse   Rhythm/Pattern, Respiratory assisted mechanically;unlabored   Cough Frequency with stimulation   Cough Type assisted   Suction Method tracheal   $ Suction Charges Inline Suction Procedure Stat Charge   Secretions Amount moderate   Secretions Color yellow   Secretions Characteristics thick   PRE-TX-O2   O2 Device (Oxygen Therapy) ventilator   $ Is the patient on Low Flow Oxygen? Yes   Pulse Oximetry Type Continuous   $ Pulse Oximetry - Multiple Charge Pulse Oximetry - Multiple   Wound Care   $ Wound Care Tech Time 15 min   Area of Concern Neck under tracheostomy   Skin Color/Characteristics without discoloration   Skin Temperature warm   Barrier used? Other (see comments)   Was wound care notified? No   Barrier Changed? Yes        Surgical Airway Portex Cuffed;Fenestrated   No Placement Date or Time found.   Present Prior to Hospital Arrival?: Yes  Inserted by: Present Prior to Hospital Arrival  Type: Tracheostomy  Brand: Portex  Airway Device Size: 8.0  Style: Cuffed;Fenestrated   Cuff Inflation? Inflated   Status Secured   Site Assessment Clean;Dry   Site Care Cleansed   Inner Cannula Care Other (Comment)   Ties Assessment Clean;Dry;Intact   Vent Select   Conventional Vent Y   $ Ventilator Initial 1   Charged w/in last 24h YES   Preset Conventional Ventilator Settings   Vent ID [id number]   Vent Type    Conventional Ventilator Alarms   Alarms On Y

## 2019-12-07 NOTE — PROGRESS NOTES
Progress Note  PULMONARY    Admit Date: 12/4/2019 12/07/2019      SUBJECTIVE:     Dec 7- pt presented with septic shock on 4th seen by Dr Kinsey. Functional quad,trach, chr vent.  Pt eyes open, not interactive.       PFSH and Allergies reviewed.    OBJECTIVE:     Vitals (Most recent):  Vitals:    12/07/19 0916   BP:    Pulse: 83   Resp: 19   Temp:        Vitals (24 hour range):  Temp:  [98.6 °F (37 °C)-99 °F (37.2 °C)]   Pulse:  []   Resp:  [15-44]   BP: (101-125)/(55-72)   SpO2:  [92 %-98 %]   Arterial Line BP: (105-149)/(55-76)       Intake/Output Summary (Last 24 hours) at 12/7/2019 1147  Last data filed at 12/7/2019 0800  Gross per 24 hour   Intake 1280 ml   Output 2000 ml   Net -720 ml          Physical Exam:  The patient's neuro status (alertness,orientation,cognitive function,motor skills,), pharyngeal exam (oral lesions, hygiene, abn dentition,), Neck (jvd,mass,thyroid,nodes in neck and above/below clavicle),RESPIRATORY(symmetry,effort,fremitus,percussion,auscultation),  Cor(rhythm,heart tones including gallops,perfusion,edema)ABD(distention,hepatic&splenomegaly,tenderness,masses), Skin(rash,cyanosis),Psyc(affect,judgement,).  Exam negative except for these pertinent findings:    Awake,not interactive, puffy, chest is symmetric, no distress, normal percussion, normal fremitus and good normal breath sounds      Radiographs reviewed: view by direct vision   cxr 12/5 with sm right lung and bilat increase markings similar to 11/12/19  Results for orders placed during the hospital encounter of 10/06/17   X-Ray Chest 1 View for PICC_Central line    Narrative A PICC has been placed on the right and ends in the distal superior vena cava. Tracheostomy tube remains in position. The patient has had a prior sternotomy. Cardiac size is within normal limits. There is no pulmonary mass or infiltrate is seen.    Impression  PICC in good position without pneumothorax. Tracheostomy tube in place. Prior  sternotomy.      Electronically signed by: Alexsander Adrian MD  Date:     10/09/17  Time:    15:43    ]    Labs     Recent Labs   Lab 12/07/19  0347   WBC 9.01   HGB 9.7*   HCT 30.7*   *     Recent Labs   Lab 12/07/19  0347      K 3.0*      CO2 29   BUN 40*   CREATININE 2.3*      CALCIUM 9.5   MG 2.2   PHOS 2.8   No results for input(s): PH, PCO2, PO2, HCO3 in the last 24 hours.  Microbiology Results (last 7 days)     Procedure Component Value Units Date/Time    Urine Culture High Risk [[id number]] Collected:  12/07/19 0930    Order Status:  Sent Specimen:  Urine, Catheterized Updated:  12/07/19 1109    Blood culture [[id number]] Collected:  12/05/19 0805    Order Status:  Completed Specimen:  Blood from Line, Arterial, Right Updated:  12/07/19 1032     Blood Culture, Routine No Growth to date      No Growth to date      No Growth to date    Narrative:       Draw from line    Culture, Respiratory with Gram Stain [[id number]]  (Abnormal)  (Susceptibility) Collected:  12/04/19 1024    Order Status:  Completed Specimen:  Respiratory from Sputum Updated:  12/07/19 0858     Respiratory Culture Reduced Normal Respiratory gabriela      PROTEUS MIRABILIS ESBL  Moderate  Results called to and read back by : Ameena López RN on M3  12/06/2019  09:36 by BREONNA        SERRATIA MARCESCENS  Moderate       Gram Stain (Respiratory) <10 epithelial cells per low power field.     Gram Stain (Respiratory) Moderate WBC's     Gram Stain (Respiratory) Few  Gram positive cocci     Gram Stain (Respiratory) Few Gram positive rods     Gram Stain (Respiratory) Few Gram negative rods    Narrative:       PER PROTOCOL    Urine culture [[id number]]  (Abnormal)  (Susceptibility) Collected:  12/04/19 1500    Order Status:  Completed Specimen:  Urine, Catheterized Updated:  12/07/19 0742     Urine Culture, Routine KLEBSIELLA PNEUMONIAE  >100,000 cfu/ml        PSEUDOMONAS AERUGINOSA  50,000 - 99,999 cfu/ml      Narrative:       Indicated  criteria for high risk culture:->Other  Other (specify):->chronic indwelling tay    Blood culture x two cultures. Draw prior to antibiotics. [[id number]]  (Abnormal)  (Susceptibility) Collected:  12/04/19 1130    Order Status:  Completed Specimen:  Blood from Line, Jugular, External Left Updated:  12/07/19 0721     Blood Culture, Routine Gram stain reynaldo bottle: Gram negative rods      Critical result called and read back Olivia Ortega RN M3 @ 9388      Gram stain aer bottle: Gram positive cocci      Results called to and read back by: Ameena López RN on M3 12/05/2019        16:23      PROVIDENCIA STUARTII      COAGULASE-NEGATIVE STAPHYLOCOCCUS SPECIES  Organism is a probable contaminant      Narrative:       Aerobic and anaerobic    Blood culture [[id number]] Collected:  12/06/19 0401    Order Status:  Completed Specimen:  Blood Updated:  12/07/19 0632     Blood Culture, Routine No Growth to date      No Growth to date    Blood culture [[id number]] Collected:  12/04/19 2050    Order Status:  Completed Specimen:  Blood from Line, Arterial, Right Updated:  12/06/19 2232     Blood Culture, Routine No Growth to date      No Growth to date      No Growth to date    Narrative:       Aerobic and anaerobic  Can we please draw one set from the periphery?  It looks like  the two sets done in the ED were drawn from the central  line?  [facility] 12[facility]04/2019 16:06 Reason:   Nurse Draw  [facility] 12/04/2019 16:06 Reason:   Nurse Draw    Blood culture x two cultures. Draw prior to antibiotics. [[id number]] Collected:  12/04/19 1217    Order Status:  Completed Specimen:  Blood from Line, Jugular, External Left Updated:  12/06/19 1632     Blood Culture, Routine No Growth to date      No Growth to date      No Growth to date    Narrative:       Aerobic and anaerobic    Stool culture [[id number]] Collected:  12/04/19 1225    Order Status:  Completed Specimen:  Stool Updated:   12/06/19 1119     Stool Culture No Salmonella,Shigella,Vibrio,Campylobacter.      No E coli 0157:H7 isolated.    Clostridium difficile EIA [[id number]] Collected:  12/04/19 1225    Order Status:  Completed Specimen:  Stool Updated:  12/04/19 1626     C. diff Antigen Negative     C difficile Toxins A+B, EIA Negative     Comment: Testing not recommended for children <24 months old.             Impression:  Active Hospital Problems    Diagnosis  POA    *Sepsis due to Gram-negative organism with septic shock [A41.50, R65.21]  Yes    UTI due to Klebsiella species [N39.0, B96.1]  Yes    Hypokalemia [E87.6]  No    Tracheostomy in place [Z93.0]  Not Applicable    Proteus mirabilis in respiratory culture [J15.6]  Yes    Seizure [R56.9]  Yes     Chronic    Bacteremia -Providencia [R78.81]  Yes    Goals of care, counseling/discussion [Z71.89]  Not Applicable    Foot ulcer-unstageable, POA [L97.509]  Yes     Chronic    CKD (chronic kidney disease), stage III [N18.3]  Yes     Chronic    Encephalopathy, metabolic [G93.41]  Yes    Candida UTI [B37.49]  Yes    C. difficile colitis [A04.72]  Yes    Essential hypertension now with hypotension on Levophed [I10]  Yes    Coronary artery disease [I25.10]  Yes    Chronic respiratory failure with hypoxia and hypercapnia secondary to JUNAID/OHS with chronic vent dependence [J96.11, J96.12]  Yes    Hemiparesis affecting dominant side as late effect of stroke [I69.359]  Not Applicable    Sacral decubitus ulcer, stage II [L89.152]  Yes    Acquired hypothyroidism [E03.9]  Yes    PEG (percutaneous endoscopic gastrostomy) status [Z93.1]  Not Applicable    Functional quadriplegia [R53.2]  Yes      Resolved Hospital Problems    Diagnosis Date Resolved POA    Hyponatremia [E87.1] 12/05/2019 Yes    Bradycardia [R00.1] 12/05/2019 Yes               Plan:       Dec 7- wbc 9, cxr 12/5 with sm right lung and bilat increase markings similar to 11/12/19,      esbl- urine on  merrem.    Volume time obstructed.     Little to add- pressors weaning.                                    .

## 2019-12-07 NOTE — PLAN OF CARE
12/07/19 0728   Patient Assessment/Suction   Level of Consciousness (AVPU) responds to voice   All Lung Fields Breath Sounds coarse   Suction Method tracheal   $ Suction Charges Inline Suction Procedure Stat Charge   Secretions Amount moderate   Secretions Color yellow   Secretions Characteristics thick   PRE-TX-O2   O2 Device (Oxygen Therapy) ventilator   Oxygen Concentration (%) 30   SpO2 96 %   Pulse Oximetry Type Continuous   $ Pulse Oximetry - Multiple Charge Pulse Oximetry - Multiple   Pulse 86   Resp 18   Aerosol Therapy   $ Aerosol Therapy Charges Aerosol Treatment   Daily Review of Necessity (SVN) completed   Respiratory Treatment Status (SVN) given   Treatment Route (SVN) in-line   Patient Position (SVN) semi-Arredondo's   Post Treatment Assessment (SVN) congestion decreased   Signs of Intolerance (SVN) none   Breath Sounds Post-Respiratory Treatment   Post-treatment Heart Rate (beats/min) 87   Post-treatment Resp Rate (breaths/min) 18   Ready to Wean/Extubation Screen   FIO2<=50 (chart decimal) 0.3

## 2019-12-07 NOTE — ASSESSMENT & PLAN NOTE
Continue meropenem.  Repeat blood cultures with no growth today.  Unclear source.  Appreciate Infectious Disease input.

## 2019-12-08 PROBLEM — I10 ESSENTIAL HYPERTENSION: Chronic | Status: ACTIVE | Noted: 2019-01-01

## 2019-12-08 PROBLEM — A41.50 SEPSIS DUE TO GRAM-NEGATIVE ORGANISM WITH SEPTIC SHOCK: Status: RESOLVED | Noted: 2019-01-01 | Resolved: 2019-01-01

## 2019-12-08 PROBLEM — R65.21 SEPSIS DUE TO GRAM-NEGATIVE ORGANISM WITH SEPTIC SHOCK: Status: RESOLVED | Noted: 2019-01-01 | Resolved: 2019-01-01

## 2019-12-08 NOTE — PROGRESS NOTES
Consult Note  Infectious Disease    Reason for Consult:  shock    HPI: Masood Messina is an  79 y.o. male with whom I am familiar from prior consultations who has been vent dependent for several years, is colonized in sputum and urine with a variety of gram negative rods and has recently been treated for pseudomonas urosepsis on 2 occasions, initially associated with hematuria/clots in bladder and with bacteremia, followed by the development of C. Difficile colitis. He was discharged about 3 weeks ago on a few days of zosyn and a taper of oral vanc. During that hospitalization he had worse than usual encephalopathy and seizures.   He was sent to the ED today for severe bradycardia and hypotension. His CXR looks like pulmonary edema, he had a WBC of 14k and required pressors, removal of indwelling picc, placement of TLC and admission to ICU. He is unresponsive, pulse is now up to 100. UA has pyuria and last few urine cultures have had Candida. His tay on admission was replaced immediately draining about a liter of urine. He was placed on merrem, IV and oral vanc.     12/5:  Temperature is less than 100 after 1 large dose of Solu-Medrol yesterday.  Vasopressor requirement has decreased.  Ventilatory needs are at or near baseline.  He is no longer bradycardic.  Blood cultures have gram-negative rods.  The urine culture was collected yesterday by nursing but not confirmed as collected in epic therefore the urine culture was not set up.  Chest x-ray is improved on the left and the same on the right.  Secretions are minimal and tan.  He will open his eyes when I call his name but he will not attend or protrude his tongue on command.  12/6: still waiting on finals of cultures. He is more alert. No success in reaching family per hospital medicine or pulmonary. Because of GNR in blood and ESBL in sputum and urine will resume meropenem.   12/7:Blood culture from 12/04 has Providencia, sensitive to ceftriaxone, Zosyn  and meropenem  Sputum has Proteus ESBL and Serratia which he has had in the past  Urine has Klebsiella and Pseudomonas.  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   Per hospital medicine, his next of kin is a niece who has been estranged and has not seen him in decades. Still on levophed.   12/8: afebrile, off pressors. No change in vent needs. Repeat Urine culture is negative so far. Secretions are improved. No problems with tube feeds. Having a little oral bleeding from oral care. No apparent antibiotic intolerance and no seizures.     EXAM & DIAGNOSTICS REVIEWED:   Vitals:     Temp:  [97.2 °F (36.2 °C)-98.3 °F (36.8 °C)]   Temp: 98.3 °F (36.8 °C) (12/08/19 0800)  Pulse: 61 (12/08/19 0950)  Resp: 18 (12/08/19 0950)  BP: 135/64 (12/08/19 0800)  SpO2: 98 % (12/08/19 0950)    Intake/Output Summary (Last 24 hours) at 12/8/2019 0955  Last data filed at 12/8/2019 0900  Gross per 24 hour   Intake 1817.11 ml   Output 1475 ml   Net 342.11 ml       General:  Eyes will open when addressed but he will not attend or follow any commands  Eyes:  Anicteric, PERRL,  EOMI are grossly intact and he has some yellow exudate from conjunctiva which is improved  ENT:  Constant/repetitive clenching of teeth  Neck:  supple,tracheostomy  Lungs: Coarse without consolidation.   Minimal ventilator needs  Heart:  iRRR, no gallop/murmur/rub noted  Abd:  Soft, NT, ND, normal BS, no masses or organomegaly appreciated. PEG, rectal tube with liquid stool. Receiving tube feeds  :   Rahman, urine clear, no flank tenderness,    Musc:  Joints without effusion, swelling, erythema, synovitis, quadriplegic   Skin:  No rashes.   Wound: Photos reviewed with nursing, worst wound is left lateral foot.   Neuro: Eyes open to voice, will not attend.. Quadriplegic, very high tone throughout  Psych:   eyes open, will not attend     Extrem: Chronic woody edema, no erythema, phlebitis, cellulitis,    VAD:  Left IJ TLC     Isolation:  Enhanced  contact    Lines/Tubes/Drains:    General Labs reviewed:  Recent Labs   Lab 12/06/19  0401 12/07/19  0347 12/08/19  0353   WBC 9.66 9.01 8.24   HGB 10.0* 9.7* 9.1*   HCT 32.2* 30.7* 29.1*   * 130* 105*       Recent Labs   Lab 12/04/19  1131 12/05/19  0426 12/06/19  0401 12/07/19  0347 12/08/19  0353   * 138 140 140 140   K 5.3* 4.7 3.8 3.0* 3.2*   CL 87* 97 99 100 100   CO2 34* 31* 30* 29 29   BUN 53* 46* 42* 40* 41*   CREATININE 2.2* 2.2* 2.4* 2.3* 2.2*   CALCIUM 9.8 9.5 9.6 9.5 9.7   PROT 8.7* 8.2  --   --   --    BILITOT 0.7 0.9  --   --   --    ALKPHOS 78 75  --   --   --    ALT 17 18  --   --   --    AST 30 34  --   --   --            Micro:  Microbiology Results (last 7 days)     Procedure Component Value Units Date/Time    Urine Culture High Risk [[id number]] Collected:  12/07/19 0930    Order Status:  Completed Specimen:  Urine, Catheterized Updated:  12/08/19 0754     Urine Culture, Routine No growth to date    Narrative:       Indicated criteria for high risk culture:->Less than 25  months of age  Indicated criteria for high risk culture:->Other  Other (specify):->CRE    Blood culture [[id number]] Collected:  12/06/19 0401    Order Status:  Completed Specimen:  Blood Updated:  12/08/19 0632     Blood Culture, Routine No Growth to date      No Growth to date      No Growth to date    Blood culture [[id number]] Collected:  12/04/19 2050    Order Status:  Completed Specimen:  Blood from Line, Arterial, Right Updated:  12/07/19 2232     Blood Culture, Routine No Growth to date      No Growth to date      No Growth to date      No Growth to date    Narrative:       Aerobic and anaerobic  Can we please draw one set from the periphery?  It looks like  the two sets done in the ED were drawn from the central  line?  Collection has been rescheduled by [facility] at 12/04/2019 16:06 Reason:   Nurse Draw  Collection has been rescheduled by [facility] at 12/04/2019 16:06 Reason:   Nurse Draw    Blood culture x two  cultures. Draw prior to antibiotics. [[id number]] Collected:  12/04/19 1217    Order Status:  Completed Specimen:  Blood from Line, Jugular, External Left Updated:  12/07/19 1632     Blood Culture, Routine No Growth to date      No Growth to date      No Growth to date      No Growth to date    Narrative:       Aerobic and anaerobic    Urine culture [[id number]]  (Abnormal)  (Susceptibility) Collected:  12/04/19 1500    Order Status:  Completed Specimen:  Urine, Catheterized Updated:  12/07/19 1318     Urine Culture, Routine KLEBSIELLA PNEUMONIAE   >100,000 cfu/ml        PSEUDOMONAS AERUGINOSA  50,000 - 99,999 cfu/ml      Narrative:       Indicated criteria for high risk culture:->Other  Other (specify):->chronic indwelling tay    Blood culture [[id number]] Collected:  12/05/19 0805    Order Status:  Completed Specimen:  Blood from Line, Arterial, Right Updated:  12/07/19 1032     Blood Culture, Routine No Growth to date      No Growth to date      No Growth to date    Narrative:       Draw from line    Culture, Respiratory with Gram Stain [[id number]]  (Abnormal)  (Susceptibility) Collected:  12/04/19 1024    Order Status:  Completed Specimen:  Respiratory from Sputum Updated:  12/07/19 0858     Respiratory Culture Reduced Normal Respiratory gabriela      PROTEUS MIRABILIS ESBL  Moderate  Results called to and read back by : Ameena López RN on M3  12/06/2019  09:36 by BREONNA        SERRATIA MARCESCENS  Moderate       Gram Stain (Respiratory) <10 epithelial cells per low power field.     Gram Stain (Respiratory) Moderate WBC's     Gram Stain (Respiratory) Few  Gram positive cocci     Gram Stain (Respiratory) Few Gram positive rods     Gram Stain (Respiratory) Few Gram negative rods    Narrative:       PER PROTOCOL    Blood culture x two cultures. Draw prior to antibiotics. [[id number]]  (Abnormal)  (Susceptibility) Collected:  12/04/19 1130    Order Status:  Completed Specimen:  Blood from Line, Jugular, External Left  Updated:  12/07/19 0721     Blood Culture, Routine Gram stain reynaldo bottle: Gram negative rods      Critical result called and read back Olivia Ortega RN M3 @ [id number]      Gram stain aer bottle: Gram positive cocci      Results called to and read back by: Ameena López RN on M3 12/05/2019        16:23      PROVIDENCIA STUARTII      COAGULASE-NEGATIVE STAPHYLOCOCCUS SPECIES  Organism is a probable contaminant      Narrative:       Aerobic and anaerobic    Stool culture [[id number]] Collected:  12/04/19 1225    Order Status:  Completed Specimen:  Stool Updated:  12/06/19 1119     Stool Culture No Salmonella,Shigella,Vibrio,Campylobacter.      No E coli 0157:H7 isolated.    Clostridium difficile EIA [[id number]] Collected:  12/04/19 1225    Order Status:  Completed Specimen:  Stool Updated:  12/04/19 1626     C. diff Antigen Negative     C difficile Toxins A+B, EIA Negative     Comment: Testing not recommended for children <24 months old.           Imaging Reviewed:   CXRs   CT head   Ultrasound kidneys 12/5, no change in hydro or non obstructing stones  Cardiology:    IMPRESSION & PLAN   1. Providencia bacteremia.  His Rahman catheter was not draining well at the time of admission and when replaced in the ER, almost a Liter was relieved.  This does not match any of the other organisms isolated from urine or sputum however  2. Hypotension, multifactorial, Bradycardia, resolved  3. Chronic vent dependence, quadriplegic  4. Extensive antibiotic exposure for recurrent  infections and colonization with numerous gram-negative rods, including a carbapenemase resistant Klebsiella in his urine, reculture negative so far  5. Conjunctivitis, improved  6. No quality of life    Recommendations:  Continue oral vancomycin   Continue merrem,   Continue diflucan   Gentamicin eye drops     Consider withdraw to comfort care  We will need to involve legal or ethics committee due to next of kin is a niece who has been estranged and not seen him in  decades. .    I will be out 12/9, Dr. Antoine will be available for questions

## 2019-12-08 NOTE — PROGRESS NOTES
[facility]  Progress Note    Patient Name: Masood Messina  MRN: [id number]  Patient Class: IP- Inpatient   Admission Date: 12/4/2019  Length of Stay: 4 days  Attending Physician: Kiah Le MD  Primary Care Provider: Jeramie Lombardi MD        Subjective:     Principal Problem:Sepsis due to Gram-negative organism with septic shock        HPI:  79 year old male with PMHx CVA, functional quadriplegia, trach, peg sent from [facility] secondary to hypotension, bradycardia, unresponsiveness.  He has a rectal tube in place for C. Diff colitis and is on vanc per peg tube.  He has a chronic indwelling tay that was changed and he is on fluconazole for fungal UTI.  Per my chart review, he's had several admissions within the past few months for septic shock, unresponsiveness. He does not currently open his eyes to voice or stimuli though he does fight me when I try to open his eyelids.  Initial HR was reported to be 30.  Initial BP was reported to be 78/35.  He was given atropine x 2, epi x 2 and started on levophed.  In the ED he was also given solumedrol, IV flagyl, IV zosyn and I see Vanc and Meropenem ordered. EKG reveals bradycardia with wide QRS which appears new.     Overview/Hospital Course:  Patient was admitted to the ICU.  He was briefly on dobutamine and heart rate improved and same was discontinued.  Needing continued Levophed for blood pressure support.  Started on Zosyn for gram-negative bacteremia likely of urinary source.  Continued on oral vancomycin for C diff.  Not following commands.  Continues with chronic vent dependence.  Appreciate consultant's input.  Sputum culture now growing ESBL Proteus.  Seems preliminary blood cultures with Proteus as well.  Changing Zosyn to meropenem and watching closely for seizure activity.  Still trying to contact listed contacts to identify next of kin, power of . On 12/07 urine with carbapenems resistant  Pseudomonas, currently on meropenem, no evidence of seizure activity, apparently patient has no power of , only living relatives estranged from patient for the last 30-40 years, will need ethics/legal involvement for goals of care dressing. On 12/08 now off levaphed and blood pressure actually elevated, remains afebrile, oozing from sacral wounds, slight decrease in urine output, no overall change clinically.     Interval History: Levaphed was able to be weaned yesterday and blood pressure now elevated and HR in the 50's. No change in neurological status.  Urine output slightly lower but still making 40cc/hr. He is tolerating tube feeds, still with liquid bowel movement. Oozing note from sacral wounds. I/O last 24 hrs 1817/1375=+442, since admission -4.9L. Tmax last 24 hrs 98.2. Labs with K 3.2. Repeat cultures with no growth to date.     Review of Systems   Unable to perform ROS: Patient unresponsive     Objective:     Vital Signs (Most Recent):  Temp: 98 °F (36.7 °C) (12/08/19 0314)  Pulse: 60 (12/08/19 0817)  Resp: 19 (12/08/19 0817)  BP: (!) 169/74 (12/08/19 0615)  SpO2: 98 % (12/08/19 0817) Vital Signs (24h Range):  Temp:  [97.2 °F (36.2 °C)-98.2 °F (36.8 °C)] 98 °F (36.7 °C)  Pulse:  [59-83] 60  Resp:  [17-29] 19  SpO2:  [91 %-100 %] 98 %  BP: ()/(42-86) 169/74  Arterial Line BP: ()/(49-90) 172/77     Weight: 129.3 kg (285 lb 1.6 oz)  Body mass index is 36.6 kg/m².    Intake/Output Summary (Last 24 hours) at 12/8/2019 0848  Last data filed at 12/8/2019 0400  Gross per 24 hour   Intake 1817.11 ml   Output 1275 ml   Net 542.11 ml      Physical Exam   Constitutional: He appears well-developed and well-nourished. No distress.   Obese male, chronically ill-appearing, spontaneously opens eyes, involuntary biting movement of jaw   HENT:   Head: Normocephalic and atraumatic.   Eyes: Pupils are equal, round, and reactive to light. Right eye exhibits no discharge. Left eye exhibits no discharge.    Neck:   Trach with dressing underlying. L IJ in place.    Cardiovascular: Regular rhythm.   No murmur heard.  Bradycardic, HR 50's   Pulmonary/Chest: No stridor. He has no wheezes.   Tracheostomy in place, mechanically ventilated with current settings FiO2 30, peep of 5, mechanical breath sounds anteriorly   Abdominal: There is no tenderness. There is no rebound and no guarding.   Protuberant, PEG in place, rectal tube in place with dark brown liquid stool   Genitourinary:   Genitourinary Comments: Rahman in place with yellow urine draining   Musculoskeletal:   Functional quadriplegia   Neurological:   Spontaneously opening eyes.  Not to voice only.  Not protruding tongue to command.  Functional quadriplegic.   Skin: No rash noted. He is not diaphoretic.   Has known sacral Decub as well as pressure skin breakdown to BLE, heels, feet, toes POA. Wearing bilateral pressure boots.   Psychiatric:   Unable to assess   Nursing note and vitals reviewed.          Significant Labs:   Bilirubin:   Recent Labs   Lab 11/10/19  1509 11/21/19  0045 11/22/19  0430 12/04/19  1131 12/05/19  0426   BILITOT 1.0 0.6 0.7 0.7 0.9     Blood Culture: No results for input(s): LABBLOO in the last 48 hours.  BMP:   Recent Labs   Lab 12/08/19  0353   GLU 91      K 3.2*      CO2 29   BUN 41*   CREATININE 2.2*   CALCIUM 9.7   MG 2.2     CBC:   Recent Labs   Lab 12/07/19  0347 12/08/19  0353   WBC 9.01 8.24   HGB 9.7* 9.1*   HCT 30.7* 29.1*   * 105*     CMP:   Recent Labs   Lab 12/07/19  0347 12/08/19  0353    140   K 3.0* 3.2*    100   CO2 29 29    91   BUN 40* 41*   CREATININE 2.3* 2.2*   CALCIUM 9.5 9.7   ANIONGAP 11 11   EGFRNONAA 25.9* 27.3*     Magnesium:   Recent Labs   Lab 12/07/19  0347 12/08/19  0353   MG 2.2 2.2     Respiratory Culture: No results for input(s): GSRESP, RESPIRATORYC in the last 48 hours.  Urine Culture:   Recent Labs   Lab 12/07/19  0930   LABURIN No growth to date     Urine  Studies: No results for input(s): COLORU, APPEARANCEUA, PHUR, SPECGRAV, PROTEINUA, GLUCUA, KETONESU, BILIRUBINUA, OCCULTUA, NITRITE, UROBILINOGEN, LEUKOCYTESUR, RBCUA, WBCUA, BACTERIA, SQUAMEPITHEL, HYALINECASTS in the last 48 hours.    Invalid input(s): YAMILSUR  All pertinent labs within the past 24 hours have been reviewed.    Significant Imaging: I have reviewed all pertinent imaging results/findings within the past 24 hours.      Assessment/Plan:      Bacteremia -Providencia  Blood culture on presentation growing Providencia Stuartii, unclear source as urine growing Klebsiella and respiratory growing Proteus.  Repeat blood culture without any growth to date.  Continue meropenem.  Follow up pending cultures.  Appreciate Infectious Disease input  Attempting to address goals of care in a challenging situation, multiple readmissions/hospitalization for recurrent resistant gram-negative infection    UTI due to Klebsiella species  Urine culture growing Klebsiella, carbapenem resistant.  Noted on admission Rahman replaced with 1 L draining.    Chronic nephrolithiasis possibly nidus for infection versus other.  Multiple urinary tract issues.  Discontinue finasteride, discussed with Infectious Disease.  Follow-up put repeat urine culture.  Enhanced isolation precaution for infectious control.  Continue meropenem.      Proteus mirabilis in respiratory culture  Respiratory culture growing Proteus mirabilis, ESBL.  Unclear this is an infection versus colonization.  Empirically on meropenem.      Candida UTI  Continuing previously prescribed fluconazole.       Hypokalemia  Potassium 3.2 today. 40mEq oral replacement via PEG ordered.  Resume home standing dose.  Repeat levels tomorrow.      Goals of care, counseling/discussion  Received faxed over Advanced directive from [place], confirmed full code status.  From attempts to contact listed contacts on face sheet, Malgorzata Fan was apparently making medical decisions for  patient in the past but not actual POA. Estranged niece (Naomi Grewal) however not involved in patient's care. Case management involved, Jennifer does not have a power of .Patient not able to make his medical decisions.  Declining status with multiple providers recommendations for comfort directed care.  Will need legal/ethical committee involvement to further assist.  Remains full code for now.    C. difficile colitis  Rectal tube in place.  Continue oral vancomycin.    Essential hypertension  Now off Levophed and blood pressure actually elevated.  Resuming home antihypertensives and monitoring.    Encephalopathy, metabolic  As per report at baseline he is a functional quadriplegic, does protrude his tongue to command.  He is not doing same today.  Possibly secondary to sepsis and infection, history of seizures and possibly post ictal do no evidence to support same  Continue to treat acute on chronic medical issues.  Follow clinically.      Acquired hypothyroidism  Still profoundly hypothyroid with  with normal free T4.  Possibly contributing to encephalopathy.  Continue IV levothyroxine 100 mcg.    Seizure  Continue home seizure medication.  Monitor closely especially with starting meropenem which can lower seizure threshold.      CKD (chronic kidney disease), stage III  Serum creatinine slightly higher than baseline.  Renally dose all medications and avoid nephrotoxin drugs.  Trend BMP.      Foot ulcer-unstageable, POA  Pressure off loading and wound care following      Chronic respiratory failure with hypoxia and hypercapnia secondary to JUNAID/OHS with chronic vent dependence  Trach with mechanical ventilation.  Current settings FiO2 of 30 with peep of 5.  Ventilator precautions.  Appreciate Pulmonary input.    Coronary artery disease  Chronic condition.  Resume Coreg for now given tachycardia.    Sacral decubitus ulcer, stage II  Present on arrival.  Wound Care following.      Hemiparesis affecting  dominant side as late effect of stroke        PEG (percutaneous endoscopic gastrostomy) status  Tolerating tube feeds well.  Increased free water bolus via PEG.      Functional quadriplegia  Baseline functional quadriplegic.        VTE Risk Mitigation (From admission, onward)         Ordered     enoxaparin injection 40 mg  Nightly      12/08/19 0847                      Kiah Le MD  Department of Hospital Medicine   [facility] hospital

## 2019-12-08 NOTE — PLAN OF CARE
12/07/19 1900   Patient Assessment/Suction   Level of Consciousness (AVPU) responds to voice   Respiratory Effort Normal;Unlabored   Expansion/Accessory Muscles/Retractions expansion symmetric   All Lung Fields Breath Sounds coarse;equal bilaterally   $ Suction Charges Inline Suction Procedure Stat Charge   Secretions Amount moderate   Secretions Color yellow   Secretions Characteristics thick   PRE-TX-O2   O2 Device (Oxygen Therapy) ventilator   Oxygen Concentration (%) 30   SpO2 97 %   Pulse Oximetry Type Continuous   $ Pulse Oximetry - Multiple Charge Pulse Oximetry - Multiple   Pulse 62   Resp 18   Aerosol Therapy   $ Aerosol Therapy Charges PRN treatment not required        Surgical Airway Portex Cuffed;Fenestrated   No Placement Date or Time found.   Present Prior to Hospital Arrival?: Yes  Inserted by: Present Prior to Hospital Arrival  Type: Tracheostomy  Brand: Portex  Airway Device Size: 8.0  Style: Cuffed;Fenestrated   Cuff Inflation? Inflated   Status Secured   Site Assessment Clean;Dry   Site Care Dried;Cleansed   Inner Cannula Care   (no dic)   Ties Assessment Dry;Intact;Clean   Vent Select   Conventional Vent Y   Charged w/in last 24h YES   Preset Conventional Ventilator Settings   Vent ID [id number]   Vent Type    Ventilation Type VC   Vent Mode A/C   Set Rate 18 bmp   Vt Set 550 mL   PEEP/CPAP 5 cmH20   Pressure Support 0 cmH20   Waveform RAMP   Peak Flow 60 L/min   Plateau Set/Insp. Hold (sec) 0   Trigger Sensitivity Flow/I-Trigger 3 L/min   Patient Ventilator Parameters   Resp Rate Total 18 br/min   Peak Airway Pressure 34 cmH2O   Mean Airway Pressure 14 cmH20   Plateau Pressure 0 cmH20   Exhaled Vt 551 mL   Total Ve 9.92 mL   I:E Ratio Measured 1:2.30   Tubing ID (mm) 8 mm   Tube Type Trach   Conventional Ventilator Alarms   Alarms On Y   Ve High Alarm 25 L/min   Ve Low Alarm 3 L/min   Vt High Alarm 1400 mL   Vt Low Alarm 250 mL   Resp Rate High Alarm 40 br/min   Press High Alarm 50 cmH2O    Apnea Rate 18   Apnea Volume (mL) 1 mL   Apnea Oxygen Concentration  100   Apnea Flow Rate (L/min) 60   T Apnea 20 sec(s)   Ready to Wean/Extubation Screen   FIO2<=50 (chart decimal) 0.3   MV<16L (chart vol.) 9.92   PEEP <=8 (chart #) 5   Ready to Wean Parameters   F/VT Ratio<105 (RSBI) (!) 32.67

## 2019-12-08 NOTE — ASSESSMENT & PLAN NOTE
Received faxed over Advanced directive from [place], confirmed full code status.  From attempts to contact listed contacts on face sheet, Malgorzata Fan was apparently making medical decisions for patient in the past but not actual POA. Estranged niece (Naomi Grewal) however not involved in patient's care. Case management involved, [place] does not have a power of .Patient not able to make his medical decisions.  Declining status with multiple providers recommendations for comfort directed care.  Will need legal/ethical committee involvement to further assist.  Remains full code for now.

## 2019-12-08 NOTE — PLAN OF CARE
12/08/19 0817   Patient Assessment/Suction   Level of Consciousness (AVPU) alert   Respiratory Effort Unlabored;Normal   Expansion/Accessory Muscles/Retractions no use of accessory muscles;no retractions;expansion symmetric   All Lung Fields Breath Sounds clear   Rhythm/Pattern, Respiratory unlabored;pattern regular;depth regular   Cough Frequency frequent   Cough Type productive   Suction Method tracheal   $ Suction Charges Inline Suction Procedure Stat Charge   Secretions Amount moderate   Secretions Color yellow   Secretions Characteristics thick   PRE-TX-O2   O2 Device (Oxygen Therapy) ventilator   $ Is the patient on Low Flow Oxygen? Yes   Oxygen Concentration (%) 30   SpO2 98 %   Pulse Oximetry Type Continuous   $ Pulse Oximetry - Multiple Charge Pulse Oximetry - Multiple   Pulse 60   Resp 19   Aerosol Therapy   $ Aerosol Therapy Charges PRN treatment not required        Surgical Airway Portex Cuffed;Fenestrated   No Placement Date or Time found.   Present Prior to Hospital Arrival?: Yes  Inserted by: Present Prior to Hospital Arrival  Type: Tracheostomy  Brand: Portex  Airway Device Size: 8.0  Style: Cuffed;Fenestrated   Cuff Inflation? Inflated   Status Secured   Site Assessment Dry;Clean   Site Care Dressing applied   Ties Assessment Dry;Intact   Vent Select   Conventional Vent Y   $ Ventilator Subsequent 1   Charged w/in last 24h YES   Preset Conventional Ventilator Settings   Vent ID [id number]   Vent Type    Ventilation Type VC   Vent Mode A/C   Humidity Heated wire   Humidifier Temp Setting 37 degC   Humidifier Temp Actual 37 degC   Set Rate 18 bmp   Vt Set 550 mL   PEEP/CPAP 5 cmH20   Pressure Support 0 cmH20   Waveform RAMP   Peak Flow 60 L/min   Plateau Set/Insp. Hold (sec) 0   Trigger Sensitivity Flow/I-Trigger 3 L/min   Patient Ventilator Parameters   Resp Rate Total 18 br/min   Peak Airway Pressure 36 cmH2O   Mean Airway Pressure 14 cmH20   Plateau Pressure 0 cmH20   Exhaled Vt 556 mL   Total Ve  9.91 mL   I:E Ratio Measured 1:2.30   Tubing ID (mm) 8 mm   Tube Type Trach   Conventional Ventilator Alarms   Alarms On Y   Ve High Alarm 25 L/min   Ve Low Alarm 3 L/min   Vt High Alarm 1400 mL   Vt Low Alarm 250 mL   Resp Rate High Alarm 40 br/min   Press High Alarm 50 cmH2O   Apnea Rate 18   Apnea Volume (mL) 1 mL   Apnea Oxygen Concentration  100   Apnea Flow Rate (L/min) 60   T Apnea 20 sec(s)   Ready to Wean/Extubation Screen   FIO2<=50 (chart decimal) 0.3   MV<16L (chart vol.) 9.91   PEEP <=8 (chart #) 5   Ready to Wean Parameters   F/VT Ratio<105 (RSBI) (!) 34.17

## 2019-12-08 NOTE — ASSESSMENT & PLAN NOTE
Urine culture growing Klebsiella, carbapenem resistant.  Noted on admission Rahman replaced with 1 L draining.    Chronic nephrolithiasis possibly nidus for infection versus other.  Multiple urinary tract issues.  Discontinue finasteride, discussed with Infectious Disease.  Follow-up put repeat urine culture.  Enhanced isolation precaution for infectious control.  Continue meropenem.

## 2019-12-08 NOTE — ASSESSMENT & PLAN NOTE
Blood culture on presentation growing Providencia Stuartii, unclear source as urine growing Klebsiella and respiratory growing Proteus.  Repeat blood culture without any growth to date.  Continue meropenem.  Follow up pending cultures.  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

## 2019-12-08 NOTE — ASSESSMENT & PLAN NOTE
Trach with mechanical ventilation.  Current settings FiO2 of 30 with peep of 5.  Ventilator precautions.  Appreciate Pulmonary input.

## 2019-12-08 NOTE — ASSESSMENT & PLAN NOTE
Levophed was able to be weaned on 12/07.  Blood pressure now elevated.  Blood cultures growing Providencia.   Unclear source- urine culture with klebsiella vs respiratory with ESBL proteus mirabilus.  Continue meropenem 1 g q.12 hours (12/06) and monitor closely for seizure activity  Daily labs.

## 2019-12-08 NOTE — ASSESSMENT & PLAN NOTE
Potassium 3.2 today. 40mEq oral replacement via PEG ordered.  Resume home standing dose.  Repeat levels tomorrow.

## 2019-12-08 NOTE — ASSESSMENT & PLAN NOTE
Now off Levophed and blood pressure actually elevated.  Resuming home antihypertensives and monitoring.

## 2019-12-08 NOTE — ASSESSMENT & PLAN NOTE
Respiratory culture growing Proteus mirabilis, ESBL.  Unclear this is an infection versus colonization.  Empirically on meropenem.

## 2019-12-08 NOTE — SUBJECTIVE & OBJECTIVE
Interval History: Levaphed was able to be weaned yesterday and blood pressure now elevated and HR in the 50's. No change in neurological status.  Urine output slightly lower but still making 40cc/hr. He is tolerating tube feeds, still with liquid bowel movement. Oozing note from sacral wounds. I/O last 24 hrs 1817/1375=+442, since admission -4.9L. Tmax last 24 hrs 98.2. Labs with K 3.2. Repeat cultures with no growth to date.     Review of Systems   Unable to perform ROS: Patient unresponsive     Objective:     Vital Signs (Most Recent):  Temp: 98 °F (36.7 °C) (12/08/19 0314)  Pulse: 60 (12/08/19 0817)  Resp: 19 (12/08/19 0817)  BP: (!) 169/74 (12/08/19 0615)  SpO2: 98 % (12/08/19 0817) Vital Signs (24h Range):  Temp:  [97.2 °F (36.2 °C)-98.2 °F (36.8 °C)] 98 °F (36.7 °C)  Pulse:  [59-83] 60  Resp:  [17-29] 19  SpO2:  [91 %-100 %] 98 %  BP: ()/(42-86) 169/74  Arterial Line BP: ()/(49-90) 172/77     Weight: 129.3 kg (285 lb 1.6 oz)  Body mass index is 36.6 kg/m².    Intake/Output Summary (Last 24 hours) at 12/8/2019 0848  Last data filed at 12/8/2019 0400  Gross per 24 hour   Intake 1817.11 ml   Output 1275 ml   Net 542.11 ml      Physical Exam   Constitutional: He appears well-developed and well-nourished. No distress.   Obese male, chronically ill-appearing, spontaneously opens eyes, involuntary biting movement of jaw   HENT:   Head: Normocephalic and atraumatic.   Eyes: Pupils are equal, round, and reactive to light. Right eye exhibits no discharge. Left eye exhibits no discharge.   Neck:   Trach with dressing underlying. L IJ in place.    Cardiovascular: Regular rhythm.   No murmur heard.  Bradycardic, HR 50's   Pulmonary/Chest: No stridor. He has no wheezes.   Tracheostomy in place, mechanically ventilated with current settings FiO2 30, peep of 5, mechanical breath sounds anteriorly   Abdominal: There is no tenderness. There is no rebound and no guarding.   Protuberant, PEG in place, rectal tube in  place with dark brown liquid stool   Genitourinary:   Genitourinary Comments: Rahman in place with yellow urine draining   Musculoskeletal:   Functional quadriplegia   Neurological:   Spontaneously opening eyes.  Not to voice only.  Not protruding tongue to command.  Functional quadriplegic.   Skin: No rash noted. He is not diaphoretic.   Has known sacral Decub as well as pressure skin breakdown to BLE, heels, feet, toes POA. Wearing bilateral pressure boots.   Psychiatric:   Unable to assess   Nursing note and vitals reviewed.          Significant Labs:   Bilirubin:   Recent Labs   Lab 11/10/19  1509 11/21/19  0045 11/22/19  0430 12/04/19  1131 12/05/19  0426   BILITOT 1.0 0.6 0.7 0.7 0.9     Blood Culture: No results for input(s): LABBLOO in the last 48 hours.  BMP:   Recent Labs   Lab 12/08/19  0353   GLU 91      K 3.2*      CO2 29   BUN 41*   CREATININE 2.2*   CALCIUM 9.7   MG 2.2     CBC:   Recent Labs   Lab 12/07/19  0347 12/08/19  0353   WBC 9.01 8.24   HGB 9.7* 9.1*   HCT 30.7* 29.1*   * 105*     CMP:   Recent Labs   Lab 12/07/19  0347 12/08/19  0353    140   K 3.0* 3.2*    100   CO2 29 29    91   BUN 40* 41*   CREATININE 2.3* 2.2*   CALCIUM 9.5 9.7   ANIONGAP 11 11   EGFRNONAA 25.9* 27.3*     Magnesium:   Recent Labs   Lab 12/07/19  0347 12/08/19  0353   MG 2.2 2.2     Respiratory Culture: No results for input(s): GSRESP, RESPIRATORYC in the last 48 hours.  Urine Culture:   Recent Labs   Lab 12/07/19  0930   LABURIN No growth to date     Urine Studies: No results for input(s): COLORU, APPEARANCEUA, PHUR, SPECGRAV, PROTEINUA, GLUCUA, KETONESU, BILIRUBINUA, OCCULTUA, NITRITE, UROBILINOGEN, LEUKOCYTESUR, RBCUA, WBCUA, BACTERIA, SQUAMEPITHEL, HYALINECASTS in the last 48 hours.    Invalid input(s): WRIGHTSUR  All pertinent labs within the past 24 hours have been reviewed.    Significant Imaging: I have reviewed all pertinent imaging results/findings within the past 24  hours.

## 2019-12-09 NOTE — PLAN OF CARE
12/09/19 0856   Discharge Reassessment   Assessment Type Discharge Planning Reassessment   Cm spoke to the Legal Dept Eric about what the hospital can legally do for this pt who has only nieces and nephews that have not seen this pt for decades and nobody has Medical POA. Pt is unable to speak for himself and very sick. Eric Centeno told cm that he would check to see what legally can be done and get back with cm.

## 2019-12-09 NOTE — PLAN OF CARE
12/08/19 1903   Patient Assessment/Suction   Level of Consciousness (AVPU) alert   Respiratory Effort Normal;Unlabored   Expansion/Accessory Muscles/Retractions no use of accessory muscles   All Lung Fields Breath Sounds coarse  (rul coarse)   Cough Frequency with stimulation   Cough Type productive   Suction Method tracheal   $ Suction Charges Inline Suction Procedure Stat Charge   PRE-TX-O2   O2 Device (Oxygen Therapy) ventilator   Oxygen Concentration (%) 30   SpO2 98 %   Pulse Oximetry Type Continuous   $ Pulse Oximetry - Multiple Charge Pulse Oximetry - Multiple   Pulse (!) 58   Resp 18   Aerosol Therapy   $ Aerosol Therapy Charges Aerosol Treatment   Daily Review of Necessity (SVN) completed   Respiratory Treatment Status (SVN) given   Treatment Route (SVN) in-line   Patient Position (SVN) semi-Arredondo's   Post Treatment Assessment (SVN) increased aeration   Signs of Intolerance (SVN) none   Breath Sounds Post-Respiratory Treatment   Throughout All Fields Post-Treatment All Fields   Throughout All Fields Post-Treatment aeration increased   Post-treatment Heart Rate (beats/min) 57   Post-treatment Resp Rate (breaths/min) 18   Vent Select   Conventional Vent Y   Charged w/in last 24h YES   Preset Conventional Ventilator Settings   Vent ID 13   Vent Type    Ventilation Type VC   Vent Mode A/C   Set Rate 18 bmp   Vt Set 550 mL   PEEP/CPAP 5 cmH20   Pressure Support 0 cmH20   Waveform RAMP   Peak Flow 60 L/min   Plateau Set/Insp. Hold (sec) 0   Trigger Sensitivity Flow/I-Trigger 3 L/min   Patient Ventilator Parameters   Resp Rate Total 18 br/min   Peak Airway Pressure 34 cmH2O   Mean Airway Pressure 14 cmH20   Plateau Pressure 0 cmH20   Exhaled Vt 556 mL   Total Ve 9.97 mL   I:E Ratio Measured 1:2.30   Tubing ID (mm) 8 mm   Tube Type Trach   Conventional Ventilator Alarms   Alarms On Y   Ve High Alarm 25 L/min   Ve Low Alarm 3 L/min   Vt High Alarm 1400 mL   Vt Low Alarm 250 mL   Resp Rate High Alarm 40  br/min   Press High Alarm 50 cmH2O   Apnea Rate 18   Apnea Volume (mL) 550 mL   Apnea Oxygen Concentration  100   Apnea Flow Rate (L/min) 60   T Apnea 20 sec(s)   Ready to Wean/Extubation Screen   FIO2<=50 (chart decimal) 0.3   MV<16L (chart vol.) 9.97   PEEP <=8 (chart #) 5   Ready to Wean Parameters   F/VT Ratio<105 (RSBI) (!) 32.37   Continue tx. As ordered/maintain adequate ventilation and oxygenation

## 2019-12-09 NOTE — PLAN OF CARE
12/09/19 0640   Patient Assessment/Suction   Level of Consciousness (AVPU) alert   Respiratory Effort Normal;Unlabored   Expansion/Accessory Muscles/Retractions no retractions;no use of accessory muscles   PRE-TX-O2   O2 Device (Oxygen Therapy) ventilator   $ Is the patient on Low Flow Oxygen? Yes   Oxygen Concentration (%) 30   SpO2 97 %   Pulse Oximetry Type Continuous   $ Pulse Oximetry - Multiple Charge Pulse Oximetry - Multiple   Pulse 64   Resp 19   Vent Select   Conventional Vent Y   $ Ventilator Subsequent 1   Charged w/in last 24h YES   Preset Conventional Ventilator Settings   Vent ID 13   Vent Type    Ventilation Type VC   Vent Mode A/C   Humidity Heated wire   Humidifier Temp Actual 36.9 degC   Set Rate 18 bmp   Vt Set 550 mL   PEEP/CPAP 5 cmH20   Pressure Support 0 cmH20   Waveform RAMP   Peak Flow 60 L/min   Plateau Set/Insp. Hold (sec) 0   Trigger Sensitivity Flow/I-Trigger 3 L/min   Patient Ventilator Parameters   Resp Rate Total 18 br/min   Peak Airway Pressure 28 cmH2O   Mean Airway Pressure 13 cmH20   Plateau Pressure 0 cmH20   Exhaled Vt 486 mL   Total Ve 8.8 mL   I:E Ratio Measured 1:2.30   Conventional Ventilator Alarms   Alarms On Y   Resp Rate High Alarm 40 br/min   Press High Alarm 50 cmH2O   Apnea Rate 18   Apnea Volume (mL) 1 mL   Apnea Oxygen Concentration  100   Apnea Flow Rate (L/min) 60   T Apnea 20 sec(s)   Ready to Wean/Extubation Screen   FIO2<=50 (chart decimal) 0.3   MV<16L (chart vol.) 8.8   PEEP <=8 (chart #) 5   Ready to Wean Parameters   F/VT Ratio<105 (RSBI) (!) 39.09   Pt received on documented vent settings. Will continue to monitor.

## 2019-12-09 NOTE — PROGRESS NOTES
"[facility]  Adult Nutrition   Progress Note (Follow-Up)    SUMMARY     Recommendations  Recommendation/Intervention: 1.) Continue TF @ goal rate as tolerated by pt 2.) RD to follow and monitor labs, weight, etc.  Goals: 1.) TF to be continued @ goal rate with good tolerance  Nutrition Goal Status: goal met  Communication of RD Recs: reviewed with RN    Reason for Assessment    Reason For Assessment: RD follow-up  Diagnosis: infection/sepsis  Relevant Medical History: NH resident, PEG, CVA, quadraplegia, trach    Nutrition Risk Screen    Nutrition Risk Screen: no indicators present       Pressure Injury 11/06/19 1430  Buttocks -Wound Image: Images linked       Pressure Injury 11/21/19 0939 Left lateral Foot Suspected Unstageable-Wound Image: Images linked  MST Score: 2  Have you recently lost weight without trying?: Unsure  Weight loss score: 2  Have you been eating poorly because of a decreased appetite?: No  Appetite score: 0       Nutrition/Diet History    Patient Reported Diet/Restrictions/Preferences: no oral intake  Spiritual, Cultural Beliefs, Ep[employer]alian Practices, Values that Affect Care: other (see comments)  Food Allergies: NKFA  Factors Affecting Nutritional Intake: on mechanical ventilation, NPO    Anthropometrics    Temp: 99.6 °F (37.6 °C)  Height Method: Estimated  Height: 6' 2" (188 cm)  Height (inches): 74 in  Weight Method: Bed Scale  Weight: 129.3 kg (285 lb 1.6 oz)  Weight (lb): 285.1 lb  Ideal Body Weight (IBW), Male: 190 lb  % Ideal Body Weight, Male (lb): 150.05 lb  BMI (Calculated): 36.6  BMI Grade: 35 - 39.9 - obesity - grade II       Weight History:  Wt Readings from Last 10 Encounters:   12/05/19 129.3 kg (285 lb 1.6 oz)   11/22/19 (!) 140.7 kg (310 lb 3 oz)   11/07/19 132 kg (291 lb 0.1 oz)   10/14/19 124.8 kg (275 lb 2.2 oz)   09/12/19 (!) 140.5 kg (309 lb 11.9 oz)   09/13/19 (!) 140.2 kg (309 lb)   10/13/17 110.6 kg (243 lb 13.3 oz)   03/27/17 118.8 kg (262 lb)   03/21/17 " 119 kg (262 lb 5.6 oz)   05/16/14 (!) 137.8 kg (303 lb 12.7 oz)       Lab/Procedures/Meds: Pertinent Labs Reviewed  Clinical Chemistry:  Recent Labs   Lab 12/05/19  0426  12/07/19  0347 12/08/19  0353 12/09/19  0355      < > 140 140 141   K 4.7   < > 3.0* 3.2* 3.0*   CL 97   < > 100 100 100   CO2 31*   < > 29 29 30*   GLU 97   < > 109 91 100   BUN 46*   < > 40* 41* 38*   CREATININE 2.2*   < > 2.3* 2.2* 1.9*   CALCIUM 9.5   < > 9.5 9.7 10.2   PROT 8.2  --   --   --   --    ALBUMIN 2.7*  --   --   --   --    BILITOT 0.9  --   --   --   --    ALKPHOS 75  --   --   --   --    AST 34  --   --   --   --    ALT 18  --   --   --   --    ANIONGAP 10   < > 11 11 11   ESTGFRAFRICA 31.5*   < > 29.9* 31.5* 37.7*   EGFRNONAA 27.3*   < > 25.9* 27.3* 32.6*   MG 2.3   < > 2.2 2.2 2.1   PHOS 3.2   < > 2.8 3.0 2.9    < > = values in this interval not displayed.     CBC:   Recent Labs   Lab 12/09/19  0355   WBC 8.22   RBC 3.14*   HGB 9.3*   HCT 29.2*   PLT 99*   MCV 93   MCH 29.6   MCHC 31.8*     Cardiac Profile:  Recent Labs   Lab 12/04/19  1131 12/04/19  1743 12/05/19 0426   *  --   --    TROPONINI 0.032 0.084* 0.094*     Thyroid & Parathyroid:  Recent Labs   Lab 12/04/19  1131   .830*   FREET4 0.76       Medications: Pertinent Medications reviewed  Scheduled Meds:   busPIRone  5 mg Per G Tube BID    carvedilol  3.125 mg Oral BID    chlorhexidine  15 mL Mouth/Throat BID    DULoxetine  30 mg Per G Tube Daily    enoxparin  40 mg Subcutaneous QHS    ferrous sulfate  325 mg Per G Tube Daily    fluconazole  200 mg Per G Tube Daily    furosemide  20 mg Per G Tube Daily    gentamicin  1 drop Both Eyes QID    lacosamide  200 mg Per G Tube Daily    Lactobacillus acidoph-L.bulgar  1 tablet Per G Tube TID WM    levetiracetam oral soln  500 mg Per G Tube BID    levothyroxine  100 mcg Intravenous Daily    meropenem (MERREM) IVPB  1 g Intravenous Q12H    mupirocin   Nasal BID    potassium chloride 10%  20 mEq  Oral Daily    vancomycin  125 mg Oral Q6H     Continuous Infusions:  PRN Meds:.acetaminophen, albuterol-ipratropium    Estimated/Assessed Needs    Weight Used For Calorie Calculations: 129.3 kg (285 lb 0.9 oz)  Energy Calorie Requirements (kcal): 9563-6592 kcal (11-14 kcal/kg)  Energy Need Method: Kcal/kg  Protein Requirements:  g (0.6-0.8 g/kg)  Weight Used For Protein Calculations: 129.3 kg (285 lb 0.9 oz)     Estimated Fluid Requirement Method: RDA Method  RDA Method (mL): 1422       Nutrition Prescription Ordered    Current Diet Order: NPO  Current Nutrition Support Formula Ordered: Nepro  Current Nutrition Support Rate Ordered: 45 (ml)  Current Nutrition Support Frequency Ordered: ml/hr, continuous    Evaluation of Received Nutrient/Fluid Intake    Enteral Calories (kcal): 1944  Enteral Protein (gm): 87  Enteral (Free Water) Fluid (mL): 785  Free Water Flush Fluid (mL): 180  Energy Calories Required: meeting needs  Protein Required: meeting needs  Fluid Required: meeting needs  Tolerance: tolerating     Intake/Output Summary (Last 24 hours) at 12/9/2019 1219  Last data filed at 12/9/2019 0845  Gross per 24 hour   Intake 1750 ml   Output 2625 ml   Net -875 ml     % Meal Intake: NPO    Dietitian Rounds Brief    Pt seen for f/u. Tolerating TF @ goal rate. Minimal residual noted. LBM 12/8 noted. No issues reported at this time by RN. RD to follow & monitor.      Nutrition Risk    Level of Risk/Frequency of Follow-up: moderate       Monitor and Evaluation    Food and Nutrient Intake: energy intake, enteral nutrition intake  Food and Nutrient Adminstration: enteral and parenteral nutrition administration  Anthropometric Measurements: weight change  Biochemical Data, Medical Tests and Procedures: gastrointestinal profile, glucose/endocrine profile, electrolyte and renal panel  Nutrition-Focused Physical Findings: overall appearance     Nutrition Follow-Up    RD Follow-up?: Yes    Melissa Nelson RD 12/09/2019  12:19 PM

## 2019-12-09 NOTE — PROGRESS NOTES
Consult Note  Infectious Disease    Reason for Consult:  shock    HPI: Msaood Messina is an  79 y.o. male with whom I am familiar from prior consultations who has been vent dependent for several years, is colonized in sputum and urine with a variety of gram negative rods and has recently been treated for pseudomonas urosepsis on 2 occasions, initially associated with hematuria/clots in bladder and with bacteremia, followed by the development of C. Difficile colitis. He was discharged about 3 weeks ago on a few days of zosyn and a taper of oral vanc. During that hospitalization he had worse than usual encephalopathy and seizures.   He was sent to the ED today for severe bradycardia and hypotension. His CXR looks like pulmonary edema, he had a WBC of 14k and required pressors, removal of indwelling picc, placement of TLC and admission to ICU. He is unresponsive, pulse is now up to 100. UA has pyuria and last few urine cultures have had Candida. His tay on admission was replaced immediately draining about a liter of urine. He was placed on merrem, IV and oral vanc.     12/5:  Temperature is less than 100 after 1 large dose of Solu-Medrol yesterday.  Vasopressor requirement has decreased.  Ventilatory needs are at or near baseline.  He is no longer bradycardic.  Blood cultures have gram-negative rods.  The urine culture was collected yesterday by nursing but not confirmed as collected in epic therefore the urine culture was not set up.  Chest x-ray is improved on the left and the same on the right.  Secretions are minimal and tan.  He will open his eyes when I call his name but he will not attend or protrude his tongue on command.  12/6: still waiting on finals of cultures. He is more alert. No success in reaching family per hospital medicine or pulmonary. Because of GNR in blood and ESBL in sputum and urine will resume meropenem.   12/7:Blood culture from 12/04 has Providencia, sensitive to ceftriaxone, Zosyn  and meropenem  Sputum has Proteus ESBL and Serratia which he has had in the past  Urine has Klebsiella and Pseudomonas.  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   Per hospital medicine, his next of kin is a niece who has been estranged and has not seen him in decades. Still on levophed.   12/8: afebrile, off pressors. No change in vent needs. Repeat Urine culture is negative so far. Secretions are improved. No problems with tube feeds. Having a little oral bleeding from oral care. No apparent antibiotic intolerance and no seizures.   12.9 Off pressors. Otherwise no issues. Working on ethics committee consult.   Diarrhea persists     EXAM & DIAGNOSTICS REVIEWED:   Vitals:     Temp:  [98 °F (36.7 °C)-99.6 °F (37.6 °C)]   Temp: 99.6 °F (37.6 °C) (12/09/19 1200)  Pulse: (!) 59 (12/09/19 1300)  Resp: 13 (12/09/19 1210)  BP: 131/65 (12/09/19 1300)  SpO2: 98 % (12/09/19 1300)    Intake/Output Summary (Last 24 hours) at 12/9/2019 1401  Last data filed at 12/9/2019 0845  Gross per 24 hour   Intake 1750 ml   Output 2175 ml   Net -425 ml       General:  Eyes will open when addressed but he will not attend or follow any commands  Eyes:  Anicteric, PERRL,  EOMI are grossly intact and he has some yellow exudate from conjunctiva which is improved  ENT:  Constant/repetitive clenching of teeth  Neck:  supple,tracheostomy  Lungs: Coarse without consolidation.   Minimal ventilator needs  Heart:  iRRR, no gallop/murmur/rub noted  Abd:  Soft, NT, ND, normal BS, no masses or organomegaly appreciated. PEG, rectal tube with liquid stool. Receiving tube feeds  :   Rhaman, urine clear, no flank tenderness,    Musc:  Joints without effusion, swelling, erythema, synovitis, quadriplegic   Skin:  No rashes.   Wound:   Neuro: Eyes open to voice, will not attend.. Quadriplegic, very high tone throughout  Psych:        Extrem: Chronic woody edema, no erythema, phlebitis, cellulitis,    VAD:  Left IJ TLC     Isolation:  Enhanced  contact    Lines/Tubes/Drains:    General Labs reviewed:  Recent Labs   Lab 12/07/19  0347 12/08/19  0353 12/09/19  0355   WBC 9.01 8.24 8.22   HGB 9.7* 9.1* 9.3*   HCT 30.7* 29.1* 29.2*   * 105* 99*       Recent Labs   Lab 12/04/19  1131 12/05/19  0426  12/07/19  0347 12/08/19  0353 12/09/19  0355   * 138   < > 140 140 141   K 5.3* 4.7   < > 3.0* 3.2* 3.0*   CL 87* 97   < > 100 100 100   CO2 34* 31*   < > 29 29 30*   BUN 53* 46*   < > 40* 41* 38*   CREATININE 2.2* 2.2*   < > 2.3* 2.2* 1.9*   CALCIUM 9.8 9.5   < > 9.5 9.7 10.2   PROT 8.7* 8.2  --   --   --   --    BILITOT 0.7 0.9  --   --   --   --    ALKPHOS 78 75  --   --   --   --    ALT 17 18  --   --   --   --    AST 30 34  --   --   --   --     < > = values in this interval not displayed.           Micro:  Microbiology Results (last 7 days)     Procedure Component Value Units Date/Time    Blood culture [[id number]] Collected:  12/05/19 0805    Order Status:  Completed Specimen:  Blood from Line, Arterial, Right Updated:  12/09/19 1032     Blood Culture, Routine No Growth to date      No Growth to date      No Growth to date      No Growth to date      No Growth to date    Narrative:       Draw from line    Urine Culture High Risk [[id number]]  (Abnormal) Collected:  12/07/19 0930    Order Status:  Completed Specimen:  Urine, Catheterized Updated:  12/09/19 0914     Urine Culture, Routine YEAST   10,000 - 49,999 cfu/ml  Identification pending      Narrative:       Indicated criteria for high risk culture:->Less than 25  months of age  Indicated criteria for high risk culture:->Other  Other (specify):->CRE    Culture, Respiratory with Gram Stain [[id number]]  (Abnormal)  (Susceptibility) Collected:  12/04/19 1024    Order Status:  Completed Specimen:  Respiratory from Sputum Updated:  12/09/19 0700     Respiratory Culture Reduced Normal Respiratory gabriela      PROTEUS MIRABILIS ESBL  Moderate  Results called to and read back by : Ameena López RN on  M3  12/06/2019  09:36 by BREONNA        SERRATIA MARCESCENS  Moderate       Gram Stain (Respiratory) <10 epithelial cells per low power field.     Gram Stain (Respiratory) Moderate WBC's     Gram Stain (Respiratory) Few  Gram positive cocci     Gram Stain (Respiratory) Few Gram positive rods     Gram Stain (Respiratory) Few Gram negative rods    Narrative:       PER PROTOCOL    Blood culture [[id number]] Collected:  12/06/19 0401    Order Status:  Completed Specimen:  Blood Updated:  12/09/19 0632     Blood Culture, Routine No Growth to date      No Growth to date      No Growth to date      No Growth to date    Blood culture [[id number]] Collected:  12/04/19 2050    Order Status:  Completed Specimen:  Blood from Line, Arterial, Right Updated:  12/08/19 2232     Blood Culture, Routine No Growth to date      No Growth to date      No Growth to date      No Growth to date      No Growth to date    Narrative:       Aerobic and anaerobic  Can we please draw one set from the periphery?  It looks like  the two sets done in the ED were drawn from the central  line?  Collection has been rescheduled by [facility] at 12/04/2019 16:06 Reason:   Nurse Draw  Collection has been rescheduled by [facility] at 12/04/2019 16:06 Reason:   Nurse Draw    Blood culture x two cultures. Draw prior to antibiotics. [[id number]] Collected:  12/04/19 1217    Order Status:  Completed Specimen:  Blood from Line, Jugular, External Left Updated:  12/08/19 1632     Blood Culture, Routine No Growth to date      No Growth to date      No Growth to date      No Growth to date      No Growth to date    Narrative:       Aerobic and anaerobic    Urine culture [[id number]]  (Abnormal)  (Susceptibility) Collected:  12/04/19 1500    Order Status:  Completed Specimen:  Urine, Catheterized Updated:  12/07/19 1318     Urine Culture, Routine KLEBSIELLA PNEUMONIAE   >100,000 cfu/ml        PSEUDOMONAS AERUGINOSA  50,000 - 99,999 cfu/ml      Narrative:       Indicated criteria for  high risk culture:->Other  Other (specify):->chronic indwelling tay    Blood culture x two cultures. Draw prior to antibiotics. [[id number]]  (Abnormal)  (Susceptibility) Collected:  12/04/19 1130    Order Status:  Completed Specimen:  Blood from Line, Jugular, External Left Updated:  12/07/19 0721     Blood Culture, Routine Gram stain reynaldo bottle: Gram negative rods      Critical result called and read back Olivia Ortega RN M3 @ 0247      Gram stain aer bottle: Gram positive cocci      Results called to and read back by: Ameena López RN on M3 12/05/2019        16:23      PROVIDENCIA STUARTII      COAGULASE-NEGATIVE STAPHYLOCOCCUS SPECIES  Organism is a probable contaminant      Narrative:       Aerobic and anaerobic    Stool culture [[id number]] Collected:  12/04/19 1225    Order Status:  Completed Specimen:  Stool Updated:  12/06/19 1119     Stool Culture No Salmonella,Shigella,Vibrio,Campylobacter.      No E coli 0157:H7 isolated.    Clostridium difficile EIA [[id number]] Collected:  12/04/19 1225    Order Status:  Completed Specimen:  Stool Updated:  12/04/19 1626     C. diff Antigen Negative     C difficile Toxins A+B, EIA Negative     Comment: Testing not recommended for children <24 months old.           Imaging Reviewed:   CXRs   CT head   Ultrasound kidneys 12/5, no change in hydro or non obstructing stones  Cardiology:    IMPRESSION & PLAN   1. Providencia bacteremia.  His Tay catheter was not draining well at the time of admission and when replaced in the ER, almost a Liter was relieved.  This does not match any of the other organisms isolated from urine or sputum however  2. Hypotension, multifactorial, Bradycardia, resolved  3. Chronic vent dependence, quadriplegic  4. Extensive antibiotic exposure for recurrent  infections and colonization with numerous gram-negative rods, including a carbapenemase resistant Klebsiella in his urine, reculture negative so far  5. Conjunctivitis, improved  6. No quality  of life    Recommendations:  Continue oral vancomycin   Continue merrem,   Continue diflucan   Gentamicin eye drops     Consider withdraw to comfort care  - dR. Tyson to follow up tomorrow.

## 2019-12-09 NOTE — PLAN OF CARE
19 1551   Discharge Reassessment   Assessment Type Discharge Planning Reassessment   Spoke with Eric and he was unable to give any advice on this matter. Dr Newton and Dr Kinsey spoke during rounds about pt and Dr Kinsey is to speak to Dr Lombardi the medical director at [facility] and see what he has to say about the pt not having any guardianship or anyone who is Medical POA. Spoke with Dr. Le to let her know the plan. Attempted to contact Malgorzata Slater but the phone is not turned on and have tried numerous times to contact Malgorzata. Spoke with the pts' Niece Naomi Grewal who has not seen this pt in decades but stated that if the hospital needed someone to give consent to do comfort care she would give consent. She stated that her brother was the one who made decisions for this pt but he  in .

## 2019-12-10 NOTE — ASSESSMENT & PLAN NOTE
Received faxed over Advanced directive from [facility], confirmed full code status.  From attempts to contact listed contacts on face sheet, Malgorzata Fan was apparently making medical decisions for patient in the past but not actual POA. Estranged niece (Naomi Grewal) however not involved in patient's care. Case management involved, [facility] does not have a power of .Patient not able to make his medical decisions.  Declining status with multiple providers recommendations for comfort directed care.  Will need legal/ethical committee involvement to further assist.  Remains full code for now. Ongoing attempts to contact [facility] director.  Ideally hospice candidate.

## 2019-12-10 NOTE — PROGRESS NOTES
[facility] Medicine  Progress Note    Patient Name: Masood Messina  MRN: [id number]  Patient Class: IP- Inpatient   Admission Date: 12/4/2019  Length of Stay: 5 days  Attending Physician: Kiah Le MD  Primary Care Provider: Jeramie Lombardi MD        Subjective:     Principal Problem:Sepsis due to Gram-negative organism with septic shock        HPI:  79 year old male with PMHx CVA, functional quadriplegia, trach, peg sent from [facility] secondary to hypotension, bradycardia, unresponsiveness.  He has a rectal tube in place for C. Diff colitis and is on vanc per peg tube.  He has a chronic indwelling tay that was changed and he is on fluconazole for fungal UTI.  Per my chart review, he's had several admissions within the past few months for septic shock, unresponsiveness. He does not currently open his eyes to voice or stimuli though he does fight me when I try to open his eyelids.  Initial HR was reported to be 30.  Initial BP was reported to be 78/35.  He was given atropine x 2, epi x 2 and started on levophed.  In the ED he was also given solumedrol, IV flagyl, IV zosyn and I see Vanc and Meropenem ordered. EKG reveals bradycardia with wide QRS which appears new.     Overview/Hospital Course:  Patient was admitted to the ICU.  He was briefly on dobutamine and heart rate improved and same was discontinued.  Needing continued Levophed for blood pressure support.  Started on Zosyn for gram-negative bacteremia likely of urinary source.  Continued on oral vancomycin for C diff.  Not following commands.  Continues with chronic vent dependence.  Appreciate consultant's input.  Sputum culture now growing ESBL Proteus.  Seems preliminary blood cultures with Proteus as well.  Changing Zosyn to meropenem and watching closely for seizure activity.  Still trying to contact listed contacts to identify next of kin, power of . On 12/07 urine with carbapenems resistant  Pseudomonas, currently on meropenem, no evidence of seizure activity, apparently patient has no power of , only living relatives estranged from patient for the last 30-40 years, will need ethics/legal involvement for goals of care dressing. On 12/08 now off levaphed and blood pressure actually elevated, remains afebrile, oozing from sacral wounds, slight decrease in urine output, no overall change clinically.     Interval History:  Patient continues with liquid stool/diarrhea.  Remains unresponsive, no change in neurological status.  T-max last 24 hr 99.6.  Urine output improved yesterday after restarting home Lasix.  I/O =1750/2750=-1000ml, since admission -6.1L. Repeat urine culture growing yeast. Labs this am K 3.0.  Scant blood tinged around trach site. Discussed with case management, trying to reach director at [facility] to further address goals of care.     Review of Systems   Unable to perform ROS: Patient unresponsive     Objective:     Vital Signs (Most Recent):  Temp: 99.6 °F (37.6 °C) (12/09/19 1600)  Pulse: (!) 53 (12/09/19 1800)  Resp: 18 (12/09/19 1800)  BP: 118/66 (12/09/19 1800)  SpO2: 98 % (12/09/19 1800) Vital Signs (24h Range):  Temp:  [98 °F (36.7 °C)-99.6 °F (37.6 °C)] 99.6 °F (37.6 °C)  Pulse:  [53-65] 53  Resp:  [13-21] 18  SpO2:  [95 %-99 %] 98 %  BP: (114-152)/(60-82) 118/66     Weight: 129.3 kg (285 lb 1.6 oz)  Body mass index is 36.6 kg/m².    Intake/Output Summary (Last 24 hours) at 12/9/2019 1842  Last data filed at 12/9/2019 1700  Gross per 24 hour   Intake 1570 ml   Output 2600 ml   Net -1030 ml      Physical Exam   Constitutional: He appears well-developed and well-nourished. No distress.   Obese male, chronically ill-appearing, spontaneously opens eyes, unresponsive   HENT:   Head: Normocephalic and atraumatic.   Eyes: Pupils are equal, round, and reactive to light. Right eye exhibits no discharge. Left eye exhibits no discharge.   Neck:   Trach with dressing underlying. L IJ  in place.    Cardiovascular: Regular rhythm.   No murmur heard.  Bradycardic, HR 50's   Pulmonary/Chest: No stridor. He has no wheezes.   Tracheostomy in place, mechanically ventilated with current settings FiO2 30, peep of 5, mechanical breath sounds anteriorly   Abdominal: There is no tenderness. There is no rebound and no guarding.   Protuberant, PEG in place, rectal tube in place with dark brown liquid stool   Genitourinary:   Genitourinary Comments: Rahman in place with yellow urine draining   Musculoskeletal:   Functional quadriplegia   Neurological:   Spontaneously opening eyes.  Not to voice only.  Not protruding tongue to command.  Functional quadriplegic.   Skin: No rash noted. He is not diaphoretic.   Has known sacral Decub as well as pressure skin breakdown to BLE, heels, feet, toes POA. Wearing bilateral pressure boots.   Psychiatric:   Unable to assess   Nursing note and vitals reviewed.      Significant Labs:   Blood Culture: No results for input(s): LABBLOO in the last 48 hours.  BMP:   Recent Labs   Lab 12/09/19 0355         K 3.0*      CO2 30*   BUN 38*   CREATININE 1.9*   CALCIUM 10.2   MG 2.1     CBC:   Recent Labs   Lab 12/08/19 0353 12/09/19 0355   WBC 8.24 8.22   HGB 9.1* 9.3*   HCT 29.1* 29.2*   * 99*     CMP:   Recent Labs   Lab 12/08/19 0353 12/09/19 0355    141   K 3.2* 3.0*    100   CO2 29 30*   GLU 91 100   BUN 41* 38*   CREATININE 2.2* 1.9*   CALCIUM 9.7 10.2   ANIONGAP 11 11   EGFRNONAA 27.3* 32.6*     Cardiac Markers: No results for input(s): CKMB, MYOGLOBIN, BNP, TROPISTAT in the last 48 hours.  Magnesium:   Recent Labs   Lab 12/08/19 0353 12/09/19 0355   MG 2.2 2.1     Urine Culture: No results for input(s): LABURIN in the last 48 hours.  Urine Studies: No results for input(s): COLORU, APPEARANCEUA, PHUR, SPECGRAV, PROTEINUA, GLUCUA, KETONESU, BILIRUBINUA, OCCULTUA, NITRITE, UROBILINOGEN, LEUKOCYTESUR, RBCUA, WBCUA, BACTERIA, SQUAMEPITHEL,  HYALINECASTS in the last 48 hours.    Invalid input(s): SAJI  All pertinent labs within the past 24 hours have been reviewed.    Significant Imaging: I have reviewed all pertinent imaging results/findings within the past 24 hours.      Assessment/Plan:      Bacteremia -Providencia  Blood culture on presentation growing Providencia Stuartii, unclear source as urine growing Klebsiella and respiratory growing Proteus.  Repeat blood culture without any growth to date.  Continue meropenem.  Follow up pending cultures.  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 Last electrolytes reviewed-   Recent Labs   Lab 12/08/19  0353 12/09/19  0355   K 3.2* 3.0*     Ordered 40mEq replacement. Repeat levels in AM.    UTI due to Klebsiella species  Urine culture growing Klebsiella, carbapenem resistant.  Noted on admission Rahman replaced with 1 L draining.    Chronic nephrolithiasis possibly nidus for infection versus other.  Multiple urinary tract issues.  Discontinue finasteride, discussed with Infectious Disease.  Repeat preliminary urine culture growing yeast.  Enhanced isolation precaution for infectious control.  Continue meropenem.      Proteus mirabilis in respiratory culture  Respiratory culture growing Proteus mirabilis, ESBL.  Unclear this is an infection versus colonization.  Empirically on meropenem.      Candida UTI  Continue fluconazole.       Goals of care, counseling/discussion  Received faxed over Advanced directive from [facility], confirmed full code status.  From attempts to contact listed contacts on face sheet, Malgorzata Fan was apparently making medical decisions for patient in the past but not actual POA. Estranged niece (Naomi Grewal) however not involved in patient's care. Case management  involved, [facility] does not have a power of .Patient not able to make his medical decisions.  Declining status with multiple providers recommendations for comfort directed care.  Will need legal/ethical committee involvement to further assist.  Remains full code for now. Ongoing attempts to contact [facility] director.  Ideally hospice candidate.    C. difficile colitis  Rectal tube in place.  Continues with liquid stool.  Continue oral vancomycin.    Essential hypertension  Of Levophed for 2 days now.  Blood pressure elevated but better controlled on resuming home medications.  Continue to monitor clinically.    Encephalopathy, metabolic  As per report at baseline he is a functional quadriplegic, does protrude his tongue to command.  He is not doing same today.  Possibly secondary to sepsis and infection, history of seizures and possibly post ictal do no evidence to support same  Continue to treat acute on chronic medical issues.  Follow clinically.      Acquired hypothyroidism  Still profoundly hypothyroid with  with normal free T4.  Possibly contributing to encephalopathy.  Continue IV levothyroxine 100 mcg.    Seizure  Continue home seizure medication.  Monitor closely especially with starting meropenem which can lower seizure threshold.      CKD (chronic kidney disease), stage III  Serum creatinine slightly higher than baseline.  Renally dose all medications and avoid nephrotoxin drugs.  Trend BMP.      Foot ulcer-unstageable, POA  Pressure off loading and wound care following      Chronic respiratory failure with hypoxia and hypercapnia secondary to JUNAID/OHS with chronic vent dependence  Trach with mechanical ventilation.  Current settings FiO2 of 30 with peep of 5.  Ventilator precautions.  Appreciate Pulmonary input.    Coronary artery disease  Chronic condition.  Resume Coreg for now given tachycardia.    Sacral decubitus ulcer, stage II  Present on arrival.  Wound Care following.      Hemiparesis  affecting dominant side as late effect of stroke        PEG (percutaneous endoscopic gastrostomy) status  Tolerating tube feeds well.  Increased free water bolus via PEG.      Functional quadriplegia  Baseline functional quadriplegic.        VTE Risk Mitigation (From admission, onward)         Ordered     enoxaparin injection 40 mg  Nightly      12/08/19 0873                      Kiah Le MD  Department of Hospital Medicine   [facility]

## 2019-12-10 NOTE — PROGRESS NOTES
79 yr old male  follow up     12/10/19 1116        Pressure Injury 11/21/19 0939 Left lateral Foot Suspected Unstageable   Date First Assessed/Time First Assessed: 11/21/19 0939   Pressure Injury Present on Admission: suspected hospital acquired  Side: Left  Orientation: lateral  Location: Foot  Is this injury device related?: (c)   Staging: Suspected Unstageable   Wound Image    Dressing Appearance Intact   Drainage Amount Scant   Drainage Characteristics/Odor Serosanguineous   Appearance Pink;Red;Black   Periwound Area Intact   Wound Edges Irregular   Care Cleansed with:;Antimicrobial agent   Dressing Applied;Honey;Island/border        12/10/19 1118        Pressure Injury 11/06/19 1430  Buttocks    Date First Assessed/Time First Assessed: 11/06/19 1430   Pressure Injury Present on Admission: yes  Side: (c)   Location: Buttocks  Staging: (c)    Wound Image    Dressing Appearance Intact;Moist drainage   Drainage Amount Small   Drainage Characteristics/Odor Serosanguineous   Appearance Red   Periwound Area Intact;[place];Redness   Wound Edges Irregular   Care Cleansed with:;Antimicrobial agent   Dressing Applied;Honey;Island/border

## 2019-12-10 NOTE — ASSESSMENT & PLAN NOTE
· No evidence of new seizures.    · Remains on levetiracetam.  · One possible seizure durring last admission precipitated by Abx/infection.

## 2019-12-10 NOTE — PLAN OF CARE
12/10/19 0700   Patient Assessment/Suction   Level of Consciousness (AVPU) alert   Respiratory Effort Normal;Unlabored   Expansion/Accessory Muscles/Retractions no retractions;no use of accessory muscles   PRE-TX-O2   O2 Device (Oxygen Therapy) ventilator   Oxygen Concentration (%) 30   SpO2 98 %   Pulse Oximetry Type Continuous   $ Pulse Oximetry - Multiple Charge Pulse Oximetry - Multiple   Pulse (!) 58   Resp 14   Vent Select   Conventional Vent Y   $ Ventilator Subsequent 1   Charged w/in last 24h YES   Preset Conventional Ventilator Settings   Vent ID [id number]   Vent Type    Ventilation Type VC   Vent Mode A/C   Humidity Heated wire   Humidifier Temp Actual 37 degC   Set Rate 18 bmp   Vt Set 550 mL   PEEP/CPAP 5 cmH20   Pressure Support 0 cmH20   Waveform RAMP   Peak Flow 60 L/min   Plateau Set/Insp. Hold (sec) 0   Trigger Sensitivity Flow/I-Trigger 3 L/min   Patient Ventilator Parameters   Resp Rate Total 18 br/min   Peak Airway Pressure 32 cmH2O   Mean Airway Pressure 14 cmH20   Plateau Pressure 0 cmH20   Exhaled Vt 508 mL   Total Ve 9.63 mL   I:E Ratio Measured 1:2.30   Conventional Ventilator Alarms   Alarms On Y   Resp Rate High Alarm 40 br/min   Press High Alarm 50 cmH2O   Apnea Rate 18   Apnea Volume (mL) 1 mL   Apnea Oxygen Concentration  100   Apnea Flow Rate (L/min) 60   T Apnea 20 sec(s)   Ready to Wean/Extubation Screen   FIO2<=50 (chart decimal) 0.3   MV<16L (chart vol.) 9.63   PEEP <=8 (chart #) 5   Ready to Wean Parameters   F/VT Ratio<105 (RSBI) (!) 27.56   Airway Safety   Ambu bag with the patient? Yes, Adult Ambu   Suction set is at the bedside? Yes   Pt received on documented vent settings. Will continue to monitor.

## 2019-12-10 NOTE — ASSESSMENT & PLAN NOTE
Urine culture growing Klebsiella, carbapenem resistant.  Noted on admission Rahman replaced with 1 L draining.    Chronic nephrolithiasis possibly nidus for infection versus other.  Multiple urinary tract issues.  Discontinue finasteride, discussed with Infectious Disease.  Repeat preliminary urine culture growing yeast.  Enhanced isolation precaution for infectious control.  Continue meropenem.

## 2019-12-10 NOTE — PROGRESS NOTES
[facility]  Pulmonology  Progress Note    Subjective     No major issues overnight. Off of vasopressors.     Review of Systems   Unable to perform ROS: Mental status change      I have personally reviewed the following during today's evaluation:  past medical history, ROS, family history, social history, surgical history, current inpatient medications,drug allergies, vital signs over the past 24 hours, results of relevant diagnostic studies and nursing/provider documentation from the past 24 hours.     Objective     VS Temp:  [98 °F (36.7 °C)-99.6 °F (37.6 °C)]   Pulse:  [54-65]   Resp:  [13-21]   BP: (114-152)/(60-82)   SpO2:  [95 %-99 %]   Ideal body weight: 82.2 kg (181 lb 3.5 oz)  Adjusted ideal body weight: 101 kg (222 lb 12.3 oz)   I/O   Intake/Output Summary (Last 24 hours) at 12/9/2019 1825  Last data filed at 12/9/2019 1700  Gross per 24 hour   Intake 1570 ml   Output 2600 ml   Net -1030 ml        Vent SpO2 98% on 30% FiO2  Vent Mode: A/C  Oxygen Concentration (%):  [30] 30  Resp Rate Total:  [18 br/min-20 br/min] 18 br/min  Vt Set:  [550 mL] 550 mL  PEEP/CPAP:  [5 cmH20] 5 cmH20  Pressure Support:  [0 cmH20] 0 cmH20  Mean Airway Pressure:  [12 cmH20-15 cmH20] 14 cmH20     PE Physical Exam   Constitutional: He appears well-developed and well-nourished. He is obese.   HENT:   Head: Normocephalic.   Right Ear: External ear normal.   Left Ear: External ear normal.   Nose: Nose normal.   Mouth/Throat: Oropharynx is clear and moist. Mallampati Score: III.   Neck: Normal range of motion. Neck supple. No JVD present. No tracheal deviation present. No thyromegaly present.   8.0 cuffed tracheostomy secure in pain.   Cardiovascular: Normal rate, regular rhythm, normal heart sounds and intact distal pulses. Exam reveals no gallop and no friction rub.   No murmur heard.  Pulmonary/Chest: Normal expansion and symmetric chest wall expansion. He has no wheezes. He has rhonchi. He has no rales.   Abdominal:  Soft. Bowel sounds are normal. He exhibits no distension and no mass. There is no rebound.   Musculoskeletal: Normal range of motion. He exhibits no edema, tenderness or deformity.   Lymphadenopathy:     He has no cervical adenopathy.   Neurological: He is unresponsive. He displays no atrophy and no tremor. No cranial nerve deficit. He displays no seizure activity. GCS eye subscore is 1. GCS verbal subscore is 1. GCS motor subscore is 3.   Skin: Skin is warm and dry. No rash noted. No cyanosis or erythema. Nails show no clubbing.   Nursing note and vitals reviewed.      Labs I have personally reviewed and interpreted all labs / diagnostic studies obtained over the past 24 hours, and relevant results are as follows:  Recent Labs   Lab 12/09/19  0355   WBC 8.22   RBC 3.14*   HGB 9.3*   HCT 29.2*   PLT 99*   MCV 93   MCH 29.6   MCHC 31.8*      K 3.0*      CO2 30*   BUN 38*   CREATININE 1.9*   MG 2.1      Imaging I have personally reviewed and interpreted the following images and reviewed the associated Radiology report.  I have reviewed and interpreted all pertinent imaging results/findings within the past 24 hours.  No new images.     Micro I have personally reviewed and interpreted the available culture data.  Relevant results are as follows.  Blood Culture   Lab Results   Component Value Date    LABBLOO No Growth to date 12/06/2019    LABBLOO No Growth to date 12/06/2019    LABBLOO No Growth to date 12/06/2019    LABBLOO No Growth to date 12/06/2019   , Sputum Culture   Lab Results   Component Value Date    GSRESP <10 epithelial cells per low power field. 12/04/2019    GSRESP Moderate WBC's 12/04/2019    GSRESP Few  Gram positive cocci 12/04/2019    GSRESP Few Gram positive rods 12/04/2019    GSRESP Few Gram negative rods 12/04/2019    RESPIRATORYC Reduced Normal Respiratory gabriela 12/04/2019    RESPIRATORYC (A) 12/04/2019     PROTEUS MIRABILIS ESBL  Moderate  Results called to and read back by : Ameena  Rene PHILIP on M3  12/06/2019  09:36 by BREONNA      RESPIRATORYC SERRATIA MARCESCENS  Moderate   (A) 12/04/2019    and Urine Culture    Lab Results   Component Value Date    LABURIN (A) 12/07/2019     YEAST   10,000 - 49,999 cfu/ml  Identification pending        Medications Scheduled    busPIRone  5 mg Per G Tube BID    carvedilol  3.125 mg Oral BID    chlorhexidine  15 mL Mouth/Throat BID    DULoxetine  30 mg Per G Tube Daily    enoxparin  40 mg Subcutaneous QHS    ferrous sulfate  325 mg Per G Tube Daily    fluconazole  200 mg Per G Tube Daily    furosemide  20 mg Per G Tube Daily    gentamicin  1 drop Both Eyes QID    lacosamide  200 mg Per G Tube Daily    Lactobacillus acidoph-L.bulgar  1 tablet Per G Tube TID WM    levetiracetam oral soln  500 mg Per G Tube BID    levothyroxine  100 mcg Intravenous Daily    meropenem (MERREM) IVPB  1 g Intravenous Q12H    mupirocin   Nasal BID    potassium chloride 10%  40 mEq Oral Once    [START ON 12/10/2019] potassium chloride 10%  40 mEq Oral Daily    vancomycin  125 mg Oral Q6H      Continuous Infusions:      PRN   acetaminophen, albuterol-ipratropium        Assessment       Active Hospital Problems    Diagnosis    Functional quadriplegia    Essential hypertension    Coronary artery disease    Chronic respiratory failure with hypoxia and hypercapnia secondary to JUNAID/OHS with chronic vent dependence    PEG (percutaneous endoscopic gastrostomy) status    Sacral decubitus ulcer, stage II    UTI due to Klebsiella species    Hypokalemia    Proteus mirabilis in respiratory culture    Seizure    Bacteremia -Providencia    Goals of care, counseling/discussion    Foot ulcer-unstageable, POA    CKD (chronic kidney disease), stage III    Encephalopathy, metabolic    Candida UTI    C. difficile colitis    Hemiparesis affecting dominant side as late effect of stroke    Acquired hypothyroidism      My Impression:  Clearly at the end of his life and now  chronically critically ill due to multiple nosocomial infections.    Plan     Seizure  · No evidence of new seizures.    · Remains on levetiracetam.  · One possible seizure durring last admission precipitated by Abx/infection.    Goals of care, counseling/discussion  · Clearly at the end of his life.  Continued life support is futile and I see little ambiguity in this situation.  · Legal consultation already underway.      Neuro   Encephalopathy secondary to metabolic derangements   No evidence of occult unaddressed pathology..   No additional investigation needed.  Recommend observation for now..   Continue to treat underlying pathology.   Continued supportive care for now with close observation and frequent neurologic assessment.   Avoid sedating/derliogenic medications..    Psychiatric  · Continue buspirone, duloxetine.    Derm  · Continue local wound care.    Pulmonary  · Continue LPV.  · No evidence of acute pathology.  · Chronic radiographic abnormalities noted.    Cardiac/Vascular  · HDS and off of vasopressors.    Renal/  · ABx for UTI.  · Renal function at baseline.    ID  · ID following.  · Will defer to their expertise re: abx selection.    Hematology  · No indication for blood products.  · Monitor for now.  · Continue feSO4 and LMWH.    Endocrine  · IV synthroid.     Mauricio Kinsey MD  Pulmonary / Critical Care Medicine  [facility]        Critical Care Time: Approximately >35 minutes    The patient is critically ill due to the following conditions that actively pose threat to life and bodily function:  Acute coma/unconsciousness not secondary to hypoglycemia, Hypoxia/hypoxemia with SpO2 < 80%    The patient is at high risk of death due to central nervous system failure, respiratory failure and requiring ongoing treatment including invasive mechanical ventilation, frequent neurologic assessment to prevent further life-threatening deterioration.  Due to a high probability of  clinically significant, life threatening deterioration, the patient required my highest level of preparedness to intervene emergently and I personally spent this critical care time directly and personally managing the patient.     Critical care was time spent personally by me on the following activities:    Obtaining a history   Examination of patient.   Reviewing pulse oximetry.   Providing medical care at the patients bedside.   Developing a treatment plan with patient or surrogate and bedside caregivers   Ordering and reviewing laboratory studies, radiographic studies, pulse oximetry.   Ordering and performing treatments and interventions.   Evaluation of patient's response to treatment.   Discussions with consultants while on the unit and immediately available to the patient.   Re-evaluation of the patient's condition.   Documentation in the medical record.    This critical care time did not overlap with that of any other provider or involve time for any procedures.     Mauricio Kinsey MD  Pulmonary / Critical Care Medicine  [facility]  12/09/2019  6:26 PM

## 2019-12-10 NOTE — ASSESSMENT & PLAN NOTE
· Clearly at the end of his life.  Continued life support is futile and I see little ambiguity in this situation.  · Continue comfort care.

## 2019-12-10 NOTE — PROGRESS NOTES
[facility]  Pulmonology  Progress Note    Subjective     No major changes over the past 24 hours.     Review of Systems   Unable to perform ROS: Mental status change      I have personally reviewed the following during today's evaluation:  past medical history, ROS, family history, social history, surgical history, current inpatient medications,drug allergies, vital signs over the past 24 hours, results of relevant diagnostic studies and nursing/provider documentation from the past 24 hours.     Objective     VS Temp:  [98.4 °F (36.9 °C)-99.6 °F (37.6 °C)]   Pulse:  [53-61]   Resp:  [10-71]   BP: (111-140)/(57-71)   SpO2:  [95 %-99 %]   Ideal body weight: 82.2 kg (181 lb 3.5 oz)  Adjusted ideal body weight: 101 kg (222 lb 12.3 oz)   I/O   Intake/Output Summary (Last 24 hours) at 12/10/2019 1108  Last data filed at 12/9/2019 1700  Gross per 24 hour   Intake 760 ml   Output 1150 ml   Net -390 ml        Vent SpO2 98% on 30% FiO2   PE Physical Exam   Constitutional: He appears well-developed and well-nourished. He is obese.   HENT:   Head: Normocephalic.   Right Ear: External ear normal.   Left Ear: External ear normal.   Nose: Nose normal.   Mouth/Throat: Oropharynx is clear and moist. Mallampati Score: III.   Neck: Normal range of motion. Neck supple. No JVD present. No tracheal deviation present. No thyromegaly present.   8.0 cuffed tracheostomy secure in pain.   Cardiovascular: Normal rate, regular rhythm, normal heart sounds and intact distal pulses. Exam reveals no gallop and no friction rub.   No murmur heard.  Pulmonary/Chest: Normal expansion and symmetric chest wall expansion. He has no wheezes. He has rhonchi. He has no rales.   Abdominal: Soft. Bowel sounds are normal. He exhibits no distension and no mass. There is no rebound.   Musculoskeletal: Normal range of motion. He exhibits no edema, tenderness or deformity.   Lymphadenopathy:     He has no cervical adenopathy.   Neurological: He is  unresponsive. He displays no atrophy and no tremor. No cranial nerve deficit. He displays no seizure activity. GCS eye subscore is 1. GCS verbal subscore is 1. GCS motor subscore is 3.   Skin: Skin is warm and dry. No rash noted. No cyanosis or erythema. Nails show no clubbing.   Nursing note and vitals reviewed.        Labs I have personally reviewed and interpreted all labs / diagnostic studies obtained over the past 24 hours, and relevant results are as follows:  Recent Labs   Lab 12/10/19  0406   WBC 7.93   RBC 3.07*   HGB 9.1*   HCT 28.3*   PLT 98*   MCV 92   MCH 29.6   MCHC 32.2      K 2.9*      CO2 31*   BUN 36*   CREATININE 1.6*   MG 2.0      Imaging I have personally reviewed and interpreted the following images and reviewed the associated Radiology report.  I have reviewed and interpreted all pertinent imaging results/findings within the past 24 hours.  No new images     Micro I have personally reviewed and interpreted the available culture data.  Relevant results are as follows.  Blood Culture   Lab Results   Component Value Date    LABBLOO No Growth to date 12/06/2019    LABBLOO No Growth to date 12/06/2019    LABBLOO No Growth to date 12/06/2019    LABBLOO No Growth to date 12/06/2019    LABBLOO No Growth to date 12/06/2019   , Sputum Culture   Lab Results   Component Value Date    GSRESP <10 epithelial cells per low power field. 12/04/2019    GSRESP Moderate WBC's 12/04/2019    GSRESP Few  Gram positive cocci 12/04/2019    GSRESP Few Gram positive rods 12/04/2019    GSRESP Few Gram negative rods 12/04/2019    RESPIRATORYC Reduced Normal Respiratory gabriela 12/04/2019    RESPIRATORYC (A) 12/04/2019     PROTEUS MIRABILIS ESBL  Moderate  Results called to and read back by : Ameena López RN on M3  12/06/2019  09:36 by DRPHIL      RESPIRATORYC SERRATIA MARCESCENS  Moderate   (A) 12/04/2019    and Urine Culture    Lab Results   Component Value Date    LABURIN (A) 12/07/2019     YEAST   10,000 - 49,999  cfu/ml  Identification pending        Medications Scheduled    busPIRone  5 mg Per G Tube BID    carvedilol  3.125 mg Oral BID    DULoxetine  30 mg Per G Tube Daily    enoxparin  40 mg Subcutaneous QHS    ferrous sulfate  325 mg Per G Tube Daily    fluconazole 40 mg/ml  200 mg Per G Tube Daily    furosemide  20 mg Per G Tube Daily    gentamicin  1 drop Both Eyes QID    lacosamide  200 mg Per G Tube Daily    Lactobacillus acidoph-L.bulgar  1 tablet Per G Tube TID WM    levetiracetam oral soln  500 mg Per G Tube BID    levothyroxine  100 mcg Intravenous Daily    meropenem (MERREM) IVPB  1 g Intravenous Q12H    potassium chloride 10%  40 mEq Oral Daily    vancomycin  125 mg Oral Q6H      Continuous Infusions:      PRN   acetaminophen, albuterol-ipratropium        Assessment       Active Hospital Problems    Diagnosis    Functional quadriplegia    Essential hypertension    Coronary artery disease    Chronic respiratory failure with hypoxia and hypercapnia secondary to JUNAID/OHS with chronic vent dependence    PEG (percutaneous endoscopic gastrostomy) status    Sacral decubitus ulcer, stage II    UTI due to Klebsiella species    Hypokalemia    Proteus mirabilis in respiratory culture    Seizure    Bacteremia -Providencia    Goals of care, counseling/discussion    Foot ulcer-unstageable, POA    CKD (chronic kidney disease), stage III    Encephalopathy, metabolic    Candida UTI    C. difficile colitis    Hemiparesis affecting dominant side as late effect of stroke    Acquired hypothyroidism      My Impression:  Stable chronic critical illness and completely dependent on invasive life support with no appreciable quality of life.    Plan     Seizure  · No evidence of new seizures.    · Remains on levetiracetam.  · One possible seizure durring last admission precipitated by Abx/infection.    Goals of care, counseling/discussion  · Clearly at the end of his life.  Continued life support is  futile and I see little ambiguity in this situation.  · Legal consultation already underway.      Neuro   Encephalopathy secondary to metabolic derangements   No evidence of occult unaddressed pathology..   No additional investigation needed.  Recommend observation for now..   Continue to treat underlying pathology.   Continued supportive care for now with close observation and frequent neurologic assessment.   Avoid sedating/derliogenic medications..    Psychiatric  · Continue buspirone, duloxetine.    Derm  · Continue local wound care.    Pulmonary  · Continue LPV.  · No evidence of acute pathology.  · Chronic radiographic abnormalities noted.    Cardiac/Vascular  · HDS and off of vasopressors.    Renal/  · ABx for UTI.  · Renal function at baseline.    ID  · ID following.  · Will defer to their expertise re: abx selection.    Hematology  · No indication for blood products.  · Monitor for now.  · Continue feSO4 and LMWH.    Endocrine  · IV synthroid.       No significant input from LTAC team.  No family available.  Legal consultation in procress.  Dismal prognosis.    Mauricio Kinsey MD  Pulmonary / Critical Care Medicine  [facility]          Critical Care Time: Approximately >35 minutes    The patient is critically ill due to the following conditions that actively pose threat to life and bodily function:  Hypoxia/hypoxemia with SpO2 < 80%    The patient is at high risk of death due to respiratory failure and requiring ongoing treatment including invasive mechanical ventilation to prevent further life-threatening deterioration.  Due to a high probability of clinically significant, life threatening deterioration, the patient required my highest level of preparedness to intervene emergently and I personally spent this critical care time directly and personally managing the patient.     Critical care was time spent personally by me on the following activities:    Obtaining a  history   Examination of patient.   Reviewing pulse oximetry.   Providing medical care at the patients bedside.   Developing a treatment plan with patient or surrogate and bedside caregivers   Ordering and reviewing laboratory studies, radiographic studies, pulse oximetry.   Ordering and performing treatments and interventions.   Evaluation of patient's response to treatment.   Discussions with consultants while on the unit and immediately available to the patient.   Re-evaluation of the patient's condition.   Documentation in the medical record.    This critical care time did not overlap with that of any other provider or involve time for any procedures.     Mauricio Kinsey MD  Pulmonary / Critical Care Medicine  [facility]  12/10/2019  11:11 AM

## 2019-12-10 NOTE — ASSESSMENT & PLAN NOTE
Of Levophed for 2 days now.  Blood pressure elevated but better controlled on resuming home medications.  Continue to monitor clinically.

## 2019-12-10 NOTE — PROGRESS NOTES
Consult Note  Infectious Disease    Reason for Consult:  shock    HPI: Masood Messina is an  79 y.o. male with whom I am familiar from prior consultations who has been vent dependent for several years, is colonized in sputum and urine with a variety of gram negative rods and has recently been treated for pseudomonas urosepsis on 2 occasions, initially associated with hematuria/clots in bladder and with bacteremia, followed by the development of C. Difficile colitis. He was discharged about 3 weeks ago on a few days of zosyn and a taper of oral vanc. During that hospitalization he had worse than usual encephalopathy and seizures.   He was sent to the ED today for severe bradycardia and hypotension. His CXR looks like pulmonary edema, he had a WBC of 14k and required pressors, removal of indwelling picc, placement of TLC and admission to ICU. He is unresponsive, pulse is now up to 100. UA has pyuria and last few urine cultures have had Candida. His tay on admission was replaced immediately draining about a liter of urine. He was placed on merrem, IV and oral vanc.     12/5:  Temperature is less than 100 after 1 large dose of Solu-Medrol yesterday.  Vasopressor requirement has decreased.  Ventilatory needs are at or near baseline.  He is no longer bradycardic.  Blood cultures have gram-negative rods.  The urine culture was collected yesterday by nursing but not confirmed as collected in epic therefore the urine culture was not set up.  Chest x-ray is improved on the left and the same on the right.  Secretions are minimal and tan.  He will open his eyes when I call his name but he will not attend or protrude his tongue on command.  12/6: still waiting on finals of cultures. He is more alert. No success in reaching family per hospital medicine or pulmonary. Because of GNR in blood and ESBL in sputum and urine will resume meropenem.   12/7:Blood culture from 12/04 has Providencia, sensitive to ceftriaxone, Zosyn  and meropenem  Sputum has Proteus ESBL and Serratia which he has had in the past  Urine has Klebsiella and Pseudomonas.  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   Per hospital medicine, his next of kin is a niece who has been estranged and has not seen him in decades. Still on levophed.   12/8: afebrile, off pressors. No change in vent needs. Repeat Urine culture is negative so far. Secretions are improved. No problems with tube feeds. Having a little oral bleeding from oral care. No apparent antibiotic intolerance and no seizures.   12.9 Off pressors. Otherwise no issues. Working on ethics committee consult.  Diarrhea persists  (JERROD OLIVERA)  12/10: interval reviewed. No change in overall status. Tolerating merrem without adverse effect. Repeat urine culture negative except for yeast.    EXAM & DIAGNOSTICS REVIEWED:   Vitals:     Temp:  [98.4 °F (36.9 °C)-99.6 °F (37.6 °C)]   Temp: 98.6 °F (37 °C) (12/10/19 0303)  Pulse: (!) 55 (12/10/19 0600)  Resp: 10 (12/10/19 0600)  BP: 123/66 (12/10/19 0600)  SpO2: 97 % (12/10/19 0600)    Intake/Output Summary (Last 24 hours) at 12/10/2019 0928  Last data filed at 12/9/2019 1700  Gross per 24 hour   Intake 760 ml   Output 1150 ml   Net -390 ml       General:  Eyes will open when addressed and he will   attend , but not follow any commands  Eyes:  Anicteric, PERRL,  EOMI are grossly intact and he has some yellow exudate from conjunctiva which is improved  ENT:  Constant/repetitive clenching of teeth, no oral lesions  Neck:  supple,tracheostomy  Lungs: Coarse without consolidation R>L   Minimal ventilator needs  Heart:  iRRR, no gallop/murmur/rub noted  Abd:  Soft, NT, ND, normal BS, no masses or organomegaly appreciated. PEG, rectal tube with liquid stool and lots of gas. Receiving tube feeds  :   Rahman, urine clear,     Musc:  Joints without effusion, swelling, erythema, synovitis, quadriplegic   Skin:  No rashes.   Wound:   Neuro: Eyes open to voice, will  Attend briefly, will not  follow command.. Quadriplegic, very high tone throughout  Psych:   calm     Extrem: Chronic woody edema, no erythema, phlebitis, cellulitis,    VAD:  Left IJ TLC     Isolation:  Enhanced contact    Lines/Tubes/Drains:    General Labs reviewed:  Recent Labs   Lab 12/08/19 0353 12/09/19  0355 12/10/19  0406   WBC 8.24 8.22 7.93   HGB 9.1* 9.3* 9.1*   HCT 29.1* 29.2* 28.3*   * 99* 98*       Recent Labs   Lab 12/04/19  1131 12/05/19  0426  12/08/19 0353 12/09/19  0355 12/10/19  0406   * 138   < > 140 141 139   K 5.3* 4.7   < > 3.2* 3.0* 2.9*   CL 87* 97   < > 100 100 100   CO2 34* 31*   < > 29 30* 31*   BUN 53* 46*   < > 41* 38* 36*   CREATININE 2.2* 2.2*   < > 2.2* 1.9* 1.6*   CALCIUM 9.8 9.5   < > 9.7 10.2 10.1   PROT 8.7* 8.2  --   --   --   --    BILITOT 0.7 0.9  --   --   --   --    ALKPHOS 78 75  --   --   --   --    ALT 17 18  --   --   --   --    AST 30 34  --   --   --   --     < > = values in this interval not displayed.           Micro:  Microbiology Results (last 7 days)     Procedure Component Value Units Date/Time    Blood culture [[id number]] Collected:  12/06/19 0401    Order Status:  Completed Specimen:  Blood Updated:  12/10/19 0632     Blood Culture, Routine No Growth to date      No Growth to date      No Growth to date      No Growth to date      No Growth to date    Blood culture [[id number]] Collected:  12/04/19 2050    Order Status:  Completed Specimen:  Blood from Line, Arterial, Right Updated:  12/09/19 2232     Blood Culture, Routine No growth after 5 days.    Narrative:       Aerobic and anaerobic  Can we please draw one set from the periphery?  It looks like  the two sets done in the ED were drawn from the central  line?  Collection has been rescheduled by [facility] at 12/04/2019 16:06 Reason:   Nurse Draw  Collection has been rescheduled by [facility] at 12/04/2019 16:06 Reason:   Nurse Draw    Blood culture x two cultures. Draw prior to antibiotics. [[id number]] Collected:  12/04/19 1217     Order Status:  Completed Specimen:  Blood from Line, Jugular, External Left Updated:  12/09/19 1632     Blood Culture, Routine No growth after 5 days.    Narrative:       Aerobic and anaerobic    Blood culture [[id number]] Collected:  12/05/19 0805    Order Status:  Completed Specimen:  Blood from Line, Arterial, Right Updated:  12/09/19 1032     Blood Culture, Routine No Growth to date      No Growth to date      No Growth to date      No Growth to date      No Growth to date    Narrative:       Draw from line    Urine Culture High Risk [[id number]]  (Abnormal) Collected:  12/07/19 0930    Order Status:  Completed Specimen:  Urine, Catheterized Updated:  12/09/19 0914     Urine Culture, Routine YEAST   10,000 - 49,999 cfu/ml  Identification pending      Narrative:       Indicated criteria for high risk culture:->Less than 25  months of age  Indicated criteria for high risk culture:->Other  Other (specify):->CRE    Culture, Respiratory with Gram Stain [[id number]]  (Abnormal)  (Susceptibility) Collected:  12/04/19 1024    Order Status:  Completed Specimen:  Respiratory from Sputum Updated:  12/09/19 0700     Respiratory Culture Reduced Normal Respiratory gabriela      PROTEUS MIRABILIS ESBL  Moderate  Results called to and read back by : Ameena López RN on M3  12/06/2019  09:36 by DRP        SERRATIA MARCESCENS  Moderate       Gram Stain (Respiratory) <10 epithelial cells per low power field.     Gram Stain (Respiratory) Moderate WBC's     Gram Stain (Respiratory) Few  Gram positive cocci     Gram Stain (Respiratory) Few Gram positive rods     Gram Stain (Respiratory) Few Gram negative rods    Narrative:       PER PROTOCOL    Urine culture [[id number]]  (Abnormal)  (Susceptibility) Collected:  12/04/19 1500    Order Status:  Completed Specimen:  Urine, Catheterized Updated:  12/07/19 1318     Urine Culture, Routine KLEBSIELLA PNEUMONIAE   >100,000 cfu/ml        PSEUDOMONAS AERUGINOSA  50,000 - 99,999 cfu/ml       Narrative:       Indicated criteria for high risk culture:->Other  Other (specify):->chronic indwelling tay    Blood culture x two cultures. Draw prior to antibiotics. [[id number]]  (Abnormal)  (Susceptibility) Collected:  12/04/19 1130    Order Status:  Completed Specimen:  Blood from Line, Jugular, External Left Updated:  12/07/19 0721     Blood Culture, Routine Gram stain reynaldo bottle: Gram negative rods      Critical result called and read back Olivia Ortega RN M3 @ 0443      Gram stain aer bottle: Gram positive cocci      Results called to and read back by: Ameena López RN on M3 12/05/2019        16:23      PROVIDENCIA STUARTII      COAGULASE-NEGATIVE STAPHYLOCOCCUS SPECIES  Organism is a probable contaminant      Narrative:       Aerobic and anaerobic    Stool culture [[id number]] Collected:  12/04/19 1225    Order Status:  Completed Specimen:  Stool Updated:  12/06/19 1119     Stool Culture No Salmonella,Shigella,Vibrio,Campylobacter.      No E coli 0157:H7 isolated.    Clostridium difficile EIA [[id number]] Collected:  12/04/19 1225    Order Status:  Completed Specimen:  Stool Updated:  12/04/19 1626     C. diff Antigen Negative     C difficile Toxins A+B, EIA Negative     Comment: Testing not recommended for children <24 months old.           Imaging Reviewed:   CXRs   CT head   Ultrasound kidneys 12/5, no change in hydro or non obstructing stones  Cardiology:    IMPRESSION & PLAN   1. Providencia bacteremia.  His Tay catheter was not draining well at the time of admission and when replaced in the ER, almost a Liter was relieved.  This does not match any of the other organisms isolated from urine or sputum however  2. Hypotension, multifactorial, Bradycardia, resolved  3. Chronic vent dependence, quadriplegic  4. 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 Conjunctivitis, improved  6. No quality of life    Recommendations:  Continue oral vancomycin through merrem then wean slowly   Continue merrem, until 12/17  Continue diflucan   Gentamicin eye drops     Consider withdraw to comfort care, ethics committee consult in progress   will follow peripherally

## 2019-12-10 NOTE — ASSESSMENT & PLAN NOTE
Patient has hypokalemia which is currently uncontrolled. Last electrolytes reviewed-   Recent Labs   Lab 12/08/19  0353 12/09/19  0355   K 3.2* 3.0*     Ordered 40mEq replacement. Repeat levels in AM.

## 2019-12-10 NOTE — SUBJECTIVE & OBJECTIVE
Interval History:  Patient continues with liquid stool/diarrhea.  Remains unresponsive, no change in neurological status.  T-max last 24 hr 99.6.  Urine output improved yesterday after restarting home Lasix.  I/O =1750/2750=-1000ml, since admission -6.1L. Repeat urine culture growing yeast. Labs this am K 3.0.  Scant blood tinged around trach site. Discussed with case management, trying to reach director at [place] [facility] to further address goals of care.     Review of Systems   Unable to perform ROS: Patient unresponsive     Objective:     Vital Signs (Most Recent):  Temp: 99.6 °F (37.6 °C) (12/09/19 1600)  Pulse: (!) 53 (12/09/19 1800)  Resp: 18 (12/09/19 1800)  BP: 118/66 (12/09/19 1800)  SpO2: 98 % (12/09/19 1800) Vital Signs (24h Range):  Temp:  [98 °F (36.7 °C)-99.6 °F (37.6 °C)] 99.6 °F (37.6 °C)  Pulse:  [53-65] 53  Resp:  [13-21] 18  SpO2:  [95 %-99 %] 98 %  BP: (114-152)/(60-82) 118/66     Weight: 129.3 kg (285 lb 1.6 oz)  Body mass index is 36.6 kg/m².    Intake/Output Summary (Last 24 hours) at 12/9/2019 1842  Last data filed at 12/9/2019 1700  Gross per 24 hour   Intake 1570 ml   Output 2600 ml   Net -1030 ml      Physical Exam   Constitutional: He appears well-developed and well-nourished. No distress.   Obese male, chronically ill-appearing, spontaneously opens eyes, unresponsive   HENT:   Head: Normocephalic and atraumatic.   Eyes: Pupils are equal, round, and reactive to light. Right eye exhibits no discharge. Left eye exhibits no discharge.   Neck:   Trach with dressing underlying. L IJ in place.    Cardiovascular: Regular rhythm.   No murmur heard.  Bradycardic, HR 50's   Pulmonary/Chest: No stridor. He has no wheezes.   Tracheostomy in place, mechanically ventilated with current settings FiO2 30, peep of 5, mechanical breath sounds anteriorly   Abdominal: There is no tenderness. There is no rebound and no guarding.   Protuberant, PEG in place, rectal tube in place with dark brown liquid stool    Genitourinary:   Genitourinary Comments: Rahman in place with yellow urine draining   Musculoskeletal:   Functional quadriplegia   Neurological:   Spontaneously opening eyes.  Not to voice only.  Not protruding tongue to command.  Functional quadriplegic.   Skin: No rash noted. He is not diaphoretic.   Has known sacral Decub as well as pressure skin breakdown to BLE, heels, feet, toes POA. Wearing bilateral pressure boots.   Psychiatric:   Unable to assess   Nursing note and vitals reviewed.      Significant Labs:   Blood Culture: No results for input(s): LABBLOO in the last 48 hours.  BMP:   Recent Labs   Lab 12/09/19 0355         K 3.0*      CO2 30*   BUN 38*   CREATININE 1.9*   CALCIUM 10.2   MG 2.1     CBC:   Recent Labs   Lab 12/08/19 0353 12/09/19 0355   WBC 8.24 8.22   HGB 9.1* 9.3*   HCT 29.1* 29.2*   * 99*     CMP:   Recent Labs   Lab 12/08/19 0353 12/09/19 0355    141   K 3.2* 3.0*    100   CO2 29 30*   GLU 91 100   BUN 41* 38*   CREATININE 2.2* 1.9*   CALCIUM 9.7 10.2   ANIONGAP 11 11   EGFRNONAA 27.3* 32.6*     Cardiac Markers: No results for input(s): CKMB, MYOGLOBIN, BNP, TROPISTAT in the last 48 hours.  Magnesium:   Recent Labs   Lab 12/08/19 0353 12/09/19 0355   MG 2.2 2.1     Urine Culture: No results for input(s): LABURIN in the last 48 hours.  Urine Studies: No results for input(s): COLORU, APPEARANCEUA, PHUR, SPECGRAV, PROTEINUA, GLUCUA, KETONESU, BILIRUBINUA, OCCULTUA, NITRITE, UROBILINOGEN, LEUKOCYTESUR, RBCUA, WBCUA, BACTERIA, SQUAMEPITHEL, HYALINECASTS in the last 48 hours.    Invalid input(s): WRIGHTSUR  All pertinent labs within the past 24 hours have been reviewed.    Significant Imaging: I have reviewed all pertinent imaging results/findings within the past 24 hours.

## 2019-12-11 NOTE — PROGRESS NOTES
12/11/19 1700        Pressure Injury 12/11/19 1030 Right posterior Head Stage 1   Date First Assessed/Time First Assessed: 12/11/19 1030   Side: Right  Orientation: posterior  Location: Head  Is this injury device related?: Other (see comments)  Staging: Stage 1   Wound Image    Staging Stage 1  (unsure of orgin of wound.)   Drainage Amount None   Appearance Red  (raised well defined border.)   Periwound Area Intact   Wound Edges Defined   Wound Length (cm) 4 cm   Wound Width (cm) 3.4 cm   Wound Surface Area (cm^2) 13.6 cm^2   Care   (continue to keep pressure off)   spoke to Kanika, she will assess again tomorrow.  Rt buttock wound appears to be unchanged from photo 12/10/19.  Bilateral buttock discolored, blanching, left has a scarred thick ridge no breakdown. Notified MD of redness to head

## 2019-12-11 NOTE — PROGRESS NOTES
[facility] Medicine Progress Note  Patient Name: Masood Messina MRN: [id number]   Patient Class: IP- Inpatient  Length of Stay: 6   Admission Date: 12/4/2019 10:05 AM Attending Physician: Sharona Hernandez MD   Primary Care Provider: Jeramie Lombardi MD Face-to-Face encounter date: 12/10/2019   Chief Complaint: Bradycardia (HR 30 BP 78/35)      Assessment & Plan:   Masood Messina is a 79 y.o. male admitted for    Providencia Stuartii Bacteremia :Treated with Meropenem. Repeat Cx negative. Appreciate ID recc.     carbapenem resistant Klebsiella species UTI: Chronic nephrolithiasis possibly nidus for infection versus other.  Multiple urinary tract issues. Continue meropenem.     Proteus mirabilis ESBL in respiratory culture Unclear this is an infection versus colonization. Empirically on meropenem.    Candida UTI Continue fluconazole.     Hypokalemia: Replace     C. difficile colitis: Rectal tube in place.  Continues with liquid stool.  Continue oral vancomycin.     Essential hypertension: Monitor clinically    Encephalopathy, metabolic: Possibly secondary to sepsis and infection, follow clinically.      Acquired hypothyroidism Continue IV levothyroxine 100 mcg.     Seizure  Continue home seizure medication.  Monitor closely especially with starting meropenem which can lower seizure threshold.      CKD (chronic kidney disease), stage III: monitor     Foot ulcer-unstageable, POA Pressure off loading and wound care following      Chronic respiratory failure with hypoxia and hypercapnia secondary to JUNAID/OHS with chronic vent dependence: pulmonary following. Trach with mechanical ventilation.  Current settings FiO2 of 30 with peep of 5.    Coronary artery disease  Chronic condition.  Resume Coreg for now given tachycardia.     Sacral decubitus ulcer, stage II  Present on arrival.  Wound Care following.    Hemiparesis affecting dominant side as late effect of stroke    PEG (percutaneous  endoscopic gastrostomy) status  Tolerating tube feeds well.  Increased free water bolus via PEG.    Functional quadriplegia  Baseline functional quadriplegic.         Core measures:  - Code status: full code   - GI PPx: ?  - DVT PPx: Lovenox  - lines/ tubes: PIV, Rt IJ central line or Rahmans cath   Discharge Planning:      Goals of care, counseling/discussion: Discussed with nursing staff today and I was told ethics committee has been formed to decide goal of care. From attempts to contact listed contacts on face sheet, Malgorzata Fan was apparently making medical decisions for patient in the past but not actual POA. Estranged niece (Naomi Grewal) however not involved in patient's care. Case management involved, [facility] does not have a power of .Patient not able to make his medical decisions.  Declining status with multiple providers recommendations for comfort directed care.  Will need legal/ethical committee involvement to further assist.  Remains full code for now. Ongoing attempts to contact [facility] director.  Ideally hospice candidate.    Subjective:    Interval History: No interval change noted. He is unable to answer any questions. Nursing reported ethics committee has been formed for him.     Overall Course:Patient was admitted to the ICU.  He was briefly on dobutamine and heart rate improved and same was discontinued.  Needing continued Levophed for blood pressure support.  Started on Zosyn for gram-negative bacteremia likely of urinary source.  Continued on oral vancomycin for C diff.  Not following commands.  Continues with chronic vent dependence.  Appreciate consultant's input.  Sputum culture now growing ESBL Proteus.  Seems preliminary blood cultures with Proteus as well.  Changing Zosyn to meropenem and watching closely for seizure activity.  Still trying to contact listed contacts to identify next of kin, power of . On 12/07 urine with carbapenems resistant Pseudomonas, currently on  "meropenem, no evidence of seizure activity, apparently patient has no power of , only living relatives estranged from patient for the last 30-40 years, will need ethics/legal involvement for goals of care dressing. On 12/08 now off levaphed and blood pressure actually elevated, remains afebrile, oozing from sacral wounds, slight decrease in urine output, no overall change clinically.       Review of Systems Unable to obtain ROS due to patient being encephalopathic.    Objective:   Physical Exam  /68   Pulse (!) 49   Temp 97.2 °F (36.2 °C) (Axillary)   Resp 19   Ht 6' 2" (1.88 m)   Wt 129.3 kg (285 lb 1.6 oz)   SpO2 98%   BMI 36.60 kg/m²   Vitals reviewed.    Constitutional: Chronically ill patient who is Breathing with Vent support  HENT: Atraumatic.   Cardiovascular: Normal rate, regular rhythm and normal heart sounds.   Pulmonary/Chest: tracheostomy in place. Breathing with vent support. No wheezes.   Abdominal: Soft. Bowel sounds are normal. Exhibits no distension and no mass. No tenderness. Peg in place  Neurological: open eyes but does not follow commands. Functional quadriplegic.   Skin: Skin is warm and dry. Has known sacral Decub as well as pressure skin breakdown to BLE, heels, feet, toes POA. Wearing bilateral pressure boots.     Following labs were Reviewed   Recent Labs   Lab 12/10/19  0406   WBC 7.93   HGB 9.1*   HCT 28.3*   PLT 98*   CALCIUM 10.1      K 2.9*   CO2 31*      BUN 36*   CREATININE 1.6*     No results found for: POCTGLUCOSE       Microbiology Results (last 7 days)     Procedure Component Value Units Date/Time    Blood culture [[id number]] Collected:  12/05/19 0805    Order Status:  Completed Specimen:  Blood from Line, Arterial, Right Updated:  12/10/19 1032     Blood Culture, Routine No growth after 5 days.    Narrative:       Draw from line    Blood culture [[id number]] Collected:  12/06/19 0401    Order Status:  Completed Specimen:  Blood Updated:  12/10/19 " 0632     Blood Culture, Routine No Growth to date      No Growth to date      No Growth to date      No Growth to date      No Growth to date    Blood culture [[id number]] Collected:  12/04/19 2050    Order Status:  Completed Specimen:  Blood from Line, Arterial, Right Updated:  12/09/19 2232     Blood Culture, Routine No growth after 5 days.    Narrative:       Aerobic and anaerobic  Can we please draw one set from the periphery?  It looks like  the two sets done in the ED were drawn from the central  line?  Collection has been rescheduled by [facility] at 12/04/2019 16:06 Reason:   Nurse Draw  Collection has been rescheduled by [facility] at 12/04/2019 16:06 Reason:   Nurse Draw    Blood culture x two cultures. Draw prior to antibiotics. [[id number]] Collected:  12/04/19 1217    Order Status:  Completed Specimen:  Blood from Line, Jugular, External Left Updated:  12/09/19 1632     Blood Culture, Routine No growth after 5 days.    Narrative:       Aerobic and anaerobic    Urine Culture High Risk [[id number]]  (Abnormal) Collected:  12/07/19 0930    Order Status:  Completed Specimen:  Urine, Catheterized Updated:  12/09/19 0914     Urine Culture, Routine YEAST   10,000 - 49,999 cfu/ml  Identification pending      Narrative:       Indicated criteria for high risk culture:->Less than 25  months of age  Indicated criteria for high risk culture:->Other  Other (specify):->CRE    Culture, Respiratory with Gram Stain [[id number]]  (Abnormal)  (Susceptibility) Collected:  12/04/19 1024    Order Status:  Completed Specimen:  Respiratory from Sputum Updated:  12/09/19 0700     Respiratory Culture Reduced Normal Respiratory gabriela      PROTEUS MIRABILIS ESBL  Moderate  Results called to and read back by : Ameena López RN on M3  12/06/2019  09:36 by BREONNA        SERRATIA MARCESCENS  Moderate       Gram Stain (Respiratory) <10 epithelial cells per low power field.     Gram Stain (Respiratory) Moderate WBC's     Gram Stain (Respiratory) Few  Gram  positive cocci     Gram Stain (Respiratory) Few Gram positive rods     Gram Stain (Respiratory) Few Gram negative rods    Narrative:       PER PROTOCOL    Urine culture [[id number]]  (Abnormal)  (Susceptibility) Collected:  12/04/19 1500    Order Status:  Completed Specimen:  Urine, Catheterized Updated:  12/07/19 1318     Urine Culture, Routine KLEBSIELLA PNEUMONIAE   >100,000 cfu/ml        PSEUDOMONAS AERUGINOSA  50,000 - 99,999 cfu/ml      Narrative:       Indicated criteria for high risk culture:->Other  Other (specify):->chronic indwelling tay    Blood culture x two cultures. Draw prior to antibiotics. [[id number]]  (Abnormal)  (Susceptibility) Collected:  12/04/19 1130    Order Status:  Completed Specimen:  Blood from Line, Jugular, External Left Updated:  12/07/19 0721     Blood Culture, Routine Gram stain reynaldo bottle: Gram negative rods      Critical result called and read back Olivia Ortega RN M3 @ [id number]      Gram stain aer bottle: Gram positive cocci      Results called to and read back by: Ameena López RN on M3 12/05/2019        16:23      PROVIDENCIA STUARTII      COAGULASE-NEGATIVE STAPHYLOCOCCUS SPECIES  Organism is a probable contaminant      Narrative:       Aerobic and anaerobic    Stool culture [[id number]] Collected:  12/04/19 1225    Order Status:  Completed Specimen:  Stool Updated:  12/06/19 1119     Stool Culture No Salmonella,Shigella,Vibrio,Campylobacter.      No E coli 0157:H7 isolated.    Clostridium difficile EIA [[id number]] Collected:  12/04/19 1225    Order Status:  Completed Specimen:  Stool Updated:  12/04/19 1626     C. diff Antigen Negative     C difficile Toxins A+B, EIA Negative     Comment: Testing not recommended for children <24 months old.           US Retroperitoneal Complete   Final Result      Limited examination as above.      Unchanged left-sided hydronephrosis.      Left-sided nephrolithiasis.         Electronically signed by: Stuart Talavera IV., MD   Date:    12/05/2019    Time:    10:27      X - Ray Chest AP Portable   Final Result      Patchy airspace opacities in the right mid lung zone and bilateral lung bases.  Opacities in the left lung base appear slightly more prominent on the current study.      Unchanged elevation of the right hemidiaphragm.      Stable positioning of tracheostomy cannula and left IJ central catheter.         Electronically signed by: Stuart Talavera IV., MD   Date:    12/05/2019   Time:    06:13      CT Head Without Contrast   Final Result      1.  No acute intracranial process.      2.  Chronic and involutional findings as noted above.      3.  Unchanged mucoperiosteal disease in the paranasal sinuses as above.         Electronically signed by: Markos Renteria MD   Date:    12/04/2019   Time:    15:26      X-Ray Chest AP Portable   Final Result      Unchanged radiograph of the chest when accounting for differences in imaging technique.         Electronically signed by: Markos Renteria MD   Date:    12/04/2019   Time:    11:13          Inpatient medications  Scheduled Meds:   busPIRone  5 mg Per G Tube BID    carvedilol  3.125 mg Oral BID    DULoxetine  30 mg Per G Tube Daily    enoxparin  40 mg Subcutaneous QHS    ferrous sulfate  325 mg Per G Tube Daily    fluconazole 40 mg/ml  200 mg Per G Tube Daily    furosemide  20 mg Per G Tube Daily    gentamicin  1 drop Both Eyes QID    lacosamide  200 mg Per G Tube Daily    Lactobacillus acidoph-L.bulgar  1 tablet Per G Tube TID WM    levetiracetam oral soln  500 mg Per G Tube BID    levothyroxine  100 mcg Intravenous Daily    meropenem (MERREM) IVPB  1 g Intravenous Q12H    potassium chloride 10%  40 mEq Oral Daily    vancomycin  125 mg Oral Q6H     Continuous Infusions:  PRN Meds:.acetaminophen, albuterol-ipratropium    Above encounter included review of the medical records, interviewing and examining the patient face-to-face, discussion with family and other health care providers, ordering and  interpreting lab/test results and formulating a plan of care.     Medical Decision Making:    [] Low Complexity  [] Moderate Complexity  [x] High Complexity    Sharona Hernandez  [facility] Hospitalist  12/10/2019

## 2019-12-11 NOTE — PROGRESS NOTES
Formerly [facility] Medicine Progress Note  Patient Name: Masood Messina MRN: [id number]   Patient Class: IP- Inpatient  Length of Stay: 7   Admission Date: 12/4/2019 10:05 AM Attending Physician: Sharona Hernandez MD   Primary Care Provider: Jeramie Lombardi MD Face-to-Face encounter date: 12/11/2019   Chief Complaint: Bradycardia (HR 30 BP 78/35)      Assessment & Plan:   Masood Messina is a 79 y.o. male admitted for    Neuro  Encephalopathy, metabolic: Possibly secondary to sepsis and infection, follow clinically.  Seizure: Continue lacosamide, levetiracetam.  Monitor closely especially with starting meropenem which can lower seizure threshold  Functional quadriplegia: Baseline functional quadriplegic.  Mood disorder: Buspirone, Duloxetine  Hemiparesis affecting dominant side as late effect of stroke     ENT  Tracheostomy: Local care    Pulmonary  Chronic respiratory failure with hypoxia and hypercapnia secondary to JUNAID/OHS with chronic vent dependence: pulmonary following. Trach with mechanical ventilation.  Current settings FiO2 of 30 with peep of 5.    CV  Coronary artery disease: Chronic condition.  Resume Coreg for now given tachycardia.  Essential hypertension: Monitor clinically    GI/Nutrition  PEG (percutaneous endoscopic gastrostomy) status: Tolerating tube feeds well.  Increased free water bolus via PEG.    Renal/Fluids/  Hypokalemia: Replace with IV 40 and PO 40  CKD (chronic kidney disease), stage III: monitor    Infectious Disease    1. C. difficile colitis: Rectal tube in place.  Continues with liquid stool.  Continue oral vancomycin.  Providencia Stuartii Bacteremia :Treated with Meropenem. Repeat Cx negative. Appreciate ID recc.   2. Carbapenem resistant Klebsiella species UTI: Chronic nephrolithiasis possibly nidus for infection versus other.  Multiple urinary tract issues. Continue meropenem.  3. Proteus mirabilis ESBL in respiratory culture Unclear this is an  infection versus colonization. Empirically on meropenem.  4. Candida UTI Continue fluconazole.     Heme  Thrombocytopenia: Infection?    Other  Acquired hypothyroidism Continue IV levothyroxine 100 mcg.  Foot ulcer-unstageable, POA Pressure off loading and wound care following  Sacral decubitus ulcer, stage II: Present on arrival.  Wound Care following.    Core measures:  - Code status: full code   - GI PPx: ?  - DVT PPx: Lovenox  - lines/ tubes: PIV, Rt IJ central line or Rahmans cath   Discharge Planning:      Goals of care, counseling/discussion: Discussed with nursing staff today and Dr. Kinsey. He is going to discuss with hospital  to decide further goal of care. Poor quality of life and patient unable to take his own decisions. I agree with Dr. Kinsey and other provides involved in the care that our focus should be comfort directed care rather life prolonging care.     Subjective:    Interval History: No interval change noted. He is unable to answer any questions. Do not form words. Nursing reported ethics committee has been formed for him.     Overall Course:Patient was admitted to the ICU.  He was briefly on dobutamine and heart rate improved and same was discontinued.  Needing continued Levophed for blood pressure support.  Started on Zosyn for gram-negative bacteremia likely of urinary source.  Continued on oral vancomycin for C diff.  Not following commands.  Continues with chronic vent dependence.  Appreciate consultant's input.  Sputum culture now growing ESBL Proteus.  Seems preliminary blood cultures with Proteus as well.  Changing Zosyn to meropenem and watching closely for seizure activity.  Still trying to contact listed contacts to identify next of kin, power of . On 12/07 urine with carbapenems resistant Pseudomonas, currently on meropenem, no evidence of seizure activity, apparently patient has no power of , only living relatives estranged from patient for the last 30-40 years,  "will need ethics/legal involvement for goals of care dressing. On 12/08 now off levaphed and blood pressure actually elevated, remains afebrile, oozing from sacral wounds, slight decrease in urine output, no overall change clinically. 12/10: Continues to be on Vent, afebrile, completing antbiotics, deciding long term plan of care.       Review of Systems Unable to obtain ROS due to patient being encephalopathic.    Objective:   Physical Exam  /69   Pulse (!) 55   Temp 98.2 °F (36.8 °C) (Oral)   Resp 19   Ht 6' 2" (1.88 m)   Wt 129.3 kg (285 lb 1.6 oz)   SpO2 98%   BMI 36.60 kg/m²   Vitals reviewed.    Constitutional: Chronically ill patient who is Breathing with Vent support  HENT: Atraumatic.   Cardiovascular: Normal rate, regular rhythm and normal heart sounds.   Pulmonary/Chest: tracheostomy in place. Breathing with vent support. No wheezes.   Abdominal: Soft. Bowel sounds are normal. Exhibits no distension and no mass. No tenderness. Peg in place  Neurological: open eyes but does not follow commands. Functional quadriplegic.   Skin: Skin is warm and dry. Has known sacral Decub as well as pressure skin breakdown to BLE, heels, feet, toes POA. Wearing bilateral pressure boots.     Following labs were Reviewed   No results for input(s): WBC, HGB, HCT, PLT, GLUCOSE, CALCIUM, ALBUMIN, PROT, NA, K, CO2, CL, BUN, CREATININE, ALKPHOS, ALT, AST, BILITOT in the last 24 hours.  No results found for: POCTGLUCOSE       Microbiology Results (last 7 days)     Procedure Component Value Units Date/Time    Urine Culture High Risk [[id number]]  (Abnormal) Collected:  12/07/19 0930    Order Status:  Completed Specimen:  Urine, Catheterized Updated:  12/11/19 0642     Urine Culture, Routine CANDIDA TROPICALIS  10,000 - 49,999 cfu/ml  Treatment of asymptomatic candiduria is not recommended (except for   specific populations). Candida isolated in the urine typically   represents colonization. If an indwelling urinary " catheter is present  it should be removed or replaced.      Narrative:       Indicated criteria for high risk culture:->Less than 25  months of age  Indicated criteria for high risk culture:->Other  Other (specify):->CRE    Blood culture [[id number]] Collected:  12/06/19 0401    Order Status:  Completed Specimen:  Blood Updated:  12/11/19 0632     Blood Culture, Routine No growth after 5 days.    Blood culture [[id number]] Collected:  12/05/19 0805    Order Status:  Completed Specimen:  Blood from Line, Arterial, Right Updated:  12/10/19 1032     Blood Culture, Routine No growth after 5 days.    Narrative:       Draw from line    Blood culture [[id number]] Collected:  12/04/19 2050    Order Status:  Completed Specimen:  Blood from Line, Arterial, Right Updated:  12/09/19 2232     Blood Culture, Routine No growth after 5 days.    Narrative:       Aerobic and anaerobic  Can we please draw one set from the periphery?  It looks like  the two sets done in the ED were drawn from the central  line?  Collection has been rescheduled by [facility] at 12/04/2019 16:06 Reason:   Nurse Draw  Collection has been rescheduled by [facility] at 12/04/2019 16:06 Reason:   Nurse Draw    Blood culture x two cultures. Draw prior to antibiotics. [[id number]] Collected:  12/04/19 1217    Order Status:  Completed Specimen:  Blood from Line, Jugular, External Left Updated:  12/09/19 1632     Blood Culture, Routine No growth after 5 days.    Narrative:       Aerobic and anaerobic    Culture, Respiratory with Gram Stain [[id number]]  (Abnormal)  (Susceptibility) Collected:  12/04/19 1024    Order Status:  Completed Specimen:  Respiratory from Sputum Updated:  12/09/19 0700     Respiratory Culture Reduced Normal Respiratory gabriela      PROTEUS MIRABILIS ESBL  Moderate  Results called to and read back by : Ameena López RN on M3  12/06/2019  09:36 by BREONNA        SERRATIA MARCESCENS  Moderate       Gram Stain (Respiratory) <10 epithelial cells per low power field.      Gram Stain (Respiratory) Moderate WBC's     Gram Stain (Respiratory) Few  Gram positive cocci     Gram Stain (Respiratory) Few Gram positive rods     Gram Stain (Respiratory) Few Gram negative rods    Narrative:       PER PROTOCOL    Urine culture [[id number]]  (Abnormal)  (Susceptibility) Collected:  12/04/19 1500    Order Status:  Completed Specimen:  Urine, Catheterized Updated:  12/07/19 1318     Urine Culture, Routine KLEBSIELLA PNEUMONIAE   >100,000 cfu/ml        PSEUDOMONAS AERUGINOSA  50,000 - 99,999 cfu/ml      Narrative:       Indicated criteria for high risk culture:->Other  Other (specify):->chronic indwelling tay    Blood culture x two cultures. Draw prior to antibiotics. [[id number]]  (Abnormal)  (Susceptibility) Collected:  12/04/19 1130    Order Status:  Completed Specimen:  Blood from Line, Jugular, External Left Updated:  12/07/19 0721     Blood Culture, Routine Gram stain reynaldo bottle: Gram negative rods      Critical result called and read back Olivia Ortega RN M3 @ [id number]      Gram stain aer bottle: Gram positive cocci      Results called to and read back by: Ameena López RN on M3 12/05/2019        16:23      PROVIDENCIA STUARTII      COAGULASE-NEGATIVE STAPHYLOCOCCUS SPECIES  Organism is a probable contaminant      Narrative:       Aerobic and anaerobic    Stool culture [[id number]] Collected:  12/04/19 1225    Order Status:  Completed Specimen:  Stool Updated:  12/06/19 1119     Stool Culture No Salmonella,Shigella,Vibrio,Campylobacter.      No E coli 0157:H7 isolated.    Clostridium difficile EIA [[id number]] Collected:  12/04/19 1225    Order Status:  Completed Specimen:  Stool Updated:  12/04/19 1626     C. diff Antigen Negative     C difficile Toxins A+B, EIA Negative     Comment: Testing not recommended for children <24 months old.           US Retroperitoneal Complete   Final Result      Limited examination as above.      Unchanged left-sided hydronephrosis.      Left-sided nephrolithiasis.          Electronically signed by: Stuart Talavera IV., MD   Date:    12/05/2019   Time:    10:27      X - Ray Chest AP Portable   Final Result      Patchy airspace opacities in the right mid lung zone and bilateral lung bases.  Opacities in the left lung base appear slightly more prominent on the current study.      Unchanged elevation of the right hemidiaphragm.      Stable positioning of tracheostomy cannula and left IJ central catheter.         Electronically signed by: Stuart Talavera IV., MD   Date:    12/05/2019   Time:    06:13      CT Head Without Contrast   Final Result      1.  No acute intracranial process.      2.  Chronic and involutional findings as noted above.      3.  Unchanged mucoperiosteal disease in the paranasal sinuses as above.         Electronically signed by: Markos Renteria MD   Date:    12/04/2019   Time:    15:26      X-Ray Chest AP Portable   Final Result      Unchanged radiograph of the chest when accounting for differences in imaging technique.         Electronically signed by: Markos Renteria MD   Date:    12/04/2019   Time:    11:13          Inpatient medications  Scheduled Meds:   busPIRone  5 mg Per G Tube BID    carvedilol  3.125 mg Oral BID    DULoxetine  30 mg Per G Tube Daily    enoxparin  40 mg Subcutaneous QHS    ferrous sulfate  325 mg Per G Tube Daily    fluconazole 40 mg/ml  200 mg Per G Tube Daily    furosemide  20 mg Per G Tube Daily    gentamicin  1 drop Both Eyes QID    lacosamide  200 mg Per G Tube Daily    Lactobacillus acidoph-L.bulgar  1 tablet Per G Tube TID WM    levetiracetam oral soln  500 mg Per G Tube BID    levothyroxine  100 mcg Intravenous Daily    meropenem (MERREM) IVPB  1 g Intravenous Q12H    potassium chloride 10%  40 mEq Oral Daily    potassium chloride 10%  40 mEq Oral Once    potassium chloride in water  40 mEq Intravenous Once    vancomycin  125 mg Oral Q6H     Continuous Infusions:  PRN Meds:.acetaminophen,  albuterol-ipratropium    Above encounter included review of the medical records, interviewing and examining the patient face-to-face, discussion with family and other health care providers, ordering and interpreting lab/test results and formulating a plan of care.     Medical Decision Making:    [] Low Complexity  [] Moderate Complexity  [x] High Complexity    Sharona Hernandez  [facility] Hospitalist  12/11/2019

## 2019-12-11 NOTE — PROGRESS NOTES
[facility]  Pulmonology  Progress Note    Subjective     No major issues or changes over the past 24 hours.     Review of Systems   Unable to perform ROS: Mental status change      I have personally reviewed the following during today's evaluation:  past medical history, ROS, family history, social history, surgical history, current inpatient medications,drug allergies, vital signs over the past 24 hours, results of relevant diagnostic studies and nursing/provider documentation from the past 24 hours.     Objective     VS Temp:  [97.2 °F (36.2 °C)-98.8 °F (37.1 °C)]   Pulse:  [49-61]   Resp:  [11-48]   BP: (106-141)/()   SpO2:  [96 %-100 %]   Ideal body weight: 82.2 kg (181 lb 3.5 oz)  Adjusted ideal body weight: 101 kg (222 lb 12.3 oz)   I/O   Intake/Output Summary (Last 24 hours) at 12/11/2019 1419  Last data filed at 12/11/2019 1200  Gross per 24 hour   Intake 2620 ml   Output 4025 ml   Net -1405 ml        Vent SpO2 99% on 30% FiO2   PE Physical Exam   Constitutional: He appears well-developed and well-nourished. He is obese.   HENT:   Head: Normocephalic.   Right Ear: External ear normal.   Left Ear: External ear normal.   Nose: Nose normal.   Mouth/Throat: Oropharynx is clear and moist. Mallampati Score: III.   Neck: Normal range of motion. Neck supple. No JVD present. No tracheal deviation present. No thyromegaly present.   8.0 cuffed tracheostomy secure in pain.   Cardiovascular: Normal rate, regular rhythm, normal heart sounds and intact distal pulses. Exam reveals no gallop and no friction rub.   No murmur heard.  Pulmonary/Chest: Normal expansion and symmetric chest wall expansion. He has no wheezes. He has rhonchi. He has no rales.   Abdominal: Soft. Bowel sounds are normal. He exhibits no distension and no mass. There is no rebound.   Musculoskeletal: Normal range of motion. He exhibits no edema, tenderness or deformity.   Lymphadenopathy:     He has no cervical adenopathy.    Neurological: He is unresponsive. He displays no atrophy and no tremor. No cranial nerve deficit. He displays no seizure activity. GCS eye subscore is 1. GCS verbal subscore is 1. GCS motor subscore is 3.   Skin: Skin is warm and dry. No rash noted. No cyanosis or erythema. Nails show no clubbing.   Nursing note and vitals reviewed.      Labs I have personally reviewed and interpreted all labs / diagnostic studies obtained over the past 24 hours, and relevant results are as follows:  No new labs   Imaging I have personally reviewed and interpreted the following images and reviewed the associated Radiology report.  I have reviewed and interpreted all pertinent imaging results/findings within the past 24 hours.  No new images.     Micro I have personally reviewed and interpreted the available culture data.  Relevant results are as follows.  Blood Culture   Lab Results   Component Value Date    LABBLOO No growth after 5 days. 12/06/2019   , Sputum Culture   Lab Results   Component Value Date    GSRESP <10 epithelial cells per low power field. 12/04/2019    GSRESP Moderate WBC's 12/04/2019    GSRESP Few  Gram positive cocci 12/04/2019    GSRESP Few Gram positive rods 12/04/2019    GSRESP Few Gram negative rods 12/04/2019    RESPIRATORYC Reduced Normal Respiratory gabriela 12/04/2019    RESPIRATORYC (A) 12/04/2019     PROTEUS MIRABILIS ESBL  Moderate  Results called to and read back by : Ameena López RN on M3  12/06/2019  09:36 by BREONNA      RESPIRATORYC SERRATIA MARCESCENS  Moderate   (A) 12/04/2019    and Urine Culture    Lab Results   Component Value Date    LABURIN (A) 12/07/2019     CANDIDA TROPICALIS  10,000 - 49,999 cfu/ml  Treatment of asymptomatic candiduria is not recommended (except for   specific populations). Candida isolated in the urine typically   represents colonization. If an indwelling urinary catheter is present  it should be removed or replaced.        Medications Scheduled    busPIRone  5 mg Per G  Tube BID    carvedilol  3.125 mg Oral BID    DULoxetine  30 mg Per G Tube Daily    enoxparin  40 mg Subcutaneous QHS    ferrous sulfate  325 mg Per G Tube Daily    fluconazole 40 mg/ml  200 mg Per G Tube Daily    furosemide  20 mg Per G Tube Daily    gentamicin  1 drop Both Eyes QID    lacosamide  200 mg Per G Tube Daily    Lactobacillus acidoph-L.bulgar  1 tablet Per G Tube TID WM    levetiracetam oral soln  500 mg Per G Tube BID    levothyroxine  100 mcg Intravenous Daily    meropenem (MERREM) IVPB  1 g Intravenous Q12H    potassium chloride 10%  40 mEq Oral Daily    vancomycin  125 mg Oral Q6H      Continuous Infusions:        PRN   acetaminophen, albuterol-ipratropium        Assessment       Active Hospital Problems    Diagnosis    Functional quadriplegia    Essential hypertension    Coronary artery disease    Chronic respiratory failure with hypoxia and hypercapnia secondary to JUNAID/OHS with chronic vent dependence    PEG (percutaneous endoscopic gastrostomy) status    Sacral decubitus ulcer, stage II    UTI due to Klebsiella species    Hypokalemia    Proteus mirabilis in respiratory culture    Seizure    Bacteremia -Providencia    Goals of care, counseling/discussion    Foot ulcer-unstageable, POA    CKD (chronic kidney disease), stage III    Encephalopathy, metabolic    Candida UTI    C. difficile colitis    Hemiparesis affecting dominant side as late effect of stroke    Acquired hypothyroidism      My Impression:  Stable.    Plan     Seizure  · No evidence of new seizures.    · Remains on levetiracetam.  · One possible seizure durring last admission precipitated by Abx/infection.    Goals of care, counseling/discussion  · Clearly at the end of his life.  Continued life support is futile and I see little ambiguity in this situation.  · Legal consultation already underway.      Neuro   Encephalopathy secondary to metabolic derangements   No evidence of occult unaddressed  pathology..   No additional investigation needed.  Recommend observation for now..   Continue to treat underlying pathology.   Continued supportive care for now with close observation and frequent neurologic assessment.   Avoid sedating/derliogenic medications..    Psychiatric  · Continue buspirone, duloxetine.    Derm  · Continue local wound care.    Pulmonary  · Continue LPV.  · No evidence of acute pathology.  · Chronic radiographic abnormalities noted.    Cardiac/Vascular  · HDS and off of vasopressors.    Renal/  · ABx for UTI.  · Renal function at baseline.    ID  · ID following.  · Will defer to their expertise re: abx selection.    Hematology  · No indication for blood products.  · Monitor for now.  · Continue feSO4 and LMWH.    Endocrine  · IV synthroid.         Mauricio Kinsey MD  Pulmonary / Critical Care Medicine  Formerly [facility]

## 2019-12-11 NOTE — PLAN OF CARE
12/11/19 0852   Patient Assessment/Suction   Level of Consciousness (AVPU) alert   Respiratory Effort Unlabored;Normal   Expansion/Accessory Muscles/Retractions no use of accessory muscles;expansion symmetric;no retractions   All Lung Fields Breath Sounds diminished   Rhythm/Pattern, Respiratory assisted mechanically   Cough Frequency frequent   Cough Type quad cough   Suction Method tracheal   $ Suction Charges Inline Suction Procedure Stat Charge   Secretions Amount moderate   Secretions Color yellow;white   Secretions Characteristics thick   PRE-TX-O2   O2 Device (Oxygen Therapy) ventilator   $ Is the patient on Low Flow Oxygen? Yes   Oxygen Concentration (%) 30   SpO2 98 %   Pulse Oximetry Type Continuous   $ Pulse Oximetry - Multiple Charge Pulse Oximetry - Multiple   Pulse (!) 55   Resp 19   Aerosol Therapy   $ Aerosol Therapy Charges PRN treatment not required        Surgical Airway Portex Cuffed;Fenestrated   No Placement Date or Time found.   Present Prior to Hospital Arrival?: Yes  Inserted by: Present Prior to Hospital Arrival  Type: Tracheostomy  Brand: Portex  Airway Device Size: 8.0  Style: Cuffed;Fenestrated   Cuff Inflation? Inflated   Status Secured   Site Assessment Oozing secretions   Site Care Cleansed   Ties Assessment Intact;Dry;Clean   Vent Select   Conventional Vent Y   $ Ventilator Subsequent 1   Charged w/in last 24h YES   Preset Conventional Ventilator Settings   Vent ID [id number]   Vent Type    Ventilation Type VC   Vent Mode A/C   Humidity Heated wire   Humidifier Temp Setting 37 degC   Humidifier Temp Actual 36.9 degC   Set Rate 18 bmp   Vt Set 550 mL   PEEP/CPAP 5 cmH20   Pressure Support 0 cmH20   Waveform RAMP   Peak Flow 60 L/min   Plateau Set/Insp. Hold (sec) 0   Trigger Sensitivity Flow/I-Trigger 3 L/min   Patient Ventilator Parameters   Resp Rate Total 32 br/min   Peak Airway Pressure 39 cmH2O   Mean Airway Pressure 16 cmH20   Plateau Pressure 0 cmH20   Exhaled Vt 0 mL   Total  Ve 5.27 mL   I:E Ratio Measured 4.90:1   Conventional Ventilator Alarms   Resp Rate High Alarm 40 br/min   Press High Alarm 50 cmH2O   Apnea Rate 18   Apnea Volume (mL) 1 mL   Apnea Oxygen Concentration  100   Apnea Flow Rate (L/min) 60   T Apnea 20 sec(s)   Ready to Wean/Extubation Screen   FIO2<=50 (chart decimal) 0.3   MV<16L (chart vol.) 5.27   PEEP <=8 (chart #) 5

## 2019-12-11 NOTE — CARE UPDATE
12/10/19 1900   Patient Assessment/Suction   Level of Consciousness (AVPU) alert   All Lung Fields Breath Sounds coarse   PRE-TX-O2   O2 Device (Oxygen Therapy) ventilator   $ Is the patient on Low Flow Oxygen? Yes   Oxygen Concentration (%) 30   SpO2 98 %   Pulse Oximetry Type Continuous   $ Pulse Oximetry - Multiple Charge Pulse Oximetry - Multiple   Pulse (!) 49   Resp 19   /68   Aerosol Therapy   $ Aerosol Therapy Charges PRN treatment not required   Vent Select   Conventional Vent Y   Charged w/in last 24h YES   Preset Conventional Ventilator Settings   Vent ID [id number]   Vent Type    Ventilation Type VC   Vent Mode A/C   Humidity Heated wire   Humidifier Temp Setting 37 degC   Humidifier Temp Actual 37 degC   Set Rate 18 bmp   Vt Set 550 mL   PEEP/CPAP 5 cmH20   Pressure Support 0 cmH20   Waveform RAMP   Peak Flow 60 L/min   Plateau Set/Insp. Hold (sec) 0   Trigger Sensitivity Flow/I-Trigger 3 L/min   Patient Ventilator Parameters   Resp Rate Total 18 br/min   Peak Airway Pressure 37 cmH2O   Mean Airway Pressure 15 cmH20   Plateau Pressure 0 cmH20   Exhaled Vt 552 mL   Total Ve 9.91 mL   I:E Ratio Measured 1:2.30   Conventional Ventilator Alarms   Alarms On Y   Resp Rate High Alarm 40 br/min   Press High Alarm 50 cmH2O   Apnea Rate 18   Apnea Volume (mL) 1 mL   Apnea Oxygen Concentration  100   Apnea Flow Rate (L/min) 60   T Apnea 20 sec(s)   Ready to Wean/Extubation Screen   FIO2<=50 (chart decimal) 0.3   MV<16L (chart vol.) 9.91   PEEP <=8 (chart #) 5   Ready to Wean Parameters   F/VT Ratio<105 (RSBI) (!) 34.42

## 2019-12-11 NOTE — NURSING
Am rounds, pt on turning bed, does open eyes, trach and peg tube in place, pt vented, vitals stable, contact isolation maintained, pt does move right arm slightly, no movement to left arm, no movement to josephine lower ext, noted, heel protectors in use,pupils equal and reactive, pt receiving tube feed via peg tube. Miky, fecal incont system in use

## 2019-12-11 NOTE — PLAN OF CARE
Patient vented, trach in place,  spoke to patient in regards to plan of care for the day, no family present to teach, patient unable to learn at this time

## 2019-12-12 NOTE — PROGRESS NOTES
The patient is an 79-year-old male with progressive deterioration over the last several years.  At this point he is no longer interactive with his surroundings.  He has multiple infections with multidrug resistant organisms.  He is repetitively hospitalized with episodes of septic shock which occurr each time that his carbapenem antibiotic is discontinued.  The patient has no quality of life.  He has no interaction with the external world.  He has no prospects for improved quality of life.  The patient is being maintained on life support and has been for many years.  Originally, he was interactive, but that time has long passed.  All physicians involved in his care agree that this care is futile.  We will proceed to comfort care.  We will discontinue the ventilator and antibiotics and place a DNR on the chart.  Will initiate comfort care orders.  We have reconfirmed that there is no interested family or interested 3rd party for this patient.

## 2019-12-12 NOTE — PROGRESS NOTES
79 yr old male  trach and vented will move head. Not on command. More a tic motion.   Lesion to the rt post head does not present a regular stage 1 injury. It is raised and red. Rash. Not linear across back of the head but round [place] on the rt post head.          12/12/19 1708        Pressure Injury 12/11/19 1030 Right posterior Head Stage 1   Date First Assessed/Time First Assessed: 12/11/19 1030   Side: Right  Orientation: posterior  Location: Head  Is this injury device related?: Other (see comments)  Staging: Stage 1   Dressing Appearance Open to air   Drainage Amount None   Appearance Red  (raised red ?rash)   Periwound Area Redness   Wound Edges Defined

## 2019-12-12 NOTE — PLAN OF CARE
I had an opportunity to speak with Dr. Lombardi, the provider who has cared for Mr. Messina for the past several years.  Although, Mr. Burden initially was admitted there after a CVA and subsequent mechanical ventilator dependence, he has, over the past 6 years, developed dementia and is no longer experiencing lucid intervals.   His baseline neurologic function is markedly different than it was when he last made his wishes known, and unfortunately, at that time the subject of his advanced directive in the event of progressive cognitive decline were not established.  I have cared for Mr. Mosley several times over the past 3 months.  He has had recurrent episodes of septic shock from multiple drug resistant organisms, which have recurred rapidly each time antibiotics have been discontinued.  He is completely dependent on invasive life support to sustain life, with no appreciable quality of life or interaction with the outside world.  His chances of sustained meaningful recovery or non-exist ant and all persons involved in his care are in agreement..    Multiple attempts have been made by myself and other members of his care team on multiple occasions to contact any next of kin, but no interested individuals exist.  All physicians involved in his care are in agreement that continuing to resuscitate him when he is in extremis is not futile but prolonging suffering.  Clearly, in the event of cardiopulmonary arrest, initiating heroic life sustaining measures would be inappropriate.  Furthermore, transitioning to therapies focused on maintaining his comfort while allowing death to occur naturally is the most appropriate approach at this time.    I spoke the only remaining, and distant relative, Ms. Younger, and informed her of this.     Mauricio Kinsey MD  Pulmonary / Critical Care Medicine  [facility]  12/12/2019  5:34 PM

## 2019-12-12 NOTE — PROGRESS NOTES
[facility] Medicine Progress Note  Patient Name: Masood Messina MRN: [id number]   Patient Class: IP- Inpatient  Length of Stay: 8   Admission Date: 12/4/2019 10:05 AM Attending Physician: Sharona Hernandez MD   Primary Care Provider: Jeramie Lombardi MD Face-to-Face encounter date: 12/12/2019   Chief Complaint: Bradycardia (HR 30 BP 78/35)      Assessment & Plan:   Masood Messina is a 79 y.o. male admitted for    Neuro  Encephalopathy, metabolic: Possibly secondary to sepsis and infection, follow clinically and closer monitoring.   Seizure: Continue lacosamide, levetiracetam.    Functional quadriplegia: Baseline functional quadriplegic.  Hemiparesis affecting dominant side as late effect of stroke     Psych  Mood disorder: Buspirone, Duloxetine    ENT  Tracheostomy: Local care    Pulmonary  Chronic respiratory failure with hypoxia and hypercapnia secondary to JUNAID/OHS with chronic vent dependence: pulmonary following. Trach with mechanical ventilation.  Current settings FiO2 of 30 with peep of 5.    CV  Coronary artery disease: Chronic condition.  Resume Coreg for now given tachycardia.  Essential hypertension: Monitor clinically    GI/Nutrition  PEG (percutaneous endoscopic gastrostomy) status: Tolerating tube feeds well.  Increased free water bolus via PEG.    Renal/Fluids/  Hypokalemia: Replace with IV 40 and PO 40  CKD (chronic kidney disease), stage III: monitor    Infectious Disease    1. C. difficile colitis: Rectal tube in place.  Continues with liquid stool.  Continue oral vancomycin.  2. Providencia Stuartii Bacteremia :Treated with Meropenem. Repeat Cx negative. Appreciate ID recc.   3. Carbapenem resistant Klebsiella species UTI: Chronic nephrolithiasis possibly nidus for infection versus other.  Multiple urinary tract issues. Continue meropenem.  4. Proteus mirabilis ESBL in respiratory culture Unclear this is an infection versus colonization. Empirically on  meropenem.  5. Candida UTI Continue fluconazole.     Heme  Thrombocytopenia: Infection? Chronic?    Other  Acquired hypothyroidism Continue IV levothyroxine 100 mcg.  Foot ulcer-unstageable, POA Pressure off loading and wound care following  Sacral decubitus ulcer, stage II: Present on arrival.  Wound Care following.    Core measures:  - Code status: full code   - GI PPx: ?  - DVT PPx: Lovenox  - lines/ tubes: PIV, Rt IJ central line or Rahmans cath   Discharge Planning:      Goals of care, counseling/discussion: I will attempt to locate the family again to discuss goals of care.    Subjective:    Interval History: No interval change noted. He is unable to answer any questions. Do not form words. Nursing reported ethics committee has been formed for him.     Overall Course:Patient was admitted to the ICU.  He was briefly on dobutamine and heart rate improved and same was discontinued.  Needing continued Levophed for blood pressure support.  Started on Zosyn for gram-negative bacteremia likely of urinary source.  Continued on oral vancomycin for C diff.  Not following commands.  Continues with chronic vent dependence.  Appreciate consultant's input.  Sputum culture now growing ESBL Proteus.  Seems preliminary blood cultures with Proteus as well.  Changing Zosyn to meropenem and watching closely for seizure activity.  Still trying to contact listed contacts to identify next of kin, power of . On 12/07 urine with carbapenems resistant Pseudomonas, currently on meropenem, no evidence of seizure activity, apparently patient has no power of , only living relatives estranged from patient for the last 30-40 years, will need ethics/legal involvement for goals of care dressing. On 12/08 now off levaphed and blood pressure actually elevated, remains afebrile, oozing from sacral wounds, slight decrease in urine output, no overall change clinically. 12/10: Continues to be on Vent, afebrile, completing antbiotics,  "deciding long term plan of care. 12/12: no interval change in the last 2 days. Attempting to locate family.       Review of Systems Unable to obtain ROS due to patient being encephalopathic.    Objective:   Physical Exam  /60   Pulse (!) 57   Temp 99.1 °F (37.3 °C) (Axillary)   Resp 17   Ht 6' 2" (1.88 m)   Wt 129.3 kg (285 lb 1.6 oz)   SpO2 97%   BMI 36.60 kg/m²   Vitals reviewed.    Constitutional: Chronically ill patient who is Breathing with Vent support  HENT: Atraumatic.   Cardiovascular: Normal rate, regular rhythm and normal heart sounds.   Pulmonary/Chest: tracheostomy in place. Breathing with vent support. No wheezes.   Abdominal: Soft. Bowel sounds are normal. Exhibits no distension and no mass. No tenderness. Peg in place  Neurological: open eyes but does not follow commands. Functional quadriplegic.   Skin: Skin is warm and dry. Has known sacral Decub as well as pressure skin breakdown to BLE, heels, feet, toes POA. Wearing bilateral pressure boots.     Following labs were Reviewed   Recent Labs   Lab 12/11/19  1637   K 3.8     No results found for: POCTGLUCOSE       Microbiology Results (last 7 days)     Procedure Component Value Units Date/Time    Urine Culture High Risk [[id number]]  (Abnormal) Collected:  12/07/19 0930    Order Status:  Completed Specimen:  Urine, Catheterized Updated:  12/11/19 0642     Urine Culture, Routine CANDIDA TROPICALIS  10,000 - 49,999 cfu/ml  Treatment of asymptomatic candiduria is not recommended (except for   specific populations). Candida isolated in the urine typically   represents colonization. If an indwelling urinary catheter is present  it should be removed or replaced.      Narrative:       Indicated criteria for high risk culture:->Less than 25  months of age  Indicated criteria for high risk culture:->Other  Other (specify):->CRE    Blood culture [[id number]] Collected:  12/06/19 0401    Order Status:  Completed Specimen:  Blood Updated:  12/11/19 " 0632     Blood Culture, Routine No growth after 5 days.    Blood culture [[id number]] Collected:  12/05/19 0805    Order Status:  Completed Specimen:  Blood from Line, Arterial, Right Updated:  12/10/19 1032     Blood Culture, Routine No growth after 5 days.    Narrative:       Draw from line    Blood culture [[id number]] Collected:  12/04/19 2050    Order Status:  Completed Specimen:  Blood from Line, Arterial, Right Updated:  12/09/19 2232     Blood Culture, Routine No growth after 5 days.    Narrative:       Aerobic and anaerobic  Can we please draw one set from the periphery?  It looks like  the two sets done in the ED were drawn from the central  line?  Collection has been rescheduled by [facility] at 12/04/2019 16:06 Reason:   Nurse Draw  Collection has been rescheduled by [facility] at 12/04/2019 16:06 Reason:   Nurse Draw    Blood culture x two cultures. Draw prior to antibiotics. [[id number]] Collected:  12/04/19 1217    Order Status:  Completed Specimen:  Blood from Line, Jugular, External Left Updated:  12/09/19 1632     Blood Culture, Routine No growth after 5 days.    Narrative:       Aerobic and anaerobic    Culture, Respiratory with Gram Stain [[id number]]  (Abnormal)  (Susceptibility) Collected:  12/04/19 1024    Order Status:  Completed Specimen:  Respiratory from Sputum Updated:  12/09/19 0700     Respiratory Culture Reduced Normal Respiratory gabriela      PROTEUS MIRABILIS ESBL  Moderate  Results called to and read back by : Ameena López RN on M3  12/06/2019  09:36 by DRP        SERRATIA MARCESCENS  Moderate       Gram Stain (Respiratory) <10 epithelial cells per low power field.     Gram Stain (Respiratory) Moderate WBC's     Gram Stain (Respiratory) Few  Gram positive cocci     Gram Stain (Respiratory) Few Gram positive rods     Gram Stain (Respiratory) Few Gram negative rods    Narrative:       PER PROTOCOL    Urine culture [[id number]]  (Abnormal)  (Susceptibility) Collected:  12/04/19 1500    Order Status:   Completed Specimen:  Urine, Catheterized Updated:  12/07/19 1318     Urine Culture, Routine KLEBSIELLA PNEUMONIAE   >100,000 cfu/ml        PSEUDOMONAS AERUGINOSA  50,000 - 99,999 cfu/ml      Narrative:       Indicated criteria for high risk culture:->Other  Other (specify):->chronic indwelling tay    Blood culture x two cultures. Draw prior to antibiotics. [[id number]]  (Abnormal)  (Susceptibility) Collected:  12/04/19 1130    Order Status:  Completed Specimen:  Blood from Line, Jugular, External Left Updated:  12/07/19 0721     Blood Culture, Routine Gram stain reynaldo bottle: Gram negative rods      Critical result called and read back Olivia Ortega RN M3 @ 0448      Gram stain aer bottle: Gram positive cocci      Results called to and read back by: Ameena López RN on M3 12/05/2019        16:23      PROVIDENCIA STUARTII      COAGULASE-NEGATIVE STAPHYLOCOCCUS SPECIES  Organism is a probable contaminant      Narrative:       Aerobic and anaerobic    Stool culture [[id number]] Collected:  12/04/19 1225    Order Status:  Completed Specimen:  Stool Updated:  12/06/19 1119     Stool Culture No Salmonella,Shigella,Vibrio,Campylobacter.      No E coli 0157:H7 isolated.        US Retroperitoneal Complete   Final Result      Limited examination as above.      Unchanged left-sided hydronephrosis.      Left-sided nephrolithiasis.         Electronically signed by: Stuart Talavera IV., MD   Date:    12/05/2019   Time:    10:27      X - Ray Chest AP Portable   Final Result      Patchy airspace opacities in the right mid lung zone and bilateral lung bases.  Opacities in the left lung base appear slightly more prominent on the current study.      Unchanged elevation of the right hemidiaphragm.      Stable positioning of tracheostomy cannula and left IJ central catheter.         Electronically signed by: Staurt Talavera IV., MD   Date:    12/05/2019   Time:    06:13      CT Head Without Contrast   Final Result      1.  No acute intracranial  process.      2.  Chronic and involutional findings as noted above.      3.  Unchanged mucoperiosteal disease in the paranasal sinuses as above.         Electronically signed by: aMrkos Renteria MD   Date:    12/04/2019   Time:    15:26      X-Ray Chest AP Portable   Final Result      Unchanged radiograph of the chest when accounting for differences in imaging technique.         Electronically signed by: Markos Renteria MD   Date:    12/04/2019   Time:    11:13          Inpatient medications  Scheduled Meds:   busPIRone  5 mg Per G Tube BID    carvedilol  3.125 mg Oral BID    DULoxetine  30 mg Per G Tube Daily    enoxparin  40 mg Subcutaneous QHS    ferrous sulfate  325 mg Per G Tube Daily    fluconazole 40 mg/ml  200 mg Per G Tube Daily    furosemide  20 mg Per G Tube Daily    gentamicin  1 drop Both Eyes QID    lacosamide  200 mg Per G Tube Daily    Lactobacillus acidoph-L.bulgar  1 tablet Per G Tube TID WM    levetiracetam oral soln  500 mg Per G Tube BID    levothyroxine  100 mcg Intravenous Daily    meropenem (MERREM) IVPB  1 g Intravenous Q12H    potassium chloride 10%  40 mEq Oral Daily    vancomycin  125 mg Oral Q6H     Continuous Infusions:  PRN Meds:.acetaminophen, albuterol-ipratropium    Above encounter included review of the medical records, interviewing and examining the patient face-to-face, discussion with family and other health care providers, ordering and interpreting lab/test results and formulating a plan of care.     Medical Decision Making:    [] Low Complexity  [] Moderate Complexity  [x] High Complexity    Sharona Hernandez  [facility] Hospitalist  12/12/2019

## 2019-12-12 NOTE — PLAN OF CARE
12/11/19 1900   Patient Assessment/Suction   Level of Consciousness (AVPU) alert   Respiratory Effort Normal;Unlabored   Expansion/Accessory Muscles/Retractions no use of accessory muscles;no retractions;expansion symmetric   All Lung Fields Breath Sounds coarse;equal bilaterally   PRE-TX-O2   O2 Device (Oxygen Therapy) ventilator   Oxygen Concentration (%) 30   SpO2 100 %   Pulse Oximetry Type Continuous   $ Pulse Oximetry - Multiple Charge Pulse Oximetry - Multiple   Pulse (!) 51   Resp 18   /69   Aerosol Therapy   $ Aerosol Therapy Charges PRN treatment not required        Surgical Airway Portex Cuffed;Fenestrated   No Placement Date or Time found.   Present Prior to Hospital Arrival?: Yes  Inserted by: Present Prior to Hospital Arrival  Type: Tracheostomy  Brand: Portex  Airway Device Size: 8.0  Style: Cuffed;Fenestrated   Cuff Inflation? Inflated   Status Secured   Site Assessment Oozing secretions   Site Care Cleansed;Dried   Inner Cannula Care   (no dic)   Ties Assessment Dry;Intact   Vent Select   Conventional Vent Y   Charged w/in last 24h YES   Preset Conventional Ventilator Settings   Vent ID [id number]   Vent Type    Ventilation Type VC   Vent Mode A/C   Humidity Heated wire   Humidifier Temp Setting 37 degC   Humidifier Temp Actual 35.5 degC   Set Rate 18 bmp   Vt Set 550 mL   PEEP/CPAP 5 cmH20   Pressure Support 0 cmH20   Waveform RAMP   Peak Flow 60 L/min   Plateau Set/Insp. Hold (sec) 0   Trigger Sensitivity Flow/I-Trigger 3 L/min   Patient Ventilator Parameters   Resp Rate Total 18 br/min   Peak Airway Pressure 38 cmH2O   Mean Airway Pressure 13 cmH20   Plateau Pressure 0 cmH20   Exhaled Vt 446 mL   Total Ve 7.97 mL   I:E Ratio Measured 1:2.30   Conventional Ventilator Alarms   Alarms On Y   Resp Rate High Alarm 40 br/min   Press High Alarm 55 cmH2O   Apnea Rate 18   Apnea Volume (mL) 1 mL   Apnea Oxygen Concentration  100   Apnea Flow Rate (L/min) 60   T Apnea 20 sec(s)   Ready to  Wean/Extubation Screen   FIO2<=50 (chart decimal) 0.3   MV<16L (chart vol.) 7.97   PEEP <=8 (chart #) 5   Ready to Wean Parameters   F/VT Ratio<105 (RSBI) (!) 40.36

## 2019-12-13 NOTE — PROGRESS NOTES
Noted pt now on comfort measures. TFs d/c'd per MD. RD to sign off. Please re-consult if needed.    Melissa Nelson RD 12/13/2019 12:15 PM

## 2019-12-13 NOTE — PROGRESS NOTES
[facility] Medicine Progress Note  Patient Name: Masood Messina MRN: [id number]   Patient Class: IP- Inpatient  Length of Stay: 9   Admission Date: 12/4/2019 10:05 AM Attending Physician: Sharona Hernandez MD   Primary Care Provider: Jeramie Lombardi MD Face-to-Face encounter date: 12/13/2019   Chief Complaint: Bradycardia (HR 30 BP 78/35)      Assessment & Plan:   Masood Messina is a 79 y.o. male admitted for    Neuro  Encephalopathy, metabolic: Possibly secondary to sepsis and infection, follow clinically and closer monitoring.   Seizure: Continue lacosamide, levetiracetam.    Functional quadriplegia: Baseline functional quadriplegic.  Hemiparesis affecting dominant side as late effect of stroke     Psych  Mood disorder: Buspirone, Duloxetine    ENT  Tracheostomy: Local care    Pulmonary  Chronic respiratory failure with hypoxia and hypercapnia secondary to JUNAID/OHS with chronic vent dependence: pulmonary following. Trach with mechanical ventilation.  Current settings FiO2 of 30 with peep of 5.    CV  Coronary artery disease: Chronic condition.  Resume Coreg for now given tachycardia.  Essential hypertension: Monitor clinically    GI/Nutrition  PEG (percutaneous endoscopic gastrostomy) status: Tolerating tube feeds well.  Increased free water bolus via PEG.    Renal/Fluids/  Hypokalemia: Replace with IV 40 and PO 40  CKD (chronic kidney disease), stage III: monitor    Infectious Disease    1. C. difficile colitis: Rectal tube in place.  Continues with liquid stool.  Continue oral vancomycin.   2. Providencia Stuartii Bacteremia :stopped Meropenem. Repeat Cx negative. Appreciate ID recc.   3. Carbapenem resistant Klebsiella species UTI: Chronic nephrolithiasis possibly nidus for infection versus other.  Multiple urinary tract issues. Stopped meropenem.  4. Proteus mirabilis ESBL in respiratory culture Unclear this is an infection versus colonization. Empirically on meropenem.  Stopped meropenem.   5. Candida UTI  fluconazole.     Heme  Thrombocytopenia: Infection? Chronic?    Other  Acquired hypothyroidism Continue IV levothyroxine 100 mcg.  Foot ulcer-unstageable, POA Pressure off loading and wound care following  Sacral decubitus ulcer, stage II: Present on arrival.  Wound Care following.    Core measures:  - Code status: comfort care  - GI PPx: ?  - DVT PPx: Lovenox  - lines/ tubes: PIV, Rt IJ central line or Rahmans cath   Discharge Planning:      Goals of care, counseling/discussion: After multiple attempts, none of the providers were able to get hold of the family. I agree to all health care providers who are involved in care that continuing any treatment at this time would be futile and would only prolong suffering. He would not be able to achieve any quality of life. Comfort care orders have been initiated. I will transfer the patient to a regular floor.     Subjective:    Interval History:  Patient comfort care.     Overall Course:Patient was admitted to the ICU.  He was briefly on dobutamine and heart rate improved and same was discontinued.  Needing continued Levophed for blood pressure support.  Started on Zosyn for gram-negative bacteremia likely of urinary source.  Continued on oral vancomycin for C diff.  Not following commands.  Continues with chronic vent dependence.  Appreciate consultant's input.  Sputum culture now growing ESBL Proteus.  Seems preliminary blood cultures with Proteus as well.  Changing Zosyn to meropenem and watching closely for seizure activity.  Still trying to contact listed contacts to identify next of kin, power of . On 12/07 urine with carbapenems resistant Pseudomonas, currently on meropenem, no evidence of seizure activity, apparently patient has no power of , only living relatives estranged from patient for the last 30-40 years, will need ethics/legal involvement for goals of care dressing. On 12/08 now off levaphed and blood  "pressure actually elevated, remains afebrile, oozing from sacral wounds, slight decrease in urine output, no overall change clinically. 12/10: Continues to be on Vent, afebrile, completing antbiotics, deciding long term plan of care. 12/12: no interval change in the last 2 days. Attempting to locate family. Patient made comfort care.       Review of Systems Unable to obtain ROS due to patient being encephalopathic.    Objective:   Physical Exam  /61   Pulse 64   Temp 97.9 °F (36.6 °C)   Resp 17   Ht 6' 2" (1.88 m)   Wt 129.3 kg (285 lb 1.6 oz)   SpO2 96%   BMI 36.60 kg/m²   Vitals reviewed.    Constitutional: Chronically ill  HENT: Atraumatic.   Cardiovascular: Normal rate, regular rhythm and normal heart sounds.   Pulmonary/Chest: tracheostomy in place. Not on vent  Abdominal: Soft. Bowel sounds are normal. Exhibits no distension and no mass. No tenderness. Peg in place  Neurological: open eyes but does not follow commands. Functional quadriplegic.   Skin: Skin is warm and dry. Has known sacral Decub as well as pressure skin breakdown to BLE, heels, feet, toes POA. Inpatient medications  Scheduled Meds:    Continuous Infusions:  PRN Meds:.lorazepam, morphine      Medical Decision Making:      Sharona Hrenandez  [facility] Hospitalist  12/13/2019      "

## 2019-12-13 NOTE — PLAN OF CARE
12/12/19 2051   Patient Assessment/Suction   Level of Consciousness (AVPU) alert   Respiratory Effort Normal;Unlabored   Expansion/Accessory Muscles/Retractions no use of accessory muscles;no retractions   All Lung Fields Breath Sounds coarse;equal bilaterally   $ Suction Charges Inline Suction Procedure Stat Charge   Secretions Amount moderate   Secretions Color yellow;white   Secretions Characteristics thick   PRE-TX-O2   O2 Device (Oxygen Therapy) Aerosol T-Tube   Flow (L/min) 8   Oxygen Concentration (%) 30   SpO2 (!) 93 %   Pulse Oximetry Type Continuous   $ Pulse Oximetry - Multiple Charge Pulse Oximetry - Multiple   Pulse 83   Resp (!) 32        Surgical Airway Portex Cuffed;Fenestrated   No Placement Date or Time found.   Present Prior to Hospital Arrival?: Yes  Inserted by: Present Prior to Hospital Arrival  Type: Tracheostomy  Brand: Portex  Airway Device Size: 8.0  Style: Cuffed;Fenestrated   Cuff Inflation? Uncuffed   Status Secured   Site Assessment Oozing secretions   Site Care Cleansed;Dried   Ties Assessment Dry;Intact;Clean

## 2019-12-13 NOTE — NURSING TRANSFER
Nursing Transfer Note      12/13/2019      Transfer To: 1104 From: 3028    Transfer via bed    Transfer with  to O2    Transported by Ameena López RN    Medicines sent: none    Chart send with patient: Yes    Notified:No family to contact    Patient reassessed at: 12/13/2019 @ 1251    Upon arrival to floor: patient oriented to room, call bell in reach and bed in lowest position    TAMERA Roberts at bedside

## 2019-12-13 NOTE — PROGRESS NOTES
[facility]  Pulmonology  Progress Note    Subjective     No major issues overnight.  No changes whatsoever.     Review of Systems   Unable to perform ROS: Dementia    I have personally reviewed the following during today's evaluation:  past medical history, ROS, family history, social history, surgical history, current inpatient medications,drug allergies, vital signs over the past 24 hours, results of relevant diagnostic studies and nursing/provider documentation from the past 24 hours.     Objective     VS Temp:  [97.7 °F (36.5 °C)-99.1 °F (37.3 °C)]   Pulse:  [51-83]   Resp:  [6-62]   BP: ()/(57-94)   SpO2:  [75 %-100 %]   Ideal body weight: 82.2 kg (181 lb 3.5 oz)  Adjusted ideal body weight: 101 kg (222 lb 12.3 oz)   I/O   Intake/Output Summary (Last 24 hours) at 12/12/2019 2304  Last data filed at 12/12/2019 1800  Gross per 24 hour   Intake 1155 ml   Output 665 ml   Net 490 ml        Vent SpO2 (!) 93%   Vent Mode: A/C  Oxygen Concentration (%):  [30] 30  Resp Rate Total:  [18 br/min-20 br/min] 19 br/min  Vt Set:  [550 mL] 550 mL  PEEP/CPAP:  [5 cmH20] 5 cmH20  Pressure Support:  [0 cmH20] 0 cmH20  Mean Airway Pressure:  [13 cmH20-15 cmH20] 15 cmH20     PE Physical Exam   Constitutional: He appears well-developed and well-nourished. He is obese.   HENT:   Head: Normocephalic.   Right Ear: External ear normal.   Left Ear: External ear normal.   Nose: Nose normal.   Mouth/Throat: Oropharynx is clear and moist. Mallampati Score: III.   Neck: Normal range of motion. Neck supple. No JVD present. No tracheal deviation present. No thyromegaly present.   8.0 cuffed tracheostomy secure    Cardiovascular: Normal rate, regular rhythm, normal heart sounds and intact distal pulses. Exam reveals no gallop and no friction rub.   No murmur heard.  Pulmonary/Chest: Normal expansion and symmetric chest wall expansion. He has no wheezes. He has no rhonchi. He has no rales.   Abdominal: Soft. Bowel sounds are  normal. He exhibits no distension and no mass. There is no rebound.   Musculoskeletal: Normal range of motion. He exhibits no edema, tenderness or deformity.   Lymphadenopathy:     He has no cervical adenopathy.   Neurological: He is unresponsive. He displays no atrophy and no tremor. No cranial nerve deficit. He displays no seizure activity. GCS eye subscore is 1. GCS verbal subscore is 1. GCS motor subscore is 3.   Skin: Skin is warm and dry. No rash noted. No cyanosis or erythema. Nails show no clubbing.   Nursing note and vitals reviewed.      Labs I have personally reviewed and interpreted all labs / diagnostic studies obtained over the past 24 hours, and relevant results are as follows:  No new labs.   Imaging I have personally reviewed and interpreted the following images and reviewed the associated Radiology report.  No new images.     Micro I have personally reviewed and interpreted the available culture data.  Relevant results are as follows.  Blood Culture   Lab Results   Component Value Date    LABBLOO No growth after 5 days. 12/06/2019    and Sputum Culture   Lab Results   Component Value Date    GSRESP <10 epithelial cells per low power field. 12/04/2019    GSRESP Moderate WBC's 12/04/2019    GSRESP Few  Gram positive cocci 12/04/2019    GSRESP Few Gram positive rods 12/04/2019    GSRESP Few Gram negative rods 12/04/2019    RESPIRATORYC Reduced Normal Respiratory gabriela 12/04/2019    RESPIRATORYC (A) 12/04/2019     PROTEUS MIRABILIS ESBL  Moderate  Results called to and read back by : Ameena López RN on M3  12/06/2019  09:36 by BREONNA      RESPIRATORYC SERRATIA MARCESCENS  Moderate   (A) 12/04/2019      Medications Scheduled      Continuous Infusions:      PRN   lorazepam, morphine        Assessment       Active Hospital Problems    Diagnosis    Functional quadriplegia    Essential hypertension    Coronary artery disease    Chronic respiratory failure with hypoxia and hypercapnia secondary to JUNAID/OHS  with chronic vent dependence    PEG (percutaneous endoscopic gastrostomy) status    Sacral decubitus ulcer, stage II    UTI due to Klebsiella species    Hypokalemia    Proteus mirabilis in respiratory culture    Seizure    Bacteremia -Providencia    Goals of care, counseling/discussion    Foot ulcer-unstageable, POA    CKD (chronic kidney disease), stage III    Encephalopathy, metabolic    Candida UTI    C. difficile colitis    Hemiparesis affecting dominant side as late effect of stroke    Acquired hypothyroidism      My Impression:  End stage chronic critical illness with no realistic chance of meaningful recovery.    Plan     Transition to comfort measures.  Please see my earlier note for details.    Mauricio Kinsey MD  Pulmonary / Critical Care Medicine  [facility]

## 2019-12-14 PROBLEM — Z51.5 END OF LIFE CARE: Status: ACTIVE | Noted: 2019-01-01

## 2019-12-14 NOTE — PLAN OF CARE
12/14/19 0830   Patient Assessment/Suction   Level of Consciousness (AVPU) alert   Respiratory Effort Normal;Unlabored   Expansion/Accessory Muscles/Retractions no retractions;no use of accessory muscles   All Lung Fields Breath Sounds coarse;diminished   Suction Method tracheal   $ Suction Charges Inline Suction Procedure Stat Charge   Secretions Amount moderate   Secretions Color white;yellow   Secretions Characteristics thick   PRE-TX-O2   O2 Device (Oxygen Therapy) T-Piece Trial   $ Is the patient on Low Flow Oxygen? Yes   Flow (L/min) 8   Oxygen Concentration (%) 30   SpO2 (!) 94 %   Pulse Oximetry Type Intermittent   $ Pulse Oximetry - Multiple Charge Pulse Oximetry - Multiple   Pulse 68   Resp 18        Surgical Airway Portex Cuffed;Fenestrated   No Placement Date or Time found.   Present Prior to Hospital Arrival?: Yes  Inserted by: Present Prior to Hospital Arrival  Type: Tracheostomy  Brand: Portex  Airway Device Size: 8.0  Style: Cuffed;Fenestrated   Cuff Pressure   (mlt)   Cuff Inflation? Inflated   Status Secured   Site Assessment Clean;Dry;No bleeding   Site Care Cleansed;Dried;Dressing applied   Ties Assessment Clean;Dry;Intact;Secure   Equipment Change   $ RT Equipment sterile water   Ready to Wean/Extubation Screen   FIO2<=50 (chart decimal) 0.3

## 2019-12-14 NOTE — CARE UPDATE
12/13/19 0401   Patient Assessment/Suction   Level of Consciousness (AVPU) alert   All Lung Fields Breath Sounds coarse   $ Suction Charges Inline Suction Procedure Stat Charge   Secretions Amount moderate   Secretions Color white   Secretions Characteristics thick   PRE-TX-O2   O2 Device (Oxygen Therapy) tracheostomy HME oxygen   $ Is the patient on Low Flow Oxygen? Yes   Oxygen Concentration (%) 30   SpO2 97 %   Pulse Oximetry Type Intermittent   $ Pulse Oximetry - Multiple Charge Pulse Oximetry - Multiple   Pulse 60   Resp 18   Ready to Wean/Extubation Screen   FIO2<=50 (chart decimal) 0.3   Respiratory Evaluation   $ Care Plan Tech Time 15 min   Evaluation For New Orders

## 2019-12-14 NOTE — PROGRESS NOTES
[facility] Medicine Progress Note  Patient Name: Masood Messina MRN: [id number]   Patient Class: IP- Inpatient  Length of Stay: 10   Admission Date: 12/4/2019 10:05 AM Attending Physician: Sharona Hernandez MD   Primary Care Provider: Jeramie Lombardi MD Face-to-Face encounter date: 12/14/2019   Chief Complaint: Bradycardia (HR 30 BP 78/35)      Assessment & Plan:   Masood Messina is a 79 y.o. male who is made comfort care.    Patient appears comfortable with Morphine and Lorazepam.       Subjective:    Interval History:  Patient comfort care.     Overall Course:Patient was admitted to the ICU.  He was briefly on dobutamine and heart rate improved and same was discontinued.  Needing continued Levophed for blood pressure support.  Started on Zosyn for gram-negative bacteremia likely of urinary source.  Continued on oral vancomycin for C diff.  Not following commands.  Continues with chronic vent dependence.  Appreciate consultant's input.  Sputum culture now growing ESBL Proteus.  Seems preliminary blood cultures with Proteus as well.  Changing Zosyn to meropenem and watching closely for seizure activity.  Still trying to contact listed contacts to identify next of kin, power of . On 12/07 urine with carbapenems resistant Pseudomonas, currently on meropenem, no evidence of seizure activity, apparently patient has no power of , only living relatives estranged from patient for the last 30-40 years, will need ethics/legal involvement for goals of care dressing. On 12/08 now off levaphed and blood pressure actually elevated, remains afebrile, oozing from sacral wounds, slight decrease in urine output, no overall change clinically. 12/10: Continues to be on Vent, afebrile, completing antbiotics, deciding long term plan of care. 12/12: no interval change in the last 2 days.  12/13 Patient made comfort care. 12/14: patient comfortable      Review of Systems Unable to  "obtain ROS due to patient being encephalopathic.    Objective:   Physical Exam  /74   Pulse 67   Temp 97.7 °F (36.5 °C) (Oral)   Resp 17   Ht 6' 2" (1.88 m)   Wt 129.3 kg (285 lb 1.6 oz)   SpO2 (!) 94%   BMI 36.60 kg/m²   Vitals reviewed.    Constitutional: Chronically ill  HENT: Atraumatic.   Cardiovascular: Normal rate, regular rhythm and normal heart sounds.   Pulmonary/Chest: tracheostomy in place. Not on vent  Abdominal: Soft. Bowel sounds are normal. Exhibits no distension and no mass. No tenderness. Peg in place  Neurological: open eyes but does not follow commands. Functional quadriplegic.   Skin: Skin is warm and dry. Has known sacral Decub as well as pressure skin breakdown to BLE, heels, feet, toes POA.     Inpatient medications  Scheduled Meds:    Continuous Infusions:  PRN Meds:.lorazepam, morphine      Sharona Hernandez  [facility] Hospitalist  12/14/2019      "

## 2019-12-14 NOTE — PROGRESS NOTES
[facility]  Pulmonology  Progress Note    Subjective     No significant changes over the past 24 hours.     Review of Systems   Unable to perform ROS: Dementia        I have personally reviewed the following during today's evaluation:  past medical history, ROS, family history, social history, surgical history, current inpatient medications,drug allergies, vital signs over the past 24 hours, results of relevant diagnostic studies and nursing/provider documentation from the past 24 hours.     Objective     VS Temp:  [97.6 °F (36.4 °C)-99.3 °F (37.4 °C)]   Pulse:  [57-68]   Resp:  [16-20]   BP: (123-157)/(74-93)   SpO2:  [93 %-99 %]   Ideal body weight: 82.2 kg (181 lb 3.5 oz)  Adjusted ideal body weight: 101 kg (222 lb 12.3 oz)   I/O   Intake/Output Summary (Last 24 hours) at 12/14/2019 1205  Last data filed at 12/14/2019 0600  Gross per 24 hour   Intake --   Output 900 ml   Net -900 ml        Vent SpO2 (!) 94% on 30% TC   PE Physical Exam   Constitutional: He appears well-developed and well-nourished. He is obese.   HENT:   Head: Normocephalic.   Right Ear: External ear normal.   Left Ear: External ear normal.   Nose: Nose normal.   Mouth/Throat: Oropharynx is clear and moist. Mallampati Score: III.   Neck: Normal range of motion. Neck supple. No JVD present. No tracheal deviation present. No thyromegaly present.   8.0 cuffed tracheostomy secure    Cardiovascular: Normal rate, regular rhythm, normal heart sounds and intact distal pulses. Exam reveals no gallop and no friction rub.   No murmur heard.  Pulmonary/Chest: Normal expansion and symmetric chest wall expansion. He has no wheezes. He has no rhonchi. He has no rales.   Abdominal: Soft. Bowel sounds are normal. He exhibits no distension and no mass. There is no rebound.   Musculoskeletal: Normal range of motion. He exhibits no edema, tenderness or deformity.   Lymphadenopathy:     He has no cervical adenopathy.   Neurological: He displays no  atrophy and no tremor. No cranial nerve deficit. He displays no seizure activity. GCS eye subscore is 1. GCS verbal subscore is 1. GCS motor subscore is 3.   Skin: Skin is warm and dry. No rash noted. No cyanosis or erythema. Nails show no clubbing.   Nursing note and vitals reviewed.        Labs I have personally reviewed and interpreted all labs / diagnostic studies obtained over the past 24 hours, and relevant results are as follows:  No new labs   Imaging I have personally reviewed and interpreted the following images and reviewed the associated Radiology report.  No new images     Micro I have personally reviewed and interpreted the available culture data.  Relevant results are as follows.  No new cultures   Medications Scheduled      Continuous Infusions:      PRN   lorazepam, morphine        Assessment       Active Hospital Problems    Diagnosis    Functional quadriplegia    Essential hypertension    Coronary artery disease    Chronic respiratory failure with hypoxia and hypercapnia secondary to JUNAID/OHS with chronic vent dependence    PEG (percutaneous endoscopic gastrostomy) status    Sacral decubitus ulcer, stage II    UTI due to Klebsiella species    Hypokalemia    Proteus mirabilis in respiratory culture    Seizure    Bacteremia -Providencia    End of life care    Foot ulcer-unstageable, POA    CKD (chronic kidney disease), stage III    Encephalopathy, metabolic    Candida UTI    C. difficile colitis    Hemiparesis affecting dominant side as late effect of stroke    Acquired hypothyroidism    Sepsis      My Impression:  Comfortable. Clearly at the end of his life.    Plan     End of life care  · Clearly at the end of his life.  Continued life support is futile and I see little ambiguity in this situation.  · Continue comfort care.    Mauricio Kinsey MD  Pulmonary / Critical Care Medicine  [facility]

## 2019-12-15 NOTE — PLAN OF CARE
Pt free of accidental injury during shift. No s/s of distress or pain noted. Comfort care.  Repositioned q 2 hrs and as needed. Remains on contact isolation. Frequent rounds made. Will continue to montior

## 2019-12-15 NOTE — CARE UPDATE
12/14/19 2121   Patient Assessment/Suction   Level of Consciousness (AVPU) alert   All Lung Fields Breath Sounds coarse   Suction Method tracheal   $ Suction Charges Inline Suction Procedure Stat Charge   Secretions Amount moderate   Secretions Color yellow   Secretions Characteristics thick   PRE-TX-O2   O2 Device (Oxygen Therapy) Aerosol T-Tube   $ Is the patient on Low Flow Oxygen? Yes   Flow (L/min) 8   Oxygen Concentration (%) 30   SpO2 (!) 92 %   Pulse Oximetry Type Intermittent   $ Pulse Oximetry - Multiple Charge Pulse Oximetry - Multiple   Pulse 88   Resp 16   Ready to Wean/Extubation Screen   FIO2<=50 (chart decimal) 0.3   Respiratory Evaluation   $ Care Plan Tech Time 15 min   Evaluation For New Orders

## 2019-12-15 NOTE — PROGRESS NOTES
[facility]  Pulmonology  Progress Note    Subjective     No major issues overnight.  No significant changes over the past 24 hr  .   Review of Systems   Unable to perform ROS: Dementia      I have personally reviewed the following during today's evaluation:  past medical history, ROS, family history, social history, surgical history, current inpatient medications,drug allergies, vital signs over the past 24 hours, results of relevant diagnostic studies and nursing/provider documentation from the past 24 hours.     Objective     VS Temp:  [97.1 °F (36.2 °C)-100 °F (37.8 °C)]   Pulse:  [77-88]   Resp:  [16-18]   BP: (146-147)/(77-88)   SpO2:  [91 %-97 %]   Ideal body weight: 82.2 kg (181 lb 3.5 oz)  Adjusted ideal body weight: 101 kg (222 lb 12.3 oz)   I/O   Intake/Output Summary (Last 24 hours) at 12/15/2019 1749  Last data filed at 12/15/2019 0000  Gross per 24 hour   Intake --   Output 1100 ml   Net -1100 ml        Vent SpO2 97%   Oxygen Concentration (%):  [30] 30     PE Physical Exam   Constitutional: He appears well-developed and well-nourished. He is obese.   HENT:   Head: Normocephalic.   Right Ear: External ear normal.   Left Ear: External ear normal.   Nose: Nose normal.   Mouth/Throat: Oropharynx is clear and moist. Mallampati Score: III.   Neck: Normal range of motion. Neck supple. No JVD present. No tracheal deviation present. No thyromegaly present.   8.0 cuffed tracheostomy secure    Cardiovascular: Normal rate, regular rhythm, normal heart sounds and intact distal pulses. Exam reveals no gallop and no friction rub.   No murmur heard.  Pulmonary/Chest: Normal expansion and symmetric chest wall expansion. He has no wheezes. He has no rhonchi. He has no rales.   Abdominal: Soft. Bowel sounds are normal. He exhibits no distension and no mass. There is no rebound.   Musculoskeletal: Normal range of motion. He exhibits no edema, tenderness or deformity.   Lymphadenopathy:     He has no  cervical adenopathy.   Neurological: He displays no atrophy and no tremor. No cranial nerve deficit. He displays no seizure activity. GCS eye subscore is 1. GCS verbal subscore is 1. GCS motor subscore is 3.   Skin: Skin is warm and dry. No rash noted. No cyanosis or erythema. Nails show no clubbing.   Nursing note and vitals reviewed.        Labs I have personally reviewed and interpreted all labs / diagnostic studies obtained over the past 24 hours, and relevant results are as follows:  No new labs today.   Imaging I have personally reviewed and interpreted the following images and reviewed the associated Radiology report.  No new images today.     Micro I have personally reviewed and interpreted the available culture data.  Relevant results are as follows.  Blood Culture   Lab Results   Component Value Date    LABBLOO No growth after 5 days. 12/06/2019   , Sputum Culture   Lab Results   Component Value Date    GSRESP <10 epithelial cells per low power field. 12/04/2019    GSRESP Moderate WBC's 12/04/2019    GSRESP Few  Gram positive cocci 12/04/2019    GSRESP Few Gram positive rods 12/04/2019    GSRESP Few Gram negative rods 12/04/2019    RESPIRATORYC Reduced Normal Respiratory gabriela 12/04/2019    RESPIRATORYC (A) 12/04/2019     PROTEUS MIRABILIS ESBL  Moderate  Results called to and read back by : Ameena López RN on M3  12/06/2019  09:36 by BREONNA      RESPIRATORYC SERRATIA MARCESCENS  Moderate   (A) 12/04/2019    and Urine Culture    Lab Results   Component Value Date    LABURIN (A) 12/07/2019     CANDIDA TROPICALIS  10,000 - 49,999 cfu/ml  Treatment of asymptomatic candiduria is not recommended (except for   specific populations). Candida isolated in the urine typically   represents colonization. If an indwelling urinary catheter is present  it should be removed or replaced.        Medications Scheduled      Continuous Infusions:      PRN   lorazepam, morphine        Assessment       Active Hospital Problems     Diagnosis    Functional quadriplegia    Essential hypertension    Coronary artery disease    Chronic respiratory failure with hypoxia and hypercapnia secondary to JUNAID/OHS with chronic vent dependence    PEG (percutaneous endoscopic gastrostomy) status    Sacral decubitus ulcer, stage II    UTI due to Klebsiella species    Hypokalemia    Proteus mirabilis in respiratory culture    Seizure    Bacteremia -Providencia    End of life care    Foot ulcer-unstageable, POA    CKD (chronic kidney disease), stage III    Encephalopathy, metabolic    Candida UTI    C. difficile colitis    Hemiparesis affecting dominant side as late effect of stroke    Acquired hypothyroidism    Sepsis      My Impression:  Remains comfortable but without any chance of meaningful recovery.    Plan   Continued comfort care.         Mauricio Kinsey MD  Pulmonary / Critical Care Medicine  [facility]

## 2019-12-15 NOTE — PLAN OF CARE
12/15/19 0700   Patient Assessment/Suction   Level of Consciousness (AVPU) alert   Respiratory Effort Normal   Expansion/Accessory Muscles/Retractions no retractions   All Lung Fields Breath Sounds coarse   Rhythm/Pattern, Respiratory unlabored;pattern regular;depth regular   Suction Method tracheal   $ Suction Charges Inline Suction Procedure Stat Charge   Secretions Amount moderate   Secretions Color yellow   Secretions Characteristics thick   PRE-TX-O2   O2 Device (Oxygen Therapy) Aerosol T-Tube   $ Is the patient on Low Flow Oxygen? Yes   Flow (L/min) 8   Oxygen Concentration (%) 30   SpO2 (!) 92 %   Pulse Oximetry Type Intermittent   $ Pulse Oximetry - Multiple Charge Pulse Oximetry - Multiple   Pulse 80   Resp 16   Temp 100 °F (37.8 °C)

## 2019-12-15 NOTE — PLAN OF CARE
Problem: Fall Injury Risk  Goal: Absence of Fall and Fall-Related Injury  Outcome: Ongoing, Progressing     Problem: Adult Inpatient Plan of Care  Goal: Plan of Care Review  Outcome: Ongoing, Progressing  Goal: Optimal Comfort and Wellbeing  Outcome: Ongoing, Progressing     Problem: Infection  Goal: Infection Symptom Resolution  Outcome: Ongoing, Progressing     Problem: Communication Impairment (Mechanical Ventilation, Invasive)  Goal: Effective Communication  Outcome: Ongoing, Progressing     Problem: Device-Related Complication Risk (Mechanical Ventilation, Invasive)  Goal: Optimal Device Function  Outcome: Ongoing, Progressing     Problem: Skin Injury Risk Increased  Goal: Skin Health and Integrity  Outcome: Ongoing, Progressing

## 2019-12-15 NOTE — PROGRESS NOTES
[facility] Medicine Progress Note  Patient Name: Masood Messina MRN: [id number]   Patient Class: IP- Inpatient  Length of Stay: 11   Admission Date: 12/4/2019 10:05 AM Attending Physician: Sharona Hernandez MD   Primary Care Provider: Jeramie Lombardi MD Face-to-Face encounter date: 12/15/2019   Chief Complaint: Bradycardia (HR 30 BP 78/35)      Assessment & Plan:   Masood Messina is a 79 y.o. male who is made comfort care.    Patient appears comfortable with Morphine and Lorazepam.       Subjective:    Interval History:  Required some morphine last time. No overnight events. Appears comfortable.     Overall Course:Patient was admitted to the ICU.  He was briefly on dobutamine and heart rate improved and same was discontinued.  Needing continued Levophed for blood pressure support.  Started on Zosyn for gram-negative bacteremia likely of urinary source.  Continued on oral vancomycin for C diff.  Not following commands.  Continues with chronic vent dependence.  Appreciate consultant's input.  Sputum culture now growing ESBL Proteus.  Seems preliminary blood cultures with Proteus as well.  Changing Zosyn to meropenem and watching closely for seizure activity.  Still trying to contact listed contacts to identify next of kin, power of . On 12/07 urine with carbapenems resistant Pseudomonas, currently on meropenem, no evidence of seizure activity, apparently patient has no power of , only living relatives estranged from patient for the last 30-40 years, will need ethics/legal involvement for goals of care dressing. On 12/08 now off levaphed and blood pressure actually elevated, remains afebrile, oozing from sacral wounds, slight decrease in urine output, no overall change clinically. 12/10: Continues to be on Vent, afebrile, completing antbiotics, deciding long term plan of care. 12/12: no interval change in the last 2 days.  12/13 Patient made comfort care. 12/14:  "patient comfortable      Review of Systems Unable to obtain ROS due to patient being encephalopathic.    Objective:   Physical Exam  BP (!) 147/77   Pulse 77   Temp 97.4 °F (36.3 °C) (Axillary)   Resp 18   Ht 6' 2" (1.88 m)   Wt 129.3 kg (285 lb 1.6 oz)   SpO2 97%   BMI 36.60 kg/m²   Vitals reviewed.    Constitutional: Chronically ill  HENT: Atraumatic.   Cardiovascular: Normal rate, regular rhythm and normal heart sounds.   Pulmonary/Chest: tracheostomy in place. Not on vent  Abdominal: Soft. Bowel sounds are normal. Exhibits no distension and no mass. No tenderness. Peg in place  Neurological: open eyes but does not follow commands. Functional quadriplegic.   Skin: Skin is warm and dry. Has known sacral Decub as well as pressure skin breakdown to BLE, heels, feet, toes POA.     Inpatient medications  Scheduled Meds:    Continuous Infusions:  PRN Meds:.lorazepam, morphine      Sharona Hernandez  [facility] Hospitalist  12/15/2019      "

## 2019-12-16 NOTE — NURSING
1936: Observed pt non-responsive, pale in color. Unable to obtain V/S. Notified hospitalist on-call.    1944: House supervisor notified.    1957: MD Choe made pronouncement    2030: 's office notified; spoke with Hanna.  will call back.    2051: Spoke with Yazminmargaret Concepcion and gave all pt information pertinent to 's requirement. Ms. Concepcion states she will call back once she spoke to the pathologist.    2123:  release number ([id number]) called by Ms. Concepcion    2134: MICHELLE notified. Pt does not meet [facility] criteria due to pt age and medical hx per [facility] representative Sebastian Bueno. Referral #[id number].

## 2019-12-16 NOTE — NURSING
Spoke with Naomi Younger.  She states she has not seen her uncle in 30-35 years and does not know what  home the patient wants to use.

## 2019-12-16 NOTE — NURSING
Attempt to call Malgorzata Fan, friend. There was no answer.  Message left.  Spoke with Naomi Younger, relative regarding pt's passing away.   She stated she spoke with the doctor a couple of days ago and was expecting this.  She states she just wanted to be notified when it happened.

## 2019-12-16 NOTE — PLAN OF CARE
12/15/19 1300   Patient Assessment/Suction   Level of Consciousness (AVPU) alert   Respiratory Effort Normal   Expansion/Accessory Muscles/Retractions no retractions   All Lung Fields Breath Sounds coarse   Rhythm/Pattern, Respiratory unlabored   Suction Method tracheal   $ Suction Charges Inline Suction Procedure Stat Charge   Secretions Amount moderate   Secretions Color yellow   Secretions Characteristics thick   PRE-TX-O2   O2 Device (Oxygen Therapy) Aerosol T-Tube   Flow (L/min) 8   Oxygen Concentration (%) 30   SpO2 98 %   Pulse 82   Resp 16   Wound Care   $ Wound Care Tech Time 15 min   Area of Concern Neck;Neck under tracheostomy   Skin Color/Characteristics yellow;white   Skin Temperature warm   Barrier used? Other (see comments)   Was wound care notified? No   Barrier Changed? Yes        Surgical Airway Portex Cuffed;Fenestrated   No Placement Date or Time found.   Present Prior to Hospital Arrival?: Yes  Inserted by: Present Prior to Hospital Arrival  Type: Tracheostomy  Brand: Portex  Airway Device Size: 8.0  Style: Cuffed;Fenestrated   Cuff Inflation? Inflated   Status Secured   Site Assessment Oozing secretions;Drainage;No bleeding   Site Care Cleansed;Dried;Dressing applied   Inner Cannula Care Cleansed/dried   Ties Assessment Changed;Clean   Equipment Change   $ RT Equipment sterile water

## 2019-12-16 NOTE — DISCHARGE SUMMARY
[facility]  Discharge Summary of   Patient Name: Masood Messina MRN: [id number]   Patient Class: IP- Inpatient  Length of Stay: 11   Admission Date: 2019 10:05 AM Attending Physician: Sharona Hernandez MD   Primary Care Provider: Jeramie Lombardi MD    Chief Complaint: Bradycardia (HR 30 BP 78/35)    Date of : 12/15/2019  Discharge Disposition:   Condition:     Reason for Hospitalization   Primary Diagnosis:    Chronic respiratory failure with hypoxia and hypercapnia      Secondary Diagnosis: Sepsis due to   1. C. difficile colitis:   2. Providencia Stuartii Bacteremia   3. Carbapenem resistant Klebsiella species UTI  4. Proteus mirabilis ESBL in respiratory culture   5. Candida UTI  fluconazole.   6. Encephalopathy, metabolic: Possibly secondary to sepsis and infection, follow clinically and closer monitoring.   7. Seizure: Continue lacosamide, levetiracetam.    8. Functional quadriplegia: Baseline functional quadriplegic.  9. Hemiparesis affecting dominant side as late effect of stroke     Patient Active Problem List   Diagnosis    Functional quadriplegia    Sepsis    Hematuria    HCAP (healthcare-associated pneumonia)    PEG (percutaneous endoscopic gastrostomy) status    Acquired hypothyroidism    Hemiparesis affecting dominant side as late effect of stroke    Sacral decubitus ulcer, stage II    Coronary artery disease    Anemia, chronic disease    Chronic respiratory failure with hypoxia and hypercapnia secondary to JUNAID/OHS with chronic vent dependence    Acute on chronic diastolic HF (heart failure)    Pseudomonas urinary tract infection    AAA (abdominal aortic aneurysm) without rupture    Enlarging abdominal aortic aneurysm (AAA)    Essential hypertension    GINETTE (acute kidney injury)    Cholelithiases    Bilateral nephrolithiasis    Hydroureter, left    Sputum culture positive for Pseudomonas    Acute on chronic diastolic heart  failure    Hypophosphatemia    Chronic pain    C. difficile colitis    History of MDR Pseudomonas aeruginosa infection    Anasarca    Encephalopathy, toxic    Bacteremia due to Pseudomonas    Pressure ulcer, stage 3    Encephalopathy, metabolic    Candida UTI    Bacteremia -Providencia    End of life care    Foot ulcer-unstageable, POA    CKD (chronic kidney disease), stage III    Proteus mirabilis in respiratory culture    Seizure    UTI due to Klebsiella species    Hypokalemia       Brief History of Present Illness    Masood Messina is a 79 y.o.  male who  has a past medical history of AAA (abdominal aortic aneurysm), Anemia, BPH (benign prostatic hyperplasia), CHF (congestive heart failure), COPD (chronic obstructive pulmonary disease), Coronary artery disease, Dementia, Dementia, Depression, GERD (gastroesophageal reflux disease), Hyperlipidemia, Hypertension, Hypothyroid, Renal disorder, Respiratory failure, chronic, and Ventilator dependence.. [facility] with a primary complaint of Bradycardia (HR 30 BP 78/35)  .     For the full HPI please refer to the History & Physical from this admission.    Hospital Course By Problem with Pertinent Findings   Patient was admitted to the ICU.  He was briefly on dobutamine and heart rate improved and same was discontinued.  Needing continued Levophed for blood pressure support.  Started on Zosyn for gram-negative bacteremia likely of urinary source.  Continued on oral vancomycin for C diff.  Not following commands.  Continues with chronic vent dependence.  Appreciate consultant's input.  Sputum culture now growing ESBL Proteus.  Seems preliminary blood cultures with Proteus as well.  Changing Zosyn to meropenem and watching closely for seizure activity.  Still trying to contact listed contacts to identify next of kin, power of . On 12/07 urine with carbapenems resistant  "Pseudomonas, currently on meropenem, no evidence of seizure activity, apparently patient has no power of , only living relatives estranged from patient for the last 30-40 years, will need ethics/legal involvement for goals of care dressing. On  now off levaphed and blood pressure actually elevated, remains afebrile, oozing from sacral wounds, slight decrease in urine output, no overall change clinically. 12/10: Continues to be on Vent, afebrile, completing antbiotics, deciding long term plan of care. : no interval change in the last 2 days. Attempting to locate family. Patient made comfort care.   12/15: patient   on 12/15/19.     Physical Exam  BP (!) 147/77   Pulse 82   Temp 97.4 °F (36.3 °C) (Axillary)   Resp 16   Ht 6' 2" (1.88 m)   Wt 129.3 kg (285 lb 1.6 oz)   SpO2 98%   BMI 36.60 kg/m²   Vitals reviewed.    Constitutional: No distress.   HENT: Atraumatic.   Cardiovascular: Normal rate, regular rhythm and normal heart sounds.   Pulmonary/Chest: Effort normal. Clear to auscultation bilaterally. No wheezes.   Abdominal: Soft. Bowel sounds are normal. Exhibits no distension and no mass. No tenderness  Neurological: Alert.   Skin: Skin is warm and dry.     Following labs were Reviewed   No results for input(s): WBC, HGB, HCT, PLT, GLUCOSE, CALCIUM, ALBUMIN, PROT, NA, K, CO2, CL, BUN, CREATININE, ALKPHOS, ALT, AST, BILITOT in the last 24 hours.  No results found for: POCTGLUCOSE     All labs within the past 24 hours have been reviewed    Microbiology Results (last 7 days)     Procedure Component Value Units Date/Time    Urine Culture High Risk [[id number]]  (Abnormal) Collected:  19 0930    Order Status:  Completed Specimen:  Urine, Catheterized Updated:  19 0642     Urine Culture, Routine CANDIDA TROPICALIS  10,000 - 49,999 cfu/ml  Treatment of asymptomatic candiduria is not recommended (except for   specific populations). Candida isolated in the urine typically "   represents colonization. If an indwelling urinary catheter is present  it should be removed or replaced.      Narrative:       Indicated criteria for high risk culture:->Less than 25  months of age  Indicated criteria for high risk culture:->Other  Other (specify):->CRE    Blood culture [[id number]] Collected:  12/06/19 0401    Order Status:  Completed Specimen:  Blood Updated:  12/11/19 0632     Blood Culture, Routine No growth after 5 days.    Blood culture [[id number]] Collected:  12/05/19 0805    Order Status:  Completed Specimen:  Blood from Line, Arterial, Right Updated:  12/10/19 1032     Blood Culture, Routine No growth after 5 days.    Narrative:       Draw from line    Blood culture [[id number]] Collected:  12/04/19 2050    Order Status:  Completed Specimen:  Blood from Line, Arterial, Right Updated:  12/09/19 2232     Blood Culture, Routine No growth after 5 days.    Narrative:       Aerobic and anaerobic  Can we please draw one set from the periphery?  It looks like  the two sets done in the ED were drawn from the central  line?  Collection has been rescheduled by [facility] at 12/04/2019 16:06 Reason:   Nurse Draw  Collection has been rescheduled by [facility] at 12/04/2019 16:06 Reason:   Nurse Draw    Blood culture x two cultures. Draw prior to antibiotics. [[id number]] Collected:  12/04/19 1217    Order Status:  Completed Specimen:  Blood from Line, Jugular, External Left Updated:  12/09/19 1632     Blood Culture, Routine No growth after 5 days.    Narrative:       Aerobic and anaerobic    Culture, Respiratory with Gram Stain [[id number]]  (Abnormal)  (Susceptibility) Collected:  12/04/19 1024    Order Status:  Completed Specimen:  Respiratory from Sputum Updated:  12/09/19 0700     Respiratory Culture Reduced Normal Respiratory gabriela      PROTEUS MIRABILIS ESBL  Moderate  Results called to and read back by : Ameena López RN on M3  12/06/2019  09:36 by BREONNA        SERRATIA MARCESCENS  Moderate       Gram Stain  (Respiratory) <10 epithelial cells per low power field.     Gram Stain (Respiratory) Moderate WBC's     Gram Stain (Respiratory) Few  Gram positive cocci     Gram Stain (Respiratory) Few Gram positive rods     Gram Stain (Respiratory) Few Gram negative rods    Narrative:       PER PROTOCOL        US Retroperitoneal Complete   Final Result      Limited examination as above.      Unchanged left-sided hydronephrosis.      Left-sided nephrolithiasis.         Electronically signed by: Stuart Talavera IV., MD   Date:    2019   Time:    10:27      X - Ray Chest AP Portable   Final Result      Patchy airspace opacities in the right mid lung zone and bilateral lung bases.  Opacities in the left lung base appear slightly more prominent on the current study.      Unchanged elevation of the right hemidiaphragm.      Stable positioning of tracheostomy cannula and left IJ central catheter.         Electronically signed by: Stuart Talavera IV., MD   Date:    2019   Time:    06:13      CT Head Without Contrast   Final Result      1.  No acute intracranial process.      2.  Chronic and involutional findings as noted above.      3.  Unchanged mucoperiosteal disease in the paranasal sinuses as above.         Electronically signed by: Markos Renteria MD   Date:    2019   Time:    15:26      X-Ray Chest AP Portable   Final Result      Unchanged radiograph of the chest when accounting for differences in imaging technique.         Electronically signed by: Markos Renteria MD   Date:    2019   Time:    11:13          No results found for this or any previous visit.      Consultants and Procedures   Consultants:  Dr. Tio Tyson    Procedures:   None      I spent 30 minutes preparing the discharge summary of the  patient including reviewing records from previous encounters, preparation of discharge summary.    Sharona Hernandez  [facility] Hospitalist  2019

## 2019-12-19 LAB
BACTERIA UR CULT: ABNORMAL
BACTERIA UR CULT: ABNORMAL

## 2020-02-19 NOTE — PROCEDURES
REASON FOR STUDY: Altered mental status   DATE OF STUDY: 11/8/2019.   PHYSICIAN REQUESTING/INSTITUTION: Dr. Kinsey   AED: Keppra. Vimpat, propofol.   MENTAL STATUS: Comatose.   METHODS: EEG was done according to the 10/20 international system. This is a 7hrs12 mins routine EEG. Bipolar and referential montages were used. Video recording was used during the study.   CLINICAL HISTORY: 78 yo man with sepsis and encephalopathy.   FINDINGS:   EEG overall symmetric with continuous polymorphic waveforms.   Background rhythm: Theta dominant while eyes are closed, with intermittent suppressed activity.   Background: 5-7 Hz   Mental status: Patient non responsive   Artifact: There is occasional eye movement, lead and EKG artifacts.   Sleep: None.   Epileptiform discharges: None.  Activation procedures:   None.   Abnormal activity: No seizures.   ECG: Regular rhythm   IMPRESSION: Mild to moderate encephalopathy. No seizures.

## 2020-02-19 NOTE — PROCEDURES
REASON FOR STUDY: Altered mental status  DATE OF STUDY: 11/6/2019.  PHYSICIAN REQUESTING/INSTITUTION:  Dr. Kinsey  AED: Keppra. Vimpat, propofol.  MENTAL STATUS: Comatose.  METHODS:  EEG was done according to the 10/20 international system.  This is a [id number] routine EEG. Bipolar and referential montages were used. Video recording was used during the study.      CLINICAL HISTORY:  80 yo man with sepsis and encephalopathy.        FINDINGS:  EEG overall symmetric with continuous polymorphic waveforms.       Background rhythm: Delta/Theta dominant while eyes are closed, with intermittent suppressed activity.   Background: 4-7 Hz   Mental status: Patient non responsive   Artifact: There is occasional eye movement, lead and EKG artifacts.       Sleep: None.         Epileptiform discharges: Transient rare generalized epileptiform activity     Activation procedures:  None.     Abnormal activity: No seizures.     ECG: Regular rhythm          IMPRESSION: Moderate encephalopathy with transient rare epileptiform activity. No seizures.

## 2021-05-04 NOTE — ASSESSMENT & PLAN NOTE
Patient getting admitted with severe sepsis with septic shock  He will be started on IV fluid, IV pressors and broad range spectrum of IV antibiotics  He will be started on IV vancomycin along with IV cefepime  Chart review indicating that he has got multidrug resistant Pseudomonas  Will consult Infectious Disease doctor  At this moment will not start patient on colistin in view with acute kidney injury     no

## 2021-06-28 NOTE — PLAN OF CARE
Problem: Nutrition Impairment (Mechanical Ventilation, Invasive)  Goal: Optimal Nutrition Delivery  Outcome: Ongoing, Progressing     Problem: Skin Injury Risk Increased  Goal: Skin Health and Integrity  Outcome: Ongoing, Progressing       Recommendation/Intervention:   1.) Recommend resume TFs as medically appropriate, rec. Nepro @ 45 ml/hr to provide: 1945 kcal, 87 g pro, & 788 ml free h20. FWF: 30 ml Q 4 hrs.   2.) Recommend Elliot BID to aid healing     Goals: 1.) TF resumed w/in 24-48 hrs with good tolerance   lives alone however granddaughter and son take turns watching her during the day for past 7 months, born [place], , has 4 children (1 of which is )

## 2022-02-03 NOTE — PROGRESS NOTES
[facility]  Pulmonology  Progress Note    Subjective     No major issues or significant changes overnight.  .   Review of Systems   Unable to perform ROS: Dementia     I have personally reviewed the following during today's evaluation:  past medical history, ROS, family history, social history, surgical history, current inpatient medications,drug allergies, vital signs over the past 24 hours, results of relevant diagnostic studies and nursing/provider documentation from the past 24 hours.     Objective     VS Temp:  [97.9 °F (36.6 °C)-98.2 °F (36.8 °C)]   Pulse:  [51-83]   Resp:  [1-90]   BP: ()/(57-89)   SpO2:  [72 %-97 %]   Ideal body weight: 82.2 kg (181 lb 3.5 oz)  Adjusted ideal body weight: 101 kg (222 lb 12.3 oz)   I/O   Intake/Output Summary (Last 24 hours) at 12/13/2019 1315  Last data filed at 12/13/2019 0600  Gross per 24 hour   Intake 280 ml   Output 1650 ml   Net -1370 ml        Vent SpO2 (!) 94% on 30% TC   PE Physical Exam   Constitutional: He appears well-developed and well-nourished. He is obese.   HENT:   Head: Normocephalic.   Right Ear: External ear normal.   Left Ear: External ear normal.   Nose: Nose normal.   Mouth/Throat: Oropharynx is clear and moist. Mallampati Score: III.   Neck: Normal range of motion. Neck supple. No JVD present. No tracheal deviation present. No thyromegaly present.   8.0 cuffed tracheostomy secure    Cardiovascular: Normal rate, regular rhythm, normal heart sounds and intact distal pulses. Exam reveals no gallop and no friction rub.   No murmur heard.  Pulmonary/Chest: Normal expansion and symmetric chest wall expansion. He has no wheezes. He has no rhonchi. He has no rales.   Abdominal: Soft. Bowel sounds are normal. He exhibits no distension and no mass. There is no rebound.   Musculoskeletal: Normal range of motion. He exhibits no edema, tenderness or deformity.   Lymphadenopathy:     He has no cervical adenopathy.   Neurological: He is  unresponsive. He displays no atrophy and no tremor. No cranial nerve deficit. He displays no seizure activity. GCS eye subscore is 1. GCS verbal subscore is 1. GCS motor subscore is 3.   Skin: Skin is warm and dry. No rash noted. No cyanosis or erythema. Nails show no clubbing.   Nursing note and vitals reviewed.      Labs I have personally reviewed and interpreted all labs / diagnostic studies obtained over the past 24 hours, and relevant results are as follows:   Imaging I have personally reviewed and interpreted the following images and reviewed the associated Radiology report.  no new images     Micro I have personally reviewed and interpreted the available culture data.  Relevant results are as follows.  No new cultures.   Medications Scheduled      Continuous Infusions:      PRN   lorazepam, morphine        Assessment       Active Hospital Problems    Diagnosis    Functional quadriplegia    Essential hypertension    Coronary artery disease    Chronic respiratory failure with hypoxia and hypercapnia secondary to JUNAID/OHS with chronic vent dependence    PEG (percutaneous endoscopic gastrostomy) status    Sacral decubitus ulcer, stage II    UTI due to Klebsiella species    Hypokalemia    Proteus mirabilis in respiratory culture    Seizure    Bacteremia -Providencia    Goals of care, counseling/discussion    Foot ulcer-unstageable, POA    CKD (chronic kidney disease), stage III    Encephalopathy, metabolic    Candida UTI    C. difficile colitis    Hemiparesis affecting dominant side as late effect of stroke    Acquired hypothyroidism    Sepsis      My Impression:  Comfortable.  Without any chance of meaningful long term survival.    Plan     · Continue comfort measures.  · Stable to transfer to med-surg.    Mauricio Kinsey MD  Pulmonary / Critical Care Medicine  [facility]     PAST SURGICAL HISTORY:  History of amputation of right great toe

## 2022-04-27 NOTE — PROGRESS NOTES
Subjective:     Interval History: Day +2 cer789 ASCT. Remains nauseated specially in the morning despite scheduled Zofran. Will add nightly zyprexa. Has low PO intake, and low appetite. Had soft BM, but she was on miralax and colace yesterday.      Objective:     Vital Signs (Most Recent):  Temp: 98 °F (36.7 °C) (04/27/22 1114)  Pulse: 85 (04/27/22 1114)  Resp: 16 (04/27/22 1114)  BP: 118/60 (04/27/22 1114)  SpO2: (!) 94 % (04/27/22 1114)   Vital Signs (24h Range):  Temp:  [98 °F (36.7 °C)-98.6 °F (37 °C)] 98 °F (36.7 °C)  Pulse:  [78-87] 85  Resp:  [16-21] 16  SpO2:  [93 %-98 %] 94 %  BP: (116-125)/(58-69) 118/60     Weight: 69.8 kg (153 lb 15.9 oz)  Body mass index is 24.12 kg/m².  Body surface area is 1.82 meters squared.    ECOG SCORE           [unfilled]    Intake/Output - Last 3 Shifts         04/25 0700  04/26 0659 04/26 0700  04/27 0659 04/27 0700  04/28 0659    P.O. 1695 940 120    I.V. (mL/kg) 2038.5 (28.9)      Blood 480      Total Intake(mL/kg) 4213.5 (59.8) 940 (13.5) 120 (1.7)    Urine (mL/kg/hr) 4550 (2.7) 4100 (2.4)     Stool 0 0     Total Output 4550 4100     Net -336.5 -3160 +120           Stool Occurrence 1 x 2 x 2 x            Physical Exam  Constitutional:       General: She is not in acute distress.     Appearance: Normal appearance.   HENT:      Head: Normocephalic and atraumatic.   Eyes:      Pupils: Pupils are equal, round, and reactive to light.   Cardiovascular:      Rate and Rhythm: Normal rate and regular rhythm.      Heart sounds: No murmur heard.  Pulmonary:      Effort: No respiratory distress.      Breath sounds: Normal breath sounds. No wheezing.   Abdominal:      General: Abdomen is flat. Bowel sounds are normal. There is no distension.      Palpations: Abdomen is soft. There is no mass.      Tenderness: There is no abdominal tenderness.   Musculoskeletal:         General: No swelling or deformity.   Skin:     Coloration: Skin is not jaundiced.      Comments: Soto line in R side  "Consult Note  Infectious Disease    Reason for Consult:  MDRO pseudomonas    HPI: Masood Messina is an  79 y.o. male with whom I am familiar from prior consults. He is quadriplegic, vent and PEG dependent. He was admitted 9/12 to [facility] for trach problem and was discharged back to NH. The events that led to a culture of sputum(GBS) followed by augmentin, then followed by IV maxipime for several days are unclear but there is a radiology report describing right sided pneumonia. He was sent her ish 10/2 with altered mental status and hypotension and hematuria. He had a RUL infiltrate and was placed on vanc and zosyn and levaquin. He required extensive manual irrigation of his bladder to remove clots. Cultures of the urine showed the usual pseudomonas and cultures of the sputum grew pseudomonas whose sensitivities were reported with resistance to everything but the aminoglycosides. He is having no fever, and has resolution of leukocytosis, and urine is clear now. He has now developed abdominal distention and liquid stools requiring a rectal tube.     10/8: afebrile, procalcitonin normal. Cdiff pcr pos, abdominal distention and stool volume are decreased. He feels "fair to middlin". Secretions remain purulent but CXR was improved. No hematuria. Repeat urine culture is in progress.   10/9: afebrile. Groin line out and picc in. CXR with increased RUL infiltrate again, ?taken in expiration? Urine culture still has a modest colony count of pseudomonas. He is tolerating tube feeds. RN reports more confusion and agitation today.   10/10: afebrile. procalcitonin normal x 2. CXR about the same. Secretions are decreased on Nadege aerosols. His abdomen is a little more distended today. No problems with tube feeds. He is mildly agitated.     EXAM & DIAGNOSTICS REVIEWED:   Vitals:     Temp:  [98 °F (36.7 °C)-100.1 °F (37.8 °C)]   Temp: 98.4 °F (36.9 °C) (10/10/19 1930)  Pulse: (!) 59 (10/10/19 2000)  Resp: 16 (10/10/19 2000)  BP: " (!) 113/52 (10/10/19 2000)  SpO2: 100 % (10/10/19 2000)    Intake/Output Summary (Last 24 hours) at 10/10/2019 2059  Last data filed at 10/10/2019 2055  Gross per 24 hour   Intake 1420 ml   Output 1585 ml   Net -165 ml       General:  In NAD. Attends, cooperates to the extent he can  Eyes:  Anicteric, PERRL, EOMI  ENT:  No ulcers, exudates, thrush, nares patent. Tardive mouth movements  Neck:  supple, tracheostomy  Lungs: Clear. Volume of secretions has decreased a lot in the last 2 days  Heart:  RRR, no gallop/murmur/rub noted  Abd:  Soft, mild tenderness, moderately distended, normal BS, no masses or organomegaly appreciated. Rectal tube with watery non bloody stool  :   Rahman, urine clear with minimal sediment,  . Meatus not visible  Musc:  Joints without effusion, swelling, erythema, synovitis,   Skin:  No rashes.   Wound:   Neuro:  Alert, attentive,  face symmetric, quadriparetic  Psych:  Calm, cooperative, answers most questions but I cannot understand him. Tardive mouth movements  Extrem: Mild chronic LE edema,no  erythema, phlebitis, cellulitis, warm    VAD:   picc right arm    Isolation:  contact    Lines/Tubes/Drains:    General Labs reviewed:  Recent Labs   Lab 10/08/19  0335 10/09/19  0419 10/10/19  0348   WBC 9.56 8.64 7.86   HGB 8.7* 8.4* 8.6*   HCT 26.9* 26.3* 26.9*    159 163       Recent Labs   Lab 10/08/19  0335 10/09/19  0419 10/10/19  0348   * 133* 135*   K 3.2* 3.4* 3.6   CL 95 96 99   CO2 28 28 28   BUN 26* 22 20   CREATININE 1.4 1.2 1.1   CALCIUM 8.7 8.5* 8.7   PROT 7.3 7.1 7.1   BILITOT 0.6 0.5 0.5   ALKPHOS 105 85 79   ALT 28 24 21   AST 24 19 18         Micro:  Microbiology Results (last 7 days)     Procedure Component Value Units Date/Time    Urine Culture High Risk [[id number]]  (Abnormal)  (Susceptibility) Collected:  10/07/19 2960    Order Status:  Completed Specimen:  Urine, Catheterized Updated:  10/10/19 1200     Urine Culture, Routine PSEUDOMONAS AERUGINOSA  10,000 -  chest. Dressing c/d/i   Neurological:      General: No focal deficit present.      Mental Status: She is alert and oriented to person, place, and time. Mental status is at baseline.       Significant Labs:   CBC:   Recent Labs   Lab 04/26/22 0315 04/27/22 0306   WBC 14.76* 1.87*   HGB 9.0* 8.9*   HCT 27.7* 26.8*   * 92*      and CMP:   Recent Labs   Lab 04/26/22 0315 04/27/22 0306    137   K 4.0 3.9   * 109   CO2 23 24   GLU 86 87   BUN 15 11   CREATININE 0.8 0.8   CALCIUM 6.8* 7.3*   PROT 4.7* 5.0*   ALBUMIN 2.7* 2.7*   BILITOT 0.6 1.1*   ALKPHOS 75 64   AST 56* 35   ALT 39 36   ANIONGAP 5* 4*   EGFRNONAA >60.0 >60.0         Diagnostic Results:  None   49,999 cfu/ml      Narrative:       Indicated criteria for high risk culture:->Other  Other (specify):->recent gross hematuria and UTI    IV catheter culture [[id number]] Collected:  10/08/19 2230    Order Status:  Completed Specimen:  Catheter Tip, NOS Updated:  10/10/19 0715     Aerobic Culture - Cath tip No growth    Narrative:       Left femoral TLC cath tip    C Diff Toxin by PCR [[id number]]  (Abnormal) Collected:  10/07/19 1755    Order Status:  Completed Updated:  10/08/19 1339     C. diff PCR Positive    Culture, Respiratory with Gram Stain [[id number]]  (Abnormal)  (Susceptibility) Collected:  10/04/19 0020    Order Status:  Completed Specimen:  Respiratory from Tracheal Aspirate Updated:  10/08/19 1121     Respiratory Culture No S aureus isolated.      PSEUDOMONAS AERUGINOSA   Many  Normal respiratory gabriela also present       Gram Stain (Respiratory) <10 epithelial cells per low power field.     Gram Stain (Respiratory) Rare WBC's     Gram Stain (Respiratory) Many Gram negative rods     Gram Stain (Respiratory) Few Gram positive rods     Gram Stain (Respiratory) Rare Gram positive cocci    Blood culture #1 **CANNOT BE ORDERED STAT** [[id number]] Collected:  10/02/19 1305    Order Status:  Completed Specimen:  Blood Updated:  10/08/19 0612     Blood Culture, Routine No growth after 5 days.    Blood culture #2 **CANNOT BE ORDERED STAT** [[id number]] Collected:  10/02/19 1305    Order Status:  Completed Specimen:  Blood Updated:  10/08/19 0612     Blood Culture, Routine No growth after 5 days.    Clostridium difficile EIA [[id number]]  (Abnormal) Collected:  10/07/19 1755    Order Status:  Completed Specimen:  Stool Updated:  10/08/19 0136     C. diff Antigen Positive     C difficile Toxins A+B, EIA Negative     Comment: Testing not recommended for children <24 months old.       Urine culture [[id number]]  (Abnormal)  (Susceptibility) Collected:  10/02/19 1321    Order Status:  Completed Specimen:  Urine  Updated:  10/05/19 0439     Urine Culture, Routine PSEUDOMONAS AERUGINOSA  10,000 - 49,999 cfu/ml      Narrative:       Preferred Collection Type->Urine, Catheterized        Imaging Reviewed:   CXRs  Cardiology:    IMPRESSION & PLAN   Imp: extensive exposure to antibiotics resulting in MDRO pseudomonas in sputum. At the onset of this pneumonia he had a relatively sensitive organism. Repeat culture now reflects selection by antibiotics.  RUL infiltrate waxes and wanes, procalcitonin is normal x 2 and oxygen requirment has not increased.    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 Difficile again 10/7    Rec: stopping cefepime now    continue tobra aerosols for another 2-3 days.    Oral vanc QIDx  14d then taper and avoid more antibiotics in near future for chronic colonization of sputum and urine      Dr. Badillo to cover 10/11-13

## 2023-05-19 NOTE — HPI
77 y.o. Male with PMHx significant for functional quadraplegia, CAD, hx psedumonas with ventilator dependency currently living at [facility] and here because of a noted fever at the nursing with associated tachycardia and relative hypotension.  In the ED he had a temperature up to 100.9 and was mildly hypotensive but responsive to fluids.  Labs significant for 40 WBC in urine and no other apparent source, so suspicion was high at that time for urosepsis.  Vanc and Zosyn were initiated and he was sent to the ICU.  He has remained HD stable with BP's on the low side of normal at this time.  Otherwise, no other acute issues.   
113

## 2023-09-21 NOTE — ASSESSMENT & PLAN NOTE
Chronic condition.  No acute process.  Holding Beta blocker given mild bradycardia.      No difficulties

## 2024-04-14 NOTE — NURSING
Patient picked up by [facility]   None

## 2025-05-27 NOTE — PROGRESS NOTES
12/04/19 1900   PRE-TX-O2   O2 Device (Oxygen Therapy) ventilator   Oxygen Concentration (%) 40   SpO2 98 %   Pulse Oximetry Type Continuous   $ Pulse Oximetry - Multiple Charge Pulse Oximetry - Multiple   Pulse 104   Resp (!) 0   Aerosol Therapy   $ Aerosol Therapy Charges PRN treatment not required   Vent Select   Conventional Vent Y   Charged w/in last 24h YES   Preset Conventional Ventilator Settings   Vent Type    Ventilation Type VC   Vent Mode A/C   Set Rate 18 bmp   Vt Set 550 mL   PEEP/CPAP 5 cmH20   Pressure Support 0 cmH20   Waveform RAMP   Peak Flow 60 L/min   Plateau Set/Insp. Hold (sec) 0   Trigger Sensitivity Flow/I-Trigger 3 L/min   Patient Ventilator Parameters   Resp Rate Total 18 br/min   Peak Airway Pressure 26 cmH2O   Mean Airway Pressure 12 cmH20   Plateau Pressure 0 cmH20   Exhaled Vt 568 mL   Total Ve 10.2 mL   I:E Ratio Measured 1:2.30   Conventional Ventilator Alarms   Resp Rate High Alarm 40 br/min   Press High Alarm 40 cmH2O   Apnea Rate 10   Apnea Volume (mL) 1 mL   Apnea Oxygen Concentration  100   Apnea Flow Rate (L/min) 74   T Apnea 20 sec(s)   Ready to Wean/Extubation Screen   FIO2<=50 (chart decimal) 0.4   MV<16L (chart vol.) 10.2   PEEP <=8 (chart #) 5   Ready to Wean Parameters   F/VT Ratio<105 (RSBI) (!) 0      normal S1, S2 heard

## 2025-07-01 NOTE — H&P
[facility] Medicine  History & Physical  DOS: 09/12/2019  Time: 05:30pm  Patient Name: Masood Messina  MRN: [id number]  Admission Date: 9/12/2019  Attending Physician: Durga Woods MD   Primary Care Provider: Jeramie Lombardi MD         Patient information was obtained from ED physician and prior records    Subjective:     Principal Problem:<principal problem not specified>    Chief Complaint:   Chief Complaint   Patient presents with    Altered Mental Status        HPI: 79-year-old nursing home resident([place]) with past medical history of chronic respiratory failure status post trach and PEG due to stroke, CAD status post CABG, hypertension, COPD,BPH presented from [facility] due to altered mental status, hypoxia.  There is no family members available and patient is not giving any meaningful history due to his clinical status.  According to ED physician Trach balloon was not functioning at nursing home and Trach tube was replaced.  Patient was also reportedly hypoxic at that time and was very confused.  Patient does have recent history of Pseudomonas infection and admission to the hospital.  No reported fever.  According to ED physician patient was moving his right upper and lower extremities upon arrival however when I interviewed patient received Ativan and is not moving his extremities, left upper extremity appears very spastic.  Patient also has stage II sacral decubitus ulcer.      Family history:  Unable to obtain    Past Medical History:   Diagnosis Date    AAA (abdominal aortic aneurysm)     Anemia     CHF (congestive heart failure)     COPD (chronic obstructive pulmonary disease)     Coronary artery disease     Dementia     Dementia     Depression     GERD (gastroesophageal reflux disease)     Hyperlipidemia     Hypertension     Hypothyroid     Renal disorder     Respiratory failure, chronic     Ventilator dependence        Past Surgical History:    Procedure Laterality Date    ABDOMINAL SURGERY      CARDIAC SURGERY  1999    CYSTOSCOPY N/A 1/30/2014    [facility]    CYSTOSCOPY N/A 1/25/2014    [facility]    CYSTOSCOPY, WITH BLADDER WASHINGS IF INDICATED  1/30/2014    [facility]    GASTROSTOMY TUBE PLACEMENT      IRRIGATION, BLADDER N/A 1/25/2014    [facility] at [facility] OR    SPINE SURGERY      TRACHEOSTOMY TUBE PLACEMENT         Review of patient's allergies indicates:   Allergen Reactions    Codeine Other (See Comments)       No current facility-administered medications on file prior to encounter.      Current Outpatient Medications on File Prior to Encounter   Medication Sig    ascorbic acid, vitamin C, (VITAMIN C) 500 mg/5 mL Syrp syrup 500 mg by PEG Tube route 2 (two) times daily.     baclofen (LIORESAL) 20 MG tablet 25 mg by PEG Tube route every 6 (six) hours.     bethanechol (URECHOLINE) 10 MG Tab Take 15 mg by mouth 3 (three) times daily.     busPIRone (BUSPAR) 5 MG Tab 5 mg by Per G Tube route 2 (two) times daily.    cholestyramine-aspartame (QUESTRAN LIGHT) 4 gram PwPk 1 packet (4 g total) by Per G Tube route 2 (two) times daily. Discontinue if no bowel movement in a day    duloxetine (CYMBALTA) 30 MG capsule 30 mg by PEG Tube route once daily.     enoxaparin (LOVENOX) 40 mg/0.4 mL Syrg Inject 40 mg into the skin once daily.    famotidine (PEPCID) 20 MG tablet 20 mg by Per G Tube route 2 (two) times daily.    ferrous sulfate 220 mg (44 mg iron)/5 mL solution 220 mg by Per G Tube route once daily.    finasteride (PROSCAR) 5 mg tablet 5 mg by PEG Tube route once daily.     gabapentin (NEURONTIN) 300 MG capsule 300 mg by Per G Tube route 3 (three) times daily.    hydrocodone-acetaminophen 10-325mg (NORCO)  mg Tab 1 tablet by Per G Tube route every 6 (six) hours as needed for Pain.    lactulose  (CHRONULAC) 10 gram/15 mL solution 20 g by Per G Tube route 4 (four) times daily.    lidocaine (XYLOCAINE) 5 % Oint ointment Apply 1 g topically as needed (with each trach change).    menthol-zinc oxide (RISAMINE) 0.44-20.6 % Oint Apply 1 application topically once daily. AFTER EACH DIAPER CHANGE    multivit-min-ferrous gluconate (CERTAVITE-ANTIOXID, IRON GLUC,) 9 mg iron/ 15 mL (15 mL) Liqd Take 15 mLs by mouth once daily.    polyethylene glycol (GLYCOLAX) 17 gram PwPk Take 17 g by mouth every evening.    rivastigmine (EXELON) 9.5 mg/24 hr PT24 Place 1 patch onto the skin once daily.    selenium sulfide 2.5 % Lotn Apply 1 application topically twice a week. ON Tuesday AND SATURDAY    terazosin (HYTRIN) 1 MG capsule 1 mg by PEG Tube route once daily.    traZODone (DESYREL) 100 MG tablet Take 100 mg by mouth every evening.    acetaminophen (TYLENOL) 160 mg/5 mL Elix 650 mg by Per G Tube route every 4 (four) hours as needed.    albuterol-ipratropium 2.5mg-0.5mg/3mL (DUONEB) 0.5 mg-3 mg(2.5 mg base)/3 mL nebulizer solution Take 3 mLs by nebulization every 6 (six) hours as needed for Wheezing or Shortness of Breath.     hydrocortisone 2.5 % cream Apply 1 application topically 2 (two) times daily as needed.    ibuprofen (ADVIL,MOTRIN) 600 MG tablet 600 mg by Per G Tube route every 6 (six) hours as needed for Pain.    Lactoperoxi/Gluc Oxid/Pot Thio (BIOTENE DRY MOUTH MM) Swish and spit 15 mLs 4 (four) times daily. AND/OR PRN    nitroGLYCERIN (NITROSTAT) 0.4 MG SL tablet Place 0.4 mg under the tongue every 5 (five) minutes as needed for Chest pain.    [DISCONTINUED] acetaminophen (TYLENOL) 650 mg/20.3 mL Soln 20.3 mLs (650 mg total) by Per G Tube route every 4 (four) hours as needed.    [DISCONTINUED] aspirin 325 MG tablet 325 mg by PEG Tube route once daily.     [DISCONTINUED] bacitracin 500 unit/gram Oint Apply topically 2 (two) times daily. Apply to PEG site with daily dressing change    [DISCONTINUED]  D3/E/SE/SOY ISOFL/TOCOPH/LYCOP (PROSTATE 2.4 ORAL) Take 30 mLs by mouth 4 (four) times daily.    [DISCONTINUED] famotidine (PEPCID) 20 MG tablet 1 tablet (20 mg total) by Per G Tube route 2 (two) times daily.    [DISCONTINUED] ferrous sulfate (FEROSUL) 220 mg (44 mg iron)/5 mL Elix Take 5 mLs by mouth once daily.    [DISCONTINUED] hydrocortisone 2.5 % cream Apply topically 3 (three) times daily.    [DISCONTINUED] levothyroxine (SYNTHROID) 100 MCG tablet Take 1 tablet (100 mcg total) by mouth before breakfast.    [DISCONTINUED] liothyronine (CYTOMEL) 25 MCG Tab 75 mcg by PEG Tube route once daily.     [DISCONTINUED] melatonin 3 mg Tab 9 mg by PEG Tube route every evening.     [DISCONTINUED] multivit-mins-ferrous gluconat 9 mg iron/15 mL Liqd Take 15 mLs by mouth once daily.    [DISCONTINUED] potassium chloride (KLOR-CON) 20 mEq Pack 20 mEq by PEG Tube route 2 (two) times daily.     [DISCONTINUED] Saccharomyces boulardii (FLORASTOR) 250 mg capsule 250 mg by Per G Tube route 2 (two) times daily.    [DISCONTINUED] selenium sulfide 2.5 % Lotn Apply 5 mLs topically twice a week.    [DISCONTINUED] vancomycin 250mg / 10ml Susp Take 250 mg QID via PEG for 3 days then  Take 250 mg TID via PEG for 7 days then  Take 250 mg BID via PEG for 7 days then  Take 250 mg daily via PEG for 7 days then STOP.     Family History     None        Tobacco Use    Smoking status: Former Smoker    Smokeless tobacco: Never Used   Substance and Sexual Activity    Alcohol use: No    Drug use: No    Sexual activity: Never     Review of Systems   Unable to perform ROS: Intubated     Objective:     Vital Signs (Most Recent):  Temp: 97.7 °F (36.5 °C) (09/12/19 1452)  Pulse: 65 (09/12/19 1552)  Resp: 16 (09/12/19 1433)  BP: (!) 110/50 (09/12/19 1415)  SpO2: (!) 80 % (09/12/19 1552) Vital Signs (24h Range):  Temp:  [97.7 °F (36.5 °C)] 97.7 °F (36.5 °C)  Pulse:  [57-70] 65  Resp:  [16-20] 16  SpO2:  [80 %-100 %] 80 %  BP: (110)/(50) 110/50      Weight: (!) 142.1 kg (313 lb 4.4 oz)  Body mass index is 42.49 kg/m².    Physical Exam   Constitutional: He appears well-developed and well-nourished.   Intubated, he just received Ativan   HENT:   Head: Atraumatic.   Mouth/Throat: Oropharynx is clear and moist.   Tracheostomy tube in place   Neck: Neck supple. No JVD present.   Tracheostomy   Cardiovascular: Normal rate, regular rhythm and normal heart sounds. Exam reveals no gallop.   No murmur heard.  Pulmonary/Chest: Breath sounds normal. No respiratory distress. He has no wheezes.   Tracheostomy, on ventilator, bilateral basal crackles   Abdominal: Bowel sounds are normal. He exhibits no distension. There is no rebound and no guarding.   Very rigid abdomen   Musculoskeletal: He exhibits no edema.   Spastic left upper extremity, quadriplegic   Lymphadenopathy:     He has no cervical adenopathy.   Neurological: No cranial nerve deficit.   According to ED physician upon arrival patient was moving his right upper and lower extremity however upon my exam he is quadriplegic.  Pupils are bilaterally equal and reactive, left upper extremity is spastic   Skin: Skin is warm and dry. Capillary refill takes less than 2 seconds. No rash noted.   Stage II decubitus sacral ulcer   Nursing note and vitals reviewed.          Significant Labs: All pertinent labs within the past 24 hours have been reviewed.    Significant Imaging: I have reviewed all pertinent imaging results/findings within the past 24 hours.    Assessment/Plan:     Severe sepsis related to UTI  -UA positive, history of Pseudomonas UTI  -urine culture  -patient already has mild volume overload and is not a candidate for aggressive IV hydration  -130 cc/hour IV fluid  -repeat lactic acid  -vancomycin and Zosyn      Respiratory failure, acute-on-chronic  -acute hypoxic hypercapnic respiratory failure  -tracheostomy was changed, current his ABGs improved  -consult pulmonology for vent management      Chronic  obstructive pulmonary disease  -scheduled bronchodilators      Sacral decubitus ulcer, stage II  -Q2H turning  -wound care    PEG (percutaneous endoscopic gastrostomy) status  -continue PEG care    Chronic respiratory failure with hypoxia and hypercapnia  -chronic vent dependent      Tracheostomy dependent  -there was a trach malfunction at [place]-tube was replaced  -further management as per Pulmonary      Functional quadriplegia  -chronic vent and PEG dependent after stroke  -baclofen      VTE Risk Mitigation (From admission, onward)        Ordered     enoxaparin injection 40 mg  Every 12 hours      09/12/19 1723     IP VTE HIGH RISK PATIENT  Once      09/12/19 1723             Durga Woods MD  Department of Hospital Medicine   [facility]   01-Jul-2025 10:06